# Patient Record
Sex: MALE | Race: WHITE | NOT HISPANIC OR LATINO | Employment: OTHER | URBAN - NONMETROPOLITAN AREA
[De-identification: names, ages, dates, MRNs, and addresses within clinical notes are randomized per-mention and may not be internally consistent; named-entity substitution may affect disease eponyms.]

---

## 2017-01-28 ENCOUNTER — HOSPITAL ENCOUNTER (EMERGENCY)
Facility: HOSPITAL | Age: 72
Discharge: HOME | End: 2017-01-28
Attending: EMERGENCY MEDICINE | Admitting: EMERGENCY MEDICINE
Payer: MEDICARE

## 2017-01-28 VITALS
OXYGEN SATURATION: 94 % | WEIGHT: 177 LBS | HEART RATE: 82 BPM | TEMPERATURE: 97.5 F | HEIGHT: 70 IN | RESPIRATION RATE: 18 BRPM | BODY MASS INDEX: 25.34 KG/M2 | DIASTOLIC BLOOD PRESSURE: 97 MMHG | SYSTOLIC BLOOD PRESSURE: 153 MMHG

## 2017-01-28 DIAGNOSIS — G44.209 ACUTE NON INTRACTABLE TENSION-TYPE HEADACHE: ICD-10-CM

## 2017-01-28 DIAGNOSIS — I10 ESSENTIAL HYPERTENSION: ICD-10-CM

## 2017-01-28 LAB
ANION GAP SERPL CALCULATED.3IONS-SCNC: 10 MMOL/L (ref 3–14)
BASOPHILS # BLD AUTO: 0 10E9/L (ref 0–0.2)
BASOPHILS NFR BLD AUTO: 0.4 %
BUN SERPL-MCNC: 19 MG/DL (ref 7–30)
CALCIUM SERPL-MCNC: 8.3 MG/DL (ref 8.5–10.1)
CHLORIDE SERPL-SCNC: 108 MMOL/L (ref 94–109)
CO2 SERPL-SCNC: 23 MMOL/L (ref 20–32)
CREAT SERPL-MCNC: 0.99 MG/DL (ref 0.66–1.25)
DIFFERENTIAL METHOD BLD: NORMAL
EOSINOPHIL # BLD AUTO: 0.3 10E9/L (ref 0–0.7)
EOSINOPHIL NFR BLD AUTO: 3 %
ERYTHROCYTE [DISTWIDTH] IN BLOOD BY AUTOMATED COUNT: 12.9 % (ref 10–15)
GFR SERPL CREATININE-BSD FRML MDRD: 74 ML/MIN/1.7M2
GLUCOSE SERPL-MCNC: 125 MG/DL (ref 70–99)
HCT VFR BLD AUTO: 47.4 % (ref 40–53)
HGB BLD-MCNC: 16.2 G/DL (ref 13.3–17.7)
IMM GRANULOCYTES # BLD: 0.1 10E9/L (ref 0–0.4)
IMM GRANULOCYTES NFR BLD: 0.5 %
LYMPHOCYTES # BLD AUTO: 1.7 10E9/L (ref 0.8–5.3)
LYMPHOCYTES NFR BLD AUTO: 17.6 %
MCH RBC QN AUTO: 29.1 PG (ref 26.5–33)
MCHC RBC AUTO-ENTMCNC: 34.2 G/DL (ref 31.5–36.5)
MCV RBC AUTO: 85 FL (ref 78–100)
MONOCYTES # BLD AUTO: 0.7 10E9/L (ref 0–1.3)
MONOCYTES NFR BLD AUTO: 7.7 %
NEUTROPHILS # BLD AUTO: 6.8 10E9/L (ref 1.6–8.3)
NEUTROPHILS NFR BLD AUTO: 70.8 %
NRBC # BLD AUTO: 0 10*3/UL
NRBC BLD AUTO-RTO: 0 /100
PLATELET # BLD AUTO: 275 10E9/L (ref 150–450)
POTASSIUM SERPL-SCNC: 4.1 MMOL/L (ref 3.4–5.3)
RBC # BLD AUTO: 5.56 10E12/L (ref 4.4–5.9)
SODIUM SERPL-SCNC: 141 MMOL/L (ref 133–144)
TROPONIN I SERPL-MCNC: NORMAL UG/L (ref 0–0.04)
WBC # BLD AUTO: 9.5 10E9/L (ref 4–11)

## 2017-01-28 PROCEDURE — 36415 COLL VENOUS BLD VENIPUNCTURE: CPT | Performed by: EMERGENCY MEDICINE

## 2017-01-28 PROCEDURE — 84484 ASSAY OF TROPONIN QUANT: CPT | Performed by: EMERGENCY MEDICINE

## 2017-01-28 PROCEDURE — 70450 CT HEAD/BRAIN W/O DYE: CPT | Mod: TC

## 2017-01-28 PROCEDURE — 85025 COMPLETE CBC W/AUTO DIFF WBC: CPT | Performed by: EMERGENCY MEDICINE

## 2017-01-28 PROCEDURE — 80048 BASIC METABOLIC PNL TOTAL CA: CPT | Performed by: EMERGENCY MEDICINE

## 2017-01-28 PROCEDURE — 99284 EMERGENCY DEPT VISIT MOD MDM: CPT | Performed by: EMERGENCY MEDICINE

## 2017-01-28 PROCEDURE — 99284 EMERGENCY DEPT VISIT MOD MDM: CPT | Mod: 25

## 2017-01-28 ASSESSMENT — ENCOUNTER SYMPTOMS
EYES NEGATIVE: 1
ABDOMINAL PAIN: 0
HEMATOLOGIC/LYMPHATIC NEGATIVE: 1
CARDIOVASCULAR NEGATIVE: 1
MUSCULOSKELETAL NEGATIVE: 1
PSYCHIATRIC NEGATIVE: 1
GASTROINTESTINAL NEGATIVE: 1
CONSTITUTIONAL NEGATIVE: 1
HEADACHES: 1
RESPIRATORY NEGATIVE: 1

## 2017-01-28 NOTE — ED AVS SNAPSHOT
[facility]    [address] [address]    Phone:  [phone]                                       Peter Zhao   MRN: [id number]    Department:  [facility]   Date of Visit:  1/28/2017           After Visit Summary Signature Page     I have received my discharge instructions, and my questions have been answered. I have discussed any challenges I see with this plan with the nurse or doctor.    ..........................................................................................................................................  Patient/Patient Representative Signature      ..........................................................................................................................................  Patient Representative Print Name and Relationship to Patient    ..................................................               ................................................  Date                                            Time    ..........................................................................................................................................  Reviewed by Signature/Title    ...................................................              ..............................................  Date                                                            Time

## 2017-01-28 NOTE — ED AVS SNAPSHOT
[facility]    [address] [address]    Phone:  [phone]                                       Peter Zhao   MRN: [id number]    Department:  [facility]   Date of Visit:  1/28/2017           Patient Information     Date Of Birth          1945        Your diagnoses for this visit were:     Essential hypertension     Acute non intractable tension-type headache        You were seen by Viky Sterling MD.      Follow-up Information     Follow up with Brock Vega MD In 1 day.    Specialty:  Family Practice    Contact information:    [facility]  [address] [address]  [phone]        Discharge References/Attachments     HEADACHE, UNSPECIFIED (ENGLISH)    HYPERTENSION, ESTABLISHED (ENGLISH)         Review of your medicines      Our records show that you are taking the medicines listed below. If these are incorrect, please call your family doctor or clinic.        Dose / Directions Last dose taken    AMOXICILLIN PO        Take 4 capsules prior to dental procedures d/t cardiac histroy   Refills:  0        ASPIRIN PO   Dose:  325 mg        Take 325 mg by mouth daily   Refills:  0        atorvastatin 40 MG tablet   Commonly known as:  LIPITOR   Dose:  40 mg        Take 40 mg by mouth daily   Refills:  0        blood glucose lancing device   Dose:  1 Units   Quantity:  1 kit        1 Units daily   Refills:  2        * blood glucose monitoring lancets   Dose:  1 Units   Quantity:  100 each        1 Units daily Use fresh lancet each day to check blood sugars   Refills:  4        * blood glucose monitoring lancets   Quantity:  100 Box        Use to test blood sugar 2 times weekly or as directed.   Refills:  6        * blood glucose monitoring test strip   Commonly known as:  ACCU-CHEK SMARTVIEW   Quantity:  1 Box        Test blood sugar before breakfast one day, before and after supper the next and not at all the third day   Refills:  12         * blood glucose monitoring test strip   Commonly known as:  MARIAN CONTOUR NEXT   Quantity:  100 strip        Use to test blood sugar 2 times weekly or as directed.   Refills:  6        FINASTERIDE PO   Dose:  5 mg        Take 5 mg by mouth daily   Refills:  0        LISINOPRIL PO   Dose:  40 mg        Take 40 mg by mouth daily   Refills:  0        METOPROLOL TARTRATE PO   Dose:  100 mg        Take 100 mg by mouth daily   Refills:  0        MULTIVITAMINS PO   Dose:  1 capsule        Take 1 capsule by mouth daily   Refills:  0        NITROSTAT 0.4 MG sublingual tablet   Dose:  1 tablet   Generic drug:  nitroglycerin        Place 1 tablet under the tongue every 5 minutes as needed   Refills:  0        NONFORMULARY        OTC eye drops as directed   Refills:  0        polyethylene glycol 0.4%- propylene glycol 0.3% 0.4-0.3 % Soln ophthalmic solution   Commonly known as:  SYSTANE ULTRA   Dose:  1 drop        Place 1 drop into both eyes every hour as needed for dry eyes   Refills:  0        RANITIDINE HCL PO   Dose:  50 mg        Take 50 mg by mouth 2 times daily   Refills:  0        VITAMIN D3 PO        Take  by mouth daily.   Refills:  0        * Notice:  This list has 4 medication(s) that are the same as other medications prescribed for you. Read the directions carefully, and ask your doctor or other care provider to review them with you.            Procedures and tests performed during your visit     Basic metabolic panel    CBC with platelets differential    Troponin I      Orders Needing Specimen Collection     None      Pending Results     No orders found from 1/27/2017 to 1/29/2017.            Pending Culture Results     No orders found from 1/27/2017 to 1/29/2017.            Thank you for choosing [facility]       Thank you for choosing [facility] for your care. Our goal is always to provide you with excellent care. Hearing back from our patients is one way we can continue to improve our services. Please take  "a few minutes to complete the written survey that you may receive in the mail after you visit with us. Thank you!        "[employer] Information     [employer] lets you send messages to your doctor, view your test results, renew your prescriptions, schedule appointments and more. To sign up, go to [email or web address] . Click on \"Log in\" on the left side of the screen, which will take you to the Welcome page. Then click on \"Sign up Now\" on the right side of the page.     You will be asked to enter the access code listed below, as well as some personal information. Please follow the directions to create your username and password.     Your access code is: [email or web address]  Expires: 2017  7:08 AM     Your access code will  in 90 days. If you need help or a new code, please call your [facility] clinic or [phone].        Care EveryWhere ID     This is your Care EveryWhere ID. This could be used by other organizations to access your [facility] medical records  [id number]        After Visit Summary       This is your record. Keep this with you and show to your community pharmacist(s) and doctor(s) at your next visit.                  "

## 2017-01-28 NOTE — ED PROVIDER NOTES
"  History     Chief Complaint   Patient presents with     Headache     \"headache since 2300 last night\"      Hypertension     \" 160/111 at home\"      HPI  Peter Zhao is a 71 year old male who presents today with a headache and concerns over his blood pressure. Patient states he was recently diagnosed with hypertension and has been placed on medications but has been unable to bring blood pressure back to normal. Patient states blood pressure was elevated today and so he took an extra dose of medications without relief. While trying to treat his blood pressure, patient noticed he had developed a headache prompting visit to the ER. Headache is dull and mainly in frontal part of his head. Headache was gradual and not worst headache of his life. Headache has now resolved. No visual complaints. No chest pain or shortness of breath at any time. No additional complaints.     I have reviewed the Medications, Allergies, Past Medical and Surgical History, and Social History in the Epic system.    Review of Systems   Constitutional: Negative.    Eyes: Negative.    Respiratory: Negative.    Cardiovascular: Negative.  Negative for chest pain.   Gastrointestinal: Negative.  Negative for abdominal pain.   Genitourinary: Negative.    Musculoskeletal: Negative.    Skin: Negative.    Neurological: Positive for headaches.   Hematological: Negative.    Psychiatric/Behavioral: Negative.        Physical Exam   BP: 138/99 mmHg  Pulse: 82  Heart Rate: 73  Temp: 98.7  F (37.1  C)  Resp: 18  Height: 177.8 cm (5' 10\")  Weight: 80.287 kg (177 lb)  SpO2: 97 %  Physical Exam   Constitutional: He is oriented to person, place, and time. He appears well-developed and well-nourished.   HENT:   Mouth/Throat: Oropharynx is clear and moist.   Eyes: Conjunctivae and EOM are normal. Pupils are equal, round, and reactive to light.   Neck: Normal range of motion. Neck supple.   Cardiovascular: Normal rate and regular rhythm.    Pulmonary/Chest: Effort " normal.   Abdominal: Soft.   Musculoskeletal: Normal range of motion.   Neurological: He is alert and oriented to person, place, and time.   Skin: Skin is warm and dry.   Psychiatric: He has a normal mood and affect. His behavior is normal. Judgment and thought content normal.       ED Course   Procedures        Head CT: No acute abnormality. Chronic microvascular ischemic patches noted.         Labs Ordered and Resulted from Time of ED Arrival Up to the Time of Departure from the ED   BASIC METABOLIC PANEL - Abnormal; Notable for the following:     Glucose 125 (*)     Calcium 8.3 (*)     All other components within normal limits   CBC WITH PLATELETS DIFFERENTIAL   TROPONIN I       Assessments & Plan (with Medical Decision Making)     I have reviewed the nursing notes.    I have reviewed the findings, diagnosis, plan and need for follow up with the patient.    New Prescriptions    No medications on file       Final diagnoses:   Essential hypertension   Acute non intractable tension-type headache       1/28/2017   [facility]  7.02am: No headache or visual symptoms. CT shows no acute findings. Plan: d/c home and patient to follow up with pcp for proper control of blood pressure. Patient to return for any chest pain, shortness of breath or return on headache.     Viky Sterling MD  01/28/17 2007

## 2017-02-15 ENCOUNTER — HOSPITAL ENCOUNTER (OUTPATIENT)
Facility: HOSPITAL | Age: 72
End: 2017-02-15
Attending: SURGERY | Admitting: SURGERY
Payer: MEDICARE

## 2017-02-15 ENCOUNTER — TRANSFERRED RECORDS (OUTPATIENT)
Dept: HEALTH INFORMATION MANAGEMENT | Facility: HOSPITAL | Age: 72
End: 2017-02-15

## 2017-02-15 ENCOUNTER — HOSPITAL ENCOUNTER (OUTPATIENT)
Dept: CT IMAGING | Facility: HOSPITAL | Age: 72
Discharge: HOME | DRG: 329 | End: 2017-02-15
Attending: FAMILY MEDICINE | Admitting: SURGERY
Payer: MEDICARE

## 2017-02-15 ENCOUNTER — OFFICE VISIT (OUTPATIENT)
Dept: SURGERY | Facility: OTHER | Age: 72
DRG: 329 | End: 2017-02-15
Attending: SURGERY
Payer: MEDICARE

## 2017-02-15 VITALS
DIASTOLIC BLOOD PRESSURE: 68 MMHG | TEMPERATURE: 97.5 F | BODY MASS INDEX: 25.34 KG/M2 | OXYGEN SATURATION: 95 % | HEART RATE: 91 BPM | HEIGHT: 70 IN | WEIGHT: 177 LBS | SYSTOLIC BLOOD PRESSURE: 128 MMHG

## 2017-02-15 DIAGNOSIS — R10.84 ABDOMINAL PAIN, GENERALIZED: Primary | ICD-10-CM

## 2017-02-15 DIAGNOSIS — K56.699 STRICTURE OF SIGMOID COLON (H): ICD-10-CM

## 2017-02-15 DIAGNOSIS — R10.32 LLQ ABDOMINAL PAIN: Primary | ICD-10-CM

## 2017-02-15 PROBLEM — R97.20 ELEVATED PROSTATE SPECIFIC ANTIGEN (PSA): Status: ACTIVE | Noted: 2017-02-15

## 2017-02-15 PROBLEM — H26.9 CATARACT: Status: ACTIVE | Noted: 2017-02-15

## 2017-02-15 PROBLEM — H53.9 VISUAL AURA: Status: ACTIVE | Noted: 2017-02-15

## 2017-02-15 PROBLEM — Z86.69 H/O MIGRAINE: Status: ACTIVE | Noted: 2017-02-15

## 2017-02-15 PROBLEM — E78.5 HYPERLIPIDEMIA LDL GOAL <100: Status: ACTIVE | Noted: 2017-02-15

## 2017-02-15 PROBLEM — I10 HTN (HYPERTENSION): Status: ACTIVE | Noted: 2017-02-15

## 2017-02-15 PROBLEM — Z95.810 PRESENCE OF COMBINATION INTERNAL CARDIAC DEFIBRILLATOR (ICD) AND PACEMAKER: Status: ACTIVE | Noted: 2017-02-15

## 2017-02-15 PROCEDURE — 2894A VOIDCORRECT: CPT | Performed by: SURGERY

## 2017-02-15 PROCEDURE — 99214 OFFICE O/P EST MOD 30 MIN: CPT | Performed by: SURGERY

## 2017-02-15 RX ORDER — LIDOCAINE 40 MG/G
CREAM TOPICAL
Status: CANCELLED | OUTPATIENT
Start: 2017-02-15

## 2017-02-15 RX ORDER — SODIUM, POTASSIUM,MAG SULFATES 17.5-3.13G
2 SOLUTION, RECONSTITUTED, ORAL ORAL SEE ADMIN INSTRUCTIONS
Qty: 2 BOTTLE | Refills: 0 | Status: CANCELLED | OUTPATIENT
Start: 2017-02-15

## 2017-02-15 RX ORDER — SODIUM CHLORIDE, SODIUM LACTATE, POTASSIUM CHLORIDE, CALCIUM CHLORIDE 600; 310; 30; 20 MG/100ML; MG/100ML; MG/100ML; MG/100ML
INJECTION, SOLUTION INTRAVENOUS CONTINUOUS
Status: CANCELLED | OUTPATIENT
Start: 2017-02-15

## 2017-02-15 RX ORDER — IOPAMIDOL 612 MG/ML
100 INJECTION, SOLUTION INTRAVASCULAR ONCE
Status: COMPLETED | OUTPATIENT
Start: 2017-02-15 | End: 2017-02-15

## 2017-02-15 RX ADMIN — IOPAMIDOL 100 ML: 612 INJECTION, SOLUTION INTRAVASCULAR at 11:49

## 2017-02-15 ASSESSMENT — PAIN SCALES - GENERAL: PAINLEVEL: SEVERE PAIN (6)

## 2017-02-15 NOTE — NURSING NOTE
"Chief Complaint   Patient presents with     Consult     Consult for sigmoid bowel obstruction-CT done here-Dr Vega is referring       Initial /68  Pulse 91  Temp 97.5  F (36.4  C) (Tympanic)  Ht 5' 10\" (1.778 m)  Wt 177 lb (80.3 kg)  SpO2 95%  BMI 25.4 kg/m2 Estimated body mass index is 25.4 kg/(m^2) as calculated from the following:    Height as of this encounter: 5' 10\" (1.778 m).    Weight as of this encounter: 177 lb (80.3 kg).  Medication Reconciliation: complete   ELIAS MULLER      "

## 2017-02-15 NOTE — MR AVS SNAPSHOT
After Visit Summary   2/15/2017    Peter Zhao    MRN: [id number]           Patient Information     Date Of Birth          1945        Visit Information        Provider Department      2/15/2017 1:30 PM Tima Moreland MD [facility]        Today's Diagnoses     Abdominal pain, generalized    -  1    Stricture of sigmoid colon (H)          Care Instructions    Thank you for allowing Dr Moreland and our surgical team to participate in your care.  If you have a scheduling or an appointment question please contact Darcie, our Health Unit Coordinator, at her direct line [phone].   ALL nursing questions or concerns can be directed to your surgical nurse at: [phone]-Barbara    You are scheduled for a: Colonoscopy with possible biopsy   Your procedure date is: Friday March 17, 2017    You have been ordered Suprep as your mechanical bowel prep. Please pick this up from your preferred pharmacy.       Bowel Prep    Clear liquid diet only on Thursday, February 16th,  the day before the examination. (See the instruction sheet)    Ruiz prep - one bottle at 6pm Thursday, February 16th, and follow with Two 16 ounce glasses water.    Ruiz prep - one bottle at 0400 on Friday, February 17th, the day of the exam.  Follow with Two 16 ounce glasses of water.    Nothing by mouth after finishing the second 16 ounce glass of water the morning of the procedure.     HOW TO PREPARE-        You need a friend or family member available to drive you home AND stay with you for 4 hours after you leave the hospital. You will not be allowed to drive yourself. IF you need to take a taxi or the bus you MUST have a responsible person to ride with you. YOUR PROCEDURE WILL BE CANCELLED IF YOU DO NOT HAVE A RESPONSIBLE ADULT TO DRIVE YOU HOME.       You need to call our Surgery Education Nurses 1-2 weeks prior to your surgery date at [phone] or toll free [phone]. Please have you medication and allergy  "lists ready.       Stop your aspirin or other NSAIDs(Ibuprofen, Motrin, Aleve, Celebrex, Naproxen, etc...) 7 days before your surgery.      Hospital admitting will call you the day before your surgery with your arrival time. If you are scheduled on a Monday admitting will call you the Friday before.      Please call your primary care physician if you should become ill within 24 hours of scheduled surgery. (ex.vomiting, diarrhea, fever)          Follow-ups after your visit        Your next 10 appointments already scheduled     Feb 17, 2017   Procedure with Tima Moreland MD   [facility] ([facility] )    [address] [place] [address] [phone]              Who to contact     If you have questions or need follow up information about today's clinic visit or your schedule please contact [facility] directly at [phone].  Normal or non-critical lab and imaging results will be communicated to you by MyChart, letter or phone within 4 business days after the clinic has received the results. If you do not hear from us within 7 days, please contact the clinic through [employer] or phone. If you have a critical or abnormal lab result, we will notify you by phone as soon as possible.  Submit refill requests through [facility] [employer] [facility] or call your pharmacy and they will forward the refill request to us. Please allow 3 business days for your refill to be completed.          Additional Information About Your Visit        [employer] Information     [employer] [facility] lets you send messages to your doctor, view your test results, renew your prescriptions, schedule appointments and more. To sign up, go to [email or web address] . Click on \"Log in\" on the left side of the screen, which will take you to the Welcome page. Then click on \"Sign up Now\" on the right side of the page.     You will be asked to enter the access code listed below, as well as some personal information. Please follow the directions " "to create your username and password.     Your access code is: [email or web address]  Expires: 2017  7:08 AM     Your access code will  in 90 days. If you need help or a new code, please call your [facility] clinic or [phone].        Care EveryWhere ID     This is your Care EveryWhere ID. This could be used by other organizations to access your [facility] medical records  [id number]        Your Vitals Were     Pulse Temperature Height Pulse Oximetry BMI (Body Mass Index)       91 97.5  F (36.4  C) (Tympanic) 5' 10\" (1.778 m) 95% 25.4 kg/m2        Blood Pressure from Last 3 Encounters:   02/15/17 128/68   17 153/97   10/04/16 112/68    Weight from Last 3 Encounters:   02/15/17 177 lb (80.3 kg)   17 177 lb (80.3 kg)   10/04/16 186 lb 3.2 oz (84.5 kg)              We Performed the Following     SURGERY ORDER        Primary Care Provider Office Phone # Fax #    Brock Vega [phone]       [facility] [address] [address]        Thank you!     Thank you for choosing [facility]  for your care. Our goal is always to provide you with excellent care. Hearing back from our patients is one way we can continue to improve our services. Please take a few minutes to complete the written survey that you may receive in the mail after your visit with us. Thank you!             Your Updated Medication List - Protect others around you: Learn how to safely use, store and throw away your medicines at www.disposemymeds.org.          This list is accurate as of: 2/15/17  2:58 PM.  Always use your most recent med list.                   Brand Name Dispense Instructions for use    AMOXICILLIN PO      Take 4 capsules prior to dental procedures d/t cardiac histroy       ASPIRIN PO      Take 325 mg by mouth daily       atorvastatin 40 MG tablet    LIPITOR     Take 40 mg by mouth daily       blood glucose lancing device     1 kit    1 Units daily       * blood " glucose monitoring lancets     100 each    1 Units daily Use fresh lancet each day to check blood sugars       * blood glucose monitoring lancets     100 Box    Use to test blood sugar 2 times weekly or as directed.       * blood glucose monitoring test strip    ACCU-CHEK SMARTVIEW    1 Box    Test blood sugar before breakfast one day, before and after supper the next and not at all the third day       * blood glucose monitoring test strip    MARIAN CONTOUR NEXT    100 strip    Use to test blood sugar 2 times weekly or as directed.       FINASTERIDE PO      Take 5 mg by mouth daily       LISINOPRIL PO      Take 40 mg by mouth daily       METOPROLOL TARTRATE PO      Take 100 mg by mouth daily       MULTIVITAMINS PO      Take 1 capsule by mouth daily       NITROSTAT 0.4 MG sublingual tablet   Generic drug:  nitroglycerin      Place 1 tablet under the tongue every 5 minutes as needed       NONFORMULARY      OTC eye drops as directed       polyethylene glycol 0.4%- propylene glycol 0.3% 0.4-0.3 % Soln ophthalmic solution    SYSTANE ULTRA     Place 1 drop into both eyes every hour as needed for dry eyes       RANITIDINE HCL PO      Take 50 mg by mouth 2 times daily       VITAMIN D3 PO      Take  by mouth daily.       * Notice:  This list has 4 medication(s) that are the same as other medications prescribed for you. Read the directions carefully, and ask your doctor or other care provider to review them with you.

## 2017-02-15 NOTE — PATIENT INSTRUCTIONS
Thank you for allowing Dr Moreland and our surgical team to participate in your care.  If you have a scheduling or an appointment question please contact Darcie, our Health Unit Coordinator, at her direct line [phone].   ALL nursing questions or concerns can be directed to your surgical nurse at: [phone]    You are scheduled for a: Colonoscopy with possible biopsy   Your procedure date is: Friday March 17, 2017    You have been ordered Suprep as your mechanical bowel prep. Please pick this up from your preferred pharmacy.       Bowel Prep    Clear liquid diet only on Thursday, February 16th,  the day before the examination. (See the instruction sheet)    Ruiz prep - one bottle at 6pm Thursday, February 16th, and follow with Two 16 ounce glasses water.    Ruiz prep - one bottle at 0400 on Friday, February 17th, the day of the exam.  Follow with Two 16 ounce glasses of water.    Nothing by mouth after finishing the second 16 ounce glass of water the morning of the procedure.     HOW TO PREPARE-        You need a friend or family member available to drive you home AND stay with you for 4 hours after you leave the hospital. You will not be allowed to drive yourself. IF you need to take a taxi or the bus you MUST have a responsible person to ride with you. YOUR PROCEDURE WILL BE CANCELLED IF YOU DO NOT HAVE A RESPONSIBLE ADULT TO DRIVE YOU HOME.       You need to call our Surgery Education Nurses 1-2 weeks prior to your surgery date at [phone] or toll free [phone]. Please have you medication and allergy lists ready.       Stop your aspirin or other NSAIDs(Ibuprofen, Motrin, Aleve, Celebrex, Naproxen, etc...) 7 days before your surgery.      Hospital admitting will call you the day before your surgery with your arrival time. If you are scheduled on a Monday admitting will call you the Friday before.      Please call your primary care physician if you should become ill within 24 hours of scheduled  surgery. (ex.vomiting, diarrhea, fever)

## 2017-02-15 NOTE — CONSULTS
Dr. Vega asked me to see Mr. Peter Zhao, who is a pleasant 71-year-old male regarding an obstructing lesion in the patient's lower sigmoid colon.  This man states that he was well until Thursday or Friday.  He does state that prior to that time his bowels have been working normally.  He tends to have 2 bowel movements a day.  He had not noticed any change in the caliber of the stool.  He started having some lower abdominal pain.  This was a crampy type of discomfort.  He was hearing a fair amount of noise coming from the abdomen.  Would develop cramps.  Would then pass a large amount of gas and feel better.  He has been passing quite a bit of gas over the weekend and earlier this week.  He has not passed as much yesterday and today.  His bowels have been functioning.  He had quite a good bowel movement yesterday.  He passed a small amount of stool today.  He denies any fever or chills.  He has not had any blood with his bowel movements.  There is no family history of colon cancer or colonic polyps.      He has been eating normally.  There has been no nausea or vomiting.      Of note, he did have a bout of diverticulitis 3-4 years ago.  He was treated with antibiotics at that time.  He was not admitted into the hospital.  He has not had any recent episodes of gastroenteritis or abdominal pain.  He did have trouble with an upper respiratory tract infection on 2 occasions earlier this year.      The patient has never had a colonoscopy.      The patient does have a history of coronary artery disease.  He has had previous aortic bypass surgery plus repair of a valve.  He also has a pacemaker and defibrillator in place.  This was placed approximately 5 years ago.  The surgery was around the same time.  He states that his heart has been good since then.  He denies chest pain.  Denies any shortness of breath.  He has not smoked for approximately 28 years.  He is not asthmatic.  He is not diabetic.  There is no history of  any chronic renal disease.  There is no history of stroke or threatened stroke.      CURRENT MEDICATIONS:  Lipitor, Nitrostat, aspirin, lisinopril, multivitamins, vitamin D, Avodart, ranitidine, and metoprolol.      ALLERGIES:  He does have allergies to acetaminophen and ciprofloxacin.      PAST SURGICAL HISTORY:  He has had previous tonsillectomy, biopsy of his prostate, bilateral inguinal hernia repairs, insertion of the pacemaker, repair of a retinal tear as well as cataract repair.      REVIEW OF SYSTEMS:  He does state that he thinks he has lost approximately 6-7 pounds of weight over the last week or so.  His abdomen does feel a bit distended.      PHYSICAL EXAMINATION:   GENERAL:  Today he is a healthy-appearing 71-year-old male who is in no distress.  He is awake and alert.   HEAD, NECK:  Unremarkable.  I could not feel any masses.  There is no thyroid enlargement.  There is no scleral icterus.   CHEST:  He has scars from the previous surgeries.  Pacemaker is present in the left upper chest.  He does have good air entry bilaterally.  There are no wheezes or creps.   HEART:  Sounds were normal.  He does have a bit of a click.  He is in sinus rhythm.  No murmurs were heard.   ABDOMEN:  He is a bit on the obese side.  He has scars from his previous hernia repairs.  One was done open on the left side; the right one was done by way of the laparoscope.  The abdomen was soft.  He had very minimal tenderness on deep palpation in the left lower quadrant.  I could not feel any masses.  There is no hepatosplenomegaly.  I could not demonstrate any costovertebral angle tenderness.  He does not have groin hernias.  His femoral pulses are strong bilaterally.      LABORATORY WORK:  He did have blood work done by Dr. Vega, which reveals that his hemoglobin is normal at 17.3 with a white count of 9.8.  His urine is clear.      He has also had a CT scan of the abdomen and pelvis.  I did review this personally.  This does  suggest a focal narrowing within the lower part of the sigmoid colon.  Thickening appears to be a confined to the bowel wall.  There are no extraneous masses that could be seen.  Proximal left colon is dilated primarily with gas and a little stool.  There is no evidence of any metastatic disease to the liver.  The radiologist felt that this was either an area of scarring perhaps secondary to diverticulitis or a neoplasm.      I explained to Mr. Zhao that he does have an area of narrowing in the sigmoid colon.  This is no doubt causing his symptoms.  I did explain that this could either be diverticular disease or it could be a malignancy.  I explained that to try and sort it out, we should do a colonoscopy.  Hopefully, we will be able to get up into the stricture and hopefully will be able to get past it and examine the whole colon.  I did explain that if there are polyps present we would remove them.  Otherwise, we will try to do biopsies.  I did go over the procedure with him including the risks of perforation and bleeding.  We also discussed the risks of the sedation.  I suspect we will be able to get him cleaned out as he is passing gas and having bowel movements still.      We also discussed that he likely is going to need to have surgery done on the area.  It will depend a little bit on the findings.  We talked briefly about the surgery.  I did explain that it is a big operation.  He likely would be in the hospital for a week or so.  We will need to discuss it further and decide on timing after the colonoscopy is done.  I did explain to him that I do also work here just on a part-time basis.      Arrangements are being made for the colonoscopy to be carried out on Friday.      Thank you for asking me to see Mr. Zhao.         RUDY RAYO MD             D: 02/15/2017 14:18   T: 02/15/2017 15:13   MT: MERCED      Name:     ROGER ZHAO   MRN:      [id number]        Account:       [id number]   :       1945           Consult Date:  02/15/2017      Document: [id number]       cc: Brokc Vega MD

## 2017-02-16 ENCOUNTER — TELEPHONE (OUTPATIENT)
Dept: EDUCATION SERVICES | Facility: HOSPITAL | Age: 72
End: 2017-02-16

## 2017-02-16 DIAGNOSIS — E11.9 TYPE 2 DIABETES MELLITUS WITHOUT COMPLICATION, WITHOUT LONG-TERM CURRENT USE OF INSULIN (H): Primary | ICD-10-CM

## 2017-02-16 NOTE — H&P
Dr. Vega asked me to see Mr. Peter Zhao, who is a pleasant 71-year-old male regarding an obstructing lesion in the patient's lower sigmoid colon.  This man states that he was well until Thursday or Friday.  He does state that prior to that time his bowels have been working normally.  He tends to have 2 bowel movements a day.  He had not noticed any change in the caliber of the stool.  He started having some lower abdominal pain.  This was a crampy type of discomfort.  He was hearing a fair amount of noise coming from the abdomen.  Would develop cramps.  Would then pass a large amount of gas and feel better.  He has been passing quite a bit of gas over the weekend and earlier this week.  He has not passed as much yesterday and today.  His bowels have been functioning.  He had quite a good bowel movement yesterday.  He passed a small amount of stool today.  He denies any fever or chills.  He has not had any blood with his bowel movements.  There is no family history of colon cancer or colonic polyps.      He has been eating normally.  There has been no nausea or vomiting.      Of note, he did have a bout of diverticulitis 3-4 years ago.  He was treated with antibiotics at that time.  He was not admitted into the hospital.  He has not had any recent episodes of gastroenteritis or abdominal pain.  He did have trouble with an upper respiratory tract infection on 2 occasions earlier this year.      The patient has never had a colonoscopy.      The patient does have a history of coronary artery disease.  He has had previous aortic bypass surgery plus repair of a valve.  He also has a pacemaker and defibrillator in place.  This was placed approximately 5 years ago.  The surgery was around the same time.  He states that his heart has been good since then.  He denies chest pain.  Denies any shortness of breath.  He has not smoked for approximately 28 years.  He is not asthmatic.  He is not diabetic.  There is no history of  any chronic renal disease.  There is no history of stroke or threatened stroke.      CURRENT MEDICATIONS:  Lipitor, Nitrostat, aspirin, lisinopril, multivitamins, vitamin D, Avodart, ranitidine, and metoprolol.      ALLERGIES:  He does have allergies to acetaminophen and ciprofloxacin.      PAST SURGICAL HISTORY:  He has had previous tonsillectomy, biopsy of his prostate, bilateral inguinal hernia repairs, insertion of the pacemaker, repair of a retinal tear as well as cataract repair.      REVIEW OF SYSTEMS:  He does state that he thinks he has lost approximately 6-7 pounds of weight over the last week or so.  His abdomen does feel a bit distended.      PHYSICAL EXAMINATION:   GENERAL:  Today he is a healthy-appearing 71-year-old male who is in no distress.  He is awake and alert.   HEAD, NECK:  Unremarkable.  I could not feel any masses.  There is no thyroid enlargement.  There is no scleral icterus.   CHEST:  He has scars from the previous surgeries.  Pacemaker is present in the left upper chest.  He does have good air entry bilaterally.  There are no wheezes or creps.   HEART:  Sounds were normal.  He does have a bit of a click.  He is in sinus rhythm.  No murmurs were heard.   ABDOMEN:  He is a bit on the obese side.  He has scars from his previous hernia repairs.  One was done open on the left side; the right one was done by way of the laparoscope.  The abdomen was soft.  He had very minimal tenderness on deep palpation in the left lower quadrant.  I could not feel any masses.  There is no hepatosplenomegaly.  I could not demonstrate any costovertebral angle tenderness.  He does not have groin hernias.  His femoral pulses are strong bilaterally.      LABORATORY WORK:  He did have blood work done by Dr. Vega, which reveals that his hemoglobin is normal at 17.3 with a white count of 9.8.  His urine is clear.      He has also had a CT scan of the abdomen and pelvis.  I did review this personally.  This does  suggest a focal narrowing within the lower part of the sigmoid colon.  Thickening appears to be a confined to the bowel wall.  There are no extraneous masses that could be seen.  Proximal left colon is dilated primarily with gas and a little stool.  There is no evidence of any metastatic disease to the liver.  The radiologist felt that this was either an area of scarring perhaps secondary to diverticulitis or a neoplasm.      I explained to Mr. Zhao that he does have an area of narrowing in the sigmoid colon.  This is no doubt causing his symptoms.  I did explain that this could either be diverticular disease or it could be a malignancy.  I explained that to try and sort it out, we should do a colonoscopy.  Hopefully, we will be able to get up into the stricture and hopefully will be able to get past it and examine the whole colon.  I did explain that if there are polyps present we would remove them.  Otherwise, we will try to do biopsies.  I did go over the procedure with him including the risks of perforation and bleeding.  We also discussed the risks of the sedation.  I suspect we will be able to get him cleaned out as he is passing gas and having bowel movements still.      We also discussed that he likely is going to need to have surgery done on the area.  It will depend a little bit on the findings.  We talked briefly about the surgery.  I did explain that it is a big operation.  He likely would be in the hospital for a week or so.  We will need to discuss it further and decide on timing after the colonoscopy is done.  I did explain to him that I do also work here just on a part-time basis.      Arrangements are being made for the colonoscopy to be carried out on Friday.      Thank you for asking me to see Mr. Zhao.         RUDY RAYO MD             D: 02/15/2017 14:18   T: 02/15/2017 15:13   MT: MERCED      Name:     ROGER ZHAO   MRN:      [id number]        Account:       [id number]   :       1945           Consult Date:  02/15/2017      Document: [id number]       cc: Brock Vega MD

## 2017-02-17 ENCOUNTER — ANESTHESIA (OUTPATIENT)
Dept: SURGERY | Facility: HOSPITAL | Age: 72
DRG: 329 | End: 2017-02-17
Payer: MEDICARE

## 2017-02-17 ENCOUNTER — APPOINTMENT (OUTPATIENT)
Dept: GENERAL RADIOLOGY | Facility: HOSPITAL | Age: 72
DRG: 329 | End: 2017-02-17
Payer: MEDICARE

## 2017-02-17 ENCOUNTER — ANESTHESIA EVENT (OUTPATIENT)
Dept: SURGERY | Facility: HOSPITAL | Age: 72
DRG: 329 | End: 2017-02-17
Payer: MEDICARE

## 2017-02-17 ENCOUNTER — TRANSFERRED RECORDS (OUTPATIENT)
Dept: SURGERY | Facility: HOSPITAL | Age: 72
End: 2017-02-17

## 2017-02-17 ENCOUNTER — HOSPITAL ENCOUNTER (INPATIENT)
Facility: HOSPITAL | Age: 72
LOS: 6 days | Discharge: HOME HEALTH | DRG: 329 | End: 2017-02-23
Attending: EMERGENCY MEDICINE | Admitting: SURGERY
Payer: MEDICARE

## 2017-02-17 ENCOUNTER — TRANSFERRED RECORDS (OUTPATIENT)
Dept: HEALTH INFORMATION MANAGEMENT | Facility: HOSPITAL | Age: 72
End: 2017-02-17

## 2017-02-17 DIAGNOSIS — K56.60 INTESTINAL OBSTRUCTION, UNSPECIFIED TYPE: Primary | ICD-10-CM

## 2017-02-17 DIAGNOSIS — Z43.3 COLOSTOMY CARE (H): ICD-10-CM

## 2017-02-17 DIAGNOSIS — R10.84 ABDOMINAL PAIN, GENERALIZED: ICD-10-CM

## 2017-02-17 PROBLEM — K56.609 LARGE BOWEL OBSTRUCTION (H): Status: ACTIVE | Noted: 2017-02-17

## 2017-02-17 PROBLEM — K56.699 STRICTURE OF SIGMOID COLON (H): Status: ACTIVE | Noted: 2017-02-17

## 2017-02-17 LAB
ALBUMIN SERPL-MCNC: 3.9 G/DL (ref 3.4–5)
ALP SERPL-CCNC: 98 U/L (ref 40–150)
ALT SERPL W P-5'-P-CCNC: 32 U/L (ref 0–70)
AMYLASE SERPL-CCNC: 51 U/L (ref 30–110)
ANION GAP SERPL CALCULATED.3IONS-SCNC: 13 MMOL/L (ref 3–14)
AST SERPL W P-5'-P-CCNC: 34 U/L (ref 0–45)
BASOPHILS # BLD AUTO: 0.1 10E9/L (ref 0–0.2)
BASOPHILS NFR BLD AUTO: 0.4 %
BILIRUB SERPL-MCNC: 1.6 MG/DL (ref 0.2–1.3)
BUN SERPL-MCNC: 29 MG/DL (ref 7–30)
CALCIUM SERPL-MCNC: 8.7 MG/DL (ref 8.5–10.1)
CHLORIDE SERPL-SCNC: 104 MMOL/L (ref 94–109)
CO2 SERPL-SCNC: 24 MMOL/L (ref 20–32)
CREAT SERPL-MCNC: 1.32 MG/DL (ref 0.66–1.25)
DIFFERENTIAL METHOD BLD: ABNORMAL
EOSINOPHIL # BLD AUTO: 0.1 10E9/L (ref 0–0.7)
EOSINOPHIL NFR BLD AUTO: 0.3 %
ERYTHROCYTE [DISTWIDTH] IN BLOOD BY AUTOMATED COUNT: 13.7 % (ref 10–15)
FLEXIBLE SIGMOIDOSCOPY: NORMAL
GFR SERPL CREATININE-BSD FRML MDRD: 53 ML/MIN/1.7M2
GLUCOSE SERPL-MCNC: 153 MG/DL (ref 70–99)
HCT VFR BLD AUTO: 50.7 % (ref 40–53)
HGB BLD-MCNC: 17.3 G/DL (ref 13.3–17.7)
IMM GRANULOCYTES # BLD: 0.1 10E9/L (ref 0–0.4)
IMM GRANULOCYTES NFR BLD: 0.4 %
LACTATE SERPL-SCNC: 1.7 MMOL/L (ref 0.4–2)
LIPASE SERPL-CCNC: 131 U/L (ref 73–393)
LYMPHOCYTES # BLD AUTO: 1.5 10E9/L (ref 0.8–5.3)
LYMPHOCYTES NFR BLD AUTO: 9.5 %
MCH RBC QN AUTO: 29.3 PG (ref 26.5–33)
MCHC RBC AUTO-ENTMCNC: 34.1 G/DL (ref 31.5–36.5)
MCV RBC AUTO: 86 FL (ref 78–100)
MONOCYTES # BLD AUTO: 0.9 10E9/L (ref 0–1.3)
MONOCYTES NFR BLD AUTO: 5.7 %
NEUTROPHILS # BLD AUTO: 13.1 10E9/L (ref 1.6–8.3)
NEUTROPHILS NFR BLD AUTO: 83.7 %
NRBC # BLD AUTO: 0 10*3/UL
NRBC BLD AUTO-RTO: 0 /100
PLATELET # BLD AUTO: 248 10E9/L (ref 150–450)
POTASSIUM SERPL-SCNC: 4 MMOL/L (ref 3.4–5.3)
PROT SERPL-MCNC: 7.9 G/DL (ref 6.8–8.8)
RBC # BLD AUTO: 5.9 10E12/L (ref 4.4–5.9)
SODIUM SERPL-SCNC: 141 MMOL/L (ref 133–144)
TROPONIN I SERPL-MCNC: NORMAL UG/L (ref 0–0.04)
WBC # BLD AUTO: 15.7 10E9/L (ref 4–11)

## 2017-02-17 PROCEDURE — 40000306 ZZH STATISTIC PRE PROC ASSESS II: Performed by: SURGERY

## 2017-02-17 PROCEDURE — 99285 EMERGENCY DEPT VISIT HI MDM: CPT | Mod: 25

## 2017-02-17 PROCEDURE — 71000027 ZZH RECOVERY PHASE 2 EACH 15 MINS: Performed by: SURGERY

## 2017-02-17 PROCEDURE — 96374 THER/PROPH/DIAG INJ IV PUSH: CPT

## 2017-02-17 PROCEDURE — 25000128 H RX IP 250 OP 636: Performed by: EMERGENCY MEDICINE

## 2017-02-17 PROCEDURE — 71000017 ZZH RECOVERY PHASE 1 LEVEL 3 EA ADDTL HR: Performed by: SURGERY

## 2017-02-17 PROCEDURE — 88307 TISSUE EXAM BY PATHOLOGIST: CPT | Mod: TC | Performed by: SURGERY

## 2017-02-17 PROCEDURE — 83605 ASSAY OF LACTIC ACID: CPT | Performed by: EMERGENCY MEDICINE

## 2017-02-17 PROCEDURE — 85025 COMPLETE CBC W/AUTO DIFF WBC: CPT | Performed by: EMERGENCY MEDICINE

## 2017-02-17 PROCEDURE — 25000125 ZZHC RX 250: Performed by: NURSE ANESTHETIST, CERTIFIED REGISTERED

## 2017-02-17 PROCEDURE — 25000566 ZZH SEVOFLURANE, EA 15 MIN: Performed by: ANESTHESIOLOGY

## 2017-02-17 PROCEDURE — 25000128 H RX IP 250 OP 636: Performed by: NURSE ANESTHETIST, CERTIFIED REGISTERED

## 2017-02-17 PROCEDURE — 45330 DIAGNOSTIC SIGMOIDOSCOPY: CPT | Performed by: ANESTHESIOLOGY

## 2017-02-17 PROCEDURE — 0DBN0ZZ EXCISION OF SIGMOID COLON, OPEN APPROACH: ICD-10-PCS | Performed by: SURGERY

## 2017-02-17 PROCEDURE — 44141 PARTIAL REMOVAL OF COLON: CPT | Performed by: SURGERY

## 2017-02-17 PROCEDURE — 83690 ASSAY OF LIPASE: CPT | Performed by: EMERGENCY MEDICINE

## 2017-02-17 PROCEDURE — S0074 INJECTION, CEFOTETAN DISODIU: HCPCS | Performed by: SURGERY

## 2017-02-17 PROCEDURE — 99100 ANES PT EXTEME AGE<1 YR&>70: CPT | Performed by: NURSE ANESTHETIST, CERTIFIED REGISTERED

## 2017-02-17 PROCEDURE — 37000009 ZZH ANESTHESIA TECHNICAL FEE, EACH ADDTL 15 MIN: Performed by: SURGERY

## 2017-02-17 PROCEDURE — 71010 ZZHC CHEST ONE VIEW: CPT | Mod: TC

## 2017-02-17 PROCEDURE — 40000275 ZZH STATISTIC RCP TIME EA 10 MIN

## 2017-02-17 PROCEDURE — 93005 ELECTROCARDIOGRAM TRACING: CPT

## 2017-02-17 PROCEDURE — 25000125 ZZHC RX 250: Performed by: EMERGENCY MEDICINE

## 2017-02-17 PROCEDURE — 99222 1ST HOSP IP/OBS MODERATE 55: CPT | Mod: AI | Performed by: SURGERY

## 2017-02-17 PROCEDURE — 37000008 ZZH ANESTHESIA TECHNICAL FEE, 1ST 30 MIN: Performed by: SURGERY

## 2017-02-17 PROCEDURE — 25000132 ZZH RX MED GY IP 250 OP 250 PS 637: Mod: GY | Performed by: SURGERY

## 2017-02-17 PROCEDURE — 62326 NJX INTERLAMINAR LMBR/SAC: CPT | Mod: 59 | Performed by: ANESTHESIOLOGY

## 2017-02-17 PROCEDURE — 44140 PARTIAL REMOVAL OF COLON: CPT | Performed by: NURSE ANESTHETIST, CERTIFIED REGISTERED

## 2017-02-17 PROCEDURE — 20000003 ZZH R&B ICU

## 2017-02-17 PROCEDURE — 36000056 ZZH SURGERY LEVEL 3 1ST 30 MIN: Performed by: SURGERY

## 2017-02-17 PROCEDURE — 25000125 ZZHC RX 250: Performed by: SURGERY

## 2017-02-17 PROCEDURE — 25800025 ZZH RX 258: Performed by: NURSE ANESTHETIST, CERTIFIED REGISTERED

## 2017-02-17 PROCEDURE — 36000058 ZZH SURGERY LEVEL 3 EA 15 ADDTL MIN: Performed by: SURGERY

## 2017-02-17 PROCEDURE — 80053 COMPREHEN METABOLIC PANEL: CPT | Performed by: EMERGENCY MEDICINE

## 2017-02-17 PROCEDURE — 36000050 ZZH SURGERY LEVEL 2 1ST 30 MIN: Performed by: SURGERY

## 2017-02-17 PROCEDURE — 25800025 ZZH RX 258: Performed by: ANESTHESIOLOGY

## 2017-02-17 PROCEDURE — 25000128 H RX IP 250 OP 636: Performed by: SURGERY

## 2017-02-17 PROCEDURE — 0D1N0Z4 BYPASS SIGMOID COLON TO CUTANEOUS, OPEN APPROACH: ICD-10-PCS | Performed by: SURGERY

## 2017-02-17 PROCEDURE — 96375 TX/PRO/DX INJ NEW DRUG ADDON: CPT

## 2017-02-17 PROCEDURE — 0DJD8ZZ INSPECTION OF LOWER INTESTINAL TRACT, VIA NATURAL OR ARTIFICIAL OPENING ENDOSCOPIC: ICD-10-PCS | Performed by: SURGERY

## 2017-02-17 PROCEDURE — 96361 HYDRATE IV INFUSION ADD-ON: CPT

## 2017-02-17 PROCEDURE — 36556 INSERT NON-TUNNEL CV CATH: CPT | Mod: 59 | Performed by: ANESTHESIOLOGY

## 2017-02-17 PROCEDURE — 44140 PARTIAL REMOVAL OF COLON: CPT | Performed by: ANESTHESIOLOGY

## 2017-02-17 PROCEDURE — 36415 COLL VENOUS BLD VENIPUNCTURE: CPT | Performed by: SURGERY

## 2017-02-17 PROCEDURE — 40000786 ZZHCL STATISTIC ACTIVE MRSA SURVEILLANCE CULTURE: Performed by: SURGERY

## 2017-02-17 PROCEDURE — 45330 DIAGNOSTIC SIGMOIDOSCOPY: CPT | Performed by: NURSE ANESTHETIST, CERTIFIED REGISTERED

## 2017-02-17 PROCEDURE — 27110028 ZZH OR GENERAL SUPPLY NON-STERILE: Performed by: SURGERY

## 2017-02-17 PROCEDURE — 84484 ASSAY OF TROPONIN QUANT: CPT | Performed by: EMERGENCY MEDICINE

## 2017-02-17 PROCEDURE — 82150 ASSAY OF AMYLASE: CPT | Performed by: EMERGENCY MEDICINE

## 2017-02-17 PROCEDURE — 99285 EMERGENCY DEPT VISIT HI MDM: CPT | Performed by: EMERGENCY MEDICINE

## 2017-02-17 PROCEDURE — 71000016 ZZH RECOVERY PHASE 1 LEVEL 3 FIRST HR: Performed by: SURGERY

## 2017-02-17 PROCEDURE — 99140 ANES COMP EMERGENCY COND: CPT | Performed by: NURSE ANESTHETIST, CERTIFIED REGISTERED

## 2017-02-17 PROCEDURE — 93010 ELECTROCARDIOGRAM REPORT: CPT | Performed by: INTERNAL MEDICINE

## 2017-02-17 PROCEDURE — 27210794 ZZH OR GENERAL SUPPLY STERILE: Performed by: SURGERY

## 2017-02-17 PROCEDURE — 74177 CT ABD & PELVIS W/CONTRAST: CPT | Mod: TC

## 2017-02-17 RX ORDER — FENTANYL CITRATE 50 UG/ML
25-50 INJECTION, SOLUTION INTRAMUSCULAR; INTRAVENOUS
Status: DISCONTINUED | OUTPATIENT
Start: 2017-02-17 | End: 2017-02-17 | Stop reason: HOSPADM

## 2017-02-17 RX ORDER — LORAZEPAM 2 MG/ML
.5-1 INJECTION INTRAMUSCULAR EVERY 4 HOURS PRN
Status: DISCONTINUED | OUTPATIENT
Start: 2017-02-17 | End: 2017-02-23 | Stop reason: HOSPADM

## 2017-02-17 RX ORDER — HYDRALAZINE HYDROCHLORIDE 20 MG/ML
10 INJECTION INTRAMUSCULAR; INTRAVENOUS EVERY 4 HOURS PRN
Status: DISCONTINUED | OUTPATIENT
Start: 2017-02-17 | End: 2017-02-22

## 2017-02-17 RX ORDER — ALBUTEROL SULFATE 0.83 MG/ML
2.5 SOLUTION RESPIRATORY (INHALATION) EVERY 4 HOURS PRN
Status: DISCONTINUED | OUTPATIENT
Start: 2017-02-17 | End: 2017-02-17 | Stop reason: HOSPADM

## 2017-02-17 RX ORDER — LIDOCAINE HYDROCHLORIDE AND EPINEPHRINE 15; 5 MG/ML; UG/ML
INJECTION, SOLUTION EPIDURAL PRN
Status: DISCONTINUED | OUTPATIENT
Start: 2017-02-17 | End: 2017-02-17

## 2017-02-17 RX ORDER — LIDOCAINE 40 MG/G
CREAM TOPICAL
Status: DISCONTINUED | OUTPATIENT
Start: 2017-02-17 | End: 2017-02-23 | Stop reason: HOSPADM

## 2017-02-17 RX ORDER — METOPROLOL TARTRATE 100 MG
100 TABLET ORAL DAILY
Status: DISCONTINUED | OUTPATIENT
Start: 2017-02-17 | End: 2017-02-17

## 2017-02-17 RX ORDER — LABETALOL HYDROCHLORIDE 5 MG/ML
10 INJECTION, SOLUTION INTRAVENOUS
Status: DISCONTINUED | OUTPATIENT
Start: 2017-02-17 | End: 2017-02-17 | Stop reason: HOSPADM

## 2017-02-17 RX ORDER — LIDOCAINE HYDROCHLORIDE 20 MG/ML
INJECTION, SOLUTION INFILTRATION; PERINEURAL PRN
Status: DISCONTINUED | OUTPATIENT
Start: 2017-02-17 | End: 2017-02-17

## 2017-02-17 RX ORDER — NALOXONE HYDROCHLORIDE 0.4 MG/ML
.1-.4 INJECTION, SOLUTION INTRAMUSCULAR; INTRAVENOUS; SUBCUTANEOUS
Status: DISCONTINUED | OUTPATIENT
Start: 2017-02-17 | End: 2017-02-17 | Stop reason: HOSPADM

## 2017-02-17 RX ORDER — LIDOCAINE 40 MG/G
CREAM TOPICAL
Status: DISCONTINUED | OUTPATIENT
Start: 2017-02-17 | End: 2017-02-17 | Stop reason: HOSPADM

## 2017-02-17 RX ORDER — PROPOFOL 10 MG/ML
INJECTION, EMULSION INTRAVENOUS PRN
Status: DISCONTINUED | OUTPATIENT
Start: 2017-02-17 | End: 2017-02-17

## 2017-02-17 RX ORDER — SODIUM CHLORIDE 9 MG/ML
INJECTION, SOLUTION INTRAVENOUS CONTINUOUS
Status: DISCONTINUED | OUTPATIENT
Start: 2017-02-17 | End: 2017-02-17

## 2017-02-17 RX ORDER — GLYCOPYRROLATE 0.2 MG/ML
INJECTION, SOLUTION INTRAMUSCULAR; INTRAVENOUS PRN
Status: DISCONTINUED | OUTPATIENT
Start: 2017-02-17 | End: 2017-02-17

## 2017-02-17 RX ORDER — FENTANYL CITRATE 50 UG/ML
INJECTION, SOLUTION INTRAMUSCULAR; INTRAVENOUS PRN
Status: DISCONTINUED | OUTPATIENT
Start: 2017-02-17 | End: 2017-02-17

## 2017-02-17 RX ORDER — IOPAMIDOL 612 MG/ML
100 INJECTION, SOLUTION INTRAVASCULAR ONCE
Status: COMPLETED | OUTPATIENT
Start: 2017-02-17 | End: 2017-02-17

## 2017-02-17 RX ORDER — MEPERIDINE HYDROCHLORIDE 25 MG/ML
12.5 INJECTION INTRAMUSCULAR; INTRAVENOUS; SUBCUTANEOUS
Status: DISCONTINUED | OUTPATIENT
Start: 2017-02-17 | End: 2017-02-17 | Stop reason: HOSPADM

## 2017-02-17 RX ORDER — ONDANSETRON 4 MG/1
4 TABLET, ORALLY DISINTEGRATING ORAL EVERY 30 MIN PRN
Status: DISCONTINUED | OUTPATIENT
Start: 2017-02-17 | End: 2017-02-17 | Stop reason: HOSPADM

## 2017-02-17 RX ORDER — EPHEDRINE SULFATE 50 MG/ML
INJECTION, SOLUTION INTRAVENOUS PRN
Status: DISCONTINUED | OUTPATIENT
Start: 2017-02-17 | End: 2017-02-17

## 2017-02-17 RX ORDER — NALOXONE HYDROCHLORIDE 0.4 MG/ML
.1-.4 INJECTION, SOLUTION INTRAMUSCULAR; INTRAVENOUS; SUBCUTANEOUS
Status: DISCONTINUED | OUTPATIENT
Start: 2017-02-17 | End: 2017-02-18

## 2017-02-17 RX ORDER — NALOXONE HYDROCHLORIDE 0.4 MG/ML
.1-.4 INJECTION, SOLUTION INTRAMUSCULAR; INTRAVENOUS; SUBCUTANEOUS
Status: DISCONTINUED | OUTPATIENT
Start: 2017-02-17 | End: 2017-02-17

## 2017-02-17 RX ORDER — NITROGLYCERIN 0.4 MG/1
0.4 TABLET SUBLINGUAL EVERY 5 MIN PRN
Status: DISCONTINUED | OUTPATIENT
Start: 2017-02-17 | End: 2017-02-23 | Stop reason: HOSPADM

## 2017-02-17 RX ORDER — ONDANSETRON 2 MG/ML
4 INJECTION INTRAMUSCULAR; INTRAVENOUS EVERY 30 MIN PRN
Status: DISCONTINUED | OUTPATIENT
Start: 2017-02-17 | End: 2017-02-17 | Stop reason: HOSPADM

## 2017-02-17 RX ORDER — SODIUM CHLORIDE, SODIUM LACTATE, POTASSIUM CHLORIDE, CALCIUM CHLORIDE 600; 310; 30; 20 MG/100ML; MG/100ML; MG/100ML; MG/100ML
INJECTION, SOLUTION INTRAVENOUS CONTINUOUS
Status: DISCONTINUED | OUTPATIENT
Start: 2017-02-17 | End: 2017-02-17 | Stop reason: HOSPADM

## 2017-02-17 RX ORDER — SODIUM CHLORIDE 9 MG/ML
1000 INJECTION, SOLUTION INTRAVENOUS CONTINUOUS
Status: DISCONTINUED | OUTPATIENT
Start: 2017-02-17 | End: 2017-02-17

## 2017-02-17 RX ORDER — ONDANSETRON 2 MG/ML
4 INJECTION INTRAMUSCULAR; INTRAVENOUS EVERY 6 HOURS PRN
Status: DISCONTINUED | OUTPATIENT
Start: 2017-02-17 | End: 2017-02-23 | Stop reason: HOSPADM

## 2017-02-17 RX ORDER — PROMETHAZINE HYDROCHLORIDE 25 MG/ML
12.5 INJECTION, SOLUTION INTRAMUSCULAR; INTRAVENOUS
Status: DISCONTINUED | OUTPATIENT
Start: 2017-02-17 | End: 2017-02-17 | Stop reason: HOSPADM

## 2017-02-17 RX ORDER — HETASTARCH 6 G/100ML
INJECTION, SOLUTION INTRAVENOUS CONTINUOUS PRN
Status: DISCONTINUED | OUTPATIENT
Start: 2017-02-17 | End: 2017-02-17

## 2017-02-17 RX ORDER — SODIUM CHLORIDE, SODIUM LACTATE, POTASSIUM CHLORIDE, CALCIUM CHLORIDE 600; 310; 30; 20 MG/100ML; MG/100ML; MG/100ML; MG/100ML
INJECTION, SOLUTION INTRAVENOUS CONTINUOUS PRN
Status: DISCONTINUED | OUTPATIENT
Start: 2017-02-17 | End: 2017-02-17

## 2017-02-17 RX ORDER — DEXAMETHASONE SODIUM PHOSPHATE 10 MG/ML
INJECTION, SOLUTION INTRAMUSCULAR; INTRAVENOUS PRN
Status: DISCONTINUED | OUTPATIENT
Start: 2017-02-17 | End: 2017-02-17

## 2017-02-17 RX ORDER — DEXAMETHASONE SODIUM PHOSPHATE 4 MG/ML
4 INJECTION, SOLUTION INTRA-ARTICULAR; INTRALESIONAL; INTRAMUSCULAR; INTRAVENOUS; SOFT TISSUE EVERY 10 MIN PRN
Status: DISCONTINUED | OUTPATIENT
Start: 2017-02-17 | End: 2017-02-17 | Stop reason: HOSPADM

## 2017-02-17 RX ORDER — SODIUM CHLORIDE 9 MG/ML
INJECTION, SOLUTION INTRAVENOUS CONTINUOUS
Status: DISCONTINUED | OUTPATIENT
Start: 2017-02-17 | End: 2017-02-18

## 2017-02-17 RX ORDER — ONDANSETRON 2 MG/ML
4 INJECTION INTRAMUSCULAR; INTRAVENOUS EVERY 6 HOURS PRN
Status: DISCONTINUED | OUTPATIENT
Start: 2017-02-17 | End: 2017-02-17

## 2017-02-17 RX ORDER — LISINOPRIL 40 MG/1
40 TABLET ORAL DAILY
Status: DISCONTINUED | OUTPATIENT
Start: 2017-02-17 | End: 2017-02-23 | Stop reason: HOSPADM

## 2017-02-17 RX ORDER — ALBUTEROL SULFATE 0.83 MG/ML
2.5 SOLUTION RESPIRATORY (INHALATION) EVERY 6 HOURS PRN
Status: DISCONTINUED | OUTPATIENT
Start: 2017-02-17 | End: 2017-02-23 | Stop reason: HOSPADM

## 2017-02-17 RX ORDER — DIPHENHYDRAMINE HYDROCHLORIDE 50 MG/ML
12.5 INJECTION INTRAMUSCULAR; INTRAVENOUS EVERY 6 HOURS PRN
Status: DISCONTINUED | OUTPATIENT
Start: 2017-02-17 | End: 2017-02-23 | Stop reason: HOSPADM

## 2017-02-17 RX ORDER — NEOSTIGMINE METHYLSULFATE 1 MG/ML
VIAL (ML) INJECTION PRN
Status: DISCONTINUED | OUTPATIENT
Start: 2017-02-17 | End: 2017-02-17

## 2017-02-17 RX ORDER — FLUMAZENIL 0.1 MG/ML
INJECTION, SOLUTION INTRAVENOUS PRN
Status: DISCONTINUED | OUTPATIENT
Start: 2017-02-17 | End: 2017-02-17

## 2017-02-17 RX ORDER — HYDROMORPHONE HYDROCHLORIDE 1 MG/ML
.3-.5 INJECTION, SOLUTION INTRAMUSCULAR; INTRAVENOUS; SUBCUTANEOUS
Status: DISCONTINUED | OUTPATIENT
Start: 2017-02-17 | End: 2017-02-17

## 2017-02-17 RX ORDER — PHENYLEPHRINE HCL IN 0.9% NACL 1 MG/10 ML
SYRINGE (ML) INTRAVENOUS PRN
Status: DISCONTINUED | OUTPATIENT
Start: 2017-02-17 | End: 2017-02-17

## 2017-02-17 RX ORDER — HYDRALAZINE HYDROCHLORIDE 20 MG/ML
2.5-5 INJECTION INTRAMUSCULAR; INTRAVENOUS EVERY 10 MIN PRN
Status: DISCONTINUED | OUTPATIENT
Start: 2017-02-17 | End: 2017-02-17 | Stop reason: HOSPADM

## 2017-02-17 RX ORDER — HYDROMORPHONE HYDROCHLORIDE 1 MG/ML
0.5 INJECTION, SOLUTION INTRAMUSCULAR; INTRAVENOUS; SUBCUTANEOUS
Status: COMPLETED | OUTPATIENT
Start: 2017-02-17 | End: 2017-02-17

## 2017-02-17 RX ORDER — ONDANSETRON 4 MG/1
4 TABLET, ORALLY DISINTEGRATING ORAL EVERY 6 HOURS PRN
Status: DISCONTINUED | OUTPATIENT
Start: 2017-02-17 | End: 2017-02-23 | Stop reason: HOSPADM

## 2017-02-17 RX ORDER — ONDANSETRON 2 MG/ML
INJECTION INTRAMUSCULAR; INTRAVENOUS PRN
Status: DISCONTINUED | OUTPATIENT
Start: 2017-02-17 | End: 2017-02-17

## 2017-02-17 RX ORDER — ONDANSETRON 2 MG/ML
4 INJECTION INTRAMUSCULAR; INTRAVENOUS EVERY 30 MIN PRN
Status: DISCONTINUED | OUTPATIENT
Start: 2017-02-17 | End: 2017-02-17

## 2017-02-17 RX ORDER — FINASTERIDE 5 MG/1
5 TABLET, FILM COATED ORAL DAILY
Status: DISCONTINUED | OUTPATIENT
Start: 2017-02-17 | End: 2017-02-17

## 2017-02-17 RX ORDER — FENTANYL CITRATE 50 UG/ML
25-50 INJECTION, SOLUTION INTRAMUSCULAR; INTRAVENOUS
Status: ACTIVE | OUTPATIENT
Start: 2017-02-17 | End: 2017-02-18

## 2017-02-17 RX ADMIN — Medication 100 MCG: at 13:10

## 2017-02-17 RX ADMIN — Medication 100 MCG: at 13:56

## 2017-02-17 RX ADMIN — FENTANYL CITRATE 50 MCG: 50 INJECTION, SOLUTION INTRAMUSCULAR; INTRAVENOUS at 08:33

## 2017-02-17 RX ADMIN — Medication 8 ML/HR: at 12:03

## 2017-02-17 RX ADMIN — PHENYLEPHRINE HYDROCHLORIDE 0.5 MCG/KG/MIN: 10 INJECTION INTRAVENOUS at 14:43

## 2017-02-17 RX ADMIN — PROPOFOL 20 MG: 10 INJECTION, EMULSION INTRAVENOUS at 08:33

## 2017-02-17 RX ADMIN — Medication 100 MCG: at 14:09

## 2017-02-17 RX ADMIN — ROCURONIUM BROMIDE 10 MG: 10 INJECTION INTRAVENOUS at 12:50

## 2017-02-17 RX ADMIN — HYDROMORPHONE HYDROCHLORIDE 0.5 MG: 1 INJECTION, SOLUTION INTRAMUSCULAR; INTRAVENOUS; SUBCUTANEOUS at 01:37

## 2017-02-17 RX ADMIN — MIDAZOLAM HYDROCHLORIDE 1 MG: 1 INJECTION, SOLUTION INTRAMUSCULAR; INTRAVENOUS at 08:33

## 2017-02-17 RX ADMIN — SUCCINYLCHOLINE CHLORIDE 100 MG: 20 INJECTION, SOLUTION INTRAMUSCULAR; INTRAVENOUS at 11:27

## 2017-02-17 RX ADMIN — Medication 100 MCG: at 13:45

## 2017-02-17 RX ADMIN — HETASTARCH IN SODIUM CHLORIDE: 6; .9 INJECTION, SOLUTION INTRAVENOUS at 11:48

## 2017-02-17 RX ADMIN — PROPOFOL 10 MG: 10 INJECTION, EMULSION INTRAVENOUS at 08:41

## 2017-02-17 RX ADMIN — PROPOFOL 10 MG: 10 INJECTION, EMULSION INTRAVENOUS at 08:43

## 2017-02-17 RX ADMIN — MIDAZOLAM HYDROCHLORIDE 1 MG: 1 INJECTION, SOLUTION INTRAMUSCULAR; INTRAVENOUS at 13:27

## 2017-02-17 RX ADMIN — SODIUM CHLORIDE 1000 ML: 9 INJECTION, SOLUTION INTRAVENOUS at 01:37

## 2017-02-17 RX ADMIN — Medication 100 MCG: at 13:28

## 2017-02-17 RX ADMIN — NEOSTIGMINE METHYLSULFATE 4 MG: 1 INJECTION INTRAMUSCULAR; INTRAVENOUS; SUBCUTANEOUS at 14:51

## 2017-02-17 RX ADMIN — Medication 100 MCG: at 11:30

## 2017-02-17 RX ADMIN — Medication 5 ML: at 12:03

## 2017-02-17 RX ADMIN — FLUMAZENIL 0.2 MG: 0.1 INJECTION, SOLUTION INTRAVENOUS at 15:00

## 2017-02-17 RX ADMIN — PROPOFOL 10 MG: 10 INJECTION, EMULSION INTRAVENOUS at 08:48

## 2017-02-17 RX ADMIN — PROPOFOL 10 MG: 10 INJECTION, EMULSION INTRAVENOUS at 08:37

## 2017-02-17 RX ADMIN — EPHEDRINE SULFATE 10 MG: 50 INJECTION, SOLUTION INTRAMUSCULAR; INTRAVENOUS; SUBCUTANEOUS at 11:39

## 2017-02-17 RX ADMIN — EPHEDRINE SULFATE 10 MG: 50 INJECTION, SOLUTION INTRAMUSCULAR; INTRAVENOUS; SUBCUTANEOUS at 11:45

## 2017-02-17 RX ADMIN — ONDANSETRON 4 MG: 2 INJECTION INTRAMUSCULAR; INTRAVENOUS at 01:37

## 2017-02-17 RX ADMIN — DEXAMETHASONE SODIUM PHOSPHATE 10 MG: 10 INJECTION, SOLUTION INTRAMUSCULAR; INTRAVENOUS at 11:49

## 2017-02-17 RX ADMIN — EPHEDRINE SULFATE 5 MG: 50 INJECTION, SOLUTION INTRAMUSCULAR; INTRAVENOUS; SUBCUTANEOUS at 13:10

## 2017-02-17 RX ADMIN — SODIUM PHOSPHATE, DIBASIC AND SODIUM PHOSPHATE, MONOBASIC 1 ENEMA: 7; 19 ENEMA RECTAL at 05:29

## 2017-02-17 RX ADMIN — Medication 100 MCG: at 11:39

## 2017-02-17 RX ADMIN — ONDANSETRON 4 MG: 2 INJECTION INTRAMUSCULAR; INTRAVENOUS at 11:49

## 2017-02-17 RX ADMIN — SODIUM CHLORIDE, POTASSIUM CHLORIDE, SODIUM LACTATE AND CALCIUM CHLORIDE: 600; 310; 30; 20 INJECTION, SOLUTION INTRAVENOUS at 08:15

## 2017-02-17 RX ADMIN — SODIUM CHLORIDE, POTASSIUM CHLORIDE, SODIUM LACTATE AND CALCIUM CHLORIDE: 600; 310; 30; 20 INJECTION, SOLUTION INTRAVENOUS at 11:02

## 2017-02-17 RX ADMIN — MIDAZOLAM HYDROCHLORIDE 1 MG: 1 INJECTION, SOLUTION INTRAMUSCULAR; INTRAVENOUS at 08:28

## 2017-02-17 RX ADMIN — SODIUM CHLORIDE, POTASSIUM CHLORIDE, SODIUM LACTATE AND CALCIUM CHLORIDE: 600; 310; 30; 20 INJECTION, SOLUTION INTRAVENOUS at 11:04

## 2017-02-17 RX ADMIN — SODIUM CHLORIDE, PRESERVATIVE FREE: 5 INJECTION INTRAVENOUS at 04:35

## 2017-02-17 RX ADMIN — PROPOFOL 20 MG: 10 INJECTION, EMULSION INTRAVENOUS at 08:32

## 2017-02-17 RX ADMIN — GLYCOPYRROLATE 0.7 MG: 0.2 INJECTION, SOLUTION INTRAMUSCULAR; INTRAVENOUS at 14:51

## 2017-02-17 RX ADMIN — Medication 100 MCG: at 11:49

## 2017-02-17 RX ADMIN — SODIUM CHLORIDE, POTASSIUM CHLORIDE, SODIUM LACTATE AND CALCIUM CHLORIDE: 600; 310; 30; 20 INJECTION, SOLUTION INTRAVENOUS at 14:38

## 2017-02-17 RX ADMIN — FAMOTIDINE 20 MG: 20 INJECTION, SOLUTION INTRAVENOUS at 06:59

## 2017-02-17 RX ADMIN — LIDOCAINE HYDROCHLORIDE 40 MG: 20 INJECTION, SOLUTION INFILTRATION; PERINEURAL at 11:25

## 2017-02-17 RX ADMIN — Medication 100 MCG: at 14:18

## 2017-02-17 RX ADMIN — PROPOFOL 10 MG: 10 INJECTION, EMULSION INTRAVENOUS at 08:39

## 2017-02-17 RX ADMIN — ROCURONIUM BROMIDE 30 MG: 10 INJECTION INTRAVENOUS at 11:44

## 2017-02-17 RX ADMIN — PROPOFOL 200 MG: 10 INJECTION, EMULSION INTRAVENOUS at 11:25

## 2017-02-17 RX ADMIN — Medication 100 MCG: at 14:30

## 2017-02-17 RX ADMIN — Medication 8 ML/HR: at 15:40

## 2017-02-17 RX ADMIN — SODIUM CHLORIDE 1000 ML: 9 INJECTION, SOLUTION INTRAVENOUS at 03:25

## 2017-02-17 RX ADMIN — ROCURONIUM BROMIDE 15 MG: 10 INJECTION INTRAVENOUS at 13:22

## 2017-02-17 RX ADMIN — FENTANYL CITRATE 50 MCG: 50 INJECTION, SOLUTION INTRAMUSCULAR; INTRAVENOUS at 12:00

## 2017-02-17 RX ADMIN — PANTOPRAZOLE SODIUM 40 MG: 40 INJECTION, POWDER, FOR SOLUTION INTRAVENOUS at 01:37

## 2017-02-17 RX ADMIN — Medication 100 MCG: at 13:39

## 2017-02-17 RX ADMIN — FENTANYL CITRATE 50 MCG: 50 INJECTION, SOLUTION INTRAMUSCULAR; INTRAVENOUS at 08:28

## 2017-02-17 RX ADMIN — HYDROMORPHONE HYDROCHLORIDE 0.5 MG: 1 INJECTION, SOLUTION INTRAMUSCULAR; INTRAVENOUS; SUBCUTANEOUS at 05:24

## 2017-02-17 RX ADMIN — MIDAZOLAM HYDROCHLORIDE 1 MG: 1 INJECTION, SOLUTION INTRAMUSCULAR; INTRAVENOUS at 11:19

## 2017-02-17 RX ADMIN — CEFOTETAN AND DEXTROSE 2 G: 2 INJECTION, SOLUTION INTRAVENOUS at 19:56

## 2017-02-17 RX ADMIN — LIDOCAINE HYDROCHLORIDE AND EPINEPHRINE 5 ML: 15; 5 INJECTION, SOLUTION EPIDURAL; INFILTRATION; INTRACAUDAL; PERINEURAL at 10:21

## 2017-02-17 RX ADMIN — SODIUM CHLORIDE 1000 ML: 9 INJECTION, SOLUTION INTRAVENOUS at 22:57

## 2017-02-17 RX ADMIN — PROPOFOL 10 MG: 10 INJECTION, EMULSION INTRAVENOUS at 08:35

## 2017-02-17 RX ADMIN — SODIUM CHLORIDE: 9 INJECTION, SOLUTION INTRAVENOUS at 18:03

## 2017-02-17 RX ADMIN — SODIUM CHLORIDE, POTASSIUM CHLORIDE, SODIUM LACTATE AND CALCIUM CHLORIDE: 600; 310; 30; 20 INJECTION, SOLUTION INTRAVENOUS at 14:30

## 2017-02-17 RX ADMIN — IOPAMIDOL 100 ML: 612 INJECTION, SOLUTION INTRAVASCULAR at 02:07

## 2017-02-17 RX ADMIN — FENTANYL CITRATE 50 MCG: 50 INJECTION, SOLUTION INTRAMUSCULAR; INTRAVENOUS at 11:19

## 2017-02-17 RX ADMIN — Medication 100 MCG: at 11:35

## 2017-02-17 RX ADMIN — PROPOFOL 10 MG: 10 INJECTION, EMULSION INTRAVENOUS at 08:45

## 2017-02-17 ASSESSMENT — ENCOUNTER SYMPTOMS
SHORTNESS OF BREATH: 0
CHILLS: 0
VOMITING: 1
ABDOMINAL PAIN: 1
FLATUS: 0
HEMATURIA: 0
HEMATOCHEZIA: 0
CONSTIPATION: 1
DYSRHYTHMIAS: 1
DYSURIA: 0
FEVER: 0
COUGH: 0
DIARRHEA: 0
SORE THROAT: 0
NAUSEA: 1
FATIGUE: 0
HEMATEMESIS: 0

## 2017-02-17 ASSESSMENT — PAIN DESCRIPTION - DESCRIPTORS: DESCRIPTORS: CRAMPING;SHARP

## 2017-02-17 ASSESSMENT — LIFESTYLE VARIABLES
TOBACCO_USE: 1
TOBACCO_USE: 1

## 2017-02-17 NOTE — BRIEF OP NOTE
[facility] Brief Operative Note    Pre-operative diagnosis: Obstructing lesion sigmoid colon   Post-operative diagnosis Stricture lower sigmoid colon   Procedure: Procedure(s):  Flexible Sigmoidoscopy - Wound Class: II-Clean Contaminated   Surgeon: Tima Moreland MD   Assistants(s):    Estimated blood loss: None    Specimens: None   Findings: Tight stricture lower sigmoid colon, no malignancy seen.

## 2017-02-17 NOTE — ANESTHESIA PREPROCEDURE EVALUATION
Anesthesia Evaluation     . Pt has had prior anesthetic.       ROS/MED HX    ENT/Pulmonary:     (+)allergic rhinitis, tobacco use, Past use Quit 2012 packs/day  , . .    Neurologic:       Cardiovascular:     (+) Dyslipidemia, hypertension-range: on beta blocker (metoprolol), -CAD, -CABG-date: 2012, . : . . . pacemaker Reason placed: 2012:ICD . dysrhythmias valvular problems/murmurs s/p AO Valve Replacement 2012:. Previous cardiac testing date:results:date: results:ECG reviewed date:2/17/2017 results:Electronic Pacemaker at 83 date: results:          METS/Exercise Tolerance:     Hematologic:     (+) History of Transfusion no previous transfusion reaction -      Musculoskeletal:   (+) arthritis, , , -       GI/Hepatic:     (+) GERD Other GI/Hepatic STRICTURE SIGMOID COLON, Diverticular Disease        Renal/Genitourinary:     (+) chronic renal disease (Stage 3 (moderate)), type: CRI and ARF, BPH,       Endo:     (+) type II DM Last HgA1c: 6.2 date: 10/2016 .      Psychiatric:         Infectious Disease:         Malignancy:         Other:               Physical Exam      Airway   Mallampati: III  TM distance: <3 FB  Neck ROM: full    Dental   (+) lower dentures, upper dentures and missing    Cardiovascular   Rhythm and rate: regular and normal  (+) murmur     PE comment: 3/6 Murmur    Pulmonary    breath sounds clear to auscultation(+) decreased breath sounds                       Anesthesia Plan      History & Physical Review  History and physical reviewed and following examination; no interval change.    ASA Status:  4 emergent.    NPO Status:  > 8 hours    Plan for General, ETT, RSI and Epidural with Intravenous and Propofol induction. Maintenance will be Balanced.    PONV prophylaxis:  Ondansetron (or other 5HT-3) and Dexamethasone or Solumedrol  Additional equipment: CVP and Central Line      Postoperative Care  Postoperative pain management:  IV analgesics and Neuraxial analgesia.      Consents  Anesthetic plan,  risks, benefits and alternatives discussed with:  Patient and Spouse.  Use of blood products discussed: Yes.   Use of blood products discussed with Patient and Spouse.  Consented to blood products.  .                          .

## 2017-02-17 NOTE — ANESTHESIA POSTPROCEDURE EVALUATION
Patient: Peter Zhao    Procedure(s):  Flexible Sigmoidoscopy - Wound Class: II-Clean Contaminated    Diagnosis:Obstructing lesion sigmoid colon  Diagnosis Additional Information: No value filed.    Anesthesia Type:  MAC    Note:  Anesthesia Post Evaluation    Patient location during evaluation: Phase 2 and Bedside  Patient participation: Able to fully participate in evaluation  Level of consciousness: awake and alert  Pain management: adequate  Airway patency: patent  Cardiovascular status: acceptable  Respiratory status: acceptable  Hydration status: stable  PONV: none     Anesthetic complications: None          Last vitals:  Vitals:    02/17/17 1100 02/17/17 1105 02/17/17 1110   BP: 118/87 122/82 125/83   Resp:      Temp:      SpO2: 93% 94% 93%         Electronically Signed By: Davi Perkins MD  February 17, 2017  3:22 PM

## 2017-02-17 NOTE — ED NOTES
Patients SpO2 decreased to 88% while on RA - placed on 2L via NC at this time - SpO2 increased to 95%.

## 2017-02-17 NOTE — ANESTHESIA PREPROCEDURE EVALUATION
Anesthesia Evaluation     . Pt has had prior anesthetic.     No history of anesthetic complications     ROS/MED HX    ENT/Pulmonary:     (+)allergic rhinitis, tobacco use, Past use Quit 2013 packs/day  , . .    Neurologic:     (+)migraines,     Cardiovascular:     (+) Dyslipidemia, hypertension-range: on beta blocker (metoprolol) , -CAD, -CABG-date: 2012, . : . . . pacemaker :ICD . valvular problems/murmurs s/p AO Valve Replacement 2012:. Previous cardiac testing date:results:date: results:ECG reviewed date:2/17/2017 results:Electronic Pacemaker at 83 date: results:         (-) taking anticoagulants/antiplatelets   METS/Exercise Tolerance:     Hematologic:     (+) History of Transfusion no previous transfusion reaction -      Musculoskeletal:   (+) arthritis, , , -       GI/Hepatic:     (+) GERD Other GI/Hepatic OBSTRUCTING LESION SIGMOID COLON, Diverticular Disease       Renal/Genitourinary:     (+) chronic renal disease (Stage 3 (moderate)), type: ARF and CRI, BPH,       Endo:     (+) type II DM Last HgA1c: 6.2 date: 10/2016 .      Psychiatric:  - neg psychiatric ROS       Infectious Disease:  - neg infectious disease ROS       Malignancy:      - no malignancy   Other:    - neg other ROS           Physical Exam      Airway   Mallampati: III  TM distance: >3 FB  Neck ROM: full    Dental   (+) missing, upper dentures, lower dentures and partials    Cardiovascular   Rhythm and rate: regular and normal  (+) murmur     PE comment: 3/6 Murmur    Pulmonary    breath sounds clear to auscultation(+) decreased breath sounds                       Anesthesia Plan      History & Physical Review  History and physical reviewed and following examination; no interval change.    ASA Status:  3 .    NPO Status:  > 8 hours    Plan for MAC with Intravenous and Propofol induction. Maintenance will be TIVA.  Reason for MAC:  Chronic cardiopulmonary disease (G9) and Other - see comments    Surgeon requests deep sedation. Patient is an  ASA 3 and has advanced age >70. Will provide MAC.      Postoperative Care  Postoperative pain management:  Multi-modal analgesia.      Consents  Anesthetic plan, risks, benefits and alternatives discussed with:  Patient..                          .

## 2017-02-17 NOTE — DISCHARGE INSTRUCTIONS
You have a follow up appointment with Dr. Vega on Tuesday, February 28th at 3:15PM. Please call [phone] if this does not work to reschedule.      Ostomy Care Instructions    Empty your pouch when it is 1/3 - 1/2 full or if it has gas in it  Change your pouch if you notice any leaking or burning by your stoma  Change your pouch at least every 3 days at the start - this time can be extended to up to a week in the future if you don't have any issues with leaking.    Pouch change:    Gather supplies - template or measuring device,pouch; barrier, scissors, barrier ring, washcloth, gauze (gauze is nice to use to clean in case you produce stool during your pouch change)    Gently remove pouch - use your finger to support skin under barrier to prevent injury    Using water and washcloth, clean the area under the barrier - do not use any soaps or lotions    Cut your barrier so that it has an opening for your stoma that is approx 1/8 inch larger.  You can use your template, but if your stoma is smaller you may need to cut a smaller opening.      Use your barrier ring around the stoma.  Remove it from its package and stretch it so it fits around the stoma.    Remove backing from barrier and apply it so the opening is around the stoma.  Use your index finger to go around the stoma to help it to adhere    Put your pouch onto the barrier - it works like a tupperwear seal.  Lightly pull on it to be sure it is on tight    Close the bottom of the pouch.  It should roll up toward you 3 times and then pull down the velcro strip to secure.    Place your hand over the barrier for about 5 minutes to warm the barrier to help it adhere.      What to expect when you have contrast    During your exam, we will inject  contrast  into your vein or artery. (Contrast is a clear liquid with iodine in it. It shows up on X-rays.)    You may feel warm or hot. You may have a metal taste in your mouth and a slight upset stomach. You may also  feel pressure near the kidneys and bladder. These effects will last about 1 to 3 minutes.    Please tell us if you have:    Sneezing     Itching    Hives     Swelling in the face    A hoarse voice    Breathing problems    Other new symptoms    Serious problems are rare.  They may include:    Irregular heartbeat     Seizures    Kidney failure              Tissue damage    Shock      Death    If you have any problems during the exam, we  will treat them right away.    When you get home    Call your hospital if you have any new symptoms in the next 2 days, like hives or swelling. (Phone numbers are at the bottom of this page.) Or call your family doctor.     If you have wheezing or trouble breathing, call 911.    Self-care  -Drink at least 4 extra glasses of water today.   This reduces the stress on your kidneys.  -Keep taking your regular medicines.    The contrast will pass out of your body in your  Urine(pee). This will happen in the next 24 hours. You  will not feel this. Your urine will not  change color.    If you have kidney problems or take metformin    Drink 4 to 8 large glasses of water for the next  2 days, if you are not on a fluid restriction.    ?If you take metformin (Glucophage or Glucovance) for diabetes, keep taking it.      ?Your kidney function tests are abnormal.  If you take Metformin, do not take it for 48 hours. Please go to your clinic for a blood test within 3 days after your exam before the restarting this medicine.     (Note to provider:please give patient prescription for lab tests.)    ?Special instructions:     I have read and understand the above information.    Patient Sign Here:______________________________________Date:________Time:______    Staff Sign Here:________________________________________Date:_______Time:______      Radiology Departments:     ?[facility]: [phone] ?[facility]: [phone]     ?[place]: [phone] ?[facility]:[phone]      ?[place]:  [phone]  ?[place]: [phone]  ?[place]:[phone]    ?[facility] [place]:[phone]  ?[facility]:[phone]

## 2017-02-17 NOTE — OR NURSING
in and spoke with pt.and wife.Istructed re:cautied,and catheter associated bloodstream infections and surgical site infections.

## 2017-02-17 NOTE — ED NOTES
Awaiting call back from Med/Surg nurse for patient report. Dr. Moreland in with patient at this time.

## 2017-02-17 NOTE — PROGRESS NOTES
[facility]    Family Practice Progress Note    Date of Service (when I saw the patient): 02/17/2017     Assessment & Plan   Peter Zhao is a 71 year old male who was admitted on 2/17/2017.     Principal Problem:    Large bowel obstruction (H)    Assessment: increased pain with prep    Plan: colonoscopy this morning  Active Problems:    CAD (coronary artery disease)    Assessment: chronic stable    Plan: acceptable risk for procedure    HTN (hypertension)    Assessment: controlled    Plan: monitor      DVT Prophylaxis: Low Risk/Ambulatory with no VTE prophylaxis indicated  Code Status: Full Code    Brokc STREETER Vega    Interval History   Stable. Doing well. Pain is reasonably controlled. No fevers. Mildly anxious.    Review Of Systems  CONSTITUTIONAL:  negative for  fevers  HEENT:  negative  RESPIRATORY:  negative for cough or shortness of breath  CARDIOVASCULAR:  negative for  chest pain, palpitations  GASTROINTESTINAL:  negative for nausea, vomiting, diarrhea, constipation and abdominal pain  GENITOURINARY:  negative for frequency and dysuria  INTEGUMENT/BREAST:  negative for rash and skin lesion(s)  MUSCULOSKELETAL:  negative for  decreased range of motion and muscle weakness  BEHAVIOR/PSYCH:  positive for anxiety      Physical Exam   Temp: 97.4  F (36.3  C) Temp src: Oral BP: 139/90   Heart Rate: 88 Resp: 16 SpO2: 94 % O2 Device: Nasal cannula Oxygen Delivery: 2 LPM  Vitals:    02/17/17 0433   Weight: 84.2 kg (185 lb 10 oz)     Vital Signs with Ranges  Temp:  [97.4  F (36.3  C)-97.9  F (36.6  C)] 97.4  F (36.3  C)  Heart Rate:  [85-91] 88  Resp:  [16-18] 16  BP: (126-155)/() 139/90  SpO2:  [88 %-98 %] 94 %       Peripheral IV 02/17/17 Left (Active)   Site Assessment WDL 2/17/2017  4:29 AM   Line Status Infusing 2/17/2017  4:29 AM   Phlebitis Scale 0-->no symptoms 2/17/2017  4:29 AM   Infiltration Scale 0 2/17/2017  4:29 AM   Extravasation? No 2/17/2017  3:00 AM   Number of days:0     Line/device  assessment(s) completed for medical necessity    Constitutional: awake, alert, cooperative, no apparent distress, appears stated age and normal weight  ENT: normocepalic, without obvious abnormality  Respiratory: no increased work of breathing, good air exchange and clear to auscultation  Cardiovascular: regular rate and rhythm and no murmur noted  GI: hypoactive bowel sounds, soft, non-distended, non-tender, involuntary guarding absent and no masses palpated, no hepatosplenomegally  Skin: no bruising or bleeding and normal skin color, texture, turgor  Musculoskeletal: no lower extremity pitting edema present  there is no redness, warmth, or swelling of the joints  Neuropsychiatric: General: calm and normal eye contact  Level of consciousness: alert / normal  Affect: anxious  Orientation: oriented to self, place, time and situation  Memory and insight: normal, memory for past and recent events intact and thought process normal    Medications     NaCl 125 mL/hr at 02/17/17 0435     lactated ringers         finasteride (PROSCAR) tablet 5 mg  5 mg Oral Daily     lisinopril (PRINIVIL/ZESTRIL) tablet 40 mg  40 mg Oral Daily     metoprolol (LOPRESSOR) tablet 100 mg  100 mg Oral Daily     sodium chloride (PF)  3 mL Intracatheter Q8H     sodium chloride (PF)  3 mL Intracatheter Q8H     sodium chloride 0.9%  250 mL Intravenous Once     famotidine  20 mg Intravenous Q8H       Data   Results for orders placed or performed during the hospital encounter of 02/17/17 (from the past 24 hour(s))   CBC with platelets differential   Result Value Ref Range    WBC 15.7 (H) 4.0 - 11.0 10e9/L    RBC Count 5.90 4.4 - 5.9 10e12/L    Hemoglobin 17.3 13.3 - 17.7 g/dL    Hematocrit 50.7 40.0 - 53.0 %    MCV 86 78 - 100 fl    MCH 29.3 26.5 - 33.0 pg    MCHC 34.1 31.5 - 36.5 g/dL    RDW 13.7 10.0 - 15.0 %    Platelet Count 248 150 - 450 10e9/L    Diff Method Automated Method     % Neutrophils 83.7 %    % Lymphocytes 9.5 %    % Monocytes 5.7 %     % Eosinophils 0.3 %    % Basophils 0.4 %    % Immature Granulocytes 0.4 %    Nucleated RBCs 0 0 /100    Absolute Neutrophil 13.1 (H) 1.6 - 8.3 10e9/L    Absolute Lymphocytes 1.5 0.8 - 5.3 10e9/L    Absolute Monocytes 0.9 0.0 - 1.3 10e9/L    Absolute Eosinophils 0.1 0.0 - 0.7 10e9/L    Absolute Basophils 0.1 0.0 - 0.2 10e9/L    Abs Immature Granulocytes 0.1 0 - 0.4 10e9/L    Absolute Nucleated RBC 0.0    Comprehensive metabolic panel   Result Value Ref Range    Sodium 141 133 - 144 mmol/L    Potassium 4.0 3.4 - 5.3 mmol/L    Chloride 104 94 - 109 mmol/L    Carbon Dioxide 24 20 - 32 mmol/L    Anion Gap 13 3 - 14 mmol/L    Glucose 153 (H) 70 - 99 mg/dL    Urea Nitrogen 29 7 - 30 mg/dL    Creatinine 1.32 (H) 0.66 - 1.25 mg/dL    GFR Estimate 53 (L) >60 mL/min/1.7m2    GFR Estimate If Black 64 >60 mL/min/1.7m2    Calcium 8.7 8.5 - 10.1 mg/dL    Bilirubin Total 1.6 (H) 0.2 - 1.3 mg/dL    Albumin 3.9 3.4 - 5.0 g/dL    Protein Total 7.9 6.8 - 8.8 g/dL    Alkaline Phosphatase 98 40 - 150 U/L    ALT 32 0 - 70 U/L    AST 34 0 - 45 U/L   Lactic acid   Result Value Ref Range    Lactic Acid 1.7 0.4 - 2.0 mmol/L   Lipase   Result Value Ref Range    Lipase 131 73 - 393 U/L   Amylase   Result Value Ref Range    Amylase 51 30 - 110 U/L   Troponin I   Result Value Ref Range    Troponin I ES  0.000 - 0.045 ug/L     <0.015  The 99th percentile for upper reference range is 0.045 ug/L.  Troponin values in   the range of 0.045 - 0.120 ug/L may be associated with risks of adverse   clinical events.

## 2017-02-17 NOTE — BRIEF OP NOTE
[facility] Brief Operative Note    Pre-operative diagnosis: STRICTURE SIGMOID COLON   Post-operative diagnosis Sigmoid colon stricture secondary to Diverticular Disease   Procedure: Procedure(s):  SIGMOID COLECTOMY with Antrostomy - Wound Class: II-Clean Contaminated   Surgeon: Tima Moreland MD   Assistants(s):    Estimated blood loss: Less than 100 ml    Specimens: Sigmoid colon   Findings: Stricture lower sigmoid colon. Bowel grossly distended and thin walled. [place] Sigmoid colostomy

## 2017-02-17 NOTE — ED NOTES
"72 y/o male presents to ED with wife with c/o abdominal pain. Patient had several calls to ER prior to arrival regarding colonoscopy prep and given the instruction to present to ER if he feels like his symptoms are an emergency. Patient states he was seen by PCP Dr Vega on Tuesday regarding abdominal pain - had a CT which showed \"diverticulitis and a small blockage.\" Patient started colonoscopy prep \"sure prep\" at 5:30pm tonight. States he had finished 3/4s of prep with no results. Also states he became nauseated and vomited 3x. Has not passed gas in 24 hours. Rates pain 5/10 all throughout abdomen. States he is \"very bloated.\"  "

## 2017-02-17 NOTE — H&P
CONSULTATION AND ADMISSION HISTORY AND PHYSICAL       DATE OF CONSULTATION AND ADMISSION:  02/17/2017.      HISTORY OF PRESENT ILLNESS:  Mr. Peter Zhao is seen in the emergency room early this morning.  This man originally was seen by myself on Wednesday of this week in the clinic.  He had presented to Dr. Vega on Wednesday complaining of lower abdominal pain.  Pain had started approximately last Friday.  He was having intermittent crampy abdominal pain, was continuing to have bowel movements.  Dr. Vega has ordered a CT scan of the abdomen and pelvis which had shown a stricture in the lower sigmoid.  This is possibly either a diverticular stricture or a malignancy.  The patient was scheduled to have colonoscopy done later this morning.  He does state that, Thursday morning, he did have a rather, large formed bowel movement.  He was having a bit of discomfort throughout the day but did carry on normally.  He started taking his prep around 5:00 last evening.  He did become nauseated, started vomiting.  Also, developed some lower abdominal crampy pain.  Did not end up passing any gas or stool.  As he was having fairly intense cramps and the multiple episodes of vomiting, he presented to the emergency room.  He did have a repeat CT scan with contrast which does again show dilated colon and the stricture in the lower sigmoid colon.  Contrast did get through into the lower sigmoid and rectum but there is stool that is present.  Currently, he is more comfortable, is having very minimal pain, has not vomited for the last 3 hours or so.      The patient did have a bout of diverticulitis 3 or 4 years ago.  He has not had previous colonoscopies.  There is no family history of colon cancer or colonic polyps.      He does have a history of coronary artery disease.  He has not had a previous myocardial infarction but has had coronary artery bypass surgery as well as repair of the valve.  This was done approximately 5 years  ago.  He also has a pacemaker and defibrillator in place.  The defibrillator has never fired.  This was placed approximately 5 years ago.  He has not had any heart troubles since the bypass surgery and the pacemaker.  He is not a cigarette smoker.  Denies shortness of breath.  He is not diabetic.  Denies any chronic renal disease.  Does have difficulties with his prostate.      CURRENT MEDICATIONS:  Include amoxicillin, aspirin, Lipitor, vitamin D3, finasteride, lisinopril, metoprolol, multivitamins, Nitrostat and ranitidine.      ALLERGIES:  He does have allergies to acetaminophen and ciprofloxacin.        PAST SURGICAL HISTORY:     1.  He has had previous tonsillectomy.   2.  Biopsy of his prostate.   3.  Bilateral inguinal hernia repairs.   4.  Coronary artery bypass surgery and insertion of a pacemaker as well as eye surgery for a retinal tear and cataract removal.      PHYSICAL EXAMINATION:   GENERAL:  Tonight in the emergency room, he is afebrile.   VITAL SIGNS:  Have been stable.  He is perhaps slightly hypertensive.  Heart rate is 89 and regular.  Sats are normal on room air.  He was lying quite comfortably in bed.   HEAD AND NECK:  Unremarkable.  I could not feel any masses.  There is no thyroid enlargement.  There is no scleral icterus.   CHEST:  He does have good air entry bilaterally.  There are no wheezes or creps.  He does have the pacemaker in the left upper chest.   HEART:  Sounds are normal.  This morning I could hear what sounds to be a grade 2/6 systolic ejection murmur.  He is in sinus rhythm.   ABDOMEN:  He is a bit distended.  The abdomen was soft.  He had some tenderness down the left lower quadrant.  There was no guarding.  The rest of the abdomen was soft and nontender with no palpable masses.  There was no hepatosplenomegaly.  No costovertebral angle tenderness.  I could not feel any groin hernias.  He has strong femoral pulses bilaterally.   EXTREMITIES:  He is moving all limbs well.   There is no peripheral edema.      LABORATORY DATA:  His blood work this morning reveals a hemoglobin of 17.3 with a white count of 15.7.  Electrolytes are normal.  A creatinine is 1.32 with a BUN of 29.  Liver function tests are normal except for a slightly high bilirubin of 1.6.      He did have another CT scan of the abdomen done which again shows the stricture in the sigmoid colon.  A bit of contrast did get by this into the lower sigmoid and rectum, which does have some stool present.  Proximal colon is distended and there is a small amount of small bowel distention.  No free air.      Unfortunately, Mr. Zhao did not tolerate his prep secondary to the colonic stricture and when I saw him in the clinic, and once I felt that this might be a problem but with him having bowel movements, I thought we would at least be able to prep him somewhat.  This does still leave us with the problem of the obstructing lesion.  At the moment, the plan will be bring him into the hospital.  We will give him a couple of fleet enemas and proceed to the colonoscopy later this morning to try and determine whether or not the lesion is malignant or a benign stricture.  It will also give us an idea as the height of the lesion from the anus.  Will then need to decide the approach to the lesion.  Unfortunately, with him being obstructed, an operation might end up with him requiring a colostomy at the time of the surgery.  It would be nice to avoid this.      I did explain this to Mr. Zhao and his wife.  They are agreeable to the admission and proceeding to do the scope later this morning.         RUDY RAYO MD             D: 2017 04:07   T: 2017 04:35   MT: ARMANI      Name:     ROGER ZHAO   MRN:      [id number]        Account:      [id number]   :      1945           Admitted:     [id number]      Document: [id number]       cc: Brock Vega MD

## 2017-02-17 NOTE — ANESTHESIA PROCEDURE NOTES
Peripheral nerve/Neuraxial procedure note : epidural catheter  Pre-Procedure  Performed by   Referred by DR HEREDIA  Location: pre-op    Procedure Times:2/17/2017 10:11 AM and 2/17/2017 10:21 AM  Pre-Anesthestic Checklist: patient identified, IV checked, site marked, risks and benefits discussed, informed consent, monitors and equipment checked, pre-op evaluation, at physician/surgeon's request and post-op pain management    Timeout  Correct Patient: Yes   Correct Procedure: Yes   Correct Site: Yes   Correct Laterality: N/A   Correct Position: Yes   Site Marked: Yes   .   Procedure Documentation  ASA 3  Diagnosis:sigmoid colectomy.    Procedure:    Epidural catheter.  Insertion Site:L2-3  (midline approach) Injection technique: LORT air   Local skin infiltrated with 3 mL of 1% lidocaine.  JUDY at 7 cm     Patient Prep;mask, sterile gloves, chlorhexidine gluconate and isopropyl alcohol, patient draped.  .  Needle: Touhy needle (18 G. 3.5 in). # of attempts: 2.  # of redirects: 2.  Spinal Needle: . . . Catheter: 20 G .  .  .  Catheter threaded easily, 13 at the skin and 5 cm in the epidural space.     Assessment/Narrative  Paresthesias: Resolved.  .  .  Aspiration negative for heme or CSF  . Test dose of 5 mL lidocaine 1.5% w/ 1:200,000 epinephrine at 10:21.  Test dose negative for signs of intravascular, subdural or intrathecal injection. Comments:  No complications noted

## 2017-02-17 NOTE — IP AVS SNAPSHOT
MRN:[id number]                      After Visit Summary   2/17/2017    Peter Zhao    MRN: [id number]           Thank you!     Thank you for choosing [facility] for your care. Our goal is always to provide you with excellent care. Hearing back from our patients is one way we can continue to improve our services. Please take a few minutes to complete the written survey that you may receive in the mail after you visit with us. Thank you!        Patient Information     Date Of Birth          1945        About your hospital stay     You were admitted on:  February 17, 2017 You last received care in the:  [facility]    You were discharged on:  February 23, 2017        Reason for your hospital stay       Patient is a 71 year old male who was admitted during his prep for colonoscopy secondary to large bowel obstruction. 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 Postoperatively he developed some confusion which cleared, his pain was well controlled and he tolerated advancement of his diet and activity. He learned to care for his ostomy and was discharged to home.                  Who to Call     For medical emergencies, please call 911.  For non-urgent questions about your medical care, please call your primary care provider or clinic, [phone]  For questions related to your surgery, please call your surgery clinic        Attending Provider     Provider Specialty    Fareed Morales MD Emergency Medicine    [facility]       Primary Care Provider Office Phone # Fax #    Brock Vega [phone]       [facility] [address] [address]        After Care Instructions     Activity       Your activity upon discharge: no lifting, driving, or strenuous exercise for 4 weeks            Diet       Follow this diet upon discharge:     Regular Diet Adult             Supplies       List the supplies the pt needs to go home - ostomy supplies                  Follow-up Appointments     Follow-up and recommended labs and tests        Follow up with primary care provider, Brock Vega, within 7 days for hospital follow- up.  The following labs/tests are recommended: Chem 8, CBC.                  Your next 10 appointments already scheduled     Mar 01, 2017 10:00 AM CST   New Visit with [facility]   [facility] ([facility] )    [address] [place] [id number] [phone]              Additional Services     WOUND CARE REFERRAL ([place])       WOUND/OSTOMY CENTER ([facility]) TO EVALUATE, INITIATE TREATMENT BASED ON WOUND OSTOMY PROTOCOL, AND RECOMMEND AND REVISE AS NEEDED AN INDIVIDUALIZED TREATMENT CARE PLAN:    Conservative Sharp Debridement of non-viable and loose necrotic tissue  Topical 5% Lidocaine ointment   for pain associated with wound care/debridement; apply thin layer   Dakin s Solution (Sodium Hypochlorite Topical 0.125%) to cleanse wounds with odor & signs of infection  Antifungal Powder and Cream   applied topically to areas of fungal dermatitis  Cadexomer Iodine (Iodosorb) 0.9% Topical Gel apply topically to wound beds with slough/soft necrotic tissue with suspected bioburden  Collagenase Enzymatic Debrider (Santyl ointment)   250units/gram apply topically to wound beds with necrotic debris/slough;   Acetic Acid 0.25% to cleanse wounds with odor and signs of infection  Lidocaine Hydrochloride  topical solution 4% to assist with Wound Vac dressing remove  Silver Sulfadiazine topical cream; apply topically to burns/wound beds  Hydrocortisone 1% cream; apply topically to areas with dermatitis  Silver nitrate topical stick to control minor bleeding, and applied to epibole and hypergranular tissue  Negative Pressure Wound Therapy (additional physician order needed)  Triple Antibiotic ointment for wounds with signs of infection or at risk for  infection  Topical agents, dressings, or  products per Wound Ostomy Protocol Clinical Guidelines  Compression therapy as indicated for lower extremity ulcers caused by venous insufficiency or complicated by edema.  ARIEL above 0.8   medium compression; ARIEL  0.6   0.8   light compression  Therapeutic support surfaces for bed and/or chair and off-loading devices to minimize pressure    DIAGNOSTICS THAT MAY BE ORDERED BY CWON OR APRN; ORDERS TO BE PLACED UNDER THE REFERRING PROVIDER OR APRN FOR REVIEW AND ANY NEEDED ACTION:    Wound culture when signs of infection or colonization are present  ARIEL in patients with Lower Extremity ulcerations; TBI for diabetics as indicated  Hgb A1c for diabetics or those suspected of undiagnosed diabetes  Albumin or Prealbumin if nutritional concerns   UA/UC if UTI suspected in Urostomy patient  Cdiff if suspected in Fecal Ostomy patient  ANCILLARY CONSULTS AS NEEDED:    Pedorithist/Prosthetist for specialty shoes/orthotic  Diabetes Education for improved blood glucose during wound healing  Medical Nutrition Therapy for diabetes management and wound and/or ostomy healing nutrition   PT/OT   assess tissue loads & need for positioning devices, mobility safety/ ADL s, lymphedema treatment    OSTOMY ONLY:    Preoperative stoma site marking if requested by surgeon  Equipment selection/Ostomy supplies based on assessment  Treatment of stomal and peristomal complications  Irrigation/Lavage    NOTE EXCLUSIONS FROM ABOVE ITEMS HERE: none      No name on file.  [facility]                  Further instructions from your care team       You have a follow up appointment with Dr. Vega on Tuesday, February 28th at 3:15PM. Please call [phone] if this does not work to reschedule.      Ostomy Care Instructions    Empty your pouch when it is 1/3 - 1/2 full or if it has gas in it  Change your pouch if you notice any leaking or burning by your stoma  Change your pouch at least every 3 days at  the start - this time can be extended to up to a week in the future if you don't have any issues with leaking.    Pouch change:    Gather supplies - template or measuring device,pouch; barrier, scissors, barrier ring, washcloth, gauze (gauze is nice to use to clean in case you produce stool during your pouch change)    Gently remove pouch - use your finger to support skin under barrier to prevent injury    Using water and washcloth, clean the area under the barrier - do not use any soaps or lotions    Cut your barrier so that it has an opening for your stoma that is approx 1/8 inch larger.  You can use your template, but if your stoma is smaller you may need to cut a smaller opening.      Use your barrier ring around the stoma.  Remove it from its package and stretch it so it fits around the stoma.    Remove backing from barrier and apply it so the opening is around the stoma.  Use your index finger to go around the stoma to help it to adhere    Put your pouch onto the barrier - it works like a tupperwear seal.  Lightly pull on it to be sure it is on tight    Close the bottom of the pouch.  It should roll up toward you 3 times and then pull down the velcro strip to secure.    Place your hand over the barrier for about 5 minutes to warm the barrier to help it adhere.      What to expect when you have contrast    During your exam, we will inject  contrast  into your vein or artery. (Contrast is a clear liquid with iodine in it. It shows up on X-rays.)    You may feel warm or hot. You may have a metal taste in your mouth and a slight upset stomach. You may also feel pressure near the kidneys and bladder. These effects will last about 1 to 3 minutes.    Please tell us if you have:    Sneezing     Itching    Hives     Swelling in the face    A hoarse voice    Breathing problems    Other new symptoms    Serious problems are rare.  They may include:    Irregular heartbeat     Seizures    Kidney failure              Tissue  damage    Shock      Death    If you have any problems during the exam, we  will treat them right away.    When you get home    Call your hospital if you have any new symptoms in the next 2 days, like hives or swelling. (Phone numbers are at the bottom of this page.) Or call your family doctor.     If you have wheezing or trouble breathing, call 911.    Self-care  -Drink at least 4 extra glasses of water today.   This reduces the stress on your kidneys.  -Keep taking your regular medicines.    The contrast will pass out of your body in your  Urine(pee). This will happen in the next 24 hours. You  will not feel this. Your urine will not  change color.    If you have kidney problems or take metformin    Drink 4 to 8 large glasses of water for the next  2 days, if you are not on a fluid restriction.    ?If you take metformin (Glucophage or Glucovance) for diabetes, keep taking it.      ?Your kidney function tests are abnormal.  If you take Metformin, do not take it for 48 hours. Please go to your clinic for a blood test within 3 days after your exam before the restarting this medicine.     (Note to provider:please give patient prescription for lab tests.)    ?Special instructions:     I have read and understand the above information.    Patient Sign Here:______________________________________Date:________Time:______    Staff Sign Here:________________________________________Date:_______Time:______      Radiology Departments:     ?[facility]: [phone] ?[facility]: [phone]     ?[facility]: [phone] ?[facility]:[phone]      ?[facility]: [phone]  ?[facility]: [phone]  ?[facility]:[phone]    ?[facility]:[phone]  ?[facility]:[phone]    Pending Results     No orders found from 2/15/2017 to 2/18/2017.            Statement of Approval     Ordered          02/23/17 0740  I have reviewed and agree with all the recommendations and orders detailed in this document.  EFFECTIVE NOW     Approved  "and electronically signed by:  Brock Vega MD             Admission Information     Date & Time Provider Department Dept. Phone    2017 Tima Moreland MD [facility] [phone]      Your Vitals Were     Blood Pressure Pulse Temperature Respirations Height Weight    136/84 (BP Location: Right arm) 84 97  F (36.1  C) (Tympanic) 16 1.778 m (5' 10\") 79 kg (174 lb 2.6 oz)    Pulse Oximetry BMI (Body Mass Index)                94% 24.99 kg/m2          [employer] Information     [employer] lets you send messages to your doctor, view your test results, renew your prescriptions, schedule appointments and more. To sign up, go to [email or web address] . Click on \"Log in\" on the left side of the screen, which will take you to the Welcome page. Then click on \"Sign up Now\" on the right side of the page.     You will be asked to enter the access code listed below, as well as some personal information. Please follow the directions to create your username and password.     Your access code is: [email or web address]  Expires: 2017  7:08 AM     Your access code will  in 90 days. If you need help or a new code, please call your [place] clinic or [phone].        Care EveryWhere ID     This is your Care EveryWhere ID. This could be used by other organizations to access your [place] medical records  [id number]           Review of your medicines      CONTINUE these medicines which have NOT CHANGED        Dose / Directions    ASPIRIN PO        Dose:  325 mg   Take 325 mg by mouth daily   Refills:  0       atorvastatin 40 MG tablet   Commonly known as:  LIPITOR        Dose:  40 mg   Take 40 mg by mouth daily   Refills:  0       blood glucose lancing device   Used for:  T2DM (type 2 diabetes mellitus) (H)        Dose:  1 Units   1 Units daily   Quantity:  1 kit   Refills:  2       * blood glucose monitoring lancets   Used for:  T2DM (type 2 diabetes mellitus) (H)        Dose:  1 Units   1 Units daily Use fresh " lancet each day to check blood sugars   Quantity:  100 each   Refills:  4       * blood glucose monitoring lancets   Used for:  Type 2 diabetes mellitus without complication, without long-term current use of insulin (H)        Use to test blood sugar 2 times weekly   Quantity:  100 Box   Refills:  6       * blood glucose monitoring test strip   Commonly known as:  ACCU-CHEK SMARTVIEW   Used for:  T2DM (type 2 diabetes mellitus) (H)        Test blood sugar before breakfast one day, before and after supper the next and not at all the third day   Quantity:  1 Box   Refills:  12       * blood glucose monitoring test strip   Commonly known as:  MARIAN CONTOUR NEXT   Used for:  Diabetes mellitus, type 2 (H)        Use to test blood sugar 2 times weekly or as directed.   Quantity:  100 strip   Refills:  6       FINASTERIDE PO        Dose:  5 mg   Take 5 mg by mouth daily   Refills:  0       LISINOPRIL PO        Dose:  40 mg   Take 40 mg by mouth daily   Refills:  0       METOPROLOL TARTRATE PO        Dose:  100 mg   Take 100 mg by mouth daily   Refills:  0       MULTIVITAMINS PO        Dose:  1 capsule   Take 1 capsule by mouth daily   Refills:  0       NITROSTAT 0.4 MG sublingual tablet   Generic drug:  nitroglycerin        Dose:  1 tablet   Place 1 tablet under the tongue every 5 minutes as needed   Refills:  0       NONFORMULARY        OTC eye drops as directed   Refills:  0       polyethylene glycol 0.4%- propylene glycol 0.3% 0.4-0.3 % Soln ophthalmic solution   Commonly known as:  SYSTANE ULTRA        Dose:  1 drop   Place 1 drop into both eyes every hour as needed for dry eyes   Refills:  0       RANITIDINE HCL PO        Dose:  50 mg   Take 50 mg by mouth 2 times daily   Refills:  0       VITAMIN D3 PO        Take  by mouth daily.   Refills:  0       * Notice:  This list has 4 medication(s) that are the same as other medications prescribed for you. Read the directions carefully, and ask your doctor or other care  provider to review them with you.             Protect others around you: Learn how to safely use, store and throw away your medicines at www.disposemymeds.org.             Medication List: This is a list of all your medications and when to take them. Check marks below indicate your daily home schedule. Keep this list as a reference.      Medications           Morning Afternoon Evening Bedtime As Needed    ASPIRIN PO   Take 325 mg by mouth daily                                atorvastatin 40 MG tablet   Commonly known as:  LIPITOR   Take 40 mg by mouth daily                                blood glucose lancing device   1 Units daily                                * blood glucose monitoring lancets   1 Units daily Use fresh lancet each day to check blood sugars                                * blood glucose monitoring lancets   Use to test blood sugar 2 times weekly                                * blood glucose monitoring test strip   Commonly known as:  ACCU-CHEK SMARTVIEW   Test blood sugar before breakfast one day, before and after supper the next and not at all the third day                                * blood glucose monitoring test strip   Commonly known as:  Second & Fourth CONTOUR NEXT   Use to test blood sugar 2 times weekly or as directed.                                FINASTERIDE PO   Take 5 mg by mouth daily                                LISINOPRIL PO   Take 40 mg by mouth daily   Last time this was given:  40 mg on 2/23/2017  9:01 AM                                METOPROLOL TARTRATE PO   Take 100 mg by mouth daily                                MULTIVITAMINS PO   Take 1 capsule by mouth daily                                NITROSTAT 0.4 MG sublingual tablet   Place 1 tablet under the tongue every 5 minutes as needed   Generic drug:  nitroglycerin                                NONFORMULARY   OTC eye drops as directed                                polyethylene glycol 0.4%- propylene glycol 0.3% 0.4-0.3 %  Soln ophthalmic solution   Commonly known as:  SYSTANE ULTRA   Place 1 drop into both eyes every hour as needed for dry eyes                                RANITIDINE HCL PO   Take 50 mg by mouth 2 times daily                                VITAMIN D3 PO   Take  by mouth daily.                                * Notice:  This list has 4 medication(s) that are the same as other medications prescribed for you. Read the directions carefully, and ask your doctor or other care provider to review them with you.              More Information        Discharge Instructions for Abdominal Surgery  Abdominal surgery is done through an incision in your abdomen. It may take a few weeks or longer to heal from the surgery. This sheet gives instructions on how to care for yourself once you re home.     Take all medicines on schedule and as directed by your doctor.   Medicines    You may be prescribed pain medication. Do not wait until your pain becomes severe before taking the medication. It may not work as well if you wait too long to take it between doses.    You may be prescribed antibiotics to help treat or prevent infection. Be sure to take all of the antibiotics even if you start to feel better. Most surgeons prescribe stool softeners along with opioid prescriptions.   Diet    Follow any diet instructions given by your doctor. You may need to start with liquids and then slowly add solid foods back into your diet.     If you have constipation, your doctor may tell you to add more fiber to your diet. You may also be told to use a laxative or stool softener. These can often be bought over the counter.    Drink plenty of fluids.  Activity    Rest as often as needed.    Ask your doctor when you can shower or bathe. Have someone nearby in case you need help.    Ask your family and friends to help with chores and errands.    Don t mow the lawn, vacuum, or do any strenuous activities for 4 to 6 weeks.    Avoid lifting anything over 10  pounds for 4 to 6 weeks.    Avoid driving until your doctor says it s OK.    Walk as often as you feel able.    Do the coughing and breathing exercises you were taught in the hospital. If you were given an incentive spirometer to help with breathing, use it as directed.    Ask your doctor when you can return to work.  Incision and drain care    Keep your incision clean and dry. It s OK to wash the skin around your incision with mild soap and water.    If you have a dressing over your incision, change it as you were told. Replace the dressing if it becomes wet or dirty. In most cases, the dressing can be removed after 48 hours.    If you have a drain, record the amount of drainage daily. You may also need to empty the drain and clean the attached tubing daily. Check with your doctor if you can get your drain wet or if it needs to stay dry at all times.    Don t sit in a bathtub, pool, or hot tub until your incision is closed and any drains are removed.    When coughing or sneezing, hold a pillow firmly against your incision with both hands. This is called  splinting.  Doing this helps protect your incision and decreases abdominal discomfort.    Avoid picking, scratching, or pulling at your incision.    Don t use oils, or creams on your incision. Ask your doctor before using lotions on your incision.  Follow-up  You will have 1 or more follow-up visits with your doctor. These are needed to check how well you re healing. Your drain, stitches, or staples may also be removed during these visits.  When to call the doctor  Call your doctor right away if you have any of the following:    Fever of 100.4 F (38 C) or higher    Chest pain or trouble breathing    Pain or tenderness in the leg    Increased pain, redness, swelling, bleeding, or foul-smelling drainage at the incision site; incision separates or comes apart    Problems with the drain if you have one    Pain or hardness in your abdomen that gets worse or isn t  relieved by pain medication    Nausea and vomiting that won t go away    Diarrhea that lasts more than 3 days    Constipation or inability to pass gas for more than 3 days    Dark-colored or bloody urine    Bright red or dark black stools    Itchy, swollen skin; skin rash     9483-0762 [employer]. [address] [place] [address]. All rights reserved. This information is not intended as a substitute for professional medical care. Always follow your healthcare professional's instructions.                Enoxaparin Sodium Solution for injection  What is this medicine?  ENOXAPARIN (ee nox a PA rin) is used after knee, hip, or abdominal surgeries to prevent blood clotting. It is also used to treat existing blood clots in the lungs or in the veins.  This medicine may be used for other purposes; ask your health care provider or pharmacist if you have questions.  What should I tell my health care provider before I take this medicine?  They need to know if you have any of these conditions:    bleeding disorders, hemorrhage, or hemophilia    infection of the heart or heart valves    kidney or liver disease    previous stroke    prosthetic heart valve    recent surgery or delivery of a baby    ulcer in the stomach or intestine, diverticulitis, or other bowel disease    an unusual or allergic reaction to enoxaparin, heparin, pork or pork products, other medicines, foods, dyes, or preservatives    pregnant or trying to get pregnant    breast-feeding  How should I use this medicine?  This medicine is for injection under the skin. It is usually given by a health-care professional. You or a family member may be trained on how to give the injections. If you are to give yourself injections, make sure you understand how to use the syringe, measure the dose if necessary, and give the injection. To avoid bruising, do not rub the site where this medicine has been injected. Do not take your medicine more often  than directed. Do not stop taking except on the advice of your doctor or health care professional.  Make sure you receive a puncture-resistant container to dispose of the needles and syringes once you have finished with them. Do not reuse these items. Return the container to your doctor or health care professional for proper disposal.  Talk to your pediatrician regarding the use of this medicine in children. Special care may be needed.  Overdosage: If you think you have taken too much of this medicine contact a poison control center or emergency room at once.  NOTE: This medicine is only for you. Do not share this medicine with others.  What if I miss a dose?  If you miss a dose, take it as soon as you can. If it is almost time for your next dose, take only that dose. Do not take double or extra doses.  What may interact with this medicine?    aspirin and aspirin-like medicines    certain medicines that treat or prevent blood clots    dipyridamole    NSAIDs, medicines for pain and inflammation, like ibuprofen or naproxen  This list may not describe all possible interactions. Give your health care provider a list of all the medicines, herbs, non-prescription drugs, or dietary supplements you use. Also tell them if you smoke, drink alcohol, or use illegal drugs. Some items may interact with your medicine.  What should I watch for while using this medicine?  Visit your doctor or health care professional for regular checks on your progress. Your condition will be monitored carefully while you are receiving this medicine.  Notify your doctor or health care professional and seek emergency treatment if you develop breathing problems; changes in vision; chest pain; severe, sudden headache; pain, swelling, warmth in the leg; trouble speaking; sudden numbness or weakness of the face, arm, or leg. These can be signs that your condition has gotten worse.  If you are going to have surgery, tell your doctor or health care  professional that you are taking this medicine.  Do not stop taking this medicine without first talking to your doctor. Be sure to refill your prescription before you run out of medicine.  Avoid sports and activities that might cause injury while you are using this medicine. Severe falls or injuries can cause unseen bleeding. Be careful when using sharp tools or knives. Consider using an electric razor. Take special care brushing or flossing your teeth. Report any injuries, bruising, or red spots on the skin to your doctor or health care professional.  What side effects may I notice from receiving this medicine?  Side effects that you should report to your doctor or health care professional as soon as possible:    allergic reactions like skin rash, itching or hives, swelling of the face, lips, or tongue    feeling faint or lightheaded, falls    signs and symptoms of bleeding such as bloody or black, tarry stools; red or dark-brown urine; spitting up blood or brown material that looks like coffee grounds; red spots on the skin; unusual bruising or bleeding from the eye, gums, or nose  Side effects that usually do not require medical attention (report to your doctor or health care professional if they continue or are bothersome):    pain, redness, or irritation at site where injected  This list may not describe all possible side effects. Call your doctor for medical advice about side effects. You may report side effects to FDA at 5-573-FDA-4378.  Where should I keep my medicine?  Keep out of the reach of children.  Store at room temperature between 15 and 30 degrees C (59 and 86 degrees F). Do not freeze. If your injections have been specially prepared, you may need to store them in the refrigerator. Ask your pharmacist. Throw away any unused medicine after the expiration date.  NOTE:This sheet is a summary. It may not cover all possible information. If you have questions about this medicine, talk to your doctor,  pharmacist, or health care provider. Copyright  2016 Gold Standard                Patient Education    Famotidine Chewable tablet    Famotidine Oral suspension    Famotidine Oral tablet    Famotidine Solution for injection  Famotidine Oral tablet  What is this medicine?  FAMOTIDINE (jennifer thorpe tangela) is a type of antihistamine that blocks the release of stomach acid. It is used to treat stomach or intestinal ulcers. It can also relieve heartburn from acid reflux.  This medicine may be used for other purposes; ask your health care provider or pharmacist if you have questions.  What should I tell my health care provider before I take this medicine?  They need to know if you have any of these conditions:    kidney or liver disease    trouble swallowing    an unusual or allergic reaction to famotidine, other medicines, foods, dyes, or preservatives    pregnant or trying to get pregnant    breast-feeding  How should I use this medicine?  Take this medicine by mouth with a glass of water. Follow the directions on the prescription label. If you only take this medicine once a day, take it at bedtime. Take your doses at regular intervals. Do not take your medicine more often than directed.  Talk to your pediatrician regarding the use of this medicine in children. Special care may be needed.  Overdosage: If you think you have taken too much of this medicine contact a poison control center or emergency room at once.  NOTE: This medicine is only for you. Do not share this medicine with others.  What if I miss a dose?  If you miss a dose, take it as soon as you can. If it is almost time for your next dose, take only that dose. Do not take double or extra doses.  What may interact with this medicine?    delavirdine    itraconazole    ketoconazole  This list may not describe all possible interactions. Give your health care provider a list of all the medicines, herbs, non-prescription drugs, or dietary supplements you use. Also tell  them if you smoke, drink alcohol, or use illegal drugs. Some items may interact with your medicine.  What should I watch for while using this medicine?  Tell your doctor or health care professional if your condition does not start to get better or if it gets worse. Finish the full course of tablets prescribed, even if you feel better.  Do not take with aspirin, ibuprofen or other antiinflammatory medicines. These can make your condition worse.  Do not smoke cigarettes or drink alcohol. These cause irritation in your stomach and can increase the time it will take for ulcers to heal.  If you get black, tarry stools or vomit up what looks like coffee grounds, call your doctor or health care professional at once. You may have a bleeding ulcer.  What side effects may I notice from receiving this medicine?  Side effects that you should report to your doctor or health care professional as soon as possible:    agitation, nervousness    confusion    hallucinations    skin rash, itching  Side effects that usually do not require medical attention (report to your doctor or health care professional if they continue or are bothersome):    constipation    diarrhea    dizziness    headache  This list may not describe all possible side effects. Call your doctor for medical advice about side effects. You may report side effects to FDA at 2-792-FDA-7591.  Where should I keep my medicine?  Keep out of the reach of children.  Store at room temperature between 15 and 30 degrees C (59 and 86 degrees F). Do not freeze. Throw away any unused medicine after the expiration date.  NOTE:This sheet is a summary. It may not cover all possible information. If you have questions about this medicine, talk to your doctor, pharmacist, or health care provider. Copyright  2016 Gold Standard                Discharge Instructions for Colostomy  You just underwent a procedure that required a colostomy. This procedure involves removing or disconnecting part  of your colon (large intestine). If your large intestine was diseased, your doctor may have removed it. If it was injured, your doctor may have disconnected it for a short time so that it can heal. After it heals, your doctor may reconnect it. During a colostomy formation, your doctor reroutes your colon through your abdominal wall. Stool and mucus can then pass out of your body through this opening, called a stoma. The following are general guidelines for home care following a colostomy. Your doctor and nurse will go over any information that is specific to your condition.  Home Care    Take care of your stoma as directed. Your doctor and ostomy nurse discussed how to do this with you before you left the hospital.    Ask your doctor or ostomy nurse for a patient education sheet about colostomy care before you leave the hospital. This will help remind you how to care for yourself.    Don t lift anything heavier than 5 pounds until your doctor says it is OK.    Don t drive until after your first doctor s appointment after your surgery.    If you ride in a car for more than short trips, stop frequently to stretch your legs.    Ask your doctor when you can expect to return to work. Most patients are able to return to work within 4 to 6 weeks after surgery.    Increase your activity gradually. Take short walks on a level surface.    Wash your incision site with soap and water and pat it dry.    Check your incision every day for redness, drainage, swelling, or separation of the skin.    Take your medications exactly as directed. Don t skip doses.    Don t take any over-the-counter medication unless your doctor tells you to do so.  Call Your Doctor  Call your doctor immediately if you have any of the following:    Excessive bleeding from your stoma    Blood in your stool    Stool that is very hard    No gas or stool    Change in the color of your stoma    Bulging skin around your stoma    A stoma that looks like it s  getting longer    Fever of 100.4 F (38 C) or higher, or chills    Redness, swelling, bleeding, or drainage from your incision    Constipation    Diarrhea    Nausea or vomiting    Increased pain in the belly or around the stoma     1180-7608 [employer]. [address] [place] [address]. All rights reserved. This information is not intended as a substitute for professional medical care. Always follow your healthcare professional's instructions.                Colostomy: Adjusting to Your Body  Getting used to a colostomy may take time. Learning to care for it and the changes in your body can be tough. Keep in mind that you are still the same person you were before the colostomy. And you can still do many of the activities that you love. This sheet offers tips to help you adjust to having a colostomy.    Accepting Yourself  It s normal to feel anxious about how your body has changed. But a health care provider will show you how to care for the colostomy and yourself. This may be a wound, ostomy, and continence (WOC) nurse. A WOC nurse is specially trained to care for ostomy patients. Soon, caring for your colostomy will become part of your daily routine -- like bathing or brushing your teeth.  Telling Others  It s your choice whether you tell others about your colostomy. No one can tell by looking at you or being near you that you have a colostomy. Your pouch won t bulge or smell if it s put on right. You may worry about how to tell potential partners that you have a colostomy. It s best to wait until you feel at ease with the person. But talk about it before you decide to have sex.  Sex and Intimacy  You may have some concerns about how a colostomy will affect your sexuality. This is normal. Talk with your WOC nurse about fears you may have. He or she can offer help and advice. Also, share your feelings with your partner. People with colostomies still have sex. They also date, , and have  children. Below are some tips:    Give yourself time. Wait until you feel well and relaxed. Until you are ready, you can express love in other ways, such as hugging, kissing, and caressing.    Empty your pouch before you have sex. You may want to wear a pouch cover or a shirt over the pouch. Or you might tuck the pouch under a soft belt or inside underwear with an open crotch.    Do not put anything in the stoma during sex.  Get Support  Talking with another person who has had a colostomy can help, too. Members of the [employer] are glad to answer questions and talk with you about any concerns you have.    [employer] [place]  [phone]  For Loved Ones  The person you love has not changed because he or she has a colostomy. But it may take time for your loved one to adjust. He or she may be depressed or withdrawn at first. Do your best to support your loved one as he or she gets used to having a colostomy. Your loved one may also want help caring for the colostomy at first. A [facility] nurse or other health care provider can show you what to do. If you have questions or concerns, you can talk with someone from the [employer] [place] ([employer]). The [employer] is a support group for people with ostomies.     7951-3157 [employer]. [address] [place] [address]. All rights reserved. This information is not intended as a substitute for professional medical care. Always follow your healthcare professional's instructions.                Colostomy: Answers to Common Questions  You have been told you need a colostomy. Or you have recently been given one. Below are answers to some questions you are likely to have. Learning as much as you can about your colostomy can help you adjust.    Can I take my regular medications when I have colostomy?  A colostomy could affect the way they act in the body. Talk to your health care provider about any medications you take.  Where do I buy  pouches and skin care products?  Supplies can be bought through medical supply companies, some drugstores, and special catalogs. Be sure you know the maker and product number of the supplies you use. And order new supplies well before you run out.  How can I know whether a product will irritate my skin?  If you have had a skin reaction before, you may want to  patch test  a product. Apply a small amount (or a small piece of product) on your belly, away from the stoma. Remove it after 48 hours. If the skin isn t red or sore, the product is okay to use. Know that you can develop an allergy to a product over time. If you start having a reaction to a product, stop using it. Then, call your wound, ostomy, and continence (WOC) nurse for advice.  Why do I still sometimes feel as if I m going to have a bowel movement through the rectum?  This is called  phantom rectum.  The feeling is common. It may occur because nerves that were cut during surgery still send messages to the brain. The feeling may go away when you ve healed from the surgery.  Where can I get more information?  Your  WOC nurse is there to answer your questions. So are your surgeon and other health care providers. Contacting the sources listed below is a good way to learn more:    [employer] [place]  [phone]   www.uoaa.org    American Cancer Society  [phone]   www.cancer.org    Wound, Ostomy, and Continence Nurses Society  www.wocn.org/Patients    8598-7218 [employer]. [address] [place] [address], [place] [address]. All rights reserved. This information is not intended as a substitute for professional medical care. Always follow your healthcare professional's instructions.                Colostomy: Living an Active Life  You have been told you need a colostomy. Or, you have recently been given one. Once you heal from surgery, you can still live an active life. In fact, if you had a chronic disease such as Crohn s  disease, your quality of life may be better now than before surgery. In most cases, it s your choice how much having a colostomy limits your life.     A colostomy won t keep you from working, being active, and living your life.   Work  You can return to work as soon as your surgeon says it s OK. Keep in mind that having a colostomy is not a handicap. People with colostomies do all kinds of work. This includes jobs that are outdoors and physical, as well as jobs that require a lot of standing or sitting. In fact, some athletes and movie stars have colostomies:    If your work involves heavy labor, such as lifting or digging, talk with your health care provider. You may need to wear a special support to prevent a hernia. A hernia is a weakness or defect in the wall of the abdomen that allows the contents of the abdomen to push outward.    If you move a lot in your work, you may want to wear an ostomy belt over the pouch to hold it in place.  Activity  You can most likely get back to your normal routine soon after surgery. This includes doing the sports and hobbies you may love, such as playing golf, doing aerobics, skiing, dancing, or taking walks. Being active is a good way to relieve stress and stay healthy. It can help you feel better about yourself, too. Until you have your strength back, ease back into being active. If you do a contact sport, such as football or karate, or lift weights, you may need to wear a special support or cover to protect your stoma. Talk to your wound, ostomy, and continence (WOC) nurse. A WOC nurse is specially trained to care for ostomy patients. You'll need to limit your activity for 6 weeks after surgery to prevent a hernia from forming.   Bathing and Swimming  Water will not hurt your stoma. You can take showers and baths, with or without the pouch. You can also go swimming. Pouches don t show under most swimwear. Some tips:    Women often prefer to wear 1-piece swimsuits with  patterns or skirts.    Men often prefer boxer-type trunks.    If you d like, use a rubber belt to hold the pouch in place. Elastic belts may change size when wet.  Clothing  Today s pouches lie flat against the body. That means they don t show, even under tight clothing. You can wear knits, belts, stretch pants -- anything you like. Women can wear pantyhose, tights, and panty girdles. Just make sure that belts and waistbands don t rub against your stoma.  Travel   With a colostomy, you can most likely still travel where you d like. But you ll need to take all your supplies with you.    If you fly, pack your supplies in your carry-on luggage.    If you drive, don t put supplies in the trunk or glove compartment. They can get hot and melt.    Fasten your seatbelt above or below your stoma to avoid rubbing.    In other countries, watch what you eat and drink. Avoid ice, tap water, and unpeeled fruits and vegetables. Drink only bottled water. Or, boil tap water and let it cool. These dietary changes may affect how your bowel functions.     7240-6768 [employer]. [address] [place] [address], [place] [address]. All rights reserved. This information is not intended as a substitute for professional medical care. Always follow your healthcare professional's instructions.

## 2017-02-17 NOTE — OP NOTE
DATE OF PROCEDURE:  02/17/2017       POSTOPERATIVE DIAGNOSIS:  Large bowel obstruction secondary to stricture of lower sigmoid colon.      POSTOPERATIVE DIAGNOSIS:  Large bowel obstruction secondary to stricture of lower sigmoid colon.      PROCEDURE:  Flexible sigmoidoscopy.      SURGEON:  Tima Moreland MD      ANESTHETIC:  MAC.      SPECIMENS:  Nil.      COMPLICATIONS:  Nil.      INDICATIONS:  Mr. Peter Zhao is a 71-year-old male who was seen in the clinic on Wednesday because of a short history of left-sided abdominal pain and distention.  CT scan at that time had revealed what appeared to be a distal sigmoid colon stricture.  The original plan was for him to have a colonoscopy today.  Unfortunately he did not tolerate his prep and did start having vomiting and abdominal pain overnight.  He was admitted to the hospital.  It was felt we would go ahead and do a colonoscopy this morning.  The patient was prepped with Fleet enemas after admission.      DESCRIPTION OF PROCEDURE:  The patient was prepped and brought to the operating room.  Under MAC anesthesia he was placed in the left lateral position.  An appropriate timeout was carried out.  A digital examination of the rectum was done.  There were no external hemorrhoids.  Prostate feels moderately enlarged.  There were no masses palpable.  The flexible end viewing colonoscope was introduced through the anus into the rectum.  He did have some liquid stool in the rectum.  The rectum appeared normal.  We were able to work into the sigmoid colon.  Bowel is a bit tortuous but we did work up to the area of the stricture.  This appears to be approximately 30 cm from the anal verge.  There was some kinking of the scope which made measurements a bit difficult, but definitely this is in the lower sigmoid and not the upper rectum.  We were able to get to an area that was fairly tight.  Mucosa appeared to be normal.  I did not see anything to suggest a malignancy.  We did  exchange the scope for a pediatric scope and worked it back up to the area but could not manipulate through the area of stricture.  Again I did not see anything that suggested a malignancy and the CT scan does support a short stricture secondary to diverticular disease.  The scope was then slowly withdrawn.  No lesions were identified though with liquid stool, small polyps could have been missed.  No biopsies were taken.      He tolerated the procedure well.  He was returned to the recovery room in good condition.  At the moment I will discuss with him and his wife whether we should just go ahead and do his procedure as he is almost completely obstructed.  Arrangements are being made for it to be carried out later today.         RUDY RAYO MD             D: 2017 09:15   T: 2017 09:32   MT: dayday      Name:     ROGER VINSON   MRN:      [id number]        Account:        [id number]   :      1945           Procedure Date: 2017      Document: [id number]

## 2017-02-17 NOTE — ANESTHESIA CARE TRANSFER NOTE
Patient: Peter NARANJO Pavel    Procedure(s):  SIGMOID COLECTOMY with Antrostomy - Wound Class: II-Clean Contaminated    Diagnosis: STRICTURE SIGMOID COLON  Diagnosis Additional Information: No value filed.    Anesthesia Type:   General, ETT, RSI, Epidural     Note:  Airway :Face Mask  Patient transferred to:PACU        Vitals: (Last set prior to Anesthesia Care Transfer)    CRNA VITALS  2/17/2017 1439 - 2/17/2017 1537      2/17/2017             Pulse: 92    SpO2: 90 %    Resp Rate (set): 8                Electronically Signed By: ALBARO Lord CRNA  February 17, 2017  3:37 PM

## 2017-02-17 NOTE — IP AVS SNAPSHOT
[facility]    [address] [address]    Phone:  [phone]    Fax:  [phone]                                       After Visit Summary   2/17/2017    Peter Zhao    MRN: [id number]           After Visit Summary Signature Page     I have received my discharge instructions, and my questions have been answered. I have discussed any challenges I see with this plan with the nurse or doctor.    ..........................................................................................................................................  Patient/Patient Representative Signature      ..........................................................................................................................................  Patient Representative Print Name and Relationship to Patient    ..................................................               ................................................  Date                                            Time    ..........................................................................................................................................  Reviewed by Signature/Title    ...................................................              ..............................................  Date                                                            Time

## 2017-02-17 NOTE — OR NURSING
Pt came to PACU confused and trying to pull out lines.  X-ray done   Pt sleeping now.  Denied any pain when asked

## 2017-02-17 NOTE — IP AVS SNAPSHOT
` `     [facility]: [phone]            Medication Administration Report for Peter Zhao as of 02/22/17 1905   Legend:    Given Hold Not Given Due Canceled Entry Other Actions    Time Time (Time) Time  Time-Action       Inactive    Active    Linked        Medications 02/16/17 02/17/17 02/18/17 02/19/17 02/20/17 02/21/17 02/22/17    albuterol neb solution 2.5 mg  Dose: 2.5 mg Freq: EVERY 6 HOURS PRN Route: NEBULIZATION  PRN Reasons: wheezing,shortness of breath / dyspnea  Start: 02/17/17 1710     (1807)-Not Given        (0747)-Not Given       (1143)-Not Given        0340 (2.5 mg)-Given       (0750)-Not Given        (0713)-Not Given       (1301)-Not Given        (1129)-Not Given       (1514)-Not Given        (0136)-Not Given       (0908)-Not Given           benzocaine-menthol (CEPACOL) 15-3.6 MG lozenge 1-2 lozenge  Dose: 1-2 lozenge Freq: EVERY 1 HOUR PRN Route: BU  PRN Reason: sore throat  PRN Comment: without fever  Start: 02/17/17 1710         1653 (1 lozenge)-Given            diphenhydrAMINE (BENADRYL) injection 12.5 mg  Dose: 12.5 mg Freq: EVERY 6 HOURS PRN Route: IV  PRN Reason: itching  Start: 02/17/17 1710   Admin Instructions: Caution to be used when administering multiple CNS depressing meds within a short time frame.         1657 (12.5 mg)-Given        0005 (12.5 mg)-Given            enoxaparin (LOVENOX) injection 40 mg  Dose: 40 mg Freq: EVERY 24 HOURS Route: SC  Start: 02/19/17 2130       (2151)-Not Given [C]         (0018)-Not Given       (2247)-Not Given        [ ] 2130           famotidine (PEPCID) tablet 20 mg  Dose: 20 mg Freq: 2 TIMES DAILY Route: PO  Start: 02/21/17 0900         0949 (20 mg)-Given       2245 (20 mg)-Given        0753 (20 mg)-Given              [ ] 2100           haloperidol lactate (HALDOL) injection 2 mg  Dose: 2 mg Freq: EVERY 6 HOURS PRN Route: IV  PRN Reason: agitation  Start: 02/20/17 1557        1647 (2 mg)-Given        0005 (2 mg)-Given       1756 (2  "mg)-Given            HYDROmorphone (PF) (DILAUDID) injection 0.2 mg  Dose: 0.2 mg Freq: EVERY 1 HOUR PRN Route: IV  PRN Reason: moderate to severe pain  Start: 02/21/17 0734              HYDROmorphone (PF) (DILAUDID) injection 0.2 mg  Dose: 0.2 mg Freq: EVERY 1 HOUR PRN Route: IV  PRN Reason: moderate to severe pain  PRN Comment: for breakthrough pain  Start: 02/18/17 2009         0400 (0.2 mg)-Given            hypromellose-dextran (ARTIFICAL TEARS) ophthalmic solution 1 drop  Dose: 1 drop Freq: EVERY 1 HOUR PRN Route: Both Eyes  PRN Reason: dry eyes  Start: 02/21/17 1602   Admin Instructions: Subs for systane ultra          1653 (1 drop)-Given            lactated ringers BOLUS 500 mL  Dose: 500 mL Freq: EVERY 4 HOURS PRN Route: IV  PRN Reason: other  PRN Comment: Give for urine output less than 80 mL/4 hours.  Start: 02/17/17 1710              lidocaine (LMX4) kit  Freq: EVERY 1 HOUR PRN Route: Top  PRN Reason: pain  PRN Comment: with VAD insertion or accessing implanted port.  Start: 02/17/17 1710   Admin Instructions: Do NOT give if patient has a history of allergy to any local anesthetic or any \"temitope\" product.   Apply 30 minutes prior to VAD insertion or port access.  MAX Dose:  2.5 g (  of 5 g tube)               lidocaine 1 % 1 mL  Dose: 1 mL Freq: EVERY 1 HOUR PRN Route: OTHER  PRN Comment: mild pain with VAD insertion or accessing implanted port  Start: 02/17/17 1710   Admin Instructions: Do NOT give if patient has a history of allergy to any local anesthetic or any \"temitope\" product. MAX dose 1 mL subcutaneous OR intradermal in divided doses.               lisinopril (PRINIVIL/ZESTRIL) tablet 40 mg  Dose: 40 mg Freq: DAILY Route: PO  Start: 02/17/17 0900     0741-Auto Hold       0900-Automatically Held       1649-Unhold        (1014)-Not Given        1013 (40 mg)-Given        0841 (40 mg)-Given        0954 (40 mg)-Given        0919 (40 mg)-Given           LORazepam (ATIVAN) injection 0.5-1 mg  Dose: 0.5-1 mg " Freq: EVERY 4 HOURS PRN Route: IV  PRN Reason: anxiety  Start: 02/17/17 1710   Admin Instructions: For IV PUSH: Dilute with equal volume of NS.               metoprolol (TOPROL-XL) 24 hr tablet 100 mg  Dose: 100 mg Freq: DAILY Route: PO  Start: 02/21/17 0900   Admin Instructions: DO NOT CRUSH. Tablet may be split in half along score line.          0948 (100 mg)-Given        0919 (100 mg)-Given           naloxone (NARCAN) injection 0.1-0.4 mg  Dose: 0.1-0.4 mg Freq: EVERY 2 MIN PRN Route: IV  PRN Reason: opioid reversal  Start: 02/18/17 2008   Admin Instructions: For respiratory rate LESS than or EQUAL to 8.  Partial reversal dose:  0.1 mg titrated q 2 minutes for Analgesia Side Effects Monitoring Sedation Level of 3 (frequently drowsy, arousable, drifts to sleep during conversation).Full reversal dose:  0.4 mg bolus for Analgesia Side Effects Monitoring Sedation Level of 4 (somnolent, minimal or no response to stimulation).               nitroglycerin (NITROSTAT) sublingual tablet 0.4 mg  Dose: 0.4 mg Freq: EVERY 5 MIN PRN Route: SL  PRN Reason: chest pain  Start: 02/17/17 0424   Admin Instructions: Max of 3 tabs per episode, contact MD if no relief after 3rd dose      0741-Auto Hold       1649-Unhold                ondansetron (ZOFRAN) injection 4 mg  Dose: 4 mg Freq: EVERY 6 HOURS PRN Route: IV  PRN Reasons: nausea,vomiting  Start: 02/17/17 1710   Admin Instructions: Step 1 of nausea and vomiting management. If nausea not resolved in 15 minutes, go to Step 2 prochlorperazine (COMPAZINE).  Irritant.       1213 (4 mg)-Given               ondansetron (ZOFRAN-ODT) ODT tab 4 mg  Dose: 4 mg Freq: EVERY 6 HOURS PRN Route: PO  PRN Reason: nausea  Start: 02/17/17 0424   Admin Instructions: This is Step 1 of nausea and vomiting management.  If nausea not resolved in 15 minutes, go to Step 2 prochlorperazine (COMPAZINE). Do not push through foil backing. Peel back foil and gently remove. Place on tongue immediately.  Administration with liquid unnecessary      0741-Auto Hold       1649-Unhold                oxyCODONE (ROXICODONE) IR tablet 5 mg  Dose: 5 mg Freq: EVERY 4 HOURS PRN Route: PO  PRN Reason: moderate to severe pain  Start: 02/21/17 0733              potassium chloride SA (K-DUR/KLOR-CON M) CR tablet 20 mEq  Dose: 20 mEq Freq: 2 TIMES DAILY Route: PO  Start: 02/22/17 0900   Admin Instructions: DO NOT CRUSH           1046 (20 mEq)-Given       [ ] 2100           prochlorperazine (COMPAZINE) injection 5 mg  Dose: 5 mg Freq: EVERY 6 HOURS PRN Route: IV  PRN Reasons: nausea,vomiting  Start: 02/17/17 1710   Admin Instructions: This is Step 2 of nausea and vomiting management. If nausea not resolved in 15 minutes, notify provider.               sodium chloride (PF) 0.9% PF flush 3 mL  Dose: 3 mL Freq: EVERY 1 HOUR PRN Route: IK  PRN Reason: line flush  PRN Comment: for peripheral IV flush post IV meds  Start: 02/17/17 1710             Discontinued Medications  Medications 02/16/17 02/17/17 02/18/17 02/19/17 02/20/17 02/21/17 02/22/17         Rate: 50 mL/hr Freq: CONTINUOUS Route: IV  Start: 02/18/17 0930   End: 02/22/17 0806      1014 ( )-New Bag       1935 ( )-New Bag        0510 ( )-New Bag       1014 ( )-Rate/Dose Change        0513 ( )-New Bag        1242 ( )-Rate/Dose Change        0806-Med Discontinued         Dose: 20 mg Freq: 2 TIMES DAILY Route: IV  Last Dose: 20 mg (02/21/17 0639)  Start: 02/19/17 1800   End: 02/21/17 0737   Admin Instructions: This is replacing zantac due to availability        1818 (20 mg)-New Bag        0604 (20 mg)-New Bag       (1816)-Not Given        0639 (20 mg)-New Bag       0737-Med Discontinued          Dose: 10 mg Freq: EVERY 4 HOURS PRN Route: IV  PRN Reason: high blood pressure  PRN Comment: Systolic Blood Pressure greater than 140 mmHg or Diastolic Blood Pressure greater than 90 mmHg.  Start: 02/17/17 1710   End: 02/22/17 0806      2259 (10 mg)-Given         0305 (10 mg)-Given          0806-Med Discontinued         Freq: PCA Route: IV  Last Dose: Stopped (02/20/17 1408)  Start: 02/18/17 2015   End: 02/21/17 0737   Admin Instructions: Do NOT give additional opioids orders unless requested by provider.       2040 ( )-New Syringe/Cartridge        0627 ( )-New Syringe/Cartridge       1445 ( )-Shift Total       2207 ( )-New Syringe/Cartridge        0558 ( )-New Syringe/Cartridge       1408-Stopped               0008-Hold [C]              0737-Med Discontinued          Dose: 5 mg Freq: EVERY 6 HOURS Route: IV  Start: 02/18/17 0800   End: 02/21/17 0737   Admin Instructions: HOLD IF Heart Rate less than 65 beats per minute or Systolic Blood Pressure less than 110 mmHg, bronchospasm, on inotropic continuous infusions or being paced .  Start POD 1.    ADMINISTRATION: By slow IV push over at least 2 minutes or give by piggyback over 15-20 minutes if patient not appropriate for IV push administration. ASSESSMENT: Blood pressure and heart rate BEFORE and 10 minutes AFTER each dose x first 2 doses.       (0740)-Not Given [C]       1515 (5 mg)-Given       2134 (5 mg)-Given        0229 (5 mg)-Given       0856 (5 mg)-Given       1440 (5 mg)-Given [C]       2028 (5 mg)-Given        0158 (5 mg)-Given       0841 (5 mg)-Given       1404 (5 mg)-Given       2031 (5 mg)-Given        (0408)-Not Given       0737-Med Discontinued          Dose: 1 drop Freq: EVERY 1 HOUR PRN Route: Both Eyes  PRN Reason: dry eyes  Start: 02/21/17 1556   End: 02/21/17 1601   Admin Instructions: For dry eyes          1601-Med Discontinued          Dose: 20-40 mEq Freq: EVERY 2 HOURS PRN Route: ORAL OR FEED  PRN Reason: potassium supplementation  Start: 02/20/17 0946   End: 02/22/17 0806   Admin Instructions: Use if unable to tolerate tablets.   If Serum K+ 3.0-3.3, dose = 60 mEq po total dose (40 mEq x1 followed in 2 hours by 20 mEq x1). Recheck K+ level 4 hours after dose and the next AM.  If Serum K+ 2.5-2.9, dose = 80 mEq po total dose (40  mEq Q2H x2). Recheck K+ level 4 hours after dose and the next AM.  If Serum K+ less than 2.5, See IV order.  Dissolve packet contents in 4-8 ounces of cold water or juice.           0806-Med Discontinued         Dose: 10 mEq Freq: EVERY 1 HOUR PRN Route: IV  PRN Reason: potassium supplementation  Start: 02/20/17 0946   End: 02/22/17 0806   Admin Instructions: Infuse via PERIPHERAL LINE or CENTRAL LINE.  If Serum K+ 3.0-3.3, dose = 10 mEq/hr x 4 doses (40 mEq IV total dose). Recheck K+ level 2 hours after dose and the next AM.  If Serum K+ less than 3.0, dose = 10 mEq/hr x 6 doses (60 mEq IV total dose). Recheck K+ level 2 hours after dose and the next AM.           0806-Med Discontinued         Dose: 10 mEq Freq: EVERY 1 HOUR PRN Route: IV  PRN Reason: potassium supplementation  Last Dose: 10 mEq (02/21/17 1130)  Start: 02/20/17 0946   End: 02/22/17 0806   Admin Instructions: Infuse via PERIPHERAL LINE. Use potassium with lidocaine for pain with peripheral administration.  If Serum K+ 3.0-3.3, dose = 10 mEq/hr x 4 doses (40 mEq IV total dose). Recheck K+ level 2 hours after dose and the next AM.  If Serum K+ less than 3.0, dose = 10 mEq/hr x 6 doses (60 mEq IV total dose). Recheck K+ level 2 hours after dose and the next AM.         1036 (10 mEq)-New Bag       1145 (10 mEq)-New Bag       1406 (10 mEq)-New Bag        0802 (10 mEq)-New Bag       0920 (10 mEq)-New Bag       1023 (10 mEq)-New Bag       1130 (10 mEq)-New Bag        0806-Med Discontinued         Dose: 20-40 mEq Freq: EVERY 2 HOURS PRN Route: PO  PRN Reason: potassium supplementation  Start: 02/20/17 0946   End: 02/22/17 0806   Admin Instructions: Use if able to take PO.   If Serum K+ 3.0-3.3, dose = 60 mEq po total dose (40 mEq x1 followed in 2 hours by 20 mEq x1). Recheck K+ level 4 hours after dose and the next AM.  If Serum K+ 2.5-2.9, dose = 80 mEq po total dose (40 mEq Q2H x2). Recheck K+ level 4 hours after dose and the next AM.  If Serum K+ less  than 2.5, See IV order.  DO NOT CRUSH           0806-Med Discontinued         Dose: 3 mL Freq: EVERY 8 HOURS Route: IK  Start: 02/17/17 1715   End: 02/21/17 0409   Admin Instructions: And Q1H PRN, to lock peripheral IV dormant line.      1803 (3 mL)-Given        (0035)-Not Given       0815 (3 mL)-Given       1702 (3 mL)-Given        (0120)-Not Given       1000 (3 mL)-Given       1816 (3 mL)-Given        (0054)-Not Given       (0857)-Not Given       (1657)-Not Given        0409-Med Discontinued  (0410)-Not Given         Medications 02/16/17 02/17/17 02/18/17 02/19/17 02/20/17 02/21/17 02/22/17

## 2017-02-17 NOTE — PLAN OF CARE
[facility] Inpatient Admission Note:    Patient admitted to 3226/3226-1 at approximately 0400 via bed accompanied by transport tech from emergency room . Report received from BRENNEN Graves in SBAR format at 0334 via telephone. Patient transferred to bed via self.. Patient is alert and oriented X 3, reports pain; rates at 4 on 0-10 scale.  Patient oriented to room, unit, hourly rounding, and plan of care. Explained admission packet and patient handbook with patient bill of rights brochure. Will continue to monitor and document as needed.     Inpatient Nursing criteria listed below was met:      Health care directives status obtained and documented: Yes      Care Everywhere authorization obtained No      MRSA swab completed for patient 65 years and older: Yes      Patient identifies a surrogate decision maker: Yes If yes, who:spouse Floridalma Contact Information: see facesheet      Core Measure diagnosis present:No. If yes, state diagnosis: n/a       If initial lactic acid >2.0, repeat lactic acid drawn within one hour of arrival to unit: NA. If no, state reason: n/a      Vaccination assessment and education completed: Yes   Vaccinations received prior to admission: Pneumovax yes  Influenza(seasonal)  YES   Vaccination(s) ordered: n/a      Clergy visit ordered if patient requests: N/A      Skin issues/needs documented: N/A      Isolation Patient: no Education given, correct sign in place and documentation row added to PCS:  No      Fall Prevention Yes: Care plan updated, education given and documented, sticker and magnet in place: Yes      Care Plan initiated: Yes      Education Documented (including assessment): Yes      Patient has discharge needs : No If yes, please explain:

## 2017-02-17 NOTE — PLAN OF CARE
Problem: Goal Outcome Summary  Goal: Goal Outcome Summary  Outcome: No Change  Pt to floor from ED this shift. Vitals as charted. Afebrile. Up with SBA. Enemas administered as ordered, stools becoming more clear. Scheduled for colonoscopy this AM. Bowel sounds hypoactive in RLQ. PRN Dilaudid for c/o abdominal pain. Denies any nausea. Alert and oriented.      Face to face report given with opportunity to observe patient.     Report given to BRENNEN Davis   2/17/2017  7:04 AM

## 2017-02-17 NOTE — ED PROVIDER NOTES
History     Chief Complaint   Patient presents with     Abdominal Pain     Nausea & Vomiting     Patient is a 71 year old male presenting with abdominal pain. The history is provided by the patient. No  was used.   Abdominal Pain   Pain location:  Generalized  Pain quality: aching    Pain radiates to:  Does not radiate  Pain severity:  Moderate  Onset quality:  Gradual  Duration:  8 hours  Timing:  Intermittent  Progression:  Worsening  Chronicity:  New  Context comment:  He was taking laxative for bowel preparation for colonoscopy  Relieved by:  Nothing  Worsened by:  Nothing  Ineffective treatments:  None tried  Associated symptoms: constipation, nausea and vomiting    Associated symptoms: no chest pain, no chills, no cough, no diarrhea, no dysuria, no fatigue, no fever, no flatus, no hematemesis, no hematochezia, no hematuria, no melena, no shortness of breath and no sore throat    Risk factors: being elderly      Peter Zhao is a 71 year old male who presents to the ED with Complaints of abdominal pain and distension.  There is associated nausea and vomiting.  Multiple episodes in the last 8 hours.  He has been taking a laxative in preparation for colonoscopy tomorrow.  So far he has not had any bowel movement. Denies any fever, chest pain, palpitations.    I have reviewed the Medications, Allergies, Past Medical and Surgical History, and Social History in the Epic system.    Review of Systems   Constitutional: Negative for chills, fatigue and fever.   HENT: Negative for sore throat.    Respiratory: Negative for cough and shortness of breath.    Cardiovascular: Negative for chest pain.   Gastrointestinal: Positive for abdominal pain, constipation, nausea and vomiting. Negative for diarrhea, flatus, hematemesis, hematochezia and melena.   Genitourinary: Negative for dysuria and hematuria.   All other systems reviewed and are negative.      Physical Exam   BP: (!) 152/114  Heart Rate:  89  Temp: 97.6  F (36.4  C)  Resp: 18  SpO2: 96 %  Physical Exam   Constitutional: No distress.   HENT:   Head: Atraumatic.   Mouth/Throat: Oropharynx is clear and moist. No oropharyngeal exudate.   Eyes: Pupils are equal, round, and reactive to light. No scleral icterus.   Cardiovascular: Normal heart sounds and intact distal pulses.    Pulmonary/Chest: Breath sounds normal. No respiratory distress.   Abdominal: Soft. Bowel sounds are normal. He exhibits distension. There is no tenderness.   Musculoskeletal: He exhibits no edema or tenderness.   Skin: Skin is warm. No rash noted. He is not diaphoretic.       ED Course   Started on IV fluids,   Dilaudid and iv Zofran. The patient's pain was relieved after thsese medications.  Mild leukocytosis is noted. Normal CMP, Amylase, lipase, troponin.   CT scan shows bowel obstruction with obstructing lesion in the sigmoid colon.    ED Course     Procedures       Labs Ordered and Resulted from Time of ED Arrival Up to the Time of Departure from the ED   CBC WITH PLATELETS DIFFERENTIAL - Abnormal; Notable for the following:        Result Value    WBC 15.7 (*)     Absolute Neutrophil 13.1 (*)     All other components within normal limits   COMPREHENSIVE METABOLIC PANEL - Abnormal; Notable for the following:     Glucose 153 (*)     Creatinine 1.32 (*)     GFR Estimate 53 (*)     Bilirubin Total 1.6 (*)     All other components within normal limits   LACTIC ACID   LIPASE   AMYLASE   TROPONIN I       Assessments & Plan (with Medical Decision Making)     I have reviewed the nursing notes.    I have reviewed the findings, diagnosis, plan and need for follow up with the patient.    New Prescriptions    No medications on file       Final diagnoses:   Abdominal pain, generalized   Intestinal obstruction, unspecified type (H)   Admitted to the hospital.    2/17/2017   [facility]     Fareed Morales MD  02/17/17 0312

## 2017-02-17 NOTE — ANESTHESIA CARE TRANSFER NOTE
Patient: Peter Zhao    Procedure(s):  Flexible Sigmoidoscopy - Wound Class: II-Clean Contaminated    Diagnosis: Obstructing lesion sigmoid colon  Diagnosis Additional Information: No value filed.    Anesthesia Type:   MAC     Note:  Airway :Nasal Cannula  Patient transferred to:Phase II        Vitals: (Last set prior to Anesthesia Care Transfer)    CRNA VITALS  2/17/2017 0826 - 2/17/2017 0901      2/17/2017             Resp Rate (observed): (!)  2    Resp Rate (set): 8                Electronically Signed By: ALBARO [facility]  February 17, 2017  9:01 AM

## 2017-02-18 PROBLEM — Z90.49 S/P COLON RESECTION: Status: ACTIVE | Noted: 2017-02-18

## 2017-02-18 LAB
ALBUMIN SERPL-MCNC: 2 G/DL (ref 3.4–5)
ALP SERPL-CCNC: 48 U/L (ref 40–150)
ALT SERPL W P-5'-P-CCNC: 20 U/L (ref 0–70)
ANION GAP SERPL CALCULATED.3IONS-SCNC: 9 MMOL/L (ref 3–14)
AST SERPL W P-5'-P-CCNC: 36 U/L (ref 0–45)
BACTERIA SPEC CULT: NORMAL
BILIRUB SERPL-MCNC: 0.9 MG/DL (ref 0.2–1.3)
BUN SERPL-MCNC: 23 MG/DL (ref 7–30)
CALCIUM SERPL-MCNC: 7.2 MG/DL (ref 8.5–10.1)
CHLORIDE SERPL-SCNC: 114 MMOL/L (ref 94–109)
CO2 SERPL-SCNC: 24 MMOL/L (ref 20–32)
CREAT SERPL-MCNC: 1.35 MG/DL (ref 0.66–1.25)
ERYTHROCYTE [DISTWIDTH] IN BLOOD BY AUTOMATED COUNT: 13.9 % (ref 10–15)
GFR SERPL CREATININE-BSD FRML MDRD: 52 ML/MIN/1.7M2
GLUCOSE SERPL-MCNC: 130 MG/DL (ref 70–99)
HCT VFR BLD AUTO: 37.4 % (ref 40–53)
HGB BLD-MCNC: 12.4 G/DL (ref 13.3–17.7)
LACTATE SERPL-SCNC: 1 MMOL/L (ref 0.4–2)
MCH RBC QN AUTO: 29.5 PG (ref 26.5–33)
MCHC RBC AUTO-ENTMCNC: 33.2 G/DL (ref 31.5–36.5)
MCV RBC AUTO: 89 FL (ref 78–100)
MICRO REPORT STATUS: NORMAL
PLATELET # BLD AUTO: 168 10E9/L (ref 150–450)
POTASSIUM SERPL-SCNC: 3.4 MMOL/L (ref 3.4–5.3)
PROT SERPL-MCNC: 4.4 G/DL (ref 6.8–8.8)
RBC # BLD AUTO: 4.21 10E12/L (ref 4.4–5.9)
SODIUM SERPL-SCNC: 147 MMOL/L (ref 133–144)
SPECIMEN SOURCE: NORMAL
WBC # BLD AUTO: 14.4 10E9/L (ref 4–11)

## 2017-02-18 PROCEDURE — 85027 COMPLETE CBC AUTOMATED: CPT | Performed by: SURGERY

## 2017-02-18 PROCEDURE — 25000125 ZZHC RX 250: Performed by: NURSE ANESTHETIST, CERTIFIED REGISTERED

## 2017-02-18 PROCEDURE — 36415 COLL VENOUS BLD VENIPUNCTURE: CPT | Performed by: SURGERY

## 2017-02-18 PROCEDURE — 20000003 ZZH R&B ICU

## 2017-02-18 PROCEDURE — S0074 INJECTION, CEFOTETAN DISODIU: HCPCS | Performed by: SURGERY

## 2017-02-18 PROCEDURE — 25000128 H RX IP 250 OP 636: Performed by: SURGERY

## 2017-02-18 PROCEDURE — 80053 COMPREHEN METABOLIC PANEL: CPT | Performed by: SURGERY

## 2017-02-18 PROCEDURE — 25000125 ZZHC RX 250: Performed by: SURGERY

## 2017-02-18 PROCEDURE — 37000011 ZZH ANESTHESIA WARD SERVICE: Performed by: NURSE ANESTHETIST, CERTIFIED REGISTERED

## 2017-02-18 PROCEDURE — 83605 ASSAY OF LACTIC ACID: CPT | Performed by: SURGERY

## 2017-02-18 PROCEDURE — 40000275 ZZH STATISTIC RCP TIME EA 10 MIN

## 2017-02-18 PROCEDURE — 25800025 ZZH RX 258: Performed by: SURGERY

## 2017-02-18 RX ORDER — FENTANYL CITRATE 50 UG/ML
50 INJECTION, SOLUTION INTRAMUSCULAR; INTRAVENOUS
Status: DISCONTINUED | OUTPATIENT
Start: 2017-02-18 | End: 2017-02-18

## 2017-02-18 RX ORDER — DEXTROSE MONOHYDRATE, SODIUM CHLORIDE, AND POTASSIUM CHLORIDE 50; 1.49; 4.5 G/1000ML; G/1000ML; G/1000ML
INJECTION, SOLUTION INTRAVENOUS CONTINUOUS
Status: DISCONTINUED | OUTPATIENT
Start: 2017-02-18 | End: 2017-02-22

## 2017-02-18 RX ORDER — NALOXONE HYDROCHLORIDE 0.4 MG/ML
.1-.4 INJECTION, SOLUTION INTRAMUSCULAR; INTRAVENOUS; SUBCUTANEOUS
Status: DISCONTINUED | OUTPATIENT
Start: 2017-02-18 | End: 2017-02-23 | Stop reason: HOSPADM

## 2017-02-18 RX ORDER — HYDROMORPHONE HYDROCHLORIDE 1 MG/ML
0.2 INJECTION, SOLUTION INTRAMUSCULAR; INTRAVENOUS; SUBCUTANEOUS
Status: DISCONTINUED | OUTPATIENT
Start: 2017-02-18 | End: 2017-02-23 | Stop reason: HOSPADM

## 2017-02-18 RX ORDER — FENTANYL CITRATE 50 UG/ML
INJECTION, SOLUTION INTRAMUSCULAR; INTRAVENOUS
Status: DISCONTINUED
Start: 2017-02-18 | End: 2017-02-19 | Stop reason: HOSPADM

## 2017-02-18 RX ADMIN — SODIUM CHLORIDE: 9 INJECTION, SOLUTION INTRAVENOUS at 06:14

## 2017-02-18 RX ADMIN — METOPROLOL TARTRATE 5 MG: 5 INJECTION INTRAVENOUS at 15:15

## 2017-02-18 RX ADMIN — FAMOTIDINE 20 MG: 20 INJECTION, SOLUTION INTRAVENOUS at 00:05

## 2017-02-18 RX ADMIN — FAMOTIDINE 20 MG: 20 INJECTION, SOLUTION INTRAVENOUS at 06:44

## 2017-02-18 RX ADMIN — SODIUM CHLORIDE: 9 INJECTION, SOLUTION INTRAVENOUS at 08:14

## 2017-02-18 RX ADMIN — FENTANYL CITRATE 50 MCG: 50 INJECTION, SOLUTION INTRAMUSCULAR; INTRAVENOUS at 20:11

## 2017-02-18 RX ADMIN — HYDROMORPHONE HYDROCHLORIDE: 10 INJECTION, SOLUTION INTRAMUSCULAR; INTRAVENOUS; SUBCUTANEOUS at 20:40

## 2017-02-18 RX ADMIN — POTASSIUM CHLORIDE, DEXTROSE MONOHYDRATE AND SODIUM CHLORIDE: 150; 5; 450 INJECTION, SOLUTION INTRAVENOUS at 10:14

## 2017-02-18 RX ADMIN — Medication 5 ML: at 13:01

## 2017-02-18 RX ADMIN — ONDANSETRON 4 MG: 2 INJECTION, SOLUTION INTRAMUSCULAR; INTRAVENOUS at 12:13

## 2017-02-18 RX ADMIN — Medication 3 ML: at 19:58

## 2017-02-18 RX ADMIN — Medication 10 ML/HR: at 13:01

## 2017-02-18 RX ADMIN — METOPROLOL TARTRATE 5 MG: 5 INJECTION INTRAVENOUS at 21:34

## 2017-02-18 RX ADMIN — HYDRALAZINE HYDROCHLORIDE 10 MG: 20 INJECTION INTRAMUSCULAR; INTRAVENOUS at 22:59

## 2017-02-18 RX ADMIN — POTASSIUM CHLORIDE, DEXTROSE MONOHYDRATE AND SODIUM CHLORIDE: 150; 5; 450 INJECTION, SOLUTION INTRAVENOUS at 19:35

## 2017-02-18 RX ADMIN — FAMOTIDINE 20 MG: 20 INJECTION, SOLUTION INTRAVENOUS at 14:05

## 2017-02-18 RX ADMIN — Medication 5 ML: at 19:55

## 2017-02-18 RX ADMIN — Medication 10 ML/HR: at 17:31

## 2017-02-18 RX ADMIN — FAMOTIDINE 20 MG: 20 INJECTION, SOLUTION INTRAVENOUS at 23:28

## 2017-02-18 RX ADMIN — CEFOTETAN AND DEXTROSE 2 G: 2 INJECTION, SOLUTION INTRAVENOUS at 06:00

## 2017-02-18 ASSESSMENT — PAIN DESCRIPTION - DESCRIPTORS
DESCRIPTORS: SHARP;STABBING
DESCRIPTORS: ACHING

## 2017-02-18 NOTE — PLAN OF CARE
Problem: Patient Goal: Rt Focus  Goal: 1. Patient Goal: RT Focus  [place] - Respiratory Clinical Assessment     Current Patient History:    Respiratory History: post op        Smoking History: quit smoking 35 years ago    Oxygen dependency: No,    Oxygen prescribed: none         2/17/2017 6:08 PM Patient Initial Assessment:     Level of Consciousness: alert , cooperative    Skin color: pink    Lung sounds:clear and diminished        Respirations:  Normal with easy respirations and no use of accessory muscles to breathe    Respiratory symptoms: no other unusual symptoms    Cough/Sputum:  nonproductive    Current oxygenation status: 95% 2L

## 2017-02-18 NOTE — PLAN OF CARE
Problem: Patient Goal: Social Work Focus  Goal: 1. Patient Goal: Social Work Focus  Met with Peter and his wife, Raven for CM/SS assessment.  Medicare letter also completed.  See flow sheet for completed assessment.       Peter lives independently at home with his wife, Raven.  They share household chores and yard work.  Peter is independent with walking, driving, ADL's, finances, and medications.  He identifies as [employer] and this is of importance to him.  He has not completed a health care directive and at this time is no interested in information.  He is not connected with services or a .  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  He enjoys [employer] and [employer].     Peter sees Dr. Vega for primary care and was last seen on Wednesday.  He sees Dr. Pleitez for dental care and was last seen in July.  He has an appointment to see Dr. Gutierrez for eye care coming up.  He is not normally on oxygen.  Peter is discharging home in several days with a colostomy.  He is interested in home care to help manage this.  Will look into availability.  Raven will provide transportation upon discharge.

## 2017-02-18 NOTE — OP NOTE
DATE OF PROCEDURE:  02/17/2017.        PREOPERATIVE DIAGNOSIS:  Stricture lower sigmoid colon.      POSTOPERATIVE DIAGNOSIS:  Stricture lower sigmoid colon secondary to diverticular disease with gross distention of large bowel.      PROCEDURE:  Resection of sigmoid colon with end sigmoid colostomy.      SURGEON:  Tima Moreland MD      ANESTHETIC:  General.      BLOOD LOSS:  Less than 100 mL.      SPECIMEN:  Sigmoid colon.      COMPLICATIONS:  Nil.      INDICATIONS FOR PROCEDURE:  Mr. Peter Zhao is a 71-year-old male who was originally seen on Wednesday with a short history of left-sided abdominal pain and difficulties with his bowels.  A CT scan had been carried out at that time which did suggest a stricture in his lower sigmoid colon with evidence of a partial large bowel obstruction.  The decision was made to try and do a colonoscopy.  He did start his prep last evening did not tolerate it, was admitted into the hospital because of vomiting.  On examination, though he had minimal tenderness though was distended.  A flexible sigmoidoscopy was carried out this morning, which did reveal the area of the narrowing which appeared to be benign, but malignancy could not be ruled out.  Because of the bowel obstruction, it was felt that we should go ahead and deal with the lesion.  The risks and benefits were discussed with the patient and his wife.      DESCRIPTION OF PROCEDURE:  The patient was prepped and brought to the operating room.  An epidural and a central line had been previously placed by Anesthesia.  Under general anesthetic with endotracheal tube in place, a Temple catheter was passed.  The patient was placed in stirrups, taking care to protect the bony prominences.  The abdomen was prepped with ChloraPrep and the perineum was prepped with povidine and draped in the usual fashion.  An appropriate timeout was carried out.  A generous midline incision was then made around the left side of the umbilicus and down  to the symphysis.  It was carried down through subcutaneous tissue with hemostasis being achieved with cautery.  The fascia was opened in the midline.  The large bowel was grossly distended.  There was a small amount of free serosanguineous fluid, particularly in the pelvis which was removed.  There was no fecal contamination.  It did appear that he had an obstructing lesion situated in the lower sigmoid behind the bladder.  This was quite a short segment, felt somewhat rubbery but I could not rule out a malignancy.  The rest of the examination was normal.  Colon was diffusely distended, particularly the cecum.  There was a small split in the lateral side of the cecum in the serosa.  The liver felt normal.  The upper abdomen felt normal.      I did go on and mobilize the left colon from the left pericolic gutter.  This was done with cautery along the white line of Toldt.  This was extended up to the level of the splenic flexure.  I did decide to do a cancer-type resection in case this did turn out to be an early carcinoma.  The sigmoid mesentery was thus mobilized from the left side.  The left ureter was identified and preserved.  We did swing the mesentery over to the level of the left common iliac dissected up to the level of Gerota's fascia.  Hemostasis was achieved with cautery.  We then extended the peritoneal dissection down onto the rectum into the pelvis.  The lesion did appear to be in the lower sigmoid just above the junction with the rectum.  A suitable area was identified in the upper area in the mid to upper third of the sigmoid colon.  This was then divided with a SHAINA 80 stapler.  The mesentery to the sigmoid colon was then divided and tied with 0-Vicryl ties.  This was taken back to the level of the left colic.  This was doubly ligated with 2-0 chromic.  The mesentery was then divided and tied with 2-0 Vicryl ties down to the level of the rectum.  The mesorectum was then divided and clipped until we  did have a suitable area on the upper rectum for division.  This area was then clamped with a TA 55 stapler using the blue cartridge.  A bowel clamp was placed distally, and the bowel was divided with a knife.  The specimen was then delivered.  This was sent fresh to the lab.  Dr. Nicolas had a look at it and did feel that there was no malignancy and this was strictly a diverticular stricture.  A search for bleeding was made and the area was dry.      We then looked at the bowel.  The sigmoid colon did appear quite thin.  It was also slightly dusky, I think, from the distention.  There is also a considerable size discrepancy between the sigmoid and the rectum.  Did consider doing a primary anastomosis but really felt that with the condition of the sigmoid that the chances of this healing without a leak was really quite small.  I thus felt that the safest thing to do was to do a sigmoid colostomy.      I did end up placing two 4-0 Prolene sutures at each end of the staple line in the rectum for identification at future surgery.  The staple line did appear intact and the bowel did look quite good.      I then created an area for the colostomy in the left iliac fossa.  A Holy Cross of skin was excised as well as the subcutaneous tissue.  A cruciate incision was made in the anterior rectus sheath.  The rectus muscle was then split in the direction of its fibers and the peritoneum opened.  We were then able to deliver the sigmoid colon through the area without difficulty and without tension.      A search for sponges and instruments was carried out.  A search for bleeding was performed.  The abdomen was then irrigated with warm normal saline.      We then switched to the closing instruments.  The peritoneum in the lower portion of the incision was closed with running 2-0 Vicryl.  The fascia was closed with running 0-PDS.  This was done in a small bite type closure, did come together well without tension.  The wound was  irrigated with saline.  The skin was then closed with interrupted 3-0 Prolene.  A gauze and Tegaderm dressing was applied.      The colostomy was then matured.  The seromuscular layer was anchored in 4 quadrants to the skin with interrupted 3-0 chromic.  The staple line in the bowel was then removed.  The mucosa was then tacked circumferentially to the skin with interrupted 3-0 chromic.  The bowel did end up pinking up nicely after it was decompressed.  A sucker was easily passed up into the descending colon through the colostomy.  We did suck out a fair amount of liquid stool and air.  A thin appliance was applied.      He tolerated procedure well.  He was returned to the intensive care unit room in good condition.  At the end of procedure, sponge, instrument and needle count was correct as reported by the nurses.         RUDY RAYO MD             D: 2017 15:33   T: 2017 15:35   MT: TD      Name:     ROGER VINSON   MRN:      [id number]        Account:        [id number]   :      1945           Procedure Date: 2017      Document: [id number]       cc: Brock Vega MD

## 2017-02-18 NOTE — PROGRESS NOTES
"[facility] Practice Progress Note    Date of Service (when I saw the patient): 02/18/2017     Assessment & Plan      Peter Zhao is a 71 year old male who was admitted on 2/17/2017 for  Lower bowel obstruction/constriction.  A colon resection was completed yesterday after a coloscopy was done.  He reports doing much better than yesterday.    Principal Problem:    Large bowel obstruction (H)  Active Problems:    CAD (coronary artery disease)    HTN (hypertension)    Stricture of sigmoid colon (H)    S/P colon resection    DVT Prophylaxis: Pneumatic Compression Devices  Code Status: Full Code    Tima Prado    Interval History      Doing well.  Continues to improve.  Pain is well-controlled.  No fevers.      Physical Exam   Temp: 98  F (36.7  C) Temp src: Tympanic BP: 110/62   Heart Rate: 92 Resp: 12 SpO2: 95 % O2 Device: Nasal cannula Oxygen Delivery: 4 LPM  Vitals:    02/17/17 0433   Weight: 84.2 kg (185 lb 10 oz)     Vital Signs with Ranges  Temp:  [95.8  F (35.4  C)-100.2  F (37.9  C)] 98  F (36.7  C)  Heart Rate:  [] 92  Resp:  [6-24] 12  BP: ()/(47-98) 110/62  FiO2 (%):  [70 %-100 %] 70 %  SpO2:  [86 %-98 %] 95 %  Blood pressure 110/62, temperature 98  F (36.7  C), temperature source Tympanic, resp. rate 12, height 1.778 m (5' 10\"), weight 84.2 kg (185 lb 10 oz), SpO2 95 %.      I/O last 3 completed shifts:  In: 7234 [I.V.:7234]  Out: 2775 [Urine:1325; Emesis/NG output:400; Stool:950; Blood:100]    Peripheral IV 02/17/17 Left (Active)   Site Assessment WDL 2/18/2017  7:38 AM   Line Status Saline locked 2/18/2017  7:38 AM   Phlebitis Scale 0-->no symptoms 2/18/2017  7:38 AM   Infiltration Scale 0 2/18/2017  7:38 AM   Extravasation? No 2/17/2017  4:11 PM   Number of days:1       CVC Triple Lumen 02/17/17 Internal jugular (Active)   Site Assessment WDL 2/18/2017  7:38 AM   Dressing Intervention Chlorhexidine sponge;Transparent 2/18/2017  7:38 AM   CVC Comment zeroed " 2/18/2017  7:15 AM   CVC Lumen Assessment Brown 2/18/2017  7:38 AM   Number of days:1       Intrathecal/Epidural Catheter 02/17/17 (Active)   Site Assessment Other (Comment) 2/18/2017  7:30 AM   Line Status Infusing 2/18/2017  7:30 AM   Dressing Type Gauze;Transparent 2/18/2017  7:30 AM   Dressing Status Drainage on dressing 2/18/2017  7:30 AM   Number of days:1       NG/OG Tube Nasogastric 18 fr Left nostril (Active)   Site Description WDL 2/18/2017  7:59 AM   Status Suction-low intermittent 2/18/2017  7:59 AM   Drainage Appearance Brown;Thin 2/18/2017  7:59 AM   Placement Check appearance of fluid consistent with GI contents 2/18/2017  7:59 AM   Distal Tube Length (cm marking) 59 cm 2/17/2017  7:49 PM   Output (ml) 150 ml 2/18/2017  4:11 AM   Number of days:1       Urethral Catheter Double-lumen 16 fr (Active)   Catheter Care Done 2/18/2017  7:59 AM   Collection Container Standard 2/18/2017  7:59 AM   Securement Method Securing device (Describe) 2/18/2017  7:59 AM   Rationale for Continued Use Strict 1-2 Hour I&O 2/18/2017  7:59 AM   Urine Output 105 mL 2/18/2017 10:00 AM   Number of days:1       Incision/Surgical Site 02/17/17 Abdomen (Active)   Incision Assessment UTV 2/18/2017  7:30 AM   Charisma-Incision Assessment UTV 2/18/2017  7:30 AM   Closure Sutures 2/18/2017  7:30 AM   Dressing Intervention Dressing marked - No change 2/18/2017  7:30 AM   Number of days:1       Incision/Surgical Site 02/17/17 Left Abdomen (Active)   Incision Assessment UTV 2/18/2017  7:30 AM   Charisma-Incision Assessment UTV 2/18/2017  7:30 AM   Incision Drainage Amount UTV 2/18/2017  7:30 AM   Drainage Description UTV 2/18/2017  7:30 AM   Number of days:1     Line/device assessment(s) completed for medical necessity    Constitutional: easily awakened, alert, cooperative, no apparent distress, and appears stated age  Respiratory: No increased work of breathing on 4 L/min, good air exchange, clear to auscultation bilaterally, no crackles or  wheezing  Cardiovascular: Normal apical impulse, regular rate and rhythm, normal S1 and S2, systolic murmur noted  GI: No scars, normal bowel sounds, soft, non-distended, colostomy noted.  Skin: no bruising or bleeding, normal skin color, texture, turgor and no redness, warmth, or swelling  Musculoskeletal: There is no redness, warmth, or swelling of the joints.  Full range of motion noted.  Tone is normal.    Medications     dextrose 5% and 0.45% NaCl + KCl 20 mEq/L 100 mL/hr at 02/18/17 1014     fentaNYL-ropivacaine 8 mL/hr (02/18/17 0804)       lisinopril (PRINIVIL/ZESTRIL) tablet 40 mg  40 mg Oral Daily     famotidine  20 mg Intravenous Q8H     sodium chloride (PF)  3 mL Intracatheter Q8H     metoprolol  5 mg Intravenous Q6H       Data      Results for orders placed or performed during the hospital encounter of 02/17/17 (from the past 24 hour(s))   XR Chest Port 1 View    Narrative    CHEST SINGLE VIEW    REPORT:  There is a central catheter with its tip at the junction of  the superior vena cava and right atrium.  There is atelectatic changes  seen at the lung bases.  Postoperative changes are seen in the  mediastinum. There is a transvenous pacemaker in place.    IMPRESSION:  BIBASILAR ATELECTASIS.    CENTRAL CATHETER WITH ITS TIP IN THE JUNCTION OF THE SUPERIOR VENA  CAVA AND RIGHT ATRIUM.  Exam Date: Feb 17, 2017 03:34:00 PM  Author: ANA HERNADEZ  This report is preliminary and transcribed     CBC with platelets   Result Value Ref Range    WBC 14.4 (H) 4.0 - 11.0 10e9/L    RBC Count 4.21 (L) 4.4 - 5.9 10e12/L    Hemoglobin 12.4 (L) 13.3 - 17.7 g/dL    Hematocrit 37.4 (L) 40.0 - 53.0 %    MCV 89 78 - 100 fl    MCH 29.5 26.5 - 33.0 pg    MCHC 33.2 31.5 - 36.5 g/dL    RDW 13.9 10.0 - 15.0 %    Platelet Count 168 150 - 450 10e9/L   Comprehensive metabolic panel   Result Value Ref Range    Sodium 147 (H) 133 - 144 mmol/L    Potassium 3.4 3.4 - 5.3 mmol/L    Chloride 114 (H) 94 - 109 mmol/L    Carbon Dioxide 24  20 - 32 mmol/L    Anion Gap 9 3 - 14 mmol/L    Glucose 130 (H) 70 - 99 mg/dL    Urea Nitrogen 23 7 - 30 mg/dL    Creatinine 1.35 (H) 0.66 - 1.25 mg/dL    GFR Estimate 52 (L) >60 mL/min/1.7m2    GFR Estimate If Black 63 >60 mL/min/1.7m2    Calcium 7.2 (L) 8.5 - 10.1 mg/dL    Bilirubin Total 0.9 0.2 - 1.3 mg/dL    Albumin 2.0 (L) 3.4 - 5.0 g/dL    Protein Total 4.4 (L) 6.8 - 8.8 g/dL    Alkaline Phosphatase 48 40 - 150 U/L    ALT 20 0 - 70 U/L    AST 36 0 - 45 U/L

## 2017-02-18 NOTE — PLAN OF CARE
Face to face report given with opportunity to observe patient.  Report given to Sabina NIEVES RN.    Felipe Palafox  2/18/2017, 7:12 AM

## 2017-02-18 NOTE — PLAN OF CARE
Problem: Patient Goal: Rt Focus  Goal: 1. Patient Goal: RT Focus  Outcome: No Change  Pt maintaining oxygen saturation >92% on 2L n/c.  No neb indicated this shift.  Encouraged IS use.

## 2017-02-18 NOTE — PLAN OF CARE
Problem: Bowel Obstruction (Adult)  Goal: Signs and Symptoms of Listed Potential Problems Will be Absent or Manageable (Bowel Obstruction)  Signs and symptoms of listed potential problems will be absent or manageable by discharge/transition of care (reference Bowel Obstruction (Adult) CPG).   Outcome: Improving  Patient is POD #1, pain managed with continuous epidural, rate increased from 8 to 10 mL per [place] from anesthesia. Patient reports pain 0.5/10 mid upper abdomen and declines additional interventions. Dermatomes completed as charted. Pt has midline incision, drainage does not extend past previously traced boarders. Pt has LLQ ostomy appliance in place with prodruding and slightly edematous red/pink stoma. Two piece appliance has put out moderate amount of brown liquid stool. Pt has NGT to LIS in place, no output noted t/o shift- Dr. Duran aware, 30 mL flush requested, flushed as ordered. Pt has oxygen on at 2 LPM with saturations mid 90's. He has been v-paced t/o shift. BP's slightly hypertensive, treated with scheduled metoprolol. Pt has productive/splinted cough with thick rust tinged sputum with dark red blood streaks- MD aware. Pt was able to dangle at side of bed for approximately 40 minutes, tolerated well. Pt was intermittently itching t/o the day, lotion applied with reported relief of itching. Temple catheter in place with good UOP.

## 2017-02-18 NOTE — PROGRESS NOTES
POD # 1  Stable overnight, HR has come down with fluid, good U/O, BP stable  Denies abdominal pain, NG in place, liquid stool in colostomy bag  Awake and alert, perhaps mild confusion  Abdomen - soft, non-tender, colostomy OK, a bit edematous, good color  Lab - Hgb 12.4, WBC 14.4, lytes OK  A - stable post-op, epidural working well  P - switch IV       Await bowel function

## 2017-02-18 NOTE — PLAN OF CARE
Pt presented to the floor with VSS, denying pain. Epidural in place and applying adequate pain control, dermatomes as charted. CVP zeroed and reading 9-10 mmHg consistently. Pt maintaining sats of 94-96% on 2L NC. Midline incision has small amount of shadowing on dressing. Colostomy putting out moderate amount of watery, brown stool. Lungs clear, IS encouraged as well as cough/deep breathing. Temple catheter in place, urine is pink/bloody and has not changed since admission to the unit. NG in place at low intermittent suction putting out clear, red fluid. CVC infusing two ports with CVP on third. Pt is alert/oriented. Has been very willing to learn about colostomy.     Temp: (!) 95.8  F (35.4  C) (bear hugger applied) Temp src: Tympanic BP: 136/85   Heart Rate: 100 Resp: 17 SpO2: 98 % O2 Device: Nasal cannula Oxygen Delivery: 2 LPM     Face to face report given with opportunity to observe patient.    Report given to BRENNEN Wang.     Brittny Mckeon   2/17/2017  7:14 PM

## 2017-02-19 LAB
ANION GAP SERPL CALCULATED.3IONS-SCNC: 7 MMOL/L (ref 3–14)
BUN SERPL-MCNC: 15 MG/DL (ref 7–30)
CALCIUM SERPL-MCNC: 7.3 MG/DL (ref 8.5–10.1)
CHLORIDE SERPL-SCNC: 114 MMOL/L (ref 94–109)
CO2 SERPL-SCNC: 22 MMOL/L (ref 20–32)
CREAT SERPL-MCNC: 1.07 MG/DL (ref 0.66–1.25)
ERYTHROCYTE [DISTWIDTH] IN BLOOD BY AUTOMATED COUNT: 14 % (ref 10–15)
GFR SERPL CREATININE-BSD FRML MDRD: 68 ML/MIN/1.7M2
GLUCOSE SERPL-MCNC: 155 MG/DL (ref 70–99)
HCT VFR BLD AUTO: 38.2 % (ref 40–53)
HGB BLD-MCNC: 12.9 G/DL (ref 13.3–17.7)
MCH RBC QN AUTO: 29.6 PG (ref 26.5–33)
MCHC RBC AUTO-ENTMCNC: 33.8 G/DL (ref 31.5–36.5)
MCV RBC AUTO: 88 FL (ref 78–100)
PLATELET # BLD AUTO: 166 10E9/L (ref 150–450)
POTASSIUM SERPL-SCNC: 3.5 MMOL/L (ref 3.4–5.3)
RBC # BLD AUTO: 4.36 10E12/L (ref 4.4–5.9)
SODIUM SERPL-SCNC: 143 MMOL/L (ref 133–144)
WBC # BLD AUTO: 14 10E9/L (ref 4–11)

## 2017-02-19 PROCEDURE — 40000275 ZZH STATISTIC RCP TIME EA 10 MIN

## 2017-02-19 PROCEDURE — 25000125 ZZHC RX 250: Performed by: SURGERY

## 2017-02-19 PROCEDURE — 80048 BASIC METABOLIC PNL TOTAL CA: CPT | Performed by: SURGERY

## 2017-02-19 PROCEDURE — 25000308 HC RX OP HPI UCR WEL MED 250 IP 250: Performed by: SURGERY

## 2017-02-19 PROCEDURE — 85027 COMPLETE CBC AUTOMATED: CPT | Performed by: SURGERY

## 2017-02-19 PROCEDURE — 36415 COLL VENOUS BLD VENIPUNCTURE: CPT | Performed by: SURGERY

## 2017-02-19 PROCEDURE — 12000000 ZZH R&B MED SURG/OB

## 2017-02-19 PROCEDURE — A9270 NON-COVERED ITEM OR SERVICE: HCPCS | Mod: GY | Performed by: SURGERY

## 2017-02-19 PROCEDURE — 25800025 ZZH RX 258: Performed by: SURGERY

## 2017-02-19 PROCEDURE — 94640 AIRWAY INHALATION TREATMENT: CPT

## 2017-02-19 PROCEDURE — 25000132 ZZH RX MED GY IP 250 OP 250 PS 637: Mod: GY | Performed by: SURGERY

## 2017-02-19 RX ADMIN — METOPROLOL TARTRATE 5 MG: 5 INJECTION INTRAVENOUS at 14:40

## 2017-02-19 RX ADMIN — METOPROLOL TARTRATE 5 MG: 5 INJECTION INTRAVENOUS at 02:29

## 2017-02-19 RX ADMIN — POTASSIUM CHLORIDE, DEXTROSE MONOHYDRATE AND SODIUM CHLORIDE: 150; 5; 450 INJECTION, SOLUTION INTRAVENOUS at 05:10

## 2017-02-19 RX ADMIN — METOPROLOL TARTRATE 5 MG: 5 INJECTION INTRAVENOUS at 08:56

## 2017-02-19 RX ADMIN — LISINOPRIL 40 MG: 40 TABLET ORAL at 10:13

## 2017-02-19 RX ADMIN — HYDROMORPHONE HYDROCHLORIDE: 10 INJECTION, SOLUTION INTRAMUSCULAR; INTRAVENOUS; SUBCUTANEOUS at 06:27

## 2017-02-19 RX ADMIN — FAMOTIDINE 20 MG: 20 INJECTION, SOLUTION INTRAVENOUS at 18:18

## 2017-02-19 RX ADMIN — ALBUTEROL SULFATE 2.5 MG: 2.5 SOLUTION RESPIRATORY (INHALATION) at 03:40

## 2017-02-19 RX ADMIN — METOPROLOL TARTRATE 5 MG: 5 INJECTION INTRAVENOUS at 20:28

## 2017-02-19 RX ADMIN — FAMOTIDINE 20 MG: 20 INJECTION, SOLUTION INTRAVENOUS at 06:13

## 2017-02-19 RX ADMIN — HYDROMORPHONE HYDROCHLORIDE: 10 INJECTION, SOLUTION INTRAMUSCULAR; INTRAVENOUS; SUBCUTANEOUS at 22:07

## 2017-02-19 NOTE — PROGRESS NOTES
"[facility]    Family Practice Progress Note    Date of Service (when I saw the patient): 02/19/2017     Assessment & Plan      Peter Zhao is a 71 year old male who was admitted on 2/17/2017.   Temple out.  Removed his own NG tube at night.  Voided once this morning without difficulty.  Some confusion which was confirmed by his wife.  Otherwise, doing well.    Principal Problem:    Large bowel obstruction (H)  Active Problems:    CAD (coronary artery disease)    HTN (hypertension)    Stricture of sigmoid colon (H)    S/P colon resection    DVT Prophylaxis: Pneumatic Compression Devices  Code Status: Full Code    Tima LEOStefan Prado    Interval History      Doing well.  Continues to improve.  Pain is well-controlled.  No fevers.      Physical Exam   Temp: 98.6  F (37  C) Temp src: Tympanic BP: 141/92   Heart Rate: 87 Resp: 20 SpO2: 92 % O2 Device: Nasal cannula with humidification Oxygen Delivery: 5 LPM  Vitals:    02/17/17 0433 02/19/17 0648   Weight: 84.2 kg (185 lb 10 oz) 87 kg (191 lb 12.8 oz)     Vital Signs with Ranges  Temp:  [98.6  F (37  C)-99.7  F (37.6  C)] 98.6  F (37  C)  Heart Rate:  [] 87  Resp:  [7-21] 20  BP: (118-159)/() 141/92  SpO2:  [89 %-97 %] 92 %  Blood pressure 141/92, temperature 98.6  F (37  C), temperature source Tympanic, resp. rate 20, height 1.778 m (5' 10\"), weight 87 kg (191 lb 12.8 oz), SpO2 92 %.      I/O last 3 completed shifts:  In: 1905 [I.V.:1875; NG/GT:30]  Out: 3290 [Urine:2240; Stool:1050]    Peripheral IV 02/17/17 Left (Active)   Site Assessment WDL 2/19/2017  6:38 AM   Line Status Saline locked;Checked every 1-2 hour 2/19/2017  6:38 AM   Phlebitis Scale 0-->no symptoms 2/19/2017  6:38 AM   Infiltration Scale 0 2/19/2017  6:38 AM   Extravasation? No 2/18/2017  4:00 PM   Number of days:2       CVC Triple Lumen 02/17/17 Internal jugular (Active)   Site Assessment WDL 2/19/2017  6:38 AM   Dressing Intervention Chlorhexidine sponge;Transparent 2/18/2017  " 4:00 PM   CVC Comment zeroed 2/18/2017  9:54 PM   CVC Lumen Assessment Brown 2/18/2017  4:00 PM   Number of days:2       Colostomy (Active)   Stomal Appliance 2 piece 2/19/2017  4:19 AM   Stoma Assessment Protruding;Red;Edema 2/18/2017  4:12 PM   Peristomal Assessment UTV 2/18/2017  4:12 PM   Stool Amount Small 2/19/2017  5:10 AM   Number of days:2       Urethral Catheter Double-lumen 16 fr (Active)   Tube Description Positional 2/18/2017  4:12 PM   Catheter Care Done 2/19/2017  4:19 AM   Collection Container Standard 2/19/2017  4:19 AM   Securement Method Securing device (Describe) 2/19/2017  4:19 AM   Rationale for Continued Use Strict 1-2 Hour I&O 2/19/2017  4:19 AM   Urine Output 200 mL 2/19/2017  6:00 AM   Number of days:2       Incision/Surgical Site 02/17/17 Abdomen (Active)   Incision Assessment UT 2/19/2017  4:19 AM   Charisma-Incision Assessment UT 2/18/2017  4:12 PM   Closure Sutures 2/18/2017  4:12 PM   Dressing Intervention Dressing marked 2/19/2017  4:19 AM   Number of days:2       Incision/Surgical Site 02/17/17 Left Abdomen (Active)   Incision Assessment UT 2/19/2017  4:19 AM   Charisma-Incision Assessment [facility] 2/18/2017  4:12 PM   Incision Drainage Amount None 2/18/2017  4:12 PM   Drainage Description UTV 2/19/2017  4:19 AM   Number of days:2     Line/device assessment(s) completed for medical necessity    Constitutional: easily awakened, alert, cooperative, no apparent distress, and appears stated age  Respiratory: No increased work of breathing on 4 L/min, good air exchange, clear to auscultation bilaterally, no crackles or wheezing  Cardiovascular: Normal apical impulse, regular rate and rhythm, normal S1 and S2, systolic murmur noted  GI: No scars, normal bowel sounds, soft, non-distended, colostomy noted.  Skin: no bruising or bleeding, normal skin color, texture, turgor and no redness, warmth, or swelling  Musculoskeletal: There is no redness, warmth, or swelling of the joints.  Full range of motion  noted.  Motor strength is 5 out of 5 all extremities bilaterally.  Tone is normal.    Medications     dextrose 5% and 0.45% NaCl + KCl 20 mEq/L 100 mL/hr at 02/19/17 0510     fentaNYL-ropivacaine Stopped (02/18/17 2005)       famotidine  20 mg Intravenous BID     HYDROmorphone   Intravenous PCA     lisinopril (PRINIVIL/ZESTRIL) tablet 40 mg  40 mg Oral Daily     sodium chloride (PF)  3 mL Intracatheter Q8H     metoprolol  5 mg Intravenous Q6H       Data      Results for orders placed or performed during the hospital encounter of 02/17/17 (from the past 24 hour(s))   Lactic acid level STAT   Result Value Ref Range    Lactic Acid 1.0 0.4 - 2.0 mmol/L   CBC with platelets   Result Value Ref Range    WBC 14.0 (H) 4.0 - 11.0 10e9/L    RBC Count 4.36 (L) 4.4 - 5.9 10e12/L    Hemoglobin 12.9 (L) 13.3 - 17.7 g/dL    Hematocrit 38.2 (L) 40.0 - 53.0 %    MCV 88 78 - 100 fl    MCH 29.6 26.5 - 33.0 pg    MCHC 33.8 31.5 - 36.5 g/dL    RDW 14.0 10.0 - 15.0 %    Platelet Count 166 150 - 450 10e9/L   Basic metabolic panel   Result Value Ref Range    Sodium 143 133 - 144 mmol/L    Potassium 3.5 3.4 - 5.3 mmol/L    Chloride 114 (H) 94 - 109 mmol/L    Carbon Dioxide 22 20 - 32 mmol/L    Anion Gap 7 3 - 14 mmol/L    Glucose 155 (H) 70 - 99 mg/dL    Urea Nitrogen 15 7 - 30 mg/dL    Creatinine 1.07 0.66 - 1.25 mg/dL    GFR Estimate 68 >60 mL/min/1.7m2    GFR Estimate If Black 82 >60 mL/min/1.7m2    Calcium 7.3 (L) 8.5 - 10.1 mg/dL

## 2017-02-19 NOTE — PLAN OF CARE
"Patient flexing arm when BP cuff going off, refusing BP's at this time. Patient refusing to CDB, and IS, stating \"I don't want too.\" Unable to redirect at this time.   "

## 2017-02-19 NOTE — PLAN OF CARE
"Patient removed o2 sensor stated, \"I'am taking a break from all that shit, don't have anyone else try to put it back on including you\"  "

## 2017-02-19 NOTE — PLAN OF CARE
Face to face report given with opportunity to observe patient.  Report given to Harika FONSECA RN.    Felipe Palafox  2/19/2017, 7:14 AM

## 2017-02-19 NOTE — PLAN OF CARE
Face to face report given with opportunity to observe patient.    Report given to BRENNEN Wang   2/18/2017  7:37 PM

## 2017-02-19 NOTE — PHARMACY
Epidural discontinued. Remainder documented as waste in pharmacy 2/19/17 February 19, 2017  Mady Lr

## 2017-02-19 NOTE — PLAN OF CARE
Problem: Patient Goal: Rt Focus  Goal: 1. Patient Goal: RT Focus  Outcome: Declining  Pt requiring 4L oxygen to maintain saturation >90%  Lungs remain clear and diminished.  No neb indicated.  Encouraged splinted cough and deep breathing.  Pt slightly restless, has had nasal cannula off.

## 2017-02-19 NOTE — PLAN OF CARE
Problem: Patient Goal: Rt Focus  Goal: 1. Patient Goal: RT Focus  Pt remained on 2lpm nc sats 93%.  BS clear, no treatments given.  Able to achieve 1000 with IS.

## 2017-02-19 NOTE — PROGRESS NOTES
Pod # 2  Pulled out NG tube last night, denies nausea this morning, feels hungry, little from colostomy  Vitals stable, good U/O, Temp 37.0, Temp max 37.9 last evening  Minimal confusion, good pain control  Abdomen - soft, minimally tender, colostomy looks OK, serous fluid in bag  Lab - Hgb 12.9, WBC 14.0, lytes OK  A - doing well  P - move out of ICU today       D/C Temple       Keep NPO for now, will have to consider TPN over the next day or two if not eating       Watch fluid balance

## 2017-02-19 NOTE — ANESTHESIA POSTPROCEDURE EVALUATION
Patient: Peter Zhao    * No procedures listed *    Diagnosis:* No pre-op diagnosis entered *  Diagnosis Additional Information: No value filed.    Anesthesia Type:  No value filed.    Note:  Anesthesia Post Evaluation    Patient location during evaluation: Bedside  Patient participation: Able to fully participate in evaluation  Level of consciousness: confused and awake  Pain management: adequate  Airway patency: patent  Cardiovascular status: acceptable  Respiratory status: acceptable  Hydration status: acceptable  PONV: none     Anesthetic complications: None          Last vitals:  Vitals:    02/18/17 1530 02/18/17 1930 02/18/17 1935   BP: 118/79 133/81    Resp: 12  13   Temp:      SpO2: 97%           Electronically Signed By: ALBARO Hernandez CRNA  February 18, 2017  8:09 PM

## 2017-02-19 NOTE — PLAN OF CARE
Problem: Patient Goal: Rt Focus  Goal: 1. Patient Goal: RT Focus  Pt weaned to 3lpm nc sats 92-95%.  BS clear to diminished throughout.  Able to achieve 1000 with IS.  No respiratory distress, no nebs given.

## 2017-02-20 ENCOUNTER — RESULTS ONLY (OUTPATIENT)
Dept: SURGERY | Facility: OTHER | Age: 72
End: 2017-02-20

## 2017-02-20 PROBLEM — E87.6 HYPOKALEMIA: Status: ACTIVE | Noted: 2017-02-20

## 2017-02-20 LAB
ALBUMIN SERPL-MCNC: 2.5 G/DL (ref 3.4–5)
ALP SERPL-CCNC: 86 U/L (ref 40–150)
ALT SERPL W P-5'-P-CCNC: 34 U/L (ref 0–70)
ANION GAP SERPL CALCULATED.3IONS-SCNC: 10 MMOL/L (ref 3–14)
AST SERPL W P-5'-P-CCNC: 45 U/L (ref 0–45)
BASOPHILS # BLD AUTO: 0 10E9/L (ref 0–0.2)
BASOPHILS NFR BLD AUTO: 0.3 %
BILIRUB SERPL-MCNC: 1.3 MG/DL (ref 0.2–1.3)
BUN SERPL-MCNC: 9 MG/DL (ref 7–30)
CALCIUM SERPL-MCNC: 7.8 MG/DL (ref 8.5–10.1)
CHLORIDE SERPL-SCNC: 110 MMOL/L (ref 94–109)
CO2 SERPL-SCNC: 22 MMOL/L (ref 20–32)
CREAT SERPL-MCNC: 0.93 MG/DL (ref 0.66–1.25)
DIFFERENTIAL METHOD BLD: ABNORMAL
EOSINOPHIL # BLD AUTO: 0.2 10E9/L (ref 0–0.7)
EOSINOPHIL NFR BLD AUTO: 2 %
ERYTHROCYTE [DISTWIDTH] IN BLOOD BY AUTOMATED COUNT: 13.6 % (ref 10–15)
GFR SERPL CREATININE-BSD FRML MDRD: 80 ML/MIN/1.7M2
GLUCOSE SERPL-MCNC: 133 MG/DL (ref 70–99)
HCT VFR BLD AUTO: 39.5 % (ref 40–53)
HGB BLD-MCNC: 13.3 G/DL (ref 13.3–17.7)
IMM GRANULOCYTES # BLD: 0.1 10E9/L (ref 0–0.4)
IMM GRANULOCYTES NFR BLD: 0.6 %
LYMPHOCYTES # BLD AUTO: 0.9 10E9/L (ref 0.8–5.3)
LYMPHOCYTES NFR BLD AUTO: 7.4 %
MCH RBC QN AUTO: 28.9 PG (ref 26.5–33)
MCHC RBC AUTO-ENTMCNC: 33.7 G/DL (ref 31.5–36.5)
MCV RBC AUTO: 86 FL (ref 78–100)
MONOCYTES # BLD AUTO: 0.9 10E9/L (ref 0–1.3)
MONOCYTES NFR BLD AUTO: 8.1 %
NEUTROPHILS # BLD AUTO: 9.4 10E9/L (ref 1.6–8.3)
NEUTROPHILS NFR BLD AUTO: 81.6 %
NRBC # BLD AUTO: 0 10*3/UL
NRBC BLD AUTO-RTO: 0 /100
PLATELET # BLD AUTO: 183 10E9/L (ref 150–450)
POTASSIUM SERPL-SCNC: 3.3 MMOL/L (ref 3.4–5.3)
PROT SERPL-MCNC: 5.8 G/DL (ref 6.8–8.8)
RBC # BLD AUTO: 4.6 10E12/L (ref 4.4–5.9)
SODIUM SERPL-SCNC: 142 MMOL/L (ref 133–144)
WBC # BLD AUTO: 11.6 10E9/L (ref 4–11)

## 2017-02-20 PROCEDURE — 25000128 H RX IP 250 OP 636

## 2017-02-20 PROCEDURE — 25800025 ZZH RX 258: Performed by: SURGERY

## 2017-02-20 PROCEDURE — 40000275 ZZH STATISTIC RCP TIME EA 10 MIN

## 2017-02-20 PROCEDURE — 25000125 ZZHC RX 250: Performed by: SURGERY

## 2017-02-20 PROCEDURE — 80053 COMPREHEN METABOLIC PANEL: CPT | Performed by: SURGERY

## 2017-02-20 PROCEDURE — 12000000 ZZH R&B MED SURG/OB

## 2017-02-20 PROCEDURE — 25000132 ZZH RX MED GY IP 250 OP 250 PS 637: Mod: GY | Performed by: SURGERY

## 2017-02-20 PROCEDURE — 85025 COMPLETE CBC W/AUTO DIFF WBC: CPT | Performed by: SURGERY

## 2017-02-20 PROCEDURE — 36415 COLL VENOUS BLD VENIPUNCTURE: CPT | Performed by: SURGERY

## 2017-02-20 PROCEDURE — A9270 NON-COVERED ITEM OR SERVICE: HCPCS | Mod: GY | Performed by: SURGERY

## 2017-02-20 PROCEDURE — 25000128 H RX IP 250 OP 636: Performed by: SURGERY

## 2017-02-20 PROCEDURE — 40000832 H STATISTIC POST OPERATIVE IMAGING: Mod: TC | Performed by: RADIOLOGY

## 2017-02-20 RX ORDER — POTASSIUM CHLORIDE 7.45 MG/ML
10 INJECTION INTRAVENOUS
Status: DISCONTINUED | OUTPATIENT
Start: 2017-02-20 | End: 2017-02-22

## 2017-02-20 RX ORDER — POTASSIUM CHLORIDE 1500 MG/1
20-40 TABLET, EXTENDED RELEASE ORAL
Status: DISCONTINUED | OUTPATIENT
Start: 2017-02-20 | End: 2017-02-22

## 2017-02-20 RX ORDER — HALOPERIDOL 5 MG/ML
2 INJECTION INTRAMUSCULAR EVERY 6 HOURS PRN
Status: DISCONTINUED | OUTPATIENT
Start: 2017-02-20 | End: 2017-02-23 | Stop reason: HOSPADM

## 2017-02-20 RX ORDER — POTASSIUM CHLORIDE 1.5 G/1.58G
20-40 POWDER, FOR SOLUTION ORAL
Status: DISCONTINUED | OUTPATIENT
Start: 2017-02-20 | End: 2017-02-22

## 2017-02-20 RX ORDER — HALOPERIDOL 5 MG/ML
INJECTION INTRAMUSCULAR
Status: COMPLETED
Start: 2017-02-20 | End: 2017-02-20

## 2017-02-20 RX ADMIN — HYDRALAZINE HYDROCHLORIDE 10 MG: 20 INJECTION INTRAMUSCULAR; INTRAVENOUS at 03:05

## 2017-02-20 RX ADMIN — HALOPERIDOL LACTATE 2 MG: 5 INJECTION, SOLUTION INTRAMUSCULAR at 16:47

## 2017-02-20 RX ADMIN — METOPROLOL TARTRATE 5 MG: 5 INJECTION INTRAVENOUS at 14:04

## 2017-02-20 RX ADMIN — POTASSIUM CHLORIDE 10 MEQ: 14.9 INJECTION, SOLUTION, CONCENTRATE PARENTERAL at 14:06

## 2017-02-20 RX ADMIN — DIPHENHYDRAMINE HYDROCHLORIDE 12.5 MG: 50 INJECTION, SOLUTION INTRAMUSCULAR; INTRAVENOUS at 16:57

## 2017-02-20 RX ADMIN — POTASSIUM CHLORIDE 10 MEQ: 14.9 INJECTION, SOLUTION, CONCENTRATE PARENTERAL at 11:45

## 2017-02-20 RX ADMIN — METOPROLOL TARTRATE 5 MG: 5 INJECTION INTRAVENOUS at 01:58

## 2017-02-20 RX ADMIN — METOPROLOL TARTRATE 5 MG: 5 INJECTION INTRAVENOUS at 20:31

## 2017-02-20 RX ADMIN — LISINOPRIL 40 MG: 40 TABLET ORAL at 08:41

## 2017-02-20 RX ADMIN — METOPROLOL TARTRATE 5 MG: 5 INJECTION INTRAVENOUS at 08:41

## 2017-02-20 RX ADMIN — HYDROMORPHONE HYDROCHLORIDE: 10 INJECTION, SOLUTION INTRAMUSCULAR; INTRAVENOUS; SUBCUTANEOUS at 05:58

## 2017-02-20 RX ADMIN — FAMOTIDINE 20 MG: 20 INJECTION, SOLUTION INTRAVENOUS at 06:04

## 2017-02-20 RX ADMIN — POTASSIUM CHLORIDE 10 MEQ: 14.9 INJECTION, SOLUTION, CONCENTRATE PARENTERAL at 10:36

## 2017-02-20 RX ADMIN — POTASSIUM CHLORIDE, DEXTROSE MONOHYDRATE AND SODIUM CHLORIDE: 150; 5; 450 INJECTION, SOLUTION INTRAVENOUS at 05:13

## 2017-02-20 ASSESSMENT — PAIN DESCRIPTION - DESCRIPTORS: DESCRIPTORS: ACHING

## 2017-02-20 NOTE — PLAN OF CARE
Problem: Patient Goal: Social Work Focus  Goal: 1. Patient Goal: Social Work Focus  Spoke with Miguel and his wife, Floridalma they are still interested in home care, with no preference in vendor.  Per discharge planning, the choice of vendor list has been provided to the patient/family for homecare.     Spoke with [facility] at [facility], they are able to meet Miguel's needs upon discharge.  Miguel and Floridalma aware.

## 2017-02-20 NOTE — PROGRESS NOTES
[facility]    Family Practice Progress Note    Date of Service (when I saw the patient): 02/20/2017     Assessment & Plan   Peter Zhao is a 71 year old male who was admitted on 2/17/2017.     Principal Problem:    Large bowel obstruction (H)    Assessment: stable S/P surgical resection    Plan: continue postoperative recovery  Active Problems:    Diabetes mellitus, type 2 (H)    Assessment: stable    Plan: monitor    CAD (coronary artery disease)    Assessment: chronic, stable    Plan: monitor    HTN (hypertension)    Assessment: controlled    Plan: monitor    Hypokalemia    Assessment: new, mild    Plan: add supplemental K+      DVT Prophylaxis: Enoxaprain (Lovenox) SQ  Code Status: Full Code    Brock Vega    Interval History   Doing well. Continues to improve. Pain is well-controlled. No fevers. Confused. Potassium 3.3 today. Other labs improving.    Review Of Systems  CONSTITUTIONAL:  negative for  fevers  HEENT:  negative  RESPIRATORY:  negative for cough or shortness of breath  CARDIOVASCULAR:  negative for  chest pain, palpitations  GASTROINTESTINAL:  negative for abdominal pain  GENITOURINARY:  negative for frequency and dysuria  INTEGUMENT/BREAST:  negative for rash and skin lesion(s)  MUSCULOSKELETAL:  negative for  muscle weakness  BEHAVIOR/PSYCH:  positive for anxiety      Physical Exam   Temp: 98.7  F (37.1  C) Temp src: Tympanic BP: 143/71   Heart Rate: 65 Resp: 16 SpO2: 94 % O2 Device: None (Room air) Oxygen Delivery: 3 LPM  Vitals:    02/17/17 0433 02/19/17 0648 02/20/17 0520   Weight: 84.2 kg (185 lb 10 oz) 87 kg (191 lb 12.8 oz) 84 kg (185 lb 3 oz)     Vital Signs with Ranges  Temp:  [98.7  F (37.1  C)-100  F (37.8  C)] 98.7  F (37.1  C)  Heart Rate:  [65-90] 65  Resp:  [11-25] 16  BP: (140-167)/(71-96) 143/71  SpO2:  [92 %-96 %] 94 %  I/O last 3 completed shifts:  In: 1537 [I.V.:1138; Other:399]  Out: 3950 [Urine:3900; Stool:50]    CVC Triple Lumen 02/17/17 Internal jugular  (Active)   Site Assessment WDL 2/20/2017  3:39 AM   Dressing Intervention Chlorhexidine sponge;Transparent 2/18/2017  4:00 PM   CVC Comment zeroed 2/18/2017  9:54 PM   CVC Lumen Assessment Brown 2/18/2017  4:00 PM   Number of days:3       Colostomy (Active)   Stomal Appliance 2 piece 2/20/2017  1:55 AM   Stoma Assessment Protruding;Red;Pink 2/19/2017  3:00 PM   Peristomal Assessment UTV 2/20/2017  1:55 AM   Stool Amount Small 2/19/2017  3:00 PM   Number of days:3       Incision/Surgical Site 02/17/17 Abdomen (Active)   Incision Assessment WDL 2/20/2017  1:55 AM   Charisma-Incision Assessment [facility] 2/19/2017  3:00 PM   Closure Sutures 2/19/2017  6:30 PM   Incision Drainage Amount Scant 2/19/2017  6:30 PM   Drainage Description Serosanguinous 2/19/2017  6:30 PM   Dressing Intervention Clean, dry, intact 2/20/2017  1:55 AM   Number of days:3       Incision/Surgical Site 02/17/17 Left Abdomen (Active)   Incision Assessment [facility] 2/20/2017  1:55 AM   Charisma-Incision Assessment [facility] 2/18/2017  4:12 PM   Incision Drainage Amount None 2/18/2017  4:12 PM   Drainage Description [facility] 2/19/2017  4:19 AM   Number of days:3     Line/device assessment(s) completed for medical necessity    Constitutional: awake, alert, cooperative, no apparent distress, appears stated age and normal weight  ENT: normocepalic, without obvious abnormality  Respiratory: no increased work of breathing, good air exchange and clear to auscultation  Cardiovascular: regular rate and rhythm and no murmur noted  GI: normal bowel sounds, soft and non-tender  Skin: normal skin color, texture, turgor and no redness, warmth, or swelling  Musculoskeletal: no lower extremity pitting edema present  there is no redness, warmth, or swelling of the joints  Neuropsychiatric: General: restless and normal eye contact  Level of consciousness: alert / normal  Affect: anxious  Orientation: oriented to self and place  Memory and insight: impaired    Medications     dextrose 5% and 0.45%  NaCl + KCl 20 mEq/L 75 mL/hr at 02/20/17 0513       famotidine  20 mg Intravenous BID     enoxaparin  40 mg Subcutaneous Q24H     HYDROmorphone   Intravenous PCA     lisinopril (PRINIVIL/ZESTRIL) tablet 40 mg  40 mg Oral Daily     sodium chloride (PF)  3 mL Intracatheter Q8H     metoprolol  5 mg Intravenous Q6H       Data     Recent Labs  Lab 02/20/17  0545 02/19/17  0455 02/18/17  0455 02/17/17  0130   WBC 11.6* 14.0* 14.4* 15.7*   HGB 13.3 12.9* 12.4* 17.3   MCV 86 88 89 86    166 168 248    143 147* 141   POTASSIUM 3.3* 3.5 3.4 4.0   CHLORIDE 110* 114* 114* 104   CO2 22 22 24 24   BUN 9 15 23 29   CR 0.93 1.07 1.35* 1.32*   ANIONGAP 10 7 9 13   WALI 7.8* 7.3* 7.2* 8.7   * 155* 130* 153*   ALBUMIN 2.5*  --  2.0* 3.9   PROTTOTAL 5.8*  --  4.4* 7.9   BILITOTAL 1.3  --  0.9 1.6*   ALKPHOS 86  --  48 98   ALT 34  --  20 32   AST 45  --  36 34   TROPI  --   --   --  <0.015The 99th percentile for upper reference range is 0.045 ug/L.  Troponin values in the range of 0.045 - 0.120 ug/L may be associated with risks of adverse clinical events.

## 2017-02-20 NOTE — PLAN OF CARE
Problem: Goal Outcome Summary  Goal: Goal Outcome Summary  Outcome: No Change  Patient pleasant, forgetfull at times. Having bruising on L flank and bilat knees, MD aware. Up SBA, ambulating halls X1. Using pain pump with adequate relief of pain control Bowel hypoactive in upper guadrants. Denies nausea/vomiting. Voiding in urinal. o2 sats mid 90's on 3 LPM nasal cannula. Refusing SCD's at times.    Problem: Bowel Obstruction (Adult)  Goal: Signs and Symptoms of Listed Potential Problems Will be Absent or Manageable (Bowel Obstruction)  Signs and symptoms of listed potential problems will be absent or manageable by discharge/transition of care (reference Bowel Obstruction (Adult) CPG).   Outcome: No Change    02/20/17 0515   Bowel Obstruction   Problems Assessed (Bowel Obstruction) pain;fluid volume deficit/hypovolemia;situational response

## 2017-02-20 NOTE — PLAN OF CARE
Problem: Goal Outcome Summary  Goal: Goal Outcome Summary  Outcome: Improving  Patient up to chair and walked 75 ft from 3124 to 3216.  Alert, oriented to self throughout the day, with very frequent reorientation.  Complained of minimal pain during the day, using PCA as documented.  Bowel sounds hypoactive in upper quadrants, progressing some from initial assessment. 40 ml out of brown/red liquid produced during the day with minimal gas.  Dressing removed and replaced.  Sutures in place and intact.  Minimal serosanguinous drainage present.  Redressed.  Patient tolerated well.  Vital signs as in graphics.  Wife present with him during the day.

## 2017-02-20 NOTE — PLAN OF CARE
Face to face report given with opportunity to observe patient.  Report given to BRENNEN Almeida.    Felipe Palafox  2/20/2017, 6:53 AM

## 2017-02-20 NOTE — PLAN OF CARE
Problem: Patient Goal: Rt Focus  Goal: 1. Patient Goal: RT Focus  Outcome: Improving  Pt maintaining oxygen saturation >92% on 3L n/c  No neb indicated this shift.  Encouraged IS use. And splinted coughing.

## 2017-02-20 NOTE — PROGRESS NOTES
POD # 3  Sitting up in chair, definitely confused this morning, did not recognize me.  Vitals stable, Afebrile, denies pain  Nothing from colostomy  Abdomen - soft, non-tender, colostomy OK, wound shows some bruising at lower end, minimal redness  Lab - Hgb 13.3, WBC 11.6, K 3.3  A - doing OK, post-op confusion        Awaiting bowel function  P - replace K       Try sips of clear fluids        Ambulate

## 2017-02-20 NOTE — PLAN OF CARE
Problem: Patient Goal: Rt Focus  Goal: 1. Patient Goal: RT Focus  SPO2 94% on Room air- no nebs given this shift- IS used-achieved 1750 ls-splinted for cough

## 2017-02-21 PROBLEM — K56.699 STRICTURE OF SIGMOID COLON (H): Status: RESOLVED | Noted: 2017-02-17 | Resolved: 2017-02-21

## 2017-02-21 PROBLEM — K56.609 LARGE BOWEL OBSTRUCTION (H): Status: RESOLVED | Noted: 2017-02-17 | Resolved: 2017-02-21

## 2017-02-21 LAB
ALBUMIN SERPL-MCNC: 2.7 G/DL (ref 3.4–5)
ALP SERPL-CCNC: 91 U/L (ref 40–150)
ALT SERPL W P-5'-P-CCNC: 45 U/L (ref 0–70)
ANION GAP SERPL CALCULATED.3IONS-SCNC: 14 MMOL/L (ref 3–14)
AST SERPL W P-5'-P-CCNC: 48 U/L (ref 0–45)
BILIRUB SERPL-MCNC: 1.6 MG/DL (ref 0.2–1.3)
BUN SERPL-MCNC: 12 MG/DL (ref 7–30)
CALCIUM SERPL-MCNC: 8.2 MG/DL (ref 8.5–10.1)
CHLORIDE SERPL-SCNC: 110 MMOL/L (ref 94–109)
CO2 SERPL-SCNC: 21 MMOL/L (ref 20–32)
COPATH REPORT: NORMAL
CREAT SERPL-MCNC: 1.04 MG/DL (ref 0.66–1.25)
ERYTHROCYTE [DISTWIDTH] IN BLOOD BY AUTOMATED COUNT: 13.7 % (ref 10–15)
GFR SERPL CREATININE-BSD FRML MDRD: 70 ML/MIN/1.7M2
GLUCOSE SERPL-MCNC: 133 MG/DL (ref 70–99)
HCT VFR BLD AUTO: 41.8 % (ref 40–53)
HGB BLD-MCNC: 14.6 G/DL (ref 13.3–17.7)
MCH RBC QN AUTO: 29.4 PG (ref 26.5–33)
MCHC RBC AUTO-ENTMCNC: 34.9 G/DL (ref 31.5–36.5)
MCV RBC AUTO: 84 FL (ref 78–100)
PLATELET # BLD AUTO: 238 10E9/L (ref 150–450)
POTASSIUM SERPL-SCNC: 3.3 MMOL/L (ref 3.4–5.3)
POTASSIUM SERPL-SCNC: 3.6 MMOL/L (ref 3.4–5.3)
PROT SERPL-MCNC: 6.2 G/DL (ref 6.8–8.8)
RBC # BLD AUTO: 4.96 10E12/L (ref 4.4–5.9)
SODIUM SERPL-SCNC: 145 MMOL/L (ref 133–144)
WBC # BLD AUTO: 11.6 10E9/L (ref 4–11)

## 2017-02-21 PROCEDURE — 36415 COLL VENOUS BLD VENIPUNCTURE: CPT | Performed by: SURGERY

## 2017-02-21 PROCEDURE — 12000000 ZZH R&B MED SURG/OB

## 2017-02-21 PROCEDURE — 85027 COMPLETE CBC AUTOMATED: CPT | Performed by: FAMILY MEDICINE

## 2017-02-21 PROCEDURE — 84132 ASSAY OF SERUM POTASSIUM: CPT | Performed by: SURGERY

## 2017-02-21 PROCEDURE — 80053 COMPREHEN METABOLIC PANEL: CPT | Performed by: FAMILY MEDICINE

## 2017-02-21 PROCEDURE — 36415 COLL VENOUS BLD VENIPUNCTURE: CPT | Performed by: FAMILY MEDICINE

## 2017-02-21 PROCEDURE — 40000275 ZZH STATISTIC RCP TIME EA 10 MIN

## 2017-02-21 PROCEDURE — 25000125 ZZHC RX 250: Performed by: SURGERY

## 2017-02-21 PROCEDURE — A9270 NON-COVERED ITEM OR SERVICE: HCPCS | Mod: GY | Performed by: SURGERY

## 2017-02-21 PROCEDURE — 25000128 H RX IP 250 OP 636: Performed by: SURGERY

## 2017-02-21 PROCEDURE — 25000132 ZZH RX MED GY IP 250 OP 250 PS 637: Mod: GY | Performed by: SURGERY

## 2017-02-21 RX ORDER — FAMOTIDINE 20 MG/1
20 TABLET, FILM COATED ORAL 2 TIMES DAILY
Status: DISCONTINUED | OUTPATIENT
Start: 2017-02-21 | End: 2017-02-23 | Stop reason: HOSPADM

## 2017-02-21 RX ORDER — METOPROLOL SUCCINATE 100 MG/1
100 TABLET, EXTENDED RELEASE ORAL DAILY
Status: DISCONTINUED | OUTPATIENT
Start: 2017-02-21 | End: 2017-02-23 | Stop reason: HOSPADM

## 2017-02-21 RX ORDER — OXYCODONE HYDROCHLORIDE 5 MG/1
5 TABLET ORAL EVERY 4 HOURS PRN
Status: DISCONTINUED | OUTPATIENT
Start: 2017-02-21 | End: 2017-02-23 | Stop reason: HOSPADM

## 2017-02-21 RX ORDER — HYDROMORPHONE HYDROCHLORIDE 1 MG/ML
0.2 INJECTION, SOLUTION INTRAMUSCULAR; INTRAVENOUS; SUBCUTANEOUS
Status: DISCONTINUED | OUTPATIENT
Start: 2017-02-21 | End: 2017-02-23 | Stop reason: HOSPADM

## 2017-02-21 RX ADMIN — HALOPERIDOL LACTATE 2 MG: 5 INJECTION, SOLUTION INTRAMUSCULAR at 17:56

## 2017-02-21 RX ADMIN — LISINOPRIL 40 MG: 40 TABLET ORAL at 09:54

## 2017-02-21 RX ADMIN — HYDROMORPHONE HYDROCHLORIDE 0.2 MG: 1 INJECTION, SOLUTION INTRAMUSCULAR; INTRAVENOUS; SUBCUTANEOUS at 04:00

## 2017-02-21 RX ADMIN — HALOPERIDOL LACTATE 2 MG: 5 INJECTION, SOLUTION INTRAMUSCULAR at 00:05

## 2017-02-21 RX ADMIN — POTASSIUM CHLORIDE 10 MEQ: 14.9 INJECTION, SOLUTION, CONCENTRATE PARENTERAL at 11:30

## 2017-02-21 RX ADMIN — FAMOTIDINE 20 MG: 20 INJECTION, SOLUTION INTRAVENOUS at 06:39

## 2017-02-21 RX ADMIN — POTASSIUM CHLORIDE 10 MEQ: 14.9 INJECTION, SOLUTION, CONCENTRATE PARENTERAL at 09:20

## 2017-02-21 RX ADMIN — FAMOTIDINE 20 MG: 20 TABLET ORAL at 09:49

## 2017-02-21 RX ADMIN — FAMOTIDINE 20 MG: 20 TABLET ORAL at 22:45

## 2017-02-21 RX ADMIN — POTASSIUM CHLORIDE 10 MEQ: 14.9 INJECTION, SOLUTION, CONCENTRATE PARENTERAL at 08:02

## 2017-02-21 RX ADMIN — BENZOCAINE AND MENTHOL 1 LOZENGE: 15; 3.6 LOZENGE ORAL at 16:53

## 2017-02-21 RX ADMIN — DEXTRAN 70, AND HYPROMELLOSE 2910 1 DROP: 1; 3 SOLUTION/ DROPS OPHTHALMIC at 16:53

## 2017-02-21 RX ADMIN — POTASSIUM CHLORIDE 10 MEQ: 14.9 INJECTION, SOLUTION, CONCENTRATE PARENTERAL at 10:23

## 2017-02-21 RX ADMIN — METOPROLOL SUCCINATE 100 MG: 100 TABLET, EXTENDED RELEASE ORAL at 09:48

## 2017-02-21 RX ADMIN — DIPHENHYDRAMINE HYDROCHLORIDE 12.5 MG: 50 INJECTION, SOLUTION INTRAMUSCULAR; INTRAVENOUS at 00:05

## 2017-02-21 ASSESSMENT — PAIN DESCRIPTION - DESCRIPTORS: DESCRIPTORS: SORE

## 2017-02-21 NOTE — PLAN OF CARE
Face to face report given with opportunity to observe patient.    Report given to Barbara HUTTON and ROSIE Mistry   2/21/2017  7:26 AM

## 2017-02-21 NOTE — PLAN OF CARE
"Increasing amount of agitation, difficult to redirect. Wanting to \"cut a new gasket\" for his ostomy.  Requesting a knife or a razor blade to cut it.  Previously, resident retrieved his pocket knife from his pant pocket in order to cut off his ID band.  Knife confiscated from him once he left the room for a walk in the hallway and is at nursing desk with name on it.  Medicated with 2mg haldol as ordered and 12.5mg Benadryl.  IV fluids are not infusing at this time.  Patient much too restless to trust him not to pull at central line.  Noted previously to have occlusion to the white port when flushing attempted.  Refused and did not want to frustrate further regarding need for lovenox injection.    "

## 2017-02-21 NOTE — PROGRESS NOTES
[facility]    Family Practice Progress Note    Date of Service (when I saw the patient): 02/21/2017     Assessment & Plan   Peter Zhao is a 71 year old male who was admitted on 2/17/2017.     Principal Problem:    S/P colon resection    Assessment: colostomy now functioning    Plan: advance diet  Active Problems:    Diabetes mellitus, type 2 (H)    Assessment: controlled, sugars running ~130    Plan: monitor as diet advanced    CAD (coronary artery disease)    Assessment: chronic stable    Plan: monitor    HTN (hypertension)    Assessment: controlled    Plan: monitor    Hypokalemia    Assessment: mild    Plan: monitor as diet is advanced      DVT Prophylaxis: Enoxaprain (Lovenox) SQ  Code Status: Full Code    Brock Vega    Interval History   Doing well. Continues to improve. Pain is well-controlled. Low grade fevers. Colostomy now functioning. Potassium mildly decreased. Sugars controlled. Less confused. Tolerating clear liquids.    Review Of Systems  CONSTITUTIONAL:  positive for low grade fevers  HEENT:  negative  RESPIRATORY:  negative for cough or shortness of breath  CARDIOVASCULAR:  negative for  chest pain, palpitations  GASTROINTESTINAL:  negative for nausea, vomiting, diarrhea, constipation and abdominal pain  GENITOURINARY:  negative for frequency and dysuria  INTEGUMENT/BREAST:  negative for rash and skin lesion(s)  MUSCULOSKELETAL:  negative for  decreased range of motion and muscle weakness  BEHAVIOR/PSYCH:  positive for anxiety and confusion      Physical Exam   Temp: 99.5  F (37.5  C) Temp src: Tympanic BP: (!) 156/101   Heart Rate: 118 Resp: 18 SpO2: 95 % O2 Device: None (Room air)    Vitals:    02/17/17 0433 02/19/17 0648 02/20/17 0520   Weight: 84.2 kg (185 lb 10 oz) 87 kg (191 lb 12.8 oz) 84 kg (185 lb 3 oz)     Vital Signs with Ranges  Temp:  [99  F (37.2  C)-99.5  F (37.5  C)] 99.5  F (37.5  C)  Heart Rate:  [] 118  Resp:  [18-20] 18  BP: (156-163)/() 156/101  SpO2:   [93 %-95 %] 95 %  I/O last 3 completed shifts:  In: -   Out: 1050 [Urine:950; Stool:100]    CVC Triple Lumen 02/17/17 Internal jugular (Active)   Site Assessment WDL 2/20/2017  8:30 PM   Dressing Intervention Chlorhexidine sponge;Transparent 2/18/2017  4:00 PM   CVC Comment zeroed 2/18/2017  9:54 PM   CVC Lumen Assessment Brown 2/18/2017  4:00 PM   Number of days:4       Colostomy (Active)   Stomal Appliance 2 piece 2/21/2017  6:00 AM   Stoma Assessment Red;Protruding 2/21/2017  6:00 AM   Peristomal Assessment UTV 2/21/2017  6:00 AM   Stool Amount Small 2/21/2017  6:00 AM   Output (ml) 100 ml 2/21/2017  6:00 AM   Number of days:4       Incision/Surgical Site 02/17/17 Abdomen (Active)   Incision Assessment UTV 2/21/2017  4:00 AM   Charisma-Incision Assessment UTV 2/21/2017  4:00 AM   Closure MECCA 2/21/2017  4:00 AM   Incision Drainage Amount UTV 2/21/2017  4:00 AM   Drainage Description Serosanguinous 2/20/2017  2:00 PM   Dressing Intervention Clean, dry, intact 2/20/2017  2:00 PM   Number of days:4       Incision/Surgical Site 02/17/17 Left Abdomen (Active)   Incision Assessment UTV 2/20/2017  2:00 PM   Charisma-Incision Assessment UTV 2/20/2017  2:00 PM   Incision Drainage Amount None 2/20/2017  2:00 PM   Drainage Description UT 2/20/2017  2:00 PM   Number of days:4     Line/device assessment(s) completed for medical necessity    Constitutional: awake, alert, cooperative, no apparent distress, appears stated age and normal weight  ENT: normocepalic, without obvious abnormality  Respiratory: no increased work of breathing, good air exchange and clear to auscultation  Cardiovascular: regular rate and rhythm and no murmur noted  GI: normal bowel sounds, soft and non-tender  Skin: normal skin color, texture, turgor and no redness, warmth, or swelling  Musculoskeletal: no lower extremity pitting edema present  there is no redness, warmth, or swelling of the joints  Neuropsychiatric: General: normal, calm and normal eye  contact  Level of consciousness: alert / normal  Affect: normal and pleasant  Orientation: oriented to self and place  Memory and insight: impaired    Medications     dextrose 5% and 0.45% NaCl + KCl 20 mEq/L 75 mL/hr at 02/20/17 0513       metoprolol  100 mg Oral Daily     famotidine  20 mg Oral BID     enoxaparin  40 mg Subcutaneous Q24H     lisinopril (PRINIVIL/ZESTRIL) tablet 40 mg  40 mg Oral Daily       Data     Recent Labs  Lab 02/21/17  0629 02/20/17  0545 02/19/17  0455  02/17/17  0130   WBC 11.6* 11.6* 14.0*  < > 15.7*   HGB 14.6 13.3 12.9*  < > 17.3   MCV 84 86 88  < > 86    183 166  < > 248   * 142 143  < > 141   POTASSIUM 3.3* 3.3* 3.5  < > 4.0   CHLORIDE 110* 110* 114*  < > 104   CO2 21 22 22  < > 24   BUN 12 9 15  < > 29   CR 1.04 0.93 1.07  < > 1.32*   ANIONGAP 14 10 7  < > 13   WALI 8.2* 7.8* 7.3*  < > 8.7   * 133* 155*  < > 153*   ALBUMIN 2.7* 2.5*  --   < > 3.9   PROTTOTAL 6.2* 5.8*  --   < > 7.9   BILITOTAL 1.6* 1.3  --   < > 1.6*   ALKPHOS 91 86  --   < > 98   ALT 45 34  --   < > 32   AST 48* 45  --   < > 34   TROPI  --   --   --   --  <0.015The 99th percentile for upper reference range is 0.045 ug/L.  Troponin values in the range of 0.045 - 0.120 ug/L may be associated with risks of adverse clinical events.   < > = values in this interval not displayed.

## 2017-02-21 NOTE — PLAN OF CARE
Patient up a number of times between this note and the previous note.  Redirected back to bed. Up at present time.  Less argumentative and suspicious.  C/O pain.  Medicated with 0.2mg dilaudid IV.  Pushed in wheelchair around unit, back to bed.

## 2017-02-21 NOTE — PLAN OF CARE
Problem: Goal Outcome Summary  Goal: Goal Outcome Summary  Outcome: No Change  A&O with forgetfulness. VSS, low temp 99.3. Denies pain. BS active. Colostomy intact, stoma red and protruding. BM small, soft to loose, output of 125mL. Abdominal incision wnl, no drainage noted with dressing partially opened, so removed and left open to air per care plan. LS clear. Potassium 3.3 this AM, replaced by IV, recheck 3.6. IVF infusing to picc line in the right neck area. White lumen occluded, blue lumen infusing, and other saline locked. Diet advance from full liquid to reg. Tolerated full liquid adequate, educated to start with soft easy to digest foods on reg diet. Ambulated in the hallway with stand by assist, very steady on feet.        Face to face report given with opportunity to observe patient.     Report given to BRENNEN Marin   2/21/2017  3:58 PM

## 2017-02-21 NOTE — PROGRESS NOTES
POD # 4  Confusion seems better this morning. He was fairly restless during the night.  Colostomy has started to work. Stool and gas. Feels hungry.  Temp 37.5, HR 95 this AM, BP OK  Abdomen - less distended, non-tender, stool in bag, wound OK - some edema but no redness  Lab - Hgb 14.6, WBC 11.6, K 3.3, Albumin 2.7  A - doing better, bowels have started to work  P - full fluids by mouth with slow increase in diet        Replace K        Decrease IV        Home in 2 to 3 days        Dr Acharya to follow from surgery pont of view    March 15, 2017 - 0841 Hrs  Patient did develop post-op confusion, acute encephalopathy secondary to narcotics and likely anesthesia. Should improve with time.

## 2017-02-21 NOTE — PLAN OF CARE
Patient laying with eyes closed.  Respirations even and unlabored.  Fall precautions in place.  Able to position self independently.

## 2017-02-21 NOTE — PLAN OF CARE
Problem: Patient Goal: Rt Focus  Goal: 1. Patient Goal: RT Focus  Pt remained on room air sats 93%.  BS clear, no resp distress, no treatments needed.  Encouraged use of IS, able to achieve 1750.

## 2017-02-21 NOTE — PLAN OF CARE
"Problem: Goal Outcome Summary  Goal: Goal Outcome Summary  Outcome: Improving  Pt advanced to clear liquid, small sips at a time.  Pt tolerating well with no increased pain or nausea.  Pt was cooperative and pleasant with mild confusion most of day.  Pt did get somewhat combative and paranoid with staff at approximately 1700 stating \"you are going to kill me\", \"I am leaving and going home\".  Took staff approximately 1 hour to calm patient.  Pt was given haldol 2 mg ivp and benadryl 12.5 mg ivp.  Pt did calm after administration and 1:1 staffing for part of the shift.  Patient was able to call wife but also confused and upset with her.  Pt colostomy pouch changed with wound care today with wife at bedside for teaching.  Wound care to return on Wednesday for additional teaching and to change.  Central line dressing changed.  All three lines flush with ease.  Dress ing to abdomin CDI.  No pain, no nausea.  Dr Vega in to see today.  Dr baron in to see pt multiple times to day as well.  Pt ambulated in stahl multiple times today, no difficulty, steady on feet.      Face to face report given with opportunity to observe patient.    Report given to Harika Loaiza   2/20/2017  7:35 PM      "

## 2017-02-21 NOTE — PLAN OF CARE
Face to face report given with opportunity to observe patient.    Report given to Barbara Harvey   2/21/2017  11:37 AM

## 2017-02-21 NOTE — PLAN OF CARE
Problem: Goal Outcome Summary  Goal: Goal Outcome Summary  Outcome: Improving  Patient is physically improving with developing bowel sounds early this shift as well as moderate stool production and passing gas as well.  VS as charted.  Would not allow them at times, but in general VSS.  Disoriented for most of the night, particularly to place, time and situation.  Did not always reorient easily often times becoming more resistant and verbalizing frustration with staff.  Given Haldol and Benadryl x1 with rest to follow.  Also received dilaudid IV x1.  IV fluids not infusing for most of the night due to risk of pulling at central line.  Patient drank about 360ml of apple juice and lemon lime soda.  Tolerated well, no complaint of nausea or vomiting noted. Voiding without difficulty into the toilet directly.  Central line remains in place.  White port found not to be flushing at the beginning of the shift.  Blue and brown flush without incident.  Fall precautions in place the entire night. Does not call for help, just gets up resulting in bed alarm sounding.  Incision intact with no drainage to dressing noted.

## 2017-02-21 NOTE — PLAN OF CARE
"Up in room.  Bed alarm sounding, unsteady on feet.  \"What the hell is going on here, who took my pills.  Who the hell took my pills?\"  Redirected to sit in wheelchair.  Wheeled around unit by this staff person.  Muttering, \"Cant believe you were going to kill me by not giving my pills, If I die, it's going to be on your hands.\"  Not engaging in redirection.  Eventually, patient back to room and into bed.  Fall precautions in place.  IV fluids are continued to be held due to risk of patient pulling at/pulling out central line.   "

## 2017-02-22 ENCOUNTER — APPOINTMENT (OUTPATIENT)
Dept: WOUND CARE | Facility: HOSPITAL | Age: 72
DRG: 329 | End: 2017-02-22
Payer: MEDICARE

## 2017-02-22 LAB
ANION GAP SERPL CALCULATED.3IONS-SCNC: 13 MMOL/L (ref 3–14)
BUN SERPL-MCNC: 14 MG/DL (ref 7–30)
CALCIUM SERPL-MCNC: 7.9 MG/DL (ref 8.5–10.1)
CHLORIDE SERPL-SCNC: 109 MMOL/L (ref 94–109)
CO2 SERPL-SCNC: 20 MMOL/L (ref 20–32)
CREAT SERPL-MCNC: 1.09 MG/DL (ref 0.66–1.25)
CRP SERPL-MCNC: 45.3 MG/L (ref 0–8)
ERYTHROCYTE [DISTWIDTH] IN BLOOD BY AUTOMATED COUNT: 13.6 % (ref 10–15)
GFR SERPL CREATININE-BSD FRML MDRD: 66 ML/MIN/1.7M2
GLUCOSE SERPL-MCNC: 125 MG/DL (ref 70–99)
HCT VFR BLD AUTO: 39.2 % (ref 40–53)
HGB BLD-MCNC: 13.6 G/DL (ref 13.3–17.7)
MCH RBC QN AUTO: 29.2 PG (ref 26.5–33)
MCHC RBC AUTO-ENTMCNC: 34.7 G/DL (ref 31.5–36.5)
MCV RBC AUTO: 84 FL (ref 78–100)
PLATELET # BLD AUTO: 237 10E9/L (ref 150–450)
POTASSIUM SERPL-SCNC: 3.3 MMOL/L (ref 3.4–5.3)
RBC # BLD AUTO: 4.66 10E12/L (ref 4.4–5.9)
SODIUM SERPL-SCNC: 142 MMOL/L (ref 133–144)
WBC # BLD AUTO: 9.4 10E9/L (ref 4–11)

## 2017-02-22 PROCEDURE — A9270 NON-COVERED ITEM OR SERVICE: HCPCS | Mod: GY | Performed by: FAMILY MEDICINE

## 2017-02-22 PROCEDURE — 25000132 ZZH RX MED GY IP 250 OP 250 PS 637: Mod: GY | Performed by: FAMILY MEDICINE

## 2017-02-22 PROCEDURE — 40000275 ZZH STATISTIC RCP TIME EA 10 MIN

## 2017-02-22 PROCEDURE — 36415 COLL VENOUS BLD VENIPUNCTURE: CPT | Performed by: FAMILY MEDICINE

## 2017-02-22 PROCEDURE — 80048 BASIC METABOLIC PNL TOTAL CA: CPT | Performed by: FAMILY MEDICINE

## 2017-02-22 PROCEDURE — 25000132 ZZH RX MED GY IP 250 OP 250 PS 637: Mod: GY | Performed by: SURGERY

## 2017-02-22 PROCEDURE — 85027 COMPLETE CBC AUTOMATED: CPT | Performed by: FAMILY MEDICINE

## 2017-02-22 PROCEDURE — 12000000 ZZH R&B MED SURG/OB

## 2017-02-22 PROCEDURE — 86140 C-REACTIVE PROTEIN: CPT | Performed by: FAMILY MEDICINE

## 2017-02-22 PROCEDURE — A9270 NON-COVERED ITEM OR SERVICE: HCPCS | Mod: GY | Performed by: SURGERY

## 2017-02-22 RX ORDER — POTASSIUM CHLORIDE 1500 MG/1
20 TABLET, EXTENDED RELEASE ORAL 2 TIMES DAILY
Status: DISCONTINUED | OUTPATIENT
Start: 2017-02-22 | End: 2017-02-23 | Stop reason: HOSPADM

## 2017-02-22 RX ADMIN — LISINOPRIL 40 MG: 40 TABLET ORAL at 09:19

## 2017-02-22 RX ADMIN — FAMOTIDINE 20 MG: 20 TABLET ORAL at 22:10

## 2017-02-22 RX ADMIN — POTASSIUM CHLORIDE 20 MEQ: 20 TABLET, EXTENDED RELEASE ORAL at 22:10

## 2017-02-22 RX ADMIN — METOPROLOL SUCCINATE 100 MG: 100 TABLET, EXTENDED RELEASE ORAL at 09:19

## 2017-02-22 RX ADMIN — POTASSIUM CHLORIDE 20 MEQ: 20 TABLET, EXTENDED RELEASE ORAL at 10:46

## 2017-02-22 RX ADMIN — FAMOTIDINE 20 MG: 20 TABLET ORAL at 07:53

## 2017-02-22 ASSESSMENT — ENCOUNTER SYMPTOMS
NAUSEA: 0
SUBJECTIVE PATIENT PAIN CONTROL: WELL CONTROLLED
VOMITING: 0
ACTIVITY IMPAIRMENT: RETURNING TO NORMAL
DIETARY ISSUES: ADEQUATE INTAKE
SUBJECTIVE PAIN PROGRESSION: IMPROVING
FEVER: 0

## 2017-02-22 NOTE — PLAN OF CARE
Problem: Goal Outcome Summary  Goal: Goal Outcome Summary  Outcome: Improving  Patient has denied pain throughout the entire night.  Has denied nausea as well.  Oriented throughout most of the night; clearing as the night has progressed to being completely alert and oriented at present.  Cranky at times, but doesn't maintain crankiness.  Up to use the bathroom to void throughout the night.  Ostomy emptied of 125ml and 250mls soft medium brown stools with flecks of formed stools.  Bowel sounds active.  When emptying ostomy, resident takes interest in process, not afraid of the process or concerned of the look of ostomy.   Eating regular diet and tolerating well without any nausea.  Central line remains in place, all ports are locked at this time.  Up ambulating multiple times.  A bit unsteady on feet.  Remainder of assessment as charted    Problem: Bowel Obstruction (Adult)  Goal: Signs and Symptoms of Listed Potential Problems Will be Absent or Manageable (Bowel Obstruction)  Signs and symptoms of listed potential problems will be absent or manageable by discharge/transition of care (reference Bowel Obstruction (Adult) CPG).   Outcome: Improving    02/22/17 0529   Bowel Obstruction   Problems Assessed (Bowel Obstruction) all;pain;nausea and vomiting   Problems Present (Bowel Obstruction) none

## 2017-02-22 NOTE — PROGRESS NOTES
[facility]    Family Practice Progress Note    Date of Service (when I saw the patient): 02/22/2017     Assessment & Plan   Peter Zhao is a 71 year old male who was admitted on 2/17/2017.     Principal Problem:    S/P colon resection    Assessment: doing well, colostomy functioning, tolerating regular diet    Plan: continue current cares  Active Problems:    Diabetes mellitus, type 2 (H)    Assessment: controlled    Plan: monitor    CAD (coronary artery disease)    Assessment: chronic, stable    Plan: monitor    HTN (hypertension)    Assessment: elevated some    Plan: observe, adjust medications if persistent    Hypokalemia    Assessment: mild    Plan: oral supplements      DVT Prophylaxis: Low Risk/Ambulatory with no VTE prophylaxis indicated  Code Status: Full Code    Brock Vega    Interval History   Doing well. Continues to improve. Pain is well-controlled. No fevers. Potassium still mildly decreased. Ambulating well. Colostomy functioning well. Blood pressures up some. Cognitively improving.    Review Of Systems  CONSTITUTIONAL:  negative for  fevers and chills  HEENT:  negative  RESPIRATORY:  negative for cough or shortness of breath  CARDIOVASCULAR:  negative for  chest pain, palpitations  GASTROINTESTINAL:  negative for nausea, vomiting, diarrhea, constipation and abdominal pain  GENITOURINARY:  negative for frequency and dysuria  INTEGUMENT/BREAST:  negative for rash and skin lesion(s)  MUSCULOSKELETAL:  negative for  myalgias, arthralgias and muscle weakness  BEHAVIOR/PSYCH:  negative for poor concentration and depressed mood      Physical Exam   Temp: 97.8  F (36.6  C) Temp src: Tympanic BP: 143/93 Pulse: 98 Heart Rate: 96 Resp: 20 SpO2: 96 % O2 Device: None (Room air)    Vitals:    02/19/17 0648 02/20/17 0520 02/22/17 0458   Weight: 87 kg (191 lb 12.8 oz) 84 kg (185 lb 3 oz) 79 kg (174 lb 2.6 oz)     Vital Signs with Ranges  Temp:  [97.1  F (36.2  C)-99.3  F (37.4  C)] 97.8  F (36.6   C)  Pulse:  [93-98] 98  Heart Rate:  [78-98] 96  Resp:  [18-22] 20  BP: (137-143)/(80-95) 143/93  SpO2:  [94 %-96 %] 96 %  I/O last 3 completed shifts:  In: 1290 [P.O.:1290]  Out: 1450 [Urine:675; Stool:775]    CVC Triple Lumen 02/17/17 Internal jugular (Active)   Site Assessment WDL 2/22/2017  5:00 AM   Dressing Intervention Chlorhexidine sponge;Transparent 2/22/2017  5:00 AM   CVC Comment zeroed 2/18/2017  9:54 PM   CVC Lumen Assessment Brown 2/18/2017  4:00 PM   Number of days:5       Colostomy (Active)   Stomal Appliance 2 piece 2/22/2017  7:41 AM   Stoma Assessment Red 2/22/2017  7:41 AM   Peristomal Assessment UTV 2/22/2017  5:00 AM   Stool Amount Small 2/21/2017  5:00 PM   Output (ml) 250 ml 2/22/2017  5:00 AM   Number of days:5       Incision/Surgical Site 02/17/17 Abdomen (Active)   Incision Assessment WDL 2/22/2017  7:41 AM   Charisma-Incision Assessment Warm 2/21/2017  7:35 PM   Closure Sutures;Approximated 2/22/2017  7:41 AM   Incision Drainage Amount None 2/22/2017  7:41 AM   Drainage Description Serosanguinous 2/20/2017  2:00 PM   Dressing Intervention Open to air / No Dressing 2/22/2017  7:41 AM   Number of days:5       Incision/Surgical Site 02/17/17 Left Abdomen (Active)   Incision Assessment WDL 2/22/2017  7:41 AM   Charisma-Incision Assessment UTV 2/21/2017  9:59 AM   Incision Drainage Amount None 2/22/2017  7:41 AM   Drainage Description UTV 2/21/2017  9:59 AM   Dressing Intervention Open to air / No Dressing 2/22/2017  7:41 AM   Number of days:5     Line/device assessment(s) completed for medical necessity    Constitutional: awake, alert, cooperative, no apparent distress, appears stated age and normal weight  ENT: normocepalic, without obvious abnormality  Respiratory: no increased work of breathing, good air exchange and clear to auscultation  Cardiovascular: regular rate and rhythm and no murmur noted  GI: normal bowel sounds, soft and non-tender  Skin: normal skin color, texture, turgor and no  redness, warmth, or swelling  Musculoskeletal: no lower extremity pitting edema present  there is no redness, warmth, or swelling of the joints  Neuropsychiatric: General: normal, calm and normal eye contact  Level of consciousness: alert / normal  Affect: normal  Orientation: oriented to self, place, time and situation  Memory and insight: impaired    Medications     dextrose 5% and 0.45% NaCl + KCl 20 mEq/L 50 mL/hr at 02/21/17 1242       metoprolol  100 mg Oral Daily     famotidine  20 mg Oral BID     enoxaparin  40 mg Subcutaneous Q24H     lisinopril (PRINIVIL/ZESTRIL) tablet 40 mg  40 mg Oral Daily       Data     Recent Labs  Lab 02/22/17  0523 02/21/17  1340 02/21/17  0629 02/20/17  0545  02/17/17  0130   WBC 9.4  --  11.6* 11.6*  < > 15.7*   HGB 13.6  --  14.6 13.3  < > 17.3   MCV 84  --  84 86  < > 86     --  238 183  < > 248     --  145* 142  < > 141   POTASSIUM 3.3* 3.6 3.3* 3.3*  < > 4.0   CHLORIDE 109  --  110* 110*  < > 104   CO2 20  --  21 22  < > 24   BUN 14  --  12 9  < > 29   CR 1.09  --  1.04 0.93  < > 1.32*   ANIONGAP 13  --  14 10  < > 13   WALI 7.9*  --  8.2* 7.8*  < > 8.7   *  --  133* 133*  < > 153*   ALBUMIN  --   --  2.7* 2.5*  < > 3.9   PROTTOTAL  --   --  6.2* 5.8*  < > 7.9   BILITOTAL  --   --  1.6* 1.3  < > 1.6*   ALKPHOS  --   --  91 86  < > 98   ALT  --   --  45 34  < > 32   AST  --   --  48* 45  < > 34   TROPI  --   --   --   --   --  <0.015The 99th percentile for upper reference range is 0.045 ug/L.  Troponin values in the range of 0.045 - 0.120 ug/L may be associated with risks of adverse clinical events.   < > = values in this interval not displayed.

## 2017-02-22 NOTE — PLAN OF CARE
Face to face report given with opportunity to observe patient.    Report given to BRENNEN Shabazz   2/21/2017  8:14 PM

## 2017-02-22 NOTE — PLAN OF CARE
Problem: Goal Outcome Summary  Goal: Goal Outcome Summary  Outcome: Improving  Pt has been A&O , pleasant and cooperative all day. Visitors and wife in most of the day. Pt has denied pain, nausea N/T or dizziness . Abdomen rounded, ostomy pouch intact with small amount of light brown liquid stool. Pt educated on steps to empty pouch- watched nurse perform procedure. Pt states understanding and states he will be able to empty per self. Nohemy in  made aware of need for follow up visit upon discharge per wound care nurse request. Appetite good- consumes 100% of meals. TLS has been flushed with 10cc NS syringes per Dr Ruby order. One port is unflushable. Pt is not getting anything IV anymore. VSS. Afebrile. No complaints.

## 2017-02-22 NOTE — PLAN OF CARE
Problem: Patient Goal: Rt Focus  Goal: 1. Patient Goal: RT Focus  Encouraged use of IS, able to achieve 2500.  BS clear on room air sats 96%.  No treatments given.

## 2017-02-22 NOTE — PLAN OF CARE
Face to face report given with opportunity to observe patient.  Report given to Elizabeth HUTTON.    Eliz Tapia  2/22/2017, 4:17 PM

## 2017-02-22 NOTE — PLAN OF CARE
Problem: Patient Goal: Social Work Focus  Goal: 1. Patient Goal: Social Work Focus  Left message for Anna Marie at [facility], anticipated discharge on Thursday.

## 2017-02-22 NOTE — PROGRESS NOTES
Pt seen along with wife Floridalma for pouch change and education.  He is much less confused today and interested in learning.  He has not had the opportunity to empty his pouch yet, but is willing to do so.  Right now his biggest concern is how he will see to do his pouch change.  Discussed options including sitting on toilet or standing up and using a mirror.      Ostomy pouch change done:  Stoma Type: end sigmoid colostomy  Location: EUSBEIO   Mucosa: red moist except brown area that is sloughing from 5:00 - 7:00 and near 9:00  Stoma Size:2 1/2 x 2 - edematous  Height: approx 1/2 in  Mucocutaneous Juncture: intact - sutures visible  Peristomal Skin: pink along eges  Output: semi-liquid brown - emptied approx 40 cc  Deviations from normal: Area that is sloughing on distal edge  - however, this is improving    Pouching system upon arrival: 2 piece cut to fit 4 inch barrier (36229) and pouch [id number]); barrier ring     Pouching system applied: 2 piece cut to fit 4 inch barrier (36569) and pouch [id number]); barrier ring stocked in [place] supply room.    Education provided including removal of pouch, cleansing, use of barrier ring, application of barrier and pouch, different types of pouching system, frequency of pouch changes, emptying pouch, lock and roll.  Pt was able to apply barrier and tape border and use lock and roll to close pouch with assistance.  It is difficult for him to see to do the entire pouch change as he needs to be flat due to the size of the barrier he currently needs.    Plan - pt thinks he will be going home tomorrow.  He will need a home care visit within a day of discharge to do a pouch change with him at home and reinforce teaching.  3 pouches and barriers and barrier rings provided for them to use at home until we can get them samples - hopefully his stoma will decrease in size so we can get him into a smaller system.  Discharge instruction written.  Referral for follow up in wound/ostomy center from   [place].  We will plan to see him next Wednesday.  They have our contact information if they have any questions.      Report given to Cely ALEMAN on above.  She will contact Case Management regarding home care scheduling.

## 2017-02-22 NOTE — PLAN OF CARE
Face to face report given with opportunity to observe patient.    Report given to Eliz Harvey   2/22/2017  11:16 AM

## 2017-02-22 NOTE — PLAN OF CARE
Face to face report given with opportunity to observe patient.    Report given to BRENNEN Olea   2/22/2017  7:10 AM

## 2017-02-22 NOTE — PROGRESS NOTES
"Peter Zhao is a 71 year old male patient.  1. Intestinal obstruction, unspecified type (H)    2. Abdominal pain, generalized      Past Medical History   Diagnosis Date     Allergic state      CAD (coronary artery disease)      CABG 2013, stent x 1 in 2014     Cataract      Cataracts, bilateral      Elevated PSA      Gastro-oesophageal reflux disease      Heart murmur      Hyperlipidemia      Hypertension      Pacemaker      Stented coronary artery      Visual aura      No current outpatient prescriptions on file.     Allergies   Allergen Reactions     Acetaminophen      Ciprofloxacin Hives     No Clinical Screening - See Comments      Antibiotic allergy, pt not sure which     Principal Problem:    S/P colon resection  Active Problems:    Diabetes mellitus, type 2 (H)    HTN (hypertension)    Hypokalemia    CAD (coronary artery disease)    Blood pressure 131/84, pulse 98, temperature 97.8  F (36.6  C), temperature source Tympanic, resp. rate 20, height 1.778 m (5' 10\"), weight 79 kg (174 lb 2.6 oz), SpO2 95 %.    Subjective:  Symptoms:  Improved.    Diet:  Adequate intake.  No nausea or vomiting.    Activity level: Returning to normal.    Pain:  He reports pain is improving.  Pain is well controlled.      Objective:  General Appearance:  Comfortable and well-appearing.    Vital signs: (most recent): Blood pressure 131/84, pulse 98, temperature 97.8  F (36.6  C), temperature source Tympanic, resp. rate 20, height 1.778 m (5' 10\"), weight 79 kg (174 lb 2.6 oz), SpO2 95 %.  No fever.    Output: Producing stool.    HEENT: Normal HEENT exam.    Lungs:  Normal effort.  He is not in respiratory distress.    Heart: Normal rate.    Skin:  Warm and dry.    Abdomen: (Soft, non-distended, incision C/D/I, ostomy viable and functioning, appropriately tender)    Assessment:    Condition: In stable condition.  Improving.       Plan:   Transfer Plan: Plan for discharge home tomorrow.  Encourage ambulation.  Start/continue incentive " spirometry.  Regular diet.  Administer medications as ordered.   (Colostomy teaching).       Charlene Loyd  2/22/2017

## 2017-02-22 NOTE — PLAN OF CARE
Problem: Patient Goal: Social Work Focus  Goal: 1. Patient Goal: Social Work Focus  Met with Miguel to discuss Medicare letter and discharge plan.  Plan is for tomorrow. [facility] able to pick Miguel up for care on Friday.

## 2017-02-23 VITALS
OXYGEN SATURATION: 94 % | HEIGHT: 70 IN | HEART RATE: 84 BPM | WEIGHT: 174.16 LBS | BODY MASS INDEX: 24.93 KG/M2 | TEMPERATURE: 97 F | SYSTOLIC BLOOD PRESSURE: 136 MMHG | RESPIRATION RATE: 16 BRPM | DIASTOLIC BLOOD PRESSURE: 84 MMHG

## 2017-02-23 LAB
ANION GAP SERPL CALCULATED.3IONS-SCNC: 8 MMOL/L (ref 3–14)
BUN SERPL-MCNC: 18 MG/DL (ref 7–30)
CALCIUM SERPL-MCNC: 7.9 MG/DL (ref 8.5–10.1)
CHLORIDE SERPL-SCNC: 112 MMOL/L (ref 94–109)
CO2 SERPL-SCNC: 24 MMOL/L (ref 20–32)
CREAT SERPL-MCNC: 1.16 MG/DL (ref 0.66–1.25)
CRP SERPL-MCNC: 25.7 MG/L (ref 0–8)
ERYTHROCYTE [DISTWIDTH] IN BLOOD BY AUTOMATED COUNT: 13.7 % (ref 10–15)
GFR SERPL CREATININE-BSD FRML MDRD: 62 ML/MIN/1.7M2
GLUCOSE SERPL-MCNC: 124 MG/DL (ref 70–99)
HCT VFR BLD AUTO: 38.9 % (ref 40–53)
HGB BLD-MCNC: 13.2 G/DL (ref 13.3–17.7)
MCH RBC QN AUTO: 29 PG (ref 26.5–33)
MCHC RBC AUTO-ENTMCNC: 33.9 G/DL (ref 31.5–36.5)
MCV RBC AUTO: 86 FL (ref 78–100)
PLATELET # BLD AUTO: 252 10E9/L (ref 150–450)
POTASSIUM SERPL-SCNC: 3.5 MMOL/L (ref 3.4–5.3)
RBC # BLD AUTO: 4.55 10E12/L (ref 4.4–5.9)
SODIUM SERPL-SCNC: 144 MMOL/L (ref 133–144)
WBC # BLD AUTO: 9 10E9/L (ref 4–11)

## 2017-02-23 PROCEDURE — 25000132 ZZH RX MED GY IP 250 OP 250 PS 637: Mod: GY | Performed by: FAMILY MEDICINE

## 2017-02-23 PROCEDURE — 36415 COLL VENOUS BLD VENIPUNCTURE: CPT | Performed by: FAMILY MEDICINE

## 2017-02-23 PROCEDURE — 40000275 ZZH STATISTIC RCP TIME EA 10 MIN

## 2017-02-23 PROCEDURE — 86140 C-REACTIVE PROTEIN: CPT | Performed by: FAMILY MEDICINE

## 2017-02-23 PROCEDURE — A9270 NON-COVERED ITEM OR SERVICE: HCPCS | Mod: GY | Performed by: SURGERY

## 2017-02-23 PROCEDURE — 80048 BASIC METABOLIC PNL TOTAL CA: CPT | Performed by: FAMILY MEDICINE

## 2017-02-23 PROCEDURE — 25000132 ZZH RX MED GY IP 250 OP 250 PS 637: Mod: GY | Performed by: SURGERY

## 2017-02-23 PROCEDURE — A9270 NON-COVERED ITEM OR SERVICE: HCPCS | Mod: GY | Performed by: FAMILY MEDICINE

## 2017-02-23 PROCEDURE — 85027 COMPLETE CBC AUTOMATED: CPT | Performed by: FAMILY MEDICINE

## 2017-02-23 RX ADMIN — METOPROLOL SUCCINATE 100 MG: 100 TABLET, EXTENDED RELEASE ORAL at 09:01

## 2017-02-23 RX ADMIN — POTASSIUM CHLORIDE 20 MEQ: 20 TABLET, EXTENDED RELEASE ORAL at 09:01

## 2017-02-23 RX ADMIN — FAMOTIDINE 20 MG: 20 TABLET ORAL at 09:01

## 2017-02-23 RX ADMIN — LISINOPRIL 40 MG: 40 TABLET ORAL at 09:01

## 2017-02-23 ASSESSMENT — ENCOUNTER SYMPTOMS
DIETARY ISSUES: ADEQUATE INTAKE
NAUSEA: 0
SUBJECTIVE PAIN PROGRESSION: IMPROVING
ACTIVITY IMPAIRMENT: RETURNING TO NORMAL
VOMITING: 0
FEVER: 0
SUBJECTIVE PATIENT PAIN CONTROL: WELL CONTROLLED

## 2017-02-23 NOTE — PLAN OF CARE
Under the instruction of Harika FONSECA ICU RN, CVC was removed, pt placed in trendelenberg, mask put over mouth and nose, and he turned his head and took a deep breath during removal. He tolerated the process well. At this time he is up in his chair resting comfortably with pressure dressing applied.

## 2017-02-23 NOTE — DISCHARGE SUMMARY
[facility]    Discharge Summary  [facility]    Date of Admission:  2/17/2017  Date of Discharge:  2/23/2017  Discharging Provider: Brock Vega  Date of Service (when I saw the patient): 02/23/17    Discharge Diagnoses   Principal Problem:    S/P colon resection  Active Problems:    Diabetes mellitus, type 2 (H)    CAD (coronary artery disease)    HTN (hypertension)    Hypokalemia      History of Present Illness   Peter Zhao is an 71 year old male who presented with large bowel obstruction    Hospital Course   Peter Zhao was admitted on 2/17/2017.  The following problems were addressed during his hospitalization:    Principal Problem:    S/P colon resection    Assessment: stable with functioning diverting colostomy    Plan: discharge  Active Problems:    Diabetes mellitus, type 2 (H)    Assessment: controlled    Plan: discharge    CAD (coronary artery disease)    Assessment: chronic, stable    Plan: discharge    HTN (hypertension)    Assessment: controlled    Plan: discharge    Hypokalemia    Assessment: resolved     Plan: discharge      Brock Vega    Significant Results and Procedures   Hgb 13.2  A1C 6.2    Pending Results   These results will be followed up by Brock Vega MD    Unresulted Labs Ordered in the Past 30 Days of this Admission     No orders found from 12/19/2016 to 2/18/2017.          Code Status   Full Code    Primary Care Physician   Brock Vega    Physical Exam   Temp: 98.6  F (37  C) Temp src: Tympanic BP: 132/89 Pulse: 95 Heart Rate: 102 Resp: 16 SpO2: 95 % O2 Device: None (Room air)    Vitals:    02/19/17 0648 02/20/17 0520 02/22/17 0458   Weight: 87 kg (191 lb 12.8 oz) 84 kg (185 lb 3 oz) 79 kg (174 lb 2.6 oz)     Vital Signs with Ranges  Temp:  [97.8  F (36.6  C)-99.2  F (37.3  C)] 98.6  F (37  C)  Pulse:  [95] 95  Heart Rate:  [] 102  Resp:  [14-20] 16  BP: (131-168)/() 132/89  SpO2:  [94 %-96 %] 95 %  I/O last 3 completed shifts:  In: 960  [P.O.:960]  Out: -     Constitutional: awake, alert, cooperative, no apparent distress, appears stated age and normal weight  ENT: normocepalic, without obvious abnormality  Respiratory: no increased work of breathing, good air exchange and clear to auscultation  Cardiovascular: regular rate and rhythm and no murmur noted  GI: normal bowel sounds, soft, non-distended, non-tender, involuntary guarding absent and no masses palpated, no hepatosplenomegally, colostomy functioning well  Skin: no bruising or bleeding and normal skin color, texture, turgor  Musculoskeletal: no lower extremity pitting edema present  there is no redness, warmth, or swelling of the joints  Neuropsychiatric: General: normal, calm and normal eye contact  Level of consciousness: alert / normal  Affect: normal  Orientation: oriented to self, place, time and situation  Memory and insight: normal, memory for past and recent events intact and thought process normal    Time Spent on this Encounter   IBrock, personally saw the patient today and spent less than or equal to 30 minutes discharging this patient.    Discharge Disposition   Discharged to home  Condition at discharge: Stable    Consultations This Hospital Stay   WOUND OSTOMY CONTINENCE NURSE  IP CONSULT    Discharge Orders     WOUND CARE REFERRAL ([place])     Reason for your hospital stay   Patient is a 71 year old male who was admitted during his prep for colonoscopy secondary to large bowel obstruction. 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 Postoperatively he developed some confusion which cleared, his pain was well controlled and he tolerated advancement of his diet and activity. He learned to care for his ostomy and was discharged to home.     Follow-up and recommended labs and tests    Follow up with primary care provider, Brock Vega, within 7 days for hospital follow- up.  The following labs/tests are  recommended: Chem 8, CBC.     Activity   Your activity upon discharge: no lifting, driving, or strenuous exercise for 4 weeks     Supplies   List the supplies the pt needs to go home - ostomy supplies     Full Code     Diet   Follow this diet upon discharge:    Regular Diet Adult       Discharge Medications   Current Discharge Medication List      CONTINUE these medications which have NOT CHANGED    Details   !! blood glucose monitoring (LUIS MICROLET) lancets Use to test blood sugar 2 times weekly  Qty: 100 Box, Refills: 6    Associated Diagnoses: Type 2 diabetes mellitus without complication, without long-term current use of insulin (H)      atorvastatin (LIPITOR) 40 MG tablet Take 40 mg by mouth daily      NONFORMULARY OTC eye drops as directed      polyethylene glycol 0.4%- propylene glycol 0.3% (SYSTANE ULTRA) 0.4-0.3 % SOLN ophthalmic solution Place 1 drop into both eyes every hour as needed for dry eyes      LISINOPRIL PO Take 40 mg by mouth daily       FINASTERIDE PO Take 5 mg by mouth daily      RANITIDINE HCL PO Take 50 mg by mouth 2 times daily       !! blood glucose (LUIS CONTOUR NEXT) test strip Use to test blood sugar 2 times weekly or as directed.  Qty: 100 strip, Refills: 6    Comments: Please dispense Luis Contour EZ test strips  Associated Diagnoses: Diabetes mellitus, type 2 (H)      Multiple Vitamin (MULTIVITAMINS PO) Take 1 capsule by mouth daily      !! glucose blood VI test strips (ACCU-CHEK SMARTVIEW) strip Test blood sugar before breakfast one day, before and after supper the next and not at all the third day  Qty: 1 Box, Refills: 12    Associated Diagnoses: T2DM (type 2 diabetes mellitus) (H)      !! SOFTCLIX LANCETS MISC 1 Units daily Use fresh lancet each day to check blood sugars  Qty: 100 each, Refills: 4    Associated Diagnoses: T2DM (type 2 diabetes mellitus) (H)      Lancets Misc. (ACCU-CHEK SOFTCLIX LANCET DEV) KIT 1 Units daily  Qty: 1 kit, Refills: 2    Associated Diagnoses:  T2DM (type 2 diabetes mellitus) (H)      METOPROLOL TARTRATE PO Take 100 mg by mouth daily       ASPIRIN PO Take 325 mg by mouth daily       Cholecalciferol (VITAMIN D3 PO) Take  by mouth daily.      nitroglycerin (NITROSTAT) 0.4 MG SL tablet Place 1 tablet under the tongue every 5 minutes as needed       !! - Potential duplicate medications found. Please discuss with provider.        Allergies   Allergies   Allergen Reactions     Acetaminophen      Ciprofloxacin Hives     No Clinical Screening - See Comments      Antibiotic allergy, pt not sure which     Data   Most Recent 3 CBC's:  Recent Labs   Lab Test  02/23/17   0538  02/22/17   0523  02/21/17   0629   WBC  9.0  9.4  11.6*   HGB  13.2*  13.6  14.6   MCV  86  84  84   PLT  252  237  238      Most Recent 3 BMP's:  Recent Labs   Lab Test  02/23/17   0538  02/22/17   0523  02/21/17   1340  02/21/17   0629   NA  144  142   --   145*   POTASSIUM  3.5  3.3*  3.6  3.3*   CHLORIDE  112*  109   --   110*   CO2  24  20   --   21   BUN  18  14   --   12   CR  1.16  1.09   --   1.04   ANIONGAP  8  13   --   14   WALI  7.9*  7.9*   --   8.2*   GLC  124*  125*   --   133*     Most Recent 2 LFT's:  Recent Labs   Lab Test  02/21/17   0629  02/20/17   0545   AST  48*  45   ALT  45  34   ALKPHOS  91  86   BILITOTAL  1.6*  1.3     Most Recent INR's and Anticoagulation Dosing History:  Anticoagulation Dose History     Recent Dosing and Labs Latest Ref Rng & Units 3/27/2013    INR 0.86 - 1.14 1.08        Most Recent 3 Troponin's:  Recent Labs   Lab Test  02/17/17   0130  01/28/17   0615  08/05/16   1105   TROPI  <0.015  The 99th percentile for upper reference range is 0.045 ug/L.  Troponin values in   the range of 0.045 - 0.120 ug/L may be associated with risks of adverse   clinical events.    <0.015  The 99th percentile for upper reference range is 0.045 ug/L.  Troponin values in   the range of 0.045 - 0.120 ug/L may be associated with risks of adverse   clinical events.     <0.015  The 99th percentile for upper reference range is 0.045 ug/L.  Troponin values in   the range of 0.045 - 0.120 ug/L may be associated with risks of adverse   clinical events.       Most Recent Cholesterol Panel:No lab results found.  Most Recent 6 Bacteria Isolates From Any Culture (See EPIC Reports for Culture Details):  Recent Labs   Lab Test  02/17/17   0430   CULT  No MRSA isolated     Most Recent TSH, T4 and A1c Labs:  Recent Labs   Lab Test 10/04/16   A1C  6.2     Results for orders placed or performed during the hospital encounter of 02/17/17   CT Abdomen Pelvis w Contrast    Narrative    CT SCAN OF ABDOMEN AND PELVIS WITH IV CONTRAST    REPORT:  There is some increased density of both lung bases.  This may  represent atelectasis.  Early pneumonia cannot be excluded.  The liver  is free of masses or biliary ductal enlargement.  No calcified  gallstones are seen.  The spleen appears normal.  The pancreas is  normal.  There are no adrenal masses.  The right and left kidneys show  no evidence of hydronephrosis.  There are some small benign cysts seen  in the right kidney.  The periaortic lymph nodes are normal in  caliber.  There is mild aneurysmal dilatation of the distal abdominal  aorta, with maximal transverse diameter of 3.7 cm.  Intraperitoneally,  there is dilated colonic loops down to the sigmoid.  There is some  focal thickening in the sigmoid colon and the possibility of  malignancy exists.  The pelvic lymph nodes are normal in caliber.  The  bladder and rectum appear normal.  Prostatic calculi are seen.  No  destructive lesions are seen in the regional skeleton.    IMPRESSION:  COLONIC OBSTRUCTION AT THE SIGMOID LEVEL.  THE  POSSIBILITY OF A SIGMOID MALIGNANCY EXISTS.  Exam Date: Feb 17, 2017 02:21:56 AM  Author: ANA HERNADEZ  This report is final and signed     XR Chest Port 1 View    Narrative    CHEST SINGLE VIEW    REPORT:  There is a central catheter with its tip at the junction of  the  superior vena cava and right atrium.  There is atelectatic changes  seen at the lung bases.  Postoperative changes are seen in the  mediastinum. There is a transvenous pacemaker in place.    IMPRESSION:  BIBASILAR ATELECTASIS.    CENTRAL CATHETER WITH ITS TIP IN THE JUNCTION OF THE SUPERIOR VENA  CAVA AND RIGHT ATRIUM.  Exam Date: Feb 17, 2017 03:34:00 PM  Author: ANA HERNADEZ  This report is final and signed       CC: Dr. Brock Vega MD  CC: [facility]

## 2017-02-23 NOTE — DISCHARGE SUMMARY
[facility]    Discharge Summary  [facility]    Date of Admission:  2/17/2017  Date of Discharge:  2/23/2017  Discharging Provider: Brock Vega  Date of Service (when I saw the patient): 02/23/17    Discharge Diagnoses   Principal Problem:    S/P colon resection  Active Problems:    Diabetes mellitus, type 2 (H)    CAD (coronary artery disease)    HTN (hypertension)    Hypokalemia      History of Present Illness   Peter Zhao is an 71 year old male who presented with a bowel obstruction    Hospital Course   Peter Zhao was admitted on 2/17/2017.  The following problems were addressed during his hospitalization:    Principal Problem:    S/P colon resection    Assessment: stable with functioning colostomy    Plan: discharge  Active Problems:    Diabetes mellitus, type 2 (H)    Assessment: controlled    Plan: discharge    CAD (coronary artery disease)    Assessment: chronic, stable    Plan: discharge    HTN (hypertension)    Assessment: controlled    Plan: discharge    Hypokalemia    Assessment: resolved    Plan: discharge      Brock Vega    Significant Results and Procedures   Hgb 13.2  A1C 6.2    Pending Results   These results will be followed up by Brock Vega MD    Unresulted Labs Ordered in the Past 30 Days of this Admission     No orders found from 12/19/2016 to 2/18/2017.          Code Status   Full Code    Primary Care Physician   Brock Vega    Physical Exam   Temp: 98.6  F (37  C) Temp src: Tympanic BP: 132/89 Pulse: 95 Heart Rate: 102 Resp: 16 SpO2: 95 % O2 Device: None (Room air)    Vitals:    02/19/17 0648 02/20/17 0520 02/22/17 0458   Weight: 87 kg (191 lb 12.8 oz) 84 kg (185 lb 3 oz) 79 kg (174 lb 2.6 oz)     Vital Signs with Ranges  Temp:  [97.8  F (36.6  C)-99.2  F (37.3  C)] 98.6  F (37  C)  Pulse:  [95] 95  Heart Rate:  [] 102  Resp:  [14-20] 16  BP: (131-168)/() 132/89  SpO2:  [94 %-96 %] 95 %  I/O last 3 completed shifts:  In: 960 [P.O.:960]  Out: -      Constitutional: awake, alert, cooperative, no apparent distress, appears stated age and normal weight  ENT: normocepalic, without obvious abnormality  Respiratory: no increased work of breathing, good air exchange and clear to auscultation  Cardiovascular: regular rate and rhythm and no murmur noted  GI: normal bowel sounds, soft, non-distended, non-tender, involuntary guarding absent and no masses palpated, no hepatosplenomegally, colostomy functioning   Skin: no bruising or bleeding and normal skin color, texture, turgor  Musculoskeletal: no lower extremity pitting edema present  there is no redness, warmth, or swelling of the joints  Neuropsychiatric: General: normal, calm and normal eye contact  Level of consciousness: alert / normal    Time Spent on this Encounter   I, Brock Vega, personally saw the patient today and spent less than or equal to 30 minutes discharging this patient.    Discharge Disposition   Discharged to home  Condition at discharge: Stable    Consultations This Hospital Stay   WOUND OSTOMY CONTINENCE NURSE  IP CONSULT    Discharge Orders     WOUND CARE REFERRAL ([place])     Reason for your hospital stay   Patient is a 71 year old male who was admitted during his prep for colonoscopy secondary to large bowel obstruction. 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 Postoperatively he developed some confusion which cleared, his pain was well controlled and he tolerated advancement of his diet and activity. He learned to care for his ostomy and was discharged to home.     Follow-up and recommended labs and tests    Follow up with primary care provider, Brock Vega, within 7 days for hospital follow- up.  The following labs/tests are recommended: Chem 8, CBC.     Activity   Your activity upon discharge: no lifting, driving, or strenuous exercise for 4 weeks     Supplies   List the supplies the pt needs to go home - ostomy supplies      Full Code     Diet   Follow this diet upon discharge:    Regular Diet Adult       Discharge Medications   Current Discharge Medication List      CONTINUE these medications which have NOT CHANGED    Details   !! blood glucose monitoring (LUIS MICROLET) lancets Use to test blood sugar 2 times weekly  Qty: 100 Box, Refills: 6    Associated Diagnoses: Type 2 diabetes mellitus without complication, without long-term current use of insulin (H)      atorvastatin (LIPITOR) 40 MG tablet Take 40 mg by mouth daily      NONFORMULARY OTC eye drops as directed      polyethylene glycol 0.4%- propylene glycol 0.3% (SYSTANE ULTRA) 0.4-0.3 % SOLN ophthalmic solution Place 1 drop into both eyes every hour as needed for dry eyes      LISINOPRIL PO Take 40 mg by mouth daily       FINASTERIDE PO Take 5 mg by mouth daily      RANITIDINE HCL PO Take 50 mg by mouth 2 times daily       !! blood glucose (LUIS CONTOUR NEXT) test strip Use to test blood sugar 2 times weekly or as directed.  Qty: 100 strip, Refills: 6    Comments: Please dispense Luis Contour EZ test strips  Associated Diagnoses: Diabetes mellitus, type 2 (H)      Multiple Vitamin (MULTIVITAMINS PO) Take 1 capsule by mouth daily      !! glucose blood VI test strips (ACCU-CHEK SMARTVIEW) strip Test blood sugar before breakfast one day, before and after supper the next and not at all the third day  Qty: 1 Box, Refills: 12    Associated Diagnoses: T2DM (type 2 diabetes mellitus) (H)      !! SOFTCLIX LANCETS MISC 1 Units daily Use fresh lancet each day to check blood sugars  Qty: 100 each, Refills: 4    Associated Diagnoses: T2DM (type 2 diabetes mellitus) (H)      Lancets Misc. (ACCU-CHEK SOFTCLIX LANCET DEV) KIT 1 Units daily  Qty: 1 kit, Refills: 2    Associated Diagnoses: T2DM (type 2 diabetes mellitus) (H)      METOPROLOL TARTRATE PO Take 100 mg by mouth daily       ASPIRIN PO Take 325 mg by mouth daily       Cholecalciferol (VITAMIN D3 PO) Take  by mouth daily.       nitroglycerin (NITROSTAT) 0.4 MG SL tablet Place 1 tablet under the tongue every 5 minutes as needed       !! - Potential duplicate medications found. Please discuss with provider.        Allergies   Allergies   Allergen Reactions     Acetaminophen      Ciprofloxacin Hives     No Clinical Screening - See Comments      Antibiotic allergy, pt not sure which     Data   Results for orders placed or performed during the hospital encounter of 02/17/17   CT Abdomen Pelvis w Contrast    Narrative    CT SCAN OF ABDOMEN AND PELVIS WITH IV CONTRAST    REPORT:  There is some increased density of both lung bases.  This may  represent atelectasis.  Early pneumonia cannot be excluded.  The liver  is free of masses or biliary ductal enlargement.  No calcified  gallstones are seen.  The spleen appears normal.  The pancreas is  normal.  There are no adrenal masses.  The right and left kidneys show  no evidence of hydronephrosis.  There are some small benign cysts seen  in the right kidney.  The periaortic lymph nodes are normal in  caliber.  There is mild aneurysmal dilatation of the distal abdominal  aorta, with maximal transverse diameter of 3.7 cm.  Intraperitoneally,  there is dilated colonic loops down to the sigmoid.  There is some  focal thickening in the sigmoid colon and the possibility of  malignancy exists.  The pelvic lymph nodes are normal in caliber.  The  bladder and rectum appear normal.  Prostatic calculi are seen.  No  destructive lesions are seen in the regional skeleton.    IMPRESSION:  COLONIC OBSTRUCTION AT THE SIGMOID LEVEL.  THE  POSSIBILITY OF A SIGMOID MALIGNANCY EXISTS.  Exam Date: Feb 17, 2017 02:21:56 AM  Author: ANA HERNADEZ  This report is final and signed     XR Chest Port 1 View    Narrative    CHEST SINGLE VIEW    REPORT:  There is a central catheter with its tip at the junction of  the superior vena cava and right atrium.  There is atelectatic changes  seen at the lung bases.  Postoperative  changes are seen in the  mediastinum. There is a transvenous pacemaker in place.    IMPRESSION:  BIBASILAR ATELECTASIS.    CENTRAL CATHETER WITH ITS TIP IN THE JUNCTION OF THE SUPERIOR VENA  CAVA AND RIGHT ATRIUM.  Exam Date: Feb 17, 2017 03:34:00 PM  Author: ANA HERNADEZ  This report is final and signed       CC: Dr. Brock Vega MD  CC: [facility]

## 2017-02-23 NOTE — PLAN OF CARE
Problem: Goal Outcome Summary  Goal: Goal Outcome Summary  Outcome: Improving  Pt has been alert and oriented all shift. He ambulated the unit multiple times during shift and ambulated around his room independently without issue. He has active BS and is passing flatus into his colostomy bag. He did empty it of air during shift and a small amount of brown liquid stool. He had his CVC removed during shift and has not had any complications noted pertaining to that. He continues on RA. LS were clear and equal bilaterally. He had adequate oral intake during shift with no c/o n&v. He has had no c/o pain during shift. Refused to take his lovenox but received all other medications per MAR. Incision on abdm is open to air, there is erythema noted around the sutures but there are no s&s of infection noted. He has been afebrile. He has remained free from all falls and/or injuries.      Face to face report given with opportunity to observe patient.     Report given to Felipe Santos RN  2/22/2017  11:59 PM

## 2017-02-23 NOTE — PLAN OF CARE
Problem: Goal Outcome Summary  Goal: Goal Outcome Summary  Outcome: No Change  Patient alert/oriented denies pain up IND, Tolerating regular diet. Bowel sounds active. Patient colostomy having air and small output, patient emptying bag himself. Incision ERICKA, WDL.     Problem: Bowel Obstruction (Adult)  Goal: Signs and Symptoms of Listed Potential Problems Will be Absent or Manageable (Bowel Obstruction)  Signs and symptoms of listed potential problems will be absent or manageable by discharge/transition of care (reference Bowel Obstruction (Adult) CPG).   Outcome: No Change    02/23/17 0456   Bowel Obstruction   Problems Assessed (Bowel Obstruction) situational response

## 2017-02-23 NOTE — PROGRESS NOTES
"Peter Zhao is a 71 year old male patient.  1. Intestinal obstruction, unspecified type (H)    2. Abdominal pain, generalized    3. Colostomy care (H)      Past Medical History   Diagnosis Date     Allergic state      CAD (coronary artery disease)      CABG 2013, stent x 1 in 2014     Cataract      Cataracts, bilateral      Elevated PSA      Gastro-oesophageal reflux disease      Heart murmur      Hyperlipidemia      Hypertension      Pacemaker      Stented coronary artery      Visual aura      No current outpatient prescriptions on file.     Allergies   Allergen Reactions     Acetaminophen      Ciprofloxacin Hives     No Clinical Screening - See Comments      Antibiotic allergy, pt not sure which     Principal Problem:    S/P colon resection  Active Problems:    Diabetes mellitus, type 2 (H)    HTN (hypertension)    Hypokalemia    CAD (coronary artery disease)    Blood pressure 136/84, pulse 84, temperature 97  F (36.1  C), temperature source Tympanic, resp. rate 16, height 1.778 m (5' 10\"), weight 79 kg (174 lb 2.6 oz), SpO2 94 %.    Subjective:  Symptoms:  Improved.    Diet:  Adequate intake.  No nausea or vomiting.    Activity level: Returning to normal.    Pain:  He reports pain is improving.  Pain is well controlled.      Objective:  General Appearance:  Comfortable and well-appearing.    Vital signs: (most recent): Blood pressure 136/84, pulse 84, temperature 97  F (36.1  C), temperature source Tympanic, resp. rate 16, height 1.778 m (5' 10\"), weight 79 kg (174 lb 2.6 oz), SpO2 94 %.  No fever.    Output: Producing stool.    HEENT: Normal HEENT exam.    Lungs:  Normal effort.  He is not in respiratory distress.    Heart: Normal rate.    Skin:  Warm and dry.    Abdomen: (Soft, non-distended, incision C/D/I, ostomy viable and functioning, appropriately tender)    Assessment:    Condition: In stable condition.  Improving.       Plan:   Discharge home.  (Colostomy teaching).       Charlene Loyd  2/22/2017    "

## 2017-02-23 NOTE — PLAN OF CARE
Face to face report given with opportunity to observe patient. Elevated blood pressure notified to the RN    Report given to MINNA Cruz   2/22/2017  8:11 PM

## 2017-02-23 NOTE — PLAN OF CARE
Problem: Discharge Planning  Goal: Discharge Planning (Adult, OB, Behavioral, Peds)  Outcome: Adequate for Discharge Date Met:  02/23/17 02/23/17 1033   Discharge Planning   Patient/family verbalizes understanding of discharge plan recommendations? Yes   Medical Team notified of plan? yes   Current Health   Anticipated Changes Related to Illness none   Functional Level Prior   Transportation Available car;family or friend will provide   Reviewed discharge instructions with Peter. Reviewed in detail medications, follow up appointments, and when to seek additional medical attention. Pt expresses understanding and has no additional questions at this time.

## 2017-02-23 NOTE — PLAN OF CARE
Face to face report given with opportunity to observe patient.  Report given to Brittny BELTRAN RN.    Felipe Palafox  2/23/2017, 6:59 AM

## 2017-02-24 ENCOUNTER — TELEPHONE (OUTPATIENT)
Dept: CASE MANAGEMENT | Facility: HOSPITAL | Age: 72
End: 2017-02-24

## 2017-02-24 NOTE — TELEPHONE ENCOUNTER
Pt answered phone but was unable to talk. Stated  wound/ostomy care was with him . Stated I  would call  back later this afternoon or next week.

## 2017-02-28 LAB
CREAT SERPL-MCNC: 1.35 MG/DL (ref 0.8–1.5)
GLUCOSE SERPL-MCNC: 104 MG/DL (ref 60–99)
POTASSIUM SERPL-SCNC: 4.5 MEQ/L (ref 3.5–5.1)

## 2017-03-01 ENCOUNTER — HOSPITAL ENCOUNTER (OUTPATIENT)
Dept: WOUND CARE | Facility: HOSPITAL | Age: 72
Discharge: HOME | End: 2017-03-01
Attending: FAMILY MEDICINE | Admitting: FAMILY MEDICINE
Payer: MEDICARE

## 2017-03-01 VITALS
SYSTOLIC BLOOD PRESSURE: 111 MMHG | DIASTOLIC BLOOD PRESSURE: 77 MMHG | HEART RATE: 72 BPM | HEIGHT: 70 IN | TEMPERATURE: 97.2 F | WEIGHT: 175.5 LBS | BODY MASS INDEX: 25.13 KG/M2

## 2017-03-01 PROCEDURE — 97597 DBRDMT OPN WND 1ST 20 CM/<: CPT

## 2017-03-01 NOTE — PROGRESS NOTES
"Peter Zhao, 1945  Chief Complaint   Patient presents with     Ostomy     Colostomy care and education       Patient Active Problem List   Diagnosis     Diabetes mellitus, type 2 (H)     CAD (coronary artery disease)     ACP (advance care planning)     Hyperlipidemia LDL goal <100     Elevated prostate specific antigen (PSA)     HTN (hypertension)     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     H/O migraine     Visual aura     Cataract     S/P colon resection     Hypokalemia       Initial Visit:  Pt seen for ostomy education and care. Pt has end sigmoid colostomy done for a stricture of his lower colon secondary to diverticular disease. Relevant PMH includes Diverticulosis, CAD and a prior CABG. He is a non-smoker and is not diabetic.  He experienced some confusion in the immediate post op period that is now better.     He is accompanied by his wife Floridalma who is willing to be standby assist for his pouch changes.  He is currently getting Home [facility] for education.  Pt is somewhat anxious about having to do the pouch changes by himself and stated \"[facility] will do this for one month and then I am on my own\".  Reminded him that Home Care is there to teach him how to do his own pouch changes - not to just do them for him.  Roddy is easily distractible and has difficulty staying on task.  His biggest concern is that \"Shit gets all over everything when I empty my pouch - I can't deal with this!!\"  Presented some options for pouch changes including a pouch swap out method and closed end pouches.  He prefers the closed end pouch option.  Roddy also thought his colostomy should be producing something all the time - explained that his bowel will go back to his previous bowel function which is 2 BM's per day.      Objective:   Stoma Type: End Colostomy  Location: LUQ  Mucosa: Pink/red moist with necrotic area from 5:00 - 7:00  Stoma Size: 1 5/8 X 1 3/8 in  Height: < 1/2 " "inch  Mucocutaneous Juncture: separation on 9:00 side with some fibrinous loose material  Peristomal Skin: Intact - 2 small pinpoint denuded areas near 6:00  Output: semi-solid brown stool with normal fecal odor  Deviations from normal: Mucocutaneous Juncture separation    Pouching system upon arrival:  2 piece cut to fit [place] flat in 4 inch size - the opening for the stoma was considerably larger than the stoma itself.    Pouching system applied:2 piece cut to fit S[employer] barrier 35783 with pouch 27577.  Hydrofiber over separation; crusted stoma powder with skin barrier prep wipe over denuded skin     Procedures:    Informed consent done for serial conservative sharp debridement - gave explanation of risks (slight bleeding, pain, infection, scarring) and benefits (enhanced healing) and answered all questions.  Form signed and sent to medical records.  Verified name, , and site prior to procedure as part of time out.  Used iris scissors to remove loose necrotic tissue from distal end of stoma and fibrinous loose tissue in mucocutaneous juncture < 20 sq cm).  no bleeding;  No pain    Assessments  Small amount of necrotic tissue from 5:00 - 7:00 with maroon soft gelatinous tissue under the necrotic tissue  Mucocutaneous Juncture separation on 9:00 side of stoma  Pt is fearful of having to be independent with pouch changes and emptying pouch    Plan of care:   Request Home Care visit on Friday instead of tomorrow  Continue with 2 piece pouching system as noted above - get samples for patient from both [employer] and [place] - he prefers as closed end system - Ordered samples   Return on Monday  Make appt for post op visit with Dr Moreland  Encouraged patient to try swap out method for pouches (remove pouch and put on clean pouch) empty used pouch and cleanse inside - hang to dry to \"swap with the 2nd pouch\"  Aiming for total system change every 3 days - will extend by one day until he is able to go " one week.    Supplies provided:  Sample 27796 and 31211 barrier/pouch; sample hydrofiber to place over mucocutaneous separation  Template provided    Education:  Ostomy education provided including types of barriers and pouches - closed end vs drainable , crusting technique, pouch change techniques (support skin, clear water only, etc);  pouch change frequency, etc.  Demonstrated pouch change.  Patient did the lock and roll. Answered all questions.  It is difficult for patient to focus on education and stay on track.  He is anxious.  His wife is present.  They will need continued repetitive instructions until he can independently do a pouch change    Nurse face to face time: 70 min    CC: Brock Vega/ Report called to Cheri at [facility]

## 2017-03-06 ENCOUNTER — OFFICE VISIT (OUTPATIENT)
Dept: WOUND CARE | Facility: OTHER | Age: 72
End: 2017-03-06
Attending: FAMILY MEDICINE
Payer: MEDICARE

## 2017-03-06 VITALS — SYSTOLIC BLOOD PRESSURE: 114 MMHG | HEART RATE: 76 BPM | DIASTOLIC BLOOD PRESSURE: 71 MMHG | TEMPERATURE: 97.1 F

## 2017-03-06 DIAGNOSIS — K94.00 COLOSTOMY COMPLICATION (H): ICD-10-CM

## 2017-03-06 DIAGNOSIS — Z90.49 S/P COLON RESECTION: Primary | ICD-10-CM

## 2017-03-06 PROCEDURE — 99213 OFFICE O/P EST LOW 20 MIN: CPT | Performed by: NURSE PRACTITIONER

## 2017-03-06 PROCEDURE — 99212 OFFICE O/P EST SF 10 MIN: CPT

## 2017-03-06 NOTE — PROGRESS NOTES
SUBJECTIVE:  Peter Zhao, 71 year old, male presents with the following Chief Complaint(s) with HPI to follow:   Chief Complaint   Patient presents with     Ostomy      HPI:  Peter is here today for follow up ostomy care.  He was seen for the first time here on 31/17 and has since been seen by home care.  His home care nurse Sarahi called this morning.  On Friday night Peter called, stating that he was having ostomy issues that he could not deal with.  By the time she arrived he and his wife had put on another pouch.  He called again in the middle of the night last night, and they tell me that she instructed them to leave the barrier on and come to his appt today.    He is accompanied by his wife today.      Patient Active Problem List   Diagnosis     Diabetes mellitus, type 2 (H)     CAD (coronary artery disease)     ACP (advance care planning)     Hyperlipidemia LDL goal <100     Elevated prostate specific antigen (PSA)     HTN (hypertension)     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     H/O migraine     Visual aura     Cataract     S/P colon resection     Hypokalemia       Past Medical History   Diagnosis Date     Allergic state      CAD (coronary artery disease)      CABG 2013, stent x 1 in 2014     Cataract      Cataracts, bilateral      Elevated PSA      Gastro-oesophageal reflux disease      Heart murmur      Hyperlipidemia      Hypertension      Pacemaker      Stented coronary artery      Visual aura        Past Surgical History   Procedure Laterality Date     Flex sig (medicare pt.)       Hernia repair       Cardiac surgery       Ent surgery       Laparoscopic herniorrhaphy inguinal Right 9/18/2015     Procedure: LAPAROSCOPIC HERNIORRHAPHY INGUINAL;  Surgeon: Solitraio Nettles, DO;  Location: [facility]     Tonsillectomy       Prostate biopsy, needle, saturation sampling  2009     Retinopexy-pvd with retinal tear Right 2008     Colectomy low anterior N/A 2/17/2017      Procedure: COLECTOMY LOW ANTERIOR;  Surgeon: Tima Moreland MD;  Location: [facility] OR     Colonoscopy N/A 2/17/2017     Procedure: COLONOSCOPY;  Surgeon: Tima Moreland MD;  Location: [facility]       Family History   Problem Relation Age of Onset     DIABETES Mother      C.A.D. Mother      CANCER Maternal Grandmother        Social History   Substance Use Topics     Smoking status: Former Smoker     Smokeless tobacco: Former User     Types: Chew     Quit date: 10/19/2013      Comment: quit in 2000     Alcohol use No       Current Outpatient Prescriptions   Medication Sig Dispense Refill     blood glucose monitoring (MARIAN MICROLET) lancets Use to test blood sugar 2 times weekly 100 Box 6     atorvastatin (LIPITOR) 40 MG tablet Take 40 mg by mouth daily       NONFORMULARY OTC eye drops as directed       polyethylene glycol 0.4%- propylene glycol 0.3% (SYSTANE ULTRA) 0.4-0.3 % SOLN ophthalmic solution Place 1 drop into both eyes every hour as needed for dry eyes       LISINOPRIL PO Take 40 mg by mouth daily        FINASTERIDE PO Take 5 mg by mouth daily       RANITIDINE HCL PO Take 50 mg by mouth 2 times daily        blood glucose (MARIAN CONTOUR NEXT) test strip Use to test blood sugar 2 times weekly or as directed. 100 strip 6     nitroglycerin (NITROSTAT) 0.4 MG SL tablet Place 1 tablet under the tongue every 5 minutes as needed       Multiple Vitamin (MULTIVITAMINS PO) Take 1 capsule by mouth daily       glucose blood VI test strips (ACCU-CHEK SMARTVIEW) strip Test blood sugar before breakfast one day, before and after supper the next and not at all the third day 1 Box 12     SOFTCLIX LANCETS MISC 1 Units daily Use fresh lancet each day to check blood sugars 100 each 4     Lancets Misc. (ACCU-CHEK SOFTCLIX LANCET DEV) KIT 1 Units daily 1 kit 2     METOPROLOL TARTRATE PO Take 100 mg by mouth daily        ASPIRIN PO Take 325 mg by mouth daily        Cholecalciferol (VITAMIN D3 PO) Take  by mouth daily.         Allergies  "  Allergen Reactions     Acetaminophen      Ciprofloxacin Hives     No Clinical Screening - See Comments      Antibiotic allergy, pt not sure which       REVIEW OF SYSTEMS  Skin: area near stoma is open  Eyes: cataracts, glasses  Ears/Nose/Throat: negative  Respiratory: No shortness of breath, dyspnea on exertion, cough, or hemoptysis  Cardiovascular: pacemaker and defibrillator  Gastrointestinal: colostomy, diverticulosis  Genitourinary: elevated prostate specific antigen level  Musculoskeletal: arthritis and joint pain  Neurologic: migraine headaches  Psychiatric: anxiety  Hematologic/Lymphatic/Immunologic: negative  Endocrine: diabetes    OBJECTIVE:    B/P: 114/71, T: 97.1, P: 76, R: Data Unavailable, W: 0 lbs 0 oz, BMI: There is no height or weight on file to calculate BMI.  Constitutional: healthy, alert and no distress  Head: Normocephalic. No masses, lesions, tenderness or abnormalities  Cardiovascular: RRR. No murmurs, clicks gallops or rub  Respiratory:  Good diaphragmatic excursion. Lungs clear  Gastrointestinal: Abdomen soft, non-tender. BS normal. No masses, organomegaly  : Deferred  Musculoskeletal: extremities normal- no gross deformities noted, gait normal and normal muscle tone  Skin: Ostomy documentation:  Type of ostomy- colostomy  Location- LLQ abd  Drainage- soft brown  Stoma presentation- oval, close to skin level at 9:00, raised approx 1/4-1/2\" at 3:00  Stoma size (measurements for pouching)- not measured, new template made  Peristomal skin condition- Mucocutaneous junction separation noted at 6:00.  Previous necrotic tissue appears to have resolved.  At 9:00 the are is moist with pink granulation buds noted, appears to be healing.  Pouching system on arrival- Sondra two piece  Discharge pouching system- [employer] Sensura Dawit wafer #[id number] (barely fits), and closed end pouches #[id number]  Misc products used- none (may need a ring)    Neurologic: Gait normal.  Sensation grossly " "WNL.  Psychiatric: mentation appears normal and affect normal/bright      ASSESSMENT / PLAN:  We will try the closed end pouches and the pouch/barrier system that sticks on with adhesive, vs the type that \"clicks\" on.  We feel he may do better with this.  He should have samples arriving soon, and I sent him home with a few pouches and one more wafer.  He was attentive during the visit and used a mirror to closely watch where I placed his wafer.  He will stop and purchase a curved scissors today.   They had a difficult time cutting out the template on Friday night.  I gave them each a piece of paper and an oval tracing and asked them to cut it out.  They both felt they did better with the curved scissors.    He and his wife verbalize understanding of the system and were attentive to teaching.  I have called and left a report for his home care RN Sarahi.  I would like Home care to visit on Wednesday.  I have also made a new template that pouches around the stoma a bit more, to encompass the moist areas.  His wafer was leaking underneath and definitely needed changing.    He may need a barrier ring in the future.  He will follow up in a week, or if he is not doing well this week, he will call and we will try to work him in sometime on Friday.     Jackie Horne CNP, CWOCN  Urology and Wound Care  March 6, 2017          "

## 2017-03-06 NOTE — MR AVS SNAPSHOT
After Visit Summary   3/6/2017    Peter Zhao    MRN: [id number]           Patient Information     Date Of Birth          1945        Visit Information        Provider Department      3/6/2017 10:00 AM Jackie Horne NP [facility]        Care Instructions    We are trying a new pouch and wafer for you.  We sent you with some closed end pouches.  When you have a stool, wait until it is done and then gently peel off the pouch and throw it away.    Please change the wafer again on Wednesday.  I sent a new pattern for you, I cut it a little bigger to go around the healing area.  If you are having troubles this week, let us know so we can fit you in on Friday if necessary.  Buy a new pair of scissors at [facility].        Follow-ups after your visit        Your next 10 appointments already scheduled     Mar 14, 2017  1:00 PM CDT   (Arrive by 12:45 PM)   Return Visit with Tima Moreland MD   [facility] ([facility])    [address] [place] [address] [facility] [address]   [phone]              Who to contact     If you have questions or need follow up information about today's clinic visit or your schedule please contact [facility] directly at [phone].  Normal or non-critical lab and imaging results will be communicated to you by MyChart, letter or phone within 4 business days after the clinic has received the results. If you do not hear from us within 7 days, please contact the clinic through MyChart or phone. If you have a critical or abnormal lab result, we will notify you by phone as soon as possible.  Submit refill requests through [employer] or call your pharmacy and they will forward the refill request to us. Please allow 3 business days for your refill to be completed.          Additional Information About Your Visit        [employer] Information     [employer] lets you send messages to your doctor, view your test results, renew your prescriptions, schedule  "appointments and more. To sign up, go to [email or web address] . Click on \"Log in\" on the left side of the screen, which will take you to the Welcome page. Then click on \"Sign up Now\" on the right side of the page.     You will be asked to enter the access code listed below, as well as some personal information. Please follow the directions to create your username and password.     Your access code is: [email or web address]  Expires: 2017  7:08 AM     Your access code will  in 90 days. If you need help or a new code, please call your [facility] clinic or [phone].        Care EveryWhere ID     This is your Care EveryWhere ID. This could be used by other organizations to access your [facility] medical records  [id number]        Your Vitals Were     Pulse Temperature                76 97.1  F (36.2  C)           Blood Pressure from Last 3 Encounters:   17 114/71   17 111/77   17 136/84    Weight from Last 3 Encounters:   17 175 lb 8 oz (79.6 kg)   17 174 lb 2.6 oz (79 kg)   02/15/17 177 lb (80.3 kg)              Today, you had the following     No orders found for display       Primary Care Provider Office Phone # Fax #    Brock Vega [phone]       [facility] [address] [address]        Thank you!     Thank you for choosing [facility]  for your care. Our goal is always to provide you with excellent care. Hearing back from our patients is one way we can continue to improve our services. Please take a few minutes to complete the written survey that you may receive in the mail after your visit with us. Thank you!             Your Updated Medication List - Protect others around you: Learn how to safely use, store and throw away your medicines at www.disposemymeds.org.          This list is accurate as of: 3/6/17 11:00 AM.  Always use your most recent med list.                   Brand Name Dispense Instructions for use    " ASPIRIN PO      Take 325 mg by mouth daily       atorvastatin 40 MG tablet    LIPITOR     Take 40 mg by mouth daily       blood glucose lancing device     1 kit    1 Units daily       * blood glucose monitoring lancets     100 each    1 Units daily Use fresh lancet each day to check blood sugars       * blood glucose monitoring lancets     100 Box    Use to test blood sugar 2 times weekly       * blood glucose monitoring test strip    ACCU-CHEK SMARTVIEW    1 Box    Test blood sugar before breakfast one day, before and after supper the next and not at all the third day       * blood glucose monitoring test strip    MARIAN CONTOUR NEXT    100 strip    Use to test blood sugar 2 times weekly or as directed.       FINASTERIDE PO      Take 5 mg by mouth daily       LISINOPRIL PO      Take 40 mg by mouth daily       METOPROLOL TARTRATE PO      Take 100 mg by mouth daily       MULTIVITAMINS PO      Take 1 capsule by mouth daily       NITROSTAT 0.4 MG sublingual tablet   Generic drug:  nitroglycerin      Place 1 tablet under the tongue every 5 minutes as needed       NONFORMULARY      OTC eye drops as directed       polyethylene glycol 0.4%- propylene glycol 0.3% 0.4-0.3 % Soln ophthalmic solution    SYSTANE ULTRA     Place 1 drop into both eyes every hour as needed for dry eyes       RANITIDINE HCL PO      Take 50 mg by mouth 2 times daily       VITAMIN D3 PO      Take  by mouth daily.       * Notice:  This list has 4 medication(s) that are the same as other medications prescribed for you. Read the directions carefully, and ask your doctor or other care provider to review them with you.

## 2017-03-06 NOTE — PATIENT INSTRUCTIONS
We are trying a new pouch and wafer for you.  We sent you with some closed end pouches.  When you have a stool, wait until it is done and then gently peel off the pouch and throw it away.    Please change the wafer again on Wednesday.  I sent a new pattern for you, I cut it a little bigger to go around the healing area.  If you are having troubles this week, let us know so we can fit you in on Friday if necessary.  Buy a new pair of scissors at [facility].

## 2017-03-07 ENCOUNTER — HOSPITAL ENCOUNTER (OUTPATIENT)
Dept: WOUND CARE | Facility: HOSPITAL | Age: 72
Discharge: HOME | End: 2017-03-07
Attending: NURSE PRACTITIONER | Admitting: FAMILY MEDICINE
Payer: MEDICARE

## 2017-03-07 VITALS — HEART RATE: 76 BPM | DIASTOLIC BLOOD PRESSURE: 76 MMHG | TEMPERATURE: 97 F | SYSTOLIC BLOOD PRESSURE: 115 MMHG

## 2017-03-07 DIAGNOSIS — Z43.3 COLOSTOMY CARE (H): Primary | ICD-10-CM

## 2017-03-07 PROCEDURE — 99215 OFFICE O/P EST HI 40 MIN: CPT

## 2017-03-07 NOTE — PROGRESS NOTES
"Peter Zhao, 1945  Chief Complaint   Patient presents with     Ostomy   Ostomy wear time issues    Patient Active Problem List   Diagnosis     Diabetes mellitus, type 2 (H)     CAD (coronary artery disease)     ACP (advance care planning)     Hyperlipidemia LDL goal <100     Elevated prostate specific antigen (PSA)     HTN (hypertension)     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     H/O migraine     Visual aura     Cataract     S/P colon resection     Hypokalemia       Concerns/Comments since last visits: Has continued to have wear time issues. Stool is collecting around the stoma and leaking under the barrier.  He first notice smell and slight burning approx 5 hours after his visit here with HSAUN Horne yesterday.  It became worse and at approx 8 pm he called the wound care nurse and she came to assist with a full pouch change.  He was afraid to do it himself because he has limited supplies while we are trying to figure out what pouching system works best.  The samples from [employer] and Sondra have not arrived.  He is quite anxious that he will start having skin issues.  Roddy's wife was present for approx 1/2 the appointment as she had a conflicting appt. He is trying to be independent with pouch changes with her standby assist.  Today he wanted to do most of the procedure by himself.    He eventually would like to consider a closed end pouch for easy disposal but for now would like the 2 piece switch out system.  He was using a toothbrush to clean out the pouch causing it to \"pucker\".    Objective:   Soft brown stool in closed end pouch - most of it in top 1/3 of pouch.  The stool had migrated under the barrier from 3:00 - 9:00.  Pt correctly removed the pouch from the barrier and the barrier from his skin.  His stoma is red and moist; the necrotic area near 6:00 is nearly resolved.  The stoma measures approx 1 1/2 x 1 3/8 in - it becoming closer to round in shape.  On " the 9:00 side there is a moist shallow separation that has several hypergranular buds.  There are also hypergranular buds at 2:00 and 5:00.  His periwound skin is intact - there is some pinkness to the skin where the adhesive strip is located on the barrier.      When patient is standing, he has some firmness around the top and lateral edge of the stoma.  There is a lower area around 8:00.  The stomal opening is pointing downward at 7;00.  Procedures:   Pouch change done after cleansing his skin with clear water.  See plan of care.  Hypergranular buds cauterized with silver nitrate.I cut the barrier for the patient so he would have a good template.  He placed the pouch with some standby assistance to get the opening centralized over the stoma.  He used his finger to help secure the barrier and applied the pouch himself.      Assessments  Continued wear time issues with hypergranular bud formation.  May need barrier ring.  Due to firmness around the top area around the stoma and softness with a slight depression below and almost flush stoma pt may benefit from convexity in the future  His ability to do a pouch change is slowly improving.    Plan of care:   Today we used a [place] flat cut to fit [employer] barrier 72734 with pouch 26078. - template provided to patient.  It was cut to fit over the separation.  Barrier ring (Adapt 7805) to barrier  Hydrofiber to separation at 9:00  Secure appropriately by using index finger around the stoma for one minute and warming with his hand for 5 min  Trial of lubrication deodarant to make emptying and cleaning easier  Use belt - instructions for use discussed.  Recommended trying vinegar for cleaning - do not use hot water or toothbrush  HomeCare to do pouch change Thursday  Return on Friday to allow for problem solving prior to weekend.  Appt with Dr Moreland next Tuesday.    Supplies provided:  Rx to [employer] per pt choice of supplier for barriers, pouchs,  "barrier ring and belt    Education:  Ostomy care instructions/education provided.    Use clear water only for cleansing    Lift abd fold prior to placing barrier to smooth surface    Use of hydrofiber    How to secure barrier    Ideas for cleaning pouch for \"swap out system\"  . Patient verbalized understanding of instruction/education however he does have trouble staying focused.  Today he was much better at staying on task.  Is capable of doing most of the pouch change with standby assist.  [facility]    Nurse face to face time: 90 min    CC: Brock Vega                  "

## 2017-03-07 NOTE — DISCHARGE INSTRUCTIONS
Pouch/Barrier change instructions      Assemble supplies - scissors; gauze or toilet paper, washcloth, pouch, barrier, barrier ring, belt  Cut barrier using template from 3/7/17    Remove backing from the barrier and put barrier ring onto barrier stretching it to fit the size of the hole you cut for the stoma    Gently remove pouch, remove any stool from stoma with gauze or toilet paper, and cleanse with clear water - try not to use any other products on your skin    Put a piece of the white felt-like material on the area near the stoma that you were shown.    Center the barrier over your stoma and apply; remove tape from edges    Using your finger; apply pressure in a circular motion around the stoma to help it adhere for about 1 minute    Attach pouch to barrier and close    Put your hand over your barrier to allow it to warm up to help it adhere better.     Put your belt on     supplies that were ordered from [employer]

## 2017-03-07 NOTE — IP AVS SNAPSHOT
MRN:[id number]                      After Visit Summary   3/7/2017    Peter Zhao    MRN: [id number]           Visit Information        Provider Department      3/7/2017 10:30 AM [facility]           Review of your medicines      UNREVIEWED medicines. Ask your doctor about these medicines        Dose / Directions    ASPIRIN PO        Dose:  325 mg   Take 325 mg by mouth daily   Refills:  0       atorvastatin 40 MG tablet   Commonly known as:  LIPITOR        Dose:  40 mg   Take 40 mg by mouth daily   Refills:  0       FINASTERIDE PO        Dose:  5 mg   Take 5 mg by mouth daily   Refills:  0       LISINOPRIL PO        Dose:  40 mg   Take 40 mg by mouth daily   Refills:  0       METOPROLOL TARTRATE PO        Dose:  100 mg   Take 100 mg by mouth daily   Refills:  0       MULTIVITAMINS PO        Dose:  1 capsule   Take 1 capsule by mouth daily   Refills:  0       NITROSTAT 0.4 MG sublingual tablet   Generic drug:  nitroglycerin        Dose:  1 tablet   Place 1 tablet under the tongue every 5 minutes as needed   Refills:  0       NONFORMULARY        OTC eye drops as directed   Refills:  0       polyethylene glycol 0.4%- propylene glycol 0.3% 0.4-0.3 % Soln ophthalmic solution   Commonly known as:  SYSTANE ULTRA        Dose:  1 drop   Place 1 drop into both eyes every hour as needed for dry eyes   Refills:  0       RANITIDINE HCL PO        Dose:  50 mg   Take 50 mg by mouth 2 times daily   Refills:  0       VITAMIN D3 PO        Take  by mouth daily.   Refills:  0         START taking        Dose / Directions    order for DME   Used for:  Colostomy care (H)        Equipment being ordered: Ostomy supplies - Barrier - currently using [address] [place] flat cut to fit; Pouch 38014 clear; Adapt barrier rings 6455; [place] ostomy belt.  Fill per insurance guidelines   Quantity:  1 Package   Refills:  11         CONTINUE these medicines which have NOT CHANGED        Dose / Directions     blood glucose lancing device   Used for:  T2DM (type 2 diabetes mellitus) (H)        Dose:  1 Units   1 Units daily   Quantity:  1 kit   Refills:  2       * blood glucose monitoring lancets   Used for:  T2DM (type 2 diabetes mellitus) (H)        Dose:  1 Units   1 Units daily Use fresh lancet each day to check blood sugars   Quantity:  100 each   Refills:  4       * blood glucose monitoring lancets   Used for:  Type 2 diabetes mellitus without complication, without long-term current use of insulin (H)        Use to test blood sugar 2 times weekly   Quantity:  100 Box   Refills:  6       * blood glucose monitoring test strip   Commonly known as:  ACCU-CHEK SMARTVIEW   Used for:  T2DM (type 2 diabetes mellitus) (H)        Test blood sugar before breakfast one day, before and after supper the next and not at all the third day   Quantity:  1 Box   Refills:  12       * blood glucose monitoring test strip   Commonly known as:  MARIAN CONTOUR NEXT   Used for:  Diabetes mellitus, type 2 (H)        Use to test blood sugar 2 times weekly or as directed.   Quantity:  100 strip   Refills:  6       * Notice:  This list has 4 medication(s) that are the same as other medications prescribed for you. Read the directions carefully, and ask your doctor or other care provider to review them with you.         Where to get your medicines      These medications were sent to [facility]  [address] [place] [address], [address], [facility] [address]     Phone:  [phone]     order for DME               Prescriptions were sent or printed at these locations (1 Prescription)                   [facility]   [address], [address], [place] [address]    Telephone:  [phone]   Fax:  [phone]   Hours:                  Faxed (1 of 1)         order for DME                 Protect others around you: Learn how to safely use, store and throw away your medicines at www.disposemymeds.org.          "Follow-ups after your visit        Your next 10 appointments already scheduled     Mar 10, 2017 11:00 AM CST   Return Visit with [facility]   [facility] Wound Ostomy ([facility] )    [address]  [facility] [id number] [phone]            Mar 14, 2017  1:00 PM CDT   (Arrive by 12:45 PM)   Return Visit with Tmia Moreland MD   [facility] ([facility])    [address]  [facility] [address]   [phone]               Care Instructions        Further instructions from your care team       Pouch/Barrier change instructions      Assemble supplies - scissors; gauze or toilet paper, washcloth, pouch, barrier, barrier ring, belt  Cut barrier using template from 3/7/17    Remove backing from the barrier and put barrier ring onto barrier stretching it to fit the size of the hole you cut for the stoma    Gently remove pouch, remove any stool from stoma with gauze or toilet paper, and cleanse with clear water - try not to use any other products on your skin    Put a piece of the white felt-like material on the area near the stoma that you were shown.    Center the barrier over your stoma and apply; remove tape from edges    Using your finger; apply pressure in a circular motion around the stoma to help it adhere for about 1 minute    Attach pouch to barrier and close    Put your hand over your barrier to allow it to warm up to help it adhere better.     Put your belt on     supplies that were ordered from [facility] [email or web address]       Additional Information About Your Visit        [employer] Information     [employer] lets you send messages to your doctor, view your test results, renew your prescriptions, schedule appointments and more. To sign up, go to [email or web address] . Click on \"Log in\" on the left side of the screen, which will take you to the Welcome page. Then click on \"Sign up Now\" on the right side of the page.     You will be asked to enter the access code listed " below, as well as some personal information. Please follow the directions to create your username and password.     Your access code is: [email or web address]  Expires: 2017  7:08 AM     Your access code will  in 90 days. If you need help or a new code, please call your [facility] clinic or [phone].        Care EveryWhere ID     This is your Care EveryWhere ID. This could be used by other organizations to access your [facility] medical records  [id number]        Your Vitals Were     Blood Pressure Pulse Temperature             115/76 (BP Location: Left arm) 76 97  F (36.1  C) (Tympanic)          Primary Care Provider Office Phone # Fax #    Brock Vega [phone]      Thank you!     Thank you for choosing [facility] for your care. Our goal is always to provide you with excellent care. Hearing back from our patients is one way we can continue to improve our services. Please take a few minutes to complete the written survey that you may receive in the mail after you visit with us. Thank you!             Medication List: This is a list of all your medications and when to take them. Check marks below indicate your daily home schedule. Keep this list as a reference.      Medications           Morning Afternoon Evening Bedtime As Needed    ASPIRIN PO   Take 325 mg by mouth daily                                atorvastatin 40 MG tablet   Commonly known as:  LIPITOR   Take 40 mg by mouth daily                                blood glucose lancing device   1 Units daily                                * blood glucose monitoring lancets   1 Units daily Use fresh lancet each day to check blood sugars                                * blood glucose monitoring lancets   Use to test blood sugar 2 times weekly                                * blood glucose monitoring test strip   Commonly known as:  ACCU-CHEK SMARTVIEW   Test blood sugar before breakfast one day, before and after supper the next and not at  all the third day                                * blood glucose monitoring test strip   Commonly known as:  Jdguanjia CONTOUR NEXT   Use to test blood sugar 2 times weekly or as directed.                                FINASTERIDE PO   Take 5 mg by mouth daily                                LISINOPRIL PO   Take 40 mg by mouth daily                                METOPROLOL TARTRATE PO   Take 100 mg by mouth daily                                MULTIVITAMINS PO   Take 1 capsule by mouth daily                                NITROSTAT 0.4 MG sublingual tablet   Place 1 tablet under the tongue every 5 minutes as needed   Generic drug:  nitroglycerin                                NONFORMULARY   OTC eye drops as directed                                order for DME   Equipment being ordered: Ostomy supplies - Barrier - currently using [address] [place] flat cut to fit; Pouch 32269 clear; Adapt barrier rings 7805; [place] ostomy belt.  Fill per insurance guidelines                                polyethylene glycol 0.4%- propylene glycol 0.3% 0.4-0.3 % Soln ophthalmic solution   Commonly known as:  SYSTANE ULTRA   Place 1 drop into both eyes every hour as needed for dry eyes                                RANITIDINE HCL PO   Take 50 mg by mouth 2 times daily                                VITAMIN D3 PO   Take  by mouth daily.                                * Notice:  This list has 4 medication(s) that are the same as other medications prescribed for you. Read the directions carefully, and ask your doctor or other care provider to review them with you.

## 2017-03-08 ENCOUNTER — TELEPHONE (OUTPATIENT)
Dept: WOUND CARE | Facility: HOSPITAL | Age: 72
End: 2017-03-08

## 2017-03-08 NOTE — TELEPHONE ENCOUNTER
Pt called to report the pouching system applied yesterday is still intact but he has some questions.  He has had to empty his pouch 5 times in the past 24 hours.  Discussed that the frequency of stools may continue to decrease.  If that doesn't happen, we could possibly try Metamucil to see if more bulk in his stool will decrease frequency.  It sounds like the tape barrier has loosen in 2 places.  He used regular tape to secure the edges.  He thinks the barrier is still securely in place although it sounds like the hydrofiber came out.  He doesn't have any burning at this time except near the bottom tape edge.  There is stool on his barrier that happen when he exchanged pouches - recommended he use a damp washcloth to gently remove the stool.  Stool is continuing to accumulate near the top of the pouch near his stoma which makes exchanging the pouches more difficult for him.  He did not think the lubricating deodorant helped at all.  Pt does not want to empty pouch at bottom at this time so I encouraged him to just clean the stool around his stoma with gauze or toilet paper when he exchanges pouches.  HomeCare will see him tomorrow, and he will see us on Friday for problem solving.

## 2017-03-10 ENCOUNTER — HOSPITAL ENCOUNTER (OUTPATIENT)
Dept: WOUND CARE | Facility: HOSPITAL | Age: 72
Discharge: HOME | End: 2017-03-10
Attending: FAMILY MEDICINE | Admitting: FAMILY MEDICINE
Payer: MEDICARE

## 2017-03-10 VITALS — TEMPERATURE: 97.7 F | DIASTOLIC BLOOD PRESSURE: 74 MMHG | SYSTOLIC BLOOD PRESSURE: 112 MMHG | HEART RATE: 73 BPM

## 2017-03-10 PROCEDURE — 99215 OFFICE O/P EST HI 40 MIN: CPT

## 2017-03-10 NOTE — PROGRESS NOTES
Peter Zhao, 1945  Chief Complaint   Patient presents with     Ostomy       Patient Active Problem List   Diagnosis     Diabetes mellitus, type 2 (H)     CAD (coronary artery disease)     ACP (advance care planning)     Hyperlipidemia LDL goal <100     Elevated prostate specific antigen (PSA)     HTN (hypertension)     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     H/O migraine     Visual aura     Cataract     S/P colon resection     Hypokalemia       Concerns/Comments since last visits: Here for follow up and teaching on ostomy care since patient has been experiencing wear time issues.  The pouch/barrier that was placed on Tuesday was still intact yesterday when it was changed by home care.  Pt said it hadn't leaked and he likes this pouching system the best so far.  Has been doing the swap out system with his pouches and it sounds like he has several rinsed out ready to go.  Reports having difficulty centering pouch over stoma while standing looking in mirror, but his wife is helping with this    Objective:   Small amount of soft brown stool in  pouch. No leakage noted although the 8:00 side is slightly damp and that is where patient has a crease. His stoma is red and moist; the necrotic area near 6:00 has fully resolved. The stoma measures approx 1 3/8 x slightly larger than 1/1/4 in.  On the 9:00 side there is a moist shallow separation that has several hypergranular buds. There are also hypergranular buds at 2:00 and 5:00. His periwound skin is intact.    Procedures:   Silver nitrate to hypergranular buds  Pouch change per plan of care    Assessments  Wear time is improving  Pt is more confident about his ability to do full pouch and barrier change however, he is a bit obsessed about cleaning his pouches and exchanging them    Plan of care:   Use the same products - Heath flat cut to fit Ceraplus barrier 02600 with pouch 89208. -new  template provided to patient. It was  cut to fit over the separation.  Barrier ring (Adapt 1989) to barrier  Discontinue hydrofiber  Use small piece of stoma paste strip at 8:00 to help fill crease - it is difficult for patient to see the crease so I told him approx where to place it.  Secure appropriately by using index finger around the stoma for one minute and warming with his hand for 5 min  Use belt - instructions for use discussed.  Recommended trying vinegar for cleaning   May try Sondra closed end pouches over the weekend to see how he likes them  HomeCare to do pouch change Monday  Return on 3/16/17 to allow for problem solving prior to weekend.  Appt with Dr Moreland next Tuesday    Supplies provided:  N/a - has supplies at home + samples from [employer] and Sondra    Education:Ostomy care instructions/education provided. Needs some reminders on securing barrier by using his index finger around the stoma and warming the barrier with his hand.  He might try a hair dryer. Patient verbalized understanding of instruction/education.    Nurse face to face time: 65 min    CC: Brock Vega

## 2017-03-14 ENCOUNTER — OFFICE VISIT (OUTPATIENT)
Dept: SURGERY | Facility: OTHER | Age: 72
End: 2017-03-14
Attending: SURGERY
Payer: MEDICARE

## 2017-03-14 VITALS
OXYGEN SATURATION: 95 % | HEART RATE: 72 BPM | WEIGHT: 173 LBS | BODY MASS INDEX: 24.82 KG/M2 | TEMPERATURE: 96.6 F | DIASTOLIC BLOOD PRESSURE: 70 MMHG | SYSTOLIC BLOOD PRESSURE: 128 MMHG

## 2017-03-14 DIAGNOSIS — K56.699 STRICTURE OF SIGMOID COLON (H): Primary | ICD-10-CM

## 2017-03-14 PROCEDURE — 99024 POSTOP FOLLOW-UP VISIT: CPT | Performed by: SURGERY

## 2017-03-14 PROCEDURE — 99213 OFFICE O/P EST LOW 20 MIN: CPT

## 2017-03-14 ASSESSMENT — PAIN SCALES - GENERAL: PAINLEVEL: NO PAIN (0)

## 2017-03-14 NOTE — NURSING NOTE
"Chief Complaint   Patient presents with     Wound Check     Ostomy       Initial /70 (BP Location: Right arm, Cuff Size: Adult Large)  Pulse 72  Temp 96.6  F (35.9  C) (Tympanic)  Wt 173 lb (78.5 kg)  SpO2 95%  BMI 24.82 kg/m2 Estimated body mass index is 24.82 kg/(m^2) as calculated from the following:    Height as of 3/1/17: 5' 10\" (1.778 m).    Weight as of this encounter: 173 lb (78.5 kg).  Medication Reconciliation: complete   Isabel Cardona LPN    "

## 2017-03-14 NOTE — PROGRESS NOTES
CLINIC PROGRESS NOTE       DATE OF VISIT:  03/14/2017      Mr. 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  He had presented approximately a month ago with a large bowel obstruction secondary to the stricture.  He ended up undergoing a laparotomy.  The sigmoid colon was resected.  He did end up with an end colostomy because of the degree of distention of the colon.  Post-procedure he did develop some rather significant confusion but this did clear prior to discharge.  Since being home, he states he has been doing well.  He denies any abdominal pain.  He has been eating basically normally.  Colostomy has been working well.  He does look after the colostomy by himself.  His wife really does not have anything to do with it.  He states he still feels a bit fatigued.  This is not surprising at this time.      On examination today, he looks well.  He is well oriented.  His abdomen is soft.  The incision has healed nicely.  There is no evidence of a fascial defect.  There are no masses.  The colostomy appears quite healthy and pink.  No evidence of a stomal hernia that I can identify.      He is doing quite well.  I have asked him to avoid lifting or straining for another 3-4 weeks.  He can then return to normal activities.  I also stated he can eat whatever he feels like though is aware that this may result in some changes in the consistency of his stool.  I would suspect it will take him another 2-3 months to fully recover.      We did discuss closing the colostomy.  I would suggest waiting to at least the 6-month alfred.  This would allow him to recover from the surgery and also for the adhesions to become stabilized.      He seems quite happy with things at this time.  I am going to see him again on my next visit to [place].  He will then continue to follow with Dr. Vega.         RUDY RAYO MD             D: 03/14/2017 14:39   T: 03/14/2017 15:13   MT: ANURAG       Name:     ROGER VINSON   MRN:      [id number]        Account:      [id number]   :      1945           Visit Date:   2017      Document: [id number]

## 2017-03-14 NOTE — MR AVS SNAPSHOT
"              After Visit Summary   3/14/2017    Peter Zhao    MRN: [id number]           Patient Information     Date Of Birth          1945        Visit Information        Provider Department      3/14/2017 1:00 PM Tima Moreland MD [facility]        Today's Diagnoses     Stricture of sigmoid colon (H)    -  1       Follow-ups after your visit        Your next 10 appointments already scheduled     Mar 16, 2017 11:00 AM CDT   Return Visit with HI WOUND CARE   HI Wound Ostomy ([facility] )    [address] [id number] [phone]            Apr 18, 2017  1:00 PM CDT   (Arrive by 12:45 PM)   Return Visit with Tima Moreland MD   [facility] ([facility])    [address] [place] [address] [facility] [address]   [phone]              Who to contact     If you have questions or need follow up information about today's clinic visit or your schedule please contact [facility] directly at [phone].  Normal or non-critical lab and imaging results will be communicated to you by [employer], letter or phone within 4 business days after the clinic has received the results. If you do not hear from us within 7 days, please contact the clinic through [employer] or phone. If you have a critical or abnormal lab result, we will notify you by phone as soon as possible.  Submit refill requests through [employer] or call your pharmacy and they will forward the refill request to us. Please allow 3 business days for your refill to be completed.          Additional Information About Your Visit        [employer] Information     [employer] lets you send messages to your doctor, view your test results, renew your prescriptions, schedule appointments and more. To sign up, go to [email or web address] . Click on \"Log in\" on the left side of the screen, which will take you to the Welcome page. Then click on \"Sign up Now\" on the right side of the page.     You will be asked to " enter the access code listed below, as well as some personal information. Please follow the directions to create your username and password.     Your access code is: [email or web address]  Expires: 2017  8:08 AM     Your access code will  in 90 days. If you need help or a new code, please call your [place] clinic or [phone].        Care EveryWhere ID     This is your Care EveryWhere ID. This could be used by other organizations to access your [place] medical records  [id number]        Your Vitals Were     Pulse Temperature Pulse Oximetry BMI (Body Mass Index)          72 96.6  F (35.9  C) (Tympanic) 95% 24.82 kg/m2         Blood Pressure from Last 3 Encounters:   17 128/70   03/10/17 112/74   17 115/76    Weight from Last 3 Encounters:   17 78.5 kg (173 lb)   17 79.6 kg (175 lb 8 oz)   17 79 kg (174 lb 2.6 oz)              Today, you had the following     No orders found for display       Primary Care Provider Office Phone # Fax #    Brock Vega [phone]       [facility] [address] [address]        Thank you!     Thank you for choosing [facility]  for your care. Our goal is always to provide you with excellent care. Hearing back from our patients is one way we can continue to improve our services. Please take a few minutes to complete the written survey that you may receive in the mail after your visit with us. Thank you!             Your Updated Medication List - Protect others around you: Learn how to safely use, store and throw away your medicines at www.disposemymeds.org.          This list is accurate as of: 3/14/17  2:40 PM.  Always use your most recent med list.                   Brand Name Dispense Instructions for use    ASPIRIN PO      Take 325 mg by mouth daily       atorvastatin 40 MG tablet    LIPITOR     Take 40 mg by mouth daily       blood glucose lancing device     1 kit    1 Units daily       *  blood glucose monitoring lancets     100 each    1 Units daily Use fresh lancet each day to check blood sugars       * blood glucose monitoring lancets     100 Box    Use to test blood sugar 2 times weekly       * blood glucose monitoring test strip    ACCU-CHEK SMARTVIEW    1 Box    Test blood sugar before breakfast one day, before and after supper the next and not at all the third day       * blood glucose monitoring test strip    MARIAN CONTOUR NEXT    100 strip    Use to test blood sugar 2 times weekly or as directed.       FINASTERIDE PO      Take 5 mg by mouth daily       LISINOPRIL PO      Take 40 mg by mouth daily       METOPROLOL TARTRATE PO      Take 100 mg by mouth daily       MULTIVITAMINS PO      Take 1 capsule by mouth daily       NITROSTAT 0.4 MG sublingual tablet   Generic drug:  nitroglycerin      Place 1 tablet under the tongue every 5 minutes as needed       NONFORMULARY      OTC eye drops as directed       order for DME     1 Package    Equipment being ordered: Ostomy supplies - Barrier - currently using [address] Sondra flat cut to fit; Pouch 30680 clear; Adapt barrier rings 7805; [email or web address] ostomy belt.  Fill per insurance guidelines       polyethylene glycol 0.4%- propylene glycol 0.3% 0.4-0.3 % Soln ophthalmic solution    SYSTANE ULTRA     Place 1 drop into both eyes every hour as needed for dry eyes       RANITIDINE HCL PO      Take 50 mg by mouth 2 times daily       VITAMIN D3 PO      Take  by mouth daily.       * Notice:  This list has 4 medication(s) that are the same as other medications prescribed for you. Read the directions carefully, and ask your doctor or other care provider to review them with you.

## 2017-03-16 ENCOUNTER — HOSPITAL ENCOUNTER (OUTPATIENT)
Dept: WOUND CARE | Facility: HOSPITAL | Age: 72
Discharge: HOME | End: 2017-03-16
Attending: SURGERY | Admitting: FAMILY MEDICINE
Payer: MEDICARE

## 2017-03-16 VITALS — TEMPERATURE: 97.6 F | SYSTOLIC BLOOD PRESSURE: 121 MMHG | HEART RATE: 74 BPM | DIASTOLIC BLOOD PRESSURE: 75 MMHG

## 2017-03-16 PROCEDURE — 99215 OFFICE O/P EST HI 40 MIN: CPT

## 2017-03-16 NOTE — PROGRESS NOTES
Peter Zhao, 1945  Chief Complaint   Patient presents with     Ostomy       Patient Active Problem List   Diagnosis     Diabetes mellitus, type 2 (H)     CAD (coronary artery disease)     ACP (advance care planning)     Hyperlipidemia LDL goal <100     Elevated prostate specific antigen (PSA)     HTN (hypertension)     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     H/O migraine     Visual aura     Cataract     S/P colon resection     Hypokalemia       Concerns/Comments since last visits: brought samples from both [employer] and Sondra with him.  He really wants to try the closed end pouch.  Explained that insurance only pays so much for ostomy supplies so he may have to pay out of pocket if he wants to use 2 closed end pouches per day + a drainable at night.  He is willing to do this.  Got pricing for him from [employer]:    Barrier 49891 - box of 10 is $40.30  Pouch 06400 (drainable) - box of 10 $46.30  Pouch 18193 (closed end) - box of 30 - $58.00    Current barrier/pouch was placed on Monday.  He thought he had a leak on Tuesday but didn't feel any burning so he left the pouching system on.  Is feeling more comfortable with pouch change.  Has some itching where the tape border is but it goes away.  He does not want to try a barrier without tape at this time.      Objective:   Stoma now measures approx 1 3/8 x 1 1/4 with hypergranular buds at 3:00, 1:00 and 4:00.  Stoma is red and moist and approx 1/4 - 1/2 inch high - it is lower at 3:00.  There was some slight leakage from 5:00 - 10:00.  Skin is intact but pink.      Procedures:   Silver nitrate to hypergranular buds to cauterize  Pouch change with barrier Sondra 80024 and closed end pouch 91890.  Cleansed peristomal skin with clear water.  Crusted stoma powder over pink area near stoma. Barrier ring around stoma.      Assessments  Getting longer wear times, but continues to have some leakage as well as  hypergranular buds.  More comfortable with doing pouch changes but still has some anxiety and needs reassurance    Plan of care:   Pt will try the closed end pouchs during the day and the drainable pouch at night.  He will contact me tomorrow so we can decided what needs to be ordered.  He currently has 5 Tyler 51898 barriers; 7 - 85280 closed end pouchs; and 6 08727 drainable pouches.      Turned barrier so tape goes vertically instead of horizontally to see if leakage improves  Placed Adapt barrier ring on skin instead of barrier  Will do crusting if experiences any skin irritation  Change pouch on Sunday  Return in 2 weeks unless he has any problems    Supplies provided:  n/a    Education:  Ostomy care instructions/education provided. Patient placed barrier ring and barrier - needed some help guiding the pouch to center over the stoma.  Pt also attached pouch to barrier.  Demonstrated crusting technique. Patient verbalized understanding of instruction/education.    Nurse face to face time: 70 min    CC: Brock Vega

## 2017-03-17 ENCOUNTER — TELEPHONE (OUTPATIENT)
Dept: WOUND CARE | Facility: HOSPITAL | Age: 72
End: 2017-03-17

## 2017-03-17 NOTE — TELEPHONE ENCOUNTER
Pt called to say he tried the closed end pouch once but had difficulty removing it because there was stool collected around the ring closure.  He does not want to order any at this time but does want to make sure that medical supply has enough barriers and drainable pouches if he runs out next week.  He currently has 7 of the [place] #60220 drainable pouches and 6 of the 60792 Sondra [employer] barriers 12652.  Contacted [facility] - they do not have them in stock but will order one box of each for back up for Roddy.

## 2017-03-21 ENCOUNTER — TELEPHONE (OUTPATIENT)
Dept: WOUND CARE | Facility: HOSPITAL | Age: 72
End: 2017-03-21

## 2017-03-21 NOTE — TELEPHONE ENCOUNTER
Pt called to discuss different pouches  - he does not like the closed end pouches as the stool collects around his stoma making it difficult to change.  [facility] came on Sunday to assist with his pouch change and do education.  He said his skin was in good condition and there wasn't any leakage.  He is getting more comfortable doing the pouch changes independently.  Will need to get supplies this week - he will  5 of the barriers and 10 of the 61057 pouch.  He asked about pouch 86811 because it has a filter but I explained that the filter doesn't work once it gets wet and since his is swapping out his pouches that is not a good product for him.  He agreed.  Home Care will be seeing him again this week.

## 2017-03-23 ENCOUNTER — TELEPHONE (OUTPATIENT)
Dept: WOUND CARE | Facility: HOSPITAL | Age: 72
End: 2017-03-23

## 2017-03-23 NOTE — TELEPHONE ENCOUNTER
Pt called - he is going to be discharged from home care tomorrow and is getting quite anxious.  I spoke with home care earlier about his ostomy supplies as they should be covering him while he is on their services.  Explained this to Roddy.  He is worried about getting an infection in his stoma - explained that would be rare.  He also has had some abd pain on the opposite side from his stoma away from his incision.  Recommended he call his PCP on that issue.  Discussed that we will follow him as long as we are assessing for complications and doing education.  Once he is stable, we cannot see him for routine care.  He sounds quite anxious about this but I did reassure him that if he is having issues we can see him again.

## 2017-03-26 ENCOUNTER — HOSPITAL ENCOUNTER (EMERGENCY)
Facility: HOSPITAL | Age: 72
Discharge: HOME | End: 2017-03-26
Attending: PHYSICIAN ASSISTANT | Admitting: PHYSICIAN ASSISTANT
Payer: MEDICARE

## 2017-03-26 VITALS
DIASTOLIC BLOOD PRESSURE: 97 MMHG | SYSTOLIC BLOOD PRESSURE: 171 MMHG | OXYGEN SATURATION: 97 % | RESPIRATION RATE: 18 BRPM | TEMPERATURE: 96.6 F

## 2017-03-26 DIAGNOSIS — I10 ESSENTIAL HYPERTENSION: ICD-10-CM

## 2017-03-26 PROCEDURE — 99212 OFFICE O/P EST SF 10 MIN: CPT

## 2017-03-26 PROCEDURE — 99212 OFFICE O/P EST SF 10 MIN: CPT | Performed by: PHYSICIAN ASSISTANT

## 2017-03-26 ASSESSMENT — ENCOUNTER SYMPTOMS
LIGHT-HEADEDNESS: 0
DIZZINESS: 0
HEADACHES: 0
AGITATION: 0
CONFUSION: 0
FEVER: 0
SHORTNESS OF BREATH: 0
FATIGUE: 0
NAUSEA: 0

## 2017-03-26 NOTE — ED AVS SNAPSHOT
[facility]    [address] [address]    Phone:  [phone]                                       Peter Zhao   MRN: [id number]    Department:  [facility]   Date of Visit:  3/26/2017           After Visit Summary Signature Page     I have received my discharge instructions, and my questions have been answered. I have discussed any challenges I see with this plan with the nurse or doctor.    ..........................................................................................................................................  Patient/Patient Representative Signature      ..........................................................................................................................................  Patient Representative Print Name and Relationship to Patient    ..................................................               ................................................  Date                                            Time    ..........................................................................................................................................  Reviewed by Signature/Title    ...................................................              ..............................................  Date                                                            Time

## 2017-03-26 NOTE — ED AVS SNAPSHOT
[facility]    [address] [address]    Phone:  [phone]                                       Peter Zhao   MRN: [id number]    Department:  [facility]   Date of Visit:  3/26/2017           Patient Information     Date Of Birth          1945        Your diagnoses for this visit were:     Essential hypertension        You were seen by Naima Cooney PA.      Follow-up Information     Follow up with Brock Vega MD.    Specialty:  Family Practice    Why:  If symptoms worsen    Contact information:    [facility]  [address] [address] [phone]          Follow up with [facility].    Specialty:  EMERGENCY MEDICINE    Why:  If further concerns develop    Contact information:    [address] [address] [phone]    Additional information:    From [facility]: Take [address]. Turn left at [address]. Turn left at the first stoplight on [address]. At the first stop sign, take a right onto [address]. Take a left into the parking lot and continue through until you reach the North enterance of the building.       From [place]: Take [address]. Take the [address] ramp towards [place]. Turn left onto [address]. Take a slight right onto [address]. Turn left at the first stoplight on [address]. At the first stop sign, take a right onto [address]. Take a left into the parking lot and continue through until you reach the North enterance of the building.       From [place]: Take [address]. Take a right at [address]. At the first stop sign, take a right onto [address]. Take a left into the parking lot and continue through until you reach the North enterance of the building.       Discharge References/Attachments     HIGH BLOOD PRESSURE, CONTROLLING (ENGLISH)      Future Appointments        Provider  Department Dept Phone Center    3/30/2017 11:00 AM Vaishali Harvey NP [facility] [place] [phone] [place]    4/18/2017 1:00 PM Tima Moreland MD [facility] [phone] [facility]         Review of your medicines      Our records show that you are taking the medicines listed below. If these are incorrect, please call your family doctor or clinic.        Dose / Directions Last dose taken    ASPIRIN PO   Dose:  325 mg        Take 325 mg by mouth daily   Refills:  0        atorvastatin 40 MG tablet   Commonly known as:  LIPITOR   Dose:  40 mg        Take 40 mg by mouth daily   Refills:  0        blood glucose lancing device   Dose:  1 Units   Quantity:  1 kit        1 Units daily   Refills:  2        * blood glucose monitoring lancets   Dose:  1 Units   Quantity:  100 each        1 Units daily Use fresh lancet each day to check blood sugars   Refills:  4        * blood glucose monitoring lancets   Quantity:  100 Box        Use to test blood sugar 2 times weekly   Refills:  6        * blood glucose monitoring test strip   Commonly known as:  ACCU-CHEK SMARTVIEW   Quantity:  1 Box        Test blood sugar before breakfast one day, before and after supper the next and not at all the third day   Refills:  12        * blood glucose monitoring test strip   Commonly known as:  MARIAN CONTOUR NEXT   Quantity:  100 strip        Use to test blood sugar 2 times weekly or as directed.   Refills:  6        FINASTERIDE PO   Dose:  5 mg        Take 5 mg by mouth daily   Refills:  0        LISINOPRIL PO   Dose:  40 mg        Take 40 mg by mouth daily   Refills:  0        METOPROLOL TARTRATE PO   Dose:  100 mg        Take 100 mg by mouth daily   Refills:  0        MULTIVITAMINS PO   Dose:  1 capsule        Take 1 capsule by mouth daily   Refills:  0        NITROSTAT 0.4 MG sublingual tablet   Dose:  1 tablet   Generic drug:  nitroglycerin        Place 1 tablet under the tongue every 5 minutes as needed  "  Refills:  0        NONFORMULARY        OTC eye drops as directed   Refills:  0        order for DME   Quantity:  1 Package        Equipment being ordered: Ostomy supplies - Barrier - currently using 69826 Sondra flat cut to fit; Pouch 07271 clear; Adapt barrier rings 7805; Sondra ostomy belt.  Fill per insurance guidelines   Refills:  11        polyethylene glycol 0.4%- propylene glycol 0.3% 0.4-0.3 % Soln ophthalmic solution   Commonly known as:  SYSTANE ULTRA   Dose:  1 drop        Place 1 drop into both eyes every hour as needed for dry eyes   Refills:  0        RANITIDINE HCL PO   Dose:  50 mg        Take 50 mg by mouth 2 times daily   Refills:  0        VITAMIN D3 PO        Take  by mouth daily.   Refills:  0        * Notice:  This list has 4 medication(s) that are the same as other medications prescribed for you. Read the directions carefully, and ask your doctor or other care provider to review them with you.            Orders Needing Specimen Collection     None      Pending Results     No orders found from 3/24/2017 to 3/27/2017.            Pending Culture Results     No orders found from 3/24/2017 to 3/27/2017.            Thank you for choosing [facility]       Thank you for choosing [facility] for your care. Our goal is always to provide you with excellent care. Hearing back from our patients is one way we can continue to improve our services. Please take a few minutes to complete the written survey that you may receive in the mail after you visit with us. Thank you!        [facility] Information     [employer] lets you send messages to your doctor, view your test results, renew your prescriptions, schedule appointments and more. To sign up, go to [email or web address] . Click on \"Log in\" on the left side of the screen, which will take you to the Welcome page. Then click on \"Sign up Now\" on the right side of the page.     You will be asked to enter the access code listed below, as well as some personal " information. Please follow the directions to create your username and password.     Your access code is: [email or web address]  Expires: 2017  8:08 AM     Your access code will  in 90 days. If you need help or a new code, please call your [facility] clinic or [phone].        Care EveryWhere ID     This is your Care EveryWhere ID. This could be used by other organizations to access your [facility] medical records  [id number]        After Visit Summary       This is your record. Keep this with you and show to your community pharmacist(s) and doctor(s) at your next visit.

## 2017-03-27 NOTE — ED NOTES
Patient came with elevated blood pressure. Stated his machine read 200/150. Patient seemed very concern over bp reading.

## 2017-03-27 NOTE — ED PROVIDER NOTES
History     Chief Complaint   Patient presents with     Hypertension     c/o monitored bp at home and notes up on his machine. bp 171/97 in triage     The history is provided by the patient. No  was used.     Peter Zhao is a 71 year old male who took his blood pressure earlier and it was approx 200/150. The pt was worried it was too high so he came in. Pt denies any headache, no new vision changes, no dizziness.  States he feels fine. No n/v. No fever    Allergies as of 03/26/2017 - Brock as Reviewed 03/26/2017   Allergen Reaction Noted     Acetaminophen  03/27/2013     Ciprofloxacin Hives 11/26/2013     No clinical screening - see comments  05/20/2013     Discharge Medication List as of 3/26/2017  9:00 PM      CONTINUE these medications which have NOT CHANGED    Details   order for DME Equipment being ordered: Ostomy supplies - Barrier - currently using [address]3764 [place] flat cut to fit; Pouch 84151 clear; Adapt barrier rings 7805; [email or web address] ostomy belt.  Fill per insurance guidelinesDisp-1 Package, R-11, Fax      atorvastatin (LIPITOR) 40 MG tablet Take 40 mg by mouth daily, Historical      FINASTERIDE PO Take 5 mg by mouth daily, Historical      RANITIDINE HCL PO Take 50 mg by mouth 2 times daily , Historical      Multiple Vitamin (MULTIVITAMINS PO) Take 1 capsule by mouth daily, Historical      Lancets Misc. (ACCU-CHEK SOFTCLIX LANCET DEV) KIT 1 Units daily, Disp-1 kit, R-2, Fax      METOPROLOL TARTRATE PO Take 100 mg by mouth daily , Historical      ASPIRIN PO Take 325 mg by mouth daily , Historical      Cholecalciferol (VITAMIN D3 PO) Take  by mouth daily., Historical      !! blood glucose monitoring (MARIAN MICROLET) lancets Use to test blood sugar 2 times weekly, Disp-100 Box, R-6, Fax      NONFORMULARY OTC eye drops as directed, Historical      polyethylene glycol 0.4%- propylene glycol 0.3% (SYSTANE ULTRA) 0.4-0.3 % SOLN ophthalmic solution Place 1 drop into both eyes every hour as  needed for dry eyes, Historical      LISINOPRIL PO Take 40 mg by mouth daily , Historical      !! blood glucose (LUIS CONTOUR NEXT) test strip Use to test blood sugar 2 times weekly or as directed., Disp-100 strip, R-6, E-PrescribePlease dispense Luis Contour EZ test strips      nitroglycerin (NITROSTAT) 0.4 MG SL tablet Place 1 tablet under the tongue every 5 minutes as needed, Historical      !! glucose blood VI test strips (ACCU-CHEK SMARTVIEW) strip Test blood sugar before breakfast one day, before and after supper the next and not at all the third day, Disp-1 Box, R-12, Fax      !! SOFTCLIX LANCETS MISC 1 Units daily Use fresh lancet each day to check blood sugars, Disp-100 each, R-4, Fax       !! - Potential duplicate medications found. Please discuss with provider.        Past Medical History:   Diagnosis Date     Allergic state      CAD (coronary artery disease)     CABG 2013, stent x 1 in 2014     Cataract      Cataracts, bilateral      Elevated PSA      Gastro-oesophageal reflux disease      Heart murmur      Hyperlipidemia      Hypertension      Pacemaker      Stented coronary artery      Visual aura      Past Surgical History:   Procedure Laterality Date     CARDIAC SURGERY       COLECTOMY LOW ANTERIOR N/A 2/17/2017    Procedure: COLECTOMY LOW ANTERIOR;  Surgeon: Tima Moreland MD;  Location: [facility]     COLONOSCOPY N/A 2/17/2017    Procedure: COLONOSCOPY;  Surgeon: Tima Moreland MD;  Location: [facility]     ENT SURGERY       FLEX SIG (MEDICARE PT.)       HERNIA REPAIR       LAPAROSCOPIC HERNIORRHAPHY INGUINAL Right 9/18/2015    Procedure: LAPAROSCOPIC HERNIORRHAPHY INGUINAL;  Surgeon: Solitario Nettles, DO;  Location: [facility]     PROSTATE BIOPSY, NEEDLE, SATURATION SAMPLING  2009     retinopexy-pvd with retinal tear Right 2008     TONSILLECTOMY       Family History   Problem Relation Age of Onset     DIABETES Mother      C.A.D. Mother      CANCER Maternal Grandmother      Social History     Social  "History     Marital status:      Spouse name: N/A     Number of children: N/A     Years of education: N/A     Social History Main Topics     Smoking status: Former Smoker     Smokeless tobacco: Former User     Types: Chew     Quit date: 10/19/2013      Comment: quit in 2000     Alcohol use No     Drug use: No     Sexual activity: Not Asked     Other Topics Concern     None     Social History Narrative    2/15:  \"Miguel\" lives with his wife and he has one child.  He is retired from [employer].           I have reviewed the Medications, Allergies, Past Medical and Surgical History, and Social History in the Epic system.    Review of Systems   Constitutional: Negative for fatigue and fever.   HENT: Negative.    Respiratory: Negative for shortness of breath.    Gastrointestinal: Negative for nausea.   Neurological: Negative for dizziness, light-headedness and headaches.   Psychiatric/Behavioral: Negative for agitation, behavioral problems and confusion.       Physical Exam   BP: 171/97  Heart Rate: 59  Temp: 96.6  F (35.9  C)  Resp: 18  SpO2: 97 %  Physical Exam   Constitutional: He is oriented to person, place, and time. He appears well-developed and well-nourished. No distress.   Cardiovascular: Normal rate, regular rhythm and normal heart sounds.    Pulmonary/Chest: Effort normal and breath sounds normal. No respiratory distress.   Neurological: He is alert and oriented to person, place, and time.   Skin: He is not diaphoretic.   Psychiatric: He has a normal mood and affect. His behavior is normal.   Nursing note and vitals reviewed.      ED Course     ED Course     Procedures          Assessments & Plan (with Medical Decision Making)     I have reviewed the nursing notes.    I have reviewed the findings, diagnosis, plan and need for follow up with the patient.    Final diagnoses:   Essential hypertension         I reassured the pt and his wife.  Take Morning BPs for your diary and only later in the day " if you have headaches/vision changes or dizziness, out of the normal for you.  If you have concerns f/u with your PCP or ED/UC. Bring BP diary.  Patient verbally educated and given appropriate education sheets for the diagnoses and has no questions.  Take current medications as directed.   Follow up with your Primary Care provider as already directed. Bring BP diary.    Naima Cooney Certified  Physician Assistant  3/26/2017  9:31 PM  URGENT CARE CLINIC      3/26/2017   [facility]     Niama Cooney PA  03/26/17 [id number]

## 2017-03-29 ENCOUNTER — TELEPHONE (OUTPATIENT)
Dept: WOUND CARE | Facility: HOSPITAL | Age: 72
End: 2017-03-29

## 2017-03-29 NOTE — TELEPHONE ENCOUNTER
"Pt called to say he thinks he has a \"little crater\" near his stoma and is wondering if he should be worried about it.  He has an appt tomorrow in the wound center for ostomy evaluation and education with YOVANY Nieves.  HomeCare is not longer involved.  He is also concerned about \"black spots\" that he thinks may be dried blood.  Stated - What if I get an infection; What if I need to change this in the middle of the night - do I come into the ED?  Also reported that last night he woke up with burning and itching under his barrier.  He did not change his pouch and it resolved on its own.  Pt continues to want to be independent with his ostomy cares but is extremely anxious.  Reinforced that he is able to do his own pouch changes and he does not need to come into the ED.  It appears that his lack of confidence is creating his anxiety.    Recommended that he change his system today if he has any burning.  Offered him an appointment this afternoon but he has decided to wait until his appt tomorrow.  "

## 2017-03-30 ENCOUNTER — OFFICE VISIT (OUTPATIENT)
Dept: WOUND CARE | Facility: OTHER | Age: 72
End: 2017-03-30
Attending: NURSE PRACTITIONER
Payer: MEDICARE

## 2017-03-30 VITALS
TEMPERATURE: 97.5 F | HEART RATE: 56 BPM | BODY MASS INDEX: 25.71 KG/M2 | DIASTOLIC BLOOD PRESSURE: 82 MMHG | OXYGEN SATURATION: 98 % | SYSTOLIC BLOOD PRESSURE: 146 MMHG | HEIGHT: 70 IN | WEIGHT: 179.6 LBS

## 2017-03-30 DIAGNOSIS — Z93.3 COLOSTOMY IN PLACE (H): Primary | ICD-10-CM

## 2017-03-30 PROCEDURE — 99212 OFFICE O/P EST SF 10 MIN: CPT

## 2017-03-30 PROCEDURE — 99213 OFFICE O/P EST LOW 20 MIN: CPT | Performed by: NURSE PRACTITIONER

## 2017-03-30 NOTE — MR AVS SNAPSHOT
After Visit Summary   3/30/2017    Peter Zhao    MRN: [id number]           Patient Information     Date Of Birth          1945        Visit Information        Provider Department      3/30/2017 11:00 AM Vaishali Harvey NP [facility]        Today's Diagnoses     Colostomy in place (H)    -  1      Care Instructions    Continue with the same plan of care.        Call with concerns.          Follow-ups after your visit        Follow-up notes from your care team     Return in about 1 week (around 4/6/2017), or if symptoms worsen or fail to improve.      Your next 10 appointments already scheduled     Apr 05, 2017  3:00 PM CDT   Return Visit with [facility]   [facility] ([facility] )    [address] [address] [phone]            Apr 18, 2017  1:00 PM CDT   (Arrive by 12:45 PM)   Return Visit with Tima Moreland MD   [facility] ([facility])    [address] [facility] [id number] [phone]            Apr 20, 2017   Procedure with Darin Gutierrez MD   [facility] Periop [facility] ([facility] )    [address] [id number] [phone]              Who to contact     If you have questions or need follow up information about today's clinic visit or your schedule please contact [facility] directly at [phone].  Normal or non-critical lab and imaging results will be communicated to you by MyChart, letter or phone within 4 business days after the clinic has received the results. If you do not hear from us within 7 days, please contact the clinic through MyChart or phone. If you have a critical or abnormal lab result, we will notify you by phone as soon as possible.  Submit refill requests through [employer] or call your pharmacy and they will forward the refill request to us. Please allow 3 business days for your refill to be completed.          Additional Information About Your  "Visit        HSTYLE Information     HSTYLE lets you send messages to your doctor, view your test results, renew your prescriptions, schedule appointments and more. To sign up, go to [email or web address] . Click on \"Log in\" on the left side of the screen, which will take you to the Welcome page. Then click on \"Sign up Now\" on the right side of the page.     You will be asked to enter the access code listed below, as well as some personal information. Please follow the directions to create your username and password.     Your access code is: [email or web address]  Expires: 2017  8:08 AM     Your access code will  in 90 days. If you need help or a new code, please call your [facility] clinic or [phone].        Care EveryWhere ID     This is your Care EveryWhere ID. This could be used by other organizations to access your [facility] medical records  [id number]        Your Vitals Were     Pulse Temperature Height Pulse Oximetry BMI (Body Mass Index)       56 97.5  F (36.4  C) 5' 10\" (1.778 m) 98% 25.77 kg/m2        Blood Pressure from Last 3 Encounters:   17 146/82   17 171/97   17 121/75    Weight from Last 3 Encounters:   17 179 lb 9.6 oz (81.5 kg)   17 173 lb (78.5 kg)   17 175 lb 8 oz (79.6 kg)              Today, you had the following     No orders found for display         Today's Medication Changes          These changes are accurate as of: 3/30/17 11:59 PM.  If you have any questions, ask your nurse or doctor.               Start taking these medicines.        Dose/Directions    Ostomy Supplies Misc   Used for:  Colostomy in place (H)        [place] m9 odor eliminator drops 7717 or 7715 - patient preference   Quantity:  1 each   Refills:  11            Where to get your medicines      Some of these will need a paper prescription and others can be bought over the counter.  Ask your nurse if you have questions.     Bring a paper prescription for each of these " medications     Ostomy Supplies Misc                Primary Care Provider Office Phone # Fax #    Brock Vega [phone]       [facility] [address] [address]        Thank you!     Thank you for choosing [facility]  for your care. Our goal is always to provide you with excellent care. Hearing back from our patients is one way we can continue to improve our services. Please take a few minutes to complete the written survey that you may receive in the mail after your visit with us. Thank you!             Your Updated Medication List - Protect others around you: Learn how to safely use, store and throw away your medicines at www.disposemymeds.org.          This list is accurate as of: 3/30/17 11:59 PM.  Always use your most recent med list.                   Brand Name Dispense Instructions for use    ASPIRIN PO      Take 325 mg by mouth daily       atorvastatin 40 MG tablet    LIPITOR     Take 40 mg by mouth daily       blood glucose lancing device     1 kit    1 Units daily       * blood glucose monitoring lancets     100 each    1 Units daily Use fresh lancet each day to check blood sugars       * blood glucose monitoring lancets     100 Box    Use to test blood sugar 2 times weekly       * blood glucose monitoring test strip    ACCU-CHEK SMARTVIEW    1 Box    Test blood sugar before breakfast one day, before and after supper the next and not at all the third day       * blood glucose monitoring test strip    MARIAN CONTOUR NEXT    100 strip    Use to test blood sugar 2 times weekly or as directed.       FINASTERIDE PO      Take 5 mg by mouth daily       LISINOPRIL PO      Take 40 mg by mouth daily       METOPROLOL TARTRATE PO      Take 100 mg by mouth daily       MULTIVITAMINS PO      Take 1 capsule by mouth daily       NITROSTAT 0.4 MG sublingual tablet   Generic drug:  nitroglycerin      Place 1 tablet under the tongue every 5 minutes as needed        NONFORMULARY      OTC eye drops as directed       order for DME     1 Package    Equipment being ordered: Ostomy supplies - Barrier - currently using [address] [place] flat cut to fit; Pouch 47217 clear; [employer] barrier rings 7805; Sondra ostomy belt.  Fill per insurance guidelines       Ostomy Supplies Misc     1 each    Sondra m9 odor eliminator drops 7717 or 7715 - patient preference       polyethylene glycol 0.4%- propylene glycol 0.3% 0.4-0.3 % Soln ophthalmic solution    SYSTANE ULTRA     Place 1 drop into both eyes every hour as needed for dry eyes       RANITIDINE HCL PO      Take 50 mg by mouth 2 times daily       VITAMIN D3 PO      Take  by mouth daily.       * Notice:  This list has 4 medication(s) that are the same as other medications prescribed for you. Read the directions carefully, and ask your doctor or other care provider to review them with you.

## 2017-03-30 NOTE — PROGRESS NOTES
"SUBJECTIVE:  Peter Zhao, 71 year old, male presents with the following Chief Complaint(s) with HPI to follow:  Chief Complaint   Patient presents with     Ostomy        Ostomy care    HPI:  Peter is here today for the reassessment and treatment of ostomy concerns.     Back story:   Peter had surgery 6 weeks ago by Dr. Moreland for a \"blockage/obstruction\".    He's suppose to have a takedown in 6 months.    Peter was first seen at the Wound/Ostomy clinic on 3/1/17 by Purvi BROWNE RN CWON for ostomy education and care.   He's been seen several times here for ostomy cares.  He's also has called several time with ostomy concerns.    Peter's here today as he has had some issues with leaking and has a hypergranular bud on his stoma.    He would like to transition into a closed end pouching system.    Denies any fevers or chills.    Denies any pain.   He is accompanied by his wife today.    Patient Active Problem List   Diagnosis     Diabetes mellitus, type 2 (H)     CAD (coronary artery disease)     ACP (advance care planning)     Hyperlipidemia LDL goal <100     Elevated prostate specific antigen (PSA)     HTN (hypertension)     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     H/O migraine     Visual aura     Cataract     S/P colon resection     Hypokalemia       Past Medical History:   Diagnosis Date     Allergic state      CAD (coronary artery disease)     CABG 2013, stent x 1 in 2014     Cataract      Cataracts, bilateral      Elevated PSA      Gastro-oesophageal reflux disease      Heart murmur      Hyperlipidemia      Hypertension      Pacemaker      Stented coronary artery      Visual aura        Past Surgical History:   Procedure Laterality Date     CARDIAC SURGERY       COLECTOMY LOW ANTERIOR N/A 2/17/2017    Procedure: COLECTOMY LOW ANTERIOR;  Surgeon: Tima Moreland MD;  Location: [facility]     COLONOSCOPY N/A 2/17/2017    Procedure: COLONOSCOPY;  Surgeon: Tima Moreland MD;  " Location: [facility] OR     ENT SURGERY       FLEX SIG (MEDICARE PT.)       HERNIA REPAIR       LAPAROSCOPIC HERNIORRHAPHY INGUINAL Right 9/18/2015    Procedure: LAPAROSCOPIC HERNIORRHAPHY INGUINAL;  Surgeon: Solitario Nettles DO;  Location: [facility] OR     PROSTATE BIOPSY, NEEDLE, SATURATION SAMPLING  2009     retinopexy-pvd with retinal tear Right 2008     TONSILLECTOMY         Family History   Problem Relation Age of Onset     DIABETES Mother      C.A.D. Mother      CANCER Maternal Grandmother        Social History   Substance Use Topics     Smoking status: Former Smoker     Smokeless tobacco: Former User     Types: Chew     Quit date: 10/19/2013      Comment: quit in 2000     Alcohol use No       Current Outpatient Prescriptions   Medication Sig Dispense Refill     Ostomy Supplies MISC Sondra m9 odor eliminator drops 7717 or 7715 - patient preference 1 each 11     order for DME Equipment being ordered: Ostomy supplies - Barrier - currently using [address] [place] flat cut to fit; Pouch 58804 clear; [employer] barrier rings 7805; Sondra ostomy belt.  Fill per insurance guidelines 1 Package 11     blood glucose monitoring (MARIAN MICROLET) lancets Use to test blood sugar 2 times weekly 100 Box 6     atorvastatin (LIPITOR) 40 MG tablet Take 40 mg by mouth daily       NONFORMULARY OTC eye drops as directed       polyethylene glycol 0.4%- propylene glycol 0.3% (SYSTANE ULTRA) 0.4-0.3 % SOLN ophthalmic solution Place 1 drop into both eyes every hour as needed for dry eyes       LISINOPRIL PO Take 40 mg by mouth daily        FINASTERIDE PO Take 5 mg by mouth daily       RANITIDINE HCL PO Take 50 mg by mouth 2 times daily        blood glucose (MARIAN CONTOUR NEXT) test strip Use to test blood sugar 2 times weekly or as directed. 100 strip 6     nitroglycerin (NITROSTAT) 0.4 MG SL tablet Place 1 tablet under the tongue every 5 minutes as needed       Multiple Vitamin (MULTIVITAMINS PO) Take 1 capsule by mouth daily       glucose blood  "VI test strips (ACCU-CHEK SMARTVIEW) strip Test blood sugar before breakfast one day, before and after supper the next and not at all the third day 1 Box 12     SOFTCLIX LANCETS MISC 1 Units daily Use fresh lancet each day to check blood sugars 100 each 4     Lancets Misc. (ACCU-CHEK SOFTCLIX LANCET DEV) KIT 1 Units daily 1 kit 2     METOPROLOL TARTRATE PO Take 100 mg by mouth daily        ASPIRIN PO Take 325 mg by mouth daily        Cholecalciferol (VITAMIN D3 PO) Take  by mouth daily.         Allergies   Allergen Reactions     Acetaminophen      Ciprofloxacin Hives     No Clinical Screening - See Comments      Antibiotic allergy, pt not sure which       REVIEW OF SYSTEMS  Skin: occasionally burning of peristomal skin  Eyes: history of cataracts, wears glasses  Ears/Nose/Throat: history of allergies; positive for Craig (left ear)  Respiratory: No shortness of breath, dyspnea on exertion, cough, or hemoptysis  Cardiovascular: history of pacemaker/defibrillator, bypass, and valve repair  Gastrointestinal: as mentioned above; history of diverticulosis  Genitourinary: negative  Musculoskeletal: history of arthritis and joint pain  Neurologic: negative  Psychiatric: positive for stress--\"tired of this sh*t\", no SI  Hematologic/Lymphatic/Immunologic: negative  Endocrine: history of diabetes    OBJECTIVE:  /82  Pulse 56  Temp 97.5  F (36.4  C)  Ht 5' 10\" (1.778 m)  Wt 179 lb 9.6 oz (81.5 kg)  SpO2 98%  BMI 25.77 kg/m2  Constitutional: healthy, alert and no distress  Cardiovascular: RRR. No murmurs, clicks gallops or rub  Respiratory: Good diaphragmatic excursion. Lungs clear  Gastrointestinal: Abdomen soft, non-tender. BS normal.   Skin: Ostomy documentation:  Stoma Type: colostomy  Location: LLQ (abd)  Mucosa: red, moist  Stoma Size: 1 3/8 in x 1 1/4 inch   Height: 1/2 inch  Mucocutaneous Juncture: appears intact  Peristomal Skin: intact skin  Output: soft brown  Deviations from normal: hypergranulated buds at " 9 o'clock     Pouching system upon arrival: Phoebe, 2 piece, flat with slim kwesi's    Pouching system applied: [place], 2 piece, flat with slime kwesi's    Psychiatric: mentation appears normal and affect normal/bright    Washed peristomal skin with warm water.    Noted skin's intact.    Silver nitrate to hypergranulated buds.    Made new template    LABS  Results for orders placed or performed during the hospital encounter of 02/17/17   CT Abdomen Pelvis w Contrast    Narrative    CT SCAN OF ABDOMEN AND PELVIS WITH IV CONTRAST    REPORT:  There is some increased density of both lung bases.  This may  represent atelectasis.  Early pneumonia cannot be excluded.  The liver  is free of masses or biliary ductal enlargement.  No calcified  gallstones are seen.  The spleen appears normal.  The pancreas is  normal.  There are no adrenal masses.  The right and left kidneys show  no evidence of hydronephrosis.  There are some small benign cysts seen  in the right kidney.  The periaortic lymph nodes are normal in  caliber.  There is mild aneurysmal dilatation of the distal abdominal  aorta, with maximal transverse diameter of 3.7 cm.  Intraperitoneally,  there is dilated colonic loops down to the sigmoid.  There is some  focal thickening in the sigmoid colon and the possibility of  malignancy exists.  The pelvic lymph nodes are normal in caliber.  The  bladder and rectum appear normal.  Prostatic calculi are seen.  No  destructive lesions are seen in the regional skeleton.    IMPRESSION:  COLONIC OBSTRUCTION AT THE SIGMOID LEVEL.  THE  POSSIBILITY OF A SIGMOID MALIGNANCY EXISTS.  Exam Date: Feb 17, 2017 02:21:56 AM  Author: ANA HERNADEZ  This report is final and signed     XR Chest Port 1 View    Narrative    CHEST SINGLE VIEW    REPORT:  There is a central catheter with its tip at the junction of  the superior vena cava and right atrium.  There is atelectatic changes  seen at the lung bases.  Postoperative changes are  seen in the  mediastinum. There is a transvenous pacemaker in place.    IMPRESSION:  BIBASILAR ATELECTASIS.    CENTRAL CATHETER WITH ITS TIP IN THE JUNCTION OF THE SUPERIOR VENA  CAVA AND RIGHT ATRIUM.  Exam Date: Feb 17, 2017 03:34:00 PM  Author: ANA HERNADEZ  This report is final and signed     CBC with platelets differential   Result Value Ref Range    WBC 15.7 (H) 4.0 - 11.0 10e9/L    RBC Count 5.90 4.4 - 5.9 10e12/L    Hemoglobin 17.3 13.3 - 17.7 g/dL    Hematocrit 50.7 40.0 - 53.0 %    MCV 86 78 - 100 fl    MCH 29.3 26.5 - 33.0 pg    MCHC 34.1 31.5 - 36.5 g/dL    RDW 13.7 10.0 - 15.0 %    Platelet Count 248 150 - 450 10e9/L    Diff Method Automated Method     % Neutrophils 83.7 %    % Lymphocytes 9.5 %    % Monocytes 5.7 %    % Eosinophils 0.3 %    % Basophils 0.4 %    % Immature Granulocytes 0.4 %    Nucleated RBCs 0 0 /100    Absolute Neutrophil 13.1 (H) 1.6 - 8.3 10e9/L    Absolute Lymphocytes 1.5 0.8 - 5.3 10e9/L    Absolute Monocytes 0.9 0.0 - 1.3 10e9/L    Absolute Eosinophils 0.1 0.0 - 0.7 10e9/L    Absolute Basophils 0.1 0.0 - 0.2 10e9/L    Abs Immature Granulocytes 0.1 0 - 0.4 10e9/L    Absolute Nucleated RBC 0.0    Comprehensive metabolic panel   Result Value Ref Range    Sodium 141 133 - 144 mmol/L    Potassium 4.0 3.4 - 5.3 mmol/L    Chloride 104 94 - 109 mmol/L    Carbon Dioxide 24 20 - 32 mmol/L    Anion Gap 13 3 - 14 mmol/L    Glucose 153 (H) 70 - 99 mg/dL    Urea Nitrogen 29 7 - 30 mg/dL    Creatinine 1.32 (H) 0.66 - 1.25 mg/dL    GFR Estimate 53 (L) >60 mL/min/1.7m2    GFR Estimate If Black 64 >60 mL/min/1.7m2    Calcium 8.7 8.5 - 10.1 mg/dL    Bilirubin Total 1.6 (H) 0.2 - 1.3 mg/dL    Albumin 3.9 3.4 - 5.0 g/dL    Protein Total 7.9 6.8 - 8.8 g/dL    Alkaline Phosphatase 98 40 - 150 U/L    ALT 32 0 - 70 U/L    AST 34 0 - 45 U/L   Lactic acid   Result Value Ref Range    Lactic Acid 1.7 0.4 - 2.0 mmol/L   Lipase   Result Value Ref Range    Lipase 131 73 - 393 U/L   Amylase   Result Value Ref  Range    Amylase 51 30 - 110 U/L   Troponin I   Result Value Ref Range    Troponin I ES  0.000 - 0.045 ug/L     <0.015  The 99th percentile for upper reference range is 0.045 ug/L.  Troponin values in   the range of 0.045 - 0.120 ug/L may be associated with risks of adverse   clinical events.     Surgical pathology exam   Result Value Ref Range    Copath Report       Patient Name: ROGER VINSON  MR#: [id number]  Specimen #:   Collected: 2/17/2017  Received: 2/20/2017  Reported: 2/21/2017 14:38  Ordering Phy(s): RUDY RAYO  Additional Phy(s): REGI COWAN    For improved result formatting, select 'View Enhanced Report Format'  under Linked Documents section.    SPECIMEN(S):  Colon, sigmoid    FINAL DIAGNOSIS:  Sigmoid colon, segmental resection  - Chronic diverticulitis  - Findings consistent with obstruction or partial obstruction    Electronically signed out by:    Bertin Nicolas M.D.    CLINICAL HISTORY:  Stricture sigmoid colon; sigmoid colectomy.    GROSS:  The specimen is a piece of bowel and mesentery.  One end of the bowel is  stapled.  The bowel is approximately 15 x 7 cm and with the attached  mesentery, is up to 8 cm.  There is a firm thicker area palpable in the  midportion of the bowel.  One end of the bowel, the stapled end, is  dilated compared to the open end.  Opening the bowel, the mucosa is  grossly unremarkable.  Karina roximately 6 cm has a firm thickened wall.  Longitudinally sectioning the bowel, there are numerous diverticula  which do not appear to be acutely inflamed.  Sectioning the mesentery,  there no focal lesions.  Summary of sections  Cassettes 1-2: Thickened area of bowel with diverticula  Cassette 3: Margins  Cassette 4: Mesentery  (4 in 4 blocks). (Dictated by: Bertin Nicolas MD 2/20/2017 05:21 PM)    INTRAOPERATIVE CONSULTATION:  Gross diagnosis-diverticulitis    MICROSCOPIC:  Microscopic sections show colon with numerous diverticula.  Some of them  have mild chronic  inflammation.  There is some adjacent fat necrosis and  fibrosis.  There are hemosiderin laden macrophages in these areas.  One  of the margins is unremarkable.  The other has some mucosal flattening  and is mildly inflamed.  Sections through the mesentery show a mildly  reactive lymph node.  It is otherwise unremarkable.    CPT Codes  A: 84302-PC7    TESTING LAB LOCATION:  [address] [place] [address]  [phone]    COLLECTION SITE:  Client: [facility]  Location: [facility] (B)     CBC with platelets   Result Value Ref Range    WBC 14.4 (H) 4.0 - 11.0 10e9/L    RBC Count 4.21 (L) 4.4 - 5.9 10e12/L    Hemoglobin 12.4 (L) 13.3 - 17.7 g/dL    Hematocrit 37.4 (L) 40.0 - 53.0 %    MCV 89 78 - 100 fl    MCH 29.5 26.5 - 33.0 pg    MCHC 33.2 31.5 - 36.5 g/dL    RDW 13.9 10.0 - 15.0 %    Platelet Count 168 150 - 450 10e9/L   Comprehensive metabolic panel   Result Value Ref Range    Sodium 147 (H) 133 - 144 mmol/L    Potassium 3.4 3.4 - 5.3 mmol/L    Chloride 114 (H) 94 - 109 mmol/L    Carbon Dioxide 24 20 - 32 mmol/L    Anion Gap 9 3 - 14 mmol/L    Glucose 130 (H) 70 - 99 mg/dL    Urea Nitrogen 23 7 - 30 mg/dL    Creatinine 1.35 (H) 0.66 - 1.25 mg/dL    GFR Estimate 52 (L) >60 mL/min/1.7m2    GFR Estimate If Black 63 >60 mL/min/1.7m2    Calcium 7.2 (L) 8.5 - 10.1 mg/dL    Bilirubin Total 0.9 0.2 - 1.3 mg/dL    Albumin 2.0 (L) 3.4 - 5.0 g/dL    Protein Total 4.4 (L) 6.8 - 8.8 g/dL    Alkaline Phosphatase 48 40 - 150 U/L    ALT 20 0 - 70 U/L    AST 36 0 - 45 U/L   Lactic acid level STAT   Result Value Ref Range    Lactic Acid 1.0 0.4 - 2.0 mmol/L   CBC with platelets   Result Value Ref Range    WBC 14.0 (H) 4.0 - 11.0 10e9/L    RBC Count 4.36 (L) 4.4 - 5.9 10e12/L    Hemoglobin 12.9 (L) 13.3 - 17.7 g/dL    Hematocrit 38.2 (L) 40.0 - 53.0 %    MCV 88 78 - 100 fl    MCH 29.6 26.5 - 33.0 pg    MCHC 33.8 31.5 - 36.5 g/dL    RDW 14.0 10.0 - 15.0 %    Platelet Count 166 150  - 450 10e9/L   Basic metabolic panel   Result Value Ref Range    Sodium 143 133 - 144 mmol/L    Potassium 3.5 3.4 - 5.3 mmol/L    Chloride 114 (H) 94 - 109 mmol/L    Carbon Dioxide 22 20 - 32 mmol/L    Anion Gap 7 3 - 14 mmol/L    Glucose 155 (H) 70 - 99 mg/dL    Urea Nitrogen 15 7 - 30 mg/dL    Creatinine 1.07 0.66 - 1.25 mg/dL    GFR Estimate 68 >60 mL/min/1.7m2    GFR Estimate If Black 82 >60 mL/min/1.7m2    Calcium 7.3 (L) 8.5 - 10.1 mg/dL   CBC with platelets differential   Result Value Ref Range    WBC 11.6 (H) 4.0 - 11.0 10e9/L    RBC Count 4.60 4.4 - 5.9 10e12/L    Hemoglobin 13.3 13.3 - 17.7 g/dL    Hematocrit 39.5 (L) 40.0 - 53.0 %    MCV 86 78 - 100 fl    MCH 28.9 26.5 - 33.0 pg    MCHC 33.7 31.5 - 36.5 g/dL    RDW 13.6 10.0 - 15.0 %    Platelet Count 183 150 - 450 10e9/L    Diff Method Automated Method     % Neutrophils 81.6 %    % Lymphocytes 7.4 %    % Monocytes 8.1 %    % Eosinophils 2.0 %    % Basophils 0.3 %    % Immature Granulocytes 0.6 %    Nucleated RBCs 0 0 /100    Absolute Neutrophil 9.4 (H) 1.6 - 8.3 10e9/L    Absolute Lymphocytes 0.9 0.8 - 5.3 10e9/L    Absolute Monocytes 0.9 0.0 - 1.3 10e9/L    Absolute Eosinophils 0.2 0.0 - 0.7 10e9/L    Absolute Basophils 0.0 0.0 - 0.2 10e9/L    Abs Immature Granulocytes 0.1 0 - 0.4 10e9/L    Absolute Nucleated RBC 0.0    Comprehensive metabolic panel   Result Value Ref Range    Sodium 142 133 - 144 mmol/L    Potassium 3.3 (L) 3.4 - 5.3 mmol/L    Chloride 110 (H) 94 - 109 mmol/L    Carbon Dioxide 22 20 - 32 mmol/L    Anion Gap 10 3 - 14 mmol/L    Glucose 133 (H) 70 - 99 mg/dL    Urea Nitrogen 9 7 - 30 mg/dL    Creatinine 0.93 0.66 - 1.25 mg/dL    GFR Estimate 80 >60 mL/min/1.7m2    GFR Estimate If Black >90   GFR Calc   >60 mL/min/1.7m2    Calcium 7.8 (L) 8.5 - 10.1 mg/dL    Bilirubin Total 1.3 0.2 - 1.3 mg/dL    Albumin 2.5 (L) 3.4 - 5.0 g/dL    Protein Total 5.8 (L) 6.8 - 8.8 g/dL    Alkaline Phosphatase 86 40 - 150 U/L    ALT 34 0 - 70  U/L    AST 45 0 - 45 U/L   CBC with platelets   Result Value Ref Range    WBC 11.6 (H) 4.0 - 11.0 10e9/L    RBC Count 4.96 4.4 - 5.9 10e12/L    Hemoglobin 14.6 13.3 - 17.7 g/dL    Hematocrit 41.8 40.0 - 53.0 %    MCV 84 78 - 100 fl    MCH 29.4 26.5 - 33.0 pg    MCHC 34.9 31.5 - 36.5 g/dL    RDW 13.7 10.0 - 15.0 %    Platelet Count 238 150 - 450 10e9/L   Comprehensive metabolic panel   Result Value Ref Range    Sodium 145 (H) 133 - 144 mmol/L    Potassium 3.3 (L) 3.4 - 5.3 mmol/L    Chloride 110 (H) 94 - 109 mmol/L    Carbon Dioxide 21 20 - 32 mmol/L    Anion Gap 14 3 - 14 mmol/L    Glucose 133 (H) 70 - 99 mg/dL    Urea Nitrogen 12 7 - 30 mg/dL    Creatinine 1.04 0.66 - 1.25 mg/dL    GFR Estimate 70 >60 mL/min/1.7m2    GFR Estimate If Black 85 >60 mL/min/1.7m2    Calcium 8.2 (L) 8.5 - 10.1 mg/dL    Bilirubin Total 1.6 (H) 0.2 - 1.3 mg/dL    Albumin 2.7 (L) 3.4 - 5.0 g/dL    Protein Total 6.2 (L) 6.8 - 8.8 g/dL    Alkaline Phosphatase 91 40 - 150 U/L    ALT 45 0 - 70 U/L    AST 48 (H) 0 - 45 U/L     *Note: Due to a large number of results and/or encounters for the requested time period, some results have not been displayed. A complete set of results can be found in Results Review.       ASSESSMENT / PLAN:  1. Colostomy in place (H)  Silver nitrate to buds.    Follow up as needed.        - Ostomy Supplies MISC; [place] m9 odor eliminator drops 7717 or 7715 - patient preference  Dispense: 1 each; Refill: 11      Patient Instructions   Continue with the same plan of care.        Call with concerns.          Time: 45 minutes  Barrier: none  Willingness to learn: accepting    Vaishali IRVIN [facility]  Disease Management    Cc: Dr. Vega

## 2017-03-31 ENCOUNTER — TELEPHONE (OUTPATIENT)
Dept: WOUND CARE | Facility: HOSPITAL | Age: 72
End: 2017-03-31

## 2017-03-31 DIAGNOSIS — Z93.3 COLOSTOMY IN PLACE (H): Primary | ICD-10-CM

## 2017-03-31 NOTE — TELEPHONE ENCOUNTER
Peter came in to  RX.Wanted to change pouches. Purvi put new order in and was fax to [facility] for Peter to .

## 2017-03-31 NOTE — TELEPHONE ENCOUNTER
I printed him one off that he can  here.  It doesn't include the closed end pouch.  I can put one in for him if he would like.  He can  the printed Rx next time he is in town and we are open.

## 2017-04-04 ENCOUNTER — TRANSFERRED RECORDS (OUTPATIENT)
Dept: HEALTH INFORMATION MANAGEMENT | Facility: HOSPITAL | Age: 72
End: 2017-04-04

## 2017-04-05 ENCOUNTER — HOSPITAL ENCOUNTER (OUTPATIENT)
Dept: WOUND CARE | Facility: HOSPITAL | Age: 72
Discharge: HOME | End: 2017-04-05
Attending: FAMILY MEDICINE | Admitting: FAMILY MEDICINE
Payer: MEDICARE

## 2017-04-05 VITALS — SYSTOLIC BLOOD PRESSURE: 150 MMHG | DIASTOLIC BLOOD PRESSURE: 78 MMHG | TEMPERATURE: 98.3 F

## 2017-04-05 PROCEDURE — 99214 OFFICE O/P EST MOD 30 MIN: CPT

## 2017-04-05 NOTE — PROGRESS NOTES
Peter Zhao, 1945  Chief Complaint   Patient presents with     Ostomy       Patient Active Problem List   Diagnosis     Diabetes mellitus, type 2 (H)     CAD (coronary artery disease)     ACP (advance care planning)     Hyperlipidemia LDL goal <100     Elevated prostate specific antigen (PSA)     HTN (hypertension)     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     H/O migraine     Visual aura     Cataract     S/P colon resection     Hypokalemia       Concerns/Comments since last visits: Has been changing pouch every 2 - 3 days when he feels some burning.  Has occasional pain near his stoma but this appears to come and go and is not related to his pouching.  Home Care has discharged patient and he is doing his own pouch changes    Arrived today with higher than normal BP which worries him because he has a headache.  Would like to see Dr Vega today.  We contacted [facility] and there aren't any openings with providers.  Dr Garcia recommends taking an extra Metoprolol.  Roddy does not want to melgoza this because his pulse gets low.  Recommended urgent care but he doesn't think they do anything there.  Repeated BP later in visit which improved.  See Vital Sign flowchart.    Objective:   Removed pouch - very slight wash out at 9:00.  2 hypergranular buds in this area.  The rest of his skin is intact with exception of small tape burn where top edge of tape from barrier lies.  Stoma is pink/red and approx 1 1/2 x 1 inc and approx 1/2 inch high.      Procedures:   Silver nitrate to 2 hypergranular buds  Pouch change with barrier 11657 Sondra [employer] flat cut to fit and 98731 pouch.  Wedge of [employer] barrier ring at 0900 with full barrier ring around stoma.    Assessments  Skin in excellent condition but continues to have hypergranular buds at 9:00 and some slight leakage there.  Pt independent with pouch changes with stand by assist from wife, but continues  to be anxious  Initial /92 with c/o headache; repeat 150/78    Plan of care:   Follow up with Dr Vega for high BP - has appt tomorrow  Continue changing pouch every 3 - 4 days or if any burning - add wedge of barrier ring to 9:00  No additional visits needed at this time unless the hypergranular buds reappear    Supplies provided:  Will be picking up supplies tomorrow - orders are in Epic    Education:  Ostomy care instructions/education provided on plan of care. Patient verbalized understanding of instruction/education.    Nurse face to face time: 50 min    CC: Brock Vega

## 2017-04-06 ENCOUNTER — TRANSFERRED RECORDS (OUTPATIENT)
Dept: HEALTH INFORMATION MANAGEMENT | Facility: HOSPITAL | Age: 72
End: 2017-04-06

## 2017-04-06 LAB
CREAT SERPL-MCNC: 1.24 MG/DL (ref 0.8–1.5)
GLUCOSE SERPL-MCNC: 95 MG/DL (ref 60–99)
INR PPP: 1.1 (ref 0.9–1.2)
POTASSIUM SERPL-SCNC: 4.2 MEQ/L (ref 3.5–5.1)

## 2017-04-11 ENCOUNTER — HOSPITAL ENCOUNTER (OUTPATIENT)
Dept: WOUND CARE | Facility: HOSPITAL | Age: 72
Discharge: HOME | End: 2017-04-11
Attending: FAMILY MEDICINE | Admitting: FAMILY MEDICINE
Payer: MEDICARE

## 2017-04-11 VITALS — HEART RATE: 58 BPM | SYSTOLIC BLOOD PRESSURE: 171 MMHG | DIASTOLIC BLOOD PRESSURE: 95 MMHG | TEMPERATURE: 97.7 F

## 2017-04-11 PROCEDURE — 99212 OFFICE O/P EST SF 10 MIN: CPT

## 2017-04-11 NOTE — PROGRESS NOTES
"Peter Zhao, 1945  Chief Complaint   Patient presents with     Ostomy     colostomy education       Patient Active Problem List   Diagnosis     Diabetes mellitus, type 2 (H)     CAD (coronary artery disease)     ACP (advance care planning)     Hyperlipidemia LDL goal <100     Elevated prostate specific antigen (PSA)     HTN (hypertension)     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     H/O migraine     Visual aura     Cataract     S/P colon resection     Hypokalemia       Concerns/Comments since last visits: is concerned about some bleeding around his stoma over the weekend and he thinks the hypergranular buds are larger.  Last pouch change was on Sunday.  He is using the closed end pouches during the day and likes that option.  Admits that he is \"a worrier\".  Wife Floridalma is present.    Objective:   Stoma pink, moist and healthy appearing.  There are 2 hypergranular buds at 9:00 that are smaller than last visit.  No leakage.  Skin is intact and in excellent condition.  Some pinkness near tape edge that pt says itches.  Small amount of soft brown stool in pouch with strong fecal odor.  Some abd bloating today    Procedures:   Pouch change and education  Hypergranular buds cauterized with silver nitrate.    Assessments  Skin is in excellent condition.  Hypergranular buds smaller but still present.    Plan of care:   Continue with same Sondra 2 piece flat cut to fit system, with [employer] barrier ring and a small pie shaped piece at 9:00 (patient did not use this on his last pouch change)  No need to return at this time unless he has skin issues or the hypergranular buds do not resolve  He is following up with a physician in [place] for his HTN (Dr Espinoza?)    Supplies provided:  New template to cut barrier    Education:  Ostomy care instructions/education provided. Patient verbalized understanding of instruction/education. Reinforced that patient is doing an excellent job and that " he doesn't need to worry so much.      Nurse face to face time: 25 min    CC: Brock Vega

## 2017-04-18 ENCOUNTER — OFFICE VISIT (OUTPATIENT)
Dept: SURGERY | Facility: OTHER | Age: 72
End: 2017-04-18
Attending: SURGERY
Payer: MEDICARE

## 2017-04-18 VITALS
HEART RATE: 64 BPM | HEIGHT: 70 IN | WEIGHT: 165 LBS | BODY MASS INDEX: 23.62 KG/M2 | TEMPERATURE: 96.1 F | DIASTOLIC BLOOD PRESSURE: 76 MMHG | SYSTOLIC BLOOD PRESSURE: 134 MMHG

## 2017-04-18 DIAGNOSIS — K56.699 STRICTURE OF SIGMOID COLON (H): Primary | ICD-10-CM

## 2017-04-18 PROCEDURE — 99024 POSTOP FOLLOW-UP VISIT: CPT | Performed by: SURGERY

## 2017-04-18 PROCEDURE — 99212 OFFICE O/P EST SF 10 MIN: CPT | Performed by: SURGERY

## 2017-04-18 ASSESSMENT — PAIN SCALES - GENERAL: PAINLEVEL: NO PAIN (0)

## 2017-04-18 NOTE — NURSING NOTE
"Chief Complaint   Patient presents with     Wound Check     ostomy       Initial /76  Pulse 64  Temp 96.1  F (35.6  C)  Ht 5' 10\" (1.778 m)  Wt 165 lb (74.8 kg)  BMI 23.68 kg/m2 Estimated body mass index is 23.68 kg/(m^2) as calculated from the following:    Height as of this encounter: 5' 10\" (1.778 m).    Weight as of this encounter: 165 lb (74.8 kg).  Medication Reconciliation: complete   Charlene Peters    "

## 2017-04-18 NOTE — MR AVS SNAPSHOT
"              After Visit Summary   4/18/2017    Peter Zhao    MRN: [id number]           Patient Information     Date Of Birth          1945        Visit Information        Provider Department      4/18/2017 1:00 PM Tima Moreland MD [facility]        Today's Diagnoses     Stricture of sigmoid colon (H)    -  1       Follow-ups after your visit        Your next 10 appointments already scheduled     Apr 20, 2017   Procedure with Darin Gutierrez MD   [facility] ([facility] )    [address] [place] [id number] [phone]            Jul 18, 2017  1:30 PM CDT   (Arrive by 1:15 PM)   Return Visit with Tima Moreland MD   [facility] ([facility])    [address]  [facility] [address]   [phone]              Who to contact     If you have questions or need follow up information about today's clinic visit or your schedule please contact [facility] directly at [phone].  Normal or non-critical lab and imaging results will be communicated to you by [email or web address], letter or phone within 4 business days after the clinic has received the results. If you do not hear from us within 7 days, please contact the clinic through [email or web address] or phone. If you have a critical or abnormal lab result, we will notify you by phone as soon as possible.  Submit refill requests through [employer] or call your pharmacy and they will forward the refill request to us. Please allow 3 business days for your refill to be completed.          Additional Information About Your Visit        [email or web address] Information     [employer] lets you send messages to your doctor, view your test results, renew your prescriptions, schedule appointments and more. To sign up, go to [email or web address] . Click on \"Log in\" on the left side of the screen, which will take you to the Welcome page. Then click on \"Sign up Now\" on the right side of the page.     You will be asked to enter " "the access code listed below, as well as some personal information. Please follow the directions to create your username and password.     Your access code is: [email or web address]  Expires: 2017  8:08 AM     Your access code will  in 90 days. If you need help or a new code, please call your [facility] clinic or [phone].        Care EveryWhere ID     This is your Care EveryWhere ID. This could be used by other organizations to access your [facility] medical records  [id number]        Your Vitals Were     Pulse Temperature Height BMI (Body Mass Index)          64 96.1  F (35.6  C) 5' 10\" (1.778 m) 23.68 kg/m2         Blood Pressure from Last 3 Encounters:   17 134/76   17 171/95   17 150/78    Weight from Last 3 Encounters:   17 165 lb (74.8 kg)   17 179 lb 9.6 oz (81.5 kg)   17 173 lb (78.5 kg)              Today, you had the following     No orders found for display       Primary Care Provider Office Phone # Fax #    Brock Vega [phone]       [facility] [address] [address]        Thank you!     Thank you for choosing [facility]  for your care. Our goal is always to provide you with excellent care. Hearing back from our patients is one way we can continue to improve our services. Please take a few minutes to complete the written survey that you may receive in the mail after your visit with us. Thank you!             Your Updated Medication List - Protect others around you: Learn how to safely use, store and throw away your medicines at www.disposemymeds.org.          This list is accurate as of: 17  6:13 PM.  Always use your most recent med list.                   Brand Name Dispense Instructions for use    ASPIRIN PO      Take 325 mg by mouth daily       atorvastatin 40 MG tablet    LIPITOR     Take 40 mg by mouth daily       blood glucose lancing device     1 kit    1 Units daily       * blood glucose " monitoring lancets     100 each    1 Units daily Use fresh lancet each day to check blood sugars       * blood glucose monitoring lancets     100 Box    Use to test blood sugar 2 times weekly       * blood glucose monitoring test strip    ACCU-CHEK SMARTVIEW    1 Box    Test blood sugar before breakfast one day, before and after supper the next and not at all the third day       * blood glucose monitoring test strip    MARIAN CONTOUR NEXT    100 strip    Use to test blood sugar 2 times weekly or as directed.       FINASTERIDE PO      Take 5 mg by mouth daily       LISINOPRIL PO      Take 40 mg by mouth daily       METOPROLOL TARTRATE PO      Take 100 mg by mouth daily       MULTIVITAMINS PO      Take 1 capsule by mouth daily       NITROSTAT 0.4 MG sublingual tablet   Generic drug:  nitroglycerin      Place 1 tablet under the tongue every 5 minutes as needed       NONFORMULARY      OTC eye drops as directed       order for DME     1 Package    Equipment being ordered: Ostomy supplies - Barrier - currently using [address]6484 [place] flat cut to fit; Pouch 06768 clear; [employer] barrier rings 7805; [place] ostomy belt.  Fill per insurance guidelines       * Ostomy Supplies Misc     1 each    [place] m9 odor eliminator drops 7717 or 7715 - patient preference       * Ostomy Supplies POUCH Misc     2 each    Sondra 10671 closed end pouches - per Medicare patient is allowed 60 per month.  Dispense 2 boxes or patient choice       polyethylene glycol 0.4%- propylene glycol 0.3% 0.4-0.3 % Soln ophthalmic solution    SYSTANE ULTRA     Place 1 drop into both eyes every hour as needed for dry eyes       RANITIDINE HCL PO      Take 50 mg by mouth 2 times daily       VITAMIN D3 PO      Take  by mouth daily.       * Notice:  This list has 6 medication(s) that are the same as other medications prescribed for you. Read the directions carefully, and ask your doctor or other care provider to review them with you.

## 2017-04-19 NOTE — PROGRESS NOTES
DATE OF SERVICE:  2017      Mr. Peter Zhao was seen today for followup of his sigmoid colectomy with end sigmoid colostomy carried out approximately 7 weeks ago.  This was done because of an obstructing sigmoid stricture which did turn out to be a benign stricture secondary to diverticular disease.  The patient was seen approximately a month ago.  He comes in for followup.  He continues to do quite well.  He really denies any abdominal pain.  He is eating normally.  He is not having any difficulties with the colostomy.  He is doing the bag changes.  He notes a bit of bleeding from the stoma when he wipes it, which I reassured him was quite normal.  He is starting to regain some of his energy.  Weight has stabilized.      PHYSICAL EXAMINATION:  On examination today, he looks quite well.  He is moving around well.     ABDOMEN:  His incision has healed nicely.  The abdomen was soft.  It was nontender.  There were no palpable masses.  There is no evidence of a hernia.  I did not take off his colostomy bag to view the stoma today.      He is doing quite well.  He has gotten his blood pressure under control on new medications.  Apparently he did have his metoprolol and lisinopril increased recently.  Blood pressure was good today.      We again talked briefly about colostomy closure.  I would like to wait until the 6-month alfred, which will take us to August.  I will see him in the clinic in July and will organize for the colostomy closure at that time and will go over the procedure.  He is aware that it is a big procedure.  In the meantime he has been instructed to return to normal activities.  I will see him again in the meantime if there are difficulties.         RUDY RAYO MD             D: 2017 18:11   T: 2017 04:53   MT: dayday      Name:     PETER ZHAO   MRN:      [id number]        Account:      [id number]   :      1945           Visit Date:   2017      Document: [id number]

## 2017-04-20 ENCOUNTER — ANESTHESIA (OUTPATIENT)
Dept: SURGERY | Facility: HOSPITAL | Age: 72
End: 2017-04-20
Payer: MEDICARE

## 2017-04-20 ENCOUNTER — HOSPITAL ENCOUNTER (OUTPATIENT)
Facility: HOSPITAL | Age: 72
Discharge: HOME | End: 2017-04-20
Attending: OPHTHALMOLOGY | Admitting: OPHTHALMOLOGY
Payer: MEDICARE

## 2017-04-20 ENCOUNTER — TRANSFERRED RECORDS (OUTPATIENT)
Dept: HEALTH INFORMATION MANAGEMENT | Facility: HOSPITAL | Age: 72
End: 2017-04-20

## 2017-04-20 ENCOUNTER — ANESTHESIA EVENT (OUTPATIENT)
Dept: SURGERY | Facility: HOSPITAL | Age: 72
End: 2017-04-20
Payer: MEDICARE

## 2017-04-20 VITALS
SYSTOLIC BLOOD PRESSURE: 147 MMHG | OXYGEN SATURATION: 96 % | BODY MASS INDEX: 24.62 KG/M2 | HEIGHT: 70 IN | WEIGHT: 172 LBS | RESPIRATION RATE: 16 BRPM | DIASTOLIC BLOOD PRESSURE: 82 MMHG | TEMPERATURE: 97.9 F

## 2017-04-20 PROCEDURE — 27210794 ZZH OR GENERAL SUPPLY STERILE: Performed by: OPHTHALMOLOGY

## 2017-04-20 PROCEDURE — 37000009 ZZH ANESTHESIA TECHNICAL FEE, EACH ADDTL 15 MIN: Performed by: OPHTHALMOLOGY

## 2017-04-20 PROCEDURE — 25000128 H RX IP 250 OP 636: Performed by: OPHTHALMOLOGY

## 2017-04-20 PROCEDURE — 25000132 ZZH RX MED GY IP 250 OP 250 PS 637: Performed by: OPHTHALMOLOGY

## 2017-04-20 PROCEDURE — V2632 POST CHMBR INTRAOCULAR LENS: HCPCS | Performed by: OPHTHALMOLOGY

## 2017-04-20 PROCEDURE — 25000125 ZZHC RX 250: Performed by: OPHTHALMOLOGY

## 2017-04-20 PROCEDURE — 36000056 ZZH SURGERY LEVEL 3 1ST 30 MIN: Performed by: OPHTHALMOLOGY

## 2017-04-20 PROCEDURE — 71000027 ZZH RECOVERY PHASE 2 EACH 15 MINS: Performed by: OPHTHALMOLOGY

## 2017-04-20 PROCEDURE — 40000305 ZZH STATISTIC PRE PROC ASSESS I: Performed by: OPHTHALMOLOGY

## 2017-04-20 PROCEDURE — C9447 INJ, PHENYLEPHRINE KETOROLAC: HCPCS | Performed by: OPHTHALMOLOGY

## 2017-04-20 PROCEDURE — 25000128 H RX IP 250 OP 636: Performed by: NURSE ANESTHETIST, CERTIFIED REGISTERED

## 2017-04-20 PROCEDURE — 66984 XCAPSL CTRC RMVL W/O ECP: CPT | Performed by: ANESTHESIOLOGY

## 2017-04-20 PROCEDURE — 66984 XCAPSL CTRC RMVL W/O ECP: CPT | Performed by: NURSE ANESTHETIST, CERTIFIED REGISTERED

## 2017-04-20 PROCEDURE — 25000132 ZZH RX MED GY IP 250 OP 250 PS 637: Mod: GY

## 2017-04-20 PROCEDURE — 36000058 ZZH SURGERY LEVEL 3 EA 15 ADDTL MIN: Performed by: OPHTHALMOLOGY

## 2017-04-20 PROCEDURE — 99100 ANES PT EXTEME AGE<1 YR&>70: CPT | Performed by: NURSE ANESTHETIST, CERTIFIED REGISTERED

## 2017-04-20 PROCEDURE — 37000008 ZZH ANESTHESIA TECHNICAL FEE, 1ST 30 MIN: Performed by: OPHTHALMOLOGY

## 2017-04-20 DEVICE — LENS-TECNIS PCB00 20.0 PRELOADED: Type: IMPLANTABLE DEVICE | Site: EYE | Status: FUNCTIONAL

## 2017-04-20 RX ORDER — TOBRAMYCIN 3 MG/ML
1 SOLUTION/ DROPS OPHTHALMIC
Status: DISCONTINUED | OUTPATIENT
Start: 2017-04-20 | End: 2017-04-20

## 2017-04-20 RX ORDER — LABETALOL HYDROCHLORIDE 5 MG/ML
10 INJECTION, SOLUTION INTRAVENOUS
Status: DISCONTINUED | OUTPATIENT
Start: 2017-04-20 | End: 2017-04-20 | Stop reason: HOSPADM

## 2017-04-20 RX ORDER — PROPARACAINE HYDROCHLORIDE 5 MG/ML
1 SOLUTION/ DROPS OPHTHALMIC ONCE
Status: COMPLETED | OUTPATIENT
Start: 2017-04-20 | End: 2017-04-20

## 2017-04-20 RX ORDER — PHENYLEPHRINE HYDROCHLORIDE 100 MG/ML
SOLUTION/ DROPS OPHTHALMIC
Status: COMPLETED
Start: 2017-04-20 | End: 2017-04-20

## 2017-04-20 RX ORDER — PHENYLEPHRINE HYDROCHLORIDE 100 MG/ML
1 SOLUTION/ DROPS OPHTHALMIC ONCE
Status: COMPLETED | OUTPATIENT
Start: 2017-04-20 | End: 2017-04-20

## 2017-04-20 RX ORDER — PHENYLEPHRINE HYDROCHLORIDE 25 MG/ML
1 SOLUTION/ DROPS OPHTHALMIC
Status: DISCONTINUED | OUTPATIENT
Start: 2017-04-20 | End: 2017-04-20

## 2017-04-20 RX ORDER — NALOXONE HYDROCHLORIDE 0.4 MG/ML
.1-.4 INJECTION, SOLUTION INTRAMUSCULAR; INTRAVENOUS; SUBCUTANEOUS
Status: DISCONTINUED | OUTPATIENT
Start: 2017-04-20 | End: 2017-04-20 | Stop reason: HOSPADM

## 2017-04-20 RX ORDER — TOBRAMYCIN 3 MG/ML
1 SOLUTION/ DROPS OPHTHALMIC ONCE
Status: COMPLETED | OUTPATIENT
Start: 2017-04-20 | End: 2017-04-20

## 2017-04-20 RX ORDER — FENTANYL CITRATE 50 UG/ML
25-50 INJECTION, SOLUTION INTRAMUSCULAR; INTRAVENOUS
Status: DISCONTINUED | OUTPATIENT
Start: 2017-04-20 | End: 2017-04-20 | Stop reason: HOSPADM

## 2017-04-20 RX ORDER — PREDNISOLONE ACETATE 10 MG/ML
SUSPENSION/ DROPS OPHTHALMIC PRN
Status: DISCONTINUED | OUTPATIENT
Start: 2017-04-20 | End: 2017-04-20 | Stop reason: HOSPADM

## 2017-04-20 RX ORDER — PHENYLEPHRINE HYDROCHLORIDE 25 MG/ML
1 SOLUTION/ DROPS OPHTHALMIC 2 TIMES DAILY
Status: DISCONTINUED | OUTPATIENT
Start: 2017-04-20 | End: 2017-04-20 | Stop reason: HOSPADM

## 2017-04-20 RX ORDER — PROPARACAINE HYDROCHLORIDE 5 MG/ML
1 SOLUTION/ DROPS OPHTHALMIC
Status: DISCONTINUED | OUTPATIENT
Start: 2017-04-20 | End: 2017-04-20

## 2017-04-20 RX ORDER — ONDANSETRON 2 MG/ML
4 INJECTION INTRAMUSCULAR; INTRAVENOUS EVERY 30 MIN PRN
Status: DISCONTINUED | OUTPATIENT
Start: 2017-04-20 | End: 2017-04-20 | Stop reason: HOSPADM

## 2017-04-20 RX ORDER — MEPERIDINE HYDROCHLORIDE 25 MG/ML
12.5 INJECTION INTRAMUSCULAR; INTRAVENOUS; SUBCUTANEOUS
Status: DISCONTINUED | OUTPATIENT
Start: 2017-04-20 | End: 2017-04-20 | Stop reason: HOSPADM

## 2017-04-20 RX ORDER — TOBRAMYCIN AND DEXAMETHASONE 3; 1 MG/ML; MG/ML
1 SUSPENSION/ DROPS OPHTHALMIC ONCE
Status: DISCONTINUED | OUTPATIENT
Start: 2017-04-20 | End: 2017-04-20 | Stop reason: CLARIF

## 2017-04-20 RX ORDER — CYCLOPENTOLATE HYDROCHLORIDE 10 MG/ML
1 SOLUTION/ DROPS OPHTHALMIC
Status: COMPLETED | OUTPATIENT
Start: 2017-04-20 | End: 2017-04-20

## 2017-04-20 RX ORDER — ALBUTEROL SULFATE 0.83 MG/ML
2.5 SOLUTION RESPIRATORY (INHALATION) EVERY 4 HOURS PRN
Status: DISCONTINUED | OUTPATIENT
Start: 2017-04-20 | End: 2017-04-20 | Stop reason: HOSPADM

## 2017-04-20 RX ORDER — HYDRALAZINE HYDROCHLORIDE 20 MG/ML
2.5-5 INJECTION INTRAMUSCULAR; INTRAVENOUS EVERY 10 MIN PRN
Status: DISCONTINUED | OUTPATIENT
Start: 2017-04-20 | End: 2017-04-20 | Stop reason: HOSPADM

## 2017-04-20 RX ORDER — CYCLOPENTOLATE HYDROCHLORIDE 10 MG/ML
1 SOLUTION/ DROPS OPHTHALMIC 2 TIMES DAILY
Status: DISCONTINUED | OUTPATIENT
Start: 2017-04-20 | End: 2017-04-20

## 2017-04-20 RX ORDER — DEXAMETHASONE SODIUM PHOSPHATE 4 MG/ML
4 INJECTION, SOLUTION INTRA-ARTICULAR; INTRALESIONAL; INTRAMUSCULAR; INTRAVENOUS; SOFT TISSUE EVERY 10 MIN PRN
Status: DISCONTINUED | OUTPATIENT
Start: 2017-04-20 | End: 2017-04-20 | Stop reason: HOSPADM

## 2017-04-20 RX ORDER — TOBRAMYCIN 3 MG/ML
SOLUTION/ DROPS OPHTHALMIC PRN
Status: DISCONTINUED | OUTPATIENT
Start: 2017-04-20 | End: 2017-04-20 | Stop reason: HOSPADM

## 2017-04-20 RX ORDER — ONDANSETRON 4 MG/1
4 TABLET, ORALLY DISINTEGRATING ORAL EVERY 30 MIN PRN
Status: DISCONTINUED | OUTPATIENT
Start: 2017-04-20 | End: 2017-04-20 | Stop reason: HOSPADM

## 2017-04-20 RX ORDER — PROMETHAZINE HYDROCHLORIDE 25 MG/ML
12.5 INJECTION, SOLUTION INTRAMUSCULAR; INTRAVENOUS
Status: DISCONTINUED | OUTPATIENT
Start: 2017-04-20 | End: 2017-04-20 | Stop reason: HOSPADM

## 2017-04-20 RX ORDER — TETRACAINE HYDROCHLORIDE 5 MG/ML
SOLUTION OPHTHALMIC PRN
Status: DISCONTINUED | OUTPATIENT
Start: 2017-04-20 | End: 2017-04-20 | Stop reason: HOSPADM

## 2017-04-20 RX ADMIN — PROPARACAINE HYDROCHLORIDE 1 DROP: 5 SOLUTION/ DROPS OPHTHALMIC at 09:12

## 2017-04-20 RX ADMIN — PHENYLEPHRINE HYDROCHLORIDE 1 DROP: 25 SOLUTION/ DROPS OPHTHALMIC at 09:13

## 2017-04-20 RX ADMIN — CYCLOPENTOLATE HYDROCHLORIDE 1 DROP: 10 SOLUTION/ DROPS OPHTHALMIC at 09:26

## 2017-04-20 RX ADMIN — LIDOCAINE HYDROCHLORIDE 0.5 ML: 20 JELLY TOPICAL at 09:54

## 2017-04-20 RX ADMIN — PHENYLEPHRINE HYDROCHLORIDE 1 DROP: 100 SOLUTION/ DROPS OPHTHALMIC at 09:44

## 2017-04-20 RX ADMIN — TOBRAMYCIN 1 DROP: 3 SOLUTION OPHTHALMIC at 09:23

## 2017-04-20 RX ADMIN — PHENYLEPHRINE HYDROCHLORIDE 1 DROP: 25 SOLUTION/ DROPS OPHTHALMIC at 09:25

## 2017-04-20 RX ADMIN — CYCLOPENTOLATE HYDROCHLORIDE 1 DROP: 10 SOLUTION/ DROPS OPHTHALMIC at 09:13

## 2017-04-20 RX ADMIN — MIDAZOLAM HYDROCHLORIDE 1 MG: 1 INJECTION, SOLUTION INTRAMUSCULAR; INTRAVENOUS at 10:24

## 2017-04-20 ASSESSMENT — LIFESTYLE VARIABLES: TOBACCO_USE: 1

## 2017-04-20 ASSESSMENT — ENCOUNTER SYMPTOMS: DYSRHYTHMIAS: 1

## 2017-04-20 NOTE — OR NURSING
Discharge instructions discussed with Pt and wife. Instructed Pt to take three sets of eye drops today, per Dr. Gutierrez. Pt did first round of drops in SDS. Eye patch in place when Pt escorted to [facility] exit.

## 2017-04-20 NOTE — IP AVS SNAPSHOT
[facility]    [address] [address]    Phone:  [phone]                                       After Visit Summary   4/20/2017    Peter Zhao    MRN: [id number]           After Visit Summary Signature Page     I have received my discharge instructions, and my questions have been answered. I have discussed any challenges I see with this plan with the nurse or doctor.    ..........................................................................................................................................  Patient/Patient Representative Signature      ..........................................................................................................................................  Patient Representative Print Name and Relationship to Patient    ..................................................               ................................................  Date                                            Time    ..........................................................................................................................................  Reviewed by Signature/Title    ...................................................              ..............................................  Date                                                            Time

## 2017-04-20 NOTE — BRIEF OP NOTE
[facility] Brief Operative Note    Pre-operative diagnosis: CATARACT RIGHT EYE   Post-operative diagnosis same   Procedure: Procedure(s):  CATARACT EXTRACTION RIGHT EYE WITH IMPLANT - Wound Class: I-Clean   Surgeon: Darin Gutierrez MD   Assistants(s): none   Estimated blood loss: None    Specimens: None   Findings: none

## 2017-04-20 NOTE — ANESTHESIA CARE TRANSFER NOTE
Patient: Peter NARANJO Pavel    Procedure(s):  CATARACT EXTRACTION RIGHT EYE WITH IMPLANT - Wound Class: I-Clean    Diagnosis: CATARACT RIGHT EYE  Diagnosis Additional Information: No value filed.    Anesthesia Type:   MAC     Note:  Airway :Room Air  Patient transferred to:Phase II        Vitals: (Last set prior to Anesthesia Care Transfer)    CRNA VITALS  4/20/2017 1022 - 4/20/2017 1057      4/20/2017             Resp Rate (set): 8                Electronically Signed By: ALBARO Johns CRNA  April 20, 2017  10:57 AM

## 2017-04-20 NOTE — IP AVS SNAPSHOT
MRN:[id number]                      After Visit Summary   4/20/2017    Peter Zhao    MRN: [id number]           Thank you!     Thank you for choosing [facility] for your care. Our goal is always to provide you with excellent care. Hearing back from our patients is one way we can continue to improve our services. Please take a few minutes to complete the written survey that you may receive in the mail after you visit with us. Thank you!        Patient Information     Date Of Birth          1945        About your hospital stay     You were admitted on:  April 20, 2017 You last received care in the:  [facility] Preop/Phase II    You were discharged on:  April 20, 2017       Who to Call     For medical emergencies, please call 911.  For non-urgent questions about your medical care, please call your primary care provider or clinic, [phone]  For questions related to your surgery, please call your surgery clinic        Attending Provider     Provider Darin Melton MD Ophthalmology       Primary Care Provider Office Phone # Fax #    Brock Vega [phone]       [facility] [address] [address]        Your next 10 appointments already scheduled     Jul 18, 2017  1:30 PM CDT   (Arrive by 1:15 PM)   Return Visit with Tima Moreland MD   [facility] ([facility])    [address] [place] [id number] [phone]              Further instructions from your care team           INSTRUCTIONS FOR IMMEDIATELY AFTER YOUR CATARACT SURGERY    POST OPERATIVE ANESTHESIA INSTRUCTIONS  YOU HAVE HAD GENERAL, LOCAL ANESTHESIA WITH SEDATION or SEDATION FOR A PROCEDURE.    For your safety please follow these directions:    1. Do not drink alcoholic beverages for 24 hours.  2. Do not drive or operate any power tools or appliances or attempt stairs for 24 hours.  3. Do not stay alone for 24 hours.  4. Rest at home.  It is not necessary  to go to bed; however, it is important to rest after anesthesia.  5. Observe the area surrounding your incision and IV site for:  Redness  Swelling  Drainage  Increased pain.  These are symptoms of infection and would usually not become apparent for 36 to 48 hours.  A fever over 100 F and/or chills are symptoms of infection also.  Please call your surgeon if any problems arise.    ? There is little to no post-operative pain following the procedure.  Some initial soreness may occur, however this is usually gone by the following morning.  Irritation, tearing, redness and increased sensitivity to light may be expected.  ? Wear dark glasses with ultraviolet protection when outdoors.  ? Go home and rest, keeping your operated eye closed as much as possible.  ? If you are uncomfortable, take 1 to 2 Tylenol, which can be repeated every 4 hours.  ? Do not bend, lift, wash hair or strain at bowel movement (use mild laxative as needed).  ? Avoid hard coughing, nose blowing & sneezing.  ? You MAY sleep on either side, read & watch television.  ? Following your procedure you will be sent home with sunglasses or an eye shield to be worn until removed by your doctor.  It is recommended that you wear the shield whenever sleeping during the day and for the next 4 nights following surgery as well.  ? Do not get anything in your eye(s) such as eye makeup, soap, or any particulate matter.  Avoid swimming pools and hot tubs for three (3) weeks.  ? Do not rub your eye or allow injury.            IN CASE OF EMERGENCY, REPORT TO THE EMERGENCY ROOM    Post-Anesthesia Patient Instructions    IMMEDIATELY FOLLOWING SURGERY:  Do not drive or operate machinery for the first twenty four hours after surgery.  Do not make any important decisions for twenty four hours after surgery or while taking narcotic pain medications or sedatives.  If you develop intractable nausea and vomiting or a severe headache please notify your doctor  "immediately.    FOLLOW-UP:  Please make an appointment with your surgeon as instructed. You do not need to follow up with anesthesia unless specifically instructed to do so.    WOUND CARE INSTRUCTIONS (if applicable):  Keep a dry clean dressing on the anesthesia/puncture wound site if there is drainage.  Once the wound has quit draining you may leave it open to air.  Generally you should leave the bandage intact for twenty four hours unless there is drainage.  If the epidural site drains for more than 36-48 hours please call the anesthesia department.    QUESTIONS?:  Please feel free to call your physician or the hospital  if you have any questions, and they will be happy to assist you.       Pending Results     No orders found from 2017 to 2017.            Admission Information     Date & Time Provider Department Dept. Phone    2017 Darin Gutierrez MD HI Preop/Phase [phone]      Your Vitals Were     Blood Pressure Temperature Respirations Height Weight Pulse Oximetry    151/98 97.8  F (36.6  C) (Oral) 16 1.778 m (5' 10\") 78 kg (172 lb) 98%    BMI (Body Mass Index)                   24.68 kg/m2           MyChart Information     [employer] lets you send messages to your doctor, view your test results, renew your prescriptions, schedule appointments and more. To sign up, go to [email or web address] . Click on \"Log in\" on the left side of the screen, which will take you to the Welcome page. Then click on \"Sign up Now\" on the right side of the page.     You will be asked to enter the access code listed below, as well as some personal information. Please follow the directions to create your username and password.     Your access code is: [email or web address]  Expires: 2017  8:08 AM     Your access code will  in 90 days. If you need help or a new code, please call your [facility] clinic or [phone].        Care EveryWhere ID     This is your Care EveryWhere ID. This could be used by " other organizations to access your [facility] medical records  [id number]           Review of your medicines      CONTINUE these medicines which have NOT CHANGED        Dose / Directions    ASPIRIN PO        Dose:  325 mg   Take 325 mg by mouth daily   Refills:  0       atorvastatin 40 MG tablet   Commonly known as:  LIPITOR        Dose:  40 mg   Take 40 mg by mouth daily   Refills:  0       blood glucose lancing device   Used for:  T2DM (type 2 diabetes mellitus) (H)        Dose:  1 Units   1 Units daily   Quantity:  1 kit   Refills:  2       * blood glucose monitoring lancets   Used for:  T2DM (type 2 diabetes mellitus) (H)        Dose:  1 Units   1 Units daily Use fresh lancet each day to check blood sugars   Quantity:  100 each   Refills:  4       * blood glucose monitoring lancets   Used for:  Type 2 diabetes mellitus without complication, without long-term current use of insulin (H)        Use to test blood sugar 2 times weekly   Quantity:  100 Box   Refills:  6       * blood glucose monitoring test strip   Commonly known as:  ACCU-CHEK SMARTVIEW   Used for:  T2DM (type 2 diabetes mellitus) (H)        Test blood sugar before breakfast one day, before and after supper the next and not at all the third day   Quantity:  1 Box   Refills:  12       * blood glucose monitoring test strip   Commonly known as:  MARIAN CONTOUR NEXT   Used for:  Diabetes mellitus, type 2 (H)        Use to test blood sugar 2 times weekly or as directed.   Quantity:  100 strip   Refills:  6       FINASTERIDE PO        Dose:  5 mg   Take 5 mg by mouth daily   Refills:  0       LISINOPRIL PO        Dose:  40 mg   Take 40 mg by mouth daily   Refills:  0       METOPROLOL TARTRATE PO        Dose:  100 mg   Take 100 mg by mouth daily   Refills:  0       MULTIVITAMINS PO        Dose:  1 capsule   Take 1 capsule by mouth daily   Refills:  0       NITROSTAT 0.4 MG sublingual tablet   Generic drug:  nitroglycerin        Dose:  1 tablet   Place 1  tablet under the tongue every 5 minutes as needed   Refills:  0       NONFORMULARY        OTC eye drops as directed   Refills:  0       order for DME   Used for:  Colostomy care (H)        Equipment being ordered: Ostomy supplies - Barrier - currently using [address]107 Sondra flat cut to fit; Pouch 30408 clear; Adapt barrier rings 7805; [place] ostomy belt.  Fill per insurance guidelines   Quantity:  1 Package   Refills:  11       * Ostomy Supplies Misc   Used for:  Colostomy in place (H)        [place] m9 odor eliminator drops 7717 or 7715 - patient preference   Quantity:  1 each   Refills:  11       * Ostomy Supplies POUCH Misc   Used for:  Colostomy in place (H)        [place] [address] closed end pouches - per Medicare patient is allowed 60 per month.  Dispense 2 boxes or patient choice   Quantity:  2 each   Refills:  11       polyethylene glycol 0.4%- propylene glycol 0.3% 0.4-0.3 % Soln ophthalmic solution   Commonly known as:  SYSTANE ULTRA        Dose:  1 drop   Place 1 drop into both eyes every hour as needed for dry eyes   Refills:  0       RANITIDINE HCL PO        Dose:  50 mg   Take 50 mg by mouth 2 times daily   Refills:  0       VITAMIN D3 PO        Take  by mouth daily.   Refills:  0       * Notice:  This list has 6 medication(s) that are the same as other medications prescribed for you. Read the directions carefully, and ask your doctor or other care provider to review them with you.             Protect others around you: Learn how to safely use, store and throw away your medicines at www.disposemymeds.org.             Medication List: This is a list of all your medications and when to take them. Check marks below indicate your daily home schedule. Keep this list as a reference.      Medications           Morning Afternoon Evening Bedtime As Needed    ASPIRIN PO   Take 325 mg by mouth daily                                atorvastatin 40 MG tablet   Commonly known as:  LIPITOR   Take 40 mg by mouth  daily                                blood glucose lancing device   1 Units daily                                * blood glucose monitoring lancets   1 Units daily Use fresh lancet each day to check blood sugars                                * blood glucose monitoring lancets   Use to test blood sugar 2 times weekly                                * blood glucose monitoring test strip   Commonly known as:  ACCU-CHEK SMARTVIEW   Test blood sugar before breakfast one day, before and after supper the next and not at all the third day                                * blood glucose monitoring test strip   Commonly known as:  MARIAN CONTOUR NEXT   Use to test blood sugar 2 times weekly or as directed.                                FINASTERIDE PO   Take 5 mg by mouth daily                                LISINOPRIL PO   Take 40 mg by mouth daily                                METOPROLOL TARTRATE PO   Take 100 mg by mouth daily                                MULTIVITAMINS PO   Take 1 capsule by mouth daily                                NITROSTAT 0.4 MG sublingual tablet   Place 1 tablet under the tongue every 5 minutes as needed   Generic drug:  nitroglycerin                                NONFORMULARY   OTC eye drops as directed                                order for DME   Equipment being ordered: Ostomy supplies - Barrier - currently using 20781 Sondra flat cut to fit; Pouch 99849 clear; Adapt barrier rings 7805; [place] ostomy belt.  Fill per insurance guidelines                                * Ostomy Supplies Misc   [place] m9 odor eliminator drops 7717 or 7715 - patient preference                                * Ostomy Supplies POUCH Movetisister 67671 closed end pouches - per Medicare patient is allowed 60 per month.  Dispense 2 boxes or patient choice                                polyethylene glycol 0.4%- propylene glycol 0.3% 0.4-0.3 % Soln ophthalmic solution   Commonly known as:  SYSTANE ULTRA    Place 1 drop into both eyes every hour as needed for dry eyes                                RANITIDINE HCL PO   Take 50 mg by mouth 2 times daily                                VITAMIN D3 PO   Take  by mouth daily.                                * Notice:  This list has 6 medication(s) that are the same as other medications prescribed for you. Read the directions carefully, and ask your doctor or other care provider to review them with you.

## 2017-04-20 NOTE — DISCHARGE INSTRUCTIONS
INSTRUCTIONS FOR IMMEDIATELY AFTER YOUR CATARACT SURGERY    POST OPERATIVE ANESTHESIA INSTRUCTIONS  YOU HAVE HAD GENERAL, LOCAL ANESTHESIA WITH SEDATION or SEDATION FOR A PROCEDURE.    For your safety please follow these directions:    1. Do not drink alcoholic beverages for 24 hours.  2. Do not drive or operate any power tools or appliances or attempt stairs for 24 hours.  3. Do not stay alone for 24 hours.  4. Rest at home.  It is not necessary to go to bed; however, it is important to rest after anesthesia.  5. Observe the area surrounding your incision and IV site for:  Redness  Swelling  Drainage  Increased pain.  These are symptoms of infection and would usually not become apparent for 36 to 48 hours.  A fever over 100 F and/or chills are symptoms of infection also.  Please call your surgeon if any problems arise.    ? There is little to no post-operative pain following the procedure.  Some initial soreness may occur, however this is usually gone by the following morning.  Irritation, tearing, redness and increased sensitivity to light may be expected.  ? Wear dark glasses with ultraviolet protection when outdoors.  ? Go home and rest, keeping your operated eye closed as much as possible.  ? If you are uncomfortable, take 1 to 2 Tylenol, which can be repeated every 4 hours.  ? Do not bend, lift, wash hair or strain at bowel movement (use mild laxative as needed).  ? Avoid hard coughing, nose blowing & sneezing.  ? You MAY sleep on either side, read & watch television.  ? Following your procedure you will be sent home with sunglasses or an eye shield to be worn until removed by your doctor.  It is recommended that you wear the shield whenever sleeping during the day and for the next 4 nights following surgery as well.  ? Do not get anything in your eye(s) such as eye makeup, soap, or any particulate matter.  Avoid swimming pools and hot tubs for three (3) weeks.  ? Do not rub your eye or allow  injury.            IN CASE OF EMERGENCY, REPORT TO THE EMERGENCY ROOM    Post-Anesthesia Patient Instructions    IMMEDIATELY FOLLOWING SURGERY:  Do not drive or operate machinery for the first twenty four hours after surgery.  Do not make any important decisions for twenty four hours after surgery or while taking narcotic pain medications or sedatives.  If you develop intractable nausea and vomiting or a severe headache please notify your doctor immediately.    FOLLOW-UP:  Please make an appointment with your surgeon as instructed. You do not need to follow up with anesthesia unless specifically instructed to do so.    WOUND CARE INSTRUCTIONS (if applicable):  Keep a dry clean dressing on the anesthesia/puncture wound site if there is drainage.  Once the wound has quit draining you may leave it open to air.  Generally you should leave the bandage intact for twenty four hours unless there is drainage.  If the epidural site drains for more than 36-48 hours please call the anesthesia department.    QUESTIONS?:  Please feel free to call your physician or the hospital  if you have any questions, and they will be happy to assist you.

## 2017-04-20 NOTE — OP NOTE
DATE OF SERVICE:  04/20/2017.      PREOPERATIVE DIAGNOSIS:     Cataract, right eye.       POSTOPERATIVE DIAGNOSIS:  Cataract, right eye.       PROCEDURE:  Phacoemulsification with intraocular lens implant, Model PCB00, 20.0 diopter power.       ANESTHESIA:  Topical with IV sedation.         DESCRIPTION OF PROCEDURE:   Operative risks, benefits and alternatives to the procedure were discussed at length with Peter Pavel including loss of vision, loss of the eye.  Patient voiced understanding and informed consent was signed.  The patient was taken to the operating suite, where an appropriate level of anesthesia was given.  Attention was placed to the right eye.  The patient was then prepped and draped in the usual sterile ophthalmic manner.  A lid speculum was placed in the eye to provide exposure and MVR blade was used to make a paracentesis.  Once this was performed, Shugarcaine was then placed intracamerally.  This was then followed by intracameral Viscoat.  A 2.75 mm blade was then used to make a biplanar temporal clear corneal incision.  A cystotome and uttrata were used to make a continuous curvilinear capsulorrhexis.  BSS on Rivera cannula was then used to hydrodissect the lens.  Phacoemulsification was then performed in a divide-and-conquer technique to remove all pieces of the nucleus.  Irrigation aspiration was then used to remove all remaining cortical material and debris.  Amvisc was then used to fill the capsular bag.  A PCB00 20.0 diopter lens was then injected into the capsular bag using the injector.  Once this was performed, a Goldberg secondary instrument was used to rotate the lens into proper position.  Irrigation aspiration was then used to remove all remaining viscoelastic material and center the lens appropriately.  BSS on 30 gauge cannula was then used to hydrate both wounds.  A Weck-Terri was used to assess intraocular IOP.  Once this was stable and the lens was found to be in good position, the  patient was undraped.  The patient was brought to the recovery area in stable condition.  Family was made aware of the patient's condition and the patient will follow up with us later this afternoon.      COMPLICATIONS:  None.         MELIDA SANDOVAL MD             D: 2017 10:53   T: 2017 11:57   MT: GILLIAN      Name:     ROGER VINSON   MRN:      [id number]        Account:        [id number]   :      1945           Procedure Date: 2017      Document: [id number]

## 2017-04-20 NOTE — ANESTHESIA PREPROCEDURE EVALUATION
Anesthesia Evaluation     . Pt has had prior anesthetic.            ROS/MED HX    ENT/Pulmonary:     (+)allergic rhinitis, tobacco use, Past use Quit 2012 packs/day  , . .    Neurologic:     (+)migraines,     Cardiovascular:     (+) Dyslipidemia, hypertension-range: on beta blocker (metoprolol), -CAD, -CABG-date: 2012, stent,2014  1 . : . . . pacemaker :ICD . dysrhythmias Irregular Heartbeat/Palpitations, valvular problems/murmurs s/p AO Valve Replacement 2012:.      (-) taking anticoagulants/antiplatelets   METS/Exercise Tolerance:     Hematologic:     (+) Anemia, History of Transfusion no previous transfusion reaction -      Musculoskeletal:   (+) arthritis, , , -       GI/Hepatic:     (+) GERD Other GI/Hepatic STRICTURE SIGMOID COLON, Diverticular Disease s/p Sigmoid Colectomy  2/2017      Renal/Genitourinary:     (+) chronic renal disease (Stage 3 (moderate)), type: CRI, BPH,       Endo:     (+) type II DM .      Psychiatric:  - neg psychiatric ROS       Infectious Disease:  - neg infectious disease ROS       Malignancy:      - no malignancy   Other:    - neg other ROS                 Physical Exam      Airway   Mallampati: III  TM distance: >3 FB  Neck ROM: full    Dental   (+) missing    Cardiovascular   Rhythm and rate: regular and normal  (+) murmur     PE comment: 4/6 Blowing Murmur at LSB      Pulmonary    breath sounds clear to auscultation(+) decreased breath sounds                       Anesthesia Plan      History & Physical Review  History and physical reviewed and following examination; no interval change.    ASA Status:  3 .    NPO Status:  > 8 hours    Plan for MAC with Other induction. Maintenance will be Other.  Reason for MAC:  Chronic cardiopulmonary disease (G9), Extreme anxiety (QS), Difficulty with conscious sedation (QS), Procedure to face, neck, head or breast and Deep or markedly invasive procedure (G8)  PONV prophylaxis:  Ondansetron (or other 5HT-3)       Postoperative  Care  Postoperative pain management:  Multi-modal analgesia.      Consents  Anesthetic plan, risks, benefits and alternatives discussed with:  Patient..                          .

## 2017-04-21 NOTE — ANESTHESIA POSTPROCEDURE EVALUATION
Patient: Peter NARANJO Pavel    Procedure(s):  CATARACT EXTRACTION RIGHT EYE WITH IMPLANT - Wound Class: I-Clean    Diagnosis:CATARACT RIGHT EYE  Diagnosis Additional Information: No value filed.    Anesthesia Type:  MAC    Note:  Anesthesia Post Evaluation    Patient location during evaluation: Phase 2 and Bedside  Patient participation: Able to fully participate in evaluation  Level of consciousness: awake and alert  Pain management: adequate  Airway patency: patent  Cardiovascular status: acceptable  Respiratory status: acceptable  Hydration status: stable  PONV: none     Anesthetic complications: None          Last vitals:  Vitals:    04/20/17 1115 04/20/17 1120 04/20/17 1125   BP: 151/98 166/80 147/82   Resp: 16  16   Temp:   97.9  F (36.6  C)   SpO2: 98% 97% 96%         Electronically Signed By: Davi Perkins MD  April 21, 2017  6:50 AM

## 2017-04-28 ENCOUNTER — HOSPITAL ENCOUNTER (OUTPATIENT)
Facility: HOSPITAL | Age: 72
Setting detail: OBSERVATION
Discharge: ANOTHER HOSPITAL | End: 2017-04-30
Admitting: FAMILY MEDICINE
Payer: MEDICARE

## 2017-04-28 DIAGNOSIS — G45.9 TIA (TRANSIENT ISCHEMIC ATTACK): ICD-10-CM

## 2017-04-28 LAB
ALBUMIN SERPL-MCNC: 3.6 G/DL (ref 3.4–5)
ALP SERPL-CCNC: 107 U/L (ref 40–150)
ALT SERPL W P-5'-P-CCNC: 22 U/L (ref 0–70)
ANION GAP SERPL CALCULATED.3IONS-SCNC: 9 MMOL/L (ref 3–14)
AST SERPL W P-5'-P-CCNC: 19 U/L (ref 0–45)
BASOPHILS # BLD AUTO: 0.1 10E9/L (ref 0–0.2)
BASOPHILS NFR BLD AUTO: 0.6 %
BILIRUB SERPL-MCNC: 0.6 MG/DL (ref 0.2–1.3)
BUN SERPL-MCNC: 22 MG/DL (ref 7–30)
CALCIUM SERPL-MCNC: 8.9 MG/DL (ref 8.5–10.1)
CHLORIDE SERPL-SCNC: 107 MMOL/L (ref 94–109)
CO2 SERPL-SCNC: 28 MMOL/L (ref 20–32)
CREAT SERPL-MCNC: 1.16 MG/DL (ref 0.66–1.25)
CRP SERPL-MCNC: <2.9 MG/L (ref 0–8)
DIFFERENTIAL METHOD BLD: NORMAL
EOSINOPHIL # BLD AUTO: 0.3 10E9/L (ref 0–0.7)
EOSINOPHIL NFR BLD AUTO: 3.6 %
ERYTHROCYTE [DISTWIDTH] IN BLOOD BY AUTOMATED COUNT: 13.4 % (ref 10–15)
GFR SERPL CREATININE-BSD FRML MDRD: 62 ML/MIN/1.7M2
GLUCOSE SERPL-MCNC: 110 MG/DL (ref 70–99)
HCT VFR BLD AUTO: 43.7 % (ref 40–53)
HGB BLD-MCNC: 15 G/DL (ref 13.3–17.7)
IMM GRANULOCYTES # BLD: 0 10E9/L (ref 0–0.4)
IMM GRANULOCYTES NFR BLD: 0.5 %
LYMPHOCYTES # BLD AUTO: 1.8 10E9/L (ref 0.8–5.3)
LYMPHOCYTES NFR BLD AUTO: 21.5 %
MCH RBC QN AUTO: 29.2 PG (ref 26.5–33)
MCHC RBC AUTO-ENTMCNC: 34.3 G/DL (ref 31.5–36.5)
MCV RBC AUTO: 85 FL (ref 78–100)
MONOCYTES # BLD AUTO: 0.9 10E9/L (ref 0–1.3)
MONOCYTES NFR BLD AUTO: 11 %
NEUTROPHILS # BLD AUTO: 5.2 10E9/L (ref 1.6–8.3)
NEUTROPHILS NFR BLD AUTO: 62.8 %
NRBC # BLD AUTO: 0 10*3/UL
NRBC BLD AUTO-RTO: 0 /100
PLATELET # BLD AUTO: 223 10E9/L (ref 150–450)
POTASSIUM SERPL-SCNC: 3.7 MMOL/L (ref 3.4–5.3)
PROT SERPL-MCNC: 7 G/DL (ref 6.8–8.8)
RBC # BLD AUTO: 5.14 10E12/L (ref 4.4–5.9)
SODIUM SERPL-SCNC: 144 MMOL/L (ref 133–144)
TROPONIN I SERPL-MCNC: NORMAL UG/L (ref 0–0.04)
TSH SERPL DL<=0.05 MIU/L-ACNC: 0.8 MU/L (ref 0.4–4)
WBC # BLD AUTO: 8.3 10E9/L (ref 4–11)

## 2017-04-28 PROCEDURE — 99284 EMERGENCY DEPT VISIT MOD MDM: CPT | Mod: 25

## 2017-04-28 PROCEDURE — A9270 NON-COVERED ITEM OR SERVICE: HCPCS | Mod: GY

## 2017-04-28 PROCEDURE — 25000132 ZZH RX MED GY IP 250 OP 250 PS 637: Mod: GY

## 2017-04-28 PROCEDURE — 80053 COMPREHEN METABOLIC PANEL: CPT

## 2017-04-28 PROCEDURE — 86140 C-REACTIVE PROTEIN: CPT

## 2017-04-28 PROCEDURE — 93005 ELECTROCARDIOGRAM TRACING: CPT

## 2017-04-28 PROCEDURE — G0378 HOSPITAL OBSERVATION PER HR: HCPCS

## 2017-04-28 PROCEDURE — 93880 EXTRACRANIAL BILAT STUDY: CPT | Mod: TC

## 2017-04-28 PROCEDURE — 84484 ASSAY OF TROPONIN QUANT: CPT

## 2017-04-28 PROCEDURE — 84443 ASSAY THYROID STIM HORMONE: CPT

## 2017-04-28 PROCEDURE — 70450 CT HEAD/BRAIN W/O DYE: CPT | Mod: TC

## 2017-04-28 PROCEDURE — 85025 COMPLETE CBC W/AUTO DIFF WBC: CPT

## 2017-04-28 PROCEDURE — 93010 ELECTROCARDIOGRAM REPORT: CPT | Performed by: INTERNAL MEDICINE

## 2017-04-28 RX ORDER — CLOPIDOGREL BISULFATE 75 MG/1
75 TABLET ORAL ONCE
Status: COMPLETED | OUTPATIENT
Start: 2017-04-28 | End: 2017-04-28

## 2017-04-28 RX ADMIN — CLOPIDOGREL 75 MG: 75 TABLET, FILM COATED ORAL at 22:45

## 2017-04-28 ASSESSMENT — ENCOUNTER SYMPTOMS
NECK STIFFNESS: 0
HEADACHES: 1
ABDOMINAL PAIN: 0
EYE REDNESS: 0
DIFFICULTY URINATING: 0
ARTHRALGIAS: 0
COLOR CHANGE: 0
WEAKNESS: 1
FACIAL ASYMMETRY: 1
CONFUSION: 0
NUMBNESS: 1
FEVER: 0
SHORTNESS OF BREATH: 0

## 2017-04-28 NOTE — IP AVS SNAPSHOT
` ` Patient Information     Patient Name Sex     Peter Zhao ([id number]) Male 1945       Room Bed    3116  3116-1      Patient Demographics     Address Phone    [address] [facility] [address] [phone] (Home)  none (Mobile)      Patient Ethnicity & Race     Ethnic Group Patient Race    American White      Emergency Contact(s)     Name Relation Home Work Mobile    Raven Zhao Spouse [phone]      Regan Zhao [phone]      Documents on File        Status Date Received Description       Documents for the Patient    Consent for Services - Hospital/Clinic-ESign Received () 14     [facility] Privacy Notice-ESign       Insurance Card Received 13 BX    Patient ID Received () 13     Consent for EHR Access-Received-ESign       Consent for EHR Access-Decline-ESign       Privacy Notice - [facility] Received 13     Consent for Services - Hospital/Clinic Received () 13     Consent for Services - Hospital/Clinic Received () 13     Consent for Services - Hospital/Clinic Received () 13     Consent for EHR Access Received 13     Insurance Card Received 13 MEDICARE    Consent for Services - Hospital/Clinic-ESign Received () 07/07/15     Consent for Services/Privacy Notice - Hospital/Clinic-Esign Received () 16     HIM TESFAYE Authorization  10/20/16 Patient    Consent for Services/Privacy Notice - Hospital/Clinic Received 17     Patient ID Received 17 [place]    Business/Insurance/Care Coordination/Health Form - Patient   CLAIM INFORMATION REQUESTED - CMS - 17    Consent for EHR Access  (Deleted)         Documents for the Encounter    Discharge Attachment   TIA, WHAT IS (ENGLISH)    CMS GOMES for Patient Signature Received 17 sign by wife linda due to pts mental status      Admission Information     Attending Provider Admitting Provider Admission Type  Admission Date/Time    Brock Vega MD Hilde-Philips, Amy K, MD Emergency 04/28/17 1948    Discharge Date Hospital Service Auth/Cert Status Service Area     General Medicine Incomplete [facility] SERVICES    Unit Room/Bed Admission Status       [facility] MEDICAL SURGICAL 3116 /3116-1 Admission (Confirmed)       Admission     Complaint    TIA (transient ischemic attack)      Hospital Account     Name Acct ID Class Status Primary Coverage    PavelPeter MARCELLA [id number] Observation Open MEDICARE - MEDICARE            Guarantor Account (for Hospital Account #[id number])     Name Relation to Pt Service Area Active? Acct Type    Peter Zhao Self RANGE Yes Personal/Family    Address Phone          [address] [place], [place] [address] [phone](H)              Coverage Information (for Hospital Account #[id number])     1. MEDICARE/MEDICARE     F/O Payor/Plan Precert #    MEDICARE/MEDICARE     Subscriber Subscriber #    Peter Zhao [id number]    Address Phone    ATTN CLAIMS  [address]  [place] [address] [phone]          2. BCBS/BCBS OUT OF STATE     F/O Payor/Plan Precert #    BCBS/BCBS OUT OF STATE     Subscriber Subscriber #    Peter Zhao [id number]    Address Phone    [address] [address]  [place], [place] [id number] [phone]

## 2017-04-28 NOTE — IP AVS SNAPSHOT
Peter Zhao #[id number] (CSN: [id number])  (71 year old M)  (Adm: 17)     HIMED-3116 -3116-1               [facility]: [phone]            Patient Demographics     Patient Name Sex          Age SSN Address Phone    Peter Zhao Male 1945 (71 year old) [id number] [address] [facility] [address] [phone] (Home)  none (Mobile)      Emergency Contact(s)     Name Relation Home Work Mobile    Raven Zhao Spouse [phone]      Regan Zhao Son [phone]      Admission Information     Attending Provider Admitting Provider Admission Type Admission Date/Time    Brock Vega MD HildeGretchen, Nancy HAN MD Emergency 17    Discharge Date Hospital Service Auth/Cert Status Service Area     General Medicine Incomplete [facility]    Unit Room/Bed Admission Status       [facility] 3116 /3116-1 Admission (Confirmed)       Admission     Complaint    TIA (transient ischemic attack)      Hospital Account     Name Acct ID Class Status Primary Coverage    Peter Zhao [id number] Observation Open MEDICARE - MEDICARE            Guarantor Account (for Hospital Account #[id number])     Name Relation to Pt Service Area Active? Acct Type    Peter Zhao Self [facility] Yes Personal/Family    Address Phone          [address] [place] [address] [phone](H)              Coverage Information (for Hospital Account #[id number])     1. MEDICARE/MEDICARE     F/O Payor/Plan Precert #    MEDICARE/MEDICARE     Subscriber Subscriber #    Peter Zhao [id number]    Address Phone    [employer] CLAIMS  [address]  [place] [address] [phone]          2. BCBS/BCBS OUT OF STATE     F/O Payor/Plan Precert #    BCBS/BCBS OUT OF STATE     Subscriber Subscriber #    Peter Zhao [id number]    Address Phone    [address] [address]  [place] [address] [phone]                                                      INTERAGENCY  "TRANSFER FORM - PHYSICIAN ORDERS   4/28/2017                       [facility]: [phone]            Attending Provider: Brock Vega MD     Allergies:  Acetaminophen, Ciprofloxacin, No Clinical Screening - See Comments    Infection:  None   Service:  GENERAL MEDI    Ht:  1.778 m (5' 10\")   Wt:  78 kg (172 lb)   Admission Wt:  --    BMI:  24.68 kg/m 2   BSA:  1.96 m 2            ED Clinical Impression     Diagnosis Description Comment Added By Time Added    TIA (transient ischemic attack) [G45.9] TIA (transient ischemic attack) [G45.9]  Monica Guzman APRN [facility]NA[facility] 4/28/2017 10:13 PM      Hospital Problems as of 4/30/2017              Priority Class Noted POA    CAD (coronary artery disease)   Unknown Yes    HTN (hypertension) Medium  2/15/2017 Yes    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 Medium  2/15/2017 Yes    * (Principal)TIA (transient ischemic attack) Medium  4/29/2017 Yes      Non-Hospital Problems as of 4/30/2017              Priority Class Noted    Diabetes mellitus, type 2 (H) Medium  10/31/2013    ACP (advance care planning)   10/4/2016    Hyperlipidemia LDL goal <100 Medium  2/15/2017    Elevated prostate specific antigen (PSA) Medium  2/15/2017    H/O migraine Medium  2/15/2017    Visual aura Medium  2/15/2017    Cataract Medium  2/15/2017    S/P colon resection Medium  2/18/2017    Hypokalemia Medium  2/20/2017      Code Status History     Date Active Date Inactive Code Status Order ID Comments User Context    2/23/2017  7:39 AM 4/29/2017  1:07 AM Full Code [id number]  Brock Vega MD Outpatient    2/17/2017  5:10 PM 2/23/2017  7:39 AM Full Code [id number]  Tima Moreland MD Inpatient    2/17/2017  4:24 AM 2/17/2017  5:10 PM Full Code [id number]  Tima Moreland MD Inpatient      Current Code Status     Date Active Code Status Order ID Comments User Context       4/29/2017  1:07 AM Full Code [id number]  Nancy Garcia MD Inpatient        "   Medication Review      CONTINUE these medications which have NOT CHANGED        Dose / Directions Comments    ASPIRIN PO        Dose:  325 mg   Take 325 mg by mouth daily   Refills:  0        atorvastatin 40 MG tablet   Commonly known as:  LIPITOR        Dose:  40 mg   Take 40 mg by mouth daily   Refills:  0        blood glucose lancing device   Used for:  T2DM (type 2 diabetes mellitus) (H)        Dose:  1 Units   1 Units daily   Quantity:  1 kit   Refills:  2        * blood glucose monitoring lancets   Used for:  T2DM (type 2 diabetes mellitus) (H)        Dose:  1 Units   1 Units daily Use fresh lancet each day to check blood sugars   Quantity:  100 each   Refills:  4        * blood glucose monitoring lancets   Used for:  Type 2 diabetes mellitus without complication, without long-term current use of insulin (H)        Use to test blood sugar 2 times weekly   Quantity:  100 Box   Refills:  6        * blood glucose monitoring test strip   Commonly known as:  ACCU-CHEK SMARTVIEW   Used for:  T2DM (type 2 diabetes mellitus) (H)        Test blood sugar before breakfast one day, before and after supper the next and not at all the third day   Quantity:  1 Box   Refills:  12        * blood glucose monitoring test strip   Commonly known as:  LUIS CONTOUR NEXT   Used for:  Diabetes mellitus, type 2 (H)        Use to test blood sugar 2 times weekly or as directed.   Quantity:  100 strip   Refills:  6    Please dispense Luis Contour EZ test strips       FINASTERIDE PO        Dose:  5 mg   Take 5 mg by mouth daily   Refills:  0        HYDROCHLOROTHIAZIDE PO        Dose:  25 mg   Take 25 mg by mouth   Refills:  0        LISINOPRIL PO        Dose:  40 mg   Take 40 mg by mouth daily   Refills:  0        MULTIVITAMINS PO        Dose:  1 capsule   Take 1 capsule by mouth daily   Refills:  0        NITROSTAT 0.4 MG sublingual tablet   Generic drug:  nitroglycerin        Dose:  1 tablet   Place 1 tablet under the tongue every 5  minutes as needed   Refills:  0        NONFORMULARY        OTC eye drops as directed   Refills:  0        order for DME   Used for:  Colostomy care (H)        Equipment being ordered: Ostomy supplies - Barrier - currently using 68178 Clayton flat cut to fit; Pouch 17285 clear; [employer] barrier rings 7805; Clayton ostomy belt.  Fill per insurance guidelines   Quantity:  1 Package   Refills:  11        * Ostomy Supplies Misc   Used for:  Colostomy in place (H)        Sondra m9 odor eliminator drops 7717 or 7715 - patient preference   Quantity:  1 each   Refills:  11        * Ostomy Supplies POUCH Misc   Used for:  Colostomy in place (H)        Sondra 68240 closed end pouches - per Medicare patient is allowed 60 per month.  Dispense 2 boxes or patient choice   Quantity:  2 each   Refills:  11        polyethylene glycol 0.4%- propylene glycol 0.3% 0.4-0.3 % Soln ophthalmic solution   Commonly known as:  SYSTANE ULTRA        Dose:  1 drop   Place 1 drop into both eyes every hour as needed for dry eyes   Refills:  0        RANITIDINE HCL PO        Dose:  50 mg   Take 50 mg by mouth 2 times daily   Refills:  0        VITAMIN D3 PO        Take  by mouth daily.   Refills:  0        * Notice:  This list has 6 medication(s) that are the same as other medications prescribed for you. Read the directions carefully, and ask your doctor or other care provider to review them with you.      STOP taking     METOPROLOL TARTRATE PO                     Further instructions from your care team       What to expect when you have contrast    During your exam, we will inject  contrast  into your vein or artery. (Contrast is a clear liquid with iodine in it. It shows up on X-rays.)    You may feel warm or hot. You may have a metal taste in your mouth and a slight upset stomach. You may also feel pressure near the kidneys and bladder. These effects will last about 1 to 3 minutes.    Please tell us if you have:    Sneezing     Itching     Hives     Swelling in the face    A hoarse voice    Breathing problems    Other new symptoms    Serious problems are rare.  They may include:    Irregular heartbeat     Seizures    Kidney failure              Tissue damage    Shock      Death    If you have any problems during the exam, we  will treat them right away.    When you get home    Call your hospital if you have any new symptoms in the next 2 days, like hives or swelling. (Phone numbers are at the bottom of this page.) Or call your family doctor.     If you have wheezing or trouble breathing, call 911.    Self-care  -Drink at least 4 extra glasses of water today.   This reduces the stress on your kidneys.  -Keep taking your regular medicines.    The contrast will pass out of your body in your  Urine(pee). This will happen in the next 24 hours. You  will not feel this. Your urine will not  change color.    If you have kidney problems or take metformin    Drink 4 to 8 large glasses of water for the next  2 days, if you are not on a fluid restriction.    ?If you take metformin (Glucophage or Glucovance) for diabetes, keep taking it.      ?Your kidney function tests are abnormal.  If you take Metformin, do not take it for 48 hours. Please go to your clinic for a blood test within 3 days after your exam before the restarting this medicine.     (Note to provider:please give patient prescription for lab tests.)    ?Special instructions: Drink an extra 4 glasses of water to flush out the contrast.     I have read and understand the above information.    Patient Sign Here:______________________________________Date:________Time:______    Staff Sign Here:________________________________________Date:_______Time:______      Radiology Departments:     ?[facility]: [phone] ?[facility]: [phone]     ?[place]: [phone] ?[facility]:[phone]      ?[facility]: [phone]  ?[facility]: [phone]  ?[facility]:[phone]    ?[facility]:[phone]  ?[facility]  "[facility]:[phone]    Your next 10 appointments already scheduled     Jul 18, 2017  1:30 PM CDT   (Arrive by 1:15 PM)   Return Visit with Tima Moreland MD   [facility] ([facility])    [place] [place] [address]   [phone]              Statement of Approval     Ordered          04/30/17 0810  I have reviewed and agree with all the recommendations and orders detailed in this document.  EFFECTIVE NOW     Comments:  Transfer to [facility]-dr. jolly accepting   Approved and electronically signed by:  Nancy Garcia MD                                                 INTERAGENCY TRANSFER FORM - NURSING   4/28/2017                       [facility]: [phone]            Attending Provider: Brock Vega MD     Allergies:  Acetaminophen, Ciprofloxacin, No Clinical Screening - See Comments    Infection:  None   Service:  GENERAL MEDI    Ht:  1.778 m (5' 10\")   Wt:  78 kg (172 lb)   Admission Wt:  --    BMI:  24.68 kg/m 2   BSA:  1.96 m 2            Advance Directives        Does patient have a scanned Advance Directive/ACP document in EPIC?           No        Immunizations     Name Date      TDAP Vaccine (Adacel) 07/04/14       ASSESSMENT     Discharge Profile Flowsheet     EXPECTED DISCHARGE     Prior to admission patient needed assistance with:  -- (independent) 04/29/17 1534    Expected Discharge Date  05/02/17 04/29/17 1534   GASTROINTESTINAL (ADULT,PEDIATRIC,OB)      DISCHARGE NEEDS ASSESSMENT     GI WDL  WDL 04/30/17 0121    Concerns To Be Addressed  -- (spouses ability to care for Miguel) 04/29/17 1534   Last Bowel Movement  04/29/17 04/29/17 0800    Patient/family verbalizes understanding of discharge plan recommendations?  Yes 04/29/17 1534   Passing flatus  yes 04/30/17 0121    Medical Team notified of plan?  no 04/29/17 1534   COMMUNICATION ASSESSMENT      Readmission Within The Last 30 Days  no previous admission in last 30 days 04/29/17 1534   Patient's " "communication style  spoken language (English or Bilingual) 04/28/17 1937    Anticipated Changes Related to Illness  inability to care for self 04/29/17 1534   SKIN      Equipment Currently Used at Home  none 02/18/17 1328   Inspection  Partial (identify areas NOT inspected) 04/30/17 0121    Transportation Available  family or friend will provide 04/29/17 1534   Skin WDL  WDL 04/30/17 0121    Current Discharge Risk  lack of support system/caregiver 04/29/17 1534   Skin areas NOT inspected  Hip, left;Hip, right;Buttock, left;Buttock, right;Sacrum;Coccyx 04/30/17 0121    Key Recommendations  F/U with PCP 04/29/17 1534   SAFETY      Does Patient Need a Referral for Clinic CC  No 02/18/17 1328   Safety WDL  WDL 04/30/17 0121    ASSESSMENT OF FUNCTIONAL STATUS                        Assessment WDL (Within Defined Limits) Definitions           Safety WDL     Effective: 09/28/15    Row Information: <b>WDL Definition:</b> Bed in low position, wheels locked; call light in reach; upper side rails up x 2; ID band on<br> <font color=\"gray\"><i>Item=AS safety wdl>>List=AS safety wdl>>Version=F14</i></font>      Skin WDL     Effective: 09/28/15    Row Information: <b>WDL Definition:</b> Warm; dry; intact; elastic; without discoloration; pressure points without redness<br> <font color=\"gray\"><i>Item=AS skin wdl>>List=AS skin wdl>>Version=F14</i></font>      Vitals     Vital Signs Flowsheet     VITAL SIGNS     Body Position  (P)  independently positioning 04/30/17 0829    Temp  96.2  F (35.7  C) 04/30/17 0825   Chair  (P)  Upright in chair 04/30/17 0829    Temp src  Tympanic 04/30/17 0825   DAILY CARE      Resp  18 04/30/17 0825   Activity Type  (P)  activity adjusted per tolerance 04/30/17 0829    Pulse  61 04/28/17 1941   Activity Level of Assistance  (P)  independent 04/30/17 0829    Heart Rate  98 04/30/17 0825   ECG      Pulse/Heart Rate Source  Monitor 04/30/17 0825   ECG Rhythm  Sinus rhythm;Other (Comment) (dual V-paced " 70's) 04/29/17 0954    BP  132/89 04/30/17 0825   EKG MONITORING      BP Location  Right arm 04/30/17 0825   Cardiac Regularity  (P)  Other (Comment) 04/30/17 0829    OXYGEN THERAPY     Cardiac Rhythm  (P)  -- (pacemaker) 04/30/17 0829    SpO2  98 % 04/30/17 0825   KANU COMA SCALE      O2 Device  None (Room air) 04/30/17 0825   Best Eye Response  4-->(E4) spontaneous 04/30/17 0606    PAIN/COMFORT     Best Motor Response  6-->(M6) obeys commands 04/30/17 0606    Patient Currently in Pain  (P)  denies 04/30/17 0829   Best Verbal Response  4-->(V4) confused 04/30/17 0606    Preferred Pain Scale  (P)  word (verbal rating pain scale) 04/30/17 0829   Kanu Coma Scale Score  14 04/30/17 0606    POSITIONING                   Patient Lines/Drains/Airways Status    Active LINES/DRAINS/AIRWAYS     Name: Placement date: Placement time: Site: Days: Last dressing change:    Colostomy 02/17/17   1415   LLQ   71     Peripheral IV 04/28/17 Left Upper forearm 04/28/17 2010   Upper forearm   1     Peripheral IV 04/30/17 Right Upper arm 04/30/17 0449   Upper arm   less than 1     Incision/Surgical Site 04/20/17 Right Eye 04/20/17   0940    9             Patient Lines/Drains/Airways Status    Active PICC/CVC     None            Intake/Output Detail Report     Date Intake     Output Net    Shift P.O. I.V. IV Piggyback Total Urine Total       Noc 04/28/17 2300 - 04/29/17 0659 240 -- -- 240 -- -- 240    Day 04/29/17 0700 - 04/29/17 1459 727 -- -- 727 -- -- 727    Landy 04/29/17 1500 - 04/29/17 2259 240 -- -- 240 -- -- 240    Noc 04/29/17 2300 - 04/30/17 0659 -- 100 -- 100 350 350 -250    Day 04/30/17 0700 - 04/30/17 1459 -- -- -- -- -- -- 0      Case Management/Discharge Planning     Case Management/Discharge Planning Flowsheet     REFERRAL INFORMATION     COPING/STRESS CAREGIVER      Admission Type  inpatient 04/29/17 1534   Major Change/Loss/Stressor  -- (spouses medical condition) 04/29/17 1534    Arrived From  home or self-care  04/29/17 1534   EXPECTED DISCHARGE      Referral Source  admission list 04/29/17 1534   Expected Discharge Date  05/02/17 04/29/17 1534    Reason For Consult  discharge planning 04/29/17 1534   ASSESSMENT/CONCERNS TO BE ADDRESSED      Record Reviewed  history and physical;plan of care 04/29/17 1534   Concerns To Be Addressed  -- (spouses ability to care for Miguel) 04/29/17 1534     Assigned to Mustapha Slater RN 04/29/17 1534   DISCHARGE PLANNING      Primary Care Clinic Name  [facility] 04/29/17 1534   Patient/family verbalizes understanding of discharge plan recommendations?  Yes 04/29/17 1534    Primary Care MD Name  Dr Vega 04/29/17 1534   Medical Team notified of plan?  no 04/29/17 1534    LIVING ENVIRONMENT     Readmission Within The Last 30 Days  no previous admission in last 30 days 04/29/17 1534    Lives With  spouse 04/29/17 1534   Anticipated Changes Related to Illness  inability to care for self 04/29/17 1534    Living Arrangements  house 04/29/17 1534   Transportation Available  family or friend will provide 04/29/17 1534    Provides Primary Care For  no one 04/29/17 1534   Current Discharge Risk  lack of support system/caregiver 04/29/17 1534    Caregiving Concerns  -- (spouse may not be able to care for him) 04/29/17 1534   Key Recommendations  F/U with PCP 04/29/17 1534    Quality Of Family Relationships  supportive;helpful;involved 04/29/17 1534   Does Patient Need a Referral for Clinic CC  No 02/18/17 1328    Able to Return to Prior Living Arrangements  -- (unknown at this time) 04/29/17 1534   FINAL NOTE      HOME SAFETY     Final Note  SEe Care PLan 04/29/17 1534    Patient Feels Safe Living in Home?  yes 04/29/17 1534   FINAL RESOURCES      ASSESSMENT OF FAMILY/SOCIAL SUPPORT     Equipment Currently Used at Home  none 02/18/17 1328    Marital Status   04/29/17 1534   ABUSE RISK SCREEN      Description of Support System  Supportive;Involved 04/29/17 1534   QUESTION TO PATIENT:  Has  a member of your family or a partner(now or in the past) intimidated, hurt, manipulated, or controlled you in any way?  no 04/28/17 1950    Quality of Family Relationships  supportive;involved 04/29/17 1534   QUESTION TO PATIENT: Do you feel safe going back to the place where you are living?  yes 04/28/17 1950    ASSESSMENT OF FUNCTIONAL STATUS     OBSERVATION: Is there reason to believe there has been maltreatment of a vulnerable adult (ie. Physical/Sexual/Emotional abuse, self neglect, lack of adequate food, shelter, medical care, or financial exploitation)?  no 04/28/17 1950    Prior to admission patient needed assistance with:  -- (independent) 04/29/17 1534   (R) MENTAL HEALTH SUICIDE RISK      EMPLOYMENT     Are you depressed or being treated for depression?  No 04/29/17 0012    Do you work full or part-time?  no 04/29/17 1534   HOMICIDE RISK      COPING/STRESS     Homicidal Ideation  no 04/28/17 1950    Major Change/Loss/Stressor  denies 04/29/17 1534                       [facility]: [phone]            Medication Administration Report for Peter Zhao MARCELLA as of 04/30/17 0830   Legend:    Given Hold Not Given Due Canceled Entry Other Actions    Time Time (Time) Time  Time-Action       Inactive    Active    Linked        Medications 04/24/17 04/25/17 04/26/17 04/27/17 04/28/17 04/29/17 04/30/17    aspirin tablet 325 mg  Dose: 325 mg Freq: DAILY Route: PO  Start: 04/29/17 0900         0803 (325 mg)-Given        0826 (325 mg)-Given           atorvastatin (LIPITOR) tablet 40 mg  Dose: 40 mg Freq: AT BEDTIME Route: PO  Start: 04/29/17 0130   Admin Instructions: Patient may be charged $5 per dose while on observation status, discuss with patient before giving this non-essential medication.          (6592)-Not Given [C]       2146 (40 mg)-Given        [ ] 2200           finasteride (PROSCAR) tablet 5 mg  Dose: 5 mg Freq: DAILY Route: PO  Start: 04/29/17 0900   Admin Instructions: *Do not handle tablets if  you are pregnant*          0803 (5 mg)-Given        0826 (5 mg)-Given           hydrochlorothiazide (HYDRODIURIL) tablet 25 mg  Dose: 25 mg Freq: DAILY Route: PO  Start: 04/29/17 1615         1617 (25 mg)-Given        0826 (25 mg)-Given           ketorolac (ACULAR) 0.5 % ophthalmic solution 1 drop  Dose: 1 drop Freq: 2 TIMES DAILY Route: RIGHT EYE  Start: 04/29/17 2100         2150 (1 drop)-Given        (0827)-Not Given [C]       [ ] 2100           lisinopril (PRINIVIL/ZESTRIL) tablet 40 mg  Dose: 40 mg Freq: DAILY Route: PO  Start: 04/29/17 1615 1617 (40 mg)-Given        0826 (40 mg)-Given           LORazepam (ATIVAN) injection 0.5-1 mg  Dose: 0.5-1 mg Freq: EVERY 6 HOURS PRN Route: IV  PRN Reasons: anxiety,agitation  Start: 04/29/17 1937   Admin Instructions: For IV PUSH: Dilute with equal volume of NS.          1954 (0.5 mg)-Given            naloxone (NARCAN) injection 0.1-0.4 mg  Dose: 0.1-0.4 mg Freq: EVERY 2 MIN PRN Route: IV  PRN Reason: opioid reversal  Start: 04/29/17 0107   Admin Instructions: For respiratory rate LESS than or EQUAL to 8.  Partial reversal dose:  0.1 mg titrated q 2 minutes for Analgesia Side Effects Monitoring Sedation Level of 3 (frequently drowsy, arousable, drifts to sleep during conversation).Full reversal dose:  0.4 mg bolus for Analgesia Side Effects Monitoring Sedation Level of 4 (somnolent, minimal or no response to stimulation).               nitroglycerin (NITROSTAT) sublingual tablet 0.4 mg  Dose: 0.4 mg Freq: EVERY 5 MIN PRN Route: SL  PRN Reason: chest pain  Start: 04/29/17 0107   Admin Instructions: Max of 3 tabs per episode, contact MD if no relief after 3rd dose               ondansetron (ZOFRAN-ODT) ODT tab 4 mg  Dose: 4 mg Freq: EVERY 6 HOURS PRN Route: PO  PRN Reason: nausea  Start: 04/29/17 0107   Admin Instructions: This is Step 1 of nausea and vomiting management.  If nausea not resolved in 15 minutes, go to Step 2 prochlorperazine (COMPAZINE). Do not push  through foil backing. Peel back foil and gently remove. Place on tongue immediately. Administration with liquid unnecessary              Or  ondansetron (ZOFRAN) injection 4 mg  Dose: 4 mg Freq: EVERY 6 HOURS PRN Route: IV  PRN Reasons: nausea,vomiting  Start: 04/29/17 0107   Admin Instructions: This is Step 1 of nausea and vomiting management.  If nausea not resolved in 15 minutes, go to Step 2 prochlorperazine (COMPAZINE).  Irritant.               prednisoLONE acetate (PRED FORTE) 1 % ophthalmic susp 1 drop  Dose: 1 drop Freq: 2 TIMES DAILY Route: RIGHT EYE  Start: 04/29/17 2100         2149 (1 drop)-Given        (0828)-Not Given [C]       [ ] 2100           ranitidine (ZANTAC) tablet 150 mg  Dose: 150 mg Freq: 2 TIMES DAILY Route: PO  Start: 04/29/17 0107   Admin Instructions: Patient may be charged $5 per dose while on observation status, discuss with patient before giving this non-essential medication.          0803 (150 mg)-Given       2146 (150 mg)-Given        0826 (150 mg)-Given       [ ] 2100           risperiDONE (risperDAL) tablet 0.25 mg  Dose: 0.25 mg Freq: 2 TIMES DAILY Route: PO  Start: 04/29/17 1515         1515 (0.25 mg)-Given       2146 (0.25 mg)-Given        0826 (0.25 mg)-Given       [ ] 2100           senna-docusate (SENOKOT-S;PERICOLACE) 8.6-50 MG per tablet 1-2 tablet  Dose: 1-2 tablet Freq: 2 TIMES DAILY PRN Route: PO  PRN Comment: constipation   Start: 04/29/17 0107   Admin Instructions: If no bowel movement in 24 hours, increase to 2 tablets PO BID.  Hold for loose stools.   This is the first step of a three step constipation treatment protocol.              Completed Medications  Medications 04/24/17 04/25/17 04/26/17 04/27/17 04/28/17 04/29/17 04/30/17         Dose: 75 mg Freq: ONCE Route: PO  Start: 04/28/17 2214   End: 04/28/17 2245        2245 (75 mg)-Given               Dose: 75 mL Freq: ONCE Route: IV  Start: 04/30/17 0445   End: 04/30/17 0504          0504 (75 mL)-Given              Dose: 100 mL Freq: ONCE Route: IV  Start: 04/30/17 0445   End: 04/30/17 0504          0504 (100 mL)-Given          Discontinued Medications  Medications 04/24/17 04/25/17 04/26/17 04/27/17 04/28/17 04/29/17 04/30/17         Dose: 650 mg Freq: EVERY 4 HOURS PRN Route: PO  PRN Reason: mild pain  Start: 04/29/17 0107   End: 04/29/17 0151   Admin Instructions: Alternate ibuprofen (if ordered) with acetaminophen.  Maximum acetaminophen dose from all sources = 75 mg/kg/day not to exceed 4 grams/day.          0151-Med Discontinued          Dose: 1 drop Freq: 2 TIMES DAILY Route: RIGHT EYE  Start: 04/29/17 2100   End: 04/29/17 1532   Admin Instructions: Formulary alternate for Acular          1532-Med Discontinued          Dose: 0.25 mg Freq: 2 TIMES DAILY Route: PO  Start: 04/29/17 2100   End: 04/29/17 1506         1506-Med Discontinued                 INTERAGENCY TRANSFER FORM - NOTES (H&P, Discharge Summary, Consults, Procedures, Therapies)   4/28/2017                       [facility]: [phone]               History & Physicals      H&P signed by Nancy Garcia MD at 4/29/2017  8:40 PM      Author:  Nancy Garcia MD Service:  Family Medicine Author Type:  Physician    Filed:  4/29/2017  8:40 PM Date of Service:  4/29/2017  3:58 PM Note Created:  4/29/2017  6:27 PM    Status:  Signed :  Nancy Garcia MD (Physician)         CHIEF COMPLAINT:  Left arm heaviness.      SUBJECTIVE:  Peter Zhao is a 71-year-old male who was brought to the emergency room by his wife for feelings of heaviness in his arm.  It also encompassed his left side of his face, his neck and jaw.  He had a previous episode 1 week ago but it did not last as long as today's episode which prompted his wife to bring him to the emergency room.  By the time he was admitted to the floor, he had resolution of symptoms.  He has also had some increasing confusion since 04/11, but he has also had significant medical issues  going on recently.  He had a cataract extraction on the right.  He has a recent history of bowel obstruction, status post colostomy and he is scheduled to have a takedown in the near future, and his wife also comments that his sister abruptly .  She does not know if all the stress has caused him to become increasingly confused, but she cannot think of anything else that may have precipitated this.      PAST MEDICAL HISTORY:   1.  Elevated LDL.   2.  Hypertension.   3.  Coronary artery disease, status post CABG.   4.  Pacemaker and defibrillator.   5.  History of migraine headaches.   6.  History of GERD.   7.  BPH.      CURRENT MEDICATIONS:   1.  P.o. nitroglycerin.   2.  Aspirin 325 mg a day.   3.  Lisinopril 40 mg a day.   4.  Multivitamin and vitamin D.   5.  Avodart 0.5 mg daily.   6.  Ranitidine 150 mg b.i.d.   7.  Metoprolol succinate 100 mg 1 tablet daily.   8.  Lipitor 40 mg at night.     9.  Tobramycin eyedrops.   10.  Ketorolac eyedrops.   11.  Prednisolone eyedrops.      PAST SURGICAL HISTORY:   1.  Tonsillectomy.   2.  Prostate biopsy .   3.  Hernia repair .   4.  Biventricular ICD placement by Dr. Nunez.   5.  Retinopexy, PVD with retinal tear.   6.  Cataract extraction in the left eye in .   7.  Resection of the sigmoid colon with end sigmoid colostomy in 2017.      FAMILY HISTORY:  Father  at age 81, had hypertension.  Mother  at 75, had hypertension, hyperlipidemia, coronary artery disease and diabetes.      SOCIAL HISTORY:  He is .  Quit tobacco in .  No alcohol.  Previous salesman and .      ALLERGIES:  Tylenol and Cipro.      REVIEW OF SYSTEMS:  Secondary to patient's confused state unreliable.      PHYSICAL EXAMINATION:   VITAL SIGNS:  Blood pressure is 162/86, heart rate is 71, temperature is 97.6, respiratory rate is 18, O2 sat on room air 97%.   GENERAL:  This is a pleasant male, oriented only to person but not place or time.   HEENT.   Eyes without injection.  The TMs are clear.  The canals are patent.  Oral mucosa within normal limits.   NECK:  No adenopathy or thyroid abnormalities.  No carotid bruits.   CARDIOVASCULAR:  Regular rate and rhythm with a grade 2/6 to 3/6 systolic ejection murmur.   According to the wife, has a history of.   ABDOMEN:  Nondistended.   EXTREMITIES:  Without edema.   NEUROLOGIC:  Alert and oriented to person only.  Cranial nerves 2-12 are intact.  Face is symmetric.  Tongue protrudes in the midline.  Gait is a normal-based gait with normal arm swing.  Strength is 5/5 in the upper extremities for deltoid, bicep, tricep and 5/5 in the lower extremities for quadricep, hamstring, dorsi and plantar flexion.  No ataxia noted.   SKIN:  Clear and dry.      LABORATORY DATA:  Sodium 144, potassium 3.7, BUN is 22, creatinine is 1.1.  Troponin less than 0.015.  Glucose of 110.  White count 8.3, hemoglobin of 14.0.        ASSESSMENT AND PLAN:     1.  This is a 71-year-old male that presented with classic transient ischemic attack symptoms.  He will be admitted OPO status for neurologic checks.  We will put him on telemetry.  Unfortunately, the patient cannot have an MRI of the head because of his pacemaker.  We will continue aspirin and add Plavix.  He did have carotid ultrasound done in the emergency room which showed no significant stenosis.  With regard to his acute confusion, more than likely this is related to a stress reaction; however, I did discuss with the patient and his wife that it may be of benefit for him to see Neurology in the near future.   2.  Hypertension.  We will hold on his medications for now, but as time progresses we will reintroduce those.  According to the patient's wife, since he has had all the stressful events, his blood pressure has been very difficult to control.   3.  Hyperlipidemia.  Continue Lipitor.   4.  Recent cataract extraction.  Continue home eyedrops.   5.  Gastroesophageal reflux disease.   Continue H2 therapy.   6.  Code status:  Full code.   7.  Expected length of hospitalization less than or equal to 2 days.   8.  Deep venous thrombosis prophylaxis:  Mechanical prophylaxis has been ordered.         NANCY GARCIA MD             D: 2017 15:58   T: 2017 18:26   MT: ANURAG      Name:     ROGER VINSON   MRN:      [id number]        Account:      [id number]   :      1945           Admitted:     [id number]      Document: [id number][AH1.1]         Revision History        User Key Date/Time User Provider Type Action    > AH1.1 2017  8:40 PM Nancy Garcia MD Physician Sign     [N/A] 2017  6:27 PM Nancy Garcia MD Physician Edit                  Discharge Summaries     No notes of this type exist for this encounter.      Consult Notes     No notes of this type exist for this encounter.         Progress Notes - Physician (Notes for yesterday and today)      Progress Notes by Nancy Garcia MD at 2017  8:06 AM     Author:  Nancy Garcia MD Service:  Family Medicine Author Type:  Physician    Filed:  2017  8:09 AM Date of Service:  2017  8:06 AM Note Created:  2017  8:06 AM    Status:  Signed :  Nancy Garcia MD (Physician)         [facility]  Progress Note            Subjective:   Pt increasing confusion last pm shift. Then early this am, slurred speech, right sided weakness. No memory of event. Very confused this am but not agitated/anxious                 Medications:[AH1.1]     Current Facility-Administered Medications Ordered in Epic   Medication Dose Route Frequency Last Rate Last Dose     aspirin tablet 325 mg  325 mg Oral Daily   325 mg at 17 0803     atorvastatin (LIPITOR) tablet 40 mg  40 mg Oral At Bedtime   40 mg at 17     finasteride (PROSCAR) tablet 5 mg  5 mg Oral Daily   5 mg at 17 0803     nitroglycerin (NITROSTAT) sublingual tablet 0.4 mg  0.4 mg Sublingual Q5  Min PRN         ranitidine (ZANTAC) tablet 150 mg  150 mg Oral BID   150 mg at 04/29/17 2146     naloxone (NARCAN) injection 0.1-0.4 mg  0.1-0.4 mg Intravenous Q2 Min PRN         senna-docusate (SENOKOT-S;PERICOLACE) 8.6-50 MG per tablet 1-2 tablet  1-2 tablet Oral BID PRN         ondansetron (ZOFRAN-ODT) ODT tab 4 mg  4 mg Oral Q6H PRN        Or     ondansetron (ZOFRAN) injection 4 mg  4 mg Intravenous Q6H PRN         prednisoLONE acetate (PRED FORTE) 1 % ophthalmic susp 1 drop  1 drop Right Eye BID   1 drop at 04/29/17 2149     risperiDONE (risperDAL) tablet 0.25 mg  0.25 mg Oral BID   0.25 mg at 04/29/17 2146     ketorolac (ACULAR) 0.5 % ophthalmic solution 1 drop  1 drop Right Eye BID   1 drop at 04/29/17 2150     hydrochlorothiazide (HYDRODIURIL) tablet 25 mg  25 mg Oral Daily   25 mg at 04/29/17 1617     lisinopril (PRINIVIL/ZESTRIL) tablet 40 mg  40 mg Oral Daily   40 mg at 04/29/17 1617     LORazepam (ATIVAN) injection 0.5-1 mg  0.5-1 mg Intravenous Q6H PRN   0.5 mg at 04/29/17 1954     No current Epic-ordered outpatient prescriptions on file.[AH1.2]       Review of Systems:   C: NEGATIVE for fever, chills, change in weight  E/M: NEGATIVE for ear, mouth and throat problems  R: NEGATIVE for significant cough or SOB  CV: NEGATIVE for chest pain, palpitations or peripheral edema               Physical Exam:   Vitals were reviewed[AH1.1]  Patient Vitals for the past 24 hrs:   BP Temp Temp src Heart Rate Resp SpO2   04/30/17 0624 146/98 96.8  F (36  C) Tympanic 80 18 96 %   04/30/17 0600 141/82 - - 84 18 -   04/30/17 0551 134/82 - - 82 18 -   04/30/17 0536 131/86 - - 81 18 -   04/30/17 0521 137/80 - - 79 16 -   04/30/17 0506 143/98 - - 86 18 -   04/30/17 0447 143/97 98.2  F (36.8  C) Tympanic 82 18 -   04/30/17 0417 161/97 98  F (36.7  C) Tympanic 82 18 97 %   04/30/17 0005 127/89 98  F (36.7  C) Tympanic 99 16 92 %   04/29/17 1540 162/86 97.6  F (36.4  C) Tympanic 71 18 97 %   04/29/17 1338 132/89 - - 79 18 97 %    04/29/17 1221 - 98  F (36.7  C) Tympanic - 18 -[AH1.2]     Constitutional: Awake, alert, cooperative.  Eyes:  sclera clear  ENT: no oral lesions  Neck: Supple,  Hematologic / Lymphatic: No cervical lymphadenopathy and no supraclavicular lymphadenopathy.  Lungs: No increased work of breathing, good air exchange, clear to auscultation bilaterally, no crackles or wheezing.  Cardiovascular: Regular rate and rhythm, normal S1 and S2, no S3 or S4, and no murmur noted.  Abdomen: normal bowel sounds, soft, non-distended, non-tender, no masses palpated,   Neurologic: Awake, alert, oriented to name, place and time.    Neuropsychiatric: Normal affect, mood, orientation, memory and insight.  Skin: No rashes[AH1.1]      Peripheral IV 04/28/17 Left Upper forearm (Active)   Site Assessment [facility] 4/30/2017 12:05 AM   Line Status Saline locked 4/30/2017 12:05 AM   Phlebitis Scale 0-->no symptoms 4/30/2017 12:05 AM   Infiltration Scale 0 4/30/2017 12:05 AM   Number of days:2       Peripheral IV 04/30/17 Right Upper arm (Active)   Number of days:0       Colostomy (Active)   Number of days:72       Incision/Surgical Site 04/20/17 Right Eye (Active)   Incision Assessment [facility] 4/20/2017 11:25 AM   Incision Drainage Amount None 4/20/2017 11:25 AM   Dressing Intervention Open to air / No Dressing 4/20/2017 11:25 AM   Number of days:10[AH1.2]     Line/device assessment(s) completed for medical necessity         Data:[AH1.1]     Results for orders placed or performed during the hospital encounter of 04/28/17 (from the past 24 hour(s))   Glucose by meter   Result Value Ref Range    Glucose 115 (H) 70 - 99 mg/dL   CBC differential   Result Value Ref Range    WBC 8.8 4.0 - 11.0 10e9/L    RBC Count 5.59 4.4 - 5.9 10e12/L    Hemoglobin 16.1 13.3 - 17.7 g/dL    Hematocrit 47.9 40.0 - 53.0 %    MCV 86 78 - 100 fl    MCH 28.8 26.5 - 33.0 pg    MCHC 33.6 31.5 - 36.5 g/dL    RDW 13.4 10.0 - 15.0 %    Platelet Count 226 150 - 450 10e9/L    Diff Method  Automated Method     % Neutrophils 68.6 %    % Lymphocytes 19.5 %    % Monocytes 8.6 %    % Eosinophils 2.1 %    % Basophils 0.6 %    % Immature Granulocytes 0.6 %    Nucleated RBCs 0 0 /100    Absolute Neutrophil 6.0 1.6 - 8.3 10e9/L    Absolute Lymphocytes 1.7 0.8 - 5.3 10e9/L    Absolute Monocytes 0.8 0.0 - 1.3 10e9/L    Absolute Eosinophils 0.2 0.0 - 0.7 10e9/L    Absolute Basophils 0.1 0.0 - 0.2 10e9/L    Abs Immature Granulocytes 0.1 0 - 0.4 10e9/L    Absolute Nucleated RBC 0.0    Comprehensive metabolic panel   Result Value Ref Range    Sodium 143 133 - 144 mmol/L    Potassium 4.0 3.4 - 5.3 mmol/L    Chloride 104 94 - 109 mmol/L    Carbon Dioxide 28 20 - 32 mmol/L    Anion Gap 11 3 - 14 mmol/L    Glucose 127 (H) 70 - 99 mg/dL    Urea Nitrogen 25 7 - 30 mg/dL    Creatinine 1.24 0.66 - 1.25 mg/dL    GFR Estimate 57 (L) >60 mL/min/1.7m2    GFR Estimate If Black 69 >60 mL/min/1.7m2    Calcium 9.0 8.5 - 10.1 mg/dL    Bilirubin Total 0.7 0.2 - 1.3 mg/dL    Albumin 3.7 3.4 - 5.0 g/dL    Protein Total 7.1 6.8 - 8.8 g/dL    Alkaline Phosphatase 106 40 - 150 U/L    ALT 21 0 - 70 U/L    AST 15 0 - 45 U/L[AH1.2]     All cardiac studies reviewed by me.  All imaging studies reviewed by me.         Assessment and Plan:   Principal Problem:    TIA (transient ischemic attack)   on statin/plavix. Still had event. Ct angio no concerning lesion. Also last night not reported to me was a transient episode of complete heart block-this was several hours before the neurol event.  Pt needs neuro eval so will transfer [facility]  Active Problems:    CAD (coronary artery disease)   asymptom    HTN (hypertension)    Med restarted yesterday    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  Will need pacemaker interrogation[AH1.1]              Revision History        User Key Date/Time User Provider Type Action    > AH1.2 4/30/2017  8:09 AM Nancy Garcia MD Physician Sign     AH1.1 4/30/2017  8:06 AM  Nancy Garcia MD Physician             Progress Notes by Yariel Sandoval MD at 4/30/2017  4:52 AM     Author:  Yariel Sandoval MD Service:  Hospitalist Author Type:  Physician    Filed:  4/30/2017  6:01 AM Date of Service:  4/30/2017  4:52 AM Note Created:  4/30/2017  4:52 AM    Status:  Addendum :  Yariel Sandoval MD (Physician)         [facility]    Hospitalist Progress Note  Called to see the patient for increasing confusion and sudden onset of slurring of speech. The patient has been confused overnight. The patient was seen and examined immediately .[AP1.1]    Assessment & Plan[AP1.2]   Peter Zhao is a 71 year old male who was admitted on 4/28/2017 with confusion heaviness on the left arm and face with a diagnosis of TIA[AP1.1] was found to have sudden onset of slurring of speech this morning.     Slurring of speech: The patient received ativan and risperidone last night. Acute onset of aphasia was also concerning for an acute stroke but the symptoms improved and as his symptoms have been intermittently present for the last 2 days, administration of TPA is not recommended. He is on ASA, statins etc. I have moved the patient to the ICU for better monitoring. The wife has been updated about the plan. I also updated the Primary Physician Dr. Montelongo. Continue with neurochecks [id number]. The risk of giving TPA outweigh the benefits at the moment. Will continue to monitor closely.  Awaiting CTA report.[AP1.3]     Yariel Sandoval    Interval History[AP1.2]   The patient was admitted with confusion and and weakness diagnosed with TIA. Reportedly the weakness resolved but intermittent confusion persisted. This morning around 4:20 am he had sudden onset of slurring of speech and gait issues while using the bathroom. He was seen and examined by myself. His symptoms were concerning for a CVA vs TIA. CTA brain was performed as he cannot get MRI due to his defibrillator. He came back from the CT scan  around 5 am. He was seen and examined again. His speech was back to baseline and weakness had resolved. The patient did receive a dose of risperidone and ativan last night for confusion.[AP1.3]      -Data reviewed today: I reviewed all new labs and imaging results over the last 24 hours.[AP1.1]    Physical Exam   Temp: 98  F (36.7  C) Temp src: Tympanic BP: 127/89   Heart Rate: 99 Resp: 16 SpO2: 92 % O2 Device: None (Room air)    There were no vitals filed for this visit.[AP1.2]  Vital Signs with Ranges[AP1.1]  Temp:  [97.3  F (36.3  C)-98  F (36.7  C)] 98  F (36.7  C)  Heart Rate:  [70-99] 99  Resp:  [16-18] 16  BP: (127-162)/(86-90) 127/89  SpO2:  [92 %-100 %] 92 %  I/O last 3 completed shifts:  In: 1207 [P.O.:1207]  Out: -     Peripheral IV 04/28/17 Left Upper forearm (Active)   Site Assessment [facility] 4/30/2017 12:05 AM   Line Status Saline locked 4/30/2017 12:05 AM   Phlebitis Scale 0-->no symptoms 4/30/2017 12:05 AM   Infiltration Scale 0 4/30/2017 12:05 AM   Number of days:2       Colostomy (Active)   Number of days:72       Incision/Surgical Site 04/20/17 Right Eye (Active)   Incision Assessment [facility] 4/20/2017 11:25 AM   Incision Drainage Amount None 4/20/2017 11:25 AM   Dressing Intervention Open to air / No Dressing 4/20/2017 11:25 AM   Number of days:10[AP1.2]     No line/device    Constitutional:   Confused, responding to some commands, has incomprehensible speech.    Eyes:   Lids and lashes normal, pupils equal, round and reactive to light, extra ocular muscles intact, sclera clear, conjunctiva normal   ENT:   Normocephalic, without obvious abnormality, atraumatic, sinuses nontender on palpation,    Neck:   supple, symmetrical, trachea midline     Lungs:   No increased work of breathing, good air exchange, clear to auscultation bilaterally, no crackles or wheezing   Cardiovascular:   normal S1 and S2   Abdomen:   No scars, normal bowel sounds, soft, non-distended, non-tender, no masses palpated, no  hepatosplenomegally     Neurologic:   Alert, not oriented, incomprehensible speech. Following some commands. No obvious facial droop, moving eye balls in all directions, PERRLA. Tongue movement is symmetric. He is moving all extremities, strength was symmetrically 5/5 in upper extremities and lower extremities. No obvious sensory loss. He was able to stand up and walk but with slight heaviness on the right side. No obvious ataxia.    Neuropsychiatric:   Level of consciousness: alert /confused   Skin:   no bruising or bleeding[AP1.1]        Medications        iopamidol  75 mL Intravenous Once     sodium chloride (PF)  100 mL Intravenous Once     aspirin tablet 325 mg  325 mg Oral Daily     atorvastatin  40 mg Oral At Bedtime     finasteride (PROSCAR) tablet 5 mg  5 mg Oral Daily     ranitidine  150 mg Oral BID     prednisoLONE acetate  1 drop Right Eye BID     risperiDONE  0.25 mg Oral BID     ketorolac  1 drop Right Eye BID     hydrochlorothiazide (HYDRODIURIL) tablet 25 mg  25 mg Oral Daily     lisinopril (PRINIVIL/ZESTRIL) tablet 40 mg  40 mg Oral Daily       Data     Recent Labs  Lab 04/28/17 2010   WBC 8.3   HGB 15.0   MCV 85         POTASSIUM 3.7   CHLORIDE 107   CO2 28   BUN 22   CR 1.16   ANIONGAP 9   WALI 8.9   *   ALBUMIN 3.6   PROTTOTAL 7.0   BILITOTAL 0.6   ALKPHOS 107   ALT 22   AST 19   TROPI <0.015The 99th percentile for upper reference range is 0.045 ug/L.  Troponin values in the range of 0.045 - 0.120 ug/L may be associated with risks of adverse clinical events.       No results found for this or any previous visit (from the past 24 hour(s)).[AP1.2]     CTA brain/neck report came back. Report reviewed. No acute occlusion or hemorrhage. Also discussed with the radiologist.[AP1.4]      Revision History        User Key Date/Time User Provider Type Action    > AP1.4 4/30/2017  6:01 AM Yariel Sandoval MD Physician Addend     AP1.3 4/30/2017  5:57 AM Yariel Sandoval MD Physician Sign      AP1.2 4/30/2017  4:53 AM Yariel Sandoval MD Physician      AP1.1 4/30/2017  4:52 AM Yariel Sandoval MD Physician                   Procedure Notes     No notes of this type exist for this encounter.      Progress Notes - Therapies (Notes from 04/27/17 through 04/30/17)     No notes of this type exist for this encounter.                                          INTERAGENCY TRANSFER FORM - LAB / IMAGING / EKG / EMG RESULTS   4/28/2017                       [facility]: [phone]            Unresulted Labs (24h ago through future)    Start       Ordered    05/01/17 0600  CBC with platelets differential  AM DRAW,   Routine     Comments:  Last Lab Result: Hemoglobin (g/dL)       Date                     Value                 04/28/2017               15.0             ----------    04/30/17 0543         Lab Results - 3 Days      Comprehensive metabolic panel [[id number]] (Abnormal)  Resulted: 04/30/17 0716, Result status: Final result    Ordering provider: Brock Vega MD  04/30/17 0614 Resulting lab: [facility]    Specimen Information    Type Source Collected On   Blood  04/30/17 0645          Components       Value Reference Range Flag Lab   Sodium 143 133 - 144 mmol/L  HI   Potassium 4.0 3.4 - 5.3 mmol/L  HI   Chloride 104 94 - 109 mmol/L  HI   Carbon Dioxide 28 20 - 32 mmol/L  HI   Anion Gap 11 3 - 14 mmol/L  HI   Glucose 127 70 - 99 mg/dL H HI   Urea Nitrogen 25 7 - 30 mg/dL  HI   Creatinine 1.24 0.66 - 1.25 mg/dL  HI   GFR Estimate 57 >60 mL/min/1.7m2 L HI   Comment:  Non  GFR Calc   GFR Estimate If Black 69 >60 mL/min/1.7m2  HI   Comment:  African American GFR Calc   Calcium 9.0 8.5 - 10.1 mg/dL  HI   Bilirubin Total 0.7 0.2 - 1.3 mg/dL  HI   Albumin 3.7 3.4 - 5.0 g/dL  HI   Protein Total 7.1 6.8 - 8.8 g/dL  HI   Alkaline Phosphatase 106 40 - 150 U/L  HI   ALT 21 0 - 70 U/L  HI   AST 15 0 - 45 U/L  HI            CBC differential [[id number]]  Resulted: 04/30/17 0656, Result  status: Final result    Ordering provider: Brock Vega MD  04/30/17 0614 Resulting lab: [facility]    Specimen Information    Type Source Collected On   Blood  04/30/17 0645          Components       Value Reference Range Flag Lab   WBC 8.8 4.0 - 11.0 10e9/L  HI   RBC Count 5.59 4.4 - 5.9 10e12/L  HI   Hemoglobin 16.1 13.3 - 17.7 g/dL  HI   Hematocrit 47.9 40.0 - 53.0 %  HI   MCV 86 78 - 100 fl  HI   MCH 28.8 26.5 - 33.0 pg  HI   MCHC 33.6 31.5 - 36.5 g/dL  HI   RDW 13.4 10.0 - 15.0 %  HI   Platelet Count 226 150 - 450 10e9/L  HI   Diff Method Automated Method   HI   % Neutrophils 68.6 %  HI   % Lymphocytes 19.5 %  HI   % Monocytes 8.6 %  HI   % Eosinophils 2.1 %  HI   % Basophils 0.6 %  HI   % Immature Granulocytes 0.6 %  HI   Nucleated RBCs 0 0 /100  HI   Absolute Neutrophil 6.0 1.6 - 8.3 10e9/L  HI   Absolute Lymphocytes 1.7 0.8 - 5.3 10e9/L  HI   Absolute Monocytes 0.8 0.0 - 1.3 10e9/L  HI   Absolute Eosinophils 0.2 0.0 - 0.7 10e9/L  HI   Absolute Basophils 0.1 0.0 - 0.2 10e9/L  HI   Abs Immature Granulocytes 0.1 0 - 0.4 10e9/L  HI   Absolute Nucleated RBC 0.0   HI            Glucose by meter [[id number]] (Abnormal)  Resulted: 04/30/17 0550, Result status: Final result    Ordering provider: Nancy Garcia MD  04/30/17 0413 Resulting lab: POINT OF CARE TEST, GLUCOSE    Specimen Information    Type Source Collected On     04/30/17 0413          Components       Value Reference Range Flag Lab   Glucose 115 70 - 99 mg/dL H 170            Comprehensive metabolic panel [[id number]] (Abnormal)  Resulted: 04/28/17 2054, Result status: Final result    Ordering provider: Monica Guzman APRN FNP  04/28/17 2019 Resulting lab: [facility]    Specimen Information    Type Source Collected On   Blood  04/28/17 2010          Components       Value Reference Range Flag Lab   Sodium 144 133 - 144 mmol/L  HI   Potassium 3.7 3.4 - 5.3 mmol/L  HI   Chloride 107 94 - 109 mmol/L  HI    Carbon Dioxide 28 20 - 32 mmol/L  HI   Anion Gap 9 3 - 14 mmol/L  HI   Glucose 110 70 - 99 mg/dL H HI   Urea Nitrogen 22 7 - 30 mg/dL  HI   Creatinine 1.16 0.66 - 1.25 mg/dL  HI   GFR Estimate 62 >60 mL/min/1.7m2  HI   Comment:  Non  GFR Calc   GFR Estimate If Black 75 >60 mL/min/1.7m2  HI   Comment:  African American GFR Calc   Calcium 8.9 8.5 - 10.1 mg/dL  HI   Bilirubin Total 0.6 0.2 - 1.3 mg/dL  HI   Albumin 3.6 3.4 - 5.0 g/dL  HI   Protein Total 7.0 6.8 - 8.8 g/dL  HI   Alkaline Phosphatase 107 40 - 150 U/L  HI   ALT 22 0 - 70 U/L  HI   AST 19 0 - 45 U/L  HI            Troponin I [[id number]]  Resulted: 04/28/17 2054, Result status: Final result    Ordering provider: Monica Guzman APRN FNP  04/28/17 2021 Resulting lab: [facility]    Specimen Information    Type Source Collected On   Blood  04/28/17 2010          Components       Value Reference Range Flag Lab   Troponin I ES -- 0.000 - 0.045 ug/L  HI   Result:         <0.015  The 99th percentile for upper reference range is 0.045 ug/L.  Troponin values in   the range of 0.045 - 0.120 ug/L may be associated with risks of adverse   clinical events.              TSH [[id number]]  Resulted: 04/28/17 2054, Result status: Final result    Ordering provider: Monica Guzman APRN FNP  04/28/17 2021 Resulting lab: [facility]    Specimen Information    Type Source Collected On   Blood  04/28/17 2010          Components       Value Reference Range Flag Lab   TSH 0.80 0.40 - 4.00 mU/L  [facility]            CRP inflammation [[id number]]  Resulted: 04/28/17 2054, Result status: Final result    Ordering provider: Monica Guzman APRN FNP  04/28/17 2021 Resulting lab: [facility]    Specimen Information    Type Source Collected On   Blood  04/28/17 2010          Components       Value Reference Range Flag Lab   CRP Inflammation <2.9 0.0 - 8.0 mg/L  HI            CBC with platelets differential [[id number]]   Resulted: 04/28/17 2027, Result status: Final result    Ordering provider: Monica Guzman APRN FNP  04/28/17 2019 Resulting lab: [facility]    Specimen Information    Type Source Collected On   Blood  04/28/17 2010          Components       Value Reference Range Flag Lab   WBC 8.3 4.0 - 11.0 10e9/L  HI   RBC Count 5.14 4.4 - 5.9 10e12/L  HI   Hemoglobin 15.0 13.3 - 17.7 g/dL  HI   Hematocrit 43.7 40.0 - 53.0 %  HI   MCV 85 78 - 100 fl  HI   MCH 29.2 26.5 - 33.0 pg  HI   MCHC 34.3 31.5 - 36.5 g/dL  HI   RDW 13.4 10.0 - 15.0 %  HI   Platelet Count 223 150 - 450 10e9/L  HI   Diff Method Automated Method   HI   % Neutrophils 62.8 %  HI   % Lymphocytes 21.5 %  HI   % Monocytes 11.0 %  HI   % Eosinophils 3.6 %  HI   % Basophils 0.6 %  HI   % Immature Granulocytes 0.5 %  HI   Nucleated RBCs 0 0 /100  HI   Absolute Neutrophil 5.2 1.6 - 8.3 10e9/L  HI   Absolute Lymphocytes 1.8 0.8 - 5.3 10e9/L  HI   Absolute Monocytes 0.9 0.0 - 1.3 10e9/L  HI   Absolute Eosinophils 0.3 0.0 - 0.7 10e9/L  HI   Absolute Basophils 0.1 0.0 - 0.2 10e9/L  HI   Abs Immature Granulocytes 0.0 0 - 0.4 10e9/L  HI   Absolute Nucleated RBC 0.0   HI            Testing Performed By     Lab - Abbreviation Name Director Address Valid Date Range    170 - Unknown POINT OF CARE TEST, GLUCOSE Unknown Unknown 10/31/11 1114 - Present    210 - HI [facility] Unknown [address] [address] 05/08/15 1057 - Present               Imaging Results - 3 Days      CT Head Angio w/o & w Contrast [[id number]]  Resulted: 04/30/17 0820, Result status: In process    Ordering provider: Yariel Sandoval MD  04/30/17 2696 Performed: 04/30/17 0445 - 04/30/17 0507    Resulting lab: PAT[facility]           Specimen Information    Type Source Collected On     04/30/17 0506            CT Neck Angio w/o & w Contrast [[id number]]  Resulted: 04/30/17 0820, Result status: In process    Ordering provider: Yariel Sandoval MD  04/30/17 0443 Performed:  04/30/17 0445 - 04/30/17 0507    Resulting lab: PAT[facility]           Specimen Information    Type Source Collected On     04/30/17 0507            US Carotid Bilateral [[id number]]  Resulted: 04/29/17 1152, Result status: Preliminary result    Ordering provider: Monica Guzman APRN FNP  04/28/17 2023 Resulted by: Warren Jay MD    Performed: 04/28/17 2024 - 04/28/17 2150 Resulting lab: PATI    Narrative:           CAROTID ULTRASOUND    INDICATION:  Syncope.    COMPARISON:  None.    FINDINGS: The caliber and flow within the common carotid arteries is  normal, with peak systolic velocity of 41 cm per second in the right  common carotid and 40 cm per second in the left common carotid.  There  is mild atherosclerotic plaque at the carotid bifurcations.  No  systolic acceleration is seen in either cervical internal carotid  artery, with a peak systolic velocity of 65 cm per second on the right  and 43 cm per second on the left.  The external carotid arteries are  patent.  The vertebral arteries are antegrade.    IMPRESSION:  NO EVIDENCE OF FLOW-LIMITING ATHEROSCLEROTIC DISEASE OF  THE MAJOR CERVICAL ARTERIES.  Exam Date: Apr 28, 2017 09:50:00 PM  Author: WARREN JAY  This report is preliminary and transcribed      Specimen Information    Type Source Collected On     04/28/17 2101            CT Head w/o Contrast [[id number]]  Resulted: 04/28/17 2334, Result status: Final result    Ordering provider: Monica Guzman APRN FNP  04/28/17 2019 Resulted by: Casey Delgadillo MD    Performed: 04/28/17 2019 - 04/28/17 2045 Resulting lab: PAT[facility]    Narrative:           CT SCAN OF THE BRAIN WITHOUT CONTRAST    REPORT:  The ventricular system is normal in size.  There are no  masses, ventricular shifts, or extracerebral collections.  Brainstem  and cerebellum appear normal.  The cranial vault is intact.  Paranasal  sinuses are clear.    IMPRESSION:  NEGATIVE CT SCAN OF THE BRAIN.  Exam Date: Apr 28, 2017  08:45:06 PM  Author: ANA HERNADEZ  This report is final and signed      Specimen Information    Type Source Collected On     04/28/17 2036            Testing Performed By     Lab - Abbreviation Name Director Address Valid Date Range    183 - [facility] Unknown Unknown 11/29/12 1227 - Present            Encounter-Level Documents:     There are no encounter-level documents.      Order-Level Documents:     There are no order-level documents.

## 2017-04-29 PROBLEM — G45.9 TIA (TRANSIENT ISCHEMIC ATTACK): Status: ACTIVE | Noted: 2017-04-29

## 2017-04-29 PROCEDURE — A9270 NON-COVERED ITEM OR SERVICE: HCPCS | Mod: GY | Performed by: FAMILY MEDICINE

## 2017-04-29 PROCEDURE — 25000132 ZZH RX MED GY IP 250 OP 250 PS 637: Mod: GY | Performed by: FAMILY MEDICINE

## 2017-04-29 PROCEDURE — 25000128 H RX IP 250 OP 636: Performed by: FAMILY MEDICINE

## 2017-04-29 PROCEDURE — G0378 HOSPITAL OBSERVATION PER HR: HCPCS

## 2017-04-29 PROCEDURE — 96374 THER/PROPH/DIAG INJ IV PUSH: CPT

## 2017-04-29 RX ORDER — ASPIRIN 325 MG
325 TABLET ORAL DAILY
Status: DISCONTINUED | OUTPATIENT
Start: 2017-04-29 | End: 2017-04-30 | Stop reason: HOSPADM

## 2017-04-29 RX ORDER — FINASTERIDE 5 MG/1
5 TABLET, FILM COATED ORAL DAILY
Status: DISCONTINUED | OUTPATIENT
Start: 2017-04-29 | End: 2017-04-30 | Stop reason: HOSPADM

## 2017-04-29 RX ORDER — ACETAMINOPHEN 325 MG/1
650 TABLET ORAL EVERY 4 HOURS PRN
Status: DISCONTINUED | OUTPATIENT
Start: 2017-04-29 | End: 2017-04-29

## 2017-04-29 RX ORDER — HYDROCHLOROTHIAZIDE 25 MG/1
25 TABLET ORAL DAILY
Status: DISCONTINUED | OUTPATIENT
Start: 2017-04-29 | End: 2017-04-30 | Stop reason: HOSPADM

## 2017-04-29 RX ORDER — PREDNISOLONE ACETATE 10 MG/ML
1 SUSPENSION/ DROPS OPHTHALMIC 2 TIMES DAILY
Status: DISCONTINUED | OUTPATIENT
Start: 2017-04-29 | End: 2017-04-30 | Stop reason: HOSPADM

## 2017-04-29 RX ORDER — ONDANSETRON 2 MG/ML
4 INJECTION INTRAMUSCULAR; INTRAVENOUS EVERY 6 HOURS PRN
Status: DISCONTINUED | OUTPATIENT
Start: 2017-04-29 | End: 2017-04-30 | Stop reason: HOSPADM

## 2017-04-29 RX ORDER — RISPERIDONE 0.25 MG/1
0.25 TABLET ORAL 2 TIMES DAILY
Status: DISCONTINUED | OUTPATIENT
Start: 2017-04-29 | End: 2017-04-29

## 2017-04-29 RX ORDER — RISPERIDONE 0.25 MG/1
0.25 TABLET ORAL 2 TIMES DAILY
Status: DISCONTINUED | OUTPATIENT
Start: 2017-04-29 | End: 2017-04-30 | Stop reason: HOSPADM

## 2017-04-29 RX ORDER — ATORVASTATIN CALCIUM 40 MG/1
40 TABLET, FILM COATED ORAL AT BEDTIME
Status: DISCONTINUED | OUTPATIENT
Start: 2017-04-29 | End: 2017-04-30 | Stop reason: HOSPADM

## 2017-04-29 RX ORDER — ONDANSETRON 4 MG/1
4 TABLET, ORALLY DISINTEGRATING ORAL EVERY 6 HOURS PRN
Status: DISCONTINUED | OUTPATIENT
Start: 2017-04-29 | End: 2017-04-30 | Stop reason: HOSPADM

## 2017-04-29 RX ORDER — DICLOFENAC SODIUM 1 MG/ML
1 SOLUTION/ DROPS OPHTHALMIC 2 TIMES DAILY
Status: DISCONTINUED | OUTPATIENT
Start: 2017-04-29 | End: 2017-04-29 | Stop reason: CLARIF

## 2017-04-29 RX ORDER — AMOXICILLIN 250 MG
1-2 CAPSULE ORAL 2 TIMES DAILY PRN
Status: DISCONTINUED | OUTPATIENT
Start: 2017-04-29 | End: 2017-04-30 | Stop reason: HOSPADM

## 2017-04-29 RX ORDER — LORAZEPAM 2 MG/ML
.5-1 INJECTION INTRAMUSCULAR EVERY 6 HOURS PRN
Status: DISCONTINUED | OUTPATIENT
Start: 2017-04-29 | End: 2017-04-30 | Stop reason: HOSPADM

## 2017-04-29 RX ORDER — LISINOPRIL 20 MG/1
40 TABLET ORAL DAILY
Status: DISCONTINUED | OUTPATIENT
Start: 2017-04-29 | End: 2017-04-30 | Stop reason: HOSPADM

## 2017-04-29 RX ORDER — KETOROLAC TROMETHAMINE 5 MG/ML
1 SOLUTION OPHTHALMIC 2 TIMES DAILY
Status: DISCONTINUED | OUTPATIENT
Start: 2017-04-29 | End: 2017-04-30 | Stop reason: HOSPADM

## 2017-04-29 RX ORDER — NALOXONE HYDROCHLORIDE 0.4 MG/ML
.1-.4 INJECTION, SOLUTION INTRAMUSCULAR; INTRAVENOUS; SUBCUTANEOUS
Status: DISCONTINUED | OUTPATIENT
Start: 2017-04-29 | End: 2017-04-30 | Stop reason: HOSPADM

## 2017-04-29 RX ORDER — NITROGLYCERIN 0.4 MG/1
0.4 TABLET SUBLINGUAL EVERY 5 MIN PRN
Status: DISCONTINUED | OUTPATIENT
Start: 2017-04-29 | End: 2017-04-30 | Stop reason: HOSPADM

## 2017-04-29 RX ADMIN — RANITIDINE HYDROCHLORIDE 150 MG: 150 TABLET, FILM COATED ORAL at 21:46

## 2017-04-29 RX ADMIN — KETOROLAC TROMETHAMINE 1 DROP: 5 SOLUTION OPHTHALMIC at 21:50

## 2017-04-29 RX ADMIN — ATORVASTATIN CALCIUM 40 MG: 40 TABLET, FILM COATED ORAL at 21:46

## 2017-04-29 RX ADMIN — PREDNISOLONE ACETATE 1 DROP: 10 SUSPENSION/ DROPS OPHTHALMIC at 21:49

## 2017-04-29 RX ADMIN — RISPERIDONE 0.25 MG: 0.25 TABLET ORAL at 21:46

## 2017-04-29 RX ADMIN — RISPERIDONE 0.25 MG: 0.25 TABLET ORAL at 15:15

## 2017-04-29 RX ADMIN — RANITIDINE HYDROCHLORIDE 150 MG: 150 TABLET, FILM COATED ORAL at 08:03

## 2017-04-29 RX ADMIN — LORAZEPAM 0.5 MG: 2 INJECTION, SOLUTION INTRAMUSCULAR; INTRAVENOUS at 19:54

## 2017-04-29 RX ADMIN — ASPIRIN 325 MG ORAL TABLET 325 MG: 325 PILL ORAL at 08:03

## 2017-04-29 RX ADMIN — LISINOPRIL 40 MG: 20 TABLET ORAL at 16:17

## 2017-04-29 RX ADMIN — HYDROCHLOROTHIAZIDE 25 MG: 25 TABLET ORAL at 16:17

## 2017-04-29 RX ADMIN — FINASTERIDE 5 MG: 5 TABLET, FILM COATED ORAL at 08:03

## 2017-04-29 ASSESSMENT — ACTIVITIES OF DAILY LIVING (ADL)
BATHING: 0-->INDEPENDENT
DRESS: 0-->INDEPENDENT
FALL_HISTORY_WITHIN_LAST_SIX_MONTHS: NO
RETIRED_COMMUNICATION: 0-->UNDERSTANDS/COMMUNICATES WITHOUT DIFFICULTY
SWALLOWING: 0-->SWALLOWS FOODS/LIQUIDS WITHOUT DIFFICULTY
COGNITION: 0 - NO COGNITION ISSUES REPORTED
TOILETING: 0-->INDEPENDENT
RETIRED_EATING: 0-->INDEPENDENT
AMBULATION: 0-->INDEPENDENT
TRANSFERRING: 0-->INDEPENDENT

## 2017-04-29 ASSESSMENT — VISUAL ACUITY
OU: NORMAL ACUITY
OU: NORMAL ACUITY

## 2017-04-29 NOTE — ED NOTES
"Patient to ED with reports he was thinking he was having a stroke at about 1830 this evening reports he was laying on the couch. Her reported he \"felt a hardness in his head\" \"numbness left side of the face and mouth\" \"numbness left hand, like it went dead\" This episode lasted until about 30 minutes. Patient reports he also had a similar episode last week, reported it last about 10-15 minutes. Reports some short term memory loss  Since April 11- has not followed up with PCP on this issue. Reports more frequent headaches recently. Reports headache, but currently denies headache.  Patient also had cataract surgery last Thursday and per patient report he saw a flash of light last week after the surgery and occasionally some double vision that doesn't last very long per patient report he also reported that it feel like its happening less often.  Patient also recently had a colostomy placed on April 17 2017, denies any problems with the colostomy  "

## 2017-04-29 NOTE — PLAN OF CARE
Face to face report given with opportunity to observe patient.    Report given to BRENNEN Mart   4/29/2017  3:32 PM

## 2017-04-29 NOTE — PLAN OF CARE
Pt continues to be confused to place and situation. Pt also is having a hard time forming coherent sentences. Needs prompts to remind him of where he is and what is going on.

## 2017-04-29 NOTE — PLAN OF CARE
Dr. Garcia notified that pt continues to be confused and is ambulating in the hallways. Pt is not combative. Wife is requesting to have pt medicated with something for sleep because she reports that has not been sleeping. MD ordered risperdal requesting that one be given now and one later this evening.     MD also made aware that pt's wife brought in eye gtts and that he is to take s/p his cataract surgery. MD requests that the eye gtts be entered as ordered.        1445 PM  04/29/2017  Joselyn Seth

## 2017-04-29 NOTE — ED PROVIDER NOTES
"  History     Chief Complaint   Patient presents with     Numbness     laying in couch about 1830 and numbness went in my left side of my head and neck, then it went away, then a bit later my left hand went numb. also happened last week. \"fighting high bp since january\"     Altered Mental Status     wife reports some confusion since april 11 but makes no note of anything specific happening. oriented to name and situation only     HPI  Peter Zhao is a 71 year old male, hx of CABG, pacemaker, AICD, ostomy s/p obstruction 2/17, HTN, started on HCTZ yesterday, right cataract extraction last week presents to ED via private car with wife for evaluation of episode of left sided weakness. Onset of sx about 2 hours PTA, pt states his left hand went numb, then left side of face felt numb. States sx lasted about 1/2 hour then resolved. He then became concerned about stroke so chose to come to ED for work up. Presently states he is sx free, denies cp, no sob, no n/v/d. He was feeling well prior to episode. Pt reports hx of migraine h/a's, wife reports increasing confusion over the past few weeks.     I have reviewed the Medications, Allergies, Past Medical and Surgical History, and Social History in the Epic system.    Review of Systems   Constitutional: Negative for fever.   HENT: Negative for congestion.    Eyes: Negative for redness.   Respiratory: Negative for shortness of breath.    Cardiovascular: Negative for chest pain.   Gastrointestinal: Negative for abdominal pain.   Genitourinary: Negative for difficulty urinating.   Musculoskeletal: Negative for arthralgias and neck stiffness.   Skin: Negative for color change.   Neurological: Positive for facial asymmetry, weakness, numbness and headaches.   Psychiatric/Behavioral: Negative for confusion.       Physical Exam   BP: (!) 159/105  Pulse: 61  Temp: 97  F (36.1  C)  Resp: 20  SpO2: 96 %  Physical Exam   Constitutional: He is oriented to person, place, and time. No " distress.   HENT:   Head: Normocephalic and atraumatic.   Eyes: Conjunctivae are normal. Pupils are equal, round, and reactive to light.   Neck: No thyromegaly present.   Cardiovascular: Normal rate and regular rhythm.    Pulmonary/Chest: Effort normal and breath sounds normal. No respiratory distress.   Abdominal: Soft. He exhibits no distension.   Neurological: He is alert and oriented to person, place, and time. No cranial nerve deficit.   Skin: Skin is warm and dry. He is not diaphoretic. No erythema.   Nursing note and vitals reviewed.      ED Course     Procedures         Labs Ordered and Resulted from Time of ED Arrival Up to the Time of Departure from the ED   COMPREHENSIVE METABOLIC PANEL - Abnormal; Notable for the following:        Result Value    Glucose 110 (*)     All other components within normal limits   CBC WITH PLATELETS DIFFERENTIAL   TROPONIN I   TSH   CRP INFLAMMATION          EKG Interpretation:      Interpreted by Monica Guzman  Time reviewed: 2040  Symptoms at time of EKG: tia like sx   Rhythm: paced  Rate: 57  Axis: Left Axis Deviation  Ectopy: none  Conduction: nonspecific interventricular conduction block  ST Segments/ T Waves: No ST-T wave changes  Q Waves: lateral leads  Comparison to prior: Unchanged    Clinical Impression: no acute changes    Assessments & Plan (with Medical Decision Making)   Pt presents with episode of left sided paresthesia, weakness, facial droop. Upon arrival, sx completely resolved. VSS, NAD, exam as above. ECG no changes, labs unremarkable, CT head normal, carotid us normal.   Findings consistent with TIA. ABCD2 score for TIA: 5 points - mod risk, 4.1% 2 day cva.   Consulted with Dr. Sue Godinez who recommended observation. Pt verbalizes understanding and agrees with plan. PO plavix 75 mg given.   Pt transferred to floor in stable condition.   I have reviewed the nursing notes.    I have reviewed the findings, diagnosis, plan and need for follow up with the  patient.    Current Discharge Medication List        Final diagnoses:   TIA (transient ischemic attack)       4/28/2017   [facility]     Monica Guzman APRN FNP  04/28/17 4468

## 2017-04-29 NOTE — PLAN OF CARE
Problem: Goal Outcome Summary  Goal: Goal Outcome Summary  Outcome: No Change  Patient was admitted on shift following a TIA. Upon assessment patient showed marked confusion resembling sun downers. He was unable to state why he came to the hospital Oriented to self only. Patient refused the use of SCD's and was very irritable with his tele bx. Patient stated that his box was making him not breath well. Patient was awake most of the night hollaring at his wife and yellign profanities. Took patient for a walk around the unit and was able to deescalate and calm him. Patiens lungs are clear. Gait is steady. Neuros remain in tact. Patient shows no signs of deficits. Strength remains equal through all extremities. No facial droop noted. Speech appropriate. Steady gait. Patient remains a stand by assist. Patients colostomy bag was replaced on shift. Patient states the entire appliance (2 piece) is scheduled to be changed on 4-29. Patient refused to have entire appliance changed here due to wanting to use his own supplies when he discharges in the am. No skin issues noted. Patient remains hypertensive per his baseline. Patient started on new blood pressure medication on 4/27. /91  Pulse 61  Temp 97.4  F (36.3  C) (Tympanic)  Resp 16  SpO2 99% Declines experiencing any pain or discomfort on shift. Tele reading per ICU nurse Sinus with dual chamber ventricular paced in the 60's. IV remains saline locked. Patient remains free of falls and any injury on shift.         Face to face report given with opportunity to observe patient.    Report given to BRENNEN Alves   4/29/2017  7:49 AM

## 2017-04-29 NOTE — PLAN OF CARE
Problem: Individualization  Goal: Patient Preferences  Outcome: Improving  Q 4 hour neuro checks performed as ordered. Pt is intermittently oriented/disoriented. Sometimes he converses appropriately and sometimes not. He is pacing the hallways, ambulating independently, tolerating activity well. His wife has been at bedside with him t/o shift providing supportive care. He has noted HTN, however is decreased from previous BP's. He denies pain. 2 piece colostomy in place, appliance burped with + flatus x2 this shift. Pt was to have appliance changed, however wife forgot to bring his supplies from home and reports that she will return home and bring everything back this evening.      Of note he recently had cataract surgery. Eye Gtts in pt's drawer. Continue to monitor neurological status. Pt lives at home with wife, currently she states that she would like to take him home. However given his persistent confusion, it may be worth continuing the conversation of possible placement or available resources in the community for help.

## 2017-04-29 NOTE — PLAN OF CARE
Problem: Patient Goal: Social Work Focus  Goal: 1. Patient Goal: Social Work Focus  Assessment completed via flow sheet.     DENAE score reviewed, level: Average     Miguel is sitting in a chair during assessment. He is very confused. His PCP is Dr Vega, dentist is Dr Cee and he sees Dr Gutierrez for his eye care. He also sees cardiology at [facility]. It is unknown if he has a POA, he has information for a healthcare directive at home. He uses [facility]. He served in the [employer].     He lives at home with his wife at [facility], states he feels safe and that his home is well maintained. He normally performs his own self cares, manages his own medications and appointment schedules. Around April 11 2017 the confusion started. He is not easily redirected. Floridalma states his confusion is worse today than it was yesterday. Both Miguel and Floridalma do the shopping and food prep. He drives and has reliable transportation. He is retired from [employer].     He does not have any sleep or mood concerns. He quit smoking 20+ years and quit drinking 30+ years ago. He states he is looking into his aysha. His support system is his wife Floridalma. He denies stressors. He enjoys fishing, going to the [employer], home maintenance and just doing nothing.     His plan is to go home, Floridalma will provide transportation depending on if the confusion clears. Will remain available for discharge planning.

## 2017-04-29 NOTE — ED NOTES
Supervisor contacted for dinner box of food for patient.   informed patients wife on admit with the delay  Patient is showing signs of confusion at this time, continues to ask questions about why he is here and is starting to become more irritable.  Patients wife reported she has been noticing that during the night time his confusion increase and day time he is ok.  Patient wife reports the confusion starts between [id number].

## 2017-04-29 NOTE — H&P
CHIEF COMPLAINT:  Left arm heaviness.      SUBJECTIVE:  Peter Zhao is a 71-year-old male who was brought to the emergency room by his wife for feelings of heaviness in his arm.  It also encompassed his left side of his face, his neck and jaw.  He had a previous episode 1 week ago but it did not last as long as today's episode which prompted his wife to bring him to the emergency room.  By the time he was admitted to the floor, he had resolution of symptoms.  He has also had some increasing confusion since , but he has also had significant medical issues going on recently.  He had a cataract extraction on the right.  He has a recent history of bowel obstruction, status post colostomy and he is scheduled to have a takedown in the near future, and his wife also comments that his sister abruptly .  She does not know if all the stress has caused him to become increasingly confused, but she cannot think of anything else that may have precipitated this.      PAST MEDICAL HISTORY:   1.  Elevated LDL.   2.  Hypertension.   3.  Coronary artery disease, status post CABG.   4.  Pacemaker and defibrillator.   5.  History of migraine headaches.   6.  History of GERD.   7.  BPH.      CURRENT MEDICATIONS:   1.  P.o. nitroglycerin.   2.  Aspirin 325 mg a day.   3.  Lisinopril 40 mg a day.   4.  Multivitamin and vitamin D.   5.  Avodart 0.5 mg daily.   6.  Ranitidine 150 mg b.i.d.   7.  Metoprolol succinate 100 mg 1 tablet daily.   8.  Lipitor 40 mg at night.     9.  Tobramycin eyedrops.   10.  Ketorolac eyedrops.   11.  Prednisolone eyedrops.      PAST SURGICAL HISTORY:   1.  Tonsillectomy.   2.  Prostate biopsy .   3.  Hernia repair .   4.  Biventricular ICD placement by Dr. Nunez.   5.  Retinopexy, PVD with retinal tear.   6.  Cataract extraction in the left eye in .   7.  Resection of the sigmoid colon with end sigmoid colostomy in 2017.      FAMILY HISTORY:  Father  at age 81, had hypertension.  Mother   at 75, had hypertension, hyperlipidemia, coronary artery disease and diabetes.      SOCIAL HISTORY:  He is .  Quit tobacco in .  No alcohol.  Previous salesman and .      ALLERGIES:  Tylenol and Cipro.      REVIEW OF SYSTEMS:  Secondary to patient's confused state unreliable.      PHYSICAL EXAMINATION:   VITAL SIGNS:  Blood pressure is 162/86, heart rate is 71, temperature is 97.6, respiratory rate is 18, O2 sat on room air 97%.   GENERAL:  This is a pleasant male, oriented only to person but not place or time.   HEENT.  Eyes without injection.  The TMs are clear.  The canals are patent.  Oral mucosa within normal limits.   NECK:  No adenopathy or thyroid abnormalities.  No carotid bruits.   CARDIOVASCULAR:  Regular rate and rhythm with a grade 2/6 to 3/6 systolic ejection murmur.   According to the wife, has a history of.   LUNGS: Clear bilaterally  ABDOMEN:  Nondistended.   EXTREMITIES:  Without edema.   NEUROLOGIC:  Alert and oriented to person only.  Cranial nerves 2-12 are intact.  Face is symmetric.  Tongue protrudes in the midline.  Gait is a normal-based gait with normal arm swing.  Strength is 5/5 in the upper extremities for deltoid, bicep, tricep and 5/5 in the lower extremities for quadricep, hamstring, dorsi and plantar flexion.  No ataxia noted.   SKIN:  Clear and dry.      LABORATORY DATA:  Sodium 144, potassium 3.7, BUN is 22, creatinine is 1.1.  Troponin less than 0.015.  Glucose of 110.  White count 8.3, hemoglobin of 14.0.        ASSESSMENT AND PLAN:     1.  This is a 71-year-old male that presented with classic transient ischemic attack symptoms.  He will be admitted OPO status for neurologic checks.  We will put him on telemetry.  Unfortunately, the patient cannot have an MRI of the head because of his pacemaker.  We will continue aspirin and add Plavix.  He did have carotid ultrasound done in the emergency room which showed no significant stenosis.  With  regard to his acute confusion, more than likely this is related to a stress reaction; however, I did discuss with the patient and his wife that it may be of benefit for him to see Neurology in the near future.   2.  Hypertension.  We will hold on his medications for now, but as time progresses we will reintroduce those.  According to the patient's wife, since he has had all the stressful events, his blood pressure has been very difficult to control.   3.  Hyperlipidemia.  Continue Lipitor.   4.  Recent cataract extraction.  Continue home eyedrops.   5.  Gastroesophageal reflux disease.  Continue H2 therapy.   6.  Code status:  Full code.   7.  Expected length of hospitalization less than or equal to 2 days.   8.  Deep venous thrombosis prophylaxis:  Mechanical prophylaxis has been ordered.         PAUL BOSE MD             D: 2017 15:58   T: 2017 18:26   MT: ANURAG      Name:     ROGER VINSON   MRN:      [id number]        Account:      [id number]   :      1945           Admitted:     [id number]      Document: [id number]

## 2017-04-29 NOTE — PLAN OF CARE
[facility] Inpatient Admission Note:    Patient admitted to 3116 /3116-1 at approximately 2345 via wheel chair accompanied by spouse from emergency room . Report received from [place] in SBAR format at 2334 via telephone. Patient ambulated to bed via self.. Patient is alert and oriented X 2, denies pain; rates at 0 on 0-10 scale.  Patient oriented to room, unit, hourly rounding, and plan of care. Explained admission packet and patient handbook with patient bill of rights brochure. Will continue to monitor and document as needed.     Inpatient Nursing criteria listed below was met:      Health care directives status obtained and documented: No      Care Everywhere authorization obtained No        Patient identifies a surrogate decision maker: Yes If yes, who: Wife Contact Information:in room      Core Measure diagnosis present:No. If yes, state diagnosis: none       If initial lactic acid >2.0, repeat lactic acid drawn within one hour of arrival to unit: NA. If no, state reason: na      Vaccination assessment and education completed: Yes   Vaccinations received prior to admission: Pneumovax yes  Influenza(seasonal)  YES   Vaccination(s) ordered: already received      Clergy visit ordered if patient requests: N/A      Skin issues/needs documented: N/A      Isolation Patient: no Education given, correct sign in place and documentation row added to PCS:  No      Fall Prevention Yes: Care plan updated, education given and documented, sticker and magnet in place: Yes      Care Plan initiated: Yes      Education Documented (including assessment): Yes      Patient has discharge needs : No If yes, please explain:na

## 2017-04-30 ENCOUNTER — TRANSFERRED RECORDS (OUTPATIENT)
Dept: HEALTH INFORMATION MANAGEMENT | Facility: HOSPITAL | Age: 72
End: 2017-04-30

## 2017-04-30 VITALS
RESPIRATION RATE: 18 BRPM | HEART RATE: 61 BPM | OXYGEN SATURATION: 98 % | DIASTOLIC BLOOD PRESSURE: 89 MMHG | SYSTOLIC BLOOD PRESSURE: 132 MMHG | TEMPERATURE: 96.2 F

## 2017-04-30 LAB
ALBUMIN SERPL-MCNC: 3.7 G/DL (ref 3.4–5)
ALP SERPL-CCNC: 106 U/L (ref 40–150)
ALT SERPL W P-5'-P-CCNC: 21 U/L (ref 0–70)
ANION GAP SERPL CALCULATED.3IONS-SCNC: 11 MMOL/L (ref 3–14)
AST SERPL W P-5'-P-CCNC: 15 U/L (ref 0–45)
BASOPHILS # BLD AUTO: 0.1 10E9/L (ref 0–0.2)
BASOPHILS NFR BLD AUTO: 0.6 %
BILIRUB SERPL-MCNC: 0.7 MG/DL (ref 0.2–1.3)
BUN SERPL-MCNC: 25 MG/DL (ref 7–30)
CALCIUM SERPL-MCNC: 9 MG/DL (ref 8.5–10.1)
CHLORIDE SERPL-SCNC: 104 MMOL/L (ref 94–109)
CHOLEST SERPL-MCNC: 129 MG/DL
CO2 SERPL-SCNC: 28 MMOL/L (ref 20–32)
CREAT SERPL-MCNC: 1.24 MG/DL (ref 0.66–1.25)
DIFFERENTIAL METHOD BLD: NORMAL
EOSINOPHIL # BLD AUTO: 0.2 10E9/L (ref 0–0.7)
EOSINOPHIL NFR BLD AUTO: 2.1 %
ERYTHROCYTE [DISTWIDTH] IN BLOOD BY AUTOMATED COUNT: 13.4 % (ref 10–15)
GFR SERPL CREATININE-BSD FRML MDRD: 57 ML/MIN/1.7M2
GLUCOSE BLDC GLUCOMTR-MCNC: 115 MG/DL (ref 70–99)
GLUCOSE SERPL-MCNC: 127 MG/DL (ref 70–99)
HCT VFR BLD AUTO: 47.9 % (ref 40–53)
HDLC SERPL-MCNC: 33 MG/DL
HGB BLD-MCNC: 16.1 G/DL (ref 13.3–17.7)
IMM GRANULOCYTES # BLD: 0.1 10E9/L (ref 0–0.4)
IMM GRANULOCYTES NFR BLD: 0.6 %
LDLC SERPL CALC-MCNC: 35 MG/DL
LYMPHOCYTES # BLD AUTO: 1.7 10E9/L (ref 0.8–5.3)
LYMPHOCYTES NFR BLD AUTO: 19.5 %
MCH RBC QN AUTO: 28.8 PG (ref 26.5–33)
MCHC RBC AUTO-ENTMCNC: 33.6 G/DL (ref 31.5–36.5)
MCV RBC AUTO: 86 FL (ref 78–100)
MONOCYTES # BLD AUTO: 0.8 10E9/L (ref 0–1.3)
MONOCYTES NFR BLD AUTO: 8.6 %
NEUTROPHILS # BLD AUTO: 6 10E9/L (ref 1.6–8.3)
NEUTROPHILS NFR BLD AUTO: 68.6 %
NRBC # BLD AUTO: 0 10*3/UL
NRBC BLD AUTO-RTO: 0 /100
PLATELET # BLD AUTO: 226 10E9/L (ref 150–450)
POTASSIUM SERPL-SCNC: 4 MMOL/L (ref 3.4–5.3)
PROT SERPL-MCNC: 7.1 G/DL (ref 6.8–8.8)
RBC # BLD AUTO: 5.59 10E12/L (ref 4.4–5.9)
SODIUM SERPL-SCNC: 143 MMOL/L (ref 133–144)
TRIGL SERPL-MCNC: 305 MG/DL
WBC # BLD AUTO: 8.8 10E9/L (ref 4–11)

## 2017-04-30 PROCEDURE — 80053 COMPREHEN METABOLIC PANEL: CPT | Performed by: FAMILY MEDICINE

## 2017-04-30 PROCEDURE — 70498 CT ANGIOGRAPHY NECK: CPT | Mod: TC

## 2017-04-30 PROCEDURE — 85025 COMPLETE CBC W/AUTO DIFF WBC: CPT | Performed by: FAMILY MEDICINE

## 2017-04-30 PROCEDURE — 99225 ZZC SUBSEQUENT OBSERVATION CARE,LEVEL II: CPT | Performed by: HOSPITALIST

## 2017-04-30 PROCEDURE — G0378 HOSPITAL OBSERVATION PER HR: HCPCS

## 2017-04-30 PROCEDURE — A9270 NON-COVERED ITEM OR SERVICE: HCPCS | Mod: GY | Performed by: FAMILY MEDICINE

## 2017-04-30 PROCEDURE — 25000132 ZZH RX MED GY IP 250 OP 250 PS 637: Mod: GY | Performed by: FAMILY MEDICINE

## 2017-04-30 PROCEDURE — 00000146 ZZHCL STATISTIC GLUCOSE BY METER IP

## 2017-04-30 PROCEDURE — 70496 CT ANGIOGRAPHY HEAD: CPT | Mod: TC

## 2017-04-30 PROCEDURE — 36415 COLL VENOUS BLD VENIPUNCTURE: CPT | Performed by: FAMILY MEDICINE

## 2017-04-30 RX ORDER — IOPAMIDOL 755 MG/ML
75 INJECTION, SOLUTION INTRAVASCULAR ONCE
Status: COMPLETED | OUTPATIENT
Start: 2017-04-30 | End: 2017-04-30

## 2017-04-30 RX ADMIN — IOPAMIDOL 75 ML: 755 INJECTION, SOLUTION INTRAVASCULAR at 05:04

## 2017-04-30 RX ADMIN — RISPERIDONE 0.25 MG: 0.25 TABLET ORAL at 08:26

## 2017-04-30 RX ADMIN — HYDROCHLOROTHIAZIDE 25 MG: 25 TABLET ORAL at 08:26

## 2017-04-30 RX ADMIN — LISINOPRIL 40 MG: 20 TABLET ORAL at 08:26

## 2017-04-30 RX ADMIN — ASPIRIN 325 MG ORAL TABLET 325 MG: 325 PILL ORAL at 08:26

## 2017-04-30 RX ADMIN — RANITIDINE HYDROCHLORIDE 150 MG: 150 TABLET, FILM COATED ORAL at 08:26

## 2017-04-30 RX ADMIN — FINASTERIDE 5 MG: 5 TABLET, FILM COATED ORAL at 08:26

## 2017-04-30 NOTE — PLAN OF CARE
Patient discharged at 10:00 AM via cart accompanied by staff. Prescriptions - None ordered for discharge. All belongings sent with patient.     Discharge instructions reviewed with Dank HUTTON with [facility] in [place]. Listed belongings gathered and returned to patient. yes    Patient discharged to [facility] in [place].   Report called to [facility] in [place] spoke with Dank HUTTON    Core Measures and Vaccines  Core Measures applicable during stay: N/A If yes, state diagnosis: N/A  Pneumonia and Influenza given prior to discharge, if indicated: N/A    Surgical Patient   Surgical Procedures during stay: N/A  Did patient receive discharge instruction on wound care and recognition of infection symptoms? N/A    MISC  Follow up appointment made:  Per [facility] to arrange on discharge.   Home and hospital aquired medications returned to patient: N/A  Patient reports pain was well managed at discharge: Yes

## 2017-04-30 NOTE — PLAN OF CARE
Problem: Goal Outcome Summary  Goal: Goal Outcome Summary  Outcome: No Change  Vitals: VSS. /98 (BP Location: Right arm)  Pulse 61  Temp 96.8  F (36  C) (Tympanic)  Resp 18  SpO2 96%  Pain: Denies.  Orientation: Oriented to self only. Refused Neuro checks. Agitated and angry about any cares being done. Beginning of shift speech was clear later became gargled within minutes. Code stroke called Read prior note.   Cardiac: Irregular. Tele monitor read 100% dual paced in the 80's.   Lungs: Pt refused to take deep breaths.  Bowels: Active  Urinating: Adequate.  Skin: WDL  IV fluids: SL  Antibiotics: None  Mobility: Assist of one with gait belt. Impulsive and does not use call light.   Safety: Call light in reach. Bed alarm on. Wife in room  Comment: Code stroke was called- CT angio negative. Unable to do MRI due to pacemaker. Slept on and off throughout shift.         Face to face report given with opportunity to observe patient.    Report given to Julia Jhaveri   4/30/2017  7:03 AM

## 2017-04-30 NOTE — PROVIDER NOTIFICATION
Dr. Garcia notified of pt's increased agitation and continued confusion, telephone with read back orders received for IV Ativan 0.5-1mg Q6H PRN.

## 2017-04-30 NOTE — PLAN OF CARE
Code stroke called.  Assisted patient into bed via 3 staff.  Applied cardiac monitor.  Obtained VS.  Assisted patient to CT via bed with staff of 3.  Inserted 18G IV Right AC while in CT. Patient tolerated well.  Patient returned to floor via bed and staff assist of 3 @ 0503.  Awaiting test results.  Will continue to observe.

## 2017-04-30 NOTE — PLAN OF CARE
Problem: Goal Outcome Summary  Goal: Goal Outcome Summary  Outcome: No Change  Pt presented with VSS, denying pain throughout the shift. Pt's neuro checks intact except for increased confusion and agitation as the night progressed, see prior notes. Pt received 0.5mg IV ativan - pt slept soundly after receiving dose. Pt up walking around in the halls when awake, is very confused and can be difficult to redirect. Pt's wife present, attentive to pt.      Temp: 97.6  F (36.4  C) Temp src: Tympanic BP: 162/86   Heart Rate: 71 Resp: 18 SpO2: 97 % O2 Device: None (Room air)       Face to face report given with opportunity to observe patient.     Report given to BRENNEN Chang.      Brittny Mckeon   4/29/2017  11:10 PM

## 2017-04-30 NOTE — PROGRESS NOTES
Name: Peter Zhao    MRN#: [id number]    Reason for Hospitalization: TIA (transient ischemic attack) [G45.9]    Discharge Date: 4/30/2017    Patient / Family response to discharge plan: in agreement    Follow-Up Appt: Future Appointments  Date Time Provider Department Center   7/18/2017 1:30 PM Tima Moreland MD [facility] [place]       Other Providers (Care Coordinator, County Services, PCA services etc): No    Discharge Disposition: [facility] Transfer Ennice \[facility] [place] [facility]\""    [facility]

## 2017-04-30 NOTE — PROGRESS NOTES
[facility]  Progress Note            Subjective:   Pt increasing confusion last pm shift. Then early this am, slurred speech, right sided weakness. No memory of event. Very confused this am but not agitated/anxious                 Medications:     Current Facility-Administered Medications Ordered in Epic   Medication Dose Route Frequency Last Rate Last Dose     aspirin tablet 325 mg  325 mg Oral Daily   325 mg at 04/29/17 0803     atorvastatin (LIPITOR) tablet 40 mg  40 mg Oral At Bedtime   40 mg at 04/29/17 2146     finasteride (PROSCAR) tablet 5 mg  5 mg Oral Daily   5 mg at 04/29/17 0803     nitroglycerin (NITROSTAT) sublingual tablet 0.4 mg  0.4 mg Sublingual Q5 Min PRN         ranitidine (ZANTAC) tablet 150 mg  150 mg Oral BID   150 mg at 04/29/17 2146     naloxone (NARCAN) injection 0.1-0.4 mg  0.1-0.4 mg Intravenous Q2 Min PRN         senna-docusate (SENOKOT-S;PERICOLACE) 8.6-50 MG per tablet 1-2 tablet  1-2 tablet Oral BID PRN         ondansetron (ZOFRAN-ODT) ODT tab 4 mg  4 mg Oral Q6H PRN        Or     ondansetron (ZOFRAN) injection 4 mg  4 mg Intravenous Q6H PRN         prednisoLONE acetate (PRED FORTE) 1 % ophthalmic susp 1 drop  1 drop Right Eye BID   1 drop at 04/29/17 2149     risperiDONE (risperDAL) tablet 0.25 mg  0.25 mg Oral BID   0.25 mg at 04/29/17 2146     ketorolac (ACULAR) 0.5 % ophthalmic solution 1 drop  1 drop Right Eye BID   1 drop at 04/29/17 2150     hydrochlorothiazide (HYDRODIURIL) tablet 25 mg  25 mg Oral Daily   25 mg at 04/29/17 1617     lisinopril (PRINIVIL/ZESTRIL) tablet 40 mg  40 mg Oral Daily   40 mg at 04/29/17 1617     LORazepam (ATIVAN) injection 0.5-1 mg  0.5-1 mg Intravenous Q6H PRN   0.5 mg at 04/29/17 1954     No current Epic-ordered outpatient prescriptions on file.       Review of Systems:   C: NEGATIVE for fever, chills, change in weight  E/M: NEGATIVE for ear, mouth and throat problems  R: NEGATIVE for significant cough or SOB  CV: NEGATIVE for chest  pain, palpitations or peripheral edema               Physical Exam:   Vitals were reviewed  Patient Vitals for the past 24 hrs:   BP Temp Temp src Heart Rate Resp SpO2   04/30/17 0624 146/98 96.8  F (36  C) Tympanic 80 18 96 %   04/30/17 0600 141/82 - - 84 18 -   04/30/17 0551 134/82 - - 82 18 -   04/30/17 0536 131/86 - - 81 18 -   04/30/17 0521 137/80 - - 79 16 -   04/30/17 0506 143/98 - - 86 18 -   04/30/17 0447 143/97 98.2  F (36.8  C) Tympanic 82 18 -   04/30/17 0417 161/97 98  F (36.7  C) Tympanic 82 18 97 %   04/30/17 0005 127/89 98  F (36.7  C) Tympanic 99 16 92 %   04/29/17 1540 162/86 97.6  F (36.4  C) Tympanic 71 18 97 %   04/29/17 1338 132/89 - - 79 18 97 %   04/29/17 1221 - 98  F (36.7  C) Tympanic - 18 -     Constitutional: Awake, alert, cooperative.  Eyes:  sclera clear  ENT: no oral lesions  Neck: Supple,  Hematologic / Lymphatic: No cervical lymphadenopathy and no supraclavicular lymphadenopathy.  Lungs: No increased work of breathing, good air exchange, clear to auscultation bilaterally, no crackles or wheezing.  Cardiovascular: Regular rate and rhythm, normal S1 and S2, no S3 or S4, and no murmur noted.  Abdomen: normal bowel sounds, soft, non-distended, non-tender, no masses palpated,   Neurologic: Awake, alert, oriented to name, place and time.    Neuropsychiatric: Normal affect, mood, orientation, memory and insight.  Skin: No rashes      Peripheral IV 04/28/17 Left Upper forearm (Active)   Site Assessment WDL 4/30/2017 12:05 AM   Line Status Saline locked 4/30/2017 12:05 AM   Phlebitis Scale 0-->no symptoms 4/30/2017 12:05 AM   Infiltration Scale 0 4/30/2017 12:05 AM   Number of days:2       Peripheral IV 04/30/17 Right Upper arm (Active)   Number of days:0       Colostomy (Active)   Number of days:72       Incision/Surgical Site 04/20/17 Right Eye (Active)   Incision Assessment WDL 4/20/2017 11:25 AM   Incision Drainage Amount None 4/20/2017 11:25 AM   Dressing Intervention Open to air / No  Dressing 4/20/2017 11:25 AM   Number of days:10     Line/device assessment(s) completed for medical necessity         Data:     Results for orders placed or performed during the hospital encounter of 04/28/17 (from the past 24 hour(s))   Glucose by meter   Result Value Ref [facility]    Glucose 115 (H) 70 - 99 mg/dL   CBC differential   Result Value Ref [facility]    WBC 8.8 4.0 - 11.0 10e9/L    RBC Count 5.59 4.4 - 5.9 10e12/L    Hemoglobin 16.1 13.3 - 17.7 g/dL    Hematocrit 47.9 40.0 - 53.0 %    MCV 86 78 - 100 fl    MCH 28.8 26.5 - 33.0 pg    MCHC 33.6 31.5 - 36.5 g/dL    RDW 13.4 10.0 - 15.0 %    Platelet Count 226 150 - 450 10e9/L    Diff Method Automated Method     % Neutrophils 68.6 %    % Lymphocytes 19.5 %    % Monocytes 8.6 %    % Eosinophils 2.1 %    % Basophils 0.6 %    % Immature Granulocytes 0.6 %    Nucleated RBCs 0 0 /100    Absolute Neutrophil 6.0 1.6 - 8.3 10e9/L    Absolute Lymphocytes 1.7 0.8 - 5.3 10e9/L    Absolute Monocytes 0.8 0.0 - 1.3 10e9/L    Absolute Eosinophils 0.2 0.0 - 0.7 10e9/L    Absolute Basophils 0.1 0.0 - 0.2 10e9/L    Abs Immature Granulocytes 0.1 0 - 0.4 10e9/L    Absolute Nucleated RBC 0.0    Comprehensive metabolic panel   Result Value Ref [facility]    Sodium 143 133 - 144 mmol/L    Potassium 4.0 3.4 - 5.3 mmol/L    Chloride 104 94 - 109 mmol/L    Carbon Dioxide 28 20 - 32 mmol/L    Anion Gap 11 3 - 14 mmol/L    Glucose 127 (H) 70 - 99 mg/dL    Urea Nitrogen 25 7 - 30 mg/dL    Creatinine 1.24 0.66 - 1.25 mg/dL    GFR Estimate 57 (L) >60 mL/min/1.7m2    GFR Estimate If Black 69 >60 mL/min/1.7m2    Calcium 9.0 8.5 - 10.1 mg/dL    Bilirubin Total 0.7 0.2 - 1.3 mg/dL    Albumin 3.7 3.4 - 5.0 g/dL    Protein Total 7.1 6.8 - 8.8 g/dL    Alkaline Phosphatase 106 40 - 150 U/L    ALT 21 0 - 70 U/L    AST 15 0 - 45 U/L     All cardiac studies reviewed by me.  All imaging studies reviewed by me.         Assessment and Plan:   Principal Problem:    TIA (transient ischemic attack)   on statin/plavix.  Still had event. Ct angio no concerning lesion. Also last night not reported to me was a transient episode of complete heart block-this was several hours before the neurol event.  Pt needs neuro eval so will transfer to [facility]  Active Problems:    CAD (coronary artery disease)   asymptom    HTN (hypertension)    Med restarted yesterday    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  Will need pacemaker interrogation

## 2017-04-30 NOTE — PLAN OF CARE
Attempted neuro exam during Code Stroke, but pt refused pupil check and did not participate in FAST exam secondary to lack of comprehension. Pt followed few commands. CT angio results read by Dr. Sandoval, Pt ok to return to med/surg. 0615: pt returned to room 3116, Charlene MOJICA RN updated on condition and will resume care of pt.

## 2017-04-30 NOTE — DISCHARGE INSTRUCTIONS
What to expect when you have contrast    During your exam, we will inject  contrast  into your vein or artery. (Contrast is a clear liquid with iodine in it. It shows up on X-rays.)    You may feel warm or hot. You may have a metal taste in your mouth and a slight upset stomach. You may also feel pressure near the kidneys and bladder. These effects will last about 1 to 3 minutes.    Please tell us if you have:    Sneezing     Itching    Hives     Swelling in the face    A hoarse voice    Breathing problems    Other new symptoms    Serious problems are rare.  They may include:    Irregular heartbeat     Seizures    Kidney failure              Tissue damage    Shock      Death    If you have any problems during the exam, we  will treat them right away.    When you get home    Call your hospital if you have any new symptoms in the next 2 days, like hives or swelling. (Phone numbers are at the bottom of this page.) Or call your family doctor.     If you have wheezing or trouble breathing, call 911.    Self-care  -Drink at least 4 extra glasses of water today.   This reduces the stress on your kidneys.  -Keep taking your regular medicines.    The contrast will pass out of your body in your  Urine(pee). This will happen in the next 24 hours. You  will not feel this. Your urine will not  change color.    If you have kidney problems or take metformin    Drink 4 to 8 large glasses of water for the next  2 days, if you are not on a fluid restriction.    ?If you take metformin (Glucophage or Glucovance) for diabetes, keep taking it.      ?Your kidney function tests are abnormal.  If you take Metformin, do not take it for 48 hours. Please go to your clinic for a blood test within 3 days after your exam before the restarting this medicine.     (Note to provider:please give patient prescription for lab tests.)    ?Special instructions: Drink an extra 4 glasses of water to flush out the contrast.     I have read and understand the  above information.    Patient Sign Here:______________________________________Date:________Time:______    Staff Sign Here:________________________________________Date:_______Time:______      Radiology Departments:     ?[facility]: [phone] ?[place]: [phone]     ?[place]: [phone] ?[facility]:[phone]      ?[facility]: [phone]  ?[place]: [phone]  ?[place]:[phone]    ?[facility]:[phone]  ?[facility]:[phone]

## 2017-04-30 NOTE — PLAN OF CARE
Patient is to be transferred to [facility] in [place], wife aware, wife did take patients eye drops home therefore they were not administered, transfer pending.

## 2017-04-30 NOTE — PROGRESS NOTES
[facility]    Hospitalist Progress Note  Called to see the patient for increasing confusion and sudden onset of slurring of speech. The patient has been confused overnight. The patient was seen and examined immediately .    Assessment & Plan   Peter Zhao is a 71 year old male who was admitted on 4/28/2017 with confusion heaviness on the left arm and face with a diagnosis of TIA was found to have sudden onset of slurring of speech this morning.     Slurring of speech: The patient received ativan and risperidone last night. Acute onset of aphasia was also concerning for an acute stroke but the symptoms improved and as his symptoms have been intermittently present for the last 2 days, administration of TPA is not recommended. He is on ASA, statins etc. I have moved the patient to the ICU for better monitoring. The wife has been updated about the plan. I also updated the Primary Physician Dr. Montelongo. Continue with neurochecks [id number]. The risk of giving TPA outweigh the benefits at the moment. Will continue to monitor closely.  Awaiting CTA report.     Yariel Sandoval    Interval History   The patient was admitted with confusion and and weakness diagnosed with TIA. Reportedly the weakness resolved but intermittent confusion persisted. This morning around 4:20 am he had sudden onset of slurring of speech and gait issues while using the bathroom. He was seen and examined by myself. His symptoms were concerning for a CVA vs TIA. CTA brain was performed as he cannot get MRI due to his defibrillator. He came back from the CT scan around 5 am. He was seen and examined again. His speech was back to baseline and weakness had resolved. The patient did receive a dose of risperidone and ativan last night for confusion.      -Data reviewed today: I reviewed all new labs and imaging results over the last 24 hours.    Physical Exam   Temp: 98  F (36.7  C) Temp src: Tympanic BP: 127/89   Heart Rate: 99 Resp: 16 SpO2:  92 % O2 Device: None (Room air)    There were no vitals filed for this visit.  Vital Signs with Ranges  Temp:  [97.3  F (36.3  C)-98  F (36.7  C)] 98  F (36.7  C)  Heart Rate:  [70-99] 99  Resp:  [16-18] 16  BP: (127-162)/(86-90) 127/89  SpO2:  [92 %-100 %] 92 %  I/O last 3 completed shifts:  In: 1207 [P.O.:1207]  Out: -     Peripheral IV 04/28/17 Left Upper forearm (Active)   Site Assessment [facility] 4/30/2017 12:05 AM   Line Status Saline locked 4/30/2017 12:05 AM   Phlebitis Scale 0-->no symptoms 4/30/2017 12:05 AM   Infiltration Scale 0 4/30/2017 12:05 AM   Number of days:2       Colostomy (Active)   Number of days:72       Incision/Surgical Site 04/20/17 Right Eye (Active)   Incision Assessment [facility] 4/20/2017 11:25 AM   Incision Drainage Amount None 4/20/2017 11:25 AM   Dressing Intervention Open to air / No Dressing 4/20/2017 11:25 AM   Number of days:10     No line/device    Constitutional:   Confused, responding to some commands, has incomprehensible speech.    Eyes:   Lids and lashes normal, pupils equal, round and reactive to light, extra ocular muscles intact, sclera clear, conjunctiva normal   ENT:   Normocephalic, without obvious abnormality, atraumatic, sinuses nontender on palpation,    Neck:   supple, symmetrical, trachea midline     Lungs:   No increased work of breathing, good air exchange, clear to auscultation bilaterally, no crackles or wheezing   Cardiovascular:   normal S1 and S2   Abdomen:   No scars, normal bowel sounds, soft, non-distended, non-tender, no masses palpated, no hepatosplenomegally     Neurologic:   Alert, not oriented, incomprehensible speech. Following some commands. No obvious facial droop, moving eye balls in all directions, PERRLA. Tongue movement is symmetric. He is moving all extremities, strength was symmetrically 5/5 in upper extremities and lower extremities. No obvious sensory loss. He was able to stand up and walk but with slight heaviness on the right side. No obvious  ataxia.    Neuropsychiatric:   Level of consciousness: alert /confused   Skin:   no bruising or bleeding        Medications        iopamidol  75 mL Intravenous Once     sodium chloride (PF)  100 mL Intravenous Once     aspirin tablet 325 mg  325 mg Oral Daily     atorvastatin  40 mg Oral At Bedtime     finasteride (PROSCAR) tablet 5 mg  5 mg Oral Daily     ranitidine  150 mg Oral BID     prednisoLONE acetate  1 drop Right Eye BID     risperiDONE  0.25 mg Oral BID     ketorolac  1 drop Right Eye BID     hydrochlorothiazide (HYDRODIURIL) tablet 25 mg  25 mg Oral Daily     lisinopril (PRINIVIL/ZESTRIL) tablet 40 mg  40 mg Oral Daily       Data     Recent Labs  Lab 04/28/17 2010   WBC 8.3   HGB 15.0   MCV 85         POTASSIUM 3.7   CHLORIDE 107   CO2 28   BUN 22   CR 1.16   ANIONGAP 9   WALI 8.9   *   ALBUMIN 3.6   PROTTOTAL 7.0   BILITOTAL 0.6   ALKPHOS 107   ALT 22   AST 19   TROPI <0.015The 99th percentile for upper reference range is 0.045 ug/L.  Troponin values in the range of 0.045 - 0.120 ug/L may be associated with risks of adverse clinical events.       No results found for this or any previous visit (from the past 24 hour(s)).     CTA brain/neck report came back. Report reviewed. No acute occlusion or hemorrhage. Also discussed with the radiologist.

## 2017-04-30 NOTE — PROVIDER NOTIFICATION
At 0414 pt was up urinating with UA and speech quickly changed by 0417 to gargled. Did a BG check that was 115. Took vital signs. Attempted to do FAST test but was unable to assess due to pt not able to follow directions. Unsure if it was from early stage dementia or signs and symptoms of stroke. MD was in room assessing at 0421 and code stroke was initiated at 0429. Pt went to get CT-angio stroke protocol at 0431.

## 2017-05-01 ENCOUNTER — TRANSFERRED RECORDS (OUTPATIENT)
Dept: HEALTH INFORMATION MANAGEMENT | Facility: HOSPITAL | Age: 72
End: 2017-05-01

## 2017-05-01 NOTE — DISCHARGE SUMMARY
HOSPITAL COURSE:  Roger Zhao is a 71-year-old male who came in for a possible TIA.  He was having left upper extremity weakness.  By the time he go to the emergency room, his symptoms were resolved.  He was admitted.  Carotid ultrasound in the ER showed no stenotic lesions.  He on the day of transfer developed slurred speech, expressive aphasia and right upper and lower extremity weakness.  He had no recollection of the event.  A code stroke was called.  He was sent for a CT angiogram that again showed no concerning lesion in any of the cerebral arteries.  However, the patient has had this acute confusion that is unexplained.  He also had an episode where he had complete heart block that was notified to me at 8:30 p.m. on , although this was several from the event that resulted in the code stroke.  This was related to the transfer team at [facility].      The total time spent in this transfer exceeded 30 minutes.         PAUL BOSE MD             D: 2017 19:20   T: 2017 20:19   MT: GILLIAN      Name:     ROGER ZHAO   MRN:      [id number]        Account:        [id number]   :      1945           Admit Date:                                       Discharge Date: 2017      Document: [id number]       cc: [facility]

## 2017-05-02 ENCOUNTER — TRANSFERRED RECORDS (OUTPATIENT)
Dept: HEALTH INFORMATION MANAGEMENT | Facility: HOSPITAL | Age: 72
End: 2017-05-02

## 2017-05-03 ENCOUNTER — TRANSFERRED RECORDS (OUTPATIENT)
Dept: HEALTH INFORMATION MANAGEMENT | Facility: HOSPITAL | Age: 72
End: 2017-05-03

## 2017-05-03 LAB — TSH SERPL-ACNC: 1.04 MIU/ML (ref 0.35–4.8)

## 2017-05-06 LAB
ALT SERPL-CCNC: 23 U/L (ref 18–65)
AST SERPL-CCNC: 20 U/L (ref 10–40)

## 2017-05-07 ENCOUNTER — TRANSFERRED RECORDS (OUTPATIENT)
Dept: HEALTH INFORMATION MANAGEMENT | Facility: HOSPITAL | Age: 72
End: 2017-05-07

## 2017-05-09 LAB
CREAT SERPL-MCNC: 1.4 MG/DL (ref 0.8–1.5)
INR PPP: 1 (ref 0.9–1.2)
POTASSIUM SERPL-SCNC: 4.2 MEQ/L (ref 3.5–5.1)

## 2017-05-12 ENCOUNTER — TRANSFERRED RECORDS (OUTPATIENT)
Dept: HEALTH INFORMATION MANAGEMENT | Facility: HOSPITAL | Age: 72
End: 2017-05-12

## 2017-05-12 LAB — GLUCOSE SERPL-MCNC: 103 MG/DL (ref 60–99)

## 2017-05-19 ENCOUNTER — OFFICE VISIT (OUTPATIENT)
Dept: FAMILY MEDICINE | Facility: OTHER | Age: 72
End: 2017-05-19
Attending: FAMILY MEDICINE
Payer: MEDICARE

## 2017-05-19 ENCOUNTER — HOSPITAL ENCOUNTER (OUTPATIENT)
Dept: WOUND CARE | Facility: HOSPITAL | Age: 72
Discharge: HOME | End: 2017-05-19
Admitting: FAMILY MEDICINE
Payer: MEDICARE

## 2017-05-19 VITALS
OXYGEN SATURATION: 96 % | HEIGHT: 70 IN | TEMPERATURE: 96.2 F | DIASTOLIC BLOOD PRESSURE: 72 MMHG | HEART RATE: 62 BPM | WEIGHT: 175 LBS | SYSTOLIC BLOOD PRESSURE: 120 MMHG | BODY MASS INDEX: 25.05 KG/M2

## 2017-05-19 VITALS — SYSTOLIC BLOOD PRESSURE: 149 MMHG | HEART RATE: 62 BPM | DIASTOLIC BLOOD PRESSURE: 97 MMHG | TEMPERATURE: 96.8 F

## 2017-05-19 DIAGNOSIS — R45.1 AGITATION: ICD-10-CM

## 2017-05-19 DIAGNOSIS — I10 BENIGN ESSENTIAL HYPERTENSION: ICD-10-CM

## 2017-05-19 DIAGNOSIS — Z86.73 HISTORY OF TIA (TRANSIENT ISCHEMIC ATTACK) AND STROKE: Primary | ICD-10-CM

## 2017-05-19 PROCEDURE — 99214 OFFICE O/P EST MOD 30 MIN: CPT

## 2017-05-19 PROCEDURE — 99214 OFFICE O/P EST MOD 30 MIN: CPT | Performed by: FAMILY MEDICINE

## 2017-05-19 PROCEDURE — 99213 OFFICE O/P EST LOW 20 MIN: CPT

## 2017-05-19 ASSESSMENT — PAIN SCALES - GENERAL: PAINLEVEL: NO PAIN (0)

## 2017-05-19 NOTE — PROGRESS NOTES
"Peter Zhao, 1945  Chief Complaint   Patient presents with     Ostomy       Patient Active Problem List   Diagnosis     Diabetes mellitus, type 2 (H)     CAD (coronary artery disease)     ACP (advance care planning)     Hyperlipidemia LDL goal <100     Elevated prostate specific antigen (PSA)     HTN (hypertension)     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     H/O migraine     Visual aura     Cataract     S/P colon resection     Hypokalemia     TIA (transient ischemic attack)     Agitation       Concerns/Comments:   Peter Zhao called this morning reporting a stomal bulge that has gotten worse the past few days and wanted to be seen today if possible. Wife Floridalma present. Has history end sigmoid colostomy that was placed on 2/17/2017 due to stricture of lower sigmoid secondary to diverticular disease. He continues to ask questions that I know have been addressed in the past and is quite anxious about the peristomal bulge. He and his wife report that he has had a small peristomal hernia bulge since the stoma was placed but feels it has gotten worse and his wife agrees; I was unable to find documentation of any mild peristomal hernia. Also c/o constipation \"not shitting\" for the past 2 days; his wife relays that the bulging was more pronounced even before that time.    At the beginning of the visit he denied any issues with the pouch, stoma, or wear time issues; at almost the end of the visit he relayed that his stoma has been bleeding quite frequently.    Objective:   Does appear to have mild reducible peristomal hernia. Explained that the bulging may be more pronounced recently due to constipation; Floridalma believes it was more pronounced even before the constipation.    Superficial stomal ulceration due to pouch rubbing on distal edge; pouch was cut too small.    Procedures:   Pt evaluated standing and sitting for peristomal bulge.  Measured for hernia belt.    Pouch " "removed; there was a small amount of soft stool present in pouch.  Peristomal skin cleansed and evaluated.  Cut barrier to fit just slightly larger than stoma with approx 1/16\" of skin showing and showed pt with mirror.  Placed cut to fit 2 piece closed end flat pouching system with use of Salvatore ring.    Assessment/Response to tx:  Mild reducible peristomal hernia.  Superficial stomal ulceration.  Peter is happy his stoma is producing again.    Plan of care:   Pt wants to wait to order hernia belt to see what Dr. Moreland says about a ostomy revision this summer; has an appt in July.  Will cut barrier larger to accommodate stoma.  Return if necessary.  Monitor BM's, if any cramping, nausea, or vomiting or continued constipation seek medical attention.    Treatment Goal:  Resolution of stomal ulceration.  No increase in size of possible hernia.    Supplies provided:  NA    Education:  Wound care instructions/education provided. Patient verbalized understanding of instruction/education.    Nurse face to face time: 60min    CC: Brock Vega                  "

## 2017-05-19 NOTE — MR AVS SNAPSHOT
After Visit Summary   5/19/2017    Peter Zhao    MRN: [id number]           Patient Information     Date Of Birth          1945        Visit Information        Provider Department      5/19/2017 9:45 AM RAYMUNDO Gallagher MD [facility]        Today's Diagnoses     History of TIA (transient ischemic attack) and stroke    -  1    Benign essential hypertension        H/O colostomy          Care Instructions    Follow up with Dr. Petersen. Try a dose reduction every 5-7 days.        Follow-ups after your visit        Additional Services     INTERNAL MEDICINE REFERRAL       Your provider has referred you to: [facility] [phone]   [email or web address]    Please be aware that coverage of these services is subject to the terms and limitations of your health insurance plan.  Call member services at your health plan with any benefit or coverage questions.      Please bring the following to your appointment:  >>   Any x-rays, CTs or MRIs which have been performed.  Contact the facility where they were done to arrange for  prior to your scheduled appointment.   >>   List of current medications   >>   This referral request   >>   Any documents/labs given to you for this referral                  Your next 10 appointments already scheduled     May 19, 2017 11:00 AM CDT   Return Visit with [facility]   [facility] ([facility] )    [address] [id number] [phone]            Jul 18, 2017  1:30 PM CDT   (Arrive by 1:15 PM)   Return Visit with Tima Moreland MD   [facility] ([facility])    [address] [place] [address] [id number] [phone]              Who to contact     If you have questions or need follow up information about today's clinic visit or your schedule please contact [facility] directly at [phone].  Normal or  "non-critical lab and imaging results will be communicated to you by MyChart, letter or phone within 4 business days after the clinic has received the results. If you do not hear from us within 7 days, please contact the clinic through [employer] or phone. If you have a critical or abnormal lab result, we will notify you by phone as soon as possible.  Submit refill requests through SmartCare system or call your pharmacy and they will forward the refill request to us. Please allow 3 business days for your refill to be completed.          Additional Information About Your Visit        SmartCare system Information     SmartCare system lets you send messages to your doctor, view your test results, renew your prescriptions, schedule appointments and more. To sign up, go to [email or web address] . Click on \"Log in\" on the left side of the screen, which will take you to the Welcome page. Then click on \"Sign up Now\" on the right side of the page.     You will be asked to enter the access code listed below, as well as some personal information. Please follow the directions to create your username and password.     Your access code is: [email or web address]  Expires: 2017  8:29 AM     Your access code will  in 90 days. If you need help or a new code, please call your [place] clinic or [phone].        Care EveryWhere ID     This is your Care EveryWhere ID. This could be used by other organizations to access your [place] medical records  [id number]        Your Vitals Were     Pulse Temperature Height Pulse Oximetry BMI (Body Mass Index)       62 96.2  F (35.7  C) (Tympanic) 5' 10\" (1.778 m) 96% 25.11 kg/m2        Blood Pressure from Last 3 Encounters:   17 120/72   17 132/89   17 147/82    Weight from Last 3 Encounters:   17 175 lb (79.4 kg)   17 172 lb (78 kg)   17 165 lb (74.8 kg)              We Performed the Following     INTERNAL MEDICINE REFERRAL        Primary Care Provider Office Phone # Fax #    Brock " MCKENNA Vega [phone]       [facility] [address] [address]        Thank you!     Thank you for choosing [facility]  for your care. Our goal is always to provide you with excellent care. Hearing back from our patients is one way we can continue to improve our services. Please take a few minutes to complete the written survey that you may receive in the mail after your visit with us. Thank you!             Your Updated Medication List - Protect others around you: Learn how to safely use, store and throw away your medicines at www.disposemymeds.org.          This list is accurate as of: 5/19/17 10:08 AM.  Always use your most recent med list.                   Brand Name Dispense Instructions for use    ASPIRIN PO      Take 325 mg by mouth daily       atorvastatin 40 MG tablet    LIPITOR     Take 40 mg by mouth daily       blood glucose lancing device     1 kit    1 Units daily       * blood glucose monitoring lancets     100 each    1 Units daily Use fresh lancet each day to check blood sugars       * blood glucose monitoring lancets     100 Box    Use to test blood sugar 2 times weekly       * blood glucose monitoring test strip    ACCU-CHEK SMARTVIEW    1 Box    Test blood sugar before breakfast one day, before and after supper the next and not at all the third day       * blood glucose monitoring test strip    MARIAN CONTOUR NEXT    100 strip    Use to test blood sugar 2 times weekly or as directed.       FINASTERIDE PO      Take 5 mg by mouth daily       HYDROCHLOROTHIAZIDE PO      Take 25 mg by mouth       LISINOPRIL PO      Take 40 mg by mouth daily       METOPROLOL TARTRATE PO      Take 100 mg by mouth 2 times daily       MULTIVITAMINS PO      Take 1 capsule by mouth daily       NITROSTAT 0.4 MG sublingual tablet   Generic drug:  nitroglycerin      Place 1 tablet under the tongue every 5 minutes as needed       NONFORMULARY      OTC eye drops as  directed       order for DME     1 Package    Equipment being ordered: Ostomy supplies - Barrier - currently using 84979 Sondra flat cut to fit; Pouch 87360 clear; [employer] barrier rings 7805; Gray ostomy belt.  Fill per insurance guidelines       * Ostomy Supplies Misc     1 each    Sondra m9 odor eliminator drops 7717 or 7715 - patient preference       * Ostomy Supplies POUCH Misc     2 each    Gray 36984 closed end pouches - per Medicare patient is allowed 60 per month.  Dispense 2 boxes or patient choice       PLAVIX PO      Take 75 mg by mouth daily       polyethylene glycol 0.4%- propylene glycol 0.3% 0.4-0.3 % Soln ophthalmic solution    SYSTANE ULTRA     Place 1 drop into both eyes every hour as needed for dry eyes       PROSCAR PO      Take 5 mg by mouth daily       RANITIDINE HCL PO      Take 50 mg by mouth 2 times daily       SEROQUEL PO      Take 50 mg by mouth 2 times daily take 25 mg tablet in the am and 50 mg tablet in the afternoon and pm       VITAMIN D3 PO      Take  by mouth daily.       * Notice:  This list has 6 medication(s) that are the same as other medications prescribed for you. Read the directions carefully, and ask your doctor or other care provider to review them with you.

## 2017-05-19 NOTE — PROGRESS NOTES
[facility]    Peter Zhao, 71 year old, male presents with   Chief Complaint   Patient presents with     Establish Care     Perviously seeing Dr. Vega  At [facility].  Family is requesting to switch to our clinic.  Earlier had difficulties getting his hypertension controlled.  In April developed a TIA.  He does have known heart valve and history of heart pacemaker.  He had a colostomy for what ended up being  a stricture of his colon from diverticulosis.  He will need subsequent colon surgery.  Is on Plavix and recently saw the neurologist.wife is requesting that he could try to get off the Seroquel.  He was not on that prior to his hospitalization.       PAST MEDICAL HISTORY:  Past Medical History:   Diagnosis Date     Allergic state      CAD (coronary artery disease)     CABG 2013, stent x 1 in 2014     Cataract      Cataracts, bilateral      Elevated PSA      Gastro-oesophageal reflux disease      Heart murmur      Hyperlipidemia      Hypertension      Pacemaker      Stented coronary artery      Visual aura        PAST SURGICAL HISTORY:  Past Surgical History:   Procedure Laterality Date     CARDIAC SURGERY       COLECTOMY LOW ANTERIOR N/A 2/17/2017    Procedure: COLECTOMY LOW ANTERIOR;  Surgeon: Tima Moreland MD;  Location: [facility] OR     COLONOSCOPY N/A 2/17/2017    Procedure: COLONOSCOPY;  Surgeon: Tima Moreland MD;  Location: [facility] OR     ENT SURGERY       FLEX SIG (MEDICARE PT.)       HERNIA REPAIR       LAPAROSCOPIC HERNIORRHAPHY INGUINAL Right 9/18/2015    Procedure: LAPAROSCOPIC HERNIORRHAPHY INGUINAL;  Surgeon: Solitario Nettles DO;  Location: [facility] OR     PHACOEMULSIFICATION WITH STANDARD INTRAOCULAR LENS IMPLANT Right 4/20/2017    Procedure: PHACOEMULSIFICATION WITH STANDARD INTRAOCULAR LENS IMPLANT;  CATARACT EXTRACTION RIGHT EYE WITH IMPLANT;  Surgeon: Darin Gutierrez MD;  Location: [facility] OR     PROSTATE BIOPSY, NEEDLE, SATURATION SAMPLING  2009     retinopexy-pvd with retinal  tear Right 2008     TONSILLECTOMY         MEDICATIONS:  Prior to Admission medications    Medication Sig Start Date End Date Taking? Authorizing Provider   Clopidogrel Bisulfate (PLAVIX PO) Take 75 mg by mouth daily   Yes Reported, Patient   Finasteride (PROSCAR PO) Take 5 mg by mouth daily   Yes Reported, Patient   METOPROLOL TARTRATE PO Take 100 mg by mouth 2 times daily   Yes Reported, Patient   QUEtiapine Fumarate (SEROQUEL PO) Take 50 mg by mouth 2 times daily take 25 mg tablet in the am and 50 mg tablet in the afternoon and pm   Yes Reported, Patient   HYDROCHLOROTHIAZIDE PO Take 25 mg by mouth   Yes Reported, Patient   Ostomy Supplies POUCH [employer] Sondra 49332 closed end pouches - per Medicare patient is allowed 60 per month.  Dispense 2 boxes or patient choice 3/31/17  Yes Jackie Horne NP   Ostomy Supplies MISC Sondra m9 odor eliminator drops 7717 or 7715 - patient preference 3/30/17  Yes Vaishali Harvey NP   order for DME Equipment being ordered: Ostomy supplies - Barrier - currently using [address] [place] flat cut to fit; Pouch 35980 clear; [employer] barrier rings 7805; Sondra ostomy belt.  Fill per insurance guidelines 3/7/17  Yes Jackie Horne NP   blood glucose monitoring (MARIAN MICROLET) lancets Use to test blood sugar 2 times weekly 2/16/17  Yes Vaishali Harvey NP   atorvastatin (LIPITOR) 40 MG tablet Take 40 mg by mouth daily   Yes Reported, Patient   NONFORMULARY OTC eye drops as directed   Yes Reported, Patient   polyethylene glycol 0.4%- propylene glycol 0.3% (SYSTANE ULTRA) 0.4-0.3 % SOLN ophthalmic solution Place 1 drop into both eyes every hour as needed for dry eyes   Yes Reported, Patient   LISINOPRIL PO Take 40 mg by mouth daily    Yes Reported, Patient   FINASTERIDE PO Take 5 mg by mouth daily   Yes Reported, Patient   RANITIDINE HCL PO Take 50 mg by mouth 2 times daily    Yes Reported, Patient   blood glucose (MARIAN CONTOUR NEXT) test strip Use to test blood sugar 2 times weekly  "or as directed. 1/13/15  Yes Brock Vega MD   nitroglycerin (NITROSTAT) 0.4 MG SL tablet Place 1 tablet under the tongue every 5 minutes as needed   Yes Reported, Patient   Multiple Vitamin (MULTIVITAMINS PO) Take 1 capsule by mouth daily   Yes Reported, Patient   glucose blood VI test strips (ACCU-CHEK SMARTVIEW) strip Test blood sugar before breakfast one day, before and after supper the next and not at all the third day 10/31/13  Yes Steven Reed, DO   SOFTCLIX LANCETS MISC 1 Units daily Use fresh lancet each day to check blood sugars 10/31/13  Yes Steven Reed,    Lancets Misc. (ACCU-CHEK SOFTCLIX LANCET DEV) KIT 1 Units daily 10/31/13  Yes Steven Reed,    ASPIRIN PO Take 325 mg by mouth daily    Yes Reported, Patient   Cholecalciferol (VITAMIN D3 PO) Take  by mouth daily.   Yes Reported, Patient       ALLERGIES:     Allergies   Allergen Reactions     Acetaminophen      Ciprofloxacin Hives     No Clinical Screening - See Comments      Antibiotic allergy, pt not sure which       ROS:  Constitutional, HEENT, cardiovascular, pulmonary, gi and gu systems are negative, except as otherwise noted.      EXAM:  /72 (BP Location: Left arm, Patient Position: Chair, Cuff Size: Adult Large)  Pulse 62  Temp 96.2  F (35.7  C) (Tympanic)  Ht 5' 10\" (1.778 m)  Wt 175 lb (79.4 kg)  SpO2 96%  BMI 25.11 kg/m2 Body mass index is 25.11 kg/(m^2).   GENERAL APPEARANCE: healthy, alert and no distress  EYES: Eyes grossly normal to inspection, PERRL and conjunctivae and sclerae normal  RESP: lungs clear to auscultation - no rales, rhonchi or wheezes  CV: regular rates and rhythm, normal S1 S2, no S3 or S4 and Has a systolic murmur  Lab/ X-ray  Results for orders placed or performed during the hospital encounter of 04/28/17   CT Head w/o Contrast    Narrative    CT SCAN OF THE BRAIN WITHOUT CONTRAST    REPORT:  The ventricular system is normal in size.  There are no  masses, ventricular shifts, or " extracerebral collections.  Brainstem  and cerebellum appear normal.  The cranial vault is intact.  Paranasal  sinuses are clear.    IMPRESSION:  NEGATIVE CT SCAN OF THE BRAIN.  Exam Date: Apr 28, 2017 08:45:06 PM  Author: ANA HERNADEZ  This report is final and signed     US Carotid Bilateral    Narrative    CAROTID ULTRASOUND    INDICATION:  Syncope.    COMPARISON:  None.    FINDINGS: The caliber and flow within the common carotid arteries is  normal, with peak systolic velocity of 41 cm per second in the right  common carotid and 40 cm per second in the left common carotid.  There  is mild atherosclerotic plaque at the carotid bifurcations.  No  systolic acceleration is seen in either cervical internal carotid  artery, with a peak systolic velocity of 65 cm per second on the right  and 43 cm per second on the left.  The external carotid arteries are  patent.  The vertebral arteries are antegrade.    IMPRESSION:  NO EVIDENCE OF FLOW-LIMITING ATHEROSCLEROTIC DISEASE OF  THE MAJOR CERVICAL ARTERIES.  Exam Date: Apr 28, 2017 09:50:00 PM  Author: ERIN JAY  This report is final and signed     CT Head Angio w/o & w Contrast    Narrative    CT ANGIOGRAM OF THE HEAD AND NECK    HISTORY:  Slurred speech, rule out stroke.    COMPARISON:  Today's study is compared to a prior head CT which is  dated April 28, 2017.    FINDINGS:  Head; the ventricles and sulci are prominent, reflecting  mild generalized volume loss.  No acute intracranial hemorrhage, mass  effect, midline shift, hydrocephalus or basilar cistern effacement is  seen.  The gray/white distinction is grossly preserved.  The calvarium  is intact.  5-minute delayed imaging demonstrates no abnormal  intracranial enhancement.    Neck; there is a 4-vessel arch with the left vertebral artery arising  from the arch, proximal left subclavian origin.  The origins of the  major arch vessels are patent.  The common carotid arteries are normal  in caliber.  The  carotid bifurcations demonstrate mild left and  minimal right mixed atherosclerotic plaque.  No measurable ICA  stenosis is present by NASCET criteria.  The distal cervical ICA's are  widely patent.    The right vertebral artery arises from the left subclavian artery.  Both cervical vertebral arteries are symmetric in size without  evidence of flow-limiting stenosis or occlusion.    CT angiogram of the head; the petrous internal carotid arteries are  unremarkable.  There is mild-to-moderate narrowing of the cavernous  internal carotid arteries due to atherosclerotic plaque.  There is  minimal caliber change in the supraclinoid arteries, also likely  reflecting atherosclerotic disease.  The A:1 segments are symmetric.  An anterior communicating artery is present.  The distal ARELI vessels  are unremarkable.  The MCA candelabra's are symmetric.  Small right  and moderate left posterior communicating arteries are present.    Continued on page 2...        The four segments are symmetric.  The left PICA origin is clearly  seen.  The right PICA origin appears to be right at the dural  penetration and giving rise to a small right PICA.  The basilar artery  is slightly small, reflecting small left more so than right P:1  segments due to anterior predominant flow on the left and mixed flow  to the right PCA arteries.  The SCA's are present and symmetric.    IMPRESSION:  1.  NO ACUTE INTRACRANIAL HEMORRHAGE.  NO ABNORMAL BRAIN ENHANCEMENT.    2.  FOUR-VESSEL AORTIC ARCH WITH DIRECT ORIGIN OF THE LEFT VERTEBRAL  ARTERY FROM THE ARCH.  MILD ATHEROSCLEROTIC DISEASE OF THE LEFT MORE  SO THAN RIGHT CAROTID BIFURCATIONS WITHOUT MEASURABLE STENOSIS BY  NASCET CRITERIA.  NO EVIDENCE OF FLOW-LIMITING STENOSIS OF THE MAJOR  ARTERIES OF THE HEAD AND NECK.    This is in agreement with the preliminary report provided by [facility].  Exam Date: Apr 30, 2017 05:07:27 AM  Author: ERIN JAY  This report is final and signed      CT Neck Angio w/o & w Contrast    Narrative    CT ANGIOGRAM OF THE HEAD AND NECK    HISTORY:  Slurred speech, rule out stroke.    COMPARISON:  Today's study is compared to a prior head CT which is  dated April 28, 2017.    FINDINGS:  Head; the ventricles and sulci are prominent, reflecting  mild generalized volume loss.  No acute intracranial hemorrhage, mass  effect, midline shift, hydrocephalus or basilar cistern effacement is  seen.  The gray/white distinction is grossly preserved.  The calvarium  is intact.  5-minute delayed imaging demonstrates no abnormal  intracranial enhancement.    Neck; there is a 4-vessel arch with the left vertebral artery arising  from the arch, proximal left subclavian origin.  The origins of the  major arch vessels are patent.  The common carotid arteries are normal  in caliber.  The carotid bifurcations demonstrate mild left and  minimal right mixed atherosclerotic plaque.  No measurable ICA  stenosis is present by NASCET criteria.  The distal cervical ICA's are  widely patent.    The right vertebral artery arises from the left subclavian artery.  Both cervical vertebral arteries are symmetric in size without  evidence of flow-limiting stenosis or occlusion.    CT angiogram of the head; the petrous internal carotid arteries are  unremarkable.  There is mild-to-moderate narrowing of the cavernous  internal carotid arteries due to atherosclerotic plaque.  There is  minimal caliber change in the supraclinoid arteries, also likely  reflecting atherosclerotic disease.  The A:1 segments are symmetric.  An anterior communicating artery is present.  The distal ARELI vessels  are unremarkable.  The MCA candelabra's are symmetric.  Small right  and moderate left posterior communicating arteries are present.    Continued on page 2...        The four segments are symmetric.  The left PICA origin is clearly  seen.  The right PICA origin appears to be right at the dural  penetration and giving  rise to a small right PICA.  The basilar artery  is slightly small, reflecting small left more so than right P:1  segments due to anterior predominant flow on the left and mixed flow  to the right PCA arteries.  The SCA's are present and symmetric.    IMPRESSION:  1.  NO ACUTE INTRACRANIAL HEMORRHAGE.  NO ABNORMAL BRAIN ENHANCEMENT.    2.  FOUR-VESSEL AORTIC ARCH WITH DIRECT ORIGIN OF THE LEFT VERTEBRAL  ARTERY FROM THE ARCH.  MILD ATHEROSCLEROTIC DISEASE OF THE LEFT MORE  SO THAN RIGHT CAROTID BIFURCATIONS WITHOUT MEASURABLE STENOSIS BY  NASCET CRITERIA.  NO EVIDENCE OF FLOW-LIMITING STENOSIS OF THE MAJOR  ARTERIES OF THE HEAD AND NECK.    This is in agreement with the preliminary report provided by [facility].  Exam Date: Apr 30, 2017 05:07:37 AM  Author: ERIN JAY  This report is final and signed     CBC with platelets differential   Result Value Ref Range    WBC 8.3 4.0 - 11.0 10e9/L    RBC Count 5.14 4.4 - 5.9 10e12/L    Hemoglobin 15.0 13.3 - 17.7 g/dL    Hematocrit 43.7 40.0 - 53.0 %    MCV 85 78 - 100 fl    MCH 29.2 26.5 - 33.0 pg    MCHC 34.3 31.5 - 36.5 g/dL    RDW 13.4 10.0 - 15.0 %    Platelet Count 223 150 - 450 10e9/L    Diff Method Automated Method     % Neutrophils 62.8 %    % Lymphocytes 21.5 %    % Monocytes 11.0 %    % Eosinophils 3.6 %    % Basophils 0.6 %    % Immature Granulocytes 0.5 %    Nucleated RBCs 0 0 /100    Absolute Neutrophil 5.2 1.6 - 8.3 10e9/L    Absolute Lymphocytes 1.8 0.8 - 5.3 10e9/L    Absolute Monocytes 0.9 0.0 - 1.3 10e9/L    Absolute Eosinophils 0.3 0.0 - 0.7 10e9/L    Absolute Basophils 0.1 0.0 - 0.2 10e9/L    Abs Immature Granulocytes 0.0 0 - 0.4 10e9/L    Absolute Nucleated RBC 0.0    Comprehensive metabolic panel   Result Value Ref Range    Sodium 144 133 - 144 mmol/L    Potassium 3.7 3.4 - 5.3 mmol/L    Chloride 107 94 - 109 mmol/L    Carbon Dioxide 28 20 - 32 mmol/L    Anion Gap 9 3 - 14 mmol/L    Glucose 110 (H) 70 - 99 mg/dL    Urea Nitrogen 22 7 -  30 mg/dL    Creatinine 1.16 0.66 - 1.25 mg/dL    GFR Estimate 62 >60 mL/min/1.7m2    GFR Estimate If Black 75 >60 mL/min/1.7m2    Calcium 8.9 8.5 - 10.1 mg/dL    Bilirubin Total 0.6 0.2 - 1.3 mg/dL    Albumin 3.6 3.4 - 5.0 g/dL    Protein Total 7.0 6.8 - 8.8 g/dL    Alkaline Phosphatase 107 40 - 150 U/L    ALT 22 0 - 70 U/L    AST 19 0 - 45 U/L   Troponin I   Result Value Ref Range    Troponin I ES  0.000 - 0.045 ug/L     <0.015  The 99th percentile for upper reference range is 0.045 ug/L.  Troponin values in   the range of 0.045 - 0.120 ug/L may be associated with risks of adverse   clinical events.     TSH   Result Value Ref Range    TSH 0.80 0.40 - 4.00 mU/L   CRP inflammation   Result Value Ref Range    CRP Inflammation <2.9 0.0 - 8.0 mg/L   Glucose by meter   Result Value Ref Range    Glucose 115 (H) 70 - 99 mg/dL   CBC differential   Result Value Ref Range    WBC 8.8 4.0 - 11.0 10e9/L    RBC Count 5.59 4.4 - 5.9 10e12/L    Hemoglobin 16.1 13.3 - 17.7 g/dL    Hematocrit 47.9 40.0 - 53.0 %    MCV 86 78 - 100 fl    MCH 28.8 26.5 - 33.0 pg    MCHC 33.6 31.5 - 36.5 g/dL    RDW 13.4 10.0 - 15.0 %    Platelet Count 226 150 - 450 10e9/L    Diff Method Automated Method     % Neutrophils 68.6 %    % Lymphocytes 19.5 %    % Monocytes 8.6 %    % Eosinophils 2.1 %    % Basophils 0.6 %    % Immature Granulocytes 0.6 %    Nucleated RBCs 0 0 /100    Absolute Neutrophil 6.0 1.6 - 8.3 10e9/L    Absolute Lymphocytes 1.7 0.8 - 5.3 10e9/L    Absolute Monocytes 0.8 0.0 - 1.3 10e9/L    Absolute Eosinophils 0.2 0.0 - 0.7 10e9/L    Absolute Basophils 0.1 0.0 - 0.2 10e9/L    Abs Immature Granulocytes 0.1 0 - 0.4 10e9/L    Absolute Nucleated RBC 0.0    Comprehensive metabolic panel   Result Value Ref Range    Sodium 143 133 - 144 mmol/L    Potassium 4.0 3.4 - 5.3 mmol/L    Chloride 104 94 - 109 mmol/L    Carbon Dioxide 28 20 - 32 mmol/L    Anion Gap 11 3 - 14 mmol/L    Glucose 127 (H) 70 - 99 mg/dL    Urea Nitrogen 25 7 - 30 mg/dL     Creatinine 1.24 0.66 - 1.25 mg/dL    GFR Estimate 57 (L) >60 mL/min/1.7m2    GFR Estimate If Black 69 >60 mL/min/1.7m2    Calcium 9.0 8.5 - 10.1 mg/dL    Bilirubin Total 0.7 0.2 - 1.3 mg/dL    Albumin 3.7 3.4 - 5.0 g/dL    Protein Total 7.1 6.8 - 8.8 g/dL    Alkaline Phosphatase 106 40 - 150 U/L    ALT 21 0 - 70 U/L    AST 15 0 - 45 U/L         ASSESSMENT/PLAN:    ICD-10-CM    1. History of TIA (transient ischemic attack) and stroke Z86.73 INTERNAL MEDICINE REFERRAL. Patient was at the [facility] and requests to switch here.  Because of his complexity will set him up to see internal medicine.  He's had previous difficulties with  uncontrolled hypertension.  Did develop a TIA in April.  Has a known heart valve and pacemaker.  Had GI surgery and will need to have surgery this summer on his colostomy and is on Plavix.  Recently saw the neurologist for follow up of his TIA   2. Benign essential hypertension I10 INTERNAL MEDICINE REFERRAL   3. H/O colostomy Z98.890 INTERNAL MEDICINE REFERRAL   4. Agitation R45.1 Try dose reduction  with the Seroquel from 25 mg in the morning and then 50  2 times later in the day to take and it  25 in the morning and 25 midday and 50 at night for a week and then go to 25   3 times a day for a week and keep reducing it that way.         LUIS Gallagher MD  May 19, 2017

## 2017-05-19 NOTE — NURSING NOTE
"Chief Complaint   Patient presents with     Establish Care     Perviously seeing Dr. Vega        Initial /72 (BP Location: Left arm, Patient Position: Chair, Cuff Size: Adult Large)  Pulse 62  Temp 96.2  F (35.7  C) (Tympanic)  Ht 5' 10\" (1.778 m)  Wt 175 lb (79.4 kg)  SpO2 96%  BMI 25.11 kg/m2 Estimated body mass index is 25.11 kg/(m^2) as calculated from the following:    Height as of this encounter: 5' 10\" (1.778 m).    Weight as of this encounter: 175 lb (79.4 kg).  Medication Reconciliation: complete   Maryjane Madera CMA(AAMA)   "

## 2017-05-23 ENCOUNTER — OFFICE VISIT (OUTPATIENT)
Dept: INTERNAL MEDICINE | Facility: OTHER | Age: 72
End: 2017-05-23
Attending: FAMILY MEDICINE
Payer: MEDICARE

## 2017-05-23 VITALS
DIASTOLIC BLOOD PRESSURE: 70 MMHG | HEART RATE: 68 BPM | SYSTOLIC BLOOD PRESSURE: 130 MMHG | HEIGHT: 70 IN | WEIGHT: 175 LBS | BODY MASS INDEX: 25.05 KG/M2 | TEMPERATURE: 97 F | OXYGEN SATURATION: 96 %

## 2017-05-23 DIAGNOSIS — Z95.0 CARDIAC PACEMAKER IN SITU: ICD-10-CM

## 2017-05-23 DIAGNOSIS — Z86.73 HISTORY OF TIA (TRANSIENT ISCHEMIC ATTACK) AND STROKE: ICD-10-CM

## 2017-05-23 DIAGNOSIS — R97.20 ELEVATED PROSTATE SPECIFIC ANTIGEN (PSA): Primary | ICD-10-CM

## 2017-05-23 DIAGNOSIS — I25.10 CORONARY ARTERY DISEASE INVOLVING NATIVE HEART WITHOUT ANGINA PECTORIS, UNSPECIFIED VESSEL OR LESION TYPE: ICD-10-CM

## 2017-05-23 DIAGNOSIS — I10 BENIGN ESSENTIAL HYPERTENSION: ICD-10-CM

## 2017-05-23 PROCEDURE — 99204 OFFICE O/P NEW MOD 45 MIN: CPT | Performed by: INTERNAL MEDICINE

## 2017-05-23 PROCEDURE — 99213 OFFICE O/P EST LOW 20 MIN: CPT

## 2017-05-23 ASSESSMENT — PAIN SCALES - GENERAL: PAINLEVEL: NO PAIN (0)

## 2017-05-23 NOTE — MR AVS SNAPSHOT
After Visit Summary   5/23/2017    Peter Zhao    MRN: [id number]           Patient Information     Date Of Birth          1945        Visit Information        Provider Department      5/23/2017 9:15 AM Alexander Vazquez, DO [facility]        Today's Diagnoses     Elevated prostate specific antigen (PSA)    -  1    History of TIA (transient ischemic attack) and stroke        Benign essential hypertension        H/O colostomy        Coronary artery disease involving native heart without angina pectoris, unspecified vessel or lesion type        Cardiac pacemaker in situ          Care Instructions    Taper off Seroquel: 25 mg PO twice daily for 5-7 days then stop completely   Follow up with me in 1 month- LONG   Release from [facility]'s -records       Clinic and Lab Hours:    I have office hours on Monday's and Wednesday's at the [facility].  I have office hours Tuesdays and Fridays at the [facility] in [facility] site.    OfficeHours:  8:00am- 4:00 pm    Following your visit, when your labs and diagnostic testing have returned and I have reviewed them, you will be contacted by my nurse.  If you are on My Chart on Epic, you can also view results there, and call or message me with questions and or concerns.    For refills, notify your pharmacy regarding what you need and the pharmacy will generate a refill request. Please plan ahead and allow 3-5 days for refill requests.    You will generally receive a reminder call the day prior to your appointment.  If you cannot attend your appointment, please cancel within days prior.  If there is a pattern of failure to present for your appointments, I cannot provide consistent, meaningful, ongoing care for you. It is very important to me that you come in for your care, so we can best assist you with your health care needs.    IMPORTANT:  Please note that it is my standard of practice to NOT participate in  prescribing ongoing requested Narcotic Analgesic therapy, and/or participate in the prescribing of other controlled substances.  My nurse and I am happy to assist you with the process of referral for alternative pain management as needed, and other treatment modalities including but not limited to:  Physical Therapy, Physical Medicine and Rehab, Counseling, Chiropractic Care, Orthopedic Care, and non-narcotic medication management.     I am out of the office on Thursday's. If you have labs that return while I am out,   our office will contact you when I return on my next scheduled clinic day.  If there is a concerning lab, you will be contacted.  Medication refills will generally be addressed upon my return the next day.     In the event that you need to be seen for emergent concerns and I am out of office,  please see one of my colleagues for acute concerns.  You may also present to  or ER.    Thank you for allowing me to participate in your care.  I look forward to addressing your  Internal Medicine Level health care needs, and assisting you to achieve optimal health.     Your note and results will be copied to your referral source, and any other specialists involved in your care, as well as any providers you are referred to for additional care.        Sincerely,  Dr. Alexander Vazquez              Follow-ups after your visit        Your next 10 appointments already scheduled     Jun 20, 2017  9:15 AM CDT   (Arrive by 9:00 AM)   Office Visit with Alexander Vazquez DO   [facility] [place] ([facility] [place] [facility])    [address]  [place] [address]   [phone]           Bring a current list of meds and any records pertaining to this visit.  For Physicals, please bring immunization records and any forms needing to be filled out.  Please arrive 10 minutes early to complete paperwork.            Jul 18, 2017  1:30 PM CDT   (Arrive by 1:15 PM)   Return Visit with Tima Moreland MD   [facility]  "[place] [facility])    [place] [address]   [phone]              Who to contact     If you have questions or need follow up information about today's clinic visit or your schedule please contact [facility] directly at [phone].  Normal or non-critical lab and imaging results will be communicated to you by MyChart, letter or phone within 4 business days after the clinic has received the results. If you do not hear from us within 7 days, please contact the clinic through MyChart or phone. If you have a critical or abnormal lab result, we will notify you by phone as soon as possible.  Submit refill requests through tsumobi or call your pharmacy and they will forward the refill request to us. Please allow 3 business days for your refill to be completed.          Additional Information About Your Visit        MyChart Information     tsumobi lets you send messages to your doctor, view your test results, renew your prescriptions, schedule appointments and more. To sign up, go to [email or web address] . Click on \"Log in\" on the left side of the screen, which will take you to the Welcome page. Then click on \"Sign up Now\" on the right side of the page.     You will be asked to enter the access code listed below, as well as some personal information. Please follow the directions to create your username and password.     Your access code is: [email or web address]  Expires: 2017  8:29 AM     Your access code will  in 90 days. If you need help or a new code, please call your [facility] clinic or [phone].        Care EveryWhere ID     This is your Care EveryWhere ID. This could be used by other organizations to access your [facility] medical records  [id number]        Your Vitals Were     Pulse Temperature Height Pulse Oximetry BMI (Body Mass Index)       68 97  F (36.1  C) (Tympanic) 5' 10\" (1.778 m) 96% 25.11 kg/m2        Blood Pressure from Last 3 Encounters:   17 " 130/70   05/19/17 149/97   05/19/17 120/72    Weight from Last 3 Encounters:   05/23/17 175 lb (79.4 kg)   05/19/17 175 lb (79.4 kg)   04/20/17 172 lb (78 kg)              Today, you had the following     No orders found for display       Primary Care Provider Office Phone # Fax #    Alexander Vazquez [phone]       [facility] [address] [place] [address]        Thank you!     Thank you for choosing [facility]  for your care. Our goal is always to provide you with excellent care. Hearing back from our patients is one way we can continue to improve our services. Please take a few minutes to complete the written survey that you may receive in the mail after your visit with us. Thank you!             Your Updated Medication List - Protect others around you: Learn how to safely use, store and throw away your medicines at www.disposemymeds.org.          This list is accurate as of: 5/23/17 11:59 PM.  Always use your most recent med list.                   Brand Name Dispense Instructions for use    ASPIRIN PO      Take 325 mg by mouth daily       atorvastatin 40 MG tablet    LIPITOR     Take 40 mg by mouth daily       blood glucose lancing device     1 kit    1 Units daily       * blood glucose monitoring lancets     100 each    1 Units daily Use fresh lancet each day to check blood sugars       * blood glucose monitoring lancets     100 Box    Use to test blood sugar 2 times weekly       * blood glucose monitoring test strip    ACCU-CHEK SMARTVIEW    1 Box    Test blood sugar before breakfast one day, before and after supper the next and not at all the third day       * blood glucose monitoring test strip    MARIAN CONTOUR NEXT    100 strip    Use to test blood sugar 2 times weekly or as directed.       LISINOPRIL PO      Take 5 mg by mouth       METOPROLOL TARTRATE PO      Take 100 mg by mouth 2 times daily       MULTIVITAMINS PO      Take 1 capsule by mouth  daily       NITROSTAT 0.4 MG sublingual tablet   Generic drug:  nitroglycerin      Place 1 tablet under the tongue every 5 minutes as needed       NONFORMULARY      OTC eye drops as directed       order for DME     1 Package    Equipment being ordered: Ostomy supplies - Barrier - currently using [address] [place] flat cut to fit; Pouch 45288 clear; [employer] barrier rings 7805; [place] ostomy belt.  Fill per insurance guidelines       * Ostomy Supplies Misc     1 each    [place] m9 odor eliminator drops 7717 or 7715 - patient preference       * Ostomy Supplies POUCH Misc     2 each    [place] [address] closed end pouches - per Medicare patient is allowed 60 per month.  Dispense 2 boxes or patient choice       PLAVIX PO      Take 75 mg by mouth daily       polyethylene glycol 0.4%- propylene glycol 0.3% 0.4-0.3 % Soln ophthalmic solution    SYSTANE ULTRA     Place 1 drop into both eyes every hour as needed for dry eyes       PROSCAR PO      Take 5 mg by mouth daily       RANITIDINE HCL PO      Take 50 mg by mouth 2 times daily       SENNOSIDES PO      Take 8.6 mg by mouth 2 times daily       SEROQUEL PO      Take 50 mg by mouth 2 times daily take 25 mg tablet in the am and 25 mg tablet in the afternoon and pm       VITAMIN D3 PO      Take  by mouth daily.       * Notice:  This list has 6 medication(s) that are the same as other medications prescribed for you. Read the directions carefully, and ask your doctor or other care provider to review them with you.

## 2017-05-23 NOTE — PATIENT INSTRUCTIONS
Taper off Seroquel: 25 mg PO twice daily for 5-7 days then stop completely   Follow up with me in 1 month- LONG   Release from [facility]       Clinic and Lab Hours:    I have office hours on Monday's and Wednesday's at the [facility].  I have office hours Tuesdays and Fridays at the [facility] in [facility] site.    OfficeHours:  8:00am- 4:00 pm    Following your visit, when your labs and diagnostic testing have returned and I have reviewed them, you will be contacted by my nurse.  If you are on My Chart on Epic, you can also view results there, and call or message me with questions and or concerns.    For refills, notify your pharmacy regarding what you need and the pharmacy will generate a refill request. Please plan ahead and allow 3-5 days for refill requests.    You will generally receive a reminder call the day prior to your appointment.  If you cannot attend your appointment, please cancel within days prior.  If there is a pattern of failure to present for your appointments, I cannot provide consistent, meaningful, ongoing care for you. It is very important to me that you come in for your care, so we can best assist you with your health care needs.    IMPORTANT:  Please note that it is my standard of practice to NOT participate in prescribing ongoing requested Narcotic Analgesic therapy, and/or participate in the prescribing of other controlled substances.  My nurse and I am happy to assist you with the process of referral for alternative pain management as needed, and other treatment modalities including but not limited to:  Physical Therapy, Physical Medicine and Rehab, Counseling, Chiropractic Care, Orthopedic Care, and non-narcotic medication management.     I am out of the office on Thursday's. If you have labs that return while I am out,   our office will contact you when I return on my next scheduled clinic day.  If there is a concerning lab, you will be contacted.   Medication refills will generally be addressed upon my return the next day.     In the event that you need to be seen for emergent concerns and I am out of office,  please see one of my colleagues for acute concerns.  You may also present to UC or ER.    Thank you for allowing me to participate in your care.  I look forward to addressing your  Internal Medicine Level health care needs, and assisting you to achieve optimal health.     Your note and results will be copied to your referral source, and any other specialists involved in your care, as well as any providers you are referred to for additional care.        Sincerely,  Dr. Alexander Vazquez

## 2017-05-23 NOTE — PROGRESS NOTES
"SUBJECTIVE:    Chief Complaint   Patient presents with     Establish Care     refered by Dr. Marcos Gallagher, pt is not fasting today     Hospital F/U     pt was in [facility] last week with TIA, pt states was just put on seroqual in [place] and pt states he feels \"cloudy\" on it. - pt states slight headache, no chest pain or SOB- spouse notices no changes       Peter Zhao is a 71 year old male with pmh of recent TIA(symptoms have resolved), CAD s/p CABG in 2013 and stenting 2014, hx of Pacemaker placement, HTN, Hyperlipidemia. Diverticular disease s/p sigmoid resection and ostomy and elevated PSA presents to clinic today for establishment of care.  Peter was hospitalized recently here at [facility] and subsequently transferred to [facility] in [place] due to recurrent TIA symptoms.  Numerous tests were also completed at [facility] without indication of stroke or arterial ischemia based upon CTA.  He was discharged home on Seroquel and Plavix and states he has been doing well since.  He denies any recent chest pain or SOB or weakness.  He does report extreme fatigue and lethargy associated with the Seroquel use.  He asks whether this can be discontinued.  It appears that it was started due to sundowning at the hospital.  He otherwise would like to transition his care to [facility] at this time.  Prior to the appointment some of the records were available and reviewed but not all records were available.          Past Medical History:   Diagnosis Date     Allergic state      CAD (coronary artery disease)     CABG 2013, stent x 1 in 2014     Cataract      Cataracts, bilateral      Elevated PSA      Gastro-oesophageal reflux disease      Heart murmur      Hyperlipidemia      Hypertension      Pacemaker      Stented coronary artery      Visual aura          Past Surgical History:   Procedure Laterality Date     CARDIAC SURGERY       CATARACT IOL, RT/LT Right 04/2017     COLECTOMY LOW ANTERIOR N/A 2/17/2017    Procedure: COLECTOMY " LOW ANTERIOR;  Surgeon: Tima Moreland MD;  Location: [facility] OR     COLONOSCOPY N/A 2/17/2017    Procedure: COLONOSCOPY;  Surgeon: Tima Moreland MD;  Location: [facility] OR     ENT SURGERY       FLEX SIG (MEDICARE PT.)       GI SURGERY  02/2017    ostemomy     HERNIA REPAIR       LAPAROSCOPIC HERNIORRHAPHY INGUINAL Right 9/18/2015    Procedure: LAPAROSCOPIC HERNIORRHAPHY INGUINAL;  Surgeon: Solitario Nettles, DO;  Location: [facility] OR     PHACOEMULSIFICATION WITH STANDARD INTRAOCULAR LENS IMPLANT Right 4/20/2017    Procedure: PHACOEMULSIFICATION WITH STANDARD INTRAOCULAR LENS IMPLANT;  CATARACT EXTRACTION RIGHT EYE WITH IMPLANT;  Surgeon: Darin Gutierrez MD;  Location: [facility] OR     PROSTATE BIOPSY, NEEDLE, SATURATION SAMPLING  2009     retinopexy-pvd with retinal tear Right 2008     TONSILLECTOMY          Allergies   Allergen Reactions     Acetaminophen      Ciprofloxacin Hives     No Clinical Screening - See Comments      Antibiotic allergy, pt not sure which       Medications:  Current Outpatient Prescriptions   Medication Sig Dispense Refill     SENNOSIDES PO Take 8.6 mg by mouth 2 times daily       Clopidogrel Bisulfate (PLAVIX PO) Take 75 mg by mouth daily       Finasteride (PROSCAR PO) Take 5 mg by mouth daily       METOPROLOL TARTRATE PO Take 100 mg by mouth 2 times daily       QUEtiapine Fumarate (SEROQUEL PO) Take 50 mg by mouth 2 times daily take 25 mg tablet in the am and 25 mg tablet in the afternoon and pm       Ostomy Supplies POUCH MISC [place] [address] closed end pouches - per Medicare patient is allowed 60 per month.  Dispense 2 boxes or patient choice 2 each 11     Ostomy Supplies MISC [place] m9 odor eliminator drops 7717 or 7715 - patient preference 1 each 11     order for DME Equipment being ordered: Ostomy supplies - Barrier - currently using [address] [place] flat cut to fit; Pouch 00531 clear; [employer] barrier rings 7805; [place] ostomy belt.  Fill per insurance guidelines 1 Package 11     blood  "glucose monitoring (MARIAN MICROLET) lancets Use to test blood sugar 2 times weekly 100 Box 6     atorvastatin (LIPITOR) 40 MG tablet Take 40 mg by mouth daily       NONFORMULARY OTC eye drops as directed       polyethylene glycol 0.4%- propylene glycol 0.3% (SYSTANE ULTRA) 0.4-0.3 % SOLN ophthalmic solution Place 1 drop into both eyes every hour as needed for dry eyes       LISINOPRIL PO Take 5 mg by mouth        RANITIDINE HCL PO Take 50 mg by mouth 2 times daily        blood glucose (MARIAN CONTOUR NEXT) test strip Use to test blood sugar 2 times weekly or as directed. 100 strip 6     nitroglycerin (NITROSTAT) 0.4 MG SL tablet Place 1 tablet under the tongue every 5 minutes as needed       Multiple Vitamin (MULTIVITAMINS PO) Take 1 capsule by mouth daily       glucose blood VI test strips (ACCU-CHEK SMARTVIEW) strip Test blood sugar before breakfast one day, before and after supper the next and not at all the third day 1 Box 12     SOFTCLIX LANCETS MISC 1 Units daily Use fresh lancet each day to check blood sugars 100 each 4     Lancets Misc. (ACCU-CHEK SOFTCLIX LANCET DEV) KIT 1 Units daily 1 kit 2     ASPIRIN PO Take 325 mg by mouth daily        Cholecalciferol (VITAMIN D3 PO) Take  by mouth daily.         Family History   Problem Relation Age of Onset     DIABETES Mother      C.A.D. Mother      CANCER Maternal Grandmother        Social History   Substance Use Topics     Smoking status: Former Smoker     Smokeless tobacco: Former User     Types: Chew     Quit date: 10/19/2013      Comment: quit in 2000     Alcohol use No     Social History     Social History Narrative    2/15:  \"Miguel\" lives with his wife and he has one child.  He is retired from [employer].          Review Of Systems  Constitutional: Fatigue-from Seroquel    Eyes: negative  Ears/Nose/Throat: negative  Cardiovascular: negative  Respiratory: No shortness of breath, dyspnea on exertion, cough, or hemoptysis  Gastrointestinal: Ostomy-otherwise " "stable    Genitourinary: negative-hx of BPH    Musculoskeletal: negative  Skin: Bruising   Neurologic: Since TIA-negative   Endocrine: DM  Hematologic/Lymphatic/Immunologic: Bruising   Psychiatric: negative    OBJECTIVE:  /70 (BP Location: Right arm, Patient Position: Chair, Cuff Size: Adult Large)  Pulse 68  Temp 97  F (36.1  C) (Tympanic)  Ht 5' 10\" (1.778 m)  Wt 175 lb (79.4 kg)  SpO2 96%  BMI 25.11 kg/m2  Body mass index is 25.11 kg/(m^2).   Gen: Well-developed, well-nourished and in no apparent distress  HEENT:  Normocephalic, atraumatic, PERRL, no scleral icterus, TMs visualized bilaterally without effusion/erythema, external auditory canals clear.  Cranial nerves grossly intact.  Neck: supple, no LAD, no thyromegaly  CV: Subcutaneous pacer in place. Regular rate and rhythm, normal S1 S2, no S3 or S4 and no murmur, click, or rub  Resp: Clear to ausculation bilaterally, normal respiratory effort  Abd: Bowel sounds present, soft NT/ND,  no masses or hepatosplenomegaly.  Ostomy in LLQ intact without erythema.    Ext: No LE edema.  5/5 strength  Neuro:  No focal deficits noted.  No evidence of slurred speech or unilater weakness today.    Skin: warm and dry  Psych: normal mood/affect, appropriately oriented    ASSESSMENT/PLAN:  Pt is a 71 year old male here to [facility]    Peter was seen today for [facility] and hospital f/u.    Diagnoses and all orders for this visit:    Elevated prostate specific antigen (PSA): Elevated PSa per hx.  Will obtain Urology records and follow up annual PSAs moving forward.      History of TIA (transient ischemic attack) and stroke:  Recent hospitalization here at [place] with subsequent transfer to [facility].  Extensive testing completed but on scant records availble.  No ischemic source identified and symptoms have completely resolved.  Patient remains on Plavix and can resume previous activities but is aware of increased bleeding risk.      Benign " essential hypertension:  BP is fine today.  Appears his Bp has been variable in the past.  Will continue to monitor.  Urged to check BPs at different times daily at home versus always checking in the morning.  No changes to BP medications today.      H/O colostomy:  S/p Colostomy due to sigmoid colectomy due to diverticular disease.  Anticipate take down in August 2017.  This likely will be fine if remains stable on Plavix for the next couple months.      Coronary artery disease involving native heart without angina pectoris, unspecified vessel or lesion type:  Stable per report-he will continue to follow with Cardiology at [facility].      Cardiac pacemaker in situ:  S/p Pacer.    Other orders:  Hx inclusive of what appears to be diet control DM.  He states his last A1C was less than 7, but we will obtain records to check on this.    -     Cancel: HEMOGLOBIN A1C      Patient instructions:   Taper off Seroquel: 25 mg PO twice daily for 5-7 days then stop completely   Follow up with me in 1 month- LONG   Release from [facility] -records    Return to clinic in 1 month.  Due to his complexity we will ask to see him back in 1 month at which time we will determine what labs and additional tests may be required.  We did also discuss Seroquel taper as listed above today.         Alexander Vazquez D.O.

## 2017-05-23 NOTE — NURSING NOTE
"Chief Complaint   Patient presents with     Establish Care     refered by Dr. Marcos Gallagher, pt is not fasting today     Hospital F/U     pt was in [facility] last week with TIA, pt states was just put on seroqual in [place] and pt states he feels \"cloudy\" on it. - pt states slight headache, no chest pain or SOB- spouse notices no changes       Initial /70 (BP Location: Right arm, Patient Position: Chair, Cuff Size: Adult Large)  Pulse 68  Temp 97  F (36.1  C) (Tympanic)  Ht 5' 10\" (1.778 m)  Wt 175 lb (79.4 kg)  SpO2 96%  BMI 25.11 kg/m2 Estimated body mass index is 25.11 kg/(m^2) as calculated from the following:    Height as of this encounter: 5' 10\" (1.778 m).    Weight as of this encounter: 175 lb (79.4 kg).  Medication Reconciliation: complete   Sabina Olivo LPN      "

## 2017-05-24 ENCOUNTER — TELEPHONE (OUTPATIENT)
Dept: SURGERY | Facility: OTHER | Age: 72
End: 2017-05-24

## 2017-05-24 NOTE — TELEPHONE ENCOUNTER
Pt wife is calling cause pt saw Dr. Moreland and has another appt scheduled in July. Pt has want he thinks is a hernia developing in the wound area and would like to get into [facility] or another surgeon sooner then July. Message sent to Surgery nurse pool. Wife notified someone should call her back with in 48 hours, that there are no nurses or providers in today.

## 2017-05-25 ENCOUNTER — TELEPHONE (OUTPATIENT)
Dept: INTERNAL MEDICINE | Facility: OTHER | Age: 72
End: 2017-05-25

## 2017-05-25 NOTE — TELEPHONE ENCOUNTER
Calling about his blood pressure started last night was the lowest 166/95 5/24/17, this morning 5/25/17 139/93 and a few minutes ago 156/94 he took his 100 metoprolol last night and the BP never went down. He takes 100mg metoprolol 5mg lisinipril.

## 2017-05-25 NOTE — TELEPHONE ENCOUNTER
Pt notified to bring in note book with readings when he comes in- was advised to monitor salt intake and exercise daily. Sabina Olivo LPN

## 2017-05-26 ENCOUNTER — HOSPITAL ENCOUNTER (EMERGENCY)
Facility: HOSPITAL | Age: 72
Discharge: HOME | End: 2017-05-26
Payer: MEDICARE

## 2017-05-26 VITALS
OXYGEN SATURATION: 96 % | HEART RATE: 95 BPM | RESPIRATION RATE: 16 BRPM | TEMPERATURE: 97.6 F | SYSTOLIC BLOOD PRESSURE: 105 MMHG | DIASTOLIC BLOOD PRESSURE: 102 MMHG

## 2017-05-26 DIAGNOSIS — E11.8 TYPE 2 DIABETES MELLITUS WITH COMPLICATION, UNSPECIFIED LONG TERM INSULIN USE STATUS: ICD-10-CM

## 2017-05-26 DIAGNOSIS — S66.911A OVERUSE SYNDROME OF RIGHT HAND, INITIAL ENCOUNTER: ICD-10-CM

## 2017-05-26 LAB
ALBUMIN SERPL-MCNC: 3.5 G/DL (ref 3.4–5)
ALP SERPL-CCNC: 116 U/L (ref 40–150)
ALT SERPL W P-5'-P-CCNC: 21 U/L (ref 0–70)
ANION GAP SERPL CALCULATED.3IONS-SCNC: 10 MMOL/L (ref 3–14)
AST SERPL W P-5'-P-CCNC: 17 U/L (ref 0–45)
BASOPHILS # BLD AUTO: 0 10E9/L (ref 0–0.2)
BASOPHILS NFR BLD AUTO: 0.5 %
BILIRUB SERPL-MCNC: 0.7 MG/DL (ref 0.2–1.3)
BUN SERPL-MCNC: 23 MG/DL (ref 7–30)
CALCIUM SERPL-MCNC: 8.3 MG/DL (ref 8.5–10.1)
CHLORIDE SERPL-SCNC: 111 MMOL/L (ref 94–109)
CO2 SERPL-SCNC: 24 MMOL/L (ref 20–32)
CREAT SERPL-MCNC: 1.23 MG/DL (ref 0.66–1.25)
DIFFERENTIAL METHOD BLD: ABNORMAL
EOSINOPHIL # BLD AUTO: 0.2 10E9/L (ref 0–0.7)
EOSINOPHIL NFR BLD AUTO: 3.1 %
ERYTHROCYTE [DISTWIDTH] IN BLOOD BY AUTOMATED COUNT: 13.7 % (ref 10–15)
GFR SERPL CREATININE-BSD FRML MDRD: 58 ML/MIN/1.7M2
GLUCOSE SERPL-MCNC: 99 MG/DL (ref 70–99)
HCT VFR BLD AUTO: 39.4 % (ref 40–53)
HGB BLD-MCNC: 13.4 G/DL (ref 13.3–17.7)
IMM GRANULOCYTES # BLD: 0 10E9/L (ref 0–0.4)
IMM GRANULOCYTES NFR BLD: 0.5 %
LYMPHOCYTES # BLD AUTO: 1.6 10E9/L (ref 0.8–5.3)
LYMPHOCYTES NFR BLD AUTO: 21 %
MCH RBC QN AUTO: 29.3 PG (ref 26.5–33)
MCHC RBC AUTO-ENTMCNC: 34 G/DL (ref 31.5–36.5)
MCV RBC AUTO: 86 FL (ref 78–100)
MONOCYTES # BLD AUTO: 0.6 10E9/L (ref 0–1.3)
MONOCYTES NFR BLD AUTO: 8.4 %
NEUTROPHILS # BLD AUTO: 5.1 10E9/L (ref 1.6–8.3)
NEUTROPHILS NFR BLD AUTO: 66.5 %
NRBC # BLD AUTO: 0 10*3/UL
NRBC BLD AUTO-RTO: 0 /100
PLATELET # BLD AUTO: 248 10E9/L (ref 150–450)
POTASSIUM SERPL-SCNC: 3.5 MMOL/L (ref 3.4–5.3)
PROT SERPL-MCNC: 6.7 G/DL (ref 6.8–8.8)
RBC # BLD AUTO: 4.58 10E12/L (ref 4.4–5.9)
SODIUM SERPL-SCNC: 145 MMOL/L (ref 133–144)
TSH SERPL DL<=0.05 MIU/L-ACNC: 0.91 MU/L (ref 0.4–4)
WBC # BLD AUTO: 7.6 10E9/L (ref 4–11)

## 2017-05-26 PROCEDURE — 99283 EMERGENCY DEPT VISIT LOW MDM: CPT

## 2017-05-26 PROCEDURE — 84443 ASSAY THYROID STIM HORMONE: CPT

## 2017-05-26 PROCEDURE — 99284 EMERGENCY DEPT VISIT MOD MDM: CPT

## 2017-05-26 PROCEDURE — 85025 COMPLETE CBC W/AUTO DIFF WBC: CPT

## 2017-05-26 PROCEDURE — 36415 COLL VENOUS BLD VENIPUNCTURE: CPT

## 2017-05-26 PROCEDURE — 80053 COMPREHEN METABOLIC PANEL: CPT

## 2017-05-26 NOTE — ED PROVIDER NOTES
History     Chief Complaint   Patient presents with     Hand Pain     Hx TIA, discharged from [facility] 2 wks ago. Noted finger pain at base of left index, middle, ring finger individually. Finger straigtend and was unable to flex at first.  Now resolved.     The history is provided by the patient. No  was used.     Peter Zhao is a 71 year old male with hx CAD, pacemaker, AICD, TIA, recent 2 week hospitalization, presents to ED via private car with wife for evaluation of stiffness, pain right hand. States he was out weed whipping and working in the yard today. When done, middle finger extended, he was unable to flex for a couple minutes. Then same thing happened to ring finger. States he feels fine, no weakness or paresthesia, sx have resolved but due to recent TIA felt he should have a check up. Denies any other sx at this time.     I have reviewed the Medications, Allergies, Past Medical and Surgical History, and Social History in the Epic system.    Review of Systems   Constitutional: Negative for fever.   HENT: Negative for congestion.    Eyes: Negative for redness.   Respiratory: Negative for shortness of breath.    Cardiovascular: Negative for chest pain.   Gastrointestinal: Negative for abdominal pain.   Genitourinary: Negative for difficulty urinating.   Musculoskeletal: Positive for arthralgias. Negative for neck stiffness.        Stiffness, pain right hand, fingers.    Skin: Negative for color change.   Neurological: Negative for headaches.   Psychiatric/Behavioral: Negative for confusion.       Physical Exam   BP: 168/96  Pulse: 64  Temp: 96.2  F (35.7  C)  Resp: 14  SpO2: 96 %  Physical Exam   Constitutional: He is oriented to person, place, and time. No distress.   HENT:   Head: Normocephalic and atraumatic.   Eyes: Conjunctivae and EOM are normal. Pupils are equal, round, and reactive to light.   Neck: Normal range of motion.   Cardiovascular: Normal rate, regular rhythm and  intact distal pulses.    Pulmonary/Chest: Effort normal. No respiratory distress.   Abdominal: Soft. He exhibits no distension.   Musculoskeletal: Normal range of motion.        Right hand: He exhibits tenderness.   Full ROM, n/v status intact, PTP with flexion and extension of fingers, no locking, no soft tissue swelling.    Neurological: He is alert and oriented to person, place, and time. No cranial nerve deficit.   Skin: Skin is warm and dry. He is not diaphoretic.   Nursing note and vitals reviewed.      ED Course     Procedures        Labs Ordered and Resulted from Time of ED Arrival Up to the Time of Departure from the ED   CBC WITH PLATELETS DIFFERENTIAL - Abnormal; Notable for the following:        Result Value    Hematocrit 39.4 (*)     All other components within normal limits   COMPREHENSIVE METABOLIC PANEL - Abnormal; Notable for the following:     Sodium 145 (*)     Chloride 111 (*)     GFR Estimate 58 (*)     Calcium 8.3 (*)     Protein Total 6.7 (*)     All other components within normal limits   TSH       Assessments & Plan (with Medical Decision Making)   Pt presents with flexion locking episode right fingers after extensive use today. Resolved upon arrival. Pt was concerned about stroke given his recent hospitalization. VSS, NAD, Neuro exam wnl. Labs noted, slightly low calcium which may be contributing to sx. Pt reassured. Recommend Ca++ to diet. Pt verbalizes understanding and agrees with plan.  Epic discharge instructions given. Pt discharged in stable condition.     I have reviewed the nursing notes.    I have reviewed the findings, diagnosis, plan and need for follow up with the patient.    Discharge Medication List as of 5/26/2017  7:59 PM      START taking these medications    Details   calcium carbonate (OS-WALI 600 MG [place]. CA) 1500 (600 CA) MG tablet Take 1 tablet (1,500 mg) by mouth daily, Disp-30 tablet, R-3, E-Prescribe             Final diagnoses:   Overuse syndrome of right hand,  initial encounter     See attached for home care   Rest, ice  Tylenol for pain  Take extra calcium for the next week  F/U with PCP if not improving or back to base line in 1 week  Return to  with worsening symptoms.     5/26/2017   [facility]     Monica Guzman, ALBARO FNP  05/30/17 1117

## 2017-05-26 NOTE — TELEPHONE ENCOUNTER
Contour test strips          Last Written Prescription Date: 2/18/2016  Last Fill Quantity: 100, # refills: 6  Last Office Visit with [facility], P or  Health prescribing provider:  5/23/2017   Next 5 appointments (look out 90 days)     Jun 20, 2017  9:15 AM CDT   (Arrive by 9:00 AM)   Office Visit with Alexander Vazquez DO   [facility] [place] ([facility] [place] [facility])    [address]  [place] [address]   [phone]            Jul 18, 2017  1:30 PM CDT   (Arrive by 1:15 PM)   Return Visit with Tima Moerland MD   [facility] [place] ([facility])    [address]  [place] [address]   [phone]                   BP Readings from Last 3 Encounters:   05/23/17 130/70   05/19/17 149/97   05/19/17 120/72     No results found for: MICROL  No results found for: UMALCR  Creatinine   Date Value Ref Range Status   04/30/2017 1.24 0.66 - 1.25 mg/dL Final   ]  GFR Estimate   Date Value Ref Range Status   04/30/2017 57 (L) >60 mL/min/1.7m2 Final     Comment:     Non  GFR Calc   04/28/2017 62 >60 mL/min/1.7m2 Final     Comment:     Non  GFR Calc   02/23/2017 62 >60 mL/min/1.7m2 Final     Comment:     Non  GFR Calc     GFR Estimate If Black   Date Value Ref Range Status   04/30/2017 69 >60 mL/min/1.7m2 Final     Comment:      GFR Calc   04/28/2017 75 >60 mL/min/1.7m2 Final     Comment:      GFR Calc   02/23/2017 75 >60 mL/min/1.7m2 Final     Comment:      GFR Calc     No results found for: CHOL  No results found for: HDL  No results found for: LDL  No results found for: TRIG  No results found for: CHOLHDLRATIO  Lab Results   Component Value Date    AST 15 04/30/2017     Lab Results   Component Value Date    ALT 21 04/30/2017     Lab Results   Component Value Date    A1C 6.2 10/04/2016    A1C 6.3 01/13/2015    A1C 6.1 01/23/2014     Potassium   Date Value Ref Range Status   04/30/2017 4.0 3.4 - 5.3 mmol/L  Final

## 2017-05-26 NOTE — ED NOTES
Pt presents with hx of weed whipping earlier today, pounding and noted his left middle finger spasmed straight, painful at base joint, then the ring finger and middle finger did it after this resolved. Pt was discharged from [facility] For a TIA 2 wks ago. Denies pain, tingling numbness at this time. States checked his bp a couple of times after this happened. 149/88 and 160/90's. Negative FAST exam.

## 2017-05-26 NOTE — ED AVS SNAPSHOT
[facility]    [address] [address]    Phone:  [phone]                                       Peter Zhao   MRN: [id number]    Department:  [facility]   Date of Visit:  5/26/2017           After Visit Summary Signature Page     I have received my discharge instructions, and my questions have been answered. I have discussed any challenges I see with this plan with the nurse or doctor.    ..........................................................................................................................................  Patient/Patient Representative Signature      ..........................................................................................................................................  Patient Representative Print Name and Relationship to Patient    ..................................................               ................................................  Date                                            Time    ..........................................................................................................................................  Reviewed by Signature/Title    ...................................................              ..............................................  Date                                                            Time

## 2017-05-26 NOTE — TELEPHONE ENCOUNTER
Spoke with  Pavel about when Dr. Moreland comes back and needing preapproval for his clinic day. Patient was ok with this plan. Writer left patient info in Dr. Aggarwal folder with surgery huc to ask Dr. Moreland when he returns on June 20th.    Milagros Justice

## 2017-05-26 NOTE — ED NOTES
Report to Dr. Goncalves. Consulted Dr. Goncalves regarding a Neuro eval. No neuro eval to be done.

## 2017-05-26 NOTE — ED AVS SNAPSHOT
[facility]    [address] [address]    Phone:  [phone]                                       Peter Zhao   MRN: [id number]    Department:  [facility]   Date of Visit:  5/26/2017           Patient Information     Date Of Birth          1945        Your diagnoses for this visit were:     Overuse syndrome of right hand, initial encounter        You were seen by Monica Guzman APRN FNP.      Follow-up Information     Follow up with Alexander Vazquez DO.    Specialty:  Internal Medicine    Why:  if not improving or back to baseline    Contact information:    [facility]  [address]  [place] [address] [phone]          Follow up with [facility].    Specialty:  EMERGENCY MEDICINE    Why:  As needed, If symptoms worsen    Contact information:    [address] [place] [address] [phone]    Additional information:    From [facility]: Take [address]. Turn left at [address]. Turn left at the first stoplight on [address]. At the first stop sign, take a right onto [address]. Take a left into the parking lot and continue through until you reach the North enterance of the building.       From [place]: Take [address]. Take the [address] ramp towards [place]. Turn left onto [address]. Take a slight right onto [address]. Turn left at the first stoplight on [address]. At the first stop sign, take a right onto [address]. Take a left into the parking lot and continue through until you reach the North enterance of the building.       From [place]: Take [address]. Take a right at [address]. At the first stop sign, take a right onto [address]. Take a left into the parking lot and continue through until you reach the North enterance of the building.         Discharge Instructions       See attached for home care    Rest, ice  Tylenol for pain  Take extra calcium for the next week  F/U with PCP if not improving or back to base line in 1 week  Return to  with worsening symptoms.           Treating Strains and Sprains  Strains and sprains happen when muscles or other soft tissues near your bones stretch or tear. These injuries can cause bruising, swelling, and pain. To ease your discomfort and speed the healing of your strain or sprain, follow the tips below. Remember, a strain or sprain can take 6 to 8 weeks to heal.     Important Note: Do not give aspirin to children or teens without discussing it with your healthcare provider first.        Ice first, heat later    Use ice for the first 24 to 48 hours after injury. Ice helps prevent swelling and reduce pain. Ice the injury for no more than 20 minutes at a time and allow at least  20 minutes between icing sessions.    Apply heat after the first 72 hours, once the swelling has gone down. Heat relaxes muscles and increases blood flow. Soak the injured area in warm water or use a heating pad set on low for no more than 15 minutes at a time.  Wrap and elevate    Wrap an injured limb firmly with an elastic bandage. This provides support and helps prevent swelling. Don t wear an elastic bandage overnight. Watch for tingling, numbness, or increased pain, and remove the bandage immediately if any of these occurs.    Elevate the injured area to help reduce swelling and throbbing. It s best to raise an injured limb above the level of your heart.     Medicines    Over-the-counter medicines such as acetaminophen or ibuprofen can help reduce pain. Some also help reduce swelling.    Take medicine only as directed.    Rest the area even if medicines are controlling the pain.  Rest    Rest the injured area by not using it for 24 hours.    When you re ready, return slowly to your normal activities. Rest the injured area often.    Don t use or walk on an injured limb if it hurts.    0831-1374  [employer]. [address] [place] [address]. All rights reserved. This information is not intended as a substitute for professional medical care. Always follow your healthcare professional's instructions.          Future Appointments        Provider Department Dept Phone Center    6/20/2017 9:15 AM [facility] DO [facility] [phone] [facility]    7/18/2017 1:30 PM Tima Moreland MD [facility] [phone] [facility]         Review of your medicines      START taking        Dose / Directions Last dose taken    calcium carbonate 1500 (600 CA) MG tablet   Commonly known as:  OS-WALI 600 mg [place]. Ca   Dose:  1500 mg   Quantity:  30 tablet        Take 1 tablet (1,500 mg) by mouth daily   Refills:  3          Our records show that you are taking the medicines listed below. If these are incorrect, please call your family doctor or clinic.        Dose / Directions Last dose taken    ASPIRIN PO   Dose:  325 mg        Take 325 mg by mouth daily   Refills:  0        atorvastatin 40 MG tablet   Commonly known as:  LIPITOR   Dose:  40 mg        Take 40 mg by mouth daily   Refills:  0        blood glucose lancing device   Dose:  1 Units   Quantity:  1 kit        1 Units daily   Refills:  2        * blood glucose monitoring lancets   Dose:  1 Units   Quantity:  100 each        1 Units daily Use fresh lancet each day to check blood sugars   Refills:  4        * blood glucose monitoring lancets   Quantity:  100 Box        Use to test blood sugar 2 times weekly   Refills:  6        * blood glucose monitoring test strip   Commonly known as:  ACCU-CHEK SMARTVIEW   Quantity:  1 Box        Test blood sugar before breakfast one day, before and after supper the next and not at all the third day   Refills:  12        * blood glucose monitoring test strip   Commonly known as:  MARIAN CONTOUR NEXT   Quantity:  100 strip        Use to test blood sugar 2 times weekly or as  directed.   Refills:  6        LISINOPRIL PO   Dose:  5 mg        Take 5 mg by mouth   Refills:  0        METOPROLOL TARTRATE PO   Dose:  100 mg        Take 100 mg by mouth 2 times daily   Refills:  0        MULTIVITAMINS PO   Dose:  1 capsule        Take 1 capsule by mouth daily   Refills:  0        NITROSTAT 0.4 MG sublingual tablet   Dose:  1 tablet   Generic drug:  nitroglycerin        Place 1 tablet under the tongue every 5 minutes as needed   Refills:  0        NONFORMULARY        OTC eye drops as directed   Refills:  0        order for DME   Quantity:  1 Package        Equipment being ordered: Ostomy supplies - Barrier - currently using 66785 Remlap flat cut to fit; Pouch 11219 clear; [employer] barrier rings 7805; Remlap ostomy belt.  Fill per insurance guidelines   Refills:  11        * Ostomy Supplies Misc   Quantity:  1 each        Sondra m9 odor eliminator drops 7717 or 7715 - patient preference   Refills:  11        * Ostomy Supplies POUCH Misc   Quantity:  2 each        Remlap 64098 closed end pouches - per Medicare patient is allowed 60 per month.  Dispense 2 boxes or patient choice   Refills:  11        PLAVIX PO   Dose:  75 mg        Take 75 mg by mouth daily   Refills:  0        polyethylene glycol 0.4%- propylene glycol 0.3% 0.4-0.3 % Soln ophthalmic solution   Commonly known as:  SYSTANE ULTRA   Dose:  1 drop        Place 1 drop into both eyes every hour as needed for dry eyes   Refills:  0        PROSCAR PO   Dose:  5 mg        Take 5 mg by mouth daily   Refills:  0        RANITIDINE HCL PO   Dose:  150 mg        Take 150 mg by mouth 2 times daily   Refills:  0        VITAMIN D3 PO        Take  by mouth daily.   Refills:  0        * Notice:  This list has 6 medication(s) that are the same as other medications prescribed for you. Read the directions carefully, and ask your doctor or other care provider to review them with you.            Prescriptions were sent or printed at these  "locations (1 Prescription)                   [facility] [address]   [address] , [place] [address]    Telephone:  [phone]   Fax:  [phone]   Hours:                  E-Prescribed (1 of 1)         calcium carbonate (OS-WALI 600 MG [place]. CA) 1500 (600 CA) MG tablet                Procedures and tests performed during your visit     CBC with platelets differential    Comprehensive metabolic panel    TSH      Orders Needing Specimen Collection     None      Pending Results     No orders found from 2017 to 2017.            Pending Culture Results     No orders found from 2017 to 2017.            Thank you for choosing [facility]       Thank you for choosing [facility] for your care. Our goal is always to provide you with excellent care. Hearing back from our patients is one way we can continue to improve our services. Please take a few minutes to complete the written survey that you may receive in the mail after you visit with us. Thank you!        [facility] Information     [employer] lets you send messages to your doctor, view your test results, renew your prescriptions, schedule appointments and more. To sign up, go to [email or web address] . Click on \"Log in\" on the left side of the screen, which will take you to the Welcome page. Then click on \"Sign up Now\" on the right side of the page.     You will be asked to enter the access code listed below, as well as some personal information. Please follow the directions to create your username and password.     Your access code is: [email or web address]  Expires: 2017  8:29 AM     Your access code will  in 90 days. If you need help or a new code, please call your [facility] clinic or [phone].        Care EveryWhere ID     This is your Care EveryWhere ID. This could be used by other organizations to access your [facility] medical records  [id number]        After Visit Summary       This is your record. Keep this with " you and show to your community pharmacist(s) and doctor(s) at your next visit.

## 2017-05-26 NOTE — ED NOTES
Pt has hx of Pacemaker/defib. 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

## 2017-05-27 NOTE — ED NOTES
Face to face report given with opportunity to observe patient.    Report given to Lis Gamble   5/26/2017  7:12 PM

## 2017-05-27 NOTE — DISCHARGE INSTRUCTIONS
See attached for home care   Rest, ice  Tylenol for pain  Take extra calcium for the next week  F/U with PCP if not improving or back to base line in 1 week  Return to  with worsening symptoms.           Treating Strains and Sprains  Strains and sprains happen when muscles or other soft tissues near your bones stretch or tear. These injuries can cause bruising, swelling, and pain. To ease your discomfort and speed the healing of your strain or sprain, follow the tips below. Remember, a strain or sprain can take 6 to 8 weeks to heal.     Important Note: Do not give aspirin to children or teens without discussing it with your healthcare provider first.        Ice first, heat later    Use ice for the first 24 to 48 hours after injury. Ice helps prevent swelling and reduce pain. Ice the injury for no more than 20 minutes at a time and allow at least  20 minutes between icing sessions.    Apply heat after the first 72 hours, once the swelling has gone down. Heat relaxes muscles and increases blood flow. Soak the injured area in warm water or use a heating pad set on low for no more than 15 minutes at a time.  Wrap and elevate    Wrap an injured limb firmly with an elastic bandage. This provides support and helps prevent swelling. Don t wear an elastic bandage overnight. Watch for tingling, numbness, or increased pain, and remove the bandage immediately if any of these occurs.    Elevate the injured area to help reduce swelling and throbbing. It s best to raise an injured limb above the level of your heart.     Medicines    Over-the-counter medicines such as acetaminophen or ibuprofen can help reduce pain. Some also help reduce swelling.    Take medicine only as directed.    Rest the area even if medicines are controlling the pain.  Rest    Rest the injured area by not using it for 24 hours.    When you re ready, return slowly to your normal activities. Rest the injured area often.    Don t use or walk on an injured  limb if it hurts.    5818-9258 [employer]. [address] [place] [address], [place] [address]. All rights reserved. This information is not intended as a substitute for professional medical care. Always follow your healthcare professional's instructions.

## 2017-05-27 NOTE — ED NOTES
Pt given discharge papers.  Verbalizes understanding of d/c dx, to follow up with PMD if needed or to be seen in UC.  Pt denies pain.  Pt has not taken his 1800 BP medication yet tonight and will do so when he gets home.  Discharged to home with wife here to transport.

## 2017-05-30 ENCOUNTER — MEDICAL CORRESPONDENCE (OUTPATIENT)
Dept: HEALTH INFORMATION MANAGEMENT | Facility: HOSPITAL | Age: 72
End: 2017-05-30

## 2017-05-30 ASSESSMENT — ENCOUNTER SYMPTOMS
FEVER: 0
NECK STIFFNESS: 0
SHORTNESS OF BREATH: 0
HEADACHES: 0
ARTHRALGIAS: 1
DIFFICULTY URINATING: 0
EYE REDNESS: 0
ABDOMINAL PAIN: 0
CONFUSION: 0
COLOR CHANGE: 0

## 2017-06-05 ENCOUNTER — TELEPHONE (OUTPATIENT)
Dept: INTERNAL MEDICINE | Facility: OTHER | Age: 72
End: 2017-06-05

## 2017-06-05 NOTE — TELEPHONE ENCOUNTER
Left message on patients phone to notify him of appointment tomorrow at at 10:30. Sabina Olivo LPN

## 2017-06-05 NOTE — TELEPHONE ENCOUNTER
Pt has no chest pain, shortness of breath or headaches. Pt states blood pressure has been slowly increasing over the past month- was advised if any chest pain, sob or any sudden change in symptoms call 911 or go to ER- pt agreed and understood- pt would like a follow up tomorrow with you- ok to double book or would you like him to follow up later on- has an appointment with you on 6/20/17- please advise. Thank you. Sabina Olivo LPN

## 2017-06-05 NOTE — TELEPHONE ENCOUNTER
8:57 AM    Reason for Call: Phone Call    Description: Peter's blood pressure is 160 over 103 and would like to have a nurse call him back with advise.    Was an appointment offered for this call? Yes patient would like to talk to nurse first    Preferred method for responding to this message: Telephone Call/[phone]    If we cannot reach you directly, may we leave a detailed response at the number you provided? Yes    Can this message wait until your PCP/provider returns, if available today? Deya,     Amanda Meza

## 2017-06-06 ENCOUNTER — OFFICE VISIT (OUTPATIENT)
Dept: INTERNAL MEDICINE | Facility: OTHER | Age: 72
End: 2017-06-06
Attending: INTERNAL MEDICINE
Payer: MEDICARE

## 2017-06-06 ENCOUNTER — ALLIED HEALTH/NURSE VISIT (OUTPATIENT)
Dept: FAMILY MEDICINE | Facility: OTHER | Age: 72
End: 2017-06-06
Attending: INTERNAL MEDICINE
Payer: MEDICARE

## 2017-06-06 VITALS
BODY MASS INDEX: 25.2 KG/M2 | TEMPERATURE: 96.9 F | SYSTOLIC BLOOD PRESSURE: 170 MMHG | WEIGHT: 176 LBS | HEIGHT: 70 IN | DIASTOLIC BLOOD PRESSURE: 82 MMHG | HEART RATE: 50 BPM | OXYGEN SATURATION: 97 %

## 2017-06-06 DIAGNOSIS — I10 BENIGN ESSENTIAL HYPERTENSION: ICD-10-CM

## 2017-06-06 DIAGNOSIS — E11.8 TYPE 2 DIABETES MELLITUS WITH COMPLICATION, WITHOUT LONG-TERM CURRENT USE OF INSULIN (H): Primary | ICD-10-CM

## 2017-06-06 DIAGNOSIS — I10 BENIGN ESSENTIAL HYPERTENSION: Primary | ICD-10-CM

## 2017-06-06 LAB
CREAT UR-MCNC: 44 MG/DL
EST. AVERAGE GLUCOSE BLD GHB EST-MCNC: 143 MG/DL
HBA1C MFR BLD: 6.6 % (ref 4.3–6)
MICROALBUMIN UR-MCNC: <5 MG/L
MICROALBUMIN/CREAT UR: NORMAL MG/G CR (ref 0–17)

## 2017-06-06 PROCEDURE — 82043 UR ALBUMIN QUANTITATIVE: CPT | Mod: ZL | Performed by: INTERNAL MEDICINE

## 2017-06-06 PROCEDURE — 40000788 ZZHCL STATISTIC ESTIMATED AVERAGE GLUCOSE: Mod: ZL | Performed by: INTERNAL MEDICINE

## 2017-06-06 PROCEDURE — 99213 OFFICE O/P EST LOW 20 MIN: CPT | Performed by: INTERNAL MEDICINE

## 2017-06-06 PROCEDURE — 83036 HEMOGLOBIN GLYCOSYLATED A1C: CPT | Mod: ZL | Performed by: INTERNAL MEDICINE

## 2017-06-06 PROCEDURE — 36415 COLL VENOUS BLD VENIPUNCTURE: CPT | Mod: ZL | Performed by: INTERNAL MEDICINE

## 2017-06-06 PROCEDURE — 99212 OFFICE O/P EST SF 10 MIN: CPT

## 2017-06-06 RX ORDER — LISINOPRIL 40 MG/1
40 TABLET ORAL DAILY
Qty: 90 TABLET | Refills: 3 | Status: SHIPPED | OUTPATIENT
Start: 2017-06-06 | End: 2017-06-16

## 2017-06-06 RX ORDER — LISINOPRIL 20 MG/1
20 TABLET ORAL DAILY
Qty: 30 TABLET | Refills: 1 | Status: SHIPPED | OUTPATIENT
Start: 2017-06-06 | End: 2017-06-06

## 2017-06-06 ASSESSMENT — PAIN SCALES - GENERAL: PAINLEVEL: NO PAIN (0)

## 2017-06-06 NOTE — MR AVS SNAPSHOT
After Visit Summary   6/6/2017    Peter Zhao    MRN: [id number]           Patient Information     Date Of Birth          1945        Visit Information        Provider Department      6/6/2017 2:00 PM HC FP NURSE [facility]        Today's Diagnoses     Benign essential hypertension    -  1       Follow-ups after your visit        Your next 10 appointments already scheduled     Jun 20, 2017  9:15 AM CDT   (Arrive by 9:00 AM)   Office Visit with Alexander Vazquez DO   [facility] ([facility])    [address] [address]   [phone]           Bring a current list of meds and any records pertaining to this visit.  For Physicals, please bring immunization records and any forms needing to be filled out.  Please arrive 10 minutes early to complete paperwork.            Jul 18, 2017  1:30 PM CDT   (Arrive by 1:15 PM)   Return Visit with Tima Moreland MD   [facility] ([facility])    [address] [address]   [phone]              Who to contact     If you have questions or need follow up information about today's clinic visit or your schedule please contact [facility] directly at [phone].  Normal or non-critical lab and imaging results will be communicated to you by MyChart, letter or phone within 4 business days after the clinic has received the results. If you do not hear from us within 7 days, please contact the clinic through Movayahart or phone. If you have a critical or abnormal lab result, we will notify you by phone as soon as possible.  Submit refill requests through [employer] or call your pharmacy and they will forward the refill request to us. Please allow 3 business days for your refill to be completed.          Additional Information About Your Visit        [employer] Information     [employer] lets you send messages to your doctor, view your test results, renew your prescriptions,  "schedule appointments and more. To sign up, go to [email or web address] . Click on \"Log in\" on the left side of the screen, which will take you to the Welcome page. Then click on \"Sign up Now\" on the right side of the page.     You will be asked to enter the access code listed below, as well as some personal information. Please follow the directions to create your username and password.     Your access code is: [email or web address]  Expires: 2017  8:29 AM     Your access code will  in 90 days. If you need help or a new code, please call your [place] clinic or [phone].        Care EveryWhere ID     This is your Care EveryWhere ID. This could be used by other organizations to access your [place] medical records  [id number]         Blood Pressure from Last 3 Encounters:   17 170/82   17 (!) 105/102   17 130/70    Weight from Last 3 Encounters:   17 176 lb (79.8 kg)   17 175 lb (79.4 kg)   17 175 lb (79.4 kg)              Today, you had the following     No orders found for display         Today's Medication Changes          These changes are accurate as of: 17  4:33 PM.  If you have any questions, ask your nurse or doctor.               These medicines have changed or have updated prescriptions.        Dose/Directions    lisinopril 40 MG tablet   Commonly known as:  PRINIVIL/ZESTRIL   This may have changed:    - medication strength  - how much to take  - when to take this   Used for:  Benign essential hypertension   Changed by:  Alexander Vazquez DO        Dose:  40 mg   Take 1 tablet (40 mg) by mouth daily   Quantity:  90 tablet   Refills:  3            Where to get your medicines      These medications were sent to [id number] [address] [place] [address] [address]  [address] [address]     Phone:  [phone]     lisinopril 40 MG tablet                Primary Care Provider Office Phone # Fax #    Alexander Vazquez [phone] " [phone]       [facility] [address] [place] [address]        Thank you!     Thank you for choosing [facility]  for your care. Our goal is always to provide you with excellent care. Hearing back from our patients is one way we can continue to improve our services. Please take a few minutes to complete the written survey that you may receive in the mail after your visit with us. Thank you!             Your Updated Medication List - Protect others around you: Learn how to safely use, store and throw away your medicines at www.disposemymeds.org.          This list is accurate as of: 6/6/17  4:33 PM.  Always use your most recent med list.                   Brand Name Dispense Instructions for use    ASPIRIN PO      Take 325 mg by mouth daily       atorvastatin 40 MG tablet    LIPITOR     Take 40 mg by mouth daily       blood glucose lancing device     1 kit    1 Units daily       * blood glucose monitoring lancets     100 each    1 Units daily Use fresh lancet each day to check blood sugars       * blood glucose monitoring lancets     100 Box    Use to test blood sugar 2 times weekly       * blood glucose monitoring test strip    ACCU-CHEK SMARTVIEW    1 Box    Test blood sugar before breakfast one day, before and after supper the next and not at all the third day       * blood glucose monitoring test strip    MARIAN CONTOUR NEXT    100 strip    Use to test blood sugar 2 times weekly or as directed.       calcium carbonate 1500 (600 CA) MG tablet    OS-WALI 600 mg [place]. Ca    30 tablet    Take 1 tablet (1,500 mg) by mouth daily       lisinopril 40 MG tablet    PRINIVIL/ZESTRIL    90 tablet    Take 1 tablet (40 mg) by mouth daily       METOPROLOL TARTRATE PO      Take 100 mg by mouth 2 times daily       MULTIVITAMINS PO      Take 1 capsule by mouth daily       NITROSTAT 0.4 MG sublingual tablet   Generic drug:  nitroglycerin      Place 1 tablet under the tongue every 5 minutes as  needed       NONFORMULARY      OTC eye drops as directed       order for DME     1 Package    Equipment being ordered: Ostomy supplies - Barrier - currently using [address] [place] flat cut to fit; Pouch 81798 clear; Adapt barrier rings 7805; Sondra ostomy belt.  Fill per insurance guidelines       * Ostomy Supplies Misc     1 each    Sondra m9 odor eliminator drops 7717 or 7715 - patient preference       * Ostomy Supplies POUCH Misc     2 each    [place] [address] closed end pouches - per Medicare patient is allowed 60 per month.  Dispense 2 boxes or patient choice       PLAVIX PO      Take 75 mg by mouth daily       polyethylene glycol 0.4%- propylene glycol 0.3% 0.4-0.3 % Soln ophthalmic solution    SYSTANE ULTRA     Place 1 drop into both eyes every hour as needed for dry eyes       PROSCAR PO      Take 5 mg by mouth daily       RANITIDINE HCL PO      Take 150 mg by mouth 2 times daily       VITAMIN D3 PO      Take  by mouth daily.       * Notice:  This list has 6 medication(s) that are the same as other medications prescribed for you. Read the directions carefully, and ask your doctor or other care provider to review them with you.

## 2017-06-06 NOTE — NURSING NOTE
"Chief Complaint   Patient presents with     Hypertension     bloood pressure has been gradually increasing- no chest pain, no sob, no headaches - pt states was in [facility] a couple weeks ago- he says they thought he had a stroke but noone could explain anything        Initial /82 (BP Location: Left arm, Patient Position: Supine, Cuff Size: Adult Large)  Pulse 50  Temp 96.9  F (36.1  C) (Tympanic)  Ht 5' 10\" (1.778 m)  Wt 176 lb (79.8 kg)  SpO2 97%  BMI 25.25 kg/m2 Estimated body mass index is 25.25 kg/(m^2) as calculated from the following:    Height as of this encounter: 5' 10\" (1.778 m).    Weight as of this encounter: 176 lb (79.8 kg).  Medication Reconciliation: complete   Sabina Olivo LPN      "

## 2017-06-06 NOTE — PATIENT INSTRUCTIONS
Increase Lisinopril from 5 Mg PO daily to 40 mg PO daily-new prescription sent  Follow up with me on 6/20 as scheduled  Labs today

## 2017-06-06 NOTE — PROGRESS NOTES
Internal Medicine:    Chief Complaint   Patient presents with     Hypertension     bloood pressure has been gradually increasing- no chest pain, no sob, no headaches - pt states was in [facility] a couple weeks ago- he says they thought he had a stroke but noone could explain anything      Peter presents today due to concern that his BP has been elevated the last week or so.  He has measurements in the 170-180s.  He denies any chest pain, SOB, or recurrent weakness.  He remains on his Plavix following his TIA.    Also in regard to his DM type 2 he has not had an A1C in awhile.           Patient Active Problem List   Diagnosis     Diabetes mellitus, type 2 (H)     CAD (coronary artery disease)     ACP (advance care planning)     Hyperlipidemia LDL goal <100     Elevated prostate specific antigen (PSA)     HTN (hypertension)     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     H/O migraine     Visual aura     Cataract     S/P colon resection     Hypokalemia     TIA (transient ischemic attack)     Agitation            Past Medical History:   Diagnosis Date     Allergic state      CAD (coronary artery disease)     CABG 2013, stent x 1 in 2014     Cataract      Cataracts, bilateral      Elevated PSA      Gastro-oesophageal reflux disease      Heart murmur      Hyperlipidemia      Hypertension      Pacemaker      Stented coronary artery      Visual aura             Past Surgical History:   Procedure Laterality Date     CARDIAC SURGERY       CATARACT IOL, RT/LT Right 04/2017     COLECTOMY LOW ANTERIOR N/A 2/17/2017    Procedure: COLECTOMY LOW ANTERIOR;  Surgeon: Tima Moreland MD;  Location: [facility]     COLONOSCOPY N/A 2/17/2017    Procedure: COLONOSCOPY;  Surgeon: Tima Moreland MD;  Location: [facility] OR     ENT SURGERY       FLEX SIG (MEDICARE PT.)       GI SURGERY  02/2017    ostemomy     HERNIA REPAIR       LAPAROSCOPIC HERNIORRHAPHY INGUINAL Right 9/18/2015    Procedure: LAPAROSCOPIC  HERNIORRHAPHY INGUINAL;  Surgeon: Solitario Nettles DO;  Location: [facility] OR     PHACOEMULSIFICATION WITH STANDARD INTRAOCULAR LENS IMPLANT Right 4/20/2017    Procedure: PHACOEMULSIFICATION WITH STANDARD INTRAOCULAR LENS IMPLANT;  CATARACT EXTRACTION RIGHT EYE WITH IMPLANT;  Surgeon: Darin Gutierrez MD;  Location: [facility] OR     PROSTATE BIOPSY, NEEDLE, SATURATION SAMPLING  2009     retinopexy-pvd with retinal tear Right 2008     TONSILLECTOMY              Social History   Substance Use Topics     Smoking status: Former Smoker     Smokeless tobacco: Former User     Types: Chew     Quit date: 10/19/2013      Comment: quit in 2000     Alcohol use No            Family History   Problem Relation Age of Onset     DIABETES Mother      C.A.D. Mother      CANCER Maternal Grandmother                Allergies   Allergen Reactions     Acetaminophen      Ciprofloxacin Hives     No Clinical Screening - See Comments      Antibiotic allergy, pt not sure which            Current Outpatient Prescriptions   Medication Sig Dispense Refill     lisinopril (PRINIVIL/ZESTRIL) 40 MG tablet Take 1 tablet (40 mg) by mouth daily 90 tablet 3     blood glucose monitoring (MARIAN CONTOUR NEXT) test strip Use to test blood sugar 2 times weekly or as directed. 100 strip 6     calcium carbonate (OS-WALI 600 MG [place]. CA) 1500 (600 CA) MG tablet Take 1 tablet (1,500 mg) by mouth daily 30 tablet 3     Clopidogrel Bisulfate (PLAVIX PO) Take 75 mg by mouth daily       Finasteride (PROSCAR PO) Take 5 mg by mouth daily       METOPROLOL TARTRATE PO Take 100 mg by mouth 2 times daily       Ostomy Supplies POUCH [place] [address] closed end pouches - per Medicare patient is allowed 60 per month.  Dispense 2 boxes or patient choice 2 each 11     Ostomy Supplies MISC Sondra m9 odor eliminator drops 7717 or 7715 - patient preference 1 each 11     order for DME Equipment being ordered: Ostomy supplies - Barrier - currently using [address] [employer] flat cut to fit;  "Pouch 84831 clear; [employer] barrier rings 7805; [place] ostomy belt.  Fill per insurance guidelines 1 Package 11     blood glucose monitoring (MARIAN MICROLET) lancets Use to test blood sugar 2 times weekly 100 Box 6     atorvastatin (LIPITOR) 40 MG tablet Take 40 mg by mouth daily       NONFORMULARY OTC eye drops as directed       polyethylene glycol 0.4%- propylene glycol 0.3% (SYSTANE ULTRA) 0.4-0.3 % SOLN ophthalmic solution Place 1 drop into both eyes every hour as needed for dry eyes       RANITIDINE HCL PO Take 150 mg by mouth 2 times daily        nitroglycerin (NITROSTAT) 0.4 MG SL tablet Place 1 tablet under the tongue every 5 minutes as needed       Multiple Vitamin (MULTIVITAMINS PO) Take 1 capsule by mouth daily       glucose blood VI test strips (ACCU-CHEK SMARTVIEW) strip Test blood sugar before breakfast one day, before and after supper the next and not at all the third day 1 Box 12     SOFTCLIX LANCETS MISC 1 Units daily Use fresh lancet each day to check blood sugars 100 each 4     Lancets Misc. (ACCU-CHEK SOFTCLIX LANCET DEV) KIT 1 Units daily 1 kit 2     ASPIRIN PO Take 325 mg by mouth daily        Cholecalciferol (VITAMIN D3 PO) Take  by mouth daily.       [DISCONTINUED] lisinopril (PRINIVIL/ZESTRIL) 20 MG tablet Take 1 tablet (20 mg) by mouth daily 30 tablet 1     [DISCONTINUED] LISINOPRIL PO Take 5 mg by mouth          Review Of Systems:    Ears/Nose/Throat: negative  Respiratory: No shortness of breath, dyspnea on exertion, cough, or hemoptysis  Cardiovascular: negative-as above    Gastrointestinal: Ostomy    Genitourinary: negative  Musculoskeletal: negative  Neurologic: negative  Psychiatric: negative  Hematologic/Lymphatic/Immunologic: negative  Endocrine: DM    Objective:   /82 (BP Location: Left arm, Patient Position: Supine, Cuff Size: Adult Large)  Pulse 50  Temp 96.9  F (36.1  C) (Tympanic)  Ht 5' 10\" (1.778 m)  Wt 176 lb (79.8 kg)  SpO2 97%  BMI 25.25 " kg/m2  EXAM:  Constitutional: alert and no distress   Cardiovascular: RRR. No murmurs, clicks gallops or rub  Respiratory:  Lungs clear  Psychiatric: mentation appears normal and affect normal/bright  Abdomen: Abdomen soft, non-tender. BS normal. Ostomy    NEURO: No focal deficits        Orders placed or performed in visit on 06/06/17     lisinopril (PRINIVIL/ZESTRIL) 20 MG tablet *Discontinued*     lisinopril (PRINIVIL/ZESTRIL) 40 MG tablet       Assessment and Plan:    (E11.8) Type 2 diabetes mellitus with complication, without long-term current use of insulin (H)  (primary encounter diagnosis)  Comment: Last A1C was <7 but he has not completed this in the last 8 months or so.    Plan: HEMOGLOBIN A1C, Albumin Random Urine         Quantitative    (I10) Benign essential hypertension  Comment:   Plan: lisinopril (PRINIVIL/ZESTRIL) 40 MG tablet,         DISCONTINUED: lisinopril (PRINIVIL/ZESTRIL) 5         MG tablet        He will follow up with me in 2 weeks for repeat BP check and assessment.      Patient instructions:  Increase Lisinopril from 5 Mg PO daily to 40 mg PO daily-new prescription sent  Follow up with me on 6/20 as scheduled  Labs today      Alexander Vazquez DO

## 2017-06-06 NOTE — MR AVS SNAPSHOT
After Visit Summary   6/6/2017    Peter Zhao    MRN: [id number]           Patient Information     Date Of Birth          1945        Visit Information        Provider Department      6/6/2017 10:45 AM Alexander Vazquez DO [facility]        Today's Diagnoses     Type 2 diabetes mellitus with complication, without long-term current use of insulin (H)    -  1    Benign essential hypertension          Care Instructions    Increase Lisinopril from 5 Mg PO daily to 40 mg PO daily-new prescription sent  Follow up with me on 6/20 as scheduled  Labs today              Follow-ups after your visit        Your next 10 appointments already scheduled     Jun 20, 2017  9:15 AM CDT   (Arrive by 9:00 AM)   Office Visit with Alexander Vazquez DO   [facility] ([facility] [place] [facility])    [address]  [place] [address] [phone]           Bring a current list of meds and any records pertaining to this visit.  For Physicals, please bring immunization records and any forms needing to be filled out.  Please arrive 10 minutes early to complete paperwork.            Jul 18, 2017  1:30 PM CDT   (Arrive by 1:15 PM)   Return Visit with Tima Moreland MD   [facility] ([facility] [place] [facility])    [address]  [place] [address]   [phone]              Who to contact     If you have questions or need follow up information about today's clinic visit or your schedule please contact [facility] directly at [phone].  Normal or non-critical lab and imaging results will be communicated to you by MyChart, letter or phone within 4 business days after the clinic has received the results. If you do not hear from us within 7 days, please contact the clinic through MyChart or phone. If you have a critical or abnormal lab result, we will notify you by phone as soon as possible.  Submit refill requests through [employer] or call your pharmacy and they will  "forward the refill request to us. Please allow 3 business days for your refill to be completed.          Additional Information About Your Visit        [employer] Information     [employer] lets you send messages to your doctor, view your test results, renew your prescriptions, schedule appointments and more. To sign up, go to [email or web address] . Click on \"Log in\" on the left side of the screen, which will take you to the Welcome page. Then click on \"Sign up Now\" on the right side of the page.     You will be asked to enter the access code listed below, as well as some personal information. Please follow the directions to create your username and password.     Your access code is: [email or web address]  Expires: 2017  8:29 AM     Your access code will  in 90 days. If you need help or a new code, please call your [place] clinic or [phone].        Care EveryWhere ID     This is your Care EveryWhere ID. This could be used by other organizations to access your [place] medical records  [id number]        Your Vitals Were     Pulse Temperature Height Pulse Oximetry BMI (Body Mass Index)       50 96.9  F (36.1  C) (Tympanic) 5' 10\" (1.778 m) 97% 25.25 kg/m2        Blood Pressure from Last 3 Encounters:   17 170/82   17 (!) 105/102   17 130/70    Weight from Last 3 Encounters:   17 176 lb (79.8 kg)   17 175 lb (79.4 kg)   17 175 lb (79.4 kg)              We Performed the Following     Albumin Random Urine Quantitative     HEMOGLOBIN A1C          Today's Medication Changes          These changes are accurate as of: 17 10:52 AM.  If you have any questions, ask your nurse or doctor.               These medicines have changed or have updated prescriptions.        Dose/Directions    lisinopril 40 MG tablet   Commonly known as:  PRINIVIL/ZESTRIL   This may have changed:    - medication strength  - how much to take  - when to take this   Used for:  Benign essential hypertension "   Changed by:  Alexander Vazquez DO        Dose:  40 mg   Take 1 tablet (40 mg) by mouth daily   Quantity:  90 tablet   Refills:  3            Where to get your medicines      These medications were sent to [id number] - [place] - [address]   [address] [address]     Phone:  [phone]     lisinopril 40 MG tablet                Primary Care Provider Office Phone # Fax #    Alexander Vazquez [phone]       [facility] [address] [place] [address]        Thank you!     Thank you for choosing [facility]  for your care. Our goal is always to provide you with excellent care. Hearing back from our patients is one way we can continue to improve our services. Please take a few minutes to complete the written survey that you may receive in the mail after your visit with us. Thank you!             Your Updated Medication List - Protect others around you: Learn how to safely use, store and throw away your medicines at www.disposemymeds.org.          This list is accurate as of: 6/6/17 10:52 AM.  Always use your most recent med list.                   Brand Name Dispense Instructions for use    ASPIRIN PO      Take 325 mg by mouth daily       atorvastatin 40 MG tablet    LIPITOR     Take 40 mg by mouth daily       blood glucose lancing device     1 kit    1 Units daily       * blood glucose monitoring lancets     100 each    1 Units daily Use fresh lancet each day to check blood sugars       * blood glucose monitoring lancets     100 Box    Use to test blood sugar 2 times weekly       * blood glucose monitoring test strip    ACCU-CHEK SMARTVIEW    1 Box    Test blood sugar before breakfast one day, before and after supper the next and not at all the third day       * blood glucose monitoring test strip    MARIAN CONTOUR NEXT    100 strip    Use to test blood sugar 2 times weekly or as directed.       calcium carbonate 1500 (600 CA) MG  tablet    OS-WALI 600 mg [place]. Ca    30 tablet    Take 1 tablet (1,500 mg) by mouth daily       lisinopril 40 MG tablet    PRINIVIL/ZESTRIL    90 tablet    Take 1 tablet (40 mg) by mouth daily       METOPROLOL TARTRATE PO      Take 100 mg by mouth 2 times daily       MULTIVITAMINS PO      Take 1 capsule by mouth daily       NITROSTAT 0.4 MG sublingual tablet   Generic drug:  nitroglycerin      Place 1 tablet under the tongue every 5 minutes as needed       NONFORMULARY      OTC eye drops as directed       order for DME     1 Package    Equipment being ordered: Ostomy supplies - Barrier - currently using [address] [place] flat cut to fit; Pouch 24497 clear; Adapt barrier rings 7805; [place] ostomy belt.  Fill per insurance guidelines       * Ostomy Supplies Misc     1 each    [place] m9 odor eliminator drops 7717 or 7715 - patient preference       * Ostomy Supplies POUCH Misc     2 each    Sondra 71048 closed end pouches - per Medicare patient is allowed 60 per month.  Dispense 2 boxes or patient choice       PLAVIX PO      Take 75 mg by mouth daily       polyethylene glycol 0.4%- propylene glycol 0.3% 0.4-0.3 % Soln ophthalmic solution    SYSTANE ULTRA     Place 1 drop into both eyes every hour as needed for dry eyes       PROSCAR PO      Take 5 mg by mouth daily       RANITIDINE HCL PO      Take 150 mg by mouth 2 times daily       VITAMIN D3 PO      Take  by mouth daily.       * Notice:  This list has 6 medication(s) that are the same as other medications prescribed for you. Read the directions carefully, and ask your doctor or other care provider to review them with you.

## 2017-06-06 NOTE — NURSING NOTE
Patient was wondering why he got 20mg lisinopril instead of 40mg like he was told- I told the patient to take 2 tablets of his 20mg and follow up with Dr. Vazquez on 6/20/17 - we will check in with him then and then we will see how he is feeling and then if need to be continued Dr. Vazquez will prescribe him the 40mg tablets at that time. Sabina Olivo LPN

## 2017-06-10 ENCOUNTER — HOSPITAL ENCOUNTER (EMERGENCY)
Facility: HOSPITAL | Age: 72
Discharge: HOME | End: 2017-06-10
Admitting: INTERNAL MEDICINE
Payer: MEDICARE

## 2017-06-10 VITALS
RESPIRATION RATE: 16 BRPM | OXYGEN SATURATION: 96 % | TEMPERATURE: 97.7 F | DIASTOLIC BLOOD PRESSURE: 98 MMHG | HEART RATE: 51 BPM | SYSTOLIC BLOOD PRESSURE: 163 MMHG

## 2017-06-10 DIAGNOSIS — I10 ESSENTIAL HYPERTENSION: ICD-10-CM

## 2017-06-10 PROCEDURE — 99283 EMERGENCY DEPT VISIT LOW MDM: CPT

## 2017-06-10 PROCEDURE — 99283 EMERGENCY DEPT VISIT LOW MDM: CPT | Performed by: INTERNAL MEDICINE

## 2017-06-10 PROCEDURE — 25000132 ZZH RX MED GY IP 250 OP 250 PS 637: Mod: GY | Performed by: INTERNAL MEDICINE

## 2017-06-10 PROCEDURE — A9270 NON-COVERED ITEM OR SERVICE: HCPCS | Mod: GY | Performed by: INTERNAL MEDICINE

## 2017-06-10 RX ORDER — AMLODIPINE BESYLATE 5 MG/1
5 TABLET ORAL DAILY
Qty: 14 TABLET | Refills: 0 | Status: SHIPPED | OUTPATIENT
Start: 2017-06-10 | End: 2017-06-16

## 2017-06-10 RX ORDER — AMLODIPINE BESYLATE 5 MG/1
5 TABLET ORAL ONCE
Status: COMPLETED | OUTPATIENT
Start: 2017-06-10 | End: 2017-06-10

## 2017-06-10 RX ADMIN — AMLODIPINE BESYLATE 5 MG: 5 TABLET ORAL at 23:26

## 2017-06-10 NOTE — ED AVS SNAPSHOT
[facility]    [address] [address]    Phone:  [phone]                                       Peter Zhao   MRN: [id number]    Department:  [facility]   Date of Visit:  6/10/2017           After Visit Summary Signature Page     I have received my discharge instructions, and my questions have been answered. I have discussed any challenges I see with this plan with the nurse or doctor.    ..........................................................................................................................................  Patient/Patient Representative Signature      ..........................................................................................................................................  Patient Representative Print Name and Relationship to Patient    ..................................................               ................................................  Date                                            Time    ..........................................................................................................................................  Reviewed by Signature/Title    ...................................................              ..............................................  Date                                                            Time

## 2017-06-10 NOTE — ED AVS SNAPSHOT
[facility]    [address] [place] [address]    [place] [address]    Phone:  [phone]                                       Peter Zhao   MRN: [id number]    Department:  [facility]   Date of Visit:  6/10/2017           Patient Information     Date Of Birth          1945        Your diagnoses for this visit were:     Essential hypertension       Follow-up Information     Follow up with Alexander Vazquez DO.    Specialty:  Internal Medicine    Contact information:    [facility]  [address] [place] [address]  [phone]          Discharge Instructions         Discharge Instructions: Taking Your Blood Pressure  Blood pressure is the force of blood as it moves from the heart through the blood vessels. You can take your own blood pressure reading using a digital monitor. Take readings as often as your health care provider instructs. Take your readings each time in the same way, using the same arm. Here are guidelines for taking your blood pressure.  1. Relax      Wait at least a half hour after smoking, eating, or exercising. Do not drink coffee, tea, soda, or other caffeinated beverages before checking your blood pressure.     Sit comfortably at a table. Place the monitor near you.    Rest for a few minutes before you begin.  2. Wrap the cuff      Place your arm on the table, palm up. Put your arm in a position that is level with your heart. Wrap the cuff around your upper arm, just above your elbow. It s best to wrap the cuff on bare skin, not over clothing.    Make sure your cuff fits. If it doesn t wrap around your upper arm, order a larger cuff. A cuff that is too large or too small can result in an inaccurate blood pressure reading.  3. Inflate the cuff      Pump the cuff until the scale reads 200. If you have a self-inflating cuff, push the button that starts the pump.    The cuff will tighten, then loosen.    The numbers will change. When they stop changing,  your blood pressure reading will appear.    If you get a reading that is too high or too low for you, relax for a few minutes. Then do the test again.    4. Write down the results    Write down your blood pressure numbers. Brock the date and time. Keep your results in one place, such as a notebook.    Remove the cuff from your arm. Turn off the machine.       6483-7001 The [employer]. [address] [place] [address], [place] [address]. All rights reserved. This information is not intended as a substitute for professional medical care. Always follow your healthcare professional's instructions.          Future Appointments        Provider Department Dept Phone Center    6/20/2017 9:15 AM Alexander Vazquez DO [facility] [phone] [facility]    7/18/2017 1:30 PM Tima Moreland MD [facility] [phone] [place] [facility]         Review of your medicines      START taking        Dose / Directions Last dose taken    amLODIPine 5 MG tablet   Commonly known as:  NORVASC   Dose:  5 mg   Quantity:  14 tablet        Take 1 tablet (5 mg) by mouth daily for 14 days   Refills:  0          Our records show that you are taking the medicines listed below. If these are incorrect, please call your family doctor or clinic.        Dose / Directions Last dose taken    ASPIRIN PO   Dose:  325 mg        Take 325 mg by mouth daily   Refills:  0        atorvastatin 40 MG tablet   Commonly known as:  LIPITOR   Dose:  40 mg        Take 40 mg by mouth daily   Refills:  0        blood glucose lancing device   Dose:  1 Units   Quantity:  1 kit        1 Units daily   Refills:  2        * blood glucose monitoring lancets   Dose:  1 Units   Quantity:  100 each        1 Units daily Use fresh lancet each day to check blood sugars   Refills:  4        * blood glucose monitoring lancets   Quantity:  100 Box        Use to test blood sugar 2 times weekly   Refills:  6        * blood glucose monitoring test strip    Commonly known as:  ACCU-CHEK SMARTVIEW   Quantity:  1 Box        Test blood sugar before breakfast one day, before and after supper the next and not at all the third day   Refills:  12        * blood glucose monitoring test strip   Commonly known as:  MARIAN CONTOUR NEXT   Quantity:  100 strip        Use to test blood sugar 2 times weekly or as directed.   Refills:  6        calcium carbonate 1500 (600 CA) MG tablet   Commonly known as:  OS-WALI 600 mg [place]. Ca   Dose:  1500 mg   Quantity:  30 tablet        Take 1 tablet (1,500 mg) by mouth daily   Refills:  3        lisinopril 40 MG tablet   Commonly known as:  PRINIVIL/ZESTRIL   Dose:  40 mg   Quantity:  90 tablet        Take 1 tablet (40 mg) by mouth daily   Refills:  3        METOPROLOL TARTRATE PO   Dose:  100 mg        Take 100 mg by mouth 2 times daily   Refills:  0        MULTIVITAMINS PO   Dose:  1 capsule        Take 1 capsule by mouth daily   Refills:  0        NITROSTAT 0.4 MG sublingual tablet   Dose:  1 tablet   Generic drug:  nitroglycerin        Place 1 tablet under the tongue every 5 minutes as needed   Refills:  0        NONFORMULARY        OTC eye drops as directed   Refills:  0        order for DME   Quantity:  1 Package        Equipment being ordered: Ostomy supplies - Barrier - currently using 22540 Sondra flat cut to fit; Pouch 07636 clear; [employer] barrier rings 7805; Sondra ostomy belt.  Fill per insurance guidelines   Refills:  11        * Ostomy Supplies Misc   Quantity:  1 each        Sondra m9 odor eliminator drops 7717 or 7715 - patient preference   Refills:  11        * Ostomy Supplies POUCH Misc   Quantity:  2 each        Sondra 28733 closed end pouches - per Medicare patient is allowed 60 per month.  Dispense 2 boxes or patient choice   Refills:  11        PLAVIX PO   Dose:  75 mg        Take 75 mg by mouth daily   Refills:  0        polyethylene glycol 0.4%- propylene glycol 0.3% 0.4-0.3 % Soln ophthalmic solution  "  Commonly known as:  SYSTANE ULTRA   Dose:  1 drop        Place 1 drop into both eyes every hour as needed for dry eyes   Refills:  0        PROSCAR PO   Dose:  5 mg        Take 5 mg by mouth daily   Refills:  0        RANITIDINE HCL PO   Dose:  150 mg        Take 150 mg by mouth 2 times daily   Refills:  0        VITAMIN D3 PO        Take  by mouth daily.   Refills:  0        * Notice:  This list has 6 medication(s) that are the same as other medications prescribed for you. Read the directions carefully, and ask your doctor or other care provider to review them with you.            Prescriptions were sent or printed at these locations (1 Prescription)                   [facility] - [place], [place] - [address]   [address], [place] [address]    Telephone:  [phone]   Fax:  [phone]   Hours:                  E-Prescribed (1 of 1)         amLODIPine (NORVASC) 5 MG tablet                Orders Needing Specimen Collection     None      Pending Results     No orders found from 6/8/2017 to 6/11/2017.            Pending Culture Results     No orders found from 6/8/2017 to 6/11/2017.            Thank you for choosing [facility]       Thank you for choosing [facility] for your care. Our goal is always to provide you with excellent care. Hearing back from our patients is one way we can continue to improve our services. Please take a few minutes to complete the written survey that you may receive in the mail after you visit with us. Thank you!        [employer] Information     [employer] lets you send messages to your doctor, view your test results, renew your prescriptions, schedule appointments and more. To sign up, go to [email or web address] . Click on \"Log in\" on the left side of the screen, which will take you to the Welcome page. Then click on \"Sign up Now\" on the right side of the page.     You will be asked to enter the access code listed below, as well as some personal information. Please follow the " directions to create your username and password.     Your access code is: [email or web address]  Expires: 2017  8:29 AM     Your access code will  in 90 days. If you need help or a new code, please call your [place] clinic or [phone].        Care EveryWhere ID     This is your Care EveryWhere ID. This could be used by other organizations to access your [place] medical records  [id number]        After Visit Summary       This is your record. Keep this with you and show to your community pharmacist(s) and doctor(s) at your next visit.

## 2017-06-11 NOTE — DISCHARGE INSTRUCTIONS
Discharge Instructions: Taking Your Blood Pressure  Blood pressure is the force of blood as it moves from the heart through the blood vessels. You can take your own blood pressure reading using a digital monitor. Take readings as often as your health care provider instructs. Take your readings each time in the same way, using the same arm. Here are guidelines for taking your blood pressure.  1. Relax      Wait at least a half hour after smoking, eating, or exercising. Do not drink coffee, tea, soda, or other caffeinated beverages before checking your blood pressure.     Sit comfortably at a table. Place the monitor near you.    Rest for a few minutes before you begin.  2. Wrap the cuff      Place your arm on the table, palm up. Put your arm in a position that is level with your heart. Wrap the cuff around your upper arm, just above your elbow. It s best to wrap the cuff on bare skin, not over clothing.    Make sure your cuff fits. If it doesn t wrap around your upper arm, order a larger cuff. A cuff that is too large or too small can result in an inaccurate blood pressure reading.  3. Inflate the cuff      Pump the cuff until the scale reads 200. If you have a self-inflating cuff, push the button that starts the pump.    The cuff will tighten, then loosen.    The numbers will change. When they stop changing, your blood pressure reading will appear.    If you get a reading that is too high or too low for you, relax for a few minutes. Then do the test again.    4. Write down the results    Write down your blood pressure numbers. Brock the date and time. Keep your results in one place, such as a notebook.    Remove the cuff from your arm. Turn off the machine.       9891-8865 The [employer]. [address] [place] [address], [place] [address]. All rights reserved. This information is not intended as a substitute for professional medical care. Always follow your healthcare professional's instructions.

## 2017-06-13 DIAGNOSIS — E11.69 TYPE 2 DIABETES MELLITUS WITH OTHER SPECIFIED COMPLICATION, UNSPECIFIED LONG TERM INSULIN USE STATUS: ICD-10-CM

## 2017-06-16 ENCOUNTER — OFFICE VISIT (OUTPATIENT)
Dept: INTERNAL MEDICINE | Facility: OTHER | Age: 72
End: 2017-06-16
Attending: INTERNAL MEDICINE
Payer: MEDICARE

## 2017-06-16 VITALS
HEART RATE: 64 BPM | HEIGHT: 70 IN | WEIGHT: 176 LBS | SYSTOLIC BLOOD PRESSURE: 144 MMHG | OXYGEN SATURATION: 96 % | BODY MASS INDEX: 25.2 KG/M2 | TEMPERATURE: 98.3 F | DIASTOLIC BLOOD PRESSURE: 84 MMHG

## 2017-06-16 DIAGNOSIS — K21.9 GASTROESOPHAGEAL REFLUX DISEASE WITHOUT ESOPHAGITIS: ICD-10-CM

## 2017-06-16 DIAGNOSIS — N40.1 BENIGN NON-NODULAR PROSTATIC HYPERPLASIA WITH LOWER URINARY TRACT SYMPTOMS: ICD-10-CM

## 2017-06-16 DIAGNOSIS — K43.2 INCISIONAL HERNIA, WITHOUT OBSTRUCTION OR GANGRENE: ICD-10-CM

## 2017-06-16 DIAGNOSIS — I10 BENIGN ESSENTIAL HYPERTENSION: Primary | ICD-10-CM

## 2017-06-16 DIAGNOSIS — I20.0 INTERMEDIATE CORONARY SYNDROME (H): ICD-10-CM

## 2017-06-16 DIAGNOSIS — E78.5 HYPERLIPIDEMIA LDL GOAL <100: ICD-10-CM

## 2017-06-16 LAB
ANION GAP SERPL CALCULATED.3IONS-SCNC: 6 MMOL/L (ref 3–14)
BUN SERPL-MCNC: 22 MG/DL (ref 7–30)
CALCIUM SERPL-MCNC: 8.4 MG/DL (ref 8.5–10.1)
CHLORIDE SERPL-SCNC: 110 MMOL/L (ref 94–109)
CO2 SERPL-SCNC: 25 MMOL/L (ref 20–32)
CREAT SERPL-MCNC: 1.07 MG/DL (ref 0.66–1.25)
GFR SERPL CREATININE-BSD FRML MDRD: 68 ML/MIN/1.7M2
GLUCOSE SERPL-MCNC: 101 MG/DL (ref 70–99)
POTASSIUM SERPL-SCNC: 3.9 MMOL/L (ref 3.4–5.3)
SODIUM SERPL-SCNC: 141 MMOL/L (ref 133–144)

## 2017-06-16 PROCEDURE — 99214 OFFICE O/P EST MOD 30 MIN: CPT | Performed by: INTERNAL MEDICINE

## 2017-06-16 PROCEDURE — 99212 OFFICE O/P EST SF 10 MIN: CPT

## 2017-06-16 PROCEDURE — 80048 BASIC METABOLIC PNL TOTAL CA: CPT | Mod: ZL | Performed by: INTERNAL MEDICINE

## 2017-06-16 PROCEDURE — 36415 COLL VENOUS BLD VENIPUNCTURE: CPT | Mod: ZL | Performed by: INTERNAL MEDICINE

## 2017-06-16 RX ORDER — CLOPIDOGREL BISULFATE 75 MG/1
75 TABLET ORAL DAILY
Qty: 90 TABLET | Refills: 3 | Status: SHIPPED | OUTPATIENT
Start: 2017-06-16 | End: 2018-05-27

## 2017-06-16 RX ORDER — NITROGLYCERIN 0.4 MG/1
0.4 TABLET SUBLINGUAL EVERY 5 MIN PRN
Qty: 25 TABLET | Refills: 3 | Status: SHIPPED | OUTPATIENT
Start: 2017-06-16 | End: 2018-06-12

## 2017-06-16 RX ORDER — AMLODIPINE BESYLATE 5 MG/1
5 TABLET ORAL DAILY
Qty: 90 TABLET | Refills: 3 | Status: SHIPPED | OUTPATIENT
Start: 2017-06-16 | End: 2018-05-27

## 2017-06-16 RX ORDER — AMLODIPINE BESYLATE 5 MG/1
5 TABLET ORAL DAILY
Qty: 30 TABLET | Refills: 1 | Status: SHIPPED | OUTPATIENT
Start: 2017-06-16 | End: 2017-08-23

## 2017-06-16 RX ORDER — ATORVASTATIN CALCIUM 40 MG/1
40 TABLET, FILM COATED ORAL DAILY
Qty: 90 TABLET | Refills: 3 | Status: SHIPPED | OUTPATIENT
Start: 2017-06-16 | End: 2018-08-16

## 2017-06-16 RX ORDER — FINASTERIDE 5 MG/1
5 TABLET, FILM COATED ORAL DAILY
Qty: 90 TABLET | Refills: 3 | Status: SHIPPED | OUTPATIENT
Start: 2017-06-16 | End: 2018-08-16

## 2017-06-16 RX ORDER — LISINOPRIL 40 MG/1
40 TABLET ORAL DAILY
Qty: 90 TABLET | Refills: 3 | Status: SHIPPED | OUTPATIENT
Start: 2017-06-16 | End: 2018-06-26

## 2017-06-16 RX ORDER — METOPROLOL TARTRATE 100 MG
100 TABLET ORAL 2 TIMES DAILY
Qty: 180 TABLET | Refills: 3 | Status: SHIPPED | OUTPATIENT
Start: 2017-06-16 | End: 2018-05-27

## 2017-06-16 ASSESSMENT — PAIN SCALES - GENERAL: PAINLEVEL: NO PAIN (0)

## 2017-06-16 NOTE — NURSING NOTE
"Chief Complaint   Patient presents with     Hypertension     pt was in urgent care- asked to bring in his b/p machine to compare- did so- ER doc put him on norvasc 5mg for 1 week and had pt take blood pressures- has results with him today      Medication Request     [facility]-if to continue norvac he is out today so will need a temporary prescription to fill in town- then [facility]      Derm Problem     pt states on his left side lower abdomen is where pt is pointing but stating his \"sack\" is \"Growing out\" not painful - states wound care was not worried before but pt states it is growing        Initial /84 (BP Location: Right arm, Patient Position: Supine, Cuff Size: Adult Regular)  Pulse 64  Temp 98.3  F (36.8  C) (Tympanic)  Ht 5' 10\" (1.778 m)  Wt 176 lb (79.8 kg)  SpO2 96%  BMI 25.25 kg/m2 Estimated body mass index is 25.25 kg/(m^2) as calculated from the following:    Height as of this encounter: 5' 10\" (1.778 m).    Weight as of this encounter: 176 lb (79.8 kg).  Medication Reconciliation: complete   Sabina Olivo LPN      "

## 2017-06-16 NOTE — MR AVS SNAPSHOT
After Visit Summary   6/16/2017    Peter Zhao    MRN: [id number]           Patient Information     Date Of Birth          1945        Visit Information        Provider Department      6/16/2017 11:45 AM Alexander Vazquez DO [facility]        Today's Diagnoses     Benign essential hypertension    -  1    Hyperlipidemia LDL goal <100        Intermediate coronary syndrome (H)        Gastroesophageal reflux disease without esophagitis        Benign non-nodular prostatic hyperplasia with lower urinary tract symptoms           Follow-ups after your visit        Your next 10 appointments already scheduled     Jun 20, 2017  9:15 AM CDT   (Arrive by 9:00 AM)   Office Visit with Alexander Vazquez DO   [facility] [place] ([facility] [place] )    [address]  [place] [address] [phone]           Bring a current list of meds and any records pertaining to this visit.  For Physicals, please bring immunization records and any forms needing to be filled out.  Please arrive 10 minutes early to complete paperwork.            Jul 18, 2017  1:30 PM CDT   (Arrive by 1:15 PM)   Return Visit with Tima Moreland MD   [facility] [place] ([facility] [place] )    [address] [facility]  [place] [address]   [phone]              Who to contact     If you have questions or need follow up information about today's clinic visit or your schedule please contact [facility] directly at [phone].  Normal or non-critical lab and imaging results will be communicated to you by MyChart, letter or phone within 4 business days after the clinic has received the results. If you do not hear from us within 7 days, please contact the clinic through MyChart or phone. If you have a critical or abnormal lab result, we will notify you by phone as soon as possible.  Submit refill requests through [employer] or call your pharmacy and they will forward the  "refill request to us. Please allow 3 business days for your refill to be completed.          Additional Information About Your Visit        [employer] Information     [email or web address] lets you send messages to your doctor, view your test results, renew your prescriptions, schedule appointments and more. To sign up, go to [email or web address] . Click on \"Log in\" on the left side of the screen, which will take you to the Welcome page. Then click on \"Sign up Now\" on the right side of the page.     You will be asked to enter the access code listed below, as well as some personal information. Please follow the directions to create your username and password.     Your access code is: [email or web address]  Expires: 2017  8:29 AM     Your access code will  in 90 days. If you need help or a new code, please call your [place] clinic or [phone].        Care EveryWhere ID     This is your Care EveryWhere ID. This could be used by other organizations to access your [place] medical records  [id number]        Your Vitals Were     Pulse Temperature Height Pulse Oximetry BMI (Body Mass Index)       64 98.3  F (36.8  C) (Tympanic) 5' 10\" (1.778 m) 96% 25.25 kg/m2        Blood Pressure from Last 3 Encounters:   17 144/84   06/10/17 163/98   17 170/82    Weight from Last 3 Encounters:   17 176 lb (79.8 kg)   17 176 lb (79.8 kg)   17 175 lb (79.4 kg)              We Performed the Following     Basic metabolic panel          Today's Medication Changes          These changes are accurate as of: 17 12:01 PM.  If you have any questions, ask your nurse or doctor.               These medicines have changed or have updated prescriptions.        Dose/Directions    * amLODIPine 5 MG tablet   Commonly known as:  NORVASC   This may have changed:  Another medication with the same name was added. Make sure you understand how and when to take each.   Used for:  Benign essential hypertension   Changed by:  " Alexander Vazquez DO        Dose:  5 mg   Take 1 tablet (5 mg) by mouth daily   Quantity:  30 tablet   Refills:  1       * amLODIPine 5 MG tablet   Commonly known as:  NORVASC   This may have changed:  You were already taking a medication with the same name, and this prescription was added. Make sure you understand how and when to take each.   Used for:  Benign essential hypertension   Changed by:  Alexander Vazquez DO        Dose:  5 mg   Take 1 tablet (5 mg) by mouth daily   Quantity:  90 tablet   Refills:  3       finasteride 5 MG tablet   Commonly known as:  PROSCAR   This may have changed:  medication strength   Used for:  Benign non-nodular prostatic hyperplasia with lower urinary tract symptoms   Changed by:  Alexander Vazquez,         Dose:  5 mg   Take 1 tablet (5 mg) by mouth daily   Quantity:  90 tablet   Refills:  3       metoprolol 100 MG tablet   Commonly known as:  LOPRESSOR   This may have changed:  medication strength   Used for:  Benign essential hypertension   Changed by:  Alexander Vazquez DO        Dose:  100 mg   Take 1 tablet (100 mg) by mouth 2 times daily   Quantity:  180 tablet   Refills:  3       ranitidine 150 MG tablet   Commonly known as:  ZANTAC   This may have changed:  medication strength   Used for:  Gastroesophageal reflux disease without esophagitis   Changed by:  Alexander Vazquez DO        Dose:  150 mg   Take 1 tablet (150 mg) by mouth 2 times daily   Quantity:  180 tablet   Refills:  3       * Notice:  This list has 2 medication(s) that are the same as other medications prescribed for you. Read the directions carefully, and ask your doctor or other care provider to review them with you.         Where to get your medicines      These medications were sent to [facility] - [address] [address]     Phone:  [phone]     amLODIPine 5 MG tablet    atorvastatin 40 MG  tablet    clopidogrel 75 MG tablet    finasteride 5 MG tablet    lisinopril 40 MG tablet    metoprolol 100 MG tablet    nitroglycerin 0.4 MG sublingual tablet    ranitidine 150 MG tablet         These medications were sent to [facility] - [place], [place] - [address]   [address] , [place] [address]     Phone:  [phone]     amLODIPine 5 MG tablet                Primary Care Provider Office Phone # Fax #    Alexander ALEMAN TaylorDO [phone]       [facility] [address] [place] [address]        Thank you!     Thank you for choosing [facility]  for your care. Our goal is always to provide you with excellent care. Hearing back from our patients is one way we can continue to improve our services. Please take a few minutes to complete the written survey that you may receive in the mail after your visit with us. Thank you!             Your Updated Medication List - Protect others around you: Learn how to safely use, store and throw away your medicines at www.disposemymeds.org.          This list is accurate as of: 6/16/17 12:01 PM.  Always use your most recent med list.                   Brand Name Dispense Instructions for use    * amLODIPine 5 MG tablet    NORVASC    30 tablet    Take 1 tablet (5 mg) by mouth daily       * amLODIPine 5 MG tablet    NORVASC    90 tablet    Take 1 tablet (5 mg) by mouth daily       ASPIRIN PO      Take 325 mg by mouth daily       atorvastatin 40 MG tablet    LIPITOR    90 tablet    Take 1 tablet (40 mg) by mouth daily       blood glucose lancing device     1 kit    1 Units daily       * blood glucose monitoring lancets     100 each    1 Units daily Use fresh lancet each day to check blood sugars       * blood glucose monitoring lancets     100 Box    Use to test blood sugar 2 times weekly       * blood glucose monitoring test strip    ACCU-CHEK SMARTVIEW    1 Box    Test blood sugar before breakfast one day, before and after  supper the next and not at all the third day       * blood glucose monitoring test strip    MARIAN CONTOUR NEXT    100 strip    Use to test blood sugar 2 times weekly or as directed.       calcium carbonate 1500 (600 CA) MG tablet    OS-WALI 600 mg [place]. Ca    30 tablet    Take 1 tablet (1,500 mg) by mouth daily       clopidogrel 75 MG tablet    PLAVIX    90 tablet    Take 1 tablet (75 mg) by mouth daily       finasteride 5 MG tablet    PROSCAR    90 tablet    Take 1 tablet (5 mg) by mouth daily       lisinopril 40 MG tablet    PRINIVIL/ZESTRIL    90 tablet    Take 1 tablet (40 mg) by mouth daily       metoprolol 100 MG tablet    LOPRESSOR    180 tablet    Take 1 tablet (100 mg) by mouth 2 times daily       MULTIVITAMINS PO      Take 1 capsule by mouth daily       nitroglycerin 0.4 MG sublingual tablet    NITROSTAT    25 tablet    Place 1 tablet (0.4 mg) under the tongue every 5 minutes as needed       NONFORMULARY      OTC eye drops as directed       order for DME     1 Package    Equipment being ordered: Ostomy supplies - Barrier - currently using [address] [place] flat cut to fit; Pouch 30128 clear; [employer] barrier rings 7805; [place] ostomy belt.  Fill per insurance guidelines       * Ostomy Supplies Misc     1 each    [place] m9 odor eliminator drops 7717 or 7715 - patient preference       * Ostomy Supplies POUCH Misc     2 each    Sondra 81595 closed end pouches - per Medicare patient is allowed 60 per month.  Dispense 2 boxes or patient choice       polyethylene glycol 0.4%- propylene glycol 0.3% 0.4-0.3 % Soln ophthalmic solution    SYSTANE ULTRA     Place 1 drop into both eyes every hour as needed for dry eyes       ranitidine 150 MG tablet    ZANTAC    180 tablet    Take 1 tablet (150 mg) by mouth 2 times daily       VITAMIN D3 PO      Take  by mouth daily.       * Notice:  This list has 8 medication(s) that are the same as other medications prescribed for you. Read the directions carefully, and ask your  doctor or other care provider to review them with you.

## 2017-06-16 NOTE — PROGRESS NOTES
"Internal Medicine:    Chief Complaint   Patient presents with     Hypertension     pt was in urgent care- asked to bring in his b/p machine to compare- did so- ER doc put him on norvasc 5mg for 1 week and had pt take blood pressures- has results with him today      Medication Request     pt [facility]-if to continue norvac he is out today so will need a temporary prescription to fill in town- then send rest [facility]      Derm Problem     pt states on his left side lower abdomen is where pt is pointing but stating his \"sack\" is \"Growing out\" not painful - states wound care was not worried before but pt states it is growing      Peter presents today for recent follow up from the ED>  It appears he was in the ED on June 11th but no record of this on EPIC.  It is noted that epic was down around that time.  In either case he presented to the ED due to elevated BPs.  Peter tends to be hypervigilant with checking his BPS and it was reported that in the ED and here in clinic today his BP machines seems to read higher measurements by 10 pts.  He denies any chest pain or SOB.  He was placed on Norvasc 5 mg by ED physician and he questions if he should continue this.  He also questions whether the hernia around his ostomy site is a concern.  He denies any change in stool output and denies any pain at that site.  He also has numerous questions about medications and side effects today.  He is continuing with the Lisinopril 40 mg at this time and needs a BMP today.           Patient Active Problem List   Diagnosis     Diabetes mellitus, type 2 (H)     CAD (coronary artery disease)     ACP (advance care planning)     Hyperlipidemia LDL goal <100     Elevated prostate specific antigen (PSA)     HTN (hypertension)     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     H/O migraine     Visual aura     Cataract     S/P colon resection     Hypokalemia     TIA " (transient ischemic attack)     Agitation            Past Medical History:   Diagnosis Date     Allergic state      CAD (coronary artery disease)     CABG 2013, stent x 1 in 2014     Cataract      Cataracts, bilateral      Elevated PSA      Gastro-oesophageal reflux disease      Heart murmur      Hyperlipidemia      Hypertension      Pacemaker      Stented coronary artery      Visual aura             Past Surgical History:   Procedure Laterality Date     CARDIAC SURGERY       CATARACT IOL, RT/LT Right 04/2017     COLECTOMY LOW ANTERIOR N/A 2/17/2017    Procedure: COLECTOMY LOW ANTERIOR;  Surgeon: Tima Moreland MD;  Location: [facility]     COLONOSCOPY N/A 2/17/2017    Procedure: COLONOSCOPY;  Surgeon: Tima Moreland MD;  Location: [facility] OR     ENT SURGERY       FLEX SIG (MEDICARE PT.)       GI SURGERY  02/2017    ostemomy     HERNIA REPAIR       LAPAROSCOPIC HERNIORRHAPHY INGUINAL Right 9/18/2015    Procedure: LAPAROSCOPIC HERNIORRHAPHY INGUINAL;  Surgeon: Solitario Nettles DO;  Location: [facility] OR     PHACOEMULSIFICATION WITH STANDARD INTRAOCULAR LENS IMPLANT Right 4/20/2017    Procedure: PHACOEMULSIFICATION WITH STANDARD INTRAOCULAR LENS IMPLANT;  CATARACT EXTRACTION RIGHT EYE WITH IMPLANT;  Surgeon: Darin Gutierrez MD;  Location: [facility] OR     PROSTATE BIOPSY, NEEDLE, SATURATION SAMPLING  2009     retinopexy-pvd with retinal tear Right 2008     TONSILLECTOMY              Social History   Substance Use Topics     Smoking status: Former Smoker     Smokeless tobacco: Former User     Types: Chew     Quit date: 10/19/2013      Comment: quit in 2000     Alcohol use No            Family History   Problem Relation Age of Onset     DIABETES Mother      C.A.D. Mother      CANCER Maternal Grandmother                Allergies   Allergen Reactions     Acetaminophen      Ciprofloxacin Hives     No Clinical Screening - See Comments      Antibiotic allergy, pt not sure which            Current Outpatient Prescriptions   Medication  Sig Dispense Refill     amLODIPine (NORVASC) 5 MG tablet Take 1 tablet (5 mg) by mouth daily 30 tablet 1     clopidogrel (PLAVIX) 75 MG tablet Take 1 tablet (75 mg) by mouth daily 90 tablet 3     metoprolol (LOPRESSOR) 100 MG tablet Take 1 tablet (100 mg) by mouth 2 times daily 180 tablet 3     lisinopril (PRINIVIL/ZESTRIL) 40 MG tablet Take 1 tablet (40 mg) by mouth daily 90 tablet 3     atorvastatin (LIPITOR) 40 MG tablet Take 1 tablet (40 mg) by mouth daily 90 tablet 3     nitroglycerin (NITROSTAT) 0.4 MG sublingual tablet Place 1 tablet (0.4 mg) under the tongue every 5 minutes as needed 25 tablet 3     ranitidine (ZANTAC) 150 MG tablet Take 1 tablet (150 mg) by mouth 2 times daily 180 tablet 3     finasteride (PROSCAR) 5 MG tablet Take 1 tablet (5 mg) by mouth daily 90 tablet 3     amLODIPine (NORVASC) 5 MG tablet Take 1 tablet (5 mg) by mouth daily 90 tablet 3     blood glucose monitoring (MARIAN CONTOUR NEXT) test strip Use to test blood sugar 2 times weekly or as directed. 100 strip 6     calcium carbonate (OS-WALI 600 MG [place]. CA) 1500 (600 CA) MG tablet Take 1 tablet (1,500 mg) by mouth daily 30 tablet 3     Ostomy Supplies POUCH [facility] Sondra 53696 closed end pouches - per Medicare patient is allowed 60 per month.  Dispense 2 boxes or patient choice 2 each 11     Ostomy Supplies [facility] [place] m9 odor eliminator drops 7717 or 7715 - patient preference 1 each 11     order for DME Equipment being ordered: Ostomy supplies - Barrier - currently using 97492 Sondra flat cut to fit; Pouch 72115 clear; [employer] barrier rings 7805; [place] ostomy belt.  Fill per insurance guidelines 1 Package 11     blood glucose monitoring (MARIAN MICROLET) lancets Use to test blood sugar 2 times weekly 100 Box 6     NONFORMULARY OTC eye drops as directed       polyethylene glycol 0.4%- propylene glycol 0.3% (SYSTANE ULTRA) 0.4-0.3 % SOLN ophthalmic solution Place 1 drop into both eyes every hour as needed for dry eyes        "Multiple Vitamin (MULTIVITAMINS PO) Take 1 capsule by mouth daily       glucose blood VI test strips (ACCU-CHEK SMARTVIEW) strip Test blood sugar before breakfast one day, before and after supper the next and not at all the third day 1 Box 12     SOFTCLIX LANCETS MISC 1 Units daily Use fresh lancet each day to check blood sugars 100 each 4     Lancets Misc. (ACCU-CHEK SOFTCLIX LANCET DEV) KIT 1 Units daily 1 kit 2     ASPIRIN PO Take 325 mg by mouth daily        Cholecalciferol (VITAMIN D3 PO) Take  by mouth daily.       [DISCONTINUED] amLODIPine (NORVASC) 5 MG tablet Take 1 tablet (5 mg) by mouth daily for 14 days 14 tablet 0     [DISCONTINUED] lisinopril (PRINIVIL/ZESTRIL) 40 MG tablet Take 1 tablet (40 mg) by mouth daily 90 tablet 3     [DISCONTINUED] Clopidogrel Bisulfate (PLAVIX PO) Take 75 mg by mouth daily       [DISCONTINUED] METOPROLOL TARTRATE PO Take 100 mg by mouth 2 times daily       [DISCONTINUED] atorvastatin (LIPITOR) 40 MG tablet Take 40 mg by mouth daily       [DISCONTINUED] nitroglycerin (NITROSTAT) 0.4 MG SL tablet Place 1 tablet under the tongue every 5 minutes as needed         Review Of Systems:    Respiratory: No shortness of breath, dyspnea on exertion, cough, or hemoptysis  Cardiovascular: as above  Gastrointestinal: as above  Genitourinary: negative  Musculoskeletal: negative  Neurologic: negative  Psychiatric: negative  Hematologic/Lymphatic/Immunologic: negative      Objective:   /84 (BP Location: Right arm, Patient Position: Supine, Cuff Size: Adult Regular)  Pulse 64  Temp 98.3  F (36.8  C) (Tympanic)  Ht 5' 10\" (1.778 m)  Wt 176 lb (79.8 kg)  SpO2 96%  BMI 25.25 kg/m2  EXAM:  Constitutional: alert and no distress   Cardiovascular:  RRR. No murmurs, clicks gallops or rub  Respiratory: Lungs clear  Psychiatric: mentation appears normal and affect normal/bright  Abdomen: Ostomy in place with hernia palpable but no pain and no drainage and good ostomy output    NEURO:No focal " deficits        Orders placed or performed in visit on 06/16/17     amLODIPine (NORVASC) 5 MG tablet     clopidogrel (PLAVIX) 75 MG tablet     metoprolol (LOPRESSOR) 100 MG tablet     lisinopril (PRINIVIL/ZESTRIL) 40 MG tablet     atorvastatin (LIPITOR) 40 MG tablet     nitroglycerin (NITROSTAT) 0.4 MG sublingual tablet     ranitidine (ZANTAC) 150 MG tablet     finasteride (PROSCAR) 5 MG tablet     amLODIPine (NORVASC) 5 MG tablet       Assessment and Plan:    (I10) Benign essential hypertension  (primary encounter diagnosis)  Comment: BP measurements generally 140-150s at home in the context of a BP machine which seems to be over estimating his BP values.  He will continue with Norvasc 5 mg Po daily.    Plan: Basic metabolic panel, amLODIPine (NORVASC) 5         MG tablet, clopidogrel (PLAVIX) 75 MG tablet,         metoprolol (LOPRESSOR) 100 MG tablet,         lisinopril (PRINIVIL/ZESTRIL) 40 MG tablet,         amLODIPine (NORVASC) 5 MG tablet    (E78.5) Hyperlipidemia LDL goal <100  Comment: Refills   Plan: atorvastatin (LIPITOR) 40 MG tablet,         nitroglycerin (NITROSTAT) 0.4 MG sublingual         tablet    (I20.0) Intermediate coronary syndrome (H)  Comment: Refill   Plan: nitroglycerin (NITROSTAT) 0.4 MG sublingual         tablet    (K21.9) Gastroesophageal reflux disease without esophagitis  Comment: Refill   Plan: ranitidine (ZANTAC) 150 MG tablet    (N40.1) Benign non-nodular prostatic hyperplasia with lower urinary tract symptoms  Plan: finasteride (PROSCAR) 5 MG tablet    (K43.2) Incisional hernia, without obstruction or gangrene  Comment: Unless pain or no stool in ostomy he can follow up with surgery as scheduled.    Plan: Pain and decreased ostomy output would be reason to go to ED otherwise f/u with surgery as scheduled.      25 minutes of time was spent on this patient's care today.  Greater than 50% of the time was spent face to face with the patient and his wife.  We discussed the fact that his  "BP cuff appears to be \"off\" in that it is measuring higher BPs as compared to ours.  In addition we spent 15 minutes going through his medications as to refills and questions about various medications.  We also discussed the continuation of Norvasc at 5 mg PO daily.  Lastly we discussed his hernia at his ostomy site.      Alexander Vazquez, DO  "

## 2017-06-22 ENCOUNTER — OFFICE VISIT (OUTPATIENT)
Dept: SURGERY | Facility: OTHER | Age: 72
End: 2017-06-22
Attending: SURGERY
Payer: MEDICARE

## 2017-06-22 VITALS
OXYGEN SATURATION: 98 % | HEART RATE: 69 BPM | DIASTOLIC BLOOD PRESSURE: 76 MMHG | TEMPERATURE: 98.4 F | HEIGHT: 70 IN | BODY MASS INDEX: 24.62 KG/M2 | WEIGHT: 172 LBS | SYSTOLIC BLOOD PRESSURE: 130 MMHG

## 2017-06-22 DIAGNOSIS — K43.2 INCISIONAL HERNIA, WITHOUT OBSTRUCTION OR GANGRENE: Primary | ICD-10-CM

## 2017-06-22 PROCEDURE — 99213 OFFICE O/P EST LOW 20 MIN: CPT

## 2017-06-22 PROCEDURE — 99213 OFFICE O/P EST LOW 20 MIN: CPT | Performed by: SURGERY

## 2017-06-22 ASSESSMENT — PAIN SCALES - GENERAL: PAINLEVEL: NO PAIN (0)

## 2017-06-22 NOTE — NURSING NOTE
"Chief Complaint   Patient presents with     Consult     Possible incisional hernia at stoma site-Dr Vazquez is referring-Left lower abdomen.   Not painful-starting to get bigger.         Initial /76 (BP Location: Left arm, Patient Position: Chair, Cuff Size: Adult Regular)  Pulse 69  Temp 98.4  F (36.9  C) (Tympanic)  Ht 5' 10\" (1.778 m)  Wt 172 lb (78 kg)  SpO2 98%  BMI 24.68 kg/m2 Estimated body mass index is 24.68 kg/(m^2) as calculated from the following:    Height as of this encounter: 5' 10\" (1.778 m).    Weight as of this encounter: 172 lb (78 kg).  Medication Reconciliation: marc MULLER      "

## 2017-06-22 NOTE — MR AVS SNAPSHOT
After Visit Summary   6/22/2017    Peter Zhao    MRN: [id number]           Patient Information     Date Of Birth          1945        Visit Information        Provider Department      6/22/2017 1:30 PM Tima Moreland MD [facility]        Today's Diagnoses     Incisional hernia, without obstruction or gangrene    -  1      Care Instructions    Thank you for allowing Dr. Moreland and our surgical team to participate in your care.  If you have a scheduling or an appointment question please contact Darcie, our Health Unit Coordinator at her direct line [phone].   ALL nursing questions or concerns can be directed to your surgical nurse at: [phone].          Follow-ups after your visit        Your next 10 appointments already scheduled     Jul 18, 2017  1:30 PM CDT   (Arrive by 1:15 PM)   Return Visit with MD Sharlene Merrill ([facility] )    [address]  [place] [address]   [phone]              Who to contact     If you have questions or need follow up information about today's clinic visit or your schedule please contact [facility] directly at [phone].  Normal or non-critical lab and imaging results will be communicated to you by MyChart, letter or phone within 4 business days after the clinic has received the results. If you do not hear from us within 7 days, please contact the clinic through MyChart or phone. If you have a critical or abnormal lab result, we will notify you by phone as soon as possible.  Submit refill requests through [employer] or call your pharmacy and they will forward the refill request to us. Please allow 3 business days for your refill to be completed.          Additional Information About Your Visit        MyChart Information     [employer] lets you send messages to your doctor, view your test results, renew your prescriptions, schedule appointments and more. To sign up, go to  [email or web address] . Click on \"Log in\" on the left side of the screen, which will take you to the Welcome page. Then click on \"Sign up Now\" on the right side of the page.     You will be asked to enter the access code listed below, as well as some personal information. Please follow the directions to create your username and password.     Your access code is: [email or web address]  Expires: 2017  8:29 AM     Your access code will  in 90 days. If you need help or a new code, please call your [facility] clinic or [phone].        Care EveryWhere ID     This is your Care EveryWhere ID. This could be used by other organizations to access your [facility] medical records  [id number]        Your Vitals Were     Pulse Temperature Height Pulse Oximetry BMI (Body Mass Index)       69 98.4  F (36.9  C) (Tympanic) 5' 10\" (1.778 m) 98% 24.68 kg/m2        Blood Pressure from Last 3 Encounters:   17 130/76   17 144/84   06/10/17 163/98    Weight from Last 3 Encounters:   17 172 lb (78 kg)   17 176 lb (79.8 kg)   17 176 lb (79.8 kg)              Today, you had the following     No orders found for display       Primary Care Provider Office Phone # Fax #    Alexander Vazquez [phone]       [address] [address]        Equal Access to Services     [facility] : Hadii miroslava roberts hadasho Soomaali, waaxda luqadaha, qaybta kaalmada adeegyabentley, john torres . So [facility] [phone].    ATENCIÓN: Si habla español, tiene a farah disposición servicios gratuitos de asistencia lingüística. Llame al [phone].    We comply with applicable federal civil rights laws and [place] laws. We do not discriminate on the basis of race, color, national origin, age, disability sex, sexual orientation or gender identity.            Thank you!     Thank you for choosing [facility]  for your care. Our goal is always to " provide you with excellent care. Hearing back from our patients is one way we can continue to improve our services. Please take a few minutes to complete the written survey that you may receive in the mail after your visit with us. Thank you!             Your Updated Medication List - Protect others around you: Learn how to safely use, store and throw away your medicines at www.disposemymeds.org.          This list is accurate as of: 6/22/17  5:46 PM.  Always use your most recent med list.                   Brand Name Dispense Instructions for use Diagnosis    * amLODIPine 5 MG tablet    NORVASC    30 tablet    Take 1 tablet (5 mg) by mouth daily    Benign essential hypertension       * amLODIPine 5 MG tablet    NORVASC    90 tablet    Take 1 tablet (5 mg) by mouth daily    Benign essential hypertension       ASPIRIN PO      Take 325 mg by mouth daily        atorvastatin 40 MG tablet    LIPITOR    90 tablet    Take 1 tablet (40 mg) by mouth daily    Hyperlipidemia LDL goal <100       blood glucose lancing device     1 kit    1 Units daily    T2DM (type 2 diabetes mellitus) (H)       * blood glucose monitoring lancets     100 each    1 Units daily Use fresh lancet each day to check blood sugars    T2DM (type 2 diabetes mellitus) (H)       * blood glucose monitoring lancets     100 Box    Use to test blood sugar 2 times weekly    Type 2 diabetes mellitus without complication, without long-term current use of insulin (H)       * blood glucose monitoring test strip    ACCU-CHEK SMARTVIEW    1 Box    Test blood sugar before breakfast one day, before and after supper the next and not at all the third day    T2DM (type 2 diabetes mellitus) (H)       * blood glucose monitoring test strip    MARIAN CONTOUR NEXT    100 strip    Use to test blood sugar 2 times weekly or as directed.    Type 2 diabetes mellitus with other specified complication, unspecified long term insulin use status (H)       calcium carbonate 1500 (600 CA)  MG tablet    OS-WALI 600 mg [place]. Ca    30 tablet    Take 1 tablet (1,500 mg) by mouth daily        clopidogrel 75 MG tablet    PLAVIX    90 tablet    Take 1 tablet (75 mg) by mouth daily    Benign essential hypertension       finasteride 5 MG tablet    PROSCAR    90 tablet    Take 1 tablet (5 mg) by mouth daily    Benign non-nodular prostatic hyperplasia with lower urinary tract symptoms       lisinopril 40 MG tablet    PRINIVIL/ZESTRIL    90 tablet    Take 1 tablet (40 mg) by mouth daily    Benign essential hypertension       metoprolol 100 MG tablet    LOPRESSOR    180 tablet    Take 1 tablet (100 mg) by mouth 2 times daily    Benign essential hypertension       MULTIVITAMINS PO      Take 1 capsule by mouth daily        nitroglycerin 0.4 MG sublingual tablet    NITROSTAT    25 tablet    Place 1 tablet (0.4 mg) under the tongue every 5 minutes as needed    Hyperlipidemia LDL goal <100, Intermediate coronary syndrome (H)       NONFORMULARY      OTC eye drops as directed        order for DME     1 Package    Equipment being ordered: Ostomy supplies - Barrier - currently using 27368 Sondra flat cut to fit; Pouch 06140 clear; [employer] barrier rings 7805; [place] ostomy belt.  Fill per insurance guidelines    Colostomy care (H)       * Ostomy Supplies Misc     1 each    Sond[place] m9 odor eliminator drops 7717 or 7715 - patient preference    Colostomy in place (H)       * Ostomy Supplies POUCH Misc     2 each    [place] [address] closed end pouches - per Medicare patient is allowed 60 per month.  Dispense 2 boxes or patient choice    Colostomy in place (H)       polyethylene glycol 0.4%- propylene glycol 0.3% 0.4-0.3 % Soln ophthalmic solution    SYSTANE ULTRA     Place 1 drop into both eyes every hour as needed for dry eyes        ranitidine 150 MG tablet    ZANTAC    180 tablet    Take 1 tablet (150 mg) by mouth 2 times daily    Gastroesophageal reflux disease without esophagitis       VITAMIN D3 PO      Take  by mouth  daily.        * Notice:  This list has 8 medication(s) that are the same as other medications prescribed for you. Read the directions carefully, and ask your doctor or other care provider to review them with you.

## 2017-06-22 NOTE — PATIENT INSTRUCTIONS
Thank you for allowing Dr. Moreland and our surgical team to participate in your care.  If you have a scheduling or an appointment question please contact Darcie, our Health Unit Coordinator at her direct line [phone].   ALL nursing questions or concerns can be directed to your surgical nurse at: [phone].

## 2017-06-23 NOTE — PROGRESS NOTES
PROGRESS NOTE      DATE OF SERVICE:  06/22/2017      Dr. Vazquez asked me to see Mr. Peter Zhao again.  This patient is well known to me.  He presented with a large bowel obstruction in late January-early February.  This turned out to be secondary to a benign diverticular stricture.  The patient did end up being operated on with a Marianna's type resection with an end-sigmoid colostomy.  This was done because of a discrepancy in size of the bowel at the time of the surgery.  Patient did have difficulties with postoperative confusion, but otherwise did well following the surgery.  I last saw him in mid March and he was doing quite well at that time.  Indeed he continues to do well.  The problem at the moment is that he has developed a bulge in the area of the ostomy.  He states the colostomy works once a day.  Does not have any pain in association with it.  He has been eating.  There has been no blood with the bowel movements.  The lump was first noticed perhaps 6 weeks ago.  It may be getting slightly larger in size.  There has been no difficulty in fitting the appliance.        Unfortunately, since I last saw him, he did experience what has been diagnosed as a TIA.  He developed some numbness of his face and left hand.  This lasted for a few hours but was followed by confusion.  He ended up in the hospital here and then transferred down to [facility].  Had an extensive workup and no cause was found.  He was started on Plavix.      PHYSICAL EXAMINATION:  On examination today, he was in no distress.  On examination of his abdomen, his midline incision has healed well.  The colostomy itself looks good.  He does have a definite bulge around the colostomy and consistent with a stomal hernia.  This does disappear when he lies down.  It was nontender.  I did not get the feeling that there was any small bowel or anything that was trapped in it.  Otherwise, the rest of the abdomen was soft and nontender with no palpable  masses.  There is no costovertebral angle tenderness.      ASSESSMENT AND PLAN:  As far as the stomal hernia is concerned, we do not need to do anything about it at the moment.  It is not causing him any problems.  Indeed the best way of fixing this is to reverse the colostomy.  The original plan was for this to be carried out in September.  I will need to discuss with Dr. Vazquez whether or not this is still going to be appropriate with the transient ischemic attack that he has had.  The patient will need to be off his Plavix prior to surgery.  Apparently Mr. Zhao and his wife were told that he would need to be on the Plavix for approximately 4 months, which would fit into timing for the colostomy reversal.      The patient has an appointment to see me again in July and I will follow up with him at that time.         RUDY RAYO MD             D: 2017 17:46   T: 2017 11:31   MT: ANURAG      Name:     ROGER ZHAO   MRN:      [id number]        Account:      [id number]   :      1945           Visit Date:   2017      Document: [id number]       cc: Alexander Vazquez DO

## 2017-06-28 ENCOUNTER — TELEPHONE (OUTPATIENT)
Dept: WOUND CARE | Facility: HOSPITAL | Age: 72
End: 2017-06-28

## 2017-06-28 NOTE — TELEPHONE ENCOUNTER
"Called Roddy back.  He is concerned that today when he changed his pouch he noticed he could see \"a little bit of skin by my 'shitter' (stoma)\" and is wondering if he needs to change his pouch again.  Says he skin is good - no irritation, weeping, itching etc.  Advised that his pouch should be ok, but next time he could cut the opening a little smaller or put the barrier ring directly on his skin around his stoma.  He said he understands and is relieved.  He will change his pouch if he notices any burning or itching.  "

## 2017-07-05 NOTE — CONSULTS
"Pt seen for ostomy consult as ordered by Dr Tima Moreland.  Pt has end sigmoid colostomy done for a stricture of his lower colon secondary to diverticular disease.  Relevant PMH includes Diverticulosis, CAD and a prior CABG.  He is a non-smoker and is not diabetic.  Of note, he has been experiencing confusion since his surgery which is new to him.  His wife is present; she is willing to help with his ostomy cares but admits to having a queasy stomach.  Pt is pleasantly confused and keeps saying the \"doctor will take care of this\".  However, he was actively watching the pouch change today and was able to do the lock and roll to close the pouch.      Stoma Type: end sigmoid colostomy  Location: ULQ  Mucosa:  Red, moist with mucous and area of brown tissue from 5:00 - 7:00  Stoma Size: 2 1/2 x 2 inches - edematous  Height:  approx 1/2 inch  Mucocutaneous Juncture:  intact  Peristomal Skin:  Intact - some surrounding bruising  Output: mucous and liquid bloody     Pouching system upon arrival: Post op 2 piece pouch    Pouching system applied: 2 piece cut to fit 4 inch barrier (68907) and pouch 45932) stocked in [place] supply room.    Education - provided written educational information and [employer] ostomy start up kit.  Discussed basics of Ostomy education provided including types of barriers and pouches,  pouch change techniques (support skin, clear water only, etc); , pouch change frequency, pt may pass mucous from rectum, measuring stoma and cutting barrier to fit, lock and roll technique and basics of emptying pouch, etc.  Answered all questions.      Barriers - pt confusion, amount of information, wife has some reluctance due to \"queasy stomach\"    Recommendations;  Pt could benefit from home care follow up and wound/ostomy referral after discharge for additional ostomy education.  Plan to re-visit and do pouch change on 2/22 @ 11:00 with wife present to reinforce teaching.   is aware of need for " home care follow up.         <<----- Click to add NO pertinent Past Medical History No pertinent past medical history

## 2017-07-06 ASSESSMENT — ENCOUNTER SYMPTOMS
PALPITATIONS: 0
NUMBNESS: 0
VOICE CHANGE: 0
SLEEP DISTURBANCE: 0
DYSURIA: 0
MYALGIAS: 0
ANAL BLEEDING: 0
BLOOD IN STOOL: 0
BACK PAIN: 0
NECK PAIN: 0
WHEEZING: 0
WEAKNESS: 0
CHILLS: 0
SHORTNESS OF BREATH: 0
COLOR CHANGE: 0
CHEST TIGHTNESS: 0
VOMITING: 0
ABDOMINAL DISTENTION: 0
FEVER: 0
LIGHT-HEADEDNESS: 0
FREQUENCY: 0
DIZZINESS: 0
HEADACHES: 0
FLANK PAIN: 0
DIAPHORESIS: 0
ABDOMINAL PAIN: 0
CONFUSION: 0
COUGH: 0
NAUSEA: 0

## 2017-07-06 NOTE — ED PROVIDER NOTES
History     Chief Complaint   Patient presents with     Hypertension     has had -170's at home last couple days     The history is provided by the patient.     Peter Zhao is a 71 year old male who came for HTN, BP between 130 to 170, no other symptoms    I have reviewed the Medications, Allergies, Past Medical and Surgical History, and Social History in the Epic system.      Review of Systems   Constitutional: Negative for chills, diaphoresis and fever.   HENT: Negative for voice change.    Eyes: Negative for visual disturbance.   Respiratory: Negative for cough, chest tightness, shortness of breath and wheezing.    Cardiovascular: Negative for chest pain, palpitations and leg swelling.   Gastrointestinal: Negative for abdominal distention, abdominal pain, anal bleeding, blood in stool, nausea and vomiting.   Genitourinary: Negative for decreased urine volume, dysuria, flank pain and frequency.   Musculoskeletal: Negative for back pain, gait problem, myalgias and neck pain.   Skin: Negative for color change, pallor and rash.   Neurological: Negative for dizziness, syncope, weakness, light-headedness, numbness and headaches.   Psychiatric/Behavioral: Negative for confusion, sleep disturbance and suicidal ideas.       Physical Exam   BP: 177/100  Pulse: 57  Heart Rate: 53  Temp: 96.8  F (36  C)  Resp: 18  SpO2: 98 %  Physical Exam   Constitutional: He is oriented to person, place, and time. He appears well-developed and well-nourished.   HENT:   Head: Normocephalic and atraumatic.   Mouth/Throat: No oropharyngeal exudate.   Eyes: Conjunctivae are normal. Pupils are equal, round, and reactive to light.   Neck: Normal range of motion. Neck supple. No JVD present. No tracheal deviation present. No thyromegaly present.   Cardiovascular: Normal rate, regular rhythm, normal heart sounds and intact distal pulses.  Exam reveals no gallop and no friction rub.    No murmur heard.  Pulmonary/Chest: Effort normal and  breath sounds normal. No stridor. No respiratory distress. He has no wheezes. He has no rales. He exhibits no tenderness.   Abdominal: Soft. Bowel sounds are normal. He exhibits no distension and no mass. There is no tenderness. There is no rebound and no guarding.   Musculoskeletal: Normal range of motion. He exhibits no edema or tenderness.   Lymphadenopathy:     He has no cervical adenopathy.   Neurological: He is alert and oriented to person, place, and time.   Skin: Skin is warm and dry. No rash noted. No erythema. No pallor.   Psychiatric: His behavior is normal.   Nursing note and vitals reviewed.      ED Course     ED Course     Procedures                 Labs Ordered and Resulted from Time of ED Arrival Up to the Time of Departure from the ED - No data to display    Assessments & Plan (with Medical Decision Making)   HTN, asymptomatic  Amlodipine added to med  Fu with PCP  I have reviewed the nursing notes.    I have reviewed the findings, diagnosis, plan and need for follow up with the patient.      Discharge Medication List as of 6/10/2017 11:29 PM      START taking these medications    Details   amLODIPine (NORVASC) 5 MG tablet Take 1 tablet (5 mg) by mouth daily for 14 days, Disp-14 tablet, R-0, E-Prescribe             Final diagnoses:   Essential hypertension       6/10/2017   [facility]     Michi Benson MD  07/06/17 3350

## 2017-07-15 ENCOUNTER — HOSPITAL ENCOUNTER (EMERGENCY)
Facility: HOSPITAL | Age: 72
Discharge: HOME | End: 2017-07-15
Attending: EMERGENCY MEDICINE | Admitting: EMERGENCY MEDICINE
Payer: MEDICARE

## 2017-07-15 VITALS
RESPIRATION RATE: 18 BRPM | TEMPERATURE: 98.6 F | DIASTOLIC BLOOD PRESSURE: 81 MMHG | SYSTOLIC BLOOD PRESSURE: 117 MMHG | OXYGEN SATURATION: 98 % | HEART RATE: 74 BPM

## 2017-07-15 DIAGNOSIS — Z93.2 STATUS POST ILEOSTOMY (H): ICD-10-CM

## 2017-07-15 LAB
BASOPHILS # BLD AUTO: 0.1 10E9/L (ref 0–0.2)
BASOPHILS NFR BLD AUTO: 0.7 %
DIFFERENTIAL METHOD BLD: NORMAL
EOSINOPHIL # BLD AUTO: 0.3 10E9/L (ref 0–0.7)
EOSINOPHIL NFR BLD AUTO: 4.1 %
ERYTHROCYTE [DISTWIDTH] IN BLOOD BY AUTOMATED COUNT: 13.5 % (ref 10–15)
HCT VFR BLD AUTO: 40.3 % (ref 40–53)
HGB BLD-MCNC: 13.7 G/DL (ref 13.3–17.7)
IMM GRANULOCYTES # BLD: 0.1 10E9/L (ref 0–0.4)
IMM GRANULOCYTES NFR BLD: 0.8 %
INR PPP: 1.01 (ref 0.8–1.2)
LYMPHOCYTES # BLD AUTO: 2 10E9/L (ref 0.8–5.3)
LYMPHOCYTES NFR BLD AUTO: 24.2 %
MCH RBC QN AUTO: 29.7 PG (ref 26.5–33)
MCHC RBC AUTO-ENTMCNC: 34 G/DL (ref 31.5–36.5)
MCV RBC AUTO: 87 FL (ref 78–100)
MONOCYTES # BLD AUTO: 0.8 10E9/L (ref 0–1.3)
MONOCYTES NFR BLD AUTO: 10.1 %
NEUTROPHILS # BLD AUTO: 5 10E9/L (ref 1.6–8.3)
NEUTROPHILS NFR BLD AUTO: 60.1 %
NRBC # BLD AUTO: 0 10*3/UL
NRBC BLD AUTO-RTO: 0 /100
PLATELET # BLD AUTO: 232 10E9/L (ref 150–450)
RBC # BLD AUTO: 4.61 10E12/L (ref 4.4–5.9)
WBC # BLD AUTO: 8.3 10E9/L (ref 4–11)

## 2017-07-15 PROCEDURE — 99283 EMERGENCY DEPT VISIT LOW MDM: CPT

## 2017-07-15 PROCEDURE — 99283 EMERGENCY DEPT VISIT LOW MDM: CPT | Performed by: EMERGENCY MEDICINE

## 2017-07-15 PROCEDURE — 85610 PROTHROMBIN TIME: CPT | Performed by: EMERGENCY MEDICINE

## 2017-07-15 PROCEDURE — 99213 OFFICE O/P EST LOW 20 MIN: CPT

## 2017-07-15 PROCEDURE — 85025 COMPLETE CBC W/AUTO DIFF WBC: CPT | Performed by: EMERGENCY MEDICINE

## 2017-07-15 PROCEDURE — 36415 COLL VENOUS BLD VENIPUNCTURE: CPT | Performed by: EMERGENCY MEDICINE

## 2017-07-15 ASSESSMENT — ENCOUNTER SYMPTOMS
CARDIOVASCULAR NEGATIVE: 1
CONSTITUTIONAL NEGATIVE: 1
BRUISES/BLEEDS EASILY: 1
CHILLS: 0
ABDOMINAL PAIN: 0
RESPIRATORY NEGATIVE: 1
MUSCULOSKELETAL NEGATIVE: 1
PSYCHIATRIC NEGATIVE: 1
NEUROLOGICAL NEGATIVE: 1

## 2017-07-15 NOTE — ED AVS SNAPSHOT
[facility]    [address] [address]    Phone:  [phone]                                       Pteer Zhao   MRN: [id number]    Department:  [facility]   Date of Visit:  7/15/2017           After Visit Summary Signature Page     I have received my discharge instructions, and my questions have been answered. I have discussed any challenges I see with this plan with the nurse or doctor.    ..........................................................................................................................................  Patient/Patient Representative Signature      ..........................................................................................................................................  Patient Representative Print Name and Relationship to Patient    ..................................................               ................................................  Date                                            Time    ..........................................................................................................................................  Reviewed by Signature/Title    ...................................................              ..............................................  Date                                                            Time

## 2017-07-15 NOTE — ED AVS SNAPSHOT
[facility]    [address] [address]    Phone:  [phone]                                       Peter Zhao   MRN: [id number]    Department:  [facility]   Date of Visit:  7/15/2017           Patient Information     Date Of Birth          1945        Your diagnoses for this visit were:     Status post ileostomy (H)        You were seen by Viky Sterling MD.      Follow-up Information     Follow up with [facility]. Call in 1 day.    Specialty:  Wound Care    Why:  Call on Monday to get an appoinment with the Ostomy care nurse.    Contact information:    [address] [address] [phone]    Additional information:    From [facility]: Take [address]. Turn left at [address]. Turn left at the first stoplight on [address]. At the first stop sign, take a right onto [address]. Take a left into the parking lot and continue through until you reach the North enterance of the building.       From [place]: Take [address]. Take the [address] ramp towards [place]. Turn left onto [address]. Take a slight right onto [address]. Turn left at the first stoplight on [address]. At the first stop sign, take a right onto [address]. Take a left into the parking lot and continue through until you reach the North enterance of the building.       From [place]: Take [address]. Take a right at [address]. At the first stop sign, take a right onto [address]. Take a left into the parking lot and continue through until you reach the North enterance of the building.         Follow up with Alexander Vazquez DO.    Specialty:  Internal Medicine    Contact information:    [facility]  [address]  [facility] [address]  [phone]          Discharge Instructions       You must follow up with the wound care nurse on Monday.     Return to the ER  if bleeding from your ostomy site increases.     Discharge References/Attachments     POST OP WOUND CHECK, BLEEDING (ENGLISH)    OSTOMY POUCH, CHANGING YOUR: DISCHARGE INSTRUCTIONS (ENGLISH)    OSTOMY CARE: EMPTYING YOUR POUCH  (ENGLISH)    COLOSTOMY: CARING FOR YOUR STOMA  (ENGLISH)      Future Appointments        Provider Department Dept Phone Center    7/18/2017 1:30 PM Tima Moreland MD [facility] [phone] [place]         Review of your medicines      Our records show that you are taking the medicines listed below. If these are incorrect, please call your family doctor or clinic.        Dose / Directions Last dose taken    * amLODIPine 5 MG tablet   Commonly known as:  NORVASC   Dose:  5 mg   Quantity:  30 tablet        Take 1 tablet (5 mg) by mouth daily   Refills:  1        * amLODIPine 5 MG tablet   Commonly known as:  NORVASC   Dose:  5 mg   Quantity:  90 tablet        Take 1 tablet (5 mg) by mouth daily   Refills:  3        ASPIRIN PO   Dose:  325 mg        Take 325 mg by mouth daily   Refills:  0        atorvastatin 40 MG tablet   Commonly known as:  LIPITOR   Dose:  40 mg   Quantity:  90 tablet        Take 1 tablet (40 mg) by mouth daily   Refills:  3        blood glucose lancing device   Dose:  1 Units   Quantity:  1 kit        1 Units daily   Refills:  2        * blood glucose monitoring lancets   Dose:  1 Units   Quantity:  100 each        1 Units daily Use fresh lancet each day to check blood sugars   Refills:  4        * blood glucose monitoring lancets   Quantity:  100 Box        Use to test blood sugar 2 times weekly   Refills:  6        * blood glucose monitoring test strip   Commonly known as:  ACCU-CHEK SMARTVIEW   Quantity:  1 Box        Test blood sugar before breakfast one day, before and after supper the next and not at all the third day   Refills:  12        * blood glucose monitoring test strip   Commonly known as:  MARIAN CONTOUR NEXT   Quantity:  100 strip         Use to test blood sugar 2 times weekly or as directed.   Refills:  6        calcium carbonate 1500 (600 CA) MG tablet   Commonly known as:  OS-WALI 600 mg [place]. Ca   Dose:  1500 mg   Quantity:  30 tablet        Take 1 tablet (1,500 mg) by mouth daily   Refills:  3        clopidogrel 75 MG tablet   Commonly known as:  PLAVIX   Dose:  75 mg   Quantity:  90 tablet        Take 1 tablet (75 mg) by mouth daily   Refills:  3        finasteride 5 MG tablet   Commonly known as:  PROSCAR   Dose:  5 mg   Quantity:  90 tablet        Take 1 tablet (5 mg) by mouth daily   Refills:  3        lisinopril 40 MG tablet   Commonly known as:  PRINIVIL/ZESTRIL   Dose:  40 mg   Quantity:  90 tablet        Take 1 tablet (40 mg) by mouth daily   Refills:  3        metoprolol 100 MG tablet   Commonly known as:  LOPRESSOR   Dose:  100 mg   Quantity:  180 tablet        Take 1 tablet (100 mg) by mouth 2 times daily   Refills:  3        MULTIVITAMINS PO   Dose:  1 capsule        Take 1 capsule by mouth daily   Refills:  0        nitroGLYcerin 0.4 MG sublingual tablet   Commonly known as:  NITROSTAT   Dose:  0.4 mg   Quantity:  25 tablet        Place 1 tablet (0.4 mg) under the tongue every 5 minutes as needed   Refills:  3        NONFORMULARY        OTC eye drops as directed   Refills:  0        order for DME   Quantity:  1 Package        Equipment being ordered: Ostomy supplies - Barrier - currently using 08155 Sondra flat cut to fit; Pouch 16335 clear; [employer] barrier rings 7805; [place] ostomy belt.  Fill per insurance guidelines   Refills:  11        * Ostomy Supplies Misc   Quantity:  1 each        [place] m9 odor eliminator drops 7717 or 7715 - patient preference   Refills:  11        * Ostomy Supplies POUCH Misc   Quantity:  2 each        Sondra 57051 closed end pouches - per Medicare patient is allowed 60 per month.  Dispense 2 boxes or patient choice   Refills:  11        polyethylene glycol 0.4%- propylene glycol 0.3% 0.4-0.3 %  "Soln ophthalmic solution   Commonly known as:  SYSTANE ULTRA   Dose:  1 drop        Place 1 drop into both eyes every hour as needed for dry eyes   Refills:  0        ranitidine 150 MG tablet   Commonly known as:  ZANTAC   Dose:  150 mg   Quantity:  180 tablet        Take 1 tablet (150 mg) by mouth 2 times daily   Refills:  3        VITAMIN D3 PO        Take  by mouth daily.   Refills:  0        * Notice:  This list has 8 medication(s) that are the same as other medications prescribed for you. Read the directions carefully, and ask your doctor or other care provider to review them with you.            Procedures and tests performed during your visit     CBC with platelets differential    INR      Orders Needing Specimen Collection     None      Pending Results     No orders found from 2017 to 2017.            Pending Culture Results     No orders found from 2017 to 2017.            Thank you for choosing [facility]       Thank you for choosing [facility] for your care. Our goal is always to provide you with excellent care. Hearing back from our patients is one way we can continue to improve our services. Please take a few minutes to complete the written survey that you may receive in the mail after you visit with us. Thank you!        [employer] Information     [employer] lets you send messages to your doctor, view your test results, renew your prescriptions, schedule appointments and more. To sign up, go to [email or web address] . Click on \"Log in\" on the left side of the screen, which will take you to the Welcome page. Then click on \"Sign up Now\" on the right side of the page.     You will be asked to enter the access code listed below, as well as some personal information. Please follow the directions to create your username and password.     Your access code is: [email or web address]  Expires: 2017  8:29 AM     Your access code will  in 90 days. If you need help or a new code, please call your " [facility] or [phone].        Care EveryWhere ID     This is your Care EveryWhere ID. This could be used by other organizations to access your [facility] medical records  [id number]        Equal Access to Services     ANDRY CASPER : Taisha June, waaxda luqadaha, qaybta kaalmada bennieolivebentley, john pulido. So [facility] [phone].    ATENCIÓN: Si habla español, tiene a farah disposición servicios gratuitos de asistencia lingüística. Llame al [phone].    We comply with applicable federal civil rights laws and [place] laws. We do not discriminate on the basis of race, color, national origin, age, disability sex, sexual orientation or gender identity.            After Visit Summary       This is your record. Keep this with you and show to your community pharmacist(s) and doctor(s) at your next visit.

## 2017-07-16 NOTE — ED NOTES
"Pt and wife given verbal and written d/c instruction and both verbalize understanding, yet pt states \"I'm just supposed to go home and bleed\". Pt advised that labwork was WNL and that Dr. Sterling did consult the surgeon-on call, Dr. Villalba and they want pt to monitor bleeding and come back in if it worsens. Pt advised to f/u with Wound care nurse on Monday as they have see pt for bleeding and done cautery in the office. Also advised to keep appointment on Tuesday with Dr. Moreland. Pt and wife then left room and this writer was unable to get d/c vitals.  "

## 2017-07-16 NOTE — ED NOTES
"Pt to room 11 ambulatory with wife accompanying. Pt states that he was doing a dressing/appliance change tonight and after he wiped stoma site with washcloth he noted it had started bleeding. Pt took bag off and bright red blood noted around stoma, with small amount of brown stool starting to come out. Small amount of blood noted in bag. Pt states that colostomy was done in Feb 2017 and that his next appointment with surgeon is Tuesday. Pt notes some \"burning\" discomfort at stoma site. Assessment as charted and call light in reach.  "

## 2017-07-16 NOTE — ED PROVIDER NOTES
"  History     Chief Complaint   Patient presents with     Abdominal Pain     bleeding from colostomy \"burning\"     HPI  Peter Zhao is a 71 year old male who presents today with a bleeding from around his ostomy site. Patient states that he was cleaning his ostomy site when he noticed some bleeding from around the site. Patient believes he may have been cleaning too vigorously. He states he became worried that bleeding may worsen prompting ER visit. Patient denies abdominal pain or any additional complaints.     I have reviewed the Medications, Allergies, Past Medical and Surgical History, and Social History in the Epic system.         Review of Systems   Constitutional: Negative.  Negative for chills.   HENT: Negative.    Respiratory: Negative.    Cardiovascular: Negative.    Gastrointestinal: Negative for abdominal pain.   Genitourinary: Negative.    Musculoskeletal: Negative.    Skin: Negative.    Neurological: Negative.    Hematological: Bruises/bleeds easily.   Psychiatric/Behavioral: Negative.        Physical Exam   BP: 117/81  Pulse: 74  Temp: 98.6  F (37  C)  Resp: 18  SpO2: 98 %  Physical Exam   Constitutional: He appears well-developed and well-nourished.   Pulmonary/Chest: Effort normal.   Abdominal: Soft.   Trace amount of bleeding noted from around ostomy wound edges into the pouch. No clear and active bleeding source noted. Less than 1ml of blood noted in pouch. No abdominal tenderness or distension noted.    Neurological: He is alert.   Skin: Skin is warm and dry.   Psychiatric: He has a normal mood and affect.       ED Course     ED Course     Procedures             Labs Ordered and Resulted from Time of ED Arrival Up to the Time of Departure from the ED   CBC WITH PLATELETS DIFFERENTIAL   INR       Assessments & Plan (with Medical Decision Making)     I have reviewed the nursing notes.    I have reviewed the findings, diagnosis, plan and need for follow up with the patient.    Discharge Medication " List as of 7/15/2017 11:44 PM          Final diagnoses:   Status post ileostomy (H)       7/15/2017   [facility]  Labs above. Given slow ooze, I felt observation was all that was needed for now. Patient adamant that he wants cautery of the bleeding sites. Given the diffuse nature of the bleeding, case discussed with on call surgeon, Dr. Venkat Mckeon, who also concurs that observation is all that is needed for now. Patient to return if sx worsen in any way.       Viky Sterling MD  07/16/17 0730

## 2017-07-16 NOTE — DISCHARGE INSTRUCTIONS
You must follow up with the wound care nurse on Monday.     Return to the ER if bleeding from your ostomy site increases.

## 2017-07-17 ENCOUNTER — TELEPHONE (OUTPATIENT)
Dept: WOUND CARE | Facility: HOSPITAL | Age: 72
End: 2017-07-17

## 2017-07-17 NOTE — TELEPHONE ENCOUNTER
"Pt called to say he is still experiencing some bleeding from his stoma.  He was seen in the ED over the weekend and sent home.  The note from the ED noted he had only minimal bleeding.  However, he continues to see \"a little bit of bleeding\" near his stoma.  He is on Plavix.  Roddy has an appt with Dr Moreland tomorrow.  Agreed to see him for ostomy evaluation tomorrow after his appointment.  Also recommended he cut his pouch open slightly larger so it doesn't touch the stoma.  Pt agrees with this plan.  "

## 2017-07-18 ENCOUNTER — HOSPITAL ENCOUNTER (OUTPATIENT)
Dept: WOUND CARE | Facility: HOSPITAL | Age: 72
Discharge: HOME | End: 2017-07-18
Attending: INTERNAL MEDICINE | Admitting: INTERNAL MEDICINE
Payer: MEDICARE

## 2017-07-18 VITALS — TEMPERATURE: 97.4 F | SYSTOLIC BLOOD PRESSURE: 120 MMHG | DIASTOLIC BLOOD PRESSURE: 83 MMHG | HEART RATE: 61 BPM

## 2017-07-18 PROCEDURE — 99213 OFFICE O/P EST LOW 20 MIN: CPT

## 2017-07-18 NOTE — PROGRESS NOTES
Peter Zhao, 1945  Chief Complaint   Patient presents with     Ostomy       Patient Active Problem List   Diagnosis     Diabetes mellitus, type 2 (H)     CAD (coronary artery disease)     ACP (advance care planning)     Hyperlipidemia LDL goal <100     Elevated prostate specific antigen (PSA)     HTN (hypertension)     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     H/O migraine     Visual aura     Cataract     S/P colon resection     Hypokalemia     TIA (transient ischemic attack)     Agitation       Concerns/Comments:   Peter Zhao is here for evaluation and tx of stomal bleed that has occurred on and off since last week. He had presented over the weekend to the ED for this also. States it was an oozing bleed, did not fill ostomy pouch but quite concerned on how to prevent it and tx it in the future. States his barrier was last changes on Saturday; has template he used at that time with today.    Objective:   Appears as if the ostomy barrier was cut too narrow (top to bottom) which may have been leading to trauma to stoma.  No areas of bleed at this time but there is some apparent disruption to the stoma where pt pointed out the bleed to be occurring.   Also has stool leakage beneath barrier.    Procedures:   Peristomal skin cleansed and evaluated.  Stoma measured and barrier cut allowing 1/16 of an inch of peristomal skin to be visible.  2 piece cut to fit Sondra flat pouching system applied with use of barrier ring; closed end pouch applied.    Assessment/Response to tx:  Most likely the cause of bleed was stomal trauma from barrier being cut too narrow.  No bleed at this time.    Plan of care:   New template provided.  Instructed on use of pressure and ice to stop bleeding in the future.  Explained that a small bleed does not require medical attention and to monitor and apply pressure/ice in the future.    Treatment Goal:  No further stomal bleed.  Self management of  future bleeds.    Supplies provided:  NA    Education:  Ostomy/wound care instructions/education provided. Patient verbalized understanding of instruction/education.    Nurse face to face time: 40min    CC: Alexander Vazquez

## 2017-07-19 ENCOUNTER — OFFICE VISIT (OUTPATIENT)
Dept: SURGERY | Facility: OTHER | Age: 72
End: 2017-07-19
Attending: SURGERY
Payer: MEDICARE

## 2017-07-19 VITALS
OXYGEN SATURATION: 95 % | SYSTOLIC BLOOD PRESSURE: 130 MMHG | TEMPERATURE: 97.4 F | WEIGHT: 165 LBS | HEIGHT: 70 IN | DIASTOLIC BLOOD PRESSURE: 82 MMHG | BODY MASS INDEX: 23.62 KG/M2 | HEART RATE: 65 BPM | RESPIRATION RATE: 18 BRPM

## 2017-07-19 DIAGNOSIS — K56.699 STRICTURE OF SIGMOID COLON (H): Primary | ICD-10-CM

## 2017-07-19 PROCEDURE — 99213 OFFICE O/P EST LOW 20 MIN: CPT | Performed by: SURGERY

## 2017-07-19 PROCEDURE — 99212 OFFICE O/P EST SF 10 MIN: CPT

## 2017-07-19 ASSESSMENT — PAIN SCALES - GENERAL: PAINLEVEL: NO PAIN (0)

## 2017-07-19 NOTE — NURSING NOTE
"Chief Complaint   Patient presents with     Surgical Followup     wound check/ resection of sigmoid colon with end sigmoid colostomy 2/17/17       Initial /85 (BP Location: Left arm, Patient Position: Left side, Cuff Size: Adult Regular)  Pulse 65  Temp 97.4  F (36.3  C) (Tympanic)  Resp 18  Ht 5' 10\" (1.778 m)  Wt 165 lb (74.8 kg)  SpO2 95%  BMI 23.68 kg/m2 Estimated body mass index is 23.68 kg/(m^2) as calculated from the following:    Height as of this encounter: 5' 10\" (1.778 m).    Weight as of this encounter: 165 lb (74.8 kg).  Medication Reconciliation: complete   Susana Owen    "

## 2017-07-19 NOTE — MR AVS SNAPSHOT
"              After Visit Summary   7/19/2017    Peter Zhao    MRN: [id number]           Patient Information     Date Of Birth          1945        Visit Information        Provider Department      7/19/2017 3:00 PM Tima Moreland MD [facility]        Today's Diagnoses     Stricture of sigmoid colon (H)    -  1       Follow-ups after your visit        Your next 10 appointments already scheduled     Aug 23, 2017 10:30 AM CDT   (Arrive by 10:15 AM)   Return Visit with Tima Moreland MD   [facility] ([facility] [place] )    [address]  [place] [address]   [phone]              Who to contact     If you have questions or need follow up information about today's clinic visit or your schedule please contact [facility] directly at [phone].  Normal or non-critical lab and imaging results will be communicated to you by MyChart, letter or phone within 4 business days after the clinic has received the results. If you do not hear from us within 7 days, please contact the clinic through MyChart or phone. If you have a critical or abnormal lab result, we will notify you by phone as soon as possible.  Submit refill requests through iFlipd or call your pharmacy and they will forward the refill request to us. Please allow 3 business days for your refill to be completed.          Additional Information About Your Visit        MyChart Information     iFlipd lets you send messages to your doctor, view your test results, renew your prescriptions, schedule appointments and more. To sign up, go to [email or web address] . Click on \"Log in\" on the left side of the screen, which will take you to the Welcome page. Then click on \"Sign up Now\" on the right side of the page.     You will be asked to enter the access code listed below, as well as some personal information. Please follow the directions to create your username and password.     Your access code " "is: [email or web address]  Expires: 2017  8:29 AM     Your access code will  in 90 days. If you need help or a new code, please call your [place] clinic or [phone].        Care EveryWhere ID     This is your Care EveryWhere ID. This could be used by other organizations to access your [place] medical records  [id number]        Your Vitals Were     Pulse Temperature Respirations Height Pulse Oximetry BMI (Body Mass Index)    65 97.4  F (36.3  C) (Tympanic) 18 5' 10\" (1.778 m) 95% 23.68 kg/m2       Blood Pressure from Last 3 Encounters:   17 130/82   17 120/83   07/15/17 117/81    Weight from Last 3 Encounters:   17 165 lb (74.8 kg)   17 172 lb (78 kg)   17 176 lb (79.8 kg)              Today, you had the following     No orders found for display       Primary Care Provider Office Phone # Fax #    Alexander Vazquez [phone]       [facility] [address] [facility] [facility] [place] [address]        Equal Access to Services     ANDRY CASPER AH: Hadii miroslava ku hadasho Soomaali, waaxda luqadaha, qaybta kaalmada adeegyada, john torres . So [facility] [phone].    ATENCIÓN: Si habla español, tiene a farah disposición servicios gratuitos de asistencia lingüística. Llame al [phone].    We comply with applicable federal civil rights laws and [place] laws. We do not discriminate on the basis of race, color, national origin, age, disability sex, sexual orientation or gender identity.            Thank you!     Thank you for choosing [facility]  for your care. Our goal is always to provide you with excellent care. Hearing back from our patients is one way we can continue to improve our services. Please take a few minutes to complete the written survey that you may receive in the mail after your visit with us. Thank you!             Your Updated Medication List - Protect others around you: Learn how to safely use, store and throw " away your medicines at www.disposemymeds.org.          This list is accurate as of: 7/19/17  4:44 PM.  Always use your most recent med list.                   Brand Name Dispense Instructions for use Diagnosis    * amLODIPine 5 MG tablet    NORVASC    30 tablet    Take 1 tablet (5 mg) by mouth daily    Benign essential hypertension       * amLODIPine 5 MG tablet    NORVASC    90 tablet    Take 1 tablet (5 mg) by mouth daily    Benign essential hypertension       ASPIRIN PO      Take 325 mg by mouth daily        atorvastatin 40 MG tablet    LIPITOR    90 tablet    Take 1 tablet (40 mg) by mouth daily    Hyperlipidemia LDL goal <100       blood glucose lancing device     1 kit    1 Units daily    T2DM (type 2 diabetes mellitus) (H)       * blood glucose monitoring lancets     100 each    1 Units daily Use fresh lancet each day to check blood sugars    T2DM (type 2 diabetes mellitus) (H)       * blood glucose monitoring lancets     100 Box    Use to test blood sugar 2 times weekly    Type 2 diabetes mellitus without complication, without long-term current use of insulin (H)       * blood glucose monitoring test strip    ACCU-CHEK SMARTVIEW    1 Box    Test blood sugar before breakfast one day, before and after supper the next and not at all the third day    T2DM (type 2 diabetes mellitus) (H)       * blood glucose monitoring test strip    MARIAN CONTOUR NEXT    100 strip    Use to test blood sugar 2 times weekly or as directed.    Type 2 diabetes mellitus with other specified complication, unspecified long term insulin use status (H)       calcium carbonate 1500 (600 CA) MG tablet    OS-WALI 600 mg [place]. Ca    30 tablet    Take 1 tablet (1,500 mg) by mouth daily        clopidogrel 75 MG tablet    PLAVIX    90 tablet    Take 1 tablet (75 mg) by mouth daily    Benign essential hypertension       finasteride 5 MG tablet    PROSCAR    90 tablet    Take 1 tablet (5 mg) by mouth daily    Benign non-nodular prostatic  hyperplasia with lower urinary tract symptoms       lisinopril 40 MG tablet    PRINIVIL/ZESTRIL    90 tablet    Take 1 tablet (40 mg) by mouth daily    Benign essential hypertension       metoprolol 100 MG tablet    LOPRESSOR    180 tablet    Take 1 tablet (100 mg) by mouth 2 times daily    Benign essential hypertension       MULTIVITAMINS PO      Take 1 capsule by mouth daily        nitroGLYcerin 0.4 MG sublingual tablet    NITROSTAT    25 tablet    Place 1 tablet (0.4 mg) under the tongue every 5 minutes as needed    Hyperlipidemia LDL goal <100, Intermediate coronary syndrome (H)       NONFORMULARY      OTC eye drops as directed        order for DME     1 Package    Equipment being ordered: Ostomy supplies - Barrier - currently using 73342 Sondra flat cut to fit; Pouch 60170 clear; Adapt barrier rings 7805; Sondra ostomy belt.  Fill per insurance guidelines    Colostomy care (H)       * Ostomy Supplies Misc     1 each    Eagle m9 odor eliminator drops 7717 or 7715 - patient preference    Colostomy in place (H)       * Ostomy Supplies POUCH Misc     2 each    Sondra 06480 closed end pouches - per Medicare patient is allowed 60 per month.  Dispense 2 boxes or patient choice    Colostomy in place (H)       polyethylene glycol 0.4%- propylene glycol 0.3% 0.4-0.3 % Soln ophthalmic solution    SYSTANE ULTRA     Place 1 drop into both eyes every hour as needed for dry eyes        ranitidine 150 MG tablet    ZANTAC    180 tablet    Take 1 tablet (150 mg) by mouth 2 times daily    Gastroesophageal reflux disease without esophagitis       VITAMIN D3 PO      Take  by mouth daily.        * Notice:  This list has 8 medication(s) that are the same as other medications prescribed for you. Read the directions carefully, and ask your doctor or other care provider to review them with you.

## 2017-07-22 NOTE — PROGRESS NOTES
History of Present Illness:  Mr. Zhao and his wife are seen in the clinic today.  He underwent a [place] type resection of his sigmoid colon back in February.  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  He had _____ bowel obstruction at that time.  He comes in today for general followup.  Generally, he has been getting along well.  He was started on Plavix approximately 2 months ago because of a presumed TIA.  He has been getting some minor bleeding from trauma to colostomy, but otherwise, the colostomy is getting along well.  He has developed a stomal hernia, which is not causing him troubles.    Physical Examination:    General:  On examination today, he looks well.    Abdomen:  Soft and nontender.  He does have the stomal hernia, which is basically the same size as when I saw him last month.      Assessment and Plan:  Today, we discussed closing the colostomy.  I explained that this is a big procedure.  It means reopening the abdomen and taking down the colostomy and reanastomosing it to the rectum.  This can be a relatively straightforward operation, but also can be quite difficult depending upon the amount of scarring from his previous procedure.  I did explain that he likely would be in the hospital for at least a week.  It would depend upon how quickly his bowels start to function.  He did develop significant postoperative confusion, and this likely would be a problem again.  I did go over the risks of the procedure, namely the risks of anesthetic, bleeding, infection, as well as anastomotic breakdown.  There is also a possibility that we will not be able to complete the procedure and he would end up with a colostomy again.  This was in addition to the risks of any major surgery as far as his heart, lungs, blood clots, pneumonia, and strokes are concerned.  With the recent presumed transient ischemic attack, he is at higher risk of having a stroke.  He would need to be off of his Plavix.   This would work out reasonably well for doing the procedure in later September.     I also discussed with him that he does not need to have the colostomy closed.  One option is to not reverse it, as it means that he would not have to undergo the rather big operation.  Everything depends upon how much of a problem the colostomy is for him long term wise.      He and his wife are going to think about things.  They will come back and see me in August.  We will, though, start planning on doing the surgery in September.        Tima Moreland MD    D:  07/19/2017 17:44 T:  07/22/2017 15:44  Document:  [id number] SM\RH\AE

## 2017-08-02 ENCOUNTER — TRANSFERRED RECORDS (OUTPATIENT)
Dept: HEALTH INFORMATION MANAGEMENT | Facility: HOSPITAL | Age: 72
End: 2017-08-02

## 2017-08-23 ENCOUNTER — OFFICE VISIT (OUTPATIENT)
Dept: SURGERY | Facility: OTHER | Age: 72
End: 2017-08-23
Attending: SURGERY
Payer: MEDICARE

## 2017-08-23 VITALS
TEMPERATURE: 97 F | OXYGEN SATURATION: 97 % | HEART RATE: 58 BPM | BODY MASS INDEX: 23.62 KG/M2 | SYSTOLIC BLOOD PRESSURE: 119 MMHG | DIASTOLIC BLOOD PRESSURE: 65 MMHG | WEIGHT: 165 LBS | RESPIRATION RATE: 18 BRPM | HEIGHT: 70 IN

## 2017-08-23 DIAGNOSIS — Z90.49 HISTORY OF RESECTION OF LARGE BOWEL: Primary | ICD-10-CM

## 2017-08-23 PROCEDURE — 99213 OFFICE O/P EST LOW 20 MIN: CPT | Performed by: SURGERY

## 2017-08-23 PROCEDURE — 99212 OFFICE O/P EST SF 10 MIN: CPT

## 2017-08-23 RX ORDER — SODIUM, POTASSIUM,MAG SULFATES 17.5-3.13G
2 SOLUTION, RECONSTITUTED, ORAL ORAL SEE ADMIN INSTRUCTIONS
Qty: 2 BOTTLE | Refills: 0 | Status: CANCELLED | OUTPATIENT
Start: 2017-08-23

## 2017-08-23 RX ORDER — CHLORHEXIDINE GLUCONATE 40 MG/ML
SOLUTION TOPICAL ONCE
Status: CANCELLED | OUTPATIENT
Start: 2017-08-23 | End: 2017-08-23

## 2017-08-23 ASSESSMENT — PAIN SCALES - GENERAL: PAINLEVEL: NO PAIN (0)

## 2017-08-23 NOTE — PATIENT INSTRUCTIONS
Thank you for allowing Dr. Moreland and our surgical team to participate in your care.  If you have a scheduling or an appointment question please contact our Health Unit Coordinator, Darcie,  at her direct line [phone].   ALL nursing questions or concerns can be directed to your surgical nurse at: [phone]    You are scheduled for a: Closure of [place]'s sigmoid colostomy, repair stomal hernia  Your procedure date is: Thursday September 21, 2017    Bowel Prep    Clear liquid diet only on  the day before the examination.    Ruiz prep - one bottle at 4pm the day of your examination and follow with Two 16 ounce glasses water.    Ruiz prep - one bottle at 10pm the day before the exam.  Follow with Two 16 ounce glasses of water.    Nothing by mouth after midnight the night prior to your procedure        HOW TO PREPARE-      You need to have a scheduled Pre-Op with your primary care physician within 30 days of your scheduled surgery. Please call as soon as possible to schedule this.       You need a friend or family member available to drive you home AND stay with you for 24 hours after you leave the hospital. You will not be allowed to drive yourself. IF you need to take a taxi or the bus you MUST have a responsible person to ride with you. YOUR PROCEDURE WILL BE CANCELLED IF YOU DO NOT HAVE A RESPONSIBLE ADULT TO DRIVE YOU HOME.       You CANNOT have anything to eat or drink after midnight the night before your surgery, ncluding water and coffee. Your stomach needs to be completely empty. Do NOT chew gum, suck on hard candy, or smoke. You can brush your teeth the morning of surgery.       You need to call our Surgery Education Nurses 1-2 weeks prior to your surgery date at  [phone] or toll free [phone]. Please have you medication and allergy lists ready.      Stop your aspirin or other NSAIDs(Ibuprofen, Motrin, Aleve, Celebrex, Naproxen, etc...) 7 days before your surgery.      Hospital admitting will call  you the day before your surgery with your arrival time. If you are scheduled on a Monday admitting will call you the Friday before.      Please call your primary care physician if you should become ill within 24 hours of scheduled surgery. (ex.vomiting, diarrhea, fever)          You will need to wash the night before AND the morning of you procedure with the supplied Hibiclens. Wash your Surgical area with your bare hands, apply friction and rinse. KEEP IT AWAY FROM YOUR EYES, EARS, NOSE AND MOUTH.

## 2017-08-23 NOTE — PROGRESS NOTES
DATE OF SERVICE:  08/23/2017      HISTORY OF PRESENT ILLNESS:  Mr. Peter Zhao and his wife was seen in the clinic today for followup of his sigmoidcolostomy.  He underwent a laparotomy for an obstructing lesion of the lower sigmoid colon back in February.  This turned out to be a benign sigmoid stricture.  He did end up with a colostomy at that time.  Generally he has gotten along well since then.  He has been eating normally.  Weight has stabilized at approximately 173 pounds.  He is having very little trouble with the colostomy other than the bulge from the stomal hernia.  He denies pain.      PHYSICAL EXAMINATION:  On examination today, he looks well.  His abdomen is soft and nontender.  His main incision looks good.  He does have a stomal hernia which is stable.      We again discussed closure of the colostomy.  I explained to him that this is a big procedure.  It would mean cleaning his bowels out ahead of time.  He would end up with a laparotomy incision again.  He would also end up with an incision around the colostomy site.  I explained that there is a chance I would leave the colostomy site opened if I was worried about infection post-healing.  I explained he would likely be in the hospital for about a week.  It would take a number of days for his bowels to start to recover.  He also would need to avoid lifting for 6-8 weeks following the procedure.      I did go over the risks of the procedure, namely the risks of the anesthetic, bleeding, infection, as well as anastomotic breakdown.  There is also the risk of any major surgery as far as his heart, lungs, blood clots, pneumonias and strokes are concerned.      He is at a little higher risk of a stroke as he did have an episode back in April.  He has been on Plavix since then.  No doubt, he will also develop postoperative confusion, which he had quite significantly after his original surgery.      After some discussion, he felt that he would like to go  ahead and have the surgery done.  Arrangements are being made for it to be carried out in September.  He is going to see Dr. Vazquez prior to the procedure.  He will need to stop his Plavix approximately a week prior to the surgery as well.  We will do a bowel prep and instructions were given today.  A consent was signed.         RUDY RAYO MD             D: 2017 11:57   T: 2017 13:31   MT: JARROD      Name:     ROGER VINSON   MRN:      [id number]        Account:      [id number]   :      1945           Visit Date:   2017      Document: [id number]

## 2017-08-23 NOTE — NURSING NOTE
"Chief Complaint   Patient presents with     Surgical Followup     wound check/ resection of sigmoid colon with end sigmoid colostomy 2/17/17       Initial /65 (BP Location: Right arm, Patient Position: Right side, Cuff Size: Adult Regular)  Pulse 58  Temp 97  F (36.1  C) (Tympanic)  Resp 18  Ht 5' 10\" (1.778 m)  Wt 165 lb (74.8 kg)  SpO2 97%  BMI 23.68 kg/m2 Estimated body mass index is 23.68 kg/(m^2) as calculated from the following:    Height as of this encounter: 5' 10\" (1.778 m).    Weight as of this encounter: 165 lb (74.8 kg).  Medication Reconciliation: complete   Susana Owen    "

## 2017-08-23 NOTE — MR AVS SNAPSHOT
After Visit Summary   8/23/2017    Peter Zhao    MRN: [id number]           Patient Information     Date Of Birth          1945        Visit Information        Provider Department      8/23/2017 10:30 AM Tima Moreland MD [facility]        Care Instructions    Thank you for allowing Dr. Moreland and our surgical team to participate in your care.  If you have a scheduling or an appointment question please contact our Health Unit Coordinator, Darcie,  at her direct line [phone].   ALL nursing questions or concerns can be directed to your surgical nurse at: [phone]    You are scheduled for a: Closure of [place]'s sigmoid colostomy, repair stomal hernia  Your procedure date is: Thursday September 21, 2017    Bowel Prep    Clear liquid diet only on  the day before the examination.    Ruiz prep - one bottle at 4pm the day of your examination and follow with Two 16 ounce glasses water.    Ruiz prep - one bottle at 10pm the day before the exam.  Follow with Two 16 ounce glasses of water.    Nothing by mouth after midnight the night prior to your procedure        HOW TO PREPARE-      You need to have a scheduled Pre-Op with your primary care physician within 30 days of your scheduled surgery. Please call as soon as possible to schedule this.       You need a friend or family member available to drive you home AND stay with you for 24 hours after you leave the hospital. You will not be allowed to drive yourself. IF you need to take a taxi or the bus you MUST have a responsible person to ride with you. YOUR PROCEDURE WILL BE CANCELLED IF YOU DO NOT HAVE A RESPONSIBLE ADULT TO DRIVE YOU HOME.       You CANNOT have anything to eat or drink after midnight the night before your surgery, ncluding water and coffee. Your stomach needs to be completely empty. Do NOT chew gum, suck on hard candy, or smoke. You can brush your teeth the morning of surgery.       You need to call our Surgery  Education Nurses 1-2 weeks prior to your surgery date at  [phone] or toll free [phone]. Please have you medication and allergy lists ready.      Stop your aspirin or other NSAIDs(Ibuprofen, Motrin, Aleve, Celebrex, Naproxen, etc...) 7 days before your surgery.      Hospital admitting will call you the day before your surgery with your arrival time. If you are scheduled on a Monday admitting will call you the Friday before.      Please call your primary care physician if you should become ill within 24 hours of scheduled surgery. (ex.vomiting, diarrhea, fever)          You will need to wash the night before AND the morning of you procedure with the supplied Hibiclens. Wash your Surgical area with your bare hands, apply friction and rinse. KEEP IT AWAY FROM YOUR EYES, EARS, NOSE AND MOUTH.             Follow-ups after your visit        Your next 10 appointments already scheduled     Sep 12, 2017  1:00 PM CDT   (Arrive by 12:45 PM)   Pre-Op physical with Alexander Vazquez,    [facility] [place] ([facility] [place] )    [address]  [facility] [address]   [phone]              Who to contact     If you have questions or need follow up information about today's clinic visit or your schedule please contact [facility] directly at [phone].  Normal or non-critical lab and imaging results will be communicated to you by MyChart, letter or phone within 4 business days after the clinic has received the results. If you do not hear from us within 7 days, please contact the clinic through MyChart or phone. If you have a critical or abnormal lab result, we will notify you by phone as soon as possible.  Submit refill requests through [employer] or call your pharmacy and they will forward the refill request to us. Please allow 3 business days for your refill to be completed.          Additional Information About Your Visit        [employer] Information     [employer] lets you send  "messages to your doctor, view your test results, renew your prescriptions, schedule appointments and more. To sign up, go to [email or web address] . Click on \"Log in\" on the left side of the screen, which will take you to the Welcome page. Then click on \"Sign up Now\" on the right side of the page.     You will be asked to enter the access code listed below, as well as some personal information. Please follow the directions to create your username and password.     Your access code is: [email or web address]  Expires: 2017 10:57 AM     Your access code will  in 90 days. If you need help or a new code, please call your [facility] clinic or [phone].        Care EveryWhere ID     This is your Care EveryWhere ID. This could be used by other organizations to access your [facility] medical records  [id number]        Your Vitals Were     Pulse Temperature Respirations Height Pulse Oximetry BMI (Body Mass Index)    58 97  F (36.1  C) (Tympanic) 18 5' 10\" (1.778 m) 97% 23.68 kg/m2       Blood Pressure from Last 3 Encounters:   17 119/65   17 130/82   17 120/83    Weight from Last 3 Encounters:   17 165 lb (74.8 kg)   17 165 lb (74.8 kg)   17 172 lb (78 kg)              Today, you had the following     No orders found for display         Today's Medication Changes          These changes are accurate as of: 17 10:58 AM.  If you have any questions, ask your nurse or doctor.               These medicines have changed or have updated prescriptions.        Dose/Directions    amLODIPine 5 MG tablet   Commonly known as:  NORVASC   This may have changed:  Another medication with the same name was removed. Continue taking this medication, and follow the directions you see here.   Used for:  Benign essential hypertension   Changed by:  Alexander Vazquez DO        Dose:  5 mg   Take 1 tablet (5 mg) by mouth daily   Quantity:  90 tablet   Refills:  3                Primary Care " Provider Office Phone # Fax #    Alexander Vazquez [phone]       [facility] [address]  [place] [address]        Equal Access to Services     ANDRY CASPER : Hadii aad ku hadcatalinao Soomaali, waaxda luqadaha, qaybta kaalmada adeegyada, john avilan jenyherman mathews melissa pulido. So [facility] [phone].    ATENCIÓN: Si habla español, tiene a farah disposición servicios gratuitos de asistencia lingüística. Llame al [phone].    We comply with applicable federal civil rights laws and [place] laws. We do not discriminate on the basis of race, color, national origin, age, disability sex, sexual orientation or gender identity.            Thank you!     Thank you for choosing [facility]  for your care. Our goal is always to provide you with excellent care. Hearing back from our patients is one way we can continue to improve our services. Please take a few minutes to complete the written survey that you may receive in the mail after your visit with us. Thank you!             Your Updated Medication List - Protect others around you: Learn how to safely use, store and throw away your medicines at www.disposemymeds.org.          This list is accurate as of: 8/23/17 10:58 AM.  Always use your most recent med list.                   Brand Name Dispense Instructions for use Diagnosis    amLODIPine 5 MG tablet    NORVASC    90 tablet    Take 1 tablet (5 mg) by mouth daily    Benign essential hypertension       ASPIRIN PO      Take 325 mg by mouth daily        atorvastatin 40 MG tablet    LIPITOR    90 tablet    Take 1 tablet (40 mg) by mouth daily    Hyperlipidemia LDL goal <100       blood glucose lancing device     1 kit    1 Units daily    T2DM (type 2 diabetes mellitus) (H)       * blood glucose monitoring lancets     100 each    1 Units daily Use fresh lancet each day to check blood sugars    T2DM (type 2 diabetes mellitus) (H)       * blood glucose monitoring lancets      100 Box    Use to test blood sugar 2 times weekly    Type 2 diabetes mellitus without complication, without long-term current use of insulin (H)       * blood glucose monitoring test strip    ACCU-CHEK SMARTVIEW    1 Box    Test blood sugar before breakfast one day, before and after supper the next and not at all the third day    T2DM (type 2 diabetes mellitus) (H)       * blood glucose monitoring test strip    MARIAN CONTOUR NEXT    100 strip    Use to test blood sugar 2 times weekly or as directed.    Type 2 diabetes mellitus with other specified complication, unspecified long term insulin use status (H)       calcium carbonate 1500 (600 CA) MG tablet    OS-WALI 600 mg [place]. Ca    30 tablet    Take 1 tablet (1,500 mg) by mouth daily        clopidogrel 75 MG tablet    PLAVIX    90 tablet    Take 1 tablet (75 mg) by mouth daily    Benign essential hypertension       finasteride 5 MG tablet    PROSCAR    90 tablet    Take 1 tablet (5 mg) by mouth daily    Benign non-nodular prostatic hyperplasia with lower urinary tract symptoms       lisinopril 40 MG tablet    PRINIVIL/ZESTRIL    90 tablet    Take 1 tablet (40 mg) by mouth daily    Benign essential hypertension       metoprolol 100 MG tablet    LOPRESSOR    180 tablet    Take 1 tablet (100 mg) by mouth 2 times daily    Benign essential hypertension       MULTIVITAMINS PO      Take 1 capsule by mouth daily        nitroGLYcerin 0.4 MG sublingual tablet    NITROSTAT    25 tablet    Place 1 tablet (0.4 mg) under the tongue every 5 minutes as needed    Hyperlipidemia LDL goal <100, Intermediate coronary syndrome (H)       NONFORMULARY      OTC eye drops as directed        order for DME     1 Package    Equipment being ordered: Ostomy supplies - Barrier - currently using 82711 Sondra flat cut to fit; Pouch 58580 clear; Adapt barrier rings 7805; Sondra ostomy belt.  Fill per insurance guidelines    Colostomy care (H)       * Ostomy Supplies Misc     1 each    Sondra  m9 odor eliminator drops 7717 or 7715 - patient preference    Colostomy in place (H)       * Ostomy Supplies POUCH Misc     2 each    [place] [address] closed end pouches - per Medicare patient is allowed 60 per month.  Dispense 2 boxes or patient choice    Colostomy in place (H)       polyethylene glycol 0.4%- propylene glycol 0.3% 0.4-0.3 % Soln ophthalmic solution    SYSTANE ULTRA     Place 1 drop into both eyes every hour as needed for dry eyes        ranitidine 150 MG tablet    ZANTAC    180 tablet    Take 1 tablet (150 mg) by mouth 2 times daily    Gastroesophageal reflux disease without esophagitis       VITAMIN D3 PO      Take  by mouth daily.        * Notice:  This list has 6 medication(s) that are the same as other medications prescribed for you. Read the directions carefully, and ask your doctor or other care provider to review them with you.

## 2017-09-05 ENCOUNTER — TELEPHONE (OUTPATIENT)
Dept: SURGERY | Facility: OTHER | Age: 72
End: 2017-09-05

## 2017-09-05 DIAGNOSIS — Z93.3 COLOSTOMY IN PLACE (H): Primary | ICD-10-CM

## 2017-09-05 NOTE — TELEPHONE ENCOUNTER
Patient called and he said his prep for his colonoscopy has not been called into [facility] in [place] for his colonoscopy that is scheduled for 9-21-17 with Dr Moreland. After this is sent to [facility], please call the patient back to let him know it is there for him, thanks. Patient can be reached at  [phone].

## 2017-09-08 RX ORDER — SODIUM, POTASSIUM,MAG SULFATES 17.5-3.13G
2 SOLUTION, RECONSTITUTED, ORAL ORAL SEE ADMIN INSTRUCTIONS
Qty: 2 BOTTLE | Refills: 0 | Status: ON HOLD | OUTPATIENT
Start: 2017-09-08 | End: 2017-09-21

## 2017-09-12 ENCOUNTER — OFFICE VISIT (OUTPATIENT)
Dept: INTERNAL MEDICINE | Facility: OTHER | Age: 72
End: 2017-09-12
Attending: INTERNAL MEDICINE
Payer: MEDICARE

## 2017-09-12 VITALS
TEMPERATURE: 96.1 F | OXYGEN SATURATION: 97 % | DIASTOLIC BLOOD PRESSURE: 70 MMHG | SYSTOLIC BLOOD PRESSURE: 120 MMHG | HEART RATE: 54 BPM | WEIGHT: 176 LBS | BODY MASS INDEX: 25.2 KG/M2 | HEIGHT: 70 IN

## 2017-09-12 DIAGNOSIS — Z01.818 PREOP GENERAL PHYSICAL EXAM: Primary | ICD-10-CM

## 2017-09-12 DIAGNOSIS — E11.8 TYPE 2 DIABETES MELLITUS WITH COMPLICATION, WITHOUT LONG-TERM CURRENT USE OF INSULIN (H): ICD-10-CM

## 2017-09-12 LAB
ALBUMIN SERPL-MCNC: 3.5 G/DL (ref 3.4–5)
ALP SERPL-CCNC: 93 U/L (ref 40–150)
ALT SERPL W P-5'-P-CCNC: 26 U/L (ref 0–70)
ANION GAP SERPL CALCULATED.3IONS-SCNC: 5 MMOL/L (ref 3–14)
AST SERPL W P-5'-P-CCNC: 21 U/L (ref 0–45)
BASOPHILS # BLD AUTO: 0.1 10E9/L (ref 0–0.2)
BASOPHILS NFR BLD AUTO: 0.8 %
BILIRUB SERPL-MCNC: 0.9 MG/DL (ref 0.2–1.3)
BUN SERPL-MCNC: 25 MG/DL (ref 7–30)
CALCIUM SERPL-MCNC: 8.5 MG/DL (ref 8.5–10.1)
CHLORIDE SERPL-SCNC: 109 MMOL/L (ref 94–109)
CO2 SERPL-SCNC: 27 MMOL/L (ref 20–32)
CREAT SERPL-MCNC: 1.23 MG/DL (ref 0.66–1.25)
DIFFERENTIAL METHOD BLD: NORMAL
EOSINOPHIL # BLD AUTO: 0.3 10E9/L (ref 0–0.7)
EOSINOPHIL NFR BLD AUTO: 4.1 %
ERYTHROCYTE [DISTWIDTH] IN BLOOD BY AUTOMATED COUNT: 13.7 % (ref 10–15)
EST. AVERAGE GLUCOSE BLD GHB EST-MCNC: 134 MG/DL
GFR SERPL CREATININE-BSD FRML MDRD: 58 ML/MIN/1.7M2
GLUCOSE SERPL-MCNC: 129 MG/DL (ref 70–99)
HBA1C MFR BLD: 6.3 % (ref 4.3–6)
HCT VFR BLD AUTO: 41.1 % (ref 40–53)
HGB BLD-MCNC: 13.8 G/DL (ref 13.3–17.7)
IMM GRANULOCYTES # BLD: 0 10E9/L (ref 0–0.4)
IMM GRANULOCYTES NFR BLD: 0.5 %
LYMPHOCYTES # BLD AUTO: 1.5 10E9/L (ref 0.8–5.3)
LYMPHOCYTES NFR BLD AUTO: 18.9 %
MCH RBC QN AUTO: 29.9 PG (ref 26.5–33)
MCHC RBC AUTO-ENTMCNC: 33.6 G/DL (ref 31.5–36.5)
MCV RBC AUTO: 89 FL (ref 78–100)
MONOCYTES # BLD AUTO: 0.7 10E9/L (ref 0–1.3)
MONOCYTES NFR BLD AUTO: 9 %
NEUTROPHILS # BLD AUTO: 5.3 10E9/L (ref 1.6–8.3)
NEUTROPHILS NFR BLD AUTO: 66.7 %
NRBC # BLD AUTO: 0 10*3/UL
NRBC BLD AUTO-RTO: 0 /100
PLATELET # BLD AUTO: 214 10E9/L (ref 150–450)
POTASSIUM SERPL-SCNC: 4.2 MMOL/L (ref 3.4–5.3)
PROT SERPL-MCNC: 6.8 G/DL (ref 6.8–8.8)
RBC # BLD AUTO: 4.62 10E12/L (ref 4.4–5.9)
SODIUM SERPL-SCNC: 141 MMOL/L (ref 133–144)
WBC # BLD AUTO: 8 10E9/L (ref 4–11)

## 2017-09-12 PROCEDURE — 85025 COMPLETE CBC W/AUTO DIFF WBC: CPT | Mod: ZL | Performed by: INTERNAL MEDICINE

## 2017-09-12 PROCEDURE — 93010 ELECTROCARDIOGRAM REPORT: CPT | Performed by: INTERNAL MEDICINE

## 2017-09-12 PROCEDURE — 71020 ZZHC CHEST TWO VIEWS, FRONT/LAT: CPT | Mod: TC

## 2017-09-12 PROCEDURE — 80053 COMPREHEN METABOLIC PANEL: CPT | Mod: ZL | Performed by: INTERNAL MEDICINE

## 2017-09-12 PROCEDURE — 40000788 ZZHCL STATISTIC ESTIMATED AVERAGE GLUCOSE: Mod: ZL | Performed by: INTERNAL MEDICINE

## 2017-09-12 PROCEDURE — 36415 COLL VENOUS BLD VENIPUNCTURE: CPT | Mod: ZL | Performed by: INTERNAL MEDICINE

## 2017-09-12 PROCEDURE — 83036 HEMOGLOBIN GLYCOSYLATED A1C: CPT | Mod: ZL | Performed by: INTERNAL MEDICINE

## 2017-09-12 PROCEDURE — 99213 OFFICE O/P EST LOW 20 MIN: CPT | Mod: 25

## 2017-09-12 PROCEDURE — 93005 ELECTROCARDIOGRAM TRACING: CPT

## 2017-09-12 PROCEDURE — 99214 OFFICE O/P EST MOD 30 MIN: CPT | Mod: 25 | Performed by: INTERNAL MEDICINE

## 2017-09-12 ASSESSMENT — PAIN SCALES - GENERAL: PAINLEVEL: NO PAIN (0)

## 2017-09-12 NOTE — PATIENT INSTRUCTIONS
Hold Plavix 7 days prior to surgery  Take metoprolol in morning prior to surgery with small sip water  Take Norvasc at night prior to surgery as usual.  HOLD-Lisinopril morning of surgery  Check BG morning of surgery  HOLD all other medications morning of surgery                Before Your Surgery      Call your surgeon if there is any change in your health. This includes signs of a cold or flu (such as a sore throat, runny nose, cough, rash or fever).    Do not smoke, drink alcohol or take over the counter medicine (unless your surgeon or primary care doctor tells you to) for the 24 hours before and after surgery.    If you take prescribed drugs: Follow your doctor s orders about which medicines to take and which to stop until after surgery.    Eating and drinking prior to surgery: follow the instructions from your surgeon    Take a shower or bath the night before surgery. Use the soap your surgeon gave you to gently clean your skin. If you do not have soap from your surgeon, use your regular soap. Do not shave or scrub the surgery site.  Wear clean pajamas and have clean sheets on your bed.

## 2017-09-12 NOTE — MR AVS SNAPSHOT
After Visit Summary   9/12/2017    Peter Zhao    MRN: [id number]           Patient Information     Date Of Birth          1945        Visit Information        Provider Department      9/12/2017 8:15 AM Alexander Vazquez DO [facility]        Today's Diagnoses     Preop general physical exam    -  1    Type 2 diabetes mellitus with complication, without long-term current use of insulin (H)          Care Instructions    Hold Plavix 7 days prior to surgery  Take metoprolol in morning prior to surgery with small sip water  Take Norvasc at night prior to surgery as usual.  HOLD-Lisinopril morning of surgery  Check BG morning of surgery  HOLD all other medications morning of surgery                Before Your Surgery      Call your surgeon if there is any change in your health. This includes signs of a cold or flu (such as a sore throat, runny nose, cough, rash or fever).    Do not smoke, drink alcohol or take over the counter medicine (unless your surgeon or primary care doctor tells you to) for the 24 hours before and after surgery.    If you take prescribed drugs: Follow your doctor s orders about which medicines to take and which to stop until after surgery.    Eating and drinking prior to surgery: follow the instructions from your surgeon    Take a shower or bath the night before surgery. Use the soap your surgeon gave you to gently clean your skin. If you do not have soap from your surgeon, use your regular soap. Do not shave or scrub the surgery site.  Wear clean pajamas and have clean sheets on your bed.           Follow-ups after your visit        Your next 10 appointments already scheduled     Sep 21, 2017   Procedure with Tima Moreland MD   [facility] ([address] [address] [phone]              Who to contact     If you have questions or need follow up information about today's clinic visit or your  "schedule please contact [facility] directly at [phone].  Normal or non-critical lab and imaging results will be communicated to you by MyChart, letter or phone within 4 business days after the clinic has received the results. If you do not hear from us within 7 days, please contact the clinic through MyChart or phone. If you have a critical or abnormal lab result, we will notify you by phone as soon as possible.  Submit refill requests through "[employer] or call your pharmacy and they will forward the refill request to us. Please allow 3 business days for your refill to be completed.          Additional Information About Your Visit        [employer] Information     "[employer] lets you send messages to your doctor, view your test results, renew your prescriptions, schedule appointments and more. To sign up, go to [email or web address]/"[email or web address] . Click on \"Log in\" on the left side of the screen, which will take you to the Welcome page. Then click on \"Sign up Now\" on the right side of the page.     You will be asked to enter the access code listed below, as well as some personal information. Please follow the directions to create your username and password.     Your access code is: [email or web address]  Expires: 2017 10:57 AM     Your access code will  in 90 days. If you need help or a new code, please call your [facility] clinic or [phone].        Care EveryWhere ID     This is your Care EveryWhere ID. This could be used by other organizations to access your [facility] medical records  [id number]        Your Vitals Were     Pulse Temperature Height Pulse Oximetry BMI (Body Mass Index)       54 96.1  F (35.6  C) (Tympanic) 5' 10\" (1.778 m) 97% 25.25 kg/m2        Blood Pressure from Last 3 Encounters:   17 120/70   17 119/65   17 130/82    Weight from Last 3 Encounters:   17 176 lb (79.8 kg)   17 165 lb (74.8 kg)   17 165 lb (74.8 kg)              We Performed the Following  "    CBC with platelets and differential     Comprehensive metabolic panel     EKG 12-lead complete w/read - (Clinic Performed)     Hemoglobin A1c        Primary Care Provider Office Phone # Fax #    Alexander ALEMAN CaseyDO juanjo [phone]       [facility] [address] [place] [address]        Equal Access to Services     ANDRY CASPER : Hadii aad ku hadasho Soomaali, waaxda luqadaha, qaybta kaalmada adeegyada, waxay idiin hayaan adeherman khiqrakevin latameka . So [facility] [phone].    ATENCIÓN: Si habla español, tiene a farah disposición servicios gratuitos de asistencia lingüística. Jose al [phone].    We comply with applicable federal civil rights laws and [place] laws. We do not discriminate on the basis of race, color, national origin, age, disability sex, sexual orientation or gender identity.            Thank you!     Thank you for choosing [facility]  for your care. Our goal is always to provide you with excellent care. Hearing back from our patients is one way we can continue to improve our services. Please take a few minutes to complete the written survey that you may receive in the mail after your visit with us. Thank you!             Your Updated Medication List - Protect others around you: Learn how to safely use, store and throw away your medicines at www.disposemymeds.org.          This list is accurate as of: 9/12/17  8:50 AM.  Always use your most recent med list.                   Brand Name Dispense Instructions for use Diagnosis    amLODIPine 5 MG tablet    NORVASC    90 tablet    Take 1 tablet (5 mg) by mouth daily    Benign essential hypertension       ASPIRIN PO      Take 325 mg by mouth daily        atorvastatin 40 MG tablet    LIPITOR    90 tablet    Take 1 tablet (40 mg) by mouth daily    Hyperlipidemia LDL goal <100       blood glucose lancing device     1 kit    1 Units daily    T2DM (type 2 diabetes mellitus) (H)       * blood glucose monitoring  lancets     100 each    1 Units daily Use fresh lancet each day to check blood sugars    T2DM (type 2 diabetes mellitus) (H)       * blood glucose monitoring lancets     100 Box    Use to test blood sugar 2 times weekly    Type 2 diabetes mellitus without complication, without long-term current use of insulin (H)       * blood glucose monitoring test strip    ACCU-CHEK SMARTVIEW    1 Box    Test blood sugar before breakfast one day, before and after supper the next and not at all the third day    T2DM (type 2 diabetes mellitus) (H)       * blood glucose monitoring test strip    MARIAN CONTOUR NEXT    100 strip    Use to test blood sugar 2 times weekly or as directed.    Type 2 diabetes mellitus with other specified complication, unspecified long term insulin use status (H)       calcium carbonate 1500 (600 CA) MG tablet    OS-WALI 600 mg [place]. Ca    30 tablet    Take 1 tablet (1,500 mg) by mouth daily        clopidogrel 75 MG tablet    PLAVIX    90 tablet    Take 1 tablet (75 mg) by mouth daily    Benign essential hypertension       finasteride 5 MG tablet    PROSCAR    90 tablet    Take 1 tablet (5 mg) by mouth daily    Benign non-nodular prostatic hyperplasia with lower urinary tract symptoms       lisinopril 40 MG tablet    PRINIVIL/ZESTRIL    90 tablet    Take 1 tablet (40 mg) by mouth daily    Benign essential hypertension       metoprolol 100 MG tablet    LOPRESSOR    180 tablet    Take 1 tablet (100 mg) by mouth 2 times daily    Benign essential hypertension       MULTIVITAMINS PO      Take 1 capsule by mouth daily        Na Sulfate-K Sulfate-Mg Sulf solution    SUPREP BOWEL PREP    2 Bottle    Take 354 mLs (2 Bottles) by mouth See Admin Instructions    Colostomy in place (H)       nitroGLYcerin 0.4 MG sublingual tablet    NITROSTAT    25 tablet    Place 1 tablet (0.4 mg) under the tongue every 5 minutes as needed    Hyperlipidemia LDL goal <100, Intermediate coronary syndrome (H)       NONFORMULARY      OTC  eye drops as directed        order for DME     1 Package    Equipment being ordered: Ostomy supplies - Barrier - currently using 77469 Sondra flat cut to fit; Pouch 88529 clear; [employer] barrier rings 7805; Sondra ostomy belt.  Fill per insurance guidelines    Colostomy care (H)       * Ostomy Supplies Misc     1 each    Sondra m9 odor eliminator drops 7717 or 7715 - patient preference    Colostomy in place (H)       * Ostomy Supplies POUCH Misc     2 each    Sondra 51038 closed end pouches - per Medicare patient is allowed 60 per month.  Dispense 2 boxes or patient choice    Colostomy in place (H)       polyethylene glycol 0.4%- propylene glycol 0.3% 0.4-0.3 % Soln ophthalmic solution    SYSTANE ULTRA     Place 1 drop into both eyes every hour as needed for dry eyes        ranitidine 150 MG tablet    ZANTAC    180 tablet    Take 1 tablet (150 mg) by mouth 2 times daily    Gastroesophageal reflux disease without esophagitis       VITAMIN D3 PO      Take  by mouth daily.        * Notice:  This list has 6 medication(s) that are the same as other medications prescribed for you. Read the directions carefully, and ask your doctor or other care provider to review them with you.

## 2017-09-12 NOTE — NURSING NOTE
"Chief Complaint   Patient presents with     Pre-Op Exam     9/21/17 Colon resction Dr. Merly Su range        Initial /70 (BP Location: Left arm, Patient Position: Supine, Cuff Size: Adult Regular)  Pulse 54  Temp 96.1  F (35.6  C) (Tympanic)  Ht 5' 10\" (1.778 m)  Wt 176 lb (79.8 kg)  SpO2 97%  BMI 25.25 kg/m2 Estimated body mass index is 25.25 kg/(m^2) as calculated from the following:    Height as of this encounter: 5' 10\" (1.778 m).    Weight as of this encounter: 176 lb (79.8 kg).  Medication Reconciliation: complete   Sabina Olivo LPN      "

## 2017-09-12 NOTE — PROGRESS NOTES
[facility] [place]  [address]  [place] [address]  [phone]  Dept: [phone]    PRE-OP EVALUATION:  Today's date: 2017    Peter Zhao (: 1945) presents for pre-operative evaluation assessment as requested by Dr. Moreland.  He requires evaluation and anesthesia risk assessment prior to undergoing surgery/procedure for treatment of colon resection .  Proposed procedure: colon resection    Date of Surgery/ Procedure: 17  Time of Surgery/ Procedure: [facility], [place]  Hospital/Surgical Facility: [facility]  Primary Physician: Alexander Vazquez  Type of Anesthesia Anticipated: to be determined    Patient has a Health Care Directive or Living Will:  NO    1. YES - DO YOU HAVE A HISTORY OF HEART ATTACK, STROKE, STENT, BYPASS OR SURGERY ON AN ARTERY IN THE HEAD, NECK, HEART OR LEG? Double bypass 5 years ago and a valve job 3 years ago   2. NO - Do you ever have any pain or discomfort in your chest?  3. NO - Do you have a history of  Heart Failure?  4. NO - Are you troubled by shortness of breath when: walking on the level, up a slight hill or at night?  5. NO - Do you currently have a cold, bronchitis or other respiratory infection?  6. NO - Do you have a cough, shortness of breath or wheezing?  7. NO - Do you sometimes get pains in the calves of your legs when you walk?  8. NO - Do you or anyone in your family have previous history of blood clots?  9. NO - Do you or does anyone in your family have a serious bleeding problem such as prolonged bleeding following surgeries or cuts?  10. NO - Have you ever had problems with anemia or been told to take iron pills?  11. NO - Have you had any abnormal blood loss such as black, tarry or bloody stools, or abnormal vaginal bleeding?  12. NO - Have you ever had a blood transfusion?  13. NO - Have you or any of your relatives ever had problems with anesthesia?  14. NO - Do you have sleep apnea, excessive snoring or daytime drowsiness?  15. NO - Do you  have any prosthetic heart valves?  16. NO - Do you have prosthetic joints?  17. NO - Is there any chance that you may be pregnant?        HPI:                                                      Brief HPI related to upcoming procedure: Colostomy take down.        DIABETES - Patient has a longstanding history of DiabetesType Type II . Patient is being treated with diet and denies significant side effects. Control has been good. Complicating factors include but are not limited to: cardiac disease, high cholesterol, cerebrovascular accident and HTN.     A1C 6.3                                                                                                          .  HYPERTENSION - Patient has longstanding history of mod-severe HTN , currently denies any symptoms referable to elevated blood pressure. Specifically denies chest pain, palpitations, dyspnea, orthopnea, PND or peripheral edema. Blood pressure readings have been in normal range. Current medication regimen is as listed below. Patient denies any side effects of medication.                                                                                                                                                                       HYPERLIPIDEMIA - Patient has a long history of significant Hyperlipidemia requiring medication for treatment with recent good control. Patient reports no problems or side effects with the medication.                                                                                                                                                       .  CAD - Patient has a longstanding history of mod-severe CAD. Patient denies recent chest pain or NTG use, denies exercise induced dyspnea or PND. Last Stent 2014 and CABG 2013.  Asymptomatic currently                                                                                                                           .  PACERMAKER - Pacemaker.  Paced Rhythm on EKG    CVA -  CVA  spring 2017.  Has been on Plavix since this event.  Patient understands the risk associated with coming off Plavix for a brief time leading up to and following the surgery.                                                                                                  It is noted the patient has had difficulty with anesthesia in the way of agitation/confusion post previous procedures.      MEDICAL HISTORY:                                                    Patient Active Problem List    Diagnosis Date Noted     Agitation 05/19/2017     Priority: Medium     TIA (transient ischemic attack) 04/29/2017     Priority: Medium     Hypokalemia 02/20/2017     Priority: Medium     S/P colon resection 02/18/2017     Priority: Medium     Hyperlipidemia LDL goal <100 02/15/2017     Priority: Medium     Elevated prostate specific antigen (PSA) 02/15/2017     Priority: Medium     HTN (hypertension) 02/15/2017     Priority: Medium     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  Peter NARANJO Pavel has no plan or code status on file. Discussed available resources and provided with information. Confirmed code status reflects current choices pending further ACP discussions.  Confirmed/documented legally designated decision makers.  Added by Daxa Campa           CAD (coronary artery disease)      Priority: Medium     CABG 2013, stent x 1 in 2014       Diabetes mellitus, type 2 (H) 10/31/2013     Priority: Medium      Past Medical History:   Diagnosis Date     Allergic state      CAD (coronary artery disease)     CABG 2013, stent x 1 in 2014     Cataract       Cataracts, bilateral      Elevated PSA      Gastro-oesophageal reflux disease      Heart murmur      Hyperlipidemia      Hypertension      Pacemaker      Stented coronary artery      Visual aura      Past Surgical History:   Procedure Laterality Date     CARDIAC SURGERY       CATARACT IOL, RT/LT Right 04/2017     COLECTOMY LOW ANTERIOR N/A 2/17/2017    Procedure: COLECTOMY LOW ANTERIOR;  Surgeon: Tima Moreland MD;  Location: [facility] OR     COLONOSCOPY N/A 2/17/2017    Procedure: COLONOSCOPY;  Surgeon: Tima Moreland MD;  Location: [facility] OR     ENT SURGERY       FLEX SIG (MEDICARE PT.)       GI SURGERY  02/2017    ostemomy     HERNIA REPAIR       LAPAROSCOPIC HERNIORRHAPHY INGUINAL Right 9/18/2015    Procedure: LAPAROSCOPIC HERNIORRHAPHY INGUINAL;  Surgeon: Solitario Nettles DO;  Location: [facility] OR     PHACOEMULSIFICATION WITH STANDARD INTRAOCULAR LENS IMPLANT Right 4/20/2017    Procedure: PHACOEMULSIFICATION WITH STANDARD INTRAOCULAR LENS IMPLANT;  CATARACT EXTRACTION RIGHT EYE WITH IMPLANT;  Surgeon: Darin Gutierrez MD;  Location: [facility] OR     PROSTATE BIOPSY, NEEDLE, SATURATION SAMPLING  2009     retinopexy-pvd with retinal tear Right 2008     TONSILLECTOMY       Current Outpatient Prescriptions   Medication Sig Dispense Refill     clopidogrel (PLAVIX) 75 MG tablet Take 1 tablet (75 mg) by mouth daily 90 tablet 3     metoprolol (LOPRESSOR) 100 MG tablet Take 1 tablet (100 mg) by mouth 2 times daily 180 tablet 3     lisinopril (PRINIVIL/ZESTRIL) 40 MG tablet Take 1 tablet (40 mg) by mouth daily 90 tablet 3     atorvastatin (LIPITOR) 40 MG tablet Take 1 tablet (40 mg) by mouth daily 90 tablet 3     ranitidine (ZANTAC) 150 MG tablet Take 1 tablet (150 mg) by mouth 2 times daily 180 tablet 3     finasteride (PROSCAR) 5 MG tablet Take 1 tablet (5 mg) by mouth daily 90 tablet 3     amLODIPine (NORVASC) 5 MG tablet Take 1 tablet (5 mg) by mouth daily 90 tablet 3     blood glucose monitoring (MARIAN CONTOUR NEXT) test strip  Use to test blood sugar 2 times weekly or as directed. 100 strip 6     calcium carbonate (OS-WALI 600 MG Elim IRA. CA) 1500 (600 CA) MG tablet Take 1 tablet (1,500 mg) by mouth daily 30 tablet 3     Ostomy Supplies POUCH [facility] [place] [id number] closed end pouches - per Medicare patient is allowed 60 per month.  Dispense 2 boxes or patient choice 2 each 11     Ostomy Supplies MISC [place] m9 odor eliminator drops 7717 or 7715 - patient preference 1 each 11     order for DME Equipment being ordered: Ostomy supplies - Barrier - currently using [address] [address] flat cut to fit; Pouch [address]8475 clear; [employer] barrier rings 7805; [place] ostomy belt.  Fill per insurance guidelines 1 Package 11     blood glucose monitoring (MARIAN MICROLET) lancets Use to test blood sugar 2 times weekly 100 Box 6     polyethylene glycol 0.4%- propylene glycol 0.3% (SYSTANE ULTRA) 0.4-0.3 % SOLN ophthalmic solution Place 1 drop into both eyes every hour as needed for dry eyes       Multiple Vitamin (MULTIVITAMINS PO) Take 1 capsule by mouth daily       glucose blood VI test strips (ACCU-CHEK SMARTVIEW) strip Test blood sugar before breakfast one day, before and after supper the next and not at all the third day 1 Box 12     SOFTCLIX LANCETS MISC 1 Units daily Use fresh lancet each day to check blood sugars 100 each 4     Lancets Misc. (ACCU-CHEK SOFTCLIX LANCET DEV) KIT 1 Units daily 1 kit 2     ASPIRIN PO Take 325 mg by mouth daily        Cholecalciferol (VITAMIN D3 PO) Take  by mouth daily.       Na Sulfate-K Sulfate-Mg Sulf (SUPREP BOWEL PREP) solution Take 354 mLs (2 Bottles) by mouth See Admin Instructions 2 Bottle 0     nitroglycerin (NITROSTAT) 0.4 MG sublingual tablet Place 1 tablet (0.4 mg) under the tongue every 5 minutes as needed (Patient not taking: Reported on 9/12/2017) 25 tablet 3     NONFORMULARY OTC eye drops as directed       OTC products: None, except as noted above    Allergies   Allergen Reactions     Acetaminophen       "Ciprofloxacin Hives     No Clinical Screening - See Comments      Antibiotic allergy, pt not sure which      Latex Allergy: NO    Social History   Substance Use Topics     Smoking status: Former Smoker     Smokeless tobacco: Former User     Types: Chew     Quit date: 10/19/2013      Comment: quit in 2000     Alcohol use No     History   Drug Use No       REVIEW OF SYSTEMS:                                                    C: NEGATIVE for fever, chills, change in weight  I: NEGATIVE for worrisome rashes, moles or lesions  E: NEGATIVE for vision changes or irritation  E/M: NEGATIVE for ear, mouth and throat problems  R: NEGATIVE for significant cough or SOB  B: NEGATIVE for masses, tenderness or discharge  CV: NEGATIVE for chest pain, palpitations or peripheral edema  GI: NEGATIVE for nausea, abdominal pain, heartburn, or change in bowel habits  : NEGATIVE for frequency, dysuria, or hematuria  M: NEGATIVE for significant arthralgias or myalgia  N: NEGATIVE for weakness, dizziness or paresthesias  E: NEGATIVE for temperature intolerance, skin/hair changes  H: NEGATIVE for bleeding problems  P: NEGATIVE for changes in mood or affect    EXAM:                                                    /70 (BP Location: Left arm, Patient Position: Supine, Cuff Size: Adult Regular)  Pulse 54  Temp 96.1  F (35.6  C) (Tympanic)  Ht 5' 10\" (1.778 m)  Wt 176 lb (79.8 kg)  SpO2 97%  BMI 25.25 kg/m2    GENERAL APPEARANCE: healthy, alert and no distress     EYES: EOMI,  PERRL     HENT: ear canals and TM's normal and nose and mouth without ulcers or lesions     NECK: no adenopathy, no asymmetry, masses, or scars and thyroid normal to palpation     RESP: lungs clear to auscultation - no rales, rhonchi or wheezes     CV: Bradycardic but regular with soft systolic murmur, normal S1 S2, no S3 or S4, no click or rub     ABDOMEN:  Ostomy bag present. Otherwise-soft, nontender, no HSM or masses and bowel sounds normal     MS: " extremities normal- no gross deformities noted, no evidence of inflammation in joints, FROM in all extremities.     SKIN: no suspicious lesions or rashes, slight bruising on arms       NEURO: Normal strength and tone, mentation intact and speech normal     PSYCH: mentation appears normal. and affect normal/bright     LYMPHATICS: No axillary, cervical, or supraclavicular nodes    DIAGNOSTICS:                                                      EKG: Paced/bradycardic, unchanged from previous tracings  CXR:  Pacer in Place-otherwise clear CXR per my read   Official read:  Pacer with bioprosthetic bovine aortic      Labs Resulted Today:   Results for orders placed or performed in visit on 09/12/17   CBC with platelets and differential   Result Value Ref Range    WBC 8.0 4.0 - 11.0 10e9/L    RBC Count 4.62 4.4 - 5.9 10e12/L    Hemoglobin 13.8 13.3 - 17.7 g/dL    Hematocrit 41.1 40.0 - 53.0 %    MCV 89 78 - 100 fl    MCH 29.9 26.5 - 33.0 pg    MCHC 33.6 31.5 - 36.5 g/dL    RDW 13.7 10.0 - 15.0 %    Platelet Count 214 150 - 450 10e9/L    Diff Method Automated Method     % Neutrophils 66.7 %    % Lymphocytes 18.9 %    % Monocytes 9.0 %    % Eosinophils 4.1 %    % Basophils 0.8 %    % Immature Granulocytes 0.5 %    Nucleated RBCs 0 0 /100    Absolute Neutrophil 5.3 1.6 - 8.3 10e9/L    Absolute Lymphocytes 1.5 0.8 - 5.3 10e9/L    Absolute Monocytes 0.7 0.0 - 1.3 10e9/L    Absolute Eosinophils 0.3 0.0 - 0.7 10e9/L    Absolute Basophils 0.1 0.0 - 0.2 10e9/L    Abs Immature Granulocytes 0.0 0 - 0.4 10e9/L    Absolute Nucleated RBC 0.0    Comprehensive metabolic panel   Result Value Ref Range    Sodium 141 133 - 144 mmol/L    Potassium 4.2 3.4 - 5.3 mmol/L    Chloride 109 94 - 109 mmol/L    Carbon Dioxide 27 20 - 32 mmol/L    Anion Gap 5 3 - 14 mmol/L    Glucose 129 (H) 70 - 99 mg/dL    Urea Nitrogen 25 7 - 30 mg/dL    Creatinine 1.23 0.66 - 1.25 mg/dL    GFR Estimate 58 (L) >60 mL/min/1.7m2    GFR Estimate If Black 70 >60  mL/min/1.7m2    Calcium 8.5 8.5 - 10.1 mg/dL    Bilirubin Total 0.9 0.2 - 1.3 mg/dL    Albumin 3.5 3.4 - 5.0 g/dL    Protein Total 6.8 6.8 - 8.8 g/dL    Alkaline Phosphatase 93 40 - 150 U/L    ALT 26 0 - 70 U/L    AST 21 0 - 45 U/L   Hemoglobin A1c   Result Value Ref Range    Hemoglobin A1C 6.3 (H) 4.3 - 6.0 %   Estimated Average Glucose   Result Value Ref Range    Estimated Average Glucose 134 mg/dL       Recent Labs   Lab Test  07/15/17   2255  06/16/17   1210  06/06/17   1056  05/26/17   1855 05/09/17  10/04/16   HGB  13.7   --    --   13.4   --    < >   --    PLT  232   --    --   248   --    < >   --    INR  1.01   --    --    --   1.0   < >   --    NA   --   141   --   145*   --    < >   --    POTASSIUM   --   3.9   --   3.5  4.2   < >   --    CR   --   1.07   --   1.23  1.40   < >   --    A1C   --    --   6.6*   --    --    --   6.2    < > = values in this interval not displayed.        IMPRESSION:                                                    Reason for surgery/procedure: Colostomy take down      The proposed surgical procedure is considered LOW risk.    REVISED CARDIAC RISK INDEX  The patient has the following serious cardiovascular risks for perioperative complications such as (MI, PE, VFib and 3  AV Block):  Cerebrovascular Disease (TIA or CVA)  INTERPRETATION: 1 risks: Class II (low risk - 0.9% complication rate)    The patient has the following additional risks for perioperative complications:  No identified additional risks  The ASCVD Risk score (Sully ANAHI Jr, et al., 2013) failed to calculate for the following reasons:    The valid total cholesterol range is 130 to 320 mg/dL    No diagnosis found.    RECOMMENDATIONS:                                                      --Consult hospital rounder / IM to assist post-op medical management    --Patient instructions:  Hold Plavix 7 days prior to surgery  Take metoprolol in morning prior to surgery with small sip water  Take Norvasc at night prior to  surgery as usual.  HOLD-Lisinopril morning of surgery  Check BG morning of surgery  HOLD all other medications morning of surgery        APPROVAL GIVEN to proceed with proposed procedure, without further diagnostic evaluation       Signed Electronically by: Alexander Vazquez DO    Copy of this evaluation report is provided to requesting physician.    [place] Preop Guidelines

## 2017-09-14 NOTE — H&P (VIEW-ONLY)
[facility] [place]  [address]  [place] [address]  [phone]  Dept: [phone]    PRE-OP EVALUATION:  Today's date: 2017    Peter Zhao (: 1945) presents for pre-operative evaluation assessment as requested by Dr. Moreland.  He requires evaluation and anesthesia risk assessment prior to undergoing surgery/procedure for treatment of colon resection .  Proposed procedure: colon resection    Date of Surgery/ Procedure: 17  Time of Surgery/ Procedure: [facility]  Hospital/Surgical Facility: [facility]  Primary Physician: Alexander Vazquez  Type of Anesthesia Anticipated: to be determined    Patient has a Health Care Directive or Living Will:  NO    1. YES - DO YOU HAVE A HISTORY OF HEART ATTACK, STROKE, STENT, BYPASS OR SURGERY ON AN ARTERY IN THE HEAD, NECK, HEART OR LEG? Double bypass 5 years ago and a valve job 3 years ago   2. NO - Do you ever have any pain or discomfort in your chest?  3. NO - Do you have a history of  Heart Failure?  4. NO - Are you troubled by shortness of breath when: walking on the level, up a slight hill or at night?  5. NO - Do you currently have a cold, bronchitis or other respiratory infection?  6. NO - Do you have a cough, shortness of breath or wheezing?  7. NO - Do you sometimes get pains in the calves of your legs when you walk?  8. NO - Do you or anyone in your family have previous history of blood clots?  9. NO - Do you or does anyone in your family have a serious bleeding problem such as prolonged bleeding following surgeries or cuts?  10. NO - Have you ever had problems with anemia or been told to take iron pills?  11. NO - Have you had any abnormal blood loss such as black, tarry or bloody stools, or abnormal vaginal bleeding?  12. NO - Have you ever had a blood transfusion?  13. NO - Have you or any of your relatives ever had problems with anesthesia?  14. NO - Do you have sleep apnea, excessive snoring or daytime drowsiness?  15. NO - Do you  have any prosthetic heart valves?  16. NO - Do you have prosthetic joints?  17. NO - Is there any chance that you may be pregnant?        HPI:                                                      Brief HPI related to upcoming procedure: Colostomy take down.        DIABETES - Patient has a longstanding history of DiabetesType Type II . Patient is being treated with diet and denies significant side effects. Control has been good. Complicating factors include but are not limited to: cardiac disease, high cholesterol, cerebrovascular accident and HTN.     A1C 6.3                                                                                                          .  HYPERTENSION - Patient has longstanding history of mod-severe HTN , currently denies any symptoms referable to elevated blood pressure. Specifically denies chest pain, palpitations, dyspnea, orthopnea, PND or peripheral edema. Blood pressure readings have been in normal range. Current medication regimen is as listed below. Patient denies any side effects of medication.                                                                                                                                                                       HYPERLIPIDEMIA - Patient has a long history of significant Hyperlipidemia requiring medication for treatment with recent good control. Patient reports no problems or side effects with the medication.                                                                                                                                                       .  CAD - Patient has a longstanding history of mod-severe CAD. Patient denies recent chest pain or NTG use, denies exercise induced dyspnea or PND. Last Stent 2014 and CABG 2013.  Asymptomatic currently                                                                                                                           .  PACERMAKER - Pacemaker.  Paced Rhythm on EKG    CVA -  CVA  spring 2017.  Has been on Plavix since this event.  Patient understands the risk associated with coming off Plavix for a brief time leading up to and following the surgery.                                                                                                  It is noted the patient has had difficulty with anesthesia in the way of agitation/confusion post previous procedures.      MEDICAL HISTORY:                                                    Patient Active Problem List    Diagnosis Date Noted     Agitation 05/19/2017     Priority: Medium     TIA (transient ischemic attack) 04/29/2017     Priority: Medium     Hypokalemia 02/20/2017     Priority: Medium     S/P colon resection 02/18/2017     Priority: Medium     Hyperlipidemia LDL goal <100 02/15/2017     Priority: Medium     Elevated prostate specific antigen (PSA) 02/15/2017     Priority: Medium     HTN (hypertension) 02/15/2017     Priority: Medium     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  Peter NARANJO Pavel has no plan or code status on file. Discussed available resources and provided with information. Confirmed code status reflects current choices pending further ACP discussions.  Confirmed/documented legally designated decision makers.  Added by Daxa Campa           CAD (coronary artery disease)      Priority: Medium     CABG 2013, stent x 1 in 2014       Diabetes mellitus, type 2 (H) 10/31/2013     Priority: Medium      Past Medical History:   Diagnosis Date     Allergic state      CAD (coronary artery disease)     CABG 2013, stent x 1 in 2014     Cataract       Cataracts, bilateral      Elevated PSA      Gastro-oesophageal reflux disease      Heart murmur      Hyperlipidemia      Hypertension      Pacemaker      Stented coronary artery      Visual aura      Past Surgical History:   Procedure Laterality Date     CARDIAC SURGERY       CATARACT IOL, RT/LT Right 04/2017     COLECTOMY LOW ANTERIOR N/A 2/17/2017    Procedure: COLECTOMY LOW ANTERIOR;  Surgeon: Tima Moreland MD;  Location: [facility] OR     COLONOSCOPY N/A 2/17/2017    Procedure: COLONOSCOPY;  Surgeon: Tima Moreland MD;  Location: [facility] OR     ENT SURGERY       FLEX SIG (MEDICARE PT.)       GI SURGERY  02/2017    ostemomy     HERNIA REPAIR       LAPAROSCOPIC HERNIORRHAPHY INGUINAL Right 9/18/2015    Procedure: LAPAROSCOPIC HERNIORRHAPHY INGUINAL;  Surgeon: Solitario Nettles DO;  Location: [facility] OR     PHACOEMULSIFICATION WITH STANDARD INTRAOCULAR LENS IMPLANT Right 4/20/2017    Procedure: PHACOEMULSIFICATION WITH STANDARD INTRAOCULAR LENS IMPLANT;  CATARACT EXTRACTION RIGHT EYE WITH IMPLANT;  Surgeon: Darin Gutierrez MD;  Location: [facility] OR     PROSTATE BIOPSY, NEEDLE, SATURATION SAMPLING  2009     retinopexy-pvd with retinal tear Right 2008     TONSILLECTOMY       Current Outpatient Prescriptions   Medication Sig Dispense Refill     clopidogrel (PLAVIX) 75 MG tablet Take 1 tablet (75 mg) by mouth daily 90 tablet 3     metoprolol (LOPRESSOR) 100 MG tablet Take 1 tablet (100 mg) by mouth 2 times daily 180 tablet 3     lisinopril (PRINIVIL/ZESTRIL) 40 MG tablet Take 1 tablet (40 mg) by mouth daily 90 tablet 3     atorvastatin (LIPITOR) 40 MG tablet Take 1 tablet (40 mg) by mouth daily 90 tablet 3     ranitidine (ZANTAC) 150 MG tablet Take 1 tablet (150 mg) by mouth 2 times daily 180 tablet 3     finasteride (PROSCAR) 5 MG tablet Take 1 tablet (5 mg) by mouth daily 90 tablet 3     amLODIPine (NORVASC) 5 MG tablet Take 1 tablet (5 mg) by mouth daily 90 tablet 3     blood glucose monitoring (MARIAN CONTOUR NEXT) test strip  Use to test blood sugar 2 times weekly or as directed. 100 strip 6     calcium carbonate (OS-WALI 600 MG [place]) 1500 (600 [place]) MG tablet Take 1 tablet (1,500 mg) by mouth daily 30 tablet 3     Ostomy Supplies POUCH [facility] [place] [address] closed end pouches - per Medicare patient is allowed 60 per month.  Dispense 2 boxes or patient choice 2 each 11     Ostomy Supplies MISC [place] m9 odor eliminator drops 7717 or 7715 - patient preference 1 each 11     order for DME Equipment being ordered: Ostomy supplies - Barrier - currently using 40467 Sondra flat cut to fit; Pouch 99406 clear; [employer] barrier rings 7805; [place] ostomy belt.  Fill per insurance guidelines 1 Package 11     blood glucose monitoring (MARIAN MICROLET) lancets Use to test blood sugar 2 times weekly 100 Box 6     polyethylene glycol 0.4%- propylene glycol 0.3% (SYSTANE ULTRA) 0.4-0.3 % SOLN ophthalmic solution Place 1 drop into both eyes every hour as needed for dry eyes       Multiple Vitamin (MULTIVITAMINS PO) Take 1 capsule by mouth daily       glucose blood VI test strips (ACCU-CHEK SMARTVIEW) strip Test blood sugar before breakfast one day, before and after supper the next and not at all the third day 1 Box 12     SOFTCLIX LANCETS MISC 1 Units daily Use fresh lancet each day to check blood sugars 100 each 4     Lancets Misc. (ACCU-CHEK SOFTCLIX LANCET DEV) KIT 1 Units daily 1 kit 2     ASPIRIN PO Take 325 mg by mouth daily        Cholecalciferol (VITAMIN D3 PO) Take  by mouth daily.       Na Sulfate-K Sulfate-Mg Sulf (SUPREP BOWEL PREP) solution Take 354 mLs (2 Bottles) by mouth See Admin Instructions 2 Bottle 0     nitroglycerin (NITROSTAT) 0.4 MG sublingual tablet Place 1 tablet (0.4 mg) under the tongue every 5 minutes as needed (Patient not taking: Reported on 9/12/2017) 25 tablet 3     NONFORMULARY OTC eye drops as directed       OTC products: None, except as noted above    Allergies   Allergen Reactions     Acetaminophen       "Ciprofloxacin Hives     No Clinical Screening - See Comments      Antibiotic allergy, pt not sure which      Latex Allergy: NO    Social History   Substance Use Topics     Smoking status: Former Smoker     Smokeless tobacco: Former User     Types: Chew     Quit date: 10/19/2013      Comment: quit in 2000     Alcohol use No     History   Drug Use No       REVIEW OF SYSTEMS:                                                    C: NEGATIVE for fever, chills, change in weight  I: NEGATIVE for worrisome rashes, moles or lesions  E: NEGATIVE for vision changes or irritation  E/M: NEGATIVE for ear, mouth and throat problems  R: NEGATIVE for significant cough or SOB  B: NEGATIVE for masses, tenderness or discharge  CV: NEGATIVE for chest pain, palpitations or peripheral edema  GI: NEGATIVE for nausea, abdominal pain, heartburn, or change in bowel habits  : NEGATIVE for frequency, dysuria, or hematuria  M: NEGATIVE for significant arthralgias or myalgia  N: NEGATIVE for weakness, dizziness or paresthesias  E: NEGATIVE for temperature intolerance, skin/hair changes  H: NEGATIVE for bleeding problems  P: NEGATIVE for changes in mood or affect    EXAM:                                                    /70 (BP Location: Left arm, Patient Position: Supine, Cuff Size: Adult Regular)  Pulse 54  Temp 96.1  F (35.6  C) (Tympanic)  Ht 5' 10\" (1.778 m)  Wt 176 lb (79.8 kg)  SpO2 97%  BMI 25.25 kg/m2    GENERAL APPEARANCE: healthy, alert and no distress     EYES: EOMI,  PERRL     HENT: ear canals and TM's normal and nose and mouth without ulcers or lesions     NECK: no adenopathy, no asymmetry, masses, or scars and thyroid normal to palpation     RESP: lungs clear to auscultation - no rales, rhonchi or wheezes     CV: Bradycardic but regular with soft systolic murmur, normal S1 S2, no S3 or S4, no click or rub     ABDOMEN:  Ostomy bag present. Otherwise-soft, nontender, no HSM or masses and bowel sounds normal     MS: " extremities normal- no gross deformities noted, no evidence of inflammation in joints, FROM in all extremities.     SKIN: no suspicious lesions or rashes, slight bruising on arms       NEURO: Normal strength and tone, mentation intact and speech normal     PSYCH: mentation appears normal. and affect normal/bright     LYMPHATICS: No axillary, cervical, or supraclavicular nodes    DIAGNOSTICS:                                                      EKG: Paced/bradycardic, unchanged from previous tracings  CXR:  Pacer in Place-otherwise clear CXR per my read   Official read:  Pacer with bioprosthetic bovine aortic      Labs Resulted Today:   Results for orders placed or performed in visit on 09/12/17   CBC with platelets and differential   Result Value Ref Range    WBC 8.0 4.0 - 11.0 10e9/L    RBC Count 4.62 4.4 - 5.9 10e12/L    Hemoglobin 13.8 13.3 - 17.7 g/dL    Hematocrit 41.1 40.0 - 53.0 %    MCV 89 78 - 100 fl    MCH 29.9 26.5 - 33.0 pg    MCHC 33.6 31.5 - 36.5 g/dL    RDW 13.7 10.0 - 15.0 %    Platelet Count 214 150 - 450 10e9/L    Diff Method Automated Method     % Neutrophils 66.7 %    % Lymphocytes 18.9 %    % Monocytes 9.0 %    % Eosinophils 4.1 %    % Basophils 0.8 %    % Immature Granulocytes 0.5 %    Nucleated RBCs 0 0 /100    Absolute Neutrophil 5.3 1.6 - 8.3 10e9/L    Absolute Lymphocytes 1.5 0.8 - 5.3 10e9/L    Absolute Monocytes 0.7 0.0 - 1.3 10e9/L    Absolute Eosinophils 0.3 0.0 - 0.7 10e9/L    Absolute Basophils 0.1 0.0 - 0.2 10e9/L    Abs Immature Granulocytes 0.0 0 - 0.4 10e9/L    Absolute Nucleated RBC 0.0    Comprehensive metabolic panel   Result Value Ref Range    Sodium 141 133 - 144 mmol/L    Potassium 4.2 3.4 - 5.3 mmol/L    Chloride 109 94 - 109 mmol/L    Carbon Dioxide 27 20 - 32 mmol/L    Anion Gap 5 3 - 14 mmol/L    Glucose 129 (H) 70 - 99 mg/dL    Urea Nitrogen 25 7 - 30 mg/dL    Creatinine 1.23 0.66 - 1.25 mg/dL    GFR Estimate 58 (L) >60 mL/min/1.7m2    GFR Estimate If Black 70 >60  mL/min/1.7m2    Calcium 8.5 8.5 - 10.1 mg/dL    Bilirubin Total 0.9 0.2 - 1.3 mg/dL    Albumin 3.5 3.4 - 5.0 g/dL    Protein Total 6.8 6.8 - 8.8 g/dL    Alkaline Phosphatase 93 40 - 150 U/L    ALT 26 0 - 70 U/L    AST 21 0 - 45 U/L   Hemoglobin A1c   Result Value Ref Range    Hemoglobin A1C 6.3 (H) 4.3 - 6.0 %   Estimated Average Glucose   Result Value Ref Range    Estimated Average Glucose 134 mg/dL       Recent Labs   Lab Test  07/15/17   2255  06/16/17   1210  06/06/17   1056  05/26/17   1855 05/09/17  10/04/16   HGB  13.7   --    --   13.4   --    < >   --    PLT  232   --    --   248   --    < >   --    INR  1.01   --    --    --   1.0   < >   --    NA   --   141   --   145*   --    < >   --    POTASSIUM   --   3.9   --   3.5  4.2   < >   --    CR   --   1.07   --   1.23  1.40   < >   --    A1C   --    --   6.6*   --    --    --   6.2    < > = values in this interval not displayed.        IMPRESSION:                                                    Reason for surgery/procedure: Colostomy take down      The proposed surgical procedure is considered LOW risk.    REVISED CARDIAC RISK INDEX  The patient has the following serious cardiovascular risks for perioperative complications such as (MI, PE, VFib and 3  AV Block):  Cerebrovascular Disease (TIA or CVA)  INTERPRETATION: 1 risks: Class II (low risk - 0.9% complication rate)    The patient has the following additional risks for perioperative complications:  No identified additional risks  The ASCVD Risk score (Sully ANAHI Jr, et al., 2013) failed to calculate for the following reasons:    The valid total cholesterol range is 130 to 320 mg/dL    No diagnosis found.    RECOMMENDATIONS:                                                      --Consult hospital rounder / IM to assist post-op medical management    --Patient instructions:  Hold Plavix 7 days prior to surgery  Take metoprolol in morning prior to surgery with small sip water  Take Norvasc at night prior to  surgery as usual.  HOLD-Lisinopril morning of surgery  Check BG morning of surgery  HOLD all other medications morning of surgery        APPROVAL GIVEN to proceed with proposed procedure, without further diagnostic evaluation       Signed Electronically by: Alexander Vazquez DO    Copy of this evaluation report is provided to requesting physician.    [place] Preop Guidelines

## 2017-09-15 ENCOUNTER — TELEPHONE (OUTPATIENT)
Dept: SURGERY | Facility: OTHER | Age: 72
End: 2017-09-15

## 2017-09-15 NOTE — TELEPHONE ENCOUNTER
12:44 PM    Reason for Call: Phone Call    Description: Brandee from the [facility] [employer] called and had a question about pt. Stated pt is having procedure done on 09/21/17 and they were wondering about changing pt's status from OR to inpatient. Please call them back at [phone]    Was an appointment offered for this call? No  If yes : Appointment type              Date    Preferred method for responding to this message: Telephone Call  What is your phone number ?    If we cannot reach you directly, may we leave a detailed response at the number you provided? Yes    Can this message wait until your PCP/provider returns, if available today? YES, aware system is done but needs response when able    Mandi Rao

## 2017-09-18 DIAGNOSIS — Z90.49 HISTORY OF RESECTION OF LARGE BOWEL: Primary | ICD-10-CM

## 2017-09-19 ENCOUNTER — TELEPHONE (OUTPATIENT)
Dept: INTERNAL MEDICINE | Facility: OTHER | Age: 72
End: 2017-09-19

## 2017-09-19 NOTE — TELEPHONE ENCOUNTER
Tamara from surgery is asking if you are ok with Peter stopping his regular strength aspirin 7 days prior to surgery- states he has done this on his own and was not instructed to- has stopped his plavix as well. Sabina Olivo LPN

## 2017-09-21 ENCOUNTER — ANESTHESIA (OUTPATIENT)
Dept: SURGERY | Facility: HOSPITAL | Age: 72
DRG: 346 | End: 2017-09-21
Payer: MEDICARE

## 2017-09-21 ENCOUNTER — ANESTHESIA EVENT (OUTPATIENT)
Dept: SURGERY | Facility: HOSPITAL | Age: 72
DRG: 346 | End: 2017-09-21
Payer: MEDICARE

## 2017-09-21 ENCOUNTER — HOSPITAL ENCOUNTER (INPATIENT)
Facility: HOSPITAL | Age: 72
LOS: 5 days | Discharge: HOME HEALTH | DRG: 346 | End: 2017-09-26
Attending: SURGERY | Admitting: SURGERY
Payer: MEDICARE

## 2017-09-21 ENCOUNTER — SURGERY (OUTPATIENT)
Age: 72
End: 2017-09-21

## 2017-09-21 DIAGNOSIS — S31.109D OPEN WOUND OF ABDOMEN, SUBSEQUENT ENCOUNTER: Primary | ICD-10-CM

## 2017-09-21 DIAGNOSIS — Z98.890 S/P COLOSTOMY TAKEDOWN: ICD-10-CM

## 2017-09-21 PROBLEM — Z43.3 ATTENTION TO COLOSTOMY (H): Status: ACTIVE | Noted: 2017-09-21

## 2017-09-21 LAB
GLUCOSE BLDC GLUCOMTR-MCNC: 124 MG/DL (ref 70–99)
GLUCOSE BLDC GLUCOMTR-MCNC: 177 MG/DL (ref 70–99)

## 2017-09-21 PROCEDURE — S0074 INJECTION, CEFOTETAN DISODIU: HCPCS | Performed by: SURGERY

## 2017-09-21 PROCEDURE — 0WQF0ZZ REPAIR ABDOMINAL WALL, OPEN APPROACH: ICD-10-PCS | Performed by: SURGERY

## 2017-09-21 PROCEDURE — 25000132 ZZH RX MED GY IP 250 OP 250 PS 637: Mod: GY | Performed by: SURGERY

## 2017-09-21 PROCEDURE — 44626 REPAIR BOWEL OPENING: CPT | Mod: 80 | Performed by: SURGERY

## 2017-09-21 PROCEDURE — 71000014 ZZH RECOVERY PHASE 1 LEVEL 2 FIRST HR: Performed by: SURGERY

## 2017-09-21 PROCEDURE — 37000008 ZZH ANESTHESIA TECHNICAL FEE, 1ST 30 MIN: Performed by: SURGERY

## 2017-09-21 PROCEDURE — 00HU33Z INSERTION OF INFUSION DEVICE INTO SPINAL CANAL, PERCUTANEOUS APPROACH: ICD-10-PCS | Performed by: NURSE ANESTHETIST, CERTIFIED REGISTERED

## 2017-09-21 PROCEDURE — 37000009 ZZH ANESTHESIA TECHNICAL FEE, EACH ADDTL 15 MIN: Performed by: SURGERY

## 2017-09-21 PROCEDURE — 44626 REPAIR BOWEL OPENING: CPT | Performed by: SURGERY

## 2017-09-21 PROCEDURE — 3E0T3CZ INTRODUCTION OF REGIONAL ANESTHETIC INTO PERIPHERAL NERVES AND PLEXI, PERCUTANEOUS APPROACH: ICD-10-PCS | Performed by: NURSE ANESTHETIST, CERTIFIED REGISTERED

## 2017-09-21 PROCEDURE — 25000128 H RX IP 250 OP 636: Performed by: SURGERY

## 2017-09-21 PROCEDURE — 27210794 ZZH OR GENERAL SUPPLY STERILE: Performed by: SURGERY

## 2017-09-21 PROCEDURE — 25000125 ZZHC RX 250: Performed by: SURGERY

## 2017-09-21 PROCEDURE — 36000063 ZZH SURGERY LEVEL 4 EA 15 ADDTL MIN: Performed by: SURGERY

## 2017-09-21 PROCEDURE — 64488 TAP BLOCK BI INJECTION: CPT | Mod: 59 | Performed by: NURSE ANESTHETIST, CERTIFIED REGISTERED

## 2017-09-21 PROCEDURE — 25000132 ZZH RX MED GY IP 250 OP 250 PS 637: Mod: GY | Performed by: INTERNAL MEDICINE

## 2017-09-21 PROCEDURE — 25000125 ZZHC RX 250: Performed by: NURSE ANESTHETIST, CERTIFIED REGISTERED

## 2017-09-21 PROCEDURE — 44626 REPAIR BOWEL OPENING: CPT | Performed by: NURSE ANESTHETIST, CERTIFIED REGISTERED

## 2017-09-21 PROCEDURE — 40000306 ZZH STATISTIC PRE PROC ASSESS II: Performed by: SURGERY

## 2017-09-21 PROCEDURE — 25000128 H RX IP 250 OP 636: Performed by: NURSE ANESTHETIST, CERTIFIED REGISTERED

## 2017-09-21 PROCEDURE — A9270 NON-COVERED ITEM OR SERVICE: HCPCS | Mod: GY | Performed by: SURGERY

## 2017-09-21 PROCEDURE — 25000566 ZZH SEVOFLURANE, EA 15 MIN: Performed by: ANESTHESIOLOGY

## 2017-09-21 PROCEDURE — 40000275 ZZH STATISTIC RCP TIME EA 10 MIN

## 2017-09-21 PROCEDURE — 0DSN0ZZ REPOSITION SIGMOID COLON, OPEN APPROACH: ICD-10-PCS | Performed by: SURGERY

## 2017-09-21 PROCEDURE — 71000015 ZZH RECOVERY PHASE 1 LEVEL 2 EA ADDTL HR: Performed by: SURGERY

## 2017-09-21 PROCEDURE — 44626 REPAIR BOWEL OPENING: CPT | Performed by: ANESTHESIOLOGY

## 2017-09-21 PROCEDURE — 12000011 ZZH R&B MS OVERFLOW

## 2017-09-21 PROCEDURE — 00000146 ZZHCL STATISTIC GLUCOSE BY METER IP

## 2017-09-21 PROCEDURE — 36000093 ZZH SURGERY LEVEL 4 1ST 30 MIN: Performed by: SURGERY

## 2017-09-21 PROCEDURE — 99223 1ST HOSP IP/OBS HIGH 75: CPT | Mod: AI | Performed by: INTERNAL MEDICINE

## 2017-09-21 PROCEDURE — 40000786 ZZHCL STATISTIC ACTIVE MRSA SURVEILLANCE CULTURE: Performed by: SURGERY

## 2017-09-21 PROCEDURE — 3E0R3CZ INTRODUCTION OF REGIONAL ANESTHETIC INTO SPINAL CANAL, PERCUTANEOUS APPROACH: ICD-10-PCS | Performed by: NURSE ANESTHETIST, CERTIFIED REGISTERED

## 2017-09-21 PROCEDURE — 62326 NJX INTERLAMINAR LMBR/SAC: CPT | Mod: 59 | Performed by: NURSE ANESTHETIST, CERTIFIED REGISTERED

## 2017-09-21 PROCEDURE — 25800025 ZZH RX 258: Performed by: SURGERY

## 2017-09-21 PROCEDURE — 99100 ANES PT EXTEME AGE<1 YR&>70: CPT | Performed by: NURSE ANESTHETIST, CERTIFIED REGISTERED

## 2017-09-21 PROCEDURE — 27110028 ZZH OR GENERAL SUPPLY NON-STERILE: Performed by: SURGERY

## 2017-09-21 PROCEDURE — A9270 NON-COVERED ITEM OR SERVICE: HCPCS | Mod: GY | Performed by: INTERNAL MEDICINE

## 2017-09-21 PROCEDURE — 25000131 ZZH RX MED GY IP 250 OP 636 PS 637: Mod: GY | Performed by: INTERNAL MEDICINE

## 2017-09-21 RX ORDER — CHLORHEXIDINE GLUCONATE 40 MG/ML
SOLUTION TOPICAL ONCE
Status: DISCONTINUED | OUTPATIENT
Start: 2017-09-21 | End: 2017-09-21 | Stop reason: HOSPADM

## 2017-09-21 RX ORDER — ALBUTEROL SULFATE 0.83 MG/ML
2.5 SOLUTION RESPIRATORY (INHALATION) EVERY 4 HOURS PRN
Status: DISCONTINUED | OUTPATIENT
Start: 2017-09-21 | End: 2017-09-21 | Stop reason: HOSPADM

## 2017-09-21 RX ORDER — NALOXONE HYDROCHLORIDE 0.4 MG/ML
.1-.4 INJECTION, SOLUTION INTRAMUSCULAR; INTRAVENOUS; SUBCUTANEOUS
Status: DISCONTINUED | OUTPATIENT
Start: 2017-09-21 | End: 2017-09-21 | Stop reason: HOSPADM

## 2017-09-21 RX ORDER — MORPHINE SULFATE 2 MG/ML
1 INJECTION, SOLUTION INTRAMUSCULAR; INTRAVENOUS
Status: DISCONTINUED | OUTPATIENT
Start: 2017-09-21 | End: 2017-09-26 | Stop reason: HOSPADM

## 2017-09-21 RX ORDER — LIDOCAINE HYDROCHLORIDE AND EPINEPHRINE 15; 5 MG/ML; UG/ML
INJECTION, SOLUTION EPIDURAL PRN
Status: DISCONTINUED | OUTPATIENT
Start: 2017-09-21 | End: 2017-09-21

## 2017-09-21 RX ORDER — LISINOPRIL 20 MG/1
40 TABLET ORAL DAILY
Status: DISCONTINUED | OUTPATIENT
Start: 2017-09-21 | End: 2017-09-26 | Stop reason: HOSPADM

## 2017-09-21 RX ORDER — DEXAMETHASONE SODIUM PHOSPHATE 10 MG/ML
INJECTION, SOLUTION INTRAMUSCULAR; INTRAVENOUS PRN
Status: DISCONTINUED | OUTPATIENT
Start: 2017-09-21 | End: 2017-09-21

## 2017-09-21 RX ORDER — AMLODIPINE BESYLATE 5 MG/1
5 TABLET ORAL DAILY
Status: DISCONTINUED | OUTPATIENT
Start: 2017-09-21 | End: 2017-09-26 | Stop reason: HOSPADM

## 2017-09-21 RX ORDER — PHENYLEPHRINE HCL IN 0.9% NACL 1 MG/10 ML
SYRINGE (ML) INTRAVENOUS PRN
Status: DISCONTINUED | OUTPATIENT
Start: 2017-09-21 | End: 2017-09-21

## 2017-09-21 RX ORDER — LIDOCAINE 40 MG/G
CREAM TOPICAL
Status: DISCONTINUED | OUTPATIENT
Start: 2017-09-21 | End: 2017-09-26 | Stop reason: HOSPADM

## 2017-09-21 RX ORDER — EPHEDRINE SULFATE 50 MG/ML
INJECTION, SOLUTION INTRAVENOUS PRN
Status: DISCONTINUED | OUTPATIENT
Start: 2017-09-21 | End: 2017-09-21

## 2017-09-21 RX ORDER — DEXAMETHASONE SODIUM PHOSPHATE 4 MG/ML
4 INJECTION, SOLUTION INTRA-ARTICULAR; INTRALESIONAL; INTRAMUSCULAR; INTRAVENOUS; SOFT TISSUE
Status: DISCONTINUED | OUTPATIENT
Start: 2017-09-21 | End: 2017-09-21 | Stop reason: HOSPADM

## 2017-09-21 RX ORDER — ONDANSETRON 2 MG/ML
4 INJECTION INTRAMUSCULAR; INTRAVENOUS EVERY 30 MIN PRN
Status: DISCONTINUED | OUTPATIENT
Start: 2017-09-21 | End: 2017-09-21 | Stop reason: HOSPADM

## 2017-09-21 RX ORDER — ROPIVACAINE HYDROCHLORIDE 5 MG/ML
INJECTION, SOLUTION EPIDURAL; INFILTRATION; PERINEURAL PRN
Status: DISCONTINUED | OUTPATIENT
Start: 2017-09-21 | End: 2017-09-21

## 2017-09-21 RX ORDER — NALOXONE HYDROCHLORIDE 0.4 MG/ML
.1-.4 INJECTION, SOLUTION INTRAMUSCULAR; INTRAVENOUS; SUBCUTANEOUS
Status: DISCONTINUED | OUTPATIENT
Start: 2017-09-21 | End: 2017-09-26 | Stop reason: HOSPADM

## 2017-09-21 RX ORDER — FENTANYL CITRATE 50 UG/ML
25-50 INJECTION, SOLUTION INTRAMUSCULAR; INTRAVENOUS
Status: DISCONTINUED | OUTPATIENT
Start: 2017-09-21 | End: 2017-09-21 | Stop reason: HOSPADM

## 2017-09-21 RX ORDER — ATORVASTATIN CALCIUM 40 MG/1
40 TABLET, FILM COATED ORAL DAILY
Status: DISCONTINUED | OUTPATIENT
Start: 2017-09-21 | End: 2017-09-26 | Stop reason: HOSPADM

## 2017-09-21 RX ORDER — DEXTROSE MONOHYDRATE 25 G/50ML
25-50 INJECTION, SOLUTION INTRAVENOUS
Status: DISCONTINUED | OUTPATIENT
Start: 2017-09-21 | End: 2017-09-23

## 2017-09-21 RX ORDER — SODIUM CHLORIDE, SODIUM LACTATE, POTASSIUM CHLORIDE, CALCIUM CHLORIDE 600; 310; 30; 20 MG/100ML; MG/100ML; MG/100ML; MG/100ML
INJECTION, SOLUTION INTRAVENOUS CONTINUOUS PRN
Status: DISCONTINUED | OUTPATIENT
Start: 2017-09-21 | End: 2017-09-21

## 2017-09-21 RX ORDER — MEPERIDINE HYDROCHLORIDE 25 MG/ML
12.5 INJECTION INTRAMUSCULAR; INTRAVENOUS; SUBCUTANEOUS EVERY 5 MIN PRN
Status: DISCONTINUED | OUTPATIENT
Start: 2017-09-21 | End: 2017-09-21 | Stop reason: HOSPADM

## 2017-09-21 RX ORDER — GLYCOPYRROLATE 0.2 MG/ML
INJECTION, SOLUTION INTRAMUSCULAR; INTRAVENOUS PRN
Status: DISCONTINUED | OUTPATIENT
Start: 2017-09-21 | End: 2017-09-21

## 2017-09-21 RX ORDER — KETOROLAC TROMETHAMINE 15 MG/ML
15 INJECTION, SOLUTION INTRAMUSCULAR; INTRAVENOUS EVERY 6 HOURS
Status: COMPLETED | OUTPATIENT
Start: 2017-09-21 | End: 2017-09-22

## 2017-09-21 RX ORDER — NALOXONE HYDROCHLORIDE 0.4 MG/ML
.1-.4 INJECTION, SOLUTION INTRAMUSCULAR; INTRAVENOUS; SUBCUTANEOUS
Status: DISCONTINUED | OUTPATIENT
Start: 2017-09-21 | End: 2017-09-21

## 2017-09-21 RX ORDER — KETOROLAC TROMETHAMINE 30 MG/ML
15 INJECTION, SOLUTION INTRAMUSCULAR; INTRAVENOUS
Status: DISCONTINUED | OUTPATIENT
Start: 2017-09-21 | End: 2017-09-21 | Stop reason: HOSPADM

## 2017-09-21 RX ORDER — FENTANYL CITRATE 50 UG/ML
INJECTION, SOLUTION INTRAMUSCULAR; INTRAVENOUS PRN
Status: DISCONTINUED | OUTPATIENT
Start: 2017-09-21 | End: 2017-09-21

## 2017-09-21 RX ORDER — LIDOCAINE HYDROCHLORIDE 20 MG/ML
INJECTION, SOLUTION INFILTRATION; PERINEURAL PRN
Status: DISCONTINUED | OUTPATIENT
Start: 2017-09-21 | End: 2017-09-21

## 2017-09-21 RX ORDER — ONDANSETRON 2 MG/ML
4 INJECTION INTRAMUSCULAR; INTRAVENOUS EVERY 6 HOURS PRN
Status: DISCONTINUED | OUTPATIENT
Start: 2017-09-21 | End: 2017-09-26 | Stop reason: HOSPADM

## 2017-09-21 RX ORDER — PROPOFOL 10 MG/ML
INJECTION, EMULSION INTRAVENOUS PRN
Status: DISCONTINUED | OUTPATIENT
Start: 2017-09-21 | End: 2017-09-21

## 2017-09-21 RX ORDER — NALBUPHINE HYDROCHLORIDE 20 MG/ML
2.5-5 INJECTION, SOLUTION INTRAMUSCULAR; INTRAVENOUS; SUBCUTANEOUS EVERY 6 HOURS PRN
Status: DISCONTINUED | OUTPATIENT
Start: 2017-09-21 | End: 2017-09-26 | Stop reason: HOSPADM

## 2017-09-21 RX ORDER — NICOTINE POLACRILEX 4 MG
15-30 LOZENGE BUCCAL
Status: DISCONTINUED | OUTPATIENT
Start: 2017-09-21 | End: 2017-09-23

## 2017-09-21 RX ORDER — HYDRALAZINE HYDROCHLORIDE 20 MG/ML
2.5-5 INJECTION INTRAMUSCULAR; INTRAVENOUS EVERY 10 MIN PRN
Status: DISCONTINUED | OUTPATIENT
Start: 2017-09-21 | End: 2017-09-21 | Stop reason: HOSPADM

## 2017-09-21 RX ORDER — METOPROLOL TARTRATE 1 MG/ML
5 INJECTION, SOLUTION INTRAVENOUS EVERY 6 HOURS
Status: DISCONTINUED | OUTPATIENT
Start: 2017-09-22 | End: 2017-09-21

## 2017-09-21 RX ORDER — CEFOTETAN DISODIUM 1 G/10ML
1 INJECTION, POWDER, FOR SOLUTION INTRAMUSCULAR; INTRAVENOUS
Status: DISCONTINUED | OUTPATIENT
Start: 2017-09-21 | End: 2017-09-21

## 2017-09-21 RX ORDER — LABETALOL HYDROCHLORIDE 5 MG/ML
10 INJECTION, SOLUTION INTRAVENOUS
Status: DISCONTINUED | OUTPATIENT
Start: 2017-09-21 | End: 2017-09-21 | Stop reason: HOSPADM

## 2017-09-21 RX ORDER — HYDROMORPHONE HYDROCHLORIDE 1 MG/ML
.3-.5 INJECTION, SOLUTION INTRAMUSCULAR; INTRAVENOUS; SUBCUTANEOUS EVERY 5 MIN PRN
Status: DISCONTINUED | OUTPATIENT
Start: 2017-09-21 | End: 2017-09-21 | Stop reason: HOSPADM

## 2017-09-21 RX ORDER — SODIUM CHLORIDE, SODIUM LACTATE, POTASSIUM CHLORIDE, CALCIUM CHLORIDE 600; 310; 30; 20 MG/100ML; MG/100ML; MG/100ML; MG/100ML
INJECTION, SOLUTION INTRAVENOUS CONTINUOUS
Status: DISCONTINUED | OUTPATIENT
Start: 2017-09-21 | End: 2017-09-21

## 2017-09-21 RX ORDER — SODIUM CHLORIDE, SODIUM LACTATE, POTASSIUM CHLORIDE, CALCIUM CHLORIDE 600; 310; 30; 20 MG/100ML; MG/100ML; MG/100ML; MG/100ML
INJECTION, SOLUTION INTRAVENOUS CONTINUOUS
Status: DISCONTINUED | OUTPATIENT
Start: 2017-09-21 | End: 2017-09-21 | Stop reason: HOSPADM

## 2017-09-21 RX ORDER — ONDANSETRON 4 MG/1
4 TABLET, ORALLY DISINTEGRATING ORAL EVERY 30 MIN PRN
Status: DISCONTINUED | OUTPATIENT
Start: 2017-09-21 | End: 2017-09-21 | Stop reason: HOSPADM

## 2017-09-21 RX ORDER — FINASTERIDE 5 MG/1
5 TABLET, FILM COATED ORAL DAILY
Status: DISCONTINUED | OUTPATIENT
Start: 2017-09-21 | End: 2017-09-26 | Stop reason: HOSPADM

## 2017-09-21 RX ORDER — HETASTARCH 6 G/100ML
INJECTION, SOLUTION INTRAVENOUS CONTINUOUS PRN
Status: DISCONTINUED | OUTPATIENT
Start: 2017-09-21 | End: 2017-09-21

## 2017-09-21 RX ORDER — ONDANSETRON 2 MG/ML
INJECTION INTRAMUSCULAR; INTRAVENOUS PRN
Status: DISCONTINUED | OUTPATIENT
Start: 2017-09-21 | End: 2017-09-21

## 2017-09-21 RX ORDER — DEXTROSE MONOHYDRATE, SODIUM CHLORIDE, AND POTASSIUM CHLORIDE 50; 1.49; 4.5 G/1000ML; G/1000ML; G/1000ML
INJECTION, SOLUTION INTRAVENOUS CONTINUOUS
Status: DISCONTINUED | OUTPATIENT
Start: 2017-09-21 | End: 2017-09-26 | Stop reason: HOSPADM

## 2017-09-21 RX ORDER — NALOXONE HYDROCHLORIDE 0.4 MG/ML
0.1-0.4 INJECTION, SOLUTION INTRAMUSCULAR; INTRAVENOUS; SUBCUTANEOUS
Status: DISCONTINUED | OUTPATIENT
Start: 2017-09-21 | End: 2017-09-21

## 2017-09-21 RX ORDER — NEOSTIGMINE METHYLSULFATE 1 MG/ML
VIAL (ML) INJECTION PRN
Status: DISCONTINUED | OUTPATIENT
Start: 2017-09-21 | End: 2017-09-21

## 2017-09-21 RX ORDER — METOPROLOL TARTRATE 100 MG
100 TABLET ORAL 2 TIMES DAILY
Status: DISCONTINUED | OUTPATIENT
Start: 2017-09-21 | End: 2017-09-26 | Stop reason: HOSPADM

## 2017-09-21 RX ADMIN — ONDANSETRON 4 MG: 2 INJECTION INTRAMUSCULAR; INTRAVENOUS at 12:15

## 2017-09-21 RX ADMIN — INSULIN ASPART 1 UNITS: 100 INJECTION, SOLUTION INTRAVENOUS; SUBCUTANEOUS at 20:38

## 2017-09-21 RX ADMIN — FENTANYL CITRATE 25 MCG: 50 INJECTION, SOLUTION INTRAMUSCULAR; INTRAVENOUS at 10:53

## 2017-09-21 RX ADMIN — SODIUM CHLORIDE, POTASSIUM CHLORIDE, SODIUM LACTATE AND CALCIUM CHLORIDE: 600; 310; 30; 20 INJECTION, SOLUTION INTRAVENOUS at 08:54

## 2017-09-21 RX ADMIN — ATORVASTATIN CALCIUM 40 MG: 40 TABLET, FILM COATED ORAL at 16:43

## 2017-09-21 RX ADMIN — MIDAZOLAM HYDROCHLORIDE 0.5 MG: 1 INJECTION, SOLUTION INTRAMUSCULAR; INTRAVENOUS at 09:31

## 2017-09-21 RX ADMIN — ROCURONIUM BROMIDE 45 MG: 10 INJECTION INTRAVENOUS at 09:58

## 2017-09-21 RX ADMIN — Medication 50 MCG: at 12:55

## 2017-09-21 RX ADMIN — ROCURONIUM BROMIDE 5 MG: 10 INJECTION INTRAVENOUS at 09:39

## 2017-09-21 RX ADMIN — LIDOCAINE HYDROCHLORIDE AND EPINEPHRINE 5 ML: 15; 5 INJECTION, SOLUTION EPIDURAL; INFILTRATION; INTRACAUDAL; PERINEURAL at 09:18

## 2017-09-21 RX ADMIN — FENTANYL CITRATE 25 MCG: 50 INJECTION, SOLUTION INTRAMUSCULAR; INTRAVENOUS at 09:31

## 2017-09-21 RX ADMIN — DEXAMETHASONE SODIUM PHOSPHATE 10 MG: 10 INJECTION, SOLUTION INTRAMUSCULAR; INTRAVENOUS at 10:00

## 2017-09-21 RX ADMIN — GLYCOPYRROLATE 1 MG: 0.2 INJECTION, SOLUTION INTRAMUSCULAR; INTRAVENOUS at 12:43

## 2017-09-21 RX ADMIN — Medication 50 MCG: at 10:02

## 2017-09-21 RX ADMIN — KETOROLAC TROMETHAMINE 15 MG: 15 INJECTION, SOLUTION INTRAMUSCULAR; INTRAVENOUS at 20:39

## 2017-09-21 RX ADMIN — HETASTARCH IN SODIUM CHLORIDE: 6; .9 INJECTION, SOLUTION INTRAVENOUS at 09:50

## 2017-09-21 RX ADMIN — EPHEDRINE SULFATE 5 MG: 50 INJECTION, SOLUTION INTRAVENOUS at 09:53

## 2017-09-21 RX ADMIN — LISINOPRIL 40 MG: 20 TABLET ORAL at 16:43

## 2017-09-21 RX ADMIN — KETOROLAC TROMETHAMINE 15 MG: 15 INJECTION, SOLUTION INTRAMUSCULAR; INTRAVENOUS at 15:34

## 2017-09-21 RX ADMIN — PROPOFOL 120 MG: 10 INJECTION, EMULSION INTRAVENOUS at 09:40

## 2017-09-21 RX ADMIN — Medication 50 MCG: at 11:50

## 2017-09-21 RX ADMIN — DEXAMETHASONE SODIUM PHOSPHATE 5 MG: 10 INJECTION, SOLUTION INTRAMUSCULAR; INTRAVENOUS at 13:21

## 2017-09-21 RX ADMIN — POTASSIUM CHLORIDE, DEXTROSE MONOHYDRATE AND SODIUM CHLORIDE: 150; 5; 450 INJECTION, SOLUTION INTRAVENOUS at 15:39

## 2017-09-21 RX ADMIN — ROCURONIUM BROMIDE 10 MG: 10 INJECTION INTRAVENOUS at 11:40

## 2017-09-21 RX ADMIN — DEXAMETHASONE SODIUM PHOSPHATE 5 MG: 10 INJECTION, SOLUTION INTRAMUSCULAR; INTRAVENOUS at 13:25

## 2017-09-21 RX ADMIN — CEFOTETAN DISODIUM 50 ML: 2 INJECTION, POWDER, FOR SOLUTION INTRAMUSCULAR; INTRAVENOUS at 09:34

## 2017-09-21 RX ADMIN — LIDOCAINE HYDROCHLORIDE 40 MG: 20 INJECTION, SOLUTION INFILTRATION; PERINEURAL at 09:39

## 2017-09-21 RX ADMIN — AMLODIPINE BESYLATE 5 MG: 5 TABLET ORAL at 16:43

## 2017-09-21 RX ADMIN — FINASTERIDE 5 MG: 5 TABLET, FILM COATED ORAL at 17:11

## 2017-09-21 RX ADMIN — Medication 100 MG: at 09:41

## 2017-09-21 RX ADMIN — METOPROLOL TARTRATE 100 MG: 100 TABLET, FILM COATED ORAL at 20:38

## 2017-09-21 RX ADMIN — Medication 5 MG: at 12:43

## 2017-09-21 RX ADMIN — Medication 4 ML/HR: at 14:05

## 2017-09-21 RX ADMIN — Medication 8 ML/HR: at 10:23

## 2017-09-21 RX ADMIN — EPHEDRINE SULFATE 5 MG: 50 INJECTION, SOLUTION INTRAVENOUS at 10:00

## 2017-09-21 RX ADMIN — RANITIDINE 150 MG: 150 TABLET ORAL at 20:38

## 2017-09-21 RX ADMIN — ROCURONIUM BROMIDE 5 MG: 10 INJECTION INTRAVENOUS at 12:06

## 2017-09-21 RX ADMIN — Medication 50 MCG: at 09:58

## 2017-09-21 RX ADMIN — ROPIVACAINE HYDROCHLORIDE 20 ML: 5 INJECTION, SOLUTION EPIDURAL; INFILTRATION; PERINEURAL at 13:25

## 2017-09-21 RX ADMIN — CEFOTETAN DISODIUM 1 G: 1 INJECTION, POWDER, FOR SOLUTION INTRAMUSCULAR; INTRAVENOUS at 20:38

## 2017-09-21 RX ADMIN — FENTANYL CITRATE 25 MCG: 50 INJECTION, SOLUTION INTRAMUSCULAR; INTRAVENOUS at 11:27

## 2017-09-21 RX ADMIN — ROCURONIUM BROMIDE 20 MG: 10 INJECTION INTRAVENOUS at 10:53

## 2017-09-21 RX ADMIN — EPHEDRINE SULFATE 10 MG: 50 INJECTION, SOLUTION INTRAVENOUS at 13:20

## 2017-09-21 RX ADMIN — MIDAZOLAM HYDROCHLORIDE 0.5 MG: 1 INJECTION, SOLUTION INTRAMUSCULAR; INTRAVENOUS at 09:52

## 2017-09-21 RX ADMIN — EPHEDRINE SULFATE 10 MG: 50 INJECTION, SOLUTION INTRAVENOUS at 09:44

## 2017-09-21 RX ADMIN — MIDAZOLAM HYDROCHLORIDE 1 MG: 1 INJECTION, SOLUTION INTRAMUSCULAR; INTRAVENOUS at 10:02

## 2017-09-21 RX ADMIN — EPHEDRINE SULFATE 5 MG: 50 INJECTION, SOLUTION INTRAVENOUS at 10:04

## 2017-09-21 RX ADMIN — ROPIVACAINE HYDROCHLORIDE 20 ML: 5 INJECTION, SOLUTION EPIDURAL; INFILTRATION; PERINEURAL at 13:21

## 2017-09-21 ASSESSMENT — ACTIVITIES OF DAILY LIVING (ADL)
BATHING: 0-->INDEPENDENT
RETIRED_COMMUNICATION: 0-->UNDERSTANDS/COMMUNICATES WITHOUT DIFFICULTY
NUMBER_OF_TIMES_PATIENT_HAS_FALLEN_WITHIN_LAST_SIX_MONTHS: 1
RETIRED_EATING: 0-->INDEPENDENT
DRESS: 0-->INDEPENDENT
COGNITION: 0 - NO COGNITION ISSUES REPORTED
AMBULATION: 0-->INDEPENDENT
TRANSFERRING: 0-->INDEPENDENT
TOILETING: 0-->INDEPENDENT
FALL_HISTORY_WITHIN_LAST_SIX_MONTHS: YES
SWALLOWING: 0-->SWALLOWS FOODS/LIQUIDS WITHOUT DIFFICULTY

## 2017-09-21 ASSESSMENT — PAIN DESCRIPTION - DESCRIPTORS
DESCRIPTORS: DISCOMFORT
DESCRIPTORS: DISCOMFORT

## 2017-09-21 ASSESSMENT — LIFESTYLE VARIABLES: TOBACCO_USE: 1

## 2017-09-21 ASSESSMENT — ENCOUNTER SYMPTOMS: DYSRHYTHMIAS: 1

## 2017-09-21 ASSESSMENT — COPD QUESTIONNAIRES: COPD: 1

## 2017-09-21 NOTE — PROVIDER NOTIFICATION
Verified with Dr. Moreland on IV Lopressor and PO Lopressor orders. Dr. Moreland stated IV Lopressor can be d/c at this time and patient can receive PO Lopressor tonight.

## 2017-09-21 NOTE — OR NURSING
Pt continues to complain of bladder discomfort relatied to catheter. Draining well. Dr. Moreland present and aware, Epidural being cared for by anesthesia, pain now 3/10

## 2017-09-21 NOTE — H&P
I was asked to follow Mr. Peter Zhao with Dr. Moreland after having his colostomy takedown.      HISTORY OF PRESENT ILLNESS:  Mr. Zhao is a 71-year-old gentleman who presented with a bowel obstruction and was found to have a sigmoid stricture that appeared to be benign, probably due to previous diverticulitis.  He had a resection of this and a colostomy placed.  He has healed up nicely.  At this point was ready for a takedown of this.  Was brought to the OR by Dr. Moreland.  He encountered no big problems.  There were minimal adhesions.  He had a reanastomosis performed.  His ileostomy colostomy defect was repaired and apparently there is an open area that they are just going to pack but had no real complicating issues.  I am seeing him in the PACU.  He is very alert, pleasant and having absolutely no pain.  He had an epidural placed.  His only complaint is his foot itches and he does have some discomfort from the Temple catheter that he has currently in place.  In speaking with him, he is, as I said, very alert.  He has had no issues in terms of chest pains, palpitations, syncope.  He has been active.  He built stairs going up to his deer stand just this past week and has been feeling just fine.  He did hold his Plavix that he was on for the last week.      PAST MEDICAL HISTORY:  Significant for:   1.  History of coronary artery disease.  He is status post coronary bypass grafting back in 2013.  He did have a subsequent angiography performed and stent placed in 2014.   2.  Status post bioprosthetic aortic valve replacement.  This was performed at the time of his bypass.  He has had moderately severe aortic stenosis at the time.   3.  Status post a biventricular ICD placement.   4.  History of hypertension.   5.  History of diabetes mellitus type 2 with dietary control.  Last A1c was 6% range.   6.  Hyperlipidemia.   7.  History of GERD.   8.  Elevated PSA.   9.  Status post possible TIA.  He was hospitalized, I  believe, in around May of this year.  He had some issue with some speech difficulty.  Workup was unremarkable.  It was felt that possibly he had a TIA, but it was never really documented, despite a significant workup.  He was empirically placed on Plavix and given some Seroquel.      MEDICATIONS:   1.  Plavix 75 mg daily, which is on hold.   2.  Metoprolol tartrate 100 mg p.o. b.i.d.   3.  Zestril 20 mg daily.   4.  Atorvastatin 40 mg daily.   5.  Nitroglycerin sublingual p.r.n. chest pain.   6.  Zantac 150 mg b.i.d.   7.  Proscar 5 mg daily.   8.  Amlodipine 5 mg daily.   9.  Aspirin 325 daily.   10.  Cholecalciferol 1000 units daily.      ALLERGIES:  He has an allergy to acetaminophen and Cipro.      SOCIAL HISTORY:  He is , retired.  He is a former smoker, quit in 2000.  Nondrinker.      FAMILY HISTORY:  Positive for diabetes and coronary artery disease in mom.      REVIEW OF SYSTEMS:  Pertinent positives and negatives noted above in the HPI.      PHYSICAL EXAMINATION:   GENERAL:  Alert, pleasant gentleman in no obvious distress.   VITAL SIGNS:  Current blood pressure is 115/60.  Heart rates in the 70s, paced rhythm.  His temperature is 97.3.  Sats are 95%.  He is on 1 liter at this point.   HEENT AND NECK:  Normocephalic, atraumatic.  Pupils equal, round, reactive.  Sclerae are clear.  Neck is supple.  N no obvious lymphadenopathy or thyromegaly.  Mucous membranes are moist.   LUNGS:  Actually quite clear to auscultation anterior and posterior.   CARDIAC:  Regular rate and rhythm.  Grade 1/6 to 2/6 systolic murmur, right sternal border.  Neck veins are flat.  I hear no carotid bruits.   ABDOMEN:  His wounds are currently dressed.  Bowel sounds are quiet at this point.   EXTREMITIES:  Without significant cyanosis, clubbing or edema.   NEUROLOGIC:  He is able to move his feet but has minimal sensation.  Temple catheter is in place.      LABORATORY DATA:  Preoperatively, sodium 140, potassium 4.2, chloride  109, CO2 is 27, BUN is 25, creatinine is 1.23 and liver profile was normal.  A1c is 6.3.  His white count is 8000, hemoglobin is 13.8, platelet count was 214,000.      ASSESSMENT:   1.  Status post colostomy takedown.  He tolerated this procedure extremely well without any complications during the surgical procedure.  He does have an epidural in place.  He had a lot of pain last time and did have a lot of postoperative confusion.  The hope is that the epidural will limit the amount of narcotics he requires and help him transition to recovery easier without confusion.  Diet will be held until cleared by Surgery.  He will have dressing changes to his wound on his abdomen per Surgery's recommendations.  Temple catheter is in place due to his epidural placement.  He is unable to get up at this point.   2.  Coronary artery disease.  Stable at this point.  No signs of heart failure, no angina whatsoever.  He does have his biventricular pacer implantable cardioverter-defibrillator and placement functioning fine without troubles.  Never had any discharges.   3.  Echocardiogram showed his left ventricular function was within normal limits.  I do not anticipate any issues with heart failure.  His sats were fine at this time.  He will be given some IV fluids until he is able to take oral adequately.   4.  History of diabetes mellitus, type 2.  He is on just dietary control.  We will check some Accu-Cheks while he is here and cover with sliding scale as needed.  We monitored in our ICU at this point while he has epidural in place.  He will be at bedrest until this is removed.  His hospital stay is likely going to be anywhere from 3 to 7 days, depending on the recovery of his bowel function.      CODE STATUS:  He is full code.         JUMANA URBANO MD             D: 2017 14:51   T: 2017 17:24   MT: DARWIN      Name:     ROGER VINSON   MRN:      [id number]        Account:      [id number]   :      1945            Admitted:     [id number]      Document: [id number]       cc: Alexander Vazquez DO

## 2017-09-21 NOTE — PLAN OF CARE
Anesthesia in to see patient. Increased epidural from 6 ml/hr to 7 ml/hr. Stated to call them if patient experiences tingling in pinkies or if RR changes. Instructed nursing to log roll patient d/t placement of epidural.

## 2017-09-21 NOTE — PLAN OF CARE
Problem: Patient Care Overview  Goal: Plan of Care/Patient Progress Review  Outcome: No Change  Admitted to the floor around 1440 from PACU. A&O. Epidural in place and secured. Tape/dressing remains CDI. Epidural infusing at 7 ml/hr currently. Dermatomes monitored-patient able to feel from neck to toes. Patient is able to wiggle toes and lift legs. Scheduled Toradol given per MAR. Lower abdominal pain rated 3/10 while at rest and 9/10 with movement. Dressing to abdomin CDI. Minimal amounts of shadow drainage outlined. Bowels active in all quadrants. Denies passing flatus. Encouraged to splint abdomin with pillow during movement and coughing. Temple catheter remains patent. NPO with ice chips and meds, tolerating without complaints. Lungs with fine crackles to RLL. Telemetry showing 100% paced 60's-80's. Patient does have pacemaker/defibrillator implanted in left chest. IV infusing at 100 ml/hr. Second IV placed in left AC, saline locked. Blood sugar at 1600-124, no SS coverage needed. Patient resting right now. RR while sleeping 9-11. RR while awake in the teen's, will continue to closely monitor respiratory status while epidural is infusing.

## 2017-09-21 NOTE — PLAN OF CARE
Updated Anesthesia on patient's pain. 3/10 while at rest and 9/10 with movement. Anesthesia with see patient and educate on pain control with epidural.

## 2017-09-21 NOTE — ANESTHESIA PREPROCEDURE EVALUATION
Anesthesia Evaluation     . Pt has had prior anesthetic.     History of anesthetic complications    post-op confusion      ROS/MED HX    ENT/Pulmonary:     (+)allergic rhinitis, tobacco use, Past use quit 10/2013 packs/day  COPD, , . .    Neurologic:     (+)migraines, CVA date: 4/29/2017 without deficits    Cardiovascular:     (+) Dyslipidemia, hypertension-range: on beta blocker (metoprolol), -CAD, -CABG-date: 2012, stent,2014  1 . Taking blood thinners : Instructions Given to patient: plavix held x7 days. . . pacemaker :ICD . dysrhythmias PVCs and Sick Sinus Syndrome, Irregular Heartbeat/Palpitations, valvular problems/murmurs s/p AO Valve Replacement 2012:. Previous cardiac testing date:results:date: results:ECG reviewed date:9/12/2017 results:SB@57, LAFB, PVC's date: results:          METS/Exercise Tolerance:     Hematologic:     (+) History of Transfusion no previous transfusion reaction -      Musculoskeletal:   (+) arthritis, , , -       GI/Hepatic:     (+) GERD Other GI/Hepatic STRICTURE SIGMOID COLON, Diverticular Disease s/p Sigmoid Colectomy  2/2017 2/2017      Renal/Genitourinary:     (+) chronic renal disease (Stage 3 (moderate)), type: CRI, BPH,       Endo:     (+) type II DM Last HgA1c: 6.3 date: 9/12/2017 .      Psychiatric:  - neg psychiatric ROS       Infectious Disease:  - neg infectious disease ROS       Malignancy:      - no malignancy   Other:    - neg other ROS                 Physical Exam      Airway   Mallampati: III  TM distance: >3 FB  Neck ROM: full    Dental   (+) chipped and missing    Cardiovascular   Rhythm and rate: regular and normal  (+) murmur     PE comment: 1-2/6 murmur    Pulmonary    breath sounds clear to auscultation(+) decreased breath sounds                       Anesthesia Plan      History & Physical Review  History and physical reviewed and following examination; no interval change.    ASA Status:  4 .    NPO Status:  > 8 hours    Plan for General, ETT, Periph.  Nerve Block for postop pain and Epidural with Intravenous and Propofol induction. Maintenance will be Balanced.    PONV prophylaxis:  Ondansetron (or other 5HT-3) and Dexamethasone or Solumedrol       Postoperative Care  Postoperative pain management:  IV analgesics, Oral pain medications, Peripheral nerve block (Single Shot), Neuraxial analgesia and Multi-modal analgesia.      Consents  Anesthetic plan, risks, benefits and alternatives discussed with:  Patient and Spouse.  Use of blood products discussed: Yes.   Use of blood products discussed with Patient and Spouse.  Consented to blood products.  .                          .

## 2017-09-21 NOTE — PLAN OF CARE
[facility] Inpatient Admission Note:    Patient admitted to 3126/3126-1 at approximately 1440  via bed accompanied by other: Surgical RN's from surgery . Report received from Dottie RN in SBAR format at 1440 via face to face in room. Patient came up in bed. Patient is alert and oriented X 3, reports pain; rates at 3 on 0-10 scale.  Patient oriented to room, unit, hourly rounding, and plan of care. Explained admission packet and patient handbook with patient bill of rights brochure. Will continue to monitor and document as needed.     Inpatient Nursing criteria listed below was met:      Health care directives status obtained and documented: Yes      Care Everywhere authorization obtained No      MRSA swab completed for patient 65 years and older: Yes      Patient identifies a surrogate decision maker: Yes If yes, who:Raven-wife Contact Information:see Face sheet       Core Measure diagnosis present:Yes. If yes, state diagnosis: Abdominal Surgery       If initial lactic acid >2.0, repeat lactic acid drawn within one hour of arrival to unit: NA. If no, state reason: Surgical pt      Vaccination assessment and education completed: Yes   Vaccinations received prior to admission: Pneumovax yes  Influenza(seasonal)  NO   Vaccination(s) ordered: patient declines      Clergy visit ordered if patient requests: N/A      Skin issues/needs documented: Yes      Isolation Patient: no Education given, correct sign in place and documentation row added to PCS:  No      Fall Prevention Yes: Care plan updated, education given and documented, sticker and magnet in place: Yes      Care Plan initiated: Yes      Education Documented (including assessment): Yes      Patient has discharge needs : unsure If yes, please explain:unsure at this time

## 2017-09-21 NOTE — ANESTHESIA CARE TRANSFER NOTE
Patient: Peter NARANJO Pavel    Procedure(s):  CLOSURE OF NORRIS'S SIGMOID COLOSTOMY, REPAIR STOMAL HERNIA - Wound Class: II-Clean Contaminated    Diagnosis: SIGMOID COLOSTOMY  Diagnosis Additional Information: No value filed.    Anesthesia Type:   General, ETT, Periph. Nerve Block for postop pain, Epidural     Note:  Airway :Room Air  Patient transferred to:PACU        Vitals: (Last set prior to Anesthesia Care Transfer)    CRNA VITALS  9/21/2017 1303 - 9/21/2017 1342      9/21/2017             Pulse: 80    SpO2: 94 %                Electronically Signed By: ALBARO Lord CRNA  September 21, 2017  1:42 PM

## 2017-09-21 NOTE — IP AVS SNAPSHOT
[facility]    [address] [place] [id number]    Phone:  [phone]    Fax:  [phone]                                       After Visit Summary   9/21/2017    Peter Zhao    MRN: [id number]           After Visit Summary Signature Page     I have received my discharge instructions, and my questions have been answered. I have discussed any challenges I see with this plan with the nurse or doctor.    ..........................................................................................................................................  Patient/Patient Representative Signature      ..........................................................................................................................................  Patient Representative Print Name and Relationship to Patient    ..................................................               ................................................  Date                                            Time    ..........................................................................................................................................  Reviewed by Signature/Title    ...................................................              ..............................................  Date                                                            Time

## 2017-09-21 NOTE — BRIEF OP NOTE
[facility] Brief Operative Note    Pre-operative diagnosis: SIGMOID COLOSTOMY   Post-operative diagnosis Previous Colostomy secondary to diverticular stricture.   Procedure: Procedure(s):  CLOSURE OF NORRIS'S SIGMOID COLOSTOMY, REPAIR STOMAL HERNIA - Wound Class: II-Clean Contaminated   Surgeon: Tima Moreland MD   Assistants(s): Dr Chopra   Estimated blood loss: 200 ml    Specimens: Sigmoid colostomy   Findings: Minimal adhesions

## 2017-09-21 NOTE — IP AVS SNAPSHOT
MRN:[id number]                      After Visit Summary   9/21/2017    Peter Zhao    MRN: [id number]           Thank you!     Thank you for choosing [facility] for your care. Our goal is always to provide you with excellent care. Hearing back from our patients is one way we can continue to improve our services. Please take a few minutes to complete the written survey that you may receive in the mail after you visit with us. Thank you!        Patient Information     Date Of Birth          1945        Designated Caregiver       Most Recent Value    Caregiver    Will someone help with your care after discharge? yes    Name of designated caregiver Raven     Phone number of caregiver [phone]    Caregiver address out of town       About your hospital stay     You were admitted on:  September 21, 2017 You last received care in the:  [facility]    You were discharged on:  September 26, 2017        Reason for your hospital stay       Surgery to close colostomy and repair hernia at site of colostomy. Continue your Plavix at home.            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 Continue Plavix at home.                  Who to Call     For medical emergencies, please call 911.  For non-urgent questions about your medical care, please call your primary care provider or clinic, [phone]  For questions related to your surgery, please call your surgery clinic        Attending Provider     Provider Specialty    Tima Moreland MD General Surgery       Primary Care Provider Office Phone # Fax #    Alexander Vazquez [phone]      After Care Instructions     Activity       Your activity upon discharge: no lifting greater than 10 pounds for 6 weeks.            Diet       Follow this diet upon discharge: Orders Placed This Encounter      Advance Diet as Tolerated: Low Residue Diet; Regular Diet Adult            Wound care and  dressings       Instructions to care for your wound at home: may get incision wet in shower but do not soak or scrub. May shower with wound vac in place. Do not need dressings on main wound.                  Follow-up Appointments     Follow-up and recommended labs and tests        Follow up in wound care at the hospital on Friday as scheduled. Dr moreland will see at the same time for recheck and possible suture removal.            Follow-up and recommended labs and tests        Follow up in Wound Care at the hospital on Friday as scheduled. Dr Moreland will you for recheck and possible suture removal.                  Your next 10 appointments already scheduled     Sep 29, 2017  2:00 PM CDT   New Visit with [facility]   [facility] ([facility] )    [address] [place] [id number] [phone]              Additional Services     Home care nursing referral       RN skilled nursing visit. RN to provide wound care.    Your provider has ordered home care nursing services. If you have not been contacted within 2 days of your discharge please call the inpatient department phone number at [phone] .            WOUND CARE REFERRAL ([facility])       WOUND/OSTOMY CENTER ([facility]) TO EVALUATE, INITIATE TREATMENT BASED ON WOUND OSTOMY PROTOCOL, AND RECOMMEND AND REVISE AS NEEDED AN INDIVIDUALIZED TREATMENT CARE PLAN:    Conservative Sharp Debridement of non-viable and loose necrotic tissue  Topical 5% Lidocaine (Xylocaine) ointment - for pain associated with wound care/debridement; apply thin layer   Dakin's Solution (Sodium Hypochlorite Topical 0.125%) to cleanse wounds with odor & signs of infection  Antifungal Powder and Cream (miconazole (Micatin) 2%) - applied topically to areas of fungal dermatitis  Cadexomer Iodine (Iodosorb) 0.9% Topical Gel apply topically to wound beds with slough/soft necrotic tissue with suspected bioburden  Collagenase Enzymatic Debrider (Santyl ointment) -  250units/gram apply topically to wound beds with necrotic debris/slough;   Acetic Acid 0.25% to cleanse wounds with odor and signs of infection  Lidocaine Hydrochloride (Xylocaine) topical solution 4% to assist with Wound Vac dressing remove  Silver Sulfadiazine (Silvadene) topical cream; apply topically to burns/wound beds  Hydrocortisone (Cortaid) 1% cream; apply topically to areas with dermatitis  Silver nitrate (Arazol) topical stick to control minor bleeding, and applied to epibole and hypergranular tissue  Negative Pressure Wound Therapy (Wound Vac) (additional physician order needed)  Triple Antibiotic (Neosporin) ointment for wounds with signs of infection or at risk for infection  Topical agents, dressings, or products per Wound Ostomy Protocol Clinical Guidelines  Compression therapy as indicated for lower extremity ulcers caused by venous insufficiency or complicated by edema.  ARIEL above 0.8 - medium compression; ARIEL 0.6 - 0.8 - light compression  Therapeutic support surfaces for bed and/or chair and off-loading devices to minimize pressure    DIAGNOSTICS THAT MAY BE ORDERED BY CWON OR APRN; ORDERS TO BE PLACED UNDER THE REFERRING PROVIDER OR APRN FOR REVIEW AND ANY NEEDED ACTION:    Wound culture when signs of infection or colonization are present  Ankle Brachial Index (ARIEL) in patients with Lower Extremity ulcerations; Toe Brachial Index (TBI) for diabetics as indicated  Hgb A1c for diabetics or those suspected of undiagnosed diabetes  Albumin or Prealbumin if nutritional concerns   Urinalysis/Urine Culture (UA/UC) if urinary tract infection (UTI) suspected in Urostomy patient  Clostridium difficile toxin B PCR panel stool sample if suspected in Fecal Ostomy patient    ANCILLARY CONSULTS AS NEEDED:  Pedorithist/Prosthetist for specialty shoes/orthotic  Diabetes Education for improved blood glucose during wound healing  Medical Nutrition Therapy for diabetes management and wound and/or ostomy healing  "nutrition   Physical Therapy/Occupational Therapy - assess tissue loads & need for positioning devices, mobility safety/ ADL's, lymphedema treatment    OSTOMY ONLY:  Preoperative stoma site marking if requested by surgeon  Equipment selection/Ostomy supplies based on assessment  Treatment of stomal and peristomal complications per Wound Ostomy Protocol Clinical Guidelines  Colostomy Irrigation routine for constipation: Irrigate with 500-1000mL warm tap water per patient rountine. Do not exceed 1000mL without physician consultation.  Ileostomy Lavage with lubricated soft catheter by instilling 30-50mL normal saline at a time until resolved. May take 1-2 hours with repeat attempts; if not resolved at that point notify surgeon.    NOTE EXCLUSIONS FROM ABOVE ITEMS HERE: BERNARDINO Adam RN                  Pending Results     Date and Time Order Name Status Description    9/21/2017 1212 Surgical pathology exam In process             Statement of Approval     Ordered          09/26/17 0919  I have reviewed and agree with all the recommendations and orders detailed in this document.  EFFECTIVE NOW     Approved and electronically signed by:  Tima Moreland MD             Admission Information     Date & Time Provider Department Dept. Phone    9/21/2017 Tima Moreland MD [facility] [phone]      Your Vitals Were     Blood Pressure Pulse Temperature Respirations Height Weight    111/70 (BP Location: Right arm) 60 98.5  F (36.9  C) (Tympanic) 20 1.778 m (5' 10\") 76.7 kg (169 lb 1.5 oz)    Pulse Oximetry BMI (Body Mass Index)                93% 24.26 kg/m2          MyChart Information     [employer] lets you send messages to your doctor, view your test results, renew your prescriptions, schedule appointments and more. To sign up, go to [email or web address] . Click on \"Log in\" on the left side of the screen, which will take you to the Welcome page. Then click on \"Sign up Now\" on the right side of the " page.     You will be asked to enter the access code listed below, as well as some personal information. Please follow the directions to create your username and password.     Your access code is: [email or web address]  Expires: 2017 10:57 AM     Your access code will  in 90 days. If you need help or a new code, please call your [facility] clinic or [phone].        Care EveryWhere ID     This is your Care EveryWhere ID. This could be used by other organizations to access your [facility] medical records  [id number]        Equal Access to Services     [facility]: Hadii miroslava June, wacuco weber, qajorge ayersaljon espinoza, john torres . So [facility] [phone].    ATENCIÓN: Si habla español, tiene a farah disposición servicios gratuitos de asistencia lingüística. Llame al [phone].    We comply with applicable federal civil rights laws and [place] laws. We do not discriminate on the basis of race, color, national origin, age, disability sex, sexual orientation or gender identity.               Review of your medicines      START taking        Dose / Directions    ibuprofen 600 MG tablet   Commonly known as:  ADVIL/MOTRIN   Used for:  S/P colostomy takedown        Dose:  600 mg   Take 1 tablet (600 mg) by mouth every 6 hours as needed for moderate pain   Quantity:  30 tablet   Refills:  1         CONTINUE these medicines which have NOT CHANGED        Dose / Directions    amLODIPine 5 MG tablet   Commonly known as:  NORVASC   Used for:  Benign essential hypertension        Dose:  5 mg   Take 1 tablet (5 mg) by mouth daily   Quantity:  90 tablet   Refills:  3       ASPIRIN PO        Dose:  325 mg   Take 325 mg by mouth daily   Refills:  0       atorvastatin 40 MG tablet   Commonly known as:  LIPITOR   Used for:  Hyperlipidemia LDL goal <100        Dose:  40 mg   Take 1 tablet (40 mg) by mouth daily   Quantity:  90 tablet   Refills:  3       blood glucose lancing device    Used for:  T2DM (type 2 diabetes mellitus) (H)        Dose:  1 Units   1 Units daily   Quantity:  1 kit   Refills:  2       * blood glucose monitoring lancets   Used for:  T2DM (type 2 diabetes mellitus) (H)        Dose:  1 Units   1 Units daily Use fresh lancet each day to check blood sugars   Quantity:  100 each   Refills:  4       * blood glucose monitoring lancets   Used for:  Type 2 diabetes mellitus without complication, without long-term current use of insulin (H)        Use to test blood sugar 2 times weekly   Quantity:  100 Box   Refills:  6       * blood glucose monitoring test strip   Commonly known as:  ACCU-CHEK SMARTVIEW   Used for:  T2DM (type 2 diabetes mellitus) (H)        Test blood sugar before breakfast one day, before and after supper the next and not at all the third day   Quantity:  1 Box   Refills:  12       * blood glucose monitoring test strip   Commonly known as:  MARIAN CONTOUR NEXT   Used for:  Type 2 diabetes mellitus with other specified complication, unspecified long term insulin use status (H)        Use to test blood sugar 2 times weekly or as directed.   Quantity:  100 strip   Refills:  6       calcium carbonate 1500 (600 CA) MG tablet   Commonly known as:  OS-WALI 600 mg [employer] [place]. Ca        Dose:  1500 mg   Take 1 tablet (1,500 mg) by mouth daily   Quantity:  30 tablet   Refills:  3       clopidogrel 75 MG tablet   Commonly known as:  PLAVIX   Used for:  Benign essential hypertension        Dose:  75 mg   Take 1 tablet (75 mg) by mouth daily   Quantity:  90 tablet   Refills:  3       finasteride 5 MG tablet   Commonly known as:  PROSCAR   Used for:  Benign non-nodular prostatic hyperplasia with lower urinary tract symptoms        Dose:  5 mg   Take 1 tablet (5 mg) by mouth daily   Quantity:  90 tablet   Refills:  3       lisinopril 40 MG tablet   Commonly known as:  PRINIVIL/ZESTRIL   Used for:  Benign essential hypertension        Dose:  40 mg   Take 1 tablet (40 mg) by mouth daily    Quantity:  90 tablet   Refills:  3       metoprolol 100 MG tablet   Commonly known as:  LOPRESSOR   Used for:  Benign essential hypertension        Dose:  100 mg   Take 1 tablet (100 mg) by mouth 2 times daily   Quantity:  180 tablet   Refills:  3       MULTIVITAMINS PO        Dose:  1 capsule   Take 1 capsule by mouth daily   Refills:  0       nitroGLYcerin 0.4 MG sublingual tablet   Commonly known as:  NITROSTAT   Used for:  Hyperlipidemia LDL goal <100, Intermediate coronary syndrome (H)        Dose:  0.4 mg   Place 1 tablet (0.4 mg) under the tongue every 5 minutes as needed   Quantity:  25 tablet   Refills:  3       NONFORMULARY        OTC eye drops as directed   Refills:  0       order for DME   Used for:  Colostomy care (H)        Equipment being ordered: Ostomy supplies - Barrier - currently using 96403 Sondra flat cut to fit; Pouch 10763 clear; Adapt barrier rings 7805; Sondra ostomy belt.  Fill per insurance guidelines   Quantity:  1 Package   Refills:  11       * Ostomy Supplies Misc   Used for:  Colostomy in place (H)        [place] m9 odor eliminator drops 7717 or 7715 - patient preference   Quantity:  1 each   Refills:  11       * Ostomy Supplies POUCH Misc   Used for:  Colostomy in place (H)        Sondra 12171 closed end pouches - per Medicare patient is allowed 60 per month.  Dispense 2 boxes or patient choice   Quantity:  2 each   Refills:  11       polyethylene glycol 0.4%- propylene glycol 0.3% 0.4-0.3 % Soln ophthalmic solution   Commonly known as:  SYSTANE ULTRA        Dose:  1 drop   Place 1 drop into both eyes every hour as needed for dry eyes   Refills:  0       ranitidine 150 MG tablet   Commonly known as:  ZANTAC   Used for:  Gastroesophageal reflux disease without esophagitis        Dose:  150 mg   Take 1 tablet (150 mg) by mouth 2 times daily   Quantity:  180 tablet   Refills:  3       UNABLE TO FIND        Dose:  550 mg   Take 550 mg by mouth 2 times daily Gymnema danielle    Refills:  0       VITAMIN D3 PO        Dose:  1000 Units   Take 1,000 Units by mouth daily   Refills:  0       * Notice:  This list has 6 medication(s) that are the same as other medications prescribed for you. Read the directions carefully, and ask your doctor or other care provider to review them with you.         Where to get your medicines      These medications were sent to [id number] - [place], [place] - [address]   [address] , [place] [address]     Phone:  [phone]     ibuprofen 600 MG tablet                Protect others around you: Learn how to safely use, store and throw away your medicines at www.disposemymeds.org.             Medication List: This is a list of all your medications and when to take them. Check marks below indicate your daily home schedule. Keep this list as a reference.      Medications           Morning Afternoon Evening Bedtime As Needed    amLODIPine 5 MG tablet   Commonly known as:  NORVASC   Take 1 tablet (5 mg) by mouth daily   Last time this was given:  5 mg on 9/25/2017  9:11 PM                                ASPIRIN PO   Take 325 mg by mouth daily                                atorvastatin 40 MG tablet   Commonly known as:  LIPITOR   Take 1 tablet (40 mg) by mouth daily   Last time this was given:  40 mg on 9/25/2017  6:34 PM                                blood glucose lancing device   1 Units daily                                * blood glucose monitoring lancets   1 Units daily Use fresh lancet each day to check blood sugars                                * blood glucose monitoring lancets   Use to test blood sugar 2 times weekly                                * blood glucose monitoring test strip   Commonly known as:  ACCU-CHEK SMARTVIEW   Test blood sugar before breakfast one day, before and after supper the next and not at all the third day                                * blood glucose monitoring test strip   Commonly known as:  [employer] CONTOUR NEXT   Use  to test blood sugar 2 times weekly or as directed.                                calcium carbonate 1500 (600 CA) MG tablet   Commonly known as:  OS-WALI 600 mg [place]. Ca   Take 1 tablet (1,500 mg) by mouth daily                                clopidogrel 75 MG tablet   Commonly known as:  PLAVIX   Take 1 tablet (75 mg) by mouth daily   Last time this was given:  75 mg on 9/26/2017  9:30 AM                                finasteride 5 MG tablet   Commonly known as:  PROSCAR   Take 1 tablet (5 mg) by mouth daily   Last time this was given:  5 mg on 9/25/2017  6:34 PM                                ibuprofen 600 MG tablet   Commonly known as:  ADVIL/MOTRIN   Take 1 tablet (600 mg) by mouth every 6 hours as needed for moderate pain                                lisinopril 40 MG tablet   Commonly known as:  PRINIVIL/ZESTRIL   Take 1 tablet (40 mg) by mouth daily   Last time this was given:  40 mg on 9/26/2017  9:29 AM                                metoprolol 100 MG tablet   Commonly known as:  LOPRESSOR   Take 1 tablet (100 mg) by mouth 2 times daily   Last time this was given:  100 mg on 9/26/2017  9:30 AM                                MULTIVITAMINS PO   Take 1 capsule by mouth daily                                nitroGLYcerin 0.4 MG sublingual tablet   Commonly known as:  NITROSTAT   Place 1 tablet (0.4 mg) under the tongue every 5 minutes as needed                                NONFORMULARY   OTC eye drops as directed                                order for DME   Equipment being ordered: Ostomy supplies - Barrier - currently using 59323 Sondra flat cut to fit; Pouch 11708 clear; Adapt barrier rings 7805; [place] ostomy belt.  Fill per insurance guidelines                                * Ostomy Supplies Misc   Sondra m9 odor eliminator drops 4917 or 7715 - patient preference                                * Ostomy Supplies POUCH [facility]   [place] [address] closed end pouches - per Medicare patient is allowed  60 per month.  Dispense 2 boxes or patient choice                                polyethylene glycol 0.4%- propylene glycol 0.3% 0.4-0.3 % Soln ophthalmic solution   Commonly known as:  SYSTANE ULTRA   Place 1 drop into both eyes every hour as needed for dry eyes                                ranitidine 150 MG tablet   Commonly known as:  ZANTAC   Take 1 tablet (150 mg) by mouth 2 times daily   Last time this was given:  150 mg on 9/26/2017  9:31 AM                                UNABLE TO FIND   Take 550 mg by mouth 2 times daily Gymnema danielle                                VITAMIN D3 PO   Take 1,000 Units by mouth daily                                * Notice:  This list has 6 medication(s) that are the same as other medications prescribed for you. Read the directions carefully, and ask your doctor or other care provider to review them with you.              More Information        Hernia Repair Surgery  A hernia (or  rupture ) is a weakness or defect in the wall of the abdomen. A hernia will not heal on its own. Surgery is needed to repair the defect in the abdominal wall. If not treated, a hernia can get larger. It can also lead to serious health complications. Fortunately hernia surgery can be done quickly and safely. Below is an overview of hernia repair surgery.  Preparing for surgery  Your healthcare provider will talk with you about getting ready for surgery. Follow all the instructions you re given and be sure to:    Tell your healthcare provider about any medicines, supplements, or herbs you take. This includes both prescription and over-the-counter items. Ask if you should stop taking any of them.    Stop taking aspirin, ibuprofen, naproxen, and other NSAIDs 7 to 14 days before surgery.    Arrange for an adult family member or friend to give you a ride home after surgery.    Stop smoking. Smoking affects blood flow and can slow healing.    Gently wash the surgical area the night before  surgery.    Follow any directions you are given for not eating or drinking before surgery.  The day of surgery  Arrive at the hospital or surgical center at your scheduled time. You ll be asked to change into a patient gown. You ll then be given an IV to provide fluids and medicine. Shortly before surgery, an anesthesiologist or nurse anesthetist will talk with you. He or she will explain the types of anesthesia used to prevent pain during surgery. You will have one or more of the following:    Monitored sedation to make you relaxed and sleepy.    Local anesthesia to numb the surgical site.    Regional anesthesia to numb specific areas of your body.    General anesthesia to let you sleep during surgery.  Fixing the weakness  Surgery treats a hernia by repairing the weakness in the abdominal wall. Most hernias are treated using  tension-free  repairs. This is surgery that uses special mesh materials to repair the weak area. The mesh covers the weak area like a patch. The mesh is made of strong, flexible plastic that stays in the body. Over time, nearby tissues grow into the mesh to strengthen the repair.  After surgery  When the procedure is over, you ll be taken to the recovery area to rest. Your blood pressure and heart rate will be monitored. You ll also have a bandage over the surgical site. To help reduce discomfort, you ll be given pain medicines. You may also be given breathing exercises to keep your lungs clear. Later you ll be asked to get up and walk. This helps prevent blood clots in the legs. You can go home when your healthcare provider says you re ready.     Risks and possible complications of hernia surgery    Bleeding    Infection    Numbness or pain in the groin or leg    Risk the hernia will recur    Damage to the testicles or testicular function   Anesthesia risks    Mesh complications    Inability to urinate    Bowel or bladder injury       Date Last Reviewed: 10/1/2016    1379-9048 [employer]. [address] [place] [address], [place] [address]. All rights reserved. This information is not intended as a substitute for professional medical care. Always follow your healthcare professional's instructions.                Preventing Surgical Site Infections     Hand washing reduces the risk of infection.     One risk of having surgery is an infection at the surgical site. The surgical site is any cut the surgeon makes in the skin to do the operation. Surgical site infections can range from minor to severe or even fatal. This sheet tells you more about surgical site infections, what hospitals are doing to prevent them, and how they are treated if they do occur. It also tells you what you can do to prevent these infections.  What causes surgical site infections?  Germs are everywhere. They re on your skin, in the air, and on things you touch. Many germs are good. Some are harmful. Surgical site infections occur when harmful germs enter your body through the incision in your skin. Some infections are caused by germs that are in the air or on objects. But most are caused by germs found on and in your own body.  Who is at risk for surgical site infections?  Anyone can have a surgical site infection. Your risk is greater if you:    Are an older adult    Have a weak immune system or other health conditions or illnesses such as diabetes    Take certain medicines such as steroids    Are a smoker    Have certain types of operations, such as abdominal surgery    Have poor nutrition    Are very overweight    If the operation lasts longer than 2 hours  What are the symptoms of a surgical site infection?    The infection usually starts with increased skin redness, pain, and swelling around the incision. Later you may notice a cloudy or greenish-yellow discharge from the incision and it may develop a foul odor. The incision may separate or open up. You are also likely to have a fever and may feel very ill.    Symptoms  can appear any time from hours to weeks after surgery. Implants such as an artificial knee or hip can become infected at any time after the operation.  How are surgical site infections treated?    Surgical site infections are treated with antibiotics. The type of medicine you get will depend on the germ thought to be causing the infection. Most serious wound infections need local wound care, and in some cases, further surgery.    An infected skin wound may be reopened and cleaned. Sometimes, deep wounds need to be packed with gauze that is changed often until the wound starts to heal from the inside out. Your healthcare provider will figure out the best care needed to treat your surgical site infection.    If an infection occurs where an implant is placed, the implant may be removed.    If you have an infection deeper in your body, you may need another operation to treat it.  What hospitals do to prevent surgical site infections  Many hospitals take these steps to help prevent surgical site infections:    Handwashing. Before the operation, your surgeon and all operating room staff scrub their hands and arms with an antiseptic soap.    Clean skin. The site where your incision is made is carefully cleaned with an antiseptic solution.    Sterile clothing and drapes. Members of your surgical team wear medical uniforms (scrub suits), long-sleeved surgical gowns, masks, caps, shoe covers, and sterile gloves. Your body is fully covered with a large sterile sheet (sterile drape) except for the spot where the incision is made.    Clean air. Operating rooms have special air filters and positive pressure airflow to prevent unfiltered air from entering the room.    Careful use of antibiotics. Antibiotics are given no more than 60 minutes before the incision is made and stopped within 24 hours after surgery. This helps kill germs but avoids problems that can occur when antibiotics are taken longer.    Controlled blood sugar  levels. Your blood sugar level may rise because of the stress of the surgery. Your blood sugar level is watched closely to make sure it stays within a normal range. High blood sugar delays wound healing and increases the chances for infection.    Controlled body temperature. A lower-than-normal temperature during or after surgery prevents oxygen from reaching the wound and makes it harder for your body to fight infection. Hospitals may warm IV fluids, increase the temperature in the operating room, and provide warm-air blankets.    Proper hair removal. Any hair that must be removed is clipped right before the incision, not shaved with a razor. This prevents tiny nicks and cuts through which germs can enter.    Wound care. After surgery, a closed wound is covered with a sterile dressing for a day or two. Open wounds are packed with sterile gauze and covered with a sterile dressing.  What you can do to prevent surgical site infections    Ask questions. Learn what your hospital is doing to prevent infection.    If your doctor tells you to, shower or bathe with plain soap the night before and the day of your operation. Follow the instructions you are given. You may be asked to use a special cleanser that you don t rinse off.    If you smoke, stop for the longest duration possible before and after the operation. Ask your doctor about ways to quit.    Take antibiotics only when your healthcare provider tells you to. Using antibiotics when they re not needed can create germs that are harder to kill. Also, finish all your antibiotics, even if you feel better.    Be sure healthcare workers clean their hands with plain soap and water or with an alcohol-based hand  before and after caring for you. Don t be afraid to remind them.    After surgery, eat healthy foods. Care for your incision as directed by your doctor or nurse.     When to seek medical care  Call your healthcare provider if you have any of the  following:    Increased soreness, pain, or tenderness at the surgical site    A red streak, increased redness, or puffiness near the incision    Yellowish, cloudy, or bad-smelling discharge from the incision    Stitches that dissolve before the wound heals    Fever of 100.4 F (38 C) or higher, or as directed by your healthcare provider    A tired feeling that doesn t go away   Date Last Reviewed: 12/1/2016 2000-2017 [employer]. [address] [place] [address]. All rights reserved. This information is not intended as a substitute for professional medical care. Always follow your healthcare professional's instructions.                Preventing Deep Vein Thrombosis  Healthcare providers use the term venous thromboembolism (VTE) to describe two conditions, deep vein thrombosis (DVT) and pulmonary embolism (PE). They use the term VTE because the two conditions are very closely related. And, because their prevention and treatment are closely related.   DVT is a blood clot or thrombus in a deep vein. Most of these clots develop in the leg or thigh. But, they may form in a vein in the arm, or other part of the body.   Part of the blood clot may separate from the vein. This is called an embolus. It may travel to the lungs and form a pulmonary embolus. This can cut off the flow of blood to a portion of or to the entire lung. A blood clot in the lungs is a medical emergency and may cause death.  Over time, blood clots can also permanently damage veins. They must be treated right away to prevent problems.   Risk factors  Anyone can develop a blood clot. But the following risk factors make a blood clot more likely to happen:    Being inactive for a long period, such as when you re in the hospital, or traveling by plane or car    Injury to a vein from an accident, a broken bone, or surgery    Having blood clots in the past or a family history of blood clots    Blood clotting disorder    Recent  surgery    Cancer and certain cancer treatments    Smoking  Other factors can also put you at higher risk for a blood clot. They include:    Age over 60 years    Pregnancy    Taking birth control pills or hormone replacement    Having other vein problems, such as varicose veins     Being overweight    Having a pacemaker or a central venous catheter. They increase the chance of a blood clot forming in an arm.    Injection drug use. This also increases the chance of a blood clot forming in an arm.  How to prevent DVT  Preventing a blood clot means improving blood flow back to your heart. To help prevent a blood clot:    Talk with your healthcare provider about a program of regular exercise.    If your legs feel swollen or heavy, take a break and sit comfortably or lie down with your feet up.    Keep a healthy weight.    Quit smoking, if you smoke.    Avoid sitting, standing, or lying down for long periods without moving your legs and feet:    When traveling by car, make frequent stops to get out and move around.    On long airplane, train, or bus rides, get up and move around when possible.    If you can t get up, wiggle your toes and tighten your calves to keep your blood moving, as pictured below.  If you need to have surgery, talk with your healthcare provider about a plan to prevent blood clots.   If you are in the hospital, your risk for blood clots increases. Your healthcare provider may prescribe an anticoagulant medicine or a blood thinner to help prevent blood clots. Or your healthcare provider may prescribe a sequential compression device (SCD) or intermittent pneumatic compression (IPC). The device has sleeves that fit around your legs. It applies gentle pressure to help with blood flow and prevent blood clots. Remove the sleeves so that you do not trip or fall when you are walking, like when you use the bathroom or shower. If you need help removing the sleeves, ask the nurse or aid. You may also want to  try the following:    When to seek immediate medical attention  If you have symptoms of a blood clot in your lungs, call 911 or get emergency help. The symptoms are:    Chest pain    Trouble breathing    Fast heartbeat    Coughing (may cough up blood)    Sweating    Fainting  When to call your healthcare provider  If you have symptoms of a blood clot, call your healthcare provider. The symptoms are:    Pain    Swelling    Redness or discoloration in a leg, arm, or other area   Date Last Reviewed: 5/1/2016 2000-2017 [employer]. [address] [place], [place] [id number]. All rights reserved. This information is not intended as a substitute for professional medical care. Always follow your healthcare professional's instructions.                Discharge Instructions for Abdominal Surgery  Abdominal surgery is done through an incision in your belly. It may take a few weeks or longer to heal from the surgery. This sheet gives instructions on how to care for yourself once you re home.  Medicines  Here is what to expect:    You may be prescribed pain medicine. Do not wait until your pain becomes severe before taking the medicine. It may not work as well if you wait too long to take it between doses.    Most surgeons prescribe stool softeners along with opioid prescriptions. Take these as prescribed.     You may be prescribed antibiotics to help treat or prevent infection. Be sure to take all of the antibiotics even if you start to feel better.  Diet  Dietary tips include:    Follow any diet instructions given by your healthcare provider. You may need to start with liquids and then slowly add solid foods back into your diet.     If you have constipation, your healthcare provider may tell you to add more fiber to your diet. You may also be told to use a laxative or stool softener. These can often be bought over the counter.    Drink plenty of fluids.  Activity  Recommendations include the following:    Rest  as often as needed.    Ask your healthcare provider when you can shower or bathe. Have someone nearby in case you need help.    Ask your family and friends to help with chores and errands.    Don t mow the lawn, vacuum, or do any strenuous activities for 4 to 6 weeks.    Avoid lifting anything over 10 pounds for 4 to 6 weeks.    Avoid driving until your healthcare provider says it s OK.    Walk as often as you feel able.    Do the coughing and breathing exercises you were taught in the hospital. If you were given an incentive spirometer to help with breathing, use it as directed. This is important and will help prevent lung infections.     Ask your healthcare provider when you can return to work.  Incision and drain care  Do's and don'ts include the following:    Keep your incision clean and dry. It s OK to wash the skin around your incision with mild soap and water.    If you have a dressing over your incision, change it as you were told. Replace the dressing if it becomes wet or dirty. In most cases, the dressing can be removed after 48 hours.    If you have a drain, record the amount of drainage daily. You may also need to empty the drain and clean the attached tubing daily. Check with your healthcare provider if you can get your drain wet or if it needs to stay dry at all times.    Don t sit in a bathtub, pool, or hot tub until your incision is closed and any drains are removed.    When coughing or sneezing, hold a pillow firmly against your incision with both hands. This is called  splinting.  Doing this helps protect your incision and decreases belly discomfort.    Avoid picking, scratching, or pulling at your incision.    Don t use oils, or creams on your incision. Ask your healthcare provider before using lotions on your incision.  Follow-up  You will have one or more follow-up visits with your healthcare provider. These are needed to check how well you re healing. Your drain, stitches, or staples may also  be removed during these visits.  When to call the healthcare provider  Call your healthcare provider right away if you have any of the following:    Fever of 100.4 F (38 C) or higher, or as advised by your healthcare provider    Chest pain or trouble breathing    Pain or tenderness in the leg    Increased pain, redness, swelling, bleeding, or foul-smelling drainage at the incision site    Incision separates or comes apart    Problems with the drain if you have one    Pain or hardness in your belly that gets worse or isn t relieved by pain medicine    Nausea and vomiting that won t go away    Diarrhea that lasts more than 3 days    Constipation or inability to pass gas for more than 3 days    Dark-colored or bloody urine    Bright red or dark black stools    Itchy, swollen skin; skin rash   Date Last Reviewed: 7/1/2016 2000-2017 [employer]. [address] [place] [address]. All rights reserved. This information is not intended as a substitute for professional medical care. Always follow your healthcare professional's instructions.                Vacuum-Assisted Closure of a Wound  Vacuum-assisted closure (VAC) of a wound is a type of treatment to help wounds heal. It s also known as negative pressure wound therapy. During the treatment, a device lowers air pressure on the wound. This can help the wound heal more quickly.  Understanding the wound VAC system  A wound VAC system has several parts. A foam or gauze dressing is put directly on the wound. The dressing is changed every 24 to 72 hours. An adhesive film covers and seals the dressing and wound. A drainage tube leads from under the adhesive film and connects to a portable vacuum pump. This pump removes air pressure over the wound. It may do this constantly. Or it may do it in cycles. During the treatment, you ll need to carry the portable pump everywhere you go.  Why wound VAC is used  You might need this therapy for a recent traumatic  wound. Or you may need it for a chronic wound. This is a wound that does not heal the way it should over time. This can happen with wounds in people who have diabetes. You may need a wound VAC if you ve had a recent skin graft. And you may need a wound VAC for a large wound. Large wounds can take a longer time to heal.  A wound vacuum system may help your wound heal more quickly by:    Draining extra fluid from the wound    Reducing swelling    Reducing bacteria in the wound    Keeping your wound moist and warm    Helping draw together wound edges    Increasing blood flow to your wound    Decreasing inflammation  Wound VAC offers some other advantages over other types of wound care. It may decrease your overall discomfort. The dressings usually need to be changed less often. And they may be easier to keep in place.  Risks of wound VAC  Wound VAC has some rare risks, such as:    Bleeding (which may be severe)    Wound infection    An abnormal connection between the intestinal tract and the skin (enteric fistula)  Proper training in dressing changes can help reduce the risk for these complications. Also, your healthcare provider will carefully evaluate you to make sure you are a good candidate for the therapy. Certain problems can increase your risk for complications. These include:    Exposed organs or blood vessels    High risk of bleeding from another medical problem    Wound infection    Nearby bone infection    Dead wound tissue    Cancer tissue    Fragile skin, such as from aging or longtime use of topical steroids    Allergy to adhesive    Very poor blood flow to your wound    Wounds close to joints that may reopen because of movement  Your healthcare provider will discuss the risks that apply to you. Make sure to talk with him or her about all of your questions and concerns.  Getting ready for wound VAC  You likely won t need to do much to get ready for wound VAC. In some cases, you may need to wait a while  before having this therapy. For example, your healthcare provider may first need to treat an infection in your wound. Dead or damaged tissue may also need to be removed from your wound.  You or a caregiver may need training on how to use the wound VAC device. This is done if you will be able to have your wound vacuum therapy at home. In other cases, you may need to have your wound vacuum therapy in a healthcare facility.  On the day of your procedure  A healthcare provider will cover your wound with foam or gauze wound dressing. An adhesive film will be put over the dressing and wound. This seals the wound. The foam connects to a drainage tube, which leads to a vacuum pump. This pump is portable. When the pump is turned on, it draws fluid through the foam and out the drainage tubing. The pump may run constantly, or it may cycle off and on. Your exact setup will depend on the specific type of wound vacuum system that you use.  Managing your wound  You may need the dressing changed about once a day. You may need it changed more or less often, depending on your wound. You or your caregiver may be trained to do this at home. Or it may be done by a visiting healthcare provider. Your provider may prescribe a pain medicine. This is to prevent or reduce pain during the dressing change.  You will likely need to use the wound VAC system for several weeks or months. During this time, you ll carry the portable pump everywhere you go.  Nutrition for wound healing  During this time, make sure you follow a healthy diet. This is needed so the wound can heal and to prevent infection. Your healthcare provider can tell you more about what to include in your diet during this time.  Follow up with your healthcare provider if you have a medical condition that led to your wound, such as diabetes. Your provider can help you prevent future wounds.  Follow-up care  Your healthcare provider will carefully keep track of your healing. Make sure  to keep all follow-up appointments.  When to call your healthcare provider  Call your healthcare provider right away if you have any of these:    Fever of 100.4 F (38.0 C) or higher    Increased redness, swelling, or warmth around wound    Increased pain    Bright red blood or blood clots in tubing or the collection chamber of the vacuum   Date Last Reviewed: 2/1/2017 2000-2017 [employer]. [address] [place] [address], [place] [address]. All rights reserved. This information is not intended as a substitute for professional medical care. Always follow your healthcare professional's instructions.

## 2017-09-21 NOTE — ANESTHESIA PREPROCEDURE EVALUATION
Anesthesia Plan          Plan for     See intraoperative notes. Epidural for post-op pain control      Postoperative Care      Consents                          .

## 2017-09-21 NOTE — OP NOTE
DATE OF PROCEDURE:  09/21/2017       PREOPERATIVE DIAGNOSIS:  Sigmoid colostomy secondary to previous diverticular stricture, parastomal hernia.      POSTOPERATIVE DIAGNOSIS:  Sigmoid colostomy secondary to previous diverticular stricture, parastomal hernia.      PROCEDURE PERFORMED:  Closure of Marianna's type sigmoid colostomy plus repair of parastomal hernia.      SURGEON:  Tima Moreland MD      ASSISTANT:  Jaydon Chopra MD      ANESTHETIC:  General.      BLOOD LOSS:  Less than 200 mL.      SPECIMENS:  Sigmoid colostomy.      COMPLICATIONS:  Nil.      INDICATIONS:  Mr. Peter Zhao is a 71-year-old male who had presented in February of this year with a large bowel obstruction.  He had a short history of having had difficulties with his bowels.  X-rays suggest a distal large bowel obstruction in the sigmoid colon.  We did originally try to prep him for colonoscopy but unfortunately obstructed.  He was thus admitted into the hospital urgently.  A sigmoidoscopy was carried out which revealed what appeared to be a benign stricture at the lower end of the sigmoid colon.  He went on to have a laparotomy with resection of the area and creation of an end-Marianna's type colostomy.  His pathology was benign diverticular stricture.  He has done well.  He is now 6 weeks following the procedure and it was felt that we should close the colostomy formed.  Unfortunately, he did develop a parastomal hernia over the last 3 months or so.  He does have some underlying cardiac troubles.  He was seen by Dr. Vazquez preoperatively.      DESCRIPTION OF PROCEDURE:  The patient was prepped and brought to the operating room.  An epidural for postoperative pain control was placed by Anesthesia.  Then, under general anesthetic with endotracheal tube in place, he was placed in the lithotomy position, taking care to protect the bony prominences.  A Temple catheter was inserted.  The colostomy was then closed at the skin level with  interrupted 2-0 silk.  The abdomen and perineum were prepped with Povidone and draped in the usual fashion.  An appropriate timeout was carried out.  The previous lower midline incision was then reopened.  This was carried down through the subcutaneous tissue and the fascia opened in the midline.  He did have some omentum that was stuck to the underside of the abdominal wall in the upper abdomen but fortunately did not have significant adhesions.  The omentum was taken down by sharp dissection.  The patient was placed in the slightly head down position and the bowel was packed away.  We turned attention to the pelvis.  Fortunately, there is no small bowel stuck down into the pelvis.  The appendix was actually stuck down on the right side of the rectal stump.  The appendix and the mesoappendix was dissected away.  We then freed up the end of the rectum for a short distance.  It appeared viable and quite mobile.      Throughout this portion of the case, Dr. Chopra did help with retraction of the bowel and take down of adhesions.  He also proceeded to take down the colostomy.  It was freed up under the fascia.  There was some omentum stuck in area that was taken down.  An incision was then made around the colostomy at the level of the skin.  This was dissected out from the subcutaneous tissue with cautery.  There was also a fairly good sized hernia sac that was dissected away.  Eventually, the bowel was completely freed up from the surrounding fascia and returned to the abdomen.      Fortunately, the sigmoid was quite mobile and we did have significant length to get down into the pelvis.  We did end up resecting the distal 3-4 cm of the sigmoid.  This was the area that had gone through the skin.  There were some peritoneal fat adhesions which were dissected away.  The mesentery was then divided and tied with 2-0 Vicryl ties.  The bowel was then divided.  This area was sent for specimen.      The end of the sigmoid was  then incised.  The largest size we could get in was a 28-Sudanese.  A pursestring suture with 2-0 Prolene was then fashioned and the anvil inserted.      Dr. Chopra then went to the perineum.  The 28-Sudanese EEA stapler was then inserted.  With a bit of difficulty, we were able to guide this up to just below the staple line in the rectum.  The dart on the stapler was then advanced through the anterior wall of the rectum just above the staple line.  The anvil was then placed in the stapler and it was tightened under direct vision.  After getting down to the green line, the stapler was fired.  It was then removed without difficulty.  This did appear to be good anastomosis without tension.  The pelvis was filled with saline.  Dr. Chopra inserted the colonoscope up to the level of the anastomosis which did appear to be intact  with very minimal bleeding.  The bowel was inflated with air.  No air bubbles were seen in the pelvis.  It does appear that the anastomosis was intact.      There was some minimal bleeding in the pelvis which was controlled with 2-0 Vicryl ties.  A search for bleeding ulcer in the abdomen was carried out with some minimal bleeding from the omentum.      The hernia sac in the area of the colostomy was then excised with cautery.  I was able to approximate the muscles with interrupted #1 PDS in Smead-Reyes fashion.  I did end up tacking a bit of the external oblique over top of the sutures with interrupted 0 Vicryl.      We then turned our attention back to the abdomen.  A search for sponges and instruments was carried out.  We then switched to a closing tray.  The abdomen had been irrigated with warm normal saline.  The peritoneum in the lower portion of the incision was closed with running 2-0 Vicryl.  The fascia was closed with running #1 PDS.  This was done in a small bite fashion throughout the length.  The wound was irrigated with saline.  Skin was closed with interrupted 3-0 Prolene.  I did decide  to leave the colostomy site open as there was really no subcutaneous tissue to close and the skin with been under some tension.  Wound was irrigated.  It was then packed with Kerlix soaked in saline and a bulky dressing was applied.      He tolerated the procedure well.  He was returned to the recovery room in good condition.  At the end of procedure, sponge, instrument and needle count was correct as reported by the nurses.         RUDY RAYO MD             D: 2017 14:00   T: 2017 14:45   MT: GILLIAN      Name:     ROGER VINSON   MRN:      [id number]        Account:        [id number]   :      1945           Procedure Date: 2017      Document: [id number]

## 2017-09-21 NOTE — ANESTHESIA PROCEDURE NOTES
Peripheral nerve/Neuraxial procedure note : epidural catheter  Pre-Procedure  Performed by   Referred by DR RAYO  Location: pre-op    Procedure Times:9/21/2017 9:12 AM and 9/21/2017 9:18 AM  Pre-Anesthestic Checklist: patient identified, IV checked, site marked, risks and benefits discussed, informed consent, monitors and equipment checked, pre-op evaluation, at physician/surgeon's request and post-op pain management    Timeout  Correct Patient: Yes   Correct Procedure: Yes   Correct Site: Yes   Correct Laterality: Yes   Correct Position: Yes   Site Marked: Yes   .   Procedure Documentation    Diagnosis:anticipated closure of hartmans sigmoid colostomy.    Procedure:    Epidural catheter.  Insertion Site:T8-9  (midline approach) Injection technique: LORT saline   Local skin infiltrated with 3 mL of 1% lidocaine.  JUDY at 4 cm     Patient Prep;mask, sterile gloves, chlorhexidine gluconate and isopropyl alcohol, patient draped.  .  Needle: Touhy needle Needle Gauge: 18.    Needle Length (Inches) 3.5  # of attempts: 1 and # of redirects:  .   Catheter: 20 G . .  Catheter threaded easily  6 cm epidural space.  10 cm at skin.   .    Assessment/Narrative  Paresthesias: No.  .  .  Aspiration negative for heme or CSF  . Test dose of 4 mL lidocaine 1.5% w/ 1:200,000 epinephrine at 09:18.  Test dose negative for signs of intravascular, subdural or intrathecal injection. Comments:  No complications noted

## 2017-09-21 NOTE — ANESTHESIA PROCEDURE NOTES
Peripheral nerve/Neuraxial procedure note : TAP  Pre-Procedure    Location: OR    Procedure Times:9/21/2017 1:20 PM and 9/21/2017 1:25 PM  Pre-Anesthestic Checklist: patient identified, IV checked, site marked, risks and benefits discussed, informed consent, monitors and equipment checked, pre-op evaluation, at physician/surgeon's request and post-op pain management    Timeout  Correct Patient: Yes   Correct Procedure: Yes   Correct Site: Yes   Correct Laterality: Yes   Correct Position: Yes   Site Marked: Yes   .   Procedure Documentation    Diagnosis:SURGERY.    Procedure:    TAP.     Ultrasound used to identify targeted nerve, plexus, or vascular marker and placed a needle adjacent to it., Ultrasound was used to visualize the spread of the anesthetic in close proximity to the above stated nerve. A permanent image is entered into the patient's record.  Patient Prep;mask, sterile gloves, chlorhexidine gluconate and isopropyl alcohol, patient draped.  .  Needle: insulated Needle Gauge: 20.    Needle Length (Inches) 2  Insertion Method: Single Shot.       Assessment/Narrative  Paresthesias: No.  Injection made incrementally with aspirations every 5 mL..  The placement was negative for: blood aspirated, painful injection and site bleeding.  Bolus given via needle..   Secured via.   Complications: none.

## 2017-09-22 LAB
ALBUMIN SERPL-MCNC: 2.6 G/DL (ref 3.4–5)
ALP SERPL-CCNC: 59 U/L (ref 40–150)
ALT SERPL W P-5'-P-CCNC: 20 U/L (ref 0–70)
ANION GAP SERPL CALCULATED.3IONS-SCNC: 8 MMOL/L (ref 3–14)
ANION GAP SERPL CALCULATED.3IONS-SCNC: 9 MMOL/L (ref 3–14)
AST SERPL W P-5'-P-CCNC: 21 U/L (ref 0–45)
BACTERIA SPEC CULT: NORMAL
BASOPHILS # BLD AUTO: 0 10E9/L (ref 0–0.2)
BASOPHILS NFR BLD AUTO: 0.1 %
BILIRUB SERPL-MCNC: 0.6 MG/DL (ref 0.2–1.3)
BUN SERPL-MCNC: 22 MG/DL (ref 7–30)
BUN SERPL-MCNC: 23 MG/DL (ref 7–30)
CALCIUM SERPL-MCNC: 8 MG/DL (ref 8.5–10.1)
CALCIUM SERPL-MCNC: 8 MG/DL (ref 8.5–10.1)
CHLORIDE SERPL-SCNC: 109 MMOL/L (ref 94–109)
CHLORIDE SERPL-SCNC: 109 MMOL/L (ref 94–109)
CO2 SERPL-SCNC: 20 MMOL/L (ref 20–32)
CO2 SERPL-SCNC: 21 MMOL/L (ref 20–32)
CREAT SERPL-MCNC: 1.21 MG/DL (ref 0.66–1.25)
CREAT SERPL-MCNC: 1.23 MG/DL (ref 0.66–1.25)
DIFFERENTIAL METHOD BLD: ABNORMAL
EOSINOPHIL # BLD AUTO: 0 10E9/L (ref 0–0.7)
EOSINOPHIL NFR BLD AUTO: 0 %
ERYTHROCYTE [DISTWIDTH] IN BLOOD BY AUTOMATED COUNT: 13.6 % (ref 10–15)
GFR SERPL CREATININE-BSD FRML MDRD: 58 ML/MIN/1.7M2
GFR SERPL CREATININE-BSD FRML MDRD: 59 ML/MIN/1.7M2
GLUCOSE BLDC GLUCOMTR-MCNC: 109 MG/DL (ref 70–99)
GLUCOSE BLDC GLUCOMTR-MCNC: 117 MG/DL (ref 70–99)
GLUCOSE BLDC GLUCOMTR-MCNC: 121 MG/DL (ref 70–99)
GLUCOSE BLDC GLUCOMTR-MCNC: 148 MG/DL (ref 70–99)
GLUCOSE BLDC GLUCOMTR-MCNC: 200 MG/DL (ref 70–99)
GLUCOSE BLDC GLUCOMTR-MCNC: 203 MG/DL (ref 70–99)
GLUCOSE SERPL-MCNC: 175 MG/DL (ref 70–99)
GLUCOSE SERPL-MCNC: 175 MG/DL (ref 70–99)
HCT VFR BLD AUTO: 35 % (ref 40–53)
HGB BLD-MCNC: 11.8 G/DL (ref 13.3–17.7)
IMM GRANULOCYTES # BLD: 0 10E9/L (ref 0–0.4)
IMM GRANULOCYTES NFR BLD: 0.2 %
LYMPHOCYTES # BLD AUTO: 0.8 10E9/L (ref 0.8–5.3)
LYMPHOCYTES NFR BLD AUTO: 4.5 %
MCH RBC QN AUTO: 29.7 PG (ref 26.5–33)
MCHC RBC AUTO-ENTMCNC: 33.7 G/DL (ref 31.5–36.5)
MCV RBC AUTO: 88 FL (ref 78–100)
MONOCYTES # BLD AUTO: 1 10E9/L (ref 0–1.3)
MONOCYTES NFR BLD AUTO: 5.7 %
NEUTROPHILS # BLD AUTO: 15.4 10E9/L (ref 1.6–8.3)
NEUTROPHILS NFR BLD AUTO: 89.5 %
NRBC # BLD AUTO: 0 10*3/UL
NRBC BLD AUTO-RTO: 0 /100
PLATELET # BLD AUTO: 180 10E9/L (ref 150–450)
POTASSIUM SERPL-SCNC: 4.9 MMOL/L (ref 3.4–5.3)
POTASSIUM SERPL-SCNC: 4.9 MMOL/L (ref 3.4–5.3)
PROT SERPL-MCNC: 5.4 G/DL (ref 6.8–8.8)
RBC # BLD AUTO: 3.97 10E12/L (ref 4.4–5.9)
SODIUM SERPL-SCNC: 138 MMOL/L (ref 133–144)
SODIUM SERPL-SCNC: 138 MMOL/L (ref 133–144)
SPECIMEN SOURCE: NORMAL
WBC # BLD AUTO: 17.2 10E9/L (ref 4–11)

## 2017-09-22 PROCEDURE — 85025 COMPLETE CBC W/AUTO DIFF WBC: CPT | Performed by: INTERNAL MEDICINE

## 2017-09-22 PROCEDURE — 36415 COLL VENOUS BLD VENIPUNCTURE: CPT | Performed by: INTERNAL MEDICINE

## 2017-09-22 PROCEDURE — S0074 INJECTION, CEFOTETAN DISODIU: HCPCS | Performed by: SURGERY

## 2017-09-22 PROCEDURE — 01996 DLY HOSP MGMT EDRL RX ADMIN: CPT | Performed by: NURSE ANESTHETIST, CERTIFIED REGISTERED

## 2017-09-22 PROCEDURE — 25800025 ZZH RX 258: Performed by: SURGERY

## 2017-09-22 PROCEDURE — 25000128 H RX IP 250 OP 636: Performed by: SURGERY

## 2017-09-22 PROCEDURE — 25000125 ZZHC RX 250: Performed by: SURGERY

## 2017-09-22 PROCEDURE — 27110038 ZZH RX 271: Performed by: NURSE ANESTHETIST, CERTIFIED REGISTERED

## 2017-09-22 PROCEDURE — 25000132 ZZH RX MED GY IP 250 OP 250 PS 637: Mod: GY | Performed by: INTERNAL MEDICINE

## 2017-09-22 PROCEDURE — 12000011 ZZH R&B MS OVERFLOW

## 2017-09-22 PROCEDURE — 80053 COMPREHEN METABOLIC PANEL: CPT | Performed by: INTERNAL MEDICINE

## 2017-09-22 PROCEDURE — A9270 NON-COVERED ITEM OR SERVICE: HCPCS | Mod: GY | Performed by: INTERNAL MEDICINE

## 2017-09-22 PROCEDURE — A9270 NON-COVERED ITEM OR SERVICE: HCPCS | Mod: GY | Performed by: SURGERY

## 2017-09-22 PROCEDURE — 40000275 ZZH STATISTIC RCP TIME EA 10 MIN

## 2017-09-22 PROCEDURE — 25000128 H RX IP 250 OP 636: Performed by: NURSE ANESTHETIST, CERTIFIED REGISTERED

## 2017-09-22 PROCEDURE — 25000132 ZZH RX MED GY IP 250 OP 250 PS 637: Mod: GY | Performed by: SURGERY

## 2017-09-22 PROCEDURE — 88304 TISSUE EXAM BY PATHOLOGIST: CPT | Mod: TC | Performed by: SURGERY

## 2017-09-22 PROCEDURE — 00000146 ZZHCL STATISTIC GLUCOSE BY METER IP

## 2017-09-22 PROCEDURE — 99232 SBSQ HOSP IP/OBS MODERATE 35: CPT | Performed by: INTERNAL MEDICINE

## 2017-09-22 RX ADMIN — POTASSIUM CHLORIDE, DEXTROSE MONOHYDRATE AND SODIUM CHLORIDE: 150; 5; 450 INJECTION, SOLUTION INTRAVENOUS at 12:07

## 2017-09-22 RX ADMIN — CEFOTETAN DISODIUM 1 G: 1 INJECTION, POWDER, FOR SOLUTION INTRAMUSCULAR; INTRAVENOUS at 08:55

## 2017-09-22 RX ADMIN — METOPROLOL TARTRATE 100 MG: 100 TABLET, FILM COATED ORAL at 21:36

## 2017-09-22 RX ADMIN — RANITIDINE 150 MG: 150 TABLET ORAL at 08:56

## 2017-09-22 RX ADMIN — POTASSIUM CHLORIDE, DEXTROSE MONOHYDRATE AND SODIUM CHLORIDE: 150; 5; 450 INJECTION, SOLUTION INTRAVENOUS at 21:42

## 2017-09-22 RX ADMIN — Medication 7 ML/HR: at 19:31

## 2017-09-22 RX ADMIN — RANITIDINE 150 MG: 150 TABLET ORAL at 18:12

## 2017-09-22 RX ADMIN — INSULIN ASPART 1 UNITS: 100 INJECTION, SOLUTION INTRAVENOUS; SUBCUTANEOUS at 03:56

## 2017-09-22 RX ADMIN — INSULIN ASPART 1 UNITS: 100 INJECTION, SOLUTION INTRAVENOUS; SUBCUTANEOUS at 00:22

## 2017-09-22 RX ADMIN — KETOROLAC TROMETHAMINE 15 MG: 15 INJECTION, SOLUTION INTRAMUSCULAR; INTRAVENOUS at 08:55

## 2017-09-22 RX ADMIN — KETOROLAC TROMETHAMINE 15 MG: 15 INJECTION, SOLUTION INTRAMUSCULAR; INTRAVENOUS at 03:56

## 2017-09-22 RX ADMIN — POTASSIUM CHLORIDE, DEXTROSE MONOHYDRATE AND SODIUM CHLORIDE: 150; 5; 450 INJECTION, SOLUTION INTRAVENOUS at 01:35

## 2017-09-22 RX ADMIN — INSULIN ASPART 1 UNITS: 100 INJECTION, SOLUTION INTRAVENOUS; SUBCUTANEOUS at 08:53

## 2017-09-22 RX ADMIN — FINASTERIDE 5 MG: 5 TABLET, FILM COATED ORAL at 18:12

## 2017-09-22 RX ADMIN — ATORVASTATIN CALCIUM 40 MG: 40 TABLET, FILM COATED ORAL at 18:12

## 2017-09-22 ASSESSMENT — PAIN DESCRIPTION - DESCRIPTORS
DESCRIPTORS: TIGHTNESS
DESCRIPTORS: DISCOMFORT

## 2017-09-22 NOTE — PLAN OF CARE
Face to face report given with opportunity to observe patient.    Report given to Elaina HUTTON and Deepak Avery   9/21/2017  7:22 PM

## 2017-09-22 NOTE — PLAN OF CARE
Patient continues to have sr cathter because he has an epidural and he is not 24 hours post op yet. Order to remove is for post op day 2.

## 2017-09-22 NOTE — CONSULTS
Pt seen with Susanna RN for consult orders placed by Dr. Moreland for abdominal wound from colostomy takedown surgery that was left to heal by secondary intention. His wife Raven is present in the room but does not feel comfortable taking part in assisting in dressing changes.  Peter is in good spirits and asking many good questions about his plan of care for tx. All were answered as best as I was able during the visit.  Removed one continuous piece of kerlix from wound base. Cleansed periwound skin with baby wash and water. Gently flushed wound with NS followed by gently cleansing base with 4x4 and applicator into the undermined areas.    Base of wound is 100% granular red, no odor or periwound erythema. Measures 6.0x2.8x2.0cm. Undermines from 10-5:00, at 12:00 is the deepest 3.0.    Loosely tucked NS moist gauze (wrung as dry as able) into undermined areas and to base of wound (one continuous length). Covered with dry 4x4's and affixed with Medipore tape.    Cleansed incision site also with baby wash and water. Apply dry 4x4's affixed with gauze.    Did speak with Dr. Moreland after and daily and PRN dressing changes are appropriate as wound is clean. Did also talk about potential need for home care services to assist with dressing changes on d/c. Does not think there is need for wound vac at this time.    Will plan on seeing pt on Monday at 2:00 for follow up.

## 2017-09-22 NOTE — PLAN OF CARE
Problem: Patient Care Overview  Goal: Plan of Care/Patient Progress Review  Outcome: No Change  Pt alert and oriented, remains bedrest with sr in and patent. Pt turning side to side, deep breathing and coughing, and incentive spirometer in use. Pt remains on the epidural running at 7mL an hour.  Pt sensation diminished in the T12 region, all other sensation present. Medicated with scheduled Toradol. VSS. Tele reading 100% Vpaced.     Problem: Bowel Resection (Adult)  Goal: Anesthesia/Sedation Recovery  Outcome: No Change    09/22/17 0543   OTHER   Anesthesia/Sedation Recovery progressing toward baseline         Face to face report given with opportunity to observe patient.    Report given to Susanna Parekh   9/22/2017  7:29 AM

## 2017-09-22 NOTE — PLAN OF CARE
Patient up to chair with assistance of 2 for safety. Pain well controlled with epidural. Patient has no loss of sensation. Able to stand without difficulty.

## 2017-09-22 NOTE — PLAN OF CARE
Assessment completed see flow sheet.    Miguel is sitting in bed, his PCP is Dr Vazquez, his dentist is Dr Cee and his eye care provider is Dr Gutierrez. He sees Dr Moreland and cardiology at [facility].  He does not know if he has a POA or healthcare directive, information was provided. He uses [email or web address]. Miguel was in the [employer] for 6 years.     Miguel lives at home with his wife and dog Ben, states he feels safe and that his home is well maintained. He performs his own self cares, manages his medication and appointment schedule. He has walkers and a wheelchair at home that he does not use. [place] They both do the shopping and food prep. He drives and has reliable transportation.  He is retired from [employer].     Miguel sleeps well and has no mood concerns. He does not smoke or consume alcohol. His aysha is important to him, he does not want aysha support at this time. His support system is his wife, son and Dr Mares, his neighbor. Stressors/worries include this colostomy takedown surgery. He enjoys being at home at the [place].    Miguel plans to discharge to home, his wife Floridalma will transport. No needs identified. Will remain available for discharge planning.    LOC: alert    Others present: Patient    Dx: colostomy takedown    Lives with: Lives With: spouse    Living at: Living Arrangements: house    Equipment used:  None    Support System: Description of Support System: Supportive, Involved    Primary PCP: Alexander Vazquez    POA/Guardian: no    Health Care Directive: NO    Pharmacy: [facility]    :  no    Homecare/County Services:   no    Adequate Resources for needs (housing, utilities, food/med): YES    Meds and appointments management: YES    Work: NO    Transportation: YES     ADLs: independent    Falls: No    Able to Return to Prior Living Arrangements: YES    [place]: [employer]    Goals: to have a bowel  movement    Barriers: surgical procedure    Needs: Demonstrates Competency    DENAE: Average    ED Visits: 5, various reasons

## 2017-09-22 NOTE — PLAN OF CARE
Updated Dr. Moreland that wound care would be up this afternoon around [id number] to assist with patient's dressing change.

## 2017-09-22 NOTE — ANESTHESIA POSTPROCEDURE EVALUATION
Patient: Peter NARANJO Pavel    Procedure(s):  CLOSURE OF NORRIS'S SIGMOID COLOSTOMY, REPAIR STOMAL HERNIA - Wound Class: II-Clean Contaminated    Diagnosis:SIGMOID COLOSTOMY  Diagnosis Additional Information: No value filed.    Anesthesia Type:  General, ETT, Periph. Nerve Block for postop pain, Epidural    Note:  Anesthesia Post Evaluation    Patient location during evaluation: PACU, ICU, Floor and Bedside  Patient participation: Able to participate in evaluation but full recovery from regional anesthesia has not yet ocurrred but is anticipated to occur within 48 hours  Level of consciousness: awake and alert  Pain management: adequate  Airway patency: patent  Cardiovascular status: acceptable  Respiratory status: acceptable  Hydration status: stable  PONV: none     Anesthetic complications: None          Last vitals:  Vitals:    09/22/17 0300 09/22/17 0400 09/22/17 0500   BP: 101/69 105/64 90/54   Pulse:      Resp: 15 (!) 10 14   Temp:      SpO2: 92% 92% 94%         Electronically Signed By: Davi Perkins MD  September 22, 2017  6:48 AM

## 2017-09-22 NOTE — PLAN OF CARE
Problem: Bowel Resection (Adult)  Goal: Signs and Symptoms of Listed Potential Problems Will be Absent, Minimized or Managed (Bowel Resection)  Signs and symptoms of listed potential problems will be absent, minimized or managed by discharge/transition of care (reference Bowel Resection (Adult) CPG).   Outcome: Improving  Alert but forgetful. BP's soft this morning but improving over shift. Telemetry monitor showing 100% V Paced 50's-70's. O2/capnography removed this morning. Sat's mid 90's while awake, do note to drop to 90-91% while sleeping. Epidural continues at 7 ml/hr. Epidural site remains CDI and secured. No loss of sensation bilaterally from epidural. Pain rated 0/10 while patient is still but patient does state it increases greatly with movement. Lungs diminished. Encouraged use of IS. Bowel sounds active throughout day, denies passing flatus. Abdominal dressing/packing done this afternoon with WOC nurse. Small amounts of shadow drainage outlined. Temple catheter remains in and patent with adequate output. IV infusing at 100 cc/hr. Patient taking sips of clear liquids throughout the day without complaint. Patient needs much encouragement to get out of bed. Up in chair twice for 1-1.5 hours each time. Did ambulate 25 feet in room, tolerated well. PCD's on and off during day. Blood sugars improving-148, 121, and 117. Epidural cartriage due to be change soon, will update oncoming shift and verify.

## 2017-09-22 NOTE — PROGRESS NOTES
[facility]  Hospitalist Progress Note          Assessment and Plan:   1.  Status post colostomy takedown.  He tolerated this procedure extremely well without any complications during the surgical procedure.  He does have an epidural in place.  He had a lot of pain last time and did have a lot of postoperative confusion.  The hope is that the epidural will limit the amount of narcotics he requires and help him transition to recovery easier without confusion which was an issue last time.  Diet will be held until cleared by Surgery.  He will have dressing changes to his wound on his abdomen per Surgery's recommendations.  Temple catheter remains in place.      2.  Coronary artery disease.  Stable at this point.  No signs of heart failure, no chest pain reported. He does have his biventricular pacer implantable cardioverter-defibrillator and placement functioning fine.  BP is also currently stable as he has a history of labile HTN, remains on outpatient medications.  Echo with normal EF.      3.  History of diabetes mellitus, type 2.  He is on just dietary control.  We will check some Accu-Cheks while he is here and cover with sliding scale as needed.  We monitored in our ICU at this point while he has epidural in place.     4. Renal:  Stable    5. Pulmonary:  Stable with bedside IS.    6. Prophylaxis:  Pneumatic boots.  Transition to SQ Lovenox once ok'k by surgical team.      7. Dispo:  Depending on return of bowel activity.     CODE STATUS:  He is full code.               Interval History:   Post op day one colostomy take down.  Without any complaints at this time.  Denies any confusion.  Pain is well controlled with epidural.  Denies and chest pain or SOB. Vitals remain stable.                 Medications:       amLODIPine  5 mg Oral Daily     atorvastatin  40 mg Oral Daily     finasteride  5 mg Oral Daily     lisinopril  40 mg Oral Daily     metoprolol  100 mg Oral BID     ranitidine  150 mg Oral BID      sodium chloride (PF)  3 mL Intracatheter Q8H     ketorolac  15 mg Intravenous Q6H     insulin aspart  1-4 Units Subcutaneous Q4H     Cefotetan (CEFOTAN) intermittent infusion  1 g Intravenous Q12H                  Physical Exam:     Vitals:    17 0200 17 0300 17 0400 17 0500   BP: 103/64 101/69 105/64 90/54   BP Location:       Pulse:       Resp: (!) 7 15 (!) 10 14   Temp:       TempSrc:       SpO2: 92% 92% 92% 94%   Weight:   79 kg (174 lb 2.6 oz)    Height:             Vital Sign Ranges  Temperature Temp  Av.5  F (36.4  C)  Min: 96.5  F (35.8  C)  Max: 98.4  F (36.9  C)   Blood pressure Systolic (24hrs), Av , Min:85 , Max:125        Diastolic (24hrs), Av, Min:45, Max:81      Pulse Pulse  Av  Min: 63  Max: 63   Respirations Resp  Avg: 15.2  Min: 0  Max: 26   Pulse oximetry SpO2  Av.5 %  Min: 83 %  Max: 99 %         Intake/Output Summary (Last 24 hours) at 17 0723  Last data filed at 17 0700   Gross per 24 hour   Intake             5095 ml   Output             1070 ml   Net             4025 ml       General:  Alert and Orientated.  NAD.  Heart:  RRR, S1 S2, with 2/6 systolic murmur, no rubs, no gallops.  Resp: CTA bilaterally.  No wheezes, no rhonchi, no crackles.  Abd: Dressing in place. No pain reported.  No BS.    Ext: No edema noted in either lower extremity  Neuro:  No focal Neurologic deficits noted.    Peripheral IV 17 Right Hand (Active)   Site Assessment [facility] 2017  4:00 AM   Line Status Infusing 2017  4:00 AM   Phlebitis Scale 0-->no symptoms 2017  4:00 AM   Infiltration Scale 0 2017  4:00 AM   Number of days:1       Peripheral IV 17 Left Upper arm (Active)   Site Assessment [facility] 2017  4:00 AM   Line Status Saline locked 2017  4:00 AM   Phlebitis Scale 0-->no symptoms 2017  4:00 AM   Infiltration Scale 0 2017  4:00 AM   Number of days:1       Intrathecal/Epidural Catheter 17 (Active)    Site Assessment Clean, dry, intact 9/22/2017  4:00 AM   Line Status Infusing 9/22/2017  4:00 AM   Dressing Type Other (Comment) 9/22/2017  4:00 AM   Number of days:1       Colostomy (Active)   Number of days:217       Urethral Catheter Double-lumen 16 fr (Active)   Tube Description UT 9/22/2017  7:00 AM   Catheter Care Done 9/22/2017  7:00 AM   Collection Container Standard 9/22/2017  7:00 AM   Securement Method Securing device (Describe) 9/22/2017  7:00 AM   Rationale for Continued Use Strict 1-2 Hour I&O 9/22/2017  7:00 AM   Urine Output 35 mL 9/22/2017  7:00 AM   Number of days:1       Incision/Surgical Site with Packing 09/21/17 Left;Mid Abdomen Qty Placed: 1 (Active)   Incision Assessment [facility] 9/22/2017  4:00 AM   Charisma-Incision Assessment [facility] 9/22/2017  4:00 AM   Dressing Intervention Dressing marked - No change 9/22/2017  4:00 AM   Number of days:1       Incision/Surgical Site 04/20/17 Right Eye (Active)   Number of days:155       Incision/Surgical Site 09/21/17 Abdomen (Active)   Incision Assessment [facility] 9/22/2017  4:00 AM   Charisma-Incision Assessment [facility] 9/22/2017  4:00 AM   Closure MECCA 9/22/2017  4:00 AM   Dressing Intervention Dressing marked - Extended 9/22/2017  4:00 AM   Number of days:1     Temple             Data:     Lab Results   Component Value Date    WBC 17.2 (H) 09/22/2017    HGB 11.8 (L) 09/22/2017    HCT 35.0 (L) 09/22/2017     09/22/2017     09/22/2017    POTASSIUM 4.9 09/22/2017    CHLORIDE 109 09/22/2017    CO2 21 09/22/2017    BUN 22 09/22/2017    CR 1.23 09/22/2017     (H) 09/22/2017    TROPI  04/28/2017     <0.015  The 99th percentile for upper reference range is 0.045 ug/L.  Troponin values in   the range of 0.045 - 0.120 ug/L may be associated with risks of adverse   clinical events.      TROPN  03/27/2013     <0.04  **Risk Stratification**   0.10 - 0.39   Elevated-may indicate increased                 risk of short term adverse                 cardiac events.       >=0.4      Possible cardiac injury.    AST 21 09/12/2017    ALT 26 09/12/2017    ALKPHOS 93 09/12/2017    BILITOTAL 0.9 09/12/2017    INR 1.01 07/15/2017     Lactic Acid   Date Value Ref Range Status   02/18/2017 1.0 0.4 - 2.0 mmol/L Final   02/17/2017 1.7 0.4 - 2.0 mmol/L Final       Alexander Vazquez, DO

## 2017-09-22 NOTE — PROGRESS NOTES
POD # 1  Doing very well, denies abdominal pain with epidural catheter.  Awake and alert, no confusion  Afebrile, BP slightly low with epidural, HR 53  Abdomen - soft, non-tender, not distended  Good urine output  Lab - Hgb 11.8, WBC 17.2, Lytes OK, glucose 203  A - doing well  P - epidural catheter for 24 more hours        Await bowel function        Sips of clear fluids        Start dressing changes

## 2017-09-22 NOTE — PROVIDER NOTIFICATION
"Dr. Vazquez updated on patient's BP this morning, okay to hold Norvasc, Lisinopril, and Metoprolol. Patient concerned about this and continues to state \"if my blood pressure gets to high I will get a stroke\". Updated the patient that his BP is low this morning and his medications need to be held at this time. Patient also concerned about not receiving his Plavix and also states he \"will get another stroke\" if he does not take it. Dr. Moreland educated patient he can not receive his Plavix until the epidural is removed.   "

## 2017-09-23 LAB
ANION GAP SERPL CALCULATED.3IONS-SCNC: 9 MMOL/L (ref 3–14)
BASOPHILS # BLD AUTO: 0.1 10E9/L (ref 0–0.2)
BASOPHILS NFR BLD AUTO: 0.3 %
BUN SERPL-MCNC: 20 MG/DL (ref 7–30)
CALCIUM SERPL-MCNC: 7.9 MG/DL (ref 8.5–10.1)
CHLORIDE SERPL-SCNC: 113 MMOL/L (ref 94–109)
CO2 SERPL-SCNC: 19 MMOL/L (ref 20–32)
CREAT SERPL-MCNC: 1.1 MG/DL (ref 0.66–1.25)
DIFFERENTIAL METHOD BLD: ABNORMAL
EOSINOPHIL # BLD AUTO: 0.1 10E9/L (ref 0–0.7)
EOSINOPHIL NFR BLD AUTO: 0.5 %
ERYTHROCYTE [DISTWIDTH] IN BLOOD BY AUTOMATED COUNT: 14.2 % (ref 10–15)
GFR SERPL CREATININE-BSD FRML MDRD: 66 ML/MIN/1.7M2
GLUCOSE BLDC GLUCOMTR-MCNC: 106 MG/DL (ref 70–99)
GLUCOSE BLDC GLUCOMTR-MCNC: 117 MG/DL (ref 70–99)
GLUCOSE SERPL-MCNC: 131 MG/DL (ref 70–99)
HCT VFR BLD AUTO: 43.4 % (ref 40–53)
HGB BLD-MCNC: 13.3 G/DL (ref 13.3–17.7)
IMM GRANULOCYTES # BLD: 0.1 10E9/L (ref 0–0.4)
IMM GRANULOCYTES NFR BLD: 0.5 %
LYMPHOCYTES # BLD AUTO: 1.5 10E9/L (ref 0.8–5.3)
LYMPHOCYTES NFR BLD AUTO: 9 %
MCH RBC QN AUTO: 29.9 PG (ref 26.5–33)
MCHC RBC AUTO-ENTMCNC: 30.6 G/DL (ref 31.5–36.5)
MCV RBC AUTO: 98 FL (ref 78–100)
MONOCYTES # BLD AUTO: 1.4 10E9/L (ref 0–1.3)
MONOCYTES NFR BLD AUTO: 8.4 %
NEUTROPHILS # BLD AUTO: 13.7 10E9/L (ref 1.6–8.3)
NEUTROPHILS NFR BLD AUTO: 81.3 %
NRBC # BLD AUTO: 0 10*3/UL
NRBC BLD AUTO-RTO: 0 /100
PLATELET # BLD AUTO: 166 10E9/L (ref 150–450)
POTASSIUM SERPL-SCNC: 4.7 MMOL/L (ref 3.4–5.3)
RBC # BLD AUTO: 4.45 10E12/L (ref 4.4–5.9)
SODIUM SERPL-SCNC: 141 MMOL/L (ref 133–144)
WBC # BLD AUTO: 16.8 10E9/L (ref 4–11)

## 2017-09-23 PROCEDURE — 00000146 ZZHCL STATISTIC GLUCOSE BY METER IP

## 2017-09-23 PROCEDURE — 25000132 ZZH RX MED GY IP 250 OP 250 PS 637: Mod: GY | Performed by: INTERNAL MEDICINE

## 2017-09-23 PROCEDURE — 80048 BASIC METABOLIC PNL TOTAL CA: CPT | Performed by: INTERNAL MEDICINE

## 2017-09-23 PROCEDURE — 40000275 ZZH STATISTIC RCP TIME EA 10 MIN

## 2017-09-23 PROCEDURE — 12000000 ZZH R&B MED SURG/OB

## 2017-09-23 PROCEDURE — 99232 SBSQ HOSP IP/OBS MODERATE 35: CPT | Performed by: INTERNAL MEDICINE

## 2017-09-23 PROCEDURE — 25000128 H RX IP 250 OP 636

## 2017-09-23 PROCEDURE — 85025 COMPLETE CBC W/AUTO DIFF WBC: CPT | Performed by: INTERNAL MEDICINE

## 2017-09-23 PROCEDURE — 01996 DLY HOSP MGMT EDRL RX ADMIN: CPT | Performed by: NURSE ANESTHETIST, CERTIFIED REGISTERED

## 2017-09-23 PROCEDURE — 25000132 ZZH RX MED GY IP 250 OP 250 PS 637: Mod: GY | Performed by: SURGERY

## 2017-09-23 PROCEDURE — 36416 COLLJ CAPILLARY BLOOD SPEC: CPT | Performed by: INTERNAL MEDICINE

## 2017-09-23 PROCEDURE — A9270 NON-COVERED ITEM OR SERVICE: HCPCS | Mod: GY | Performed by: INTERNAL MEDICINE

## 2017-09-23 PROCEDURE — A9270 NON-COVERED ITEM OR SERVICE: HCPCS | Mod: GY | Performed by: SURGERY

## 2017-09-23 PROCEDURE — 25000128 H RX IP 250 OP 636: Performed by: INTERNAL MEDICINE

## 2017-09-23 RX ORDER — KETOROLAC TROMETHAMINE 15 MG/ML
15 INJECTION, SOLUTION INTRAMUSCULAR; INTRAVENOUS EVERY 6 HOURS PRN
Status: DISCONTINUED | OUTPATIENT
Start: 2017-09-23 | End: 2017-09-26 | Stop reason: HOSPADM

## 2017-09-23 RX ORDER — KETOROLAC TROMETHAMINE 30 MG/ML
INJECTION, SOLUTION INTRAMUSCULAR; INTRAVENOUS
Status: COMPLETED
Start: 2017-09-23 | End: 2017-09-23

## 2017-09-23 RX ADMIN — METOPROLOL TARTRATE 100 MG: 100 TABLET, FILM COATED ORAL at 09:43

## 2017-09-23 RX ADMIN — FINASTERIDE 5 MG: 5 TABLET, FILM COATED ORAL at 18:07

## 2017-09-23 RX ADMIN — RANITIDINE 150 MG: 150 TABLET ORAL at 19:50

## 2017-09-23 RX ADMIN — KETOROLAC TROMETHAMINE 30 MG: 30 INJECTION, SOLUTION INTRAMUSCULAR; INTRAVENOUS at 09:39

## 2017-09-23 RX ADMIN — KETOROLAC TROMETHAMINE 15 MG: 15 INJECTION, SOLUTION INTRAMUSCULAR; INTRAVENOUS at 17:24

## 2017-09-23 RX ADMIN — LISINOPRIL 40 MG: 20 TABLET ORAL at 09:43

## 2017-09-23 RX ADMIN — AMLODIPINE BESYLATE 5 MG: 5 TABLET ORAL at 19:49

## 2017-09-23 RX ADMIN — METOPROLOL TARTRATE 100 MG: 100 TABLET, FILM COATED ORAL at 19:49

## 2017-09-23 RX ADMIN — ATORVASTATIN CALCIUM 40 MG: 40 TABLET, FILM COATED ORAL at 18:07

## 2017-09-23 RX ADMIN — RANITIDINE 150 MG: 150 TABLET ORAL at 09:45

## 2017-09-23 RX ADMIN — KETOROLAC TROMETHAMINE 15 MG: 15 INJECTION, SOLUTION INTRAMUSCULAR; INTRAVENOUS at 22:55

## 2017-09-23 ASSESSMENT — PAIN DESCRIPTION - DESCRIPTORS
DESCRIPTORS: SORE
DESCRIPTORS: ACHING

## 2017-09-23 NOTE — ANESTHESIA POSTPROCEDURE EVALUATION
Patient: Peter Zhao    * No procedures listed *    Diagnosis:* No pre-op diagnosis entered *  Diagnosis Additional Information: No value filed.    Anesthesia Type:  No value filed.    Note:  Anesthesia Post Evaluation    Patient location during evaluation: Bedside  Patient participation: Able to fully participate in evaluation  Level of consciousness: awake  Pain management: adequate  Airway patency: patent  Cardiovascular status: acceptable  Respiratory status: acceptable  Hydration status: acceptable  PONV: none     Anesthetic complications: None          Last vitals:  Vitals:    09/23/17 1100 09/23/17 1200 09/23/17 1300   BP: 125/71 123/80 129/74   Pulse:      Resp: 12 16 12   Temp:  98.2  F (36.8  C)    SpO2:  93%          Electronically Signed By: ALBARO Long CRNA  September 23, 2017  3:04 PM

## 2017-09-23 NOTE — PLAN OF CARE
Face to face report given with opportunity to observe patient.    Report given to Nora HUTTON and Barbara Avery   9/22/2017  7:36 PM

## 2017-09-23 NOTE — PROGRESS NOTES
POD # 2  Doing well, some lower abdominal pain when moves around, passing flatus, no stool  Afebrile, HR 60, /77  Abdomen - soft, non-tender, a bit distended, both wounds look good  Lab - Hgb 13.3, WBC 16.8, lytes OK  A - doing well post colostomy closure  P - epidural out today        Try to avoid narcotics as cause confusion        Out of ICU after epidural out        Full liquids by mouth        Ambulate

## 2017-09-23 NOTE — PROGRESS NOTES
[facility]  Hospitalist Progress Note          Assessment and Plan:   1.  Status post colostomy takedown.  Post op day #2. He tolerated this procedure extremely well without any complications during the surgical procedure.  He does have an epidural in place.  He had a lot of pain last time and did have a lot of postoperative confusion.  The hope is that the epidural will limit the amount of narcotics he requires and help him transition to recovery easier without confusion which was an issue last time.  Diet will be held until cleared by Surgery.  He will have dressing changes to his wound on his abdomen per Surgery's recommendations.  Temple catheter remains in place until epidural removed.       2.  Coronary artery disease.  Stable at this point.  No signs of heart failure, no chest pain reported. He does have his biventricular pacer implantable cardioverter-defibrillator and placement functioning fine.  BP is also currently stable as he has a history of labile HTN, remains on outpatient medications-ACE/Beta blocker and Amlodipine.  Echo with normal EF.       3.  History of diabetes mellitus, type 2.  Generally diet control at baseline.  Accu-Cheks while he is here and cover with sliding scale as needed.  We monitored in our ICU at this point while he has epidural in place.    4.  Neuro:  History of CVA.  We will restart Plavix once able.       5. Renal:  Stable     6. Pulmonary:  Stable with bedside IS.     7. Prophylaxis:  Pneumatic boots.  Transition to SQ Lovenox once ok'k by surgical team and epidural removed.       8. Dispo:  Depending on return of bowel activity.     CODE STATUS:  He is full code.               Interval History:   Post op day #2 colostomy takedown. Patient remains stable overnight.  Reporting some pain overnight in lower abdomen especially when moving around.  Denies any chest pain or SOB.  Denies any nausea or vomiting.  Still with epidural in place.  Some flatus reported this  morning.                Medications:       amLODIPine  5 mg Oral Daily     atorvastatin  40 mg Oral Daily     finasteride  5 mg Oral Daily     lisinopril  40 mg Oral Daily     metoprolol  100 mg Oral BID     ranitidine  150 mg Oral BID     sodium chloride (PF)  3 mL Intracatheter Q8H     insulin aspart  1-4 Units Subcutaneous Q4H                  Physical Exam:     Vitals:    17 0400 17 0500 17 0605 17 0630   BP: 137/82 145/78 131/73 133/77   BP Location: Left arm      Pulse:       Resp: 14 12   Temp:  98.7  F (37.1  C)     TempSrc:  Tympanic     SpO2: 93% 92% 92% (!) 91%   Weight:       Height:             Vital Sign Ranges  Temperature Temp  Av.4  F (36.9  C)  Min: 97.5  F (36.4  C)  Max: 99.1  F (37.3  C)   Blood pressure Systolic (24hrs), Av , Min:88 , Max:145        Diastolic (24hrs), Av, Min:52, Max:84      Pulse Pulse  Av  Min: 60  Max: 60   Respirations Resp  Av.1  Min: 7  Max: 23   Pulse oximetry SpO2  Av %  Min: 86 %  Max: 98 %         Intake/Output Summary (Last 24 hours) at 17 0718  Last data filed at 17 0510   Gross per 24 hour   Intake             2455 ml   Output             3510 ml   Net            -1055 ml     General:  Alert and Orientated.  NAD.  Heart:  RRR, S1 S2, with 2/6 systolic murmur, no rubs, no gallops.  Resp: CTA bilaterally.  No wheezes, no rhonchi, no crackles.  Abd: Dressing in place. No pain reported.  BS are present this morning.      Ext: No edema noted in either lower extremity  Neuro:  No focal Neurologic deficits noted.    Peripheral IV 17 Right Hand (Active)   Site Assessment WDL 2017 12:00 AM   Line Status Infusing;Checked every 1-2 hour 2017 12:00 AM   Phlebitis Scale 0-->no symptoms 2017 12:00 AM   Infiltration Scale 0 2017 12:00 AM   Extravasation? No 2017 12:00 AM   Number of days:2       Peripheral IV 17 Left Upper arm (Active)   Site Assessment WDL 2017 12:00  AM   Line Status Saline locked 9/23/2017 12:00 AM   Phlebitis Scale 0-->no symptoms 9/23/2017 12:00 AM   Infiltration Scale 0 9/23/2017 12:00 AM   Extravasation? No 9/23/2017 12:00 AM   Number of days:2       Intrathecal/Epidural Catheter 09/21/17 (Active)   Site Assessment Clean, dry, intact 9/23/2017  3:50 AM   Line Status Infusing 9/23/2017  3:50 AM   Dressing Type Transparent 9/23/2017  3:50 AM   Dressing Status Dressing reinforced 9/23/2017  3:50 AM   Number of days:2       Colostomy (Active)   Number of days:218       Urethral Catheter Double-lumen 16 fr (Active)   Tube Description UTV 9/23/2017  3:50 AM   Catheter Care Done 9/23/2017 12:00 AM   Collection Container Standard 9/23/2017  3:50 AM   Securement Method Securing device (Describe) 9/23/2017  3:50 AM   Rationale for Continued Use Epidural/Intrathecal Catheter 9/23/2017  3:50 AM   Urine Output 350 mL 9/23/2017  5:10 AM   Number of days:2       Incision/Surgical Site with Packing 09/21/17 Left;Mid Abdomen Qty Placed: 1 (Active)   Incision Assessment UTV 9/23/2017  3:50 AM   Charisma-Incision Assessment UTV 9/23/2017  3:50 AM   Drainage Description Sanguinous 9/22/2017  3:15 PM   Incision Care Other (Comment) 9/22/2017  8:00 PM   Dressing Intervention Dressing reinforced 9/23/2017  3:50 AM   Packing Assessment moist 9/22/2017  8:00 PM   Drainage Amount none 9/23/2017  3:50 AM   Drainage Appearance/Odor serosanguineous;no odor 9/22/2017  8:00 PM   Number of days:2       Incision/Surgical Site 04/20/17 Right Eye (Active)   Number of days:156       Incision/Surgical Site 09/21/17 Abdomen (Active)   Incision Assessment UTV 9/23/2017  3:50 AM   Charisma-Incision Assessment UTV 9/23/2017  3:50 AM   Closure Sutures 9/22/2017  6:20 PM   Incision Drainage Amount None 9/22/2017  8:00 PM   Dressing Intervention Dressing marked 9/23/2017  3:50 AM   Number of days:2     Temple              Data:     Lab Results   Component Value Date    WBC 16.8 (H) 09/23/2017    HGB 13.3  09/23/2017    HCT 43.4 09/23/2017     09/23/2017     09/23/2017    POTASSIUM 4.7 09/23/2017    CHLORIDE 113 (H) 09/23/2017    CO2 19 (L) 09/23/2017    BUN 20 09/23/2017    CR 1.10 09/23/2017     (H) 09/23/2017    TROPI  04/28/2017     <0.015  The 99th percentile for upper reference range is 0.045 ug/L.  Troponin values in   the range of 0.045 - 0.120 ug/L may be associated with risks of adverse   clinical events.      TROPN  03/27/2013     <0.04  **Risk Stratification**   0.10 - 0.39   Elevated-may indicate increased                 risk of short term adverse                 cardiac events.      >=0.4      Possible cardiac injury.    AST 21 09/22/2017    ALT 20 09/22/2017    ALKPHOS 59 09/22/2017    BILITOTAL 0.6 09/22/2017    INR 1.01 07/15/2017     Lactic Acid   Date Value Ref Range Status   02/18/2017 1.0 0.4 - 2.0 mmol/L Final   02/17/2017 1.7 0.4 - 2.0 mmol/L Final       Alexander Vazquez, DO

## 2017-09-23 NOTE — PLAN OF CARE
Problem: Patient Care Overview  Goal: Plan of Care/Patient Progress Review  Outcome: Improving  VSS. Afebrile.  BBS clear.  Abdomen soft with active bowel sound throughout.  Midline abd. Dressing changed, small amount of dried drainage on old dressing.  Incision appears WDL.  Abdominal surgical wound left abdomen dressing saturated, dressing changed, packed with kelix soaked with NS and covered with 4.4's.  Patient receiving toradol for pain with good relief.  Epidural cath removed at 1500 by nurse anesthetistgabriel to site, clean and dry.  Temple removed at 1530, patient has had full feeling in legs and is able to ambulate and bear weight.

## 2017-09-23 NOTE — PLAN OF CARE
Problem: Goal Outcome Summary  Goal: Goal Outcome Summary  Outcome: Therapy, progress towards functional goals is fair  Pt alert and orientated, however appears anxious and fixated on blood pressure (-140). Pt on room air, lung sounds clear, instructed and encouraged to use IS. Pt has been 100% paced throughout the night (rates 60's). Pt abdominal midline incision c/d/i with unchanged drainage and left abdominal dressing with serosanguineous drainage. Pt has bowel sounds throughout and is passing gas. Pt denies pain at rest, but complains of discomfort with repositioning. Pt epidural rate unchanged with denial of loss of sensation. Pt able to move all extremities and aid in repositioning.

## 2017-09-23 NOTE — PLAN OF CARE
Face to face report given with opportunity to observe patient.    Report given to BRENNEN Dunbar   9/23/2017  7:15 AM

## 2017-09-24 LAB
ALBUMIN SERPL-MCNC: 2.9 G/DL (ref 3.4–5)
ALP SERPL-CCNC: 75 U/L (ref 40–150)
ALT SERPL W P-5'-P-CCNC: 25 U/L (ref 0–70)
ANION GAP SERPL CALCULATED.3IONS-SCNC: 8 MMOL/L (ref 3–14)
AST SERPL W P-5'-P-CCNC: 24 U/L (ref 0–45)
BASOPHILS # BLD AUTO: 0 10E9/L (ref 0–0.2)
BASOPHILS NFR BLD AUTO: 0.4 %
BILIRUB SERPL-MCNC: 1.3 MG/DL (ref 0.2–1.3)
BUN SERPL-MCNC: 17 MG/DL (ref 7–30)
CALCIUM SERPL-MCNC: 8.3 MG/DL (ref 8.5–10.1)
CHLORIDE SERPL-SCNC: 107 MMOL/L (ref 94–109)
CO2 SERPL-SCNC: 23 MMOL/L (ref 20–32)
CREAT SERPL-MCNC: 1.07 MG/DL (ref 0.66–1.25)
DIFFERENTIAL METHOD BLD: ABNORMAL
EOSINOPHIL # BLD AUTO: 0.2 10E9/L (ref 0–0.7)
EOSINOPHIL NFR BLD AUTO: 2.1 %
ERYTHROCYTE [DISTWIDTH] IN BLOOD BY AUTOMATED COUNT: 13.8 % (ref 10–15)
GFR SERPL CREATININE-BSD FRML MDRD: 68 ML/MIN/1.7M2
GLUCOSE BLDC GLUCOMTR-MCNC: 126 MG/DL (ref 70–99)
GLUCOSE SERPL-MCNC: 117 MG/DL (ref 70–99)
HCT VFR BLD AUTO: 38 % (ref 40–53)
HGB BLD-MCNC: 13.1 G/DL (ref 13.3–17.7)
IMM GRANULOCYTES # BLD: 0.1 10E9/L (ref 0–0.4)
IMM GRANULOCYTES NFR BLD: 0.5 %
LYMPHOCYTES # BLD AUTO: 1 10E9/L (ref 0.8–5.3)
LYMPHOCYTES NFR BLD AUTO: 11.4 %
MCH RBC QN AUTO: 30.1 PG (ref 26.5–33)
MCHC RBC AUTO-ENTMCNC: 34.5 G/DL (ref 31.5–36.5)
MCV RBC AUTO: 87 FL (ref 78–100)
MONOCYTES # BLD AUTO: 0.8 10E9/L (ref 0–1.3)
MONOCYTES NFR BLD AUTO: 8.6 %
NEUTROPHILS # BLD AUTO: 7 10E9/L (ref 1.6–8.3)
NEUTROPHILS NFR BLD AUTO: 77 %
NRBC # BLD AUTO: 0 10*3/UL
NRBC BLD AUTO-RTO: 0 /100
PLATELET # BLD AUTO: 197 10E9/L (ref 150–450)
POTASSIUM SERPL-SCNC: 4.1 MMOL/L (ref 3.4–5.3)
PROT SERPL-MCNC: 6.1 G/DL (ref 6.8–8.8)
RBC # BLD AUTO: 4.35 10E12/L (ref 4.4–5.9)
SODIUM SERPL-SCNC: 138 MMOL/L (ref 133–144)
WBC # BLD AUTO: 9.1 10E9/L (ref 4–11)

## 2017-09-24 PROCEDURE — 80053 COMPREHEN METABOLIC PANEL: CPT | Performed by: INTERNAL MEDICINE

## 2017-09-24 PROCEDURE — A9270 NON-COVERED ITEM OR SERVICE: HCPCS | Mod: GY | Performed by: SURGERY

## 2017-09-24 PROCEDURE — 25000132 ZZH RX MED GY IP 250 OP 250 PS 637: Mod: GY | Performed by: INTERNAL MEDICINE

## 2017-09-24 PROCEDURE — 36415 COLL VENOUS BLD VENIPUNCTURE: CPT | Performed by: INTERNAL MEDICINE

## 2017-09-24 PROCEDURE — 85025 COMPLETE CBC W/AUTO DIFF WBC: CPT | Performed by: INTERNAL MEDICINE

## 2017-09-24 PROCEDURE — 25000132 ZZH RX MED GY IP 250 OP 250 PS 637: Mod: GY | Performed by: SURGERY

## 2017-09-24 PROCEDURE — 25000128 H RX IP 250 OP 636: Performed by: INTERNAL MEDICINE

## 2017-09-24 PROCEDURE — 40000275 ZZH STATISTIC RCP TIME EA 10 MIN

## 2017-09-24 PROCEDURE — A9270 NON-COVERED ITEM OR SERVICE: HCPCS | Mod: GY | Performed by: INTERNAL MEDICINE

## 2017-09-24 PROCEDURE — 12000000 ZZH R&B MED SURG/OB

## 2017-09-24 RX ADMIN — AMLODIPINE BESYLATE 5 MG: 5 TABLET ORAL at 20:24

## 2017-09-24 RX ADMIN — METOPROLOL TARTRATE 100 MG: 100 TABLET, FILM COATED ORAL at 20:23

## 2017-09-24 RX ADMIN — RANITIDINE 150 MG: 150 TABLET ORAL at 08:05

## 2017-09-24 RX ADMIN — LISINOPRIL 40 MG: 20 TABLET ORAL at 08:04

## 2017-09-24 RX ADMIN — ENOXAPARIN SODIUM 30 MG: 30 INJECTION SUBCUTANEOUS at 08:07

## 2017-09-24 RX ADMIN — METOPROLOL TARTRATE 100 MG: 100 TABLET, FILM COATED ORAL at 08:05

## 2017-09-24 RX ADMIN — RANITIDINE 150 MG: 150 TABLET ORAL at 20:23

## 2017-09-24 RX ADMIN — KETOROLAC TROMETHAMINE 15 MG: 15 INJECTION, SOLUTION INTRAMUSCULAR; INTRAVENOUS at 08:33

## 2017-09-24 RX ADMIN — ATORVASTATIN CALCIUM 40 MG: 40 TABLET, FILM COATED ORAL at 18:16

## 2017-09-24 RX ADMIN — FINASTERIDE 5 MG: 5 TABLET, FILM COATED ORAL at 18:16

## 2017-09-24 NOTE — PROGRESS NOTES
POD # 3  Doing well, awake and alert, confused at times.  Good pain control, getting out of bed on his own.  Passing flatus, a small amount of blood per rectum  Afebrile, Vitals stable.  Abdomen - soft, non-tender, wounds look good, slightly distended  Lab - Hgb 13.1, WBC 9.1, lytes OK  A - doing well  P - increase diet        Ambulate        Home in day or 2

## 2017-09-24 NOTE — PLAN OF CARE
Pt on room air, vitals stable. Pt up to bathroom with standby assist several times. Pt had small smears of what appeared to be bloody stool. Pt denies pain. IV saline locked, pt tolerated PO well. Pt able to make needs known.

## 2017-09-24 NOTE — PLAN OF CARE
Face to face report given with opportunity to observe patient.  Report given to BRENNEN Jimenez.    Bettina Espinoza  9/23/2017, 7:12 PM

## 2017-09-24 NOTE — PLAN OF CARE
Problem: Patient Care Overview  Goal: Plan of Care/Patient Progress Review  Outcome: No Change  Patient confused at times. Alarms on when someone not in room because he will just get OOB, did use call light twice this shift. Wife at bedside. Ambulated in hallway, [facility]. Patient steady on feet. Denies pain throughout the shift but did received PRN IV Toradol 15 mg prior to dressing change prophylactic. Dressings removed for Dr. Moreland to view incisions this am then new dressing applied per plan of care using sterile technique. Midline incision well approximated, scant dried drainage on posterior part of dressing, no s/s of infection. Open incision L abdomin wet to dry dressing change per Plan of care, serosanguinous drainage on old kerlix and gauze. Patient's bowel sounds active, lungs clear. Voiding without difficulty. Smear of bright red blood in brief per Unit Assistant when patient was cleaned up. Call light within reach.      Face to face report given with opportunity to observe patient.     Report given to Jesus Potts   9/24/2017  3:33 PM

## 2017-09-24 NOTE — PLAN OF CARE
Patient got OOB to void, didn't use call light,alarms on,  steady on feet. Re-directs easily. Patient keeps removing hats from toilet so unable to measure output, re-educated.

## 2017-09-24 NOTE — PLAN OF CARE
Face to face report given with opportunity to observe patient.    Report given to BRENNEN Ornelas   9/23/2017  8:58 PM

## 2017-09-24 NOTE — PLAN OF CARE
Problem: Patient Care Overview  Goal: Plan of Care/Patient Progress Review  Outcome: Improving  Assume care of pt at approximately 2100 full assessment charted by ICU BRENNEN steinberg.   A&O, VSS, afebrile, denies pain-receiving Toradol 15 mg q 6 hrs-denies need for further tx. Bowel sounds active.  Packed drsng on L-abd saturated with serosanguinous fluid- remove  wet 4x4 gauze and replace with same. Packing left in place. Epidural  site w/o drainage or redness. Band-aid covers.  Ice cream before bed. Becomes confused and slightly agitated by shift end--able to get pt back in room and calmed with talking and explaining cares.

## 2017-09-24 NOTE — PROGRESS NOTES
[facility]  Hospitalist Progress Note          Assessment and Plan:   1.  Status post colostomy takedown.  Post op day #3. He tolerated this procedure extremely well without any complications during the surgical procedure.  Epidural removed yesterday.  He had a lot of pain last time and did have a lot of postoperative confusion. Continue to refrain from narcotics due to agitation and confusion with these medications.  Full liquid diet currently.  He will have dressing changes to his wound on his abdomen per Surgery's recommendations. He is passing flatus and some bloody stool.        2.  Coronary artery disease.  Stable at this point.  No signs of heart failure, no chest pain reported. He does have his biventricular pacer implantable cardioverter-defibrillator and placement functioning fine.  BP is also currently stable as he has a history of labile HTN, remains on outpatient medications-ACE/Beta blocker and Amlodipine.  Echo with normal EF.        3.  History of diabetes mellitus, type 2.  Generally diet control at baseline.  Accu-Cheks while he is here and cover with sliding scale as needed.  We monitored in our ICU at this point while he has epidural in place.     4.  Neuro:  History of CVA.  We will restart Plavix once able but will hold off for now due to some bloody stool.        5. Renal:  Stable      6. Pulmonary:  Stable with bedside IS.      7. Prophylaxis:  Pneumatic boots.  Transition to SQ Lovenox for DVT prophylaxis as epidural has been removed.       8. Dispo:  Depending on return of bowel activity.      9.  Code:  Full              Interval History:   Post op day #3 colostomy take down.  Patient doing well.  No acute issues.  Passing some flatus and bloody stools.                Medications:       amLODIPine  5 mg Oral Daily     atorvastatin  40 mg Oral Daily     finasteride  5 mg Oral Daily     lisinopril  40 mg Oral Daily     metoprolol  100 mg Oral BID     ranitidine  150 mg Oral BID      sodium chloride (PF)  3 mL Intracatheter Q8H                  Physical Exam:     Vitals:    17 2042 17 0046 17 0315 17 0500   BP: 146/77 149/72 135/86    BP Location: Left arm Left arm Left arm    Pulse:       Resp:  18    Temp: 97.7  F (36.5  C) 98.3  F (36.8  C) 98.3  F (36.8  C)    TempSrc: Tympanic Tympanic Tympanic    SpO2: 96% 93% 95%    Weight:    76.7 kg (169 lb 1.5 oz)   Height:             Vital Sign Ranges  Temperature Temp  Av.2  F (36.8  C)  Min: 97.7  F (36.5  C)  Max: 98.6  F (37  C)   Blood pressure Systolic (24hrs), Av , Min:118 , Max:152        Diastolic (24hrs), Av, Min:70, Max:89      Pulse Pulse  Av  Min: 60  Max: 60   Respirations Resp  Av.9  Min: 12  Max: 26   Pulse oximetry SpO2  Av.6 %  Min: 93 %  Max: 96 %         Intake/Output Summary (Last 24 hours) at 17 0721  Last data filed at 17 0500   Gross per 24 hour   Intake           1858.9 ml   Output             1645 ml   Net            213.9 ml     General:  Alert and Orientated.  NAD.  Heart:  RRR, S1 S2, with 2/6 systolic murmur, no rubs, no gallops.  Resp: CTA bilaterally.  No wheezes, no rhonchi, no crackles.  Abd: Dressing in place. No pain reported.  BS are present this morning.      Ext: No edema noted in either lower extremity  Neuro:  No focal Neurologic deficits noted.    Peripheral IV 17 Right Hand (Active)   Site Assessment WDL 2017  4:05 AM   Line Status Saline locked 2017  4:05 AM   Phlebitis Scale 0-->no symptoms 2017  4:05 AM   Infiltration Scale 0 2017  4:05 AM   Extravasation? No 2017  7:30 PM   Number of days:3       Peripheral IV 17 Left Upper arm (Active)   Site Assessment WDL 2017  4:05 AM   Line Status Saline locked 2017  4:05 AM   Phlebitis Scale 0-->no symptoms 2017  4:05 AM   Infiltration Scale 0 2017  4:05 AM   Extravasation? No 2017  7:30 PM   Number of days:3       Colostomy  (Active)   Number of days:219       Incision/Surgical Site with Packing 09/21/17 Left;Mid Abdomen Qty Placed: 1 (Active)   Incision Assessment UTV 9/24/2017 12:46 AM   Charisma-Incision Assessment Induration 9/23/2017  7:45 PM   Drainage Description Serosanguinous 9/24/2017 12:46 AM   Incision Care Other (Comment) 9/22/2017  8:00 PM   Dressing Intervention Dressing marked 9/24/2017 12:46 AM   Packing Assessment moist 9/23/2017  7:45 PM   Drainage Amount moderate 9/23/2017  7:45 PM   Drainage Appearance/Odor serosanguineous 9/24/2017 12:46 AM   Number of days:3       Incision/Surgical Site 04/20/17 Right Eye (Active)   Number of days:157       Incision/Surgical Site 09/21/17 Abdomen (Active)   Incision Assessment UTV 9/24/2017 12:46 AM   Charisma-Incision Assessment UTV 9/24/2017 12:46 AM   Closure Sutures 9/22/2017  6:20 PM   Incision Drainage Amount None 9/22/2017  8:00 PM   Dressing Intervention Clean, dry, intact 9/24/2017 12:46 AM   Number of days:3     No line/device             Data:     Lab Results   Component Value Date    WBC 9.1 09/24/2017    HGB 13.1 (L) 09/24/2017    HCT 38.0 (L) 09/24/2017     09/24/2017     09/24/2017    POTASSIUM 4.1 09/24/2017    CHLORIDE 107 09/24/2017    CO2 23 09/24/2017    BUN 17 09/24/2017    CR 1.07 09/24/2017     (H) 09/24/2017    TROPI  04/28/2017     <0.015  The 99th percentile for upper reference range is 0.045 ug/L.  Troponin values in   the range of 0.045 - 0.120 ug/L may be associated with risks of adverse   clinical events.      TROPN  03/27/2013     <0.04  **Risk Stratification**   0.10 - 0.39   Elevated-may indicate increased                 risk of short term adverse                 cardiac events.      >=0.4      Possible cardiac injury.    AST 24 09/24/2017    ALT 25 09/24/2017    ALKPHOS 75 09/24/2017    BILITOTAL 1.3 09/24/2017    INR 1.01 07/15/2017     Lactic Acid   Date Value Ref Range Status   02/18/2017 1.0 0.4 - 2.0 mmol/L Final   02/17/2017 1.7  0.4 - 2.0 mmol/L Final       Alexander CHRISTINA Vazquez DO

## 2017-09-24 NOTE — PROGRESS NOTES
Met briefly with Roddy and his wife. No needs identified. Will remain available for discharge planning.

## 2017-09-25 LAB
ALBUMIN SERPL-MCNC: 2.9 G/DL (ref 3.4–5)
ALP SERPL-CCNC: 76 U/L (ref 40–150)
ALT SERPL W P-5'-P-CCNC: 27 U/L (ref 0–70)
ANION GAP SERPL CALCULATED.3IONS-SCNC: 11 MMOL/L (ref 3–14)
AST SERPL W P-5'-P-CCNC: 19 U/L (ref 0–45)
BASOPHILS # BLD AUTO: 0.1 10E9/L (ref 0–0.2)
BASOPHILS NFR BLD AUTO: 0.7 %
BILIRUB SERPL-MCNC: 1.1 MG/DL (ref 0.2–1.3)
BUN SERPL-MCNC: 18 MG/DL (ref 7–30)
CALCIUM SERPL-MCNC: 8.6 MG/DL (ref 8.5–10.1)
CHLORIDE SERPL-SCNC: 107 MMOL/L (ref 94–109)
CO2 SERPL-SCNC: 22 MMOL/L (ref 20–32)
CREAT SERPL-MCNC: 1.12 MG/DL (ref 0.66–1.25)
DIFFERENTIAL METHOD BLD: ABNORMAL
EOSINOPHIL # BLD AUTO: 0.5 10E9/L (ref 0–0.7)
EOSINOPHIL NFR BLD AUTO: 4.6 %
ERYTHROCYTE [DISTWIDTH] IN BLOOD BY AUTOMATED COUNT: 13.4 % (ref 10–15)
GFR SERPL CREATININE-BSD FRML MDRD: 64 ML/MIN/1.7M2
GLUCOSE SERPL-MCNC: 122 MG/DL (ref 70–99)
HCT VFR BLD AUTO: 38.1 % (ref 40–53)
HGB BLD-MCNC: 13.2 G/DL (ref 13.3–17.7)
IMM GRANULOCYTES # BLD: 0.1 10E9/L (ref 0–0.4)
IMM GRANULOCYTES NFR BLD: 0.8 %
LYMPHOCYTES # BLD AUTO: 1.6 10E9/L (ref 0.8–5.3)
LYMPHOCYTES NFR BLD AUTO: 16 %
MCH RBC QN AUTO: 29.9 PG (ref 26.5–33)
MCHC RBC AUTO-ENTMCNC: 34.6 G/DL (ref 31.5–36.5)
MCV RBC AUTO: 86 FL (ref 78–100)
MONOCYTES # BLD AUTO: 1 10E9/L (ref 0–1.3)
MONOCYTES NFR BLD AUTO: 9.9 %
NEUTROPHILS # BLD AUTO: 6.7 10E9/L (ref 1.6–8.3)
NEUTROPHILS NFR BLD AUTO: 68 %
NRBC # BLD AUTO: 0 10*3/UL
NRBC BLD AUTO-RTO: 0 /100
PLATELET # BLD AUTO: 225 10E9/L (ref 150–450)
POTASSIUM SERPL-SCNC: 4 MMOL/L (ref 3.4–5.3)
PROT SERPL-MCNC: 6.2 G/DL (ref 6.8–8.8)
RBC # BLD AUTO: 4.41 10E12/L (ref 4.4–5.9)
SODIUM SERPL-SCNC: 140 MMOL/L (ref 133–144)
WBC # BLD AUTO: 9.9 10E9/L (ref 4–11)

## 2017-09-25 PROCEDURE — 12000000 ZZH R&B MED SURG/OB

## 2017-09-25 PROCEDURE — 25000128 H RX IP 250 OP 636: Performed by: INTERNAL MEDICINE

## 2017-09-25 PROCEDURE — 80053 COMPREHEN METABOLIC PANEL: CPT | Performed by: INTERNAL MEDICINE

## 2017-09-25 PROCEDURE — A9270 NON-COVERED ITEM OR SERVICE: HCPCS | Mod: GY | Performed by: INTERNAL MEDICINE

## 2017-09-25 PROCEDURE — 99239 HOSP IP/OBS DSCHRG MGMT >30: CPT | Performed by: INTERNAL MEDICINE

## 2017-09-25 PROCEDURE — 25000132 ZZH RX MED GY IP 250 OP 250 PS 637: Mod: GY | Performed by: INTERNAL MEDICINE

## 2017-09-25 PROCEDURE — A9270 NON-COVERED ITEM OR SERVICE: HCPCS | Mod: GY | Performed by: SURGERY

## 2017-09-25 PROCEDURE — 25000132 ZZH RX MED GY IP 250 OP 250 PS 637: Mod: GY | Performed by: SURGERY

## 2017-09-25 PROCEDURE — 85025 COMPLETE CBC W/AUTO DIFF WBC: CPT | Performed by: INTERNAL MEDICINE

## 2017-09-25 PROCEDURE — 36415 COLL VENOUS BLD VENIPUNCTURE: CPT | Performed by: INTERNAL MEDICINE

## 2017-09-25 RX ORDER — CLOPIDOGREL BISULFATE 75 MG/1
75 TABLET ORAL DAILY
Status: DISCONTINUED | OUTPATIENT
Start: 2017-09-25 | End: 2017-09-26 | Stop reason: HOSPADM

## 2017-09-25 RX ADMIN — RANITIDINE 150 MG: 150 TABLET ORAL at 21:11

## 2017-09-25 RX ADMIN — METOPROLOL TARTRATE 100 MG: 100 TABLET, FILM COATED ORAL at 08:44

## 2017-09-25 RX ADMIN — RANITIDINE 150 MG: 150 TABLET ORAL at 08:44

## 2017-09-25 RX ADMIN — ATORVASTATIN CALCIUM 40 MG: 40 TABLET, FILM COATED ORAL at 18:34

## 2017-09-25 RX ADMIN — AMLODIPINE BESYLATE 5 MG: 5 TABLET ORAL at 21:11

## 2017-09-25 RX ADMIN — KETOROLAC TROMETHAMINE 15 MG: 15 INJECTION, SOLUTION INTRAMUSCULAR; INTRAVENOUS at 13:07

## 2017-09-25 RX ADMIN — METOPROLOL TARTRATE 100 MG: 100 TABLET, FILM COATED ORAL at 21:11

## 2017-09-25 RX ADMIN — CLOPIDOGREL 75 MG: 75 TABLET, FILM COATED ORAL at 15:33

## 2017-09-25 RX ADMIN — LISINOPRIL 40 MG: 20 TABLET ORAL at 08:44

## 2017-09-25 RX ADMIN — ENOXAPARIN SODIUM 30 MG: 30 INJECTION SUBCUTANEOUS at 06:58

## 2017-09-25 RX ADMIN — FINASTERIDE 5 MG: 5 TABLET, FILM COATED ORAL at 18:34

## 2017-09-25 ASSESSMENT — PAIN DESCRIPTION - DESCRIPTORS: DESCRIPTORS: SORE

## 2017-09-25 NOTE — PROGRESS NOTES
Call placed to [facility] to inquire to the availability of nursing for dressing changes.

## 2017-09-25 NOTE — PROGRESS NOTES
On license of [facility] is not able to accept Roddy for services as he is not homebound because of a medical reason that makes it taxing for him to get out which means Medicare will not pay for the service. [facility] would be willing to provide dressing changes for him if paid privately at the cost of about $75/nurse visit.     Wound care is not able to bill for routine dressing changes.     Reviewed the above with Roddy and Floridalma as well as the option of coming in to the clinic or to Urgent Care for daily dressing changes.     Floridalma called Medicare and says she was told that the clinic and Urgent Care visits would be a covered benefit as long as doctors orders are written for this.     In discussing the possibility of a wound vac, Floridalma had questions. Sabrina from wound care will come talk to her.

## 2017-09-25 NOTE — PROGRESS NOTES
[facility] is unable to admit him this week. Referral called and faxed to [facility].

## 2017-09-25 NOTE — PROGRESS NOTES
POD # 4  Doing well, good pain control, passing flatus, smears of stool, no isaiah BM, denies cramps  Up walking.  Confusion at times but clears.  Afebrile, Vitals stable  Abdomen - soft, less distension, non-tender, wound looks good  Lab - Hg 13.2, WBC 9.9, lytes OK  A - doing well  P - would like him to have a BM prior to discharge        Home later today or tomorrow        [facility] for dressing changes

## 2017-09-25 NOTE — PROGRESS NOTES
[facility]    Hospitalist Progress Note    Date of Service (when I saw the patient): 09/25/2017    Assessment & Plan   1.  Status post colostomy takedown.  Post op day #4. He tolerated this procedure extremely well without any complications during the surgical procedure.  Epidural removed.  He had a lot of pain last time and did have a lot of postoperative confusion. Continue to refrain from narcotics due to agitation and confusion with these medications.  Tolerating regular diet.  He will have dressing changes to his wound on his abdomen per Surgery's recommendations. He is passing flatus and had some early episodes bloody stool post procedure. No bowel movement yet. Would like to see BM prior to discharge.      2.  Coronary artery disease.  Stable at this point.  No signs of heart failure, no chest pain reported. He does have his biventricular pacer implantable cardioverter-defibrillator and placement functioning fine.  BP is also currently stable as he has a history of labile HTN, remains on outpatient medications-ACE/Beta blocker and Amlodipine.  Echo with normal EF.        3.  History of diabetes mellitus, type 2.  Generally diet control at baseline.  Accu-Cheks while he is here and cover with sliding scale as needed.        4.  Neuro:  History of CVA.  We will restart Plavix today      5. Renal:  Stable      6. Pulmonary:  Stable with bedside IS.      7. Prophylaxis:  Pneumatic boots.  Transitioned to SQ Lovenox for DVT prophylaxis as epidural has been removed.       8. Dispo:  Depending on return of bowel activity.      Code Status: Full Code    Disposition: Expected discharge in 1-2 days pending passage of BM    Marni ANDRES. Sharp    Interval History   Lying in bed comfortably. Has no interval complaints. Passing gas. No BM yet. Tolerating regular diet    -Data reviewed today: I reviewed all new labs and imaging results over the last 24 hours. I personally reviewed no images or EKG's  today.    Physical Exam   Temp: 98.9  F (37.2  C) Temp src: Tympanic BP: 123/80   Heart Rate: 73 Resp: 18 SpO2: 93 % O2 Device: None (Room air)    Vitals:    09/21/17 0829 09/22/17 0400 09/24/17 0500   Weight: 79.8 kg (175 lb 14.8 oz) 79 kg (174 lb 2.6 oz) 76.7 kg (169 lb 1.5 oz)     Vital Signs with Ranges  Temp:  [97.9  F (36.6  C)-99  F (37.2  C)] 98.9  F (37.2  C)  Heart Rate:  [68-85] 73  Resp:  [16-18] 18  BP: (123-133)/(70-80) 123/80  SpO2:  [93 %-97 %] 93 %  I/O last 3 completed shifts:  In: 360 [P.O.:360]  Out: 1500 [Urine:1500]    Peripheral IV 09/21/17 Left Upper arm (Active)   Site Assessment [employer] 9/25/2017  8:16 AM   Line Status Saline locked 9/25/2017  8:16 AM   Phlebitis Scale 0-->no symptoms 9/25/2017  8:16 AM   Infiltration Scale 0 9/25/2017  8:16 AM   Infiltration Site Treatment Method  None 9/25/2017  8:16 AM   Extravasation? No 9/23/2017  7:30 PM   Number of days:4       Colostomy (Active)   Number of days:220       Incision/Surgical Site with Packing 09/21/17 Left;Mid Abdomen Qty Placed: 1 (Active)   Incision Assessment WDL 9/25/2017  8:16 AM   Charisma-Incision Assessment UTV 9/24/2017 11:30 PM   Incision Drainage Amount Moderate 9/24/2017  8:25 AM   Drainage Description Serosanguinous 9/24/2017  8:25 AM   Incision Care Other (Comment) 9/24/2017  8:25 AM   Dressing Intervention Clean, dry, intact 9/25/2017  8:16 AM   Packing Assessment moist 9/24/2017  8:25 AM   Drainage Amount moderate 9/24/2017  8:25 AM   Drainage Appearance/Odor serosanguineous 9/24/2017  8:25 AM   Packing removed by Surgeon 9/24/2017  8:25 AM   Number of days:4       Incision/Surgical Site 04/20/17 Right Eye (Active)   Number of days:158       Incision/Surgical Site 09/21/17 Abdomen (Active)   Incision Assessment WDL 9/25/2017  8:16 AM   Charisma-Incision Assessment UTV 9/24/2017 11:30 PM   Closure Sutures 9/24/2017  8:35 PM   Incision Drainage Amount None 9/24/2017  8:35 PM   Dressing Intervention Clean, dry, intact 9/25/2017  8:16  AM   Number of days:4     No line/device    Constitutional: Alert and oriented x3. No distress    HEENT: NC/AT, PERRL, EOMI, mouth clear, neck supple, no LN.     Cardiovascular: RRR. No murmur, no  rubs, or gallops.      Respiratory: Clear to auscultation bilaterally    Abdomen: Soft, NTND, no organomegaly. Bowel sounds present. Dressing c/d/i    Extremities: Warm/dry. No edema    Neuro:  Non focal, cranial nerves intact, Moves all extremities.    Medications     - MEDICATION INSTRUCTIONS -       fentaNYL-ropivacaine Stopped (09/23/17 1500)     dextrose 5% and 0.45% NaCl + KCl 20 mEq/L Stopped (09/23/17 1947)     - MEDICATION INSTRUCTIONS -         enoxaparin  30 mg Subcutaneous Q24H     amLODIPine  5 mg Oral Daily     atorvastatin  40 mg Oral Daily     finasteride  5 mg Oral Daily     lisinopril  40 mg Oral Daily     metoprolol  100 mg Oral BID     ranitidine  150 mg Oral BID     sodium chloride (PF)  3 mL Intracatheter Q8H       Data     Recent Labs  Lab 09/25/17  0522 09/24/17  0558 09/23/17  0630   WBC 9.9 9.1 16.8*   HGB 13.2* 13.1* 13.3   MCV 86 87 98    197 166    138 141   POTASSIUM 4.0 4.1 4.7   CHLORIDE 107 107 113*   CO2 22 23 19*   BUN 18 17 20   CR 1.12 1.07 1.10   ANIONGAP 11 8 9   WALI 8.6 8.3* 7.9*   * 117* 131*   ALBUMIN 2.9* 2.9*  --    PROTTOTAL 6.2* 6.1*  --    BILITOTAL 1.1 1.3  --    ALKPHOS 76 75  --    ALT 27 25  --    AST 19 24  --      Lactic Acid   Date Value Ref Range Status   02/18/2017 1.0 0.4 - 2.0 mmol/L Final   02/17/2017 1.7 0.4 - 2.0 mmol/L Final       No results found for this or any previous visit (from the past 24 hour(s)).

## 2017-09-25 NOTE — PLAN OF CARE
"Problem: Patient Care Overview  Goal: Plan of Care/Patient Progress Review  Outcome: Improving    09/24/17 1958   OTHER   Plan Of Care Reviewed With patient   Plan of Care Review   Progress improving         Problem: Bowel Resection (Adult)  Goal: Signs and Symptoms of Listed Potential Problems Will be Absent, Minimized or Managed (Bowel Resection)  Signs and symptoms of listed potential problems will be absent, minimized or managed by discharge/transition of care (reference Bowel Resection (Adult) CPG).   Outcome: Improving    09/24/17 1700 09/24/17 1958   Bowel Resection   Problems Assessed (Bowel Resection) all --    Problems Present (Bowel Resection) --  pain         Problem: Surgery Nonspecified (Adult)  Goal: Signs and Symptoms of Listed Potential Problems Will be Absent, Minimized or Managed (Surgery Nonspecified)  Signs and symptoms of listed potential problems will be absent, minimized or managed by discharge/transition of care (reference Surgery Nonspecified (Adult) CPG).   Outcome: Improving    09/24/17 1958   Surgery Nonspecified   Problems Assessed (Surgery) all   Problems Present (Surgery) pain      Most recent vitals: /70  Pulse 60  Temp 98.3  F (36.8  C) (Tympanic)  Resp 18  Ht 1.778 m (5' 10\")  Wt 76.7 kg (169 lb 1.5 oz)  SpO2 95%  BMI 24.26 kg/m2     Pain Management:  Pt denies pain for most of shift, feels \"twinges\" at times over incision areas when moving. Declines pain medications.     Orientation:  Alert and oriented at beginning of shift, as shift progresses, pt becomes disoriented to place and situation, is easily reoriented however.     Cardiac:  WDL. Pacemaker present.     Respiratory:  WDL     GI:  Tolerates low residue diet, orders to advance diet as tolerated, MD would like it to be advanced slowly. One stool during this shift, smear in brief, light red and tan.     :  WDL.      Skin Issues:  Abdominal incisions covered, dressings CDI. Midline abdominal dressing changes at " 2030. Incision CDI, no drainage, no s/s of infection.     IVF:  Saline locked     Mobility:  SBA. Ambulates in halls with steady gait. Alarms present d/t pt being disoriented at times.     Safety:  Alarms active and audible. Uses call light appropriately at times but needs re education. Pt is easily reoriented when he forgets where he is.        9/24/2017  8:01 PM  Deanna Perez           Face to face report given with opportunity to observe patient.    Report given to Gracie Perez   9/24/2017  11:15 PM

## 2017-09-25 NOTE — PLAN OF CARE
Problem: Patient Care Overview  Goal: Plan of Care/Patient Progress Review  Outcome: No Change  Patient has been able to ambulate around the unit with no difficulty, bowel sounds are present, the abdominal surgical incision sites are clean dry intact, per wound care RN they will come up at 1400 today to do the dressing changes. Patient has reported remote left sided abdominal pain for which Toradol has been administered, patient is reporting he is feeling constipated, prune juice has been ordered, and patient is instructed to alert staff if he has results.patient is adequately making his needs known. And has had a poor appetite today.

## 2017-09-25 NOTE — CONSULTS
Pt was seen for reevaluation of abdominal wound from colostomy takedown surgery. Pt doing well, denies pain to wound area, does have some c/o to abdomen below the wound.  Wound bed cleansed with NS moist gauze, periwound skin and incision cleansed with soap and water.     Wound measures 6.2x3.2x3.0cm, 100% red granular base, moderate serosanguinous drainage, no odor or erythema. Undermines from 10-5:00, deepest at 11:00 at 2.2cm.    Dressed with NS moist kerlix roll gauze (one continuous length), covered with dry 4x4's affixed with Medipore tape.    Covered incision with dry gauze affixed with Medipore to protect from rubbing on clothing.    Spoke with Bianca about plan of care for discharge.    Later on, I did get ahold of Dr. Moreland and he recommends NPWT vac placement. Will try to get this released if possible before discharge; it pt is discharged before vac can be obtained will have to be seen in UC for daily dressing changes until the vac is available.

## 2017-09-25 NOTE — PLAN OF CARE
Problem: Patient Care Overview  Goal: Plan of Care/Patient Progress Review  Outcome: Improving    09/25/17 0452   OTHER   Plan Of Care Reviewed With patient   Plan of Care Review   Progress improving      Pt appears to have slept slightly more then previous night.  Slightly restless and gets out of bed to move around multiple times during the shift.  Bed alarm and and personal alarm on while in chair although pt very steady on his feet.  VSS except temp of 99 that went down to 97.9. Confused to time and place but re-directs well. By the morning he has been alert and oriented. Up to void x 2.  Steady on his feet. Pleasant and cooperative.  Dressing on midline and on right side both have been CDI this shift.  Pt denies any pain but winces when getting into bed.  BS are active and pt passing flatus. Lungs are clear bilaterally. Call light within reach and has used it multiple times to get out of bed.  No change to skin integrity this shift.  Educated on Lovenox this am.    Problem: Bowel Resection (Adult)  Goal: Signs and Symptoms of Listed Potential Problems Will be Absent, Minimized or Managed (Bowel Resection)  Signs and symptoms of listed potential problems will be absent, minimized or managed by discharge/transition of care (reference Bowel Resection (Adult) CPG).   Outcome: Improving    09/24/17 2330   Bowel Resection   Problems Assessed (Bowel Resection) all   Problems Present (Bowel Resection) pain  (only when moving in and out of bed splinting encouraged)       Face to face report given with opportunity to observe patient.    Report given to Julia Gamble   9/25/2017  7:39 AM

## 2017-09-26 VITALS
TEMPERATURE: 98 F | RESPIRATION RATE: 18 BRPM | HEART RATE: 60 BPM | WEIGHT: 169.09 LBS | HEIGHT: 70 IN | OXYGEN SATURATION: 94 % | BODY MASS INDEX: 24.21 KG/M2 | DIASTOLIC BLOOD PRESSURE: 59 MMHG | SYSTOLIC BLOOD PRESSURE: 98 MMHG

## 2017-09-26 LAB
ALBUMIN SERPL-MCNC: 2.8 G/DL (ref 3.4–5)
ALP SERPL-CCNC: 80 U/L (ref 40–150)
ALT SERPL W P-5'-P-CCNC: 31 U/L (ref 0–70)
ANION GAP SERPL CALCULATED.3IONS-SCNC: 9 MMOL/L (ref 3–14)
AST SERPL W P-5'-P-CCNC: 20 U/L (ref 0–45)
BASOPHILS # BLD AUTO: 0.1 10E9/L (ref 0–0.2)
BASOPHILS NFR BLD AUTO: 0.5 %
BILIRUB SERPL-MCNC: 0.7 MG/DL (ref 0.2–1.3)
BUN SERPL-MCNC: 25 MG/DL (ref 7–30)
CALCIUM SERPL-MCNC: 8.3 MG/DL (ref 8.5–10.1)
CHLORIDE SERPL-SCNC: 110 MMOL/L (ref 94–109)
CO2 SERPL-SCNC: 22 MMOL/L (ref 20–32)
COPATH REPORT: NORMAL
CREAT SERPL-MCNC: 1.13 MG/DL (ref 0.66–1.25)
DIFFERENTIAL METHOD BLD: ABNORMAL
EOSINOPHIL # BLD AUTO: 0.6 10E9/L (ref 0–0.7)
EOSINOPHIL NFR BLD AUTO: 6 %
ERYTHROCYTE [DISTWIDTH] IN BLOOD BY AUTOMATED COUNT: 13.3 % (ref 10–15)
GFR SERPL CREATININE-BSD FRML MDRD: 64 ML/MIN/1.7M2
GLUCOSE SERPL-MCNC: 121 MG/DL (ref 70–99)
HCT VFR BLD AUTO: 38.3 % (ref 40–53)
HGB BLD-MCNC: 13.5 G/DL (ref 13.3–17.7)
IMM GRANULOCYTES # BLD: 0.1 10E9/L (ref 0–0.4)
IMM GRANULOCYTES NFR BLD: 0.8 %
LYMPHOCYTES # BLD AUTO: 1.6 10E9/L (ref 0.8–5.3)
LYMPHOCYTES NFR BLD AUTO: 16.5 %
MCH RBC QN AUTO: 30.5 PG (ref 26.5–33)
MCHC RBC AUTO-ENTMCNC: 35.2 G/DL (ref 31.5–36.5)
MCV RBC AUTO: 87 FL (ref 78–100)
MONOCYTES # BLD AUTO: 0.9 10E9/L (ref 0–1.3)
MONOCYTES NFR BLD AUTO: 9.4 %
NEUTROPHILS # BLD AUTO: 6.4 10E9/L (ref 1.6–8.3)
NEUTROPHILS NFR BLD AUTO: 66.8 %
NRBC # BLD AUTO: 0 10*3/UL
NRBC BLD AUTO-RTO: 0 /100
PLATELET # BLD AUTO: 245 10E9/L (ref 150–450)
POTASSIUM SERPL-SCNC: 4.1 MMOL/L (ref 3.4–5.3)
PROT SERPL-MCNC: 6 G/DL (ref 6.8–8.8)
RBC # BLD AUTO: 4.43 10E12/L (ref 4.4–5.9)
SODIUM SERPL-SCNC: 141 MMOL/L (ref 133–144)
WBC # BLD AUTO: 9.6 10E9/L (ref 4–11)

## 2017-09-26 PROCEDURE — 25000132 ZZH RX MED GY IP 250 OP 250 PS 637: Mod: GY | Performed by: INTERNAL MEDICINE

## 2017-09-26 PROCEDURE — 2W13X6Z COMPRESSION OF ABDOMINAL WALL USING PRESSURE DRESSING: ICD-10-PCS | Performed by: SURGERY

## 2017-09-26 PROCEDURE — 80053 COMPREHEN METABOLIC PANEL: CPT | Performed by: INTERNAL MEDICINE

## 2017-09-26 PROCEDURE — 85025 COMPLETE CBC W/AUTO DIFF WBC: CPT | Performed by: INTERNAL MEDICINE

## 2017-09-26 PROCEDURE — A9270 NON-COVERED ITEM OR SERVICE: HCPCS | Mod: GY | Performed by: INTERNAL MEDICINE

## 2017-09-26 PROCEDURE — 36415 COLL VENOUS BLD VENIPUNCTURE: CPT | Performed by: INTERNAL MEDICINE

## 2017-09-26 PROCEDURE — 25000132 ZZH RX MED GY IP 250 OP 250 PS 637: Mod: GY | Performed by: SURGERY

## 2017-09-26 PROCEDURE — 25000128 H RX IP 250 OP 636: Performed by: INTERNAL MEDICINE

## 2017-09-26 PROCEDURE — A9270 NON-COVERED ITEM OR SERVICE: HCPCS | Mod: GY | Performed by: SURGERY

## 2017-09-26 RX ORDER — IBUPROFEN 600 MG/1
600 TABLET, FILM COATED ORAL EVERY 6 HOURS PRN
Qty: 30 TABLET | Refills: 1 | Status: SHIPPED | OUTPATIENT
Start: 2017-09-26 | End: 2018-04-30

## 2017-09-26 RX ADMIN — METOPROLOL TARTRATE 100 MG: 100 TABLET, FILM COATED ORAL at 09:30

## 2017-09-26 RX ADMIN — CLOPIDOGREL 75 MG: 75 TABLET, FILM COATED ORAL at 09:30

## 2017-09-26 RX ADMIN — ENOXAPARIN SODIUM 30 MG: 30 INJECTION SUBCUTANEOUS at 07:53

## 2017-09-26 RX ADMIN — RANITIDINE 150 MG: 150 TABLET ORAL at 09:31

## 2017-09-26 RX ADMIN — LISINOPRIL 40 MG: 20 TABLET ORAL at 09:29

## 2017-09-26 RX ADMIN — KETOROLAC TROMETHAMINE 15 MG: 15 INJECTION, SOLUTION INTRAMUSCULAR; INTRAVENOUS at 06:46

## 2017-09-26 ASSESSMENT — PAIN DESCRIPTION - DESCRIPTORS: DESCRIPTORS: SORE

## 2017-09-26 NOTE — PROGRESS NOTES
POD # 5  Generally doing well, minimal pain, bowels have started to work, no cramps. Tolerating solid food.  Afebrile, Vitals stable  Abdomen - soft, non-tender, wounds look good.  Lab - Hgb 13.5, WBC 9.6, Lytes OK  A - doing well  P - wound vac later today        Discharge home        Follow up in wound care on Friday for suture removal        Restart home meds.

## 2017-09-26 NOTE — PLAN OF CARE
Nurse to nurse report called to [facility] home care nurse Link HUTTON they do anticipate doing a home health visit on Monday and they reported they will call to schedule a time.

## 2017-09-26 NOTE — PLAN OF CARE
Face to face report given with opportunity to observe patient.    Report given to dora GAYTAN  9/26/2017  12:38 PM

## 2017-09-26 NOTE — CONSULTS
Pt was seen today for L abdominal wound vac placement. His wife Raven is present. They are aware of potential copay amt and agree to continue with tx. Proof of delivery [employer] form signed and faxed to [facility].  Both are very concerned about complications with the vac and asked many good questions during the visit today.     Periwound skin cleansed with baby wash and water. Cleansed wound with NS and gauze. Barrier wipe and draping applied to periwound skin to protect. Placed one piece of foam (cut into long length) to base of wound and one atop to pad disc. Adequate seal obtained at 125mm/hg continuous low.    Education provided on how to charge the vac, approx battery life, showering with the vac, and vac monitor/screen. Did not provide education on canister change or using draping as both appeared too anxious at this point additional education would not have been beneficial.     They are concerned about what to do after hours and on weekends if the vac should stop working or the dressing should come off for some reason. They do not feel capable of performing a dressing change and prefer to come into UC or the ED if this occurs. They will have Home Care starting on Monday who will be able to assist on call if needed, in the interim if they have issues and need to go the the UC/ED I ensured them that there is a wound vac (Negative pressure wound therapy) policy that addresses the following: the vac dressing needs to be removed and daily wet to dry dressing needs to be applied. If in the event it is over the weekend they would need to come into UC daily for the dressing change until they can be seen by Home Care on Monday. They felt comfortable with this plan.    Will plan on seeing Friday in the [facility]ound Center for reevaluation and tx. Encouraged to call with questions or concerns during normal business hours. Direct phone # provided.

## 2017-09-26 NOTE — PLAN OF CARE
VSS, pt slept well last night. Pt did complain of 6/10 pain this AM in his incisional area, treated with toradol. Pt was able to order his own breakfast this AM, dressings are clean dry and intact. Lungs clear, bowels are audible and active. Pt is alert and oriented x 4, steady on his feet, independent.   Face to face report given with opportunity to observe patient.    Report given to BRENNEN Dumont and Betzy De Souza RN  9/26/2017  8:21 AM

## 2017-09-26 NOTE — PROGRESS NOTES
Spoke with floor RN, wound care RN, Dr Moreland, [facility] and patient & his wife, all would like a discharge plan consisting of wound vac placement (which was occurring while we spoke) with follow up wound care done on Friday of this week, then next Monday [facility] will begin nursing visits to provide wound care.     Name: Peter Zhao    MRN#: [id number]    Reason for Hospitalization: SIGMOID COLOSTOMY  Attention to colostomy (H)    DENAE: average    Discharge Date: 9/26/2017    Patient / Family response to discharge plan: understand and agree    Follow-Up Appt: Future Appointments  Date Time Provider Department Center   9/29/2017 2:00 PM [facility]       Other Providers (Care Coordinator, [facility] Services, PCA services etc): Yes: wound care and [facility]    Discharge Disposition: home via wife with [facility] wound care and [facility]    Tori Calvo RN

## 2017-09-26 NOTE — PLAN OF CARE
Patient discharged at 1:00 PM via wheel chair accompanied by spouse and staff. Prescriptions sent to patients preferred pharmacy. All belongings sent with patient.     Discharge instructions reviewed with patient and wife. Listed belongings gathered and returned to patient. yes    Patient discharged to home.   Report called to Home Care:  [facility], report given to Link HUTTON     Core Measures and Vaccines  Core Measures applicable during stay: No. If yes, state diagnosis: N/A  Pneumonia and Influenza given prior to discharge, if indicated: N/A    Surgical Patient   Surgical Procedures during stay: yes  Did patient receive discharge instruction on wound care and recognition of infection symptoms? Yes    MISC  Follow up appointment made:  Yes  Home and hospital aquired medications returned to patient: N/A  Patient reports pain was well managed at discharge: Yes

## 2017-09-26 NOTE — PLAN OF CARE
"Problem: Patient Care Overview  Goal: Plan of Care/Patient Progress Review  Outcome: No Change  Reason for hospital stay:  Colostomy Takedown and hernia repair     Most recent vitals: /67 (BP Location: Left arm)  Pulse 60  Temp 99.1  F (37.3  C) (Tympanic)  Resp 16  Ht 1.778 m (5' 10\")  Wt 76.7 kg (169 lb 1.5 oz)  SpO2 93%  BMI 24.26 kg/m2     Pain Management:  Rates abdominal pain 1/10 at beginning of shift. Declined anything for pain. Denied any pain rest of shift.     Orientation:  A+O x4     GI:  BS present x4 - tolerating low residue diet - denies any nausea. Passing flatus, having small loose mucous stools with scant amount of bleeding noted in stool.      Skin Issues:  Abdominal dressings clean dry intact - no shadowing noted     IVF:  Saline lock     Mobility:  Up independently with steady gait - ambulated in room and in hallway - alarms off     9/26/2017  12:06 AM  Mario Ware     Face to face report given with opportunity to observe patient.     Report given to Kaiden Ware   9/26/2017  12:11 AM           Problem: Bowel Resection (Adult)  Goal: Signs and Symptoms of Listed Potential Problems Will be Absent, Minimized or Managed (Bowel Resection)  Signs and symptoms of listed potential problems will be absent, minimized or managed by discharge/transition of care (reference Bowel Resection (Adult) CPG).   Outcome: No Change    09/26/17 0004   Bowel Resection   Problems Assessed (Bowel Resection) all   Problems Present (Bowel Resection) pain;wound healing impaired           "

## 2017-09-27 ENCOUNTER — TELEPHONE (OUTPATIENT)
Dept: CASE MANAGEMENT | Facility: HOSPITAL | Age: 72
End: 2017-09-27

## 2017-09-27 ENCOUNTER — MEDICAL CORRESPONDENCE (OUTPATIENT)
Dept: HEALTH INFORMATION MANAGEMENT | Facility: HOSPITAL | Age: 72
End: 2017-09-27

## 2017-09-27 ENCOUNTER — HOSPITAL ENCOUNTER (EMERGENCY)
Facility: HOSPITAL | Age: 72
Discharge: HOME | End: 2017-09-27
Attending: PHYSICIAN ASSISTANT | Admitting: PHYSICIAN ASSISTANT
Payer: MEDICARE

## 2017-09-27 VITALS
SYSTOLIC BLOOD PRESSURE: 139 MMHG | TEMPERATURE: 97.9 F | OXYGEN SATURATION: 95 % | DIASTOLIC BLOOD PRESSURE: 82 MMHG | RESPIRATION RATE: 16 BRPM | HEART RATE: 81 BPM

## 2017-09-27 DIAGNOSIS — Z98.890 POSTOPERATIVE STATE: ICD-10-CM

## 2017-09-27 DIAGNOSIS — K62.5 RECTAL BLEEDING: ICD-10-CM

## 2017-09-27 LAB
ALBUMIN SERPL-MCNC: 3.1 G/DL (ref 3.4–5)
ALP SERPL-CCNC: 113 U/L (ref 40–150)
ALT SERPL W P-5'-P-CCNC: 31 U/L (ref 0–70)
ANION GAP SERPL CALCULATED.3IONS-SCNC: 6 MMOL/L (ref 3–14)
APTT PPP: 26 SEC (ref 24–37)
AST SERPL W P-5'-P-CCNC: 16 U/L (ref 0–45)
BASOPHILS # BLD AUTO: 0.1 10E9/L (ref 0–0.2)
BASOPHILS NFR BLD AUTO: 0.8 %
BILIRUB SERPL-MCNC: 0.4 MG/DL (ref 0.2–1.3)
BUN SERPL-MCNC: 20 MG/DL (ref 7–30)
CALCIUM SERPL-MCNC: 8.2 MG/DL (ref 8.5–10.1)
CHLORIDE SERPL-SCNC: 109 MMOL/L (ref 94–109)
CO2 SERPL-SCNC: 26 MMOL/L (ref 20–32)
CREAT SERPL-MCNC: 1.12 MG/DL (ref 0.66–1.25)
DIFFERENTIAL METHOD BLD: NORMAL
EOSINOPHIL # BLD AUTO: 0.5 10E9/L (ref 0–0.7)
EOSINOPHIL NFR BLD AUTO: 6.2 %
ERYTHROCYTE [DISTWIDTH] IN BLOOD BY AUTOMATED COUNT: 13.5 % (ref 10–15)
GFR SERPL CREATININE-BSD FRML MDRD: 64 ML/MIN/1.7M2
GLUCOSE SERPL-MCNC: 124 MG/DL (ref 70–99)
HCT VFR BLD AUTO: 40.8 % (ref 40–53)
HGB BLD-MCNC: 13.7 G/DL (ref 13.3–17.7)
IMM GRANULOCYTES # BLD: 0.1 10E9/L (ref 0–0.4)
IMM GRANULOCYTES NFR BLD: 1 %
INR PPP: 1.06 (ref 0.8–1.2)
LYMPHOCYTES # BLD AUTO: 1.2 10E9/L (ref 0.8–5.3)
LYMPHOCYTES NFR BLD AUTO: 14.8 %
MCH RBC QN AUTO: 29.8 PG (ref 26.5–33)
MCHC RBC AUTO-ENTMCNC: 33.6 G/DL (ref 31.5–36.5)
MCV RBC AUTO: 89 FL (ref 78–100)
MONOCYTES # BLD AUTO: 0.9 10E9/L (ref 0–1.3)
MONOCYTES NFR BLD AUTO: 11.3 %
NEUTROPHILS # BLD AUTO: 5.1 10E9/L (ref 1.6–8.3)
NEUTROPHILS NFR BLD AUTO: 65.9 %
NRBC # BLD AUTO: 0 10*3/UL
NRBC BLD AUTO-RTO: 0 /100
PLATELET # BLD AUTO: 264 10E9/L (ref 150–450)
POTASSIUM SERPL-SCNC: 3.6 MMOL/L (ref 3.4–5.3)
PROT SERPL-MCNC: 6.5 G/DL (ref 6.8–8.8)
RBC # BLD AUTO: 4.59 10E12/L (ref 4.4–5.9)
SODIUM SERPL-SCNC: 141 MMOL/L (ref 133–144)
WBC # BLD AUTO: 7.8 10E9/L (ref 4–11)

## 2017-09-27 PROCEDURE — 86901 BLOOD TYPING SEROLOGIC RH(D): CPT | Performed by: PHYSICIAN ASSISTANT

## 2017-09-27 PROCEDURE — 86850 RBC ANTIBODY SCREEN: CPT | Performed by: PHYSICIAN ASSISTANT

## 2017-09-27 PROCEDURE — 99214 OFFICE O/P EST MOD 30 MIN: CPT | Mod: 24 | Performed by: SURGERY

## 2017-09-27 PROCEDURE — 99284 EMERGENCY DEPT VISIT MOD MDM: CPT

## 2017-09-27 PROCEDURE — 85025 COMPLETE CBC W/AUTO DIFF WBC: CPT | Performed by: PHYSICIAN ASSISTANT

## 2017-09-27 PROCEDURE — 36415 COLL VENOUS BLD VENIPUNCTURE: CPT | Performed by: PHYSICIAN ASSISTANT

## 2017-09-27 PROCEDURE — 99283 EMERGENCY DEPT VISIT LOW MDM: CPT | Performed by: PHYSICIAN ASSISTANT

## 2017-09-27 PROCEDURE — 99283 EMERGENCY DEPT VISIT LOW MDM: CPT

## 2017-09-27 PROCEDURE — 85730 THROMBOPLASTIN TIME PARTIAL: CPT | Performed by: PHYSICIAN ASSISTANT

## 2017-09-27 PROCEDURE — 80053 COMPREHEN METABOLIC PANEL: CPT | Performed by: PHYSICIAN ASSISTANT

## 2017-09-27 PROCEDURE — 85610 PROTHROMBIN TIME: CPT | Performed by: PHYSICIAN ASSISTANT

## 2017-09-27 PROCEDURE — 86900 BLOOD TYPING SEROLOGIC ABO: CPT | Performed by: PHYSICIAN ASSISTANT

## 2017-09-27 ASSESSMENT — ENCOUNTER SYMPTOMS
WEAKNESS: 0
ABDOMINAL PAIN: 0
ANAL BLEEDING: 1
DIZZINESS: 0
BRUISES/BLEEDS EASILY: 1
NAUSEA: 0
SHORTNESS OF BREATH: 0
LIGHT-HEADEDNESS: 0
VOMITING: 0
BLOOD IN STOOL: 1

## 2017-09-27 NOTE — ED PROVIDER NOTES
"  History     Chief Complaint   Patient presents with     Rectal Bleeding     The history is provided by the patient and the spouse.     Peter Zhao is a 71 year old male who presented to the ED ambulatory along with wife for evaluation of rectal bleeding. His PMH is most appreciable for colostomy closure by Dr. Moreland on 9/12.  Did well postoperative.  Discharged on 9/26.  Complicated by Plavix therapy.  Today he reports 3 small stools described as \"deer shit\" and then one large stool mixed with isaiah red blood.  He called triage and was advised to come to the ED.  He had a small normal looking stool in the triage area with large amount of bright red blood on the toilet paper.  No dizziness.  No chest pain.  Has a wound vac. No fevers.     I have reviewed the Medications, Allergies, Past Medical and Surgical History, and Social History in the Epic system.    Allergies:   Allergies   Allergen Reactions     Acetaminophen      Ciprofloxacin Hives     No Clinical Screening - See Comments      Antibiotic allergy, pt not sure which         No current facility-administered medications on file prior to encounter.   Current Outpatient Prescriptions on File Prior to Encounter:  ibuprofen (ADVIL/MOTRIN) 600 MG tablet Take 1 tablet (600 mg) by mouth every 6 hours as needed for moderate pain   clopidogrel (PLAVIX) 75 MG tablet Take 1 tablet (75 mg) by mouth daily   metoprolol (LOPRESSOR) 100 MG tablet Take 1 tablet (100 mg) by mouth 2 times daily   lisinopril (PRINIVIL/ZESTRIL) 40 MG tablet Take 1 tablet (40 mg) by mouth daily   atorvastatin (LIPITOR) 40 MG tablet Take 1 tablet (40 mg) by mouth daily   ranitidine (ZANTAC) 150 MG tablet Take 1 tablet (150 mg) by mouth 2 times daily   finasteride (PROSCAR) 5 MG tablet Take 1 tablet (5 mg) by mouth daily   amLODIPine (NORVASC) 5 MG tablet Take 1 tablet (5 mg) by mouth daily   Multiple Vitamin (MULTIVITAMINS PO) Take 1 capsule by mouth daily   Lancets Misc. (ACCU-CHEK SOFTCLIX " LANCET DEV) KIT 1 Units daily   ASPIRIN PO Take 325 mg by mouth daily    Cholecalciferol (VITAMIN D3 PO) Take 1,000 Units by mouth daily    UNABLE TO FIND Take 550 mg by mouth 2 times daily Gymnema danielle   nitroglycerin (NITROSTAT) 0.4 MG sublingual tablet Place 1 tablet (0.4 mg) under the tongue every 5 minutes as needed (Patient not taking: Reported on 9/12/2017)   blood glucose monitoring (MARIAN CONTOUR NEXT) test strip Use to test blood sugar 2 times weekly or as directed.   calcium carbonate (OS-WALI 600 MG [place]. CA) 1500 (600 CA) MG tablet Take 1 tablet (1,500 mg) by mouth daily   Ostomy Supplies POUCH IntroNet 42524 closed end pouches - per Medicare patient is allowed 60 per month.  Dispense 2 boxes or patient choice   Ostomy Supplies MISC Morrison m9 odor eliminator drops 7717 or 7715 - patient preference   order for DME Equipment being ordered: Ostomy supplies - Barrier - currently using 62465 Sondra flat cut to fit; Pouch 08621 clear; Adapt barrier rings 7805; Sondra ostomy belt.  Fill per insurance guidelines   blood glucose monitoring (MARIAN MICROLET) lancets Use to test blood sugar 2 times weekly   NONFORMULARY OTC eye drops as directed   polyethylene glycol 0.4%- propylene glycol 0.3% (SYSTANE ULTRA) 0.4-0.3 % SOLN ophthalmic solution Place 1 drop into both eyes every hour as needed for dry eyes   glucose blood VI test strips (ACCU-CHEK SMARTVIEW) strip Test blood sugar before breakfast one day, before and after supper the next and not at all the third day   SOFTCLIX LANCETS MISC 1 Units daily Use fresh lancet each day to check blood sugars       Patient Active Problem List   Diagnosis     Diabetes mellitus, type 2 (H)     CAD (coronary artery disease)     ACP (advance care planning)     Hyperlipidemia LDL goal <100     Elevated prostate specific antigen (PSA)     HTN (hypertension)     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     H/O migraine      "Visual aura     Cataract     S/P colon resection     Hypokalemia     TIA (transient ischemic attack)     Agitation     Attention to colostomy (H)       Past Surgical History:   Procedure Laterality Date     AORTIC VALVE REPLACEMENT      25 mm CE bovine replacement Dr. Wu 8/20/2012     CARDIAC SURGERY       CATARACT IOL, RT/LT Right 04/2017     COLECTOMY LOW ANTERIOR N/A 2/17/2017    Procedure: COLECTOMY LOW ANTERIOR;  Surgeon: Tima Moreland MD;  [facility]     COLONOSCOPY N/A 2/17/2017    Procedure: COLONOSCOPY;  Surgeon: Tima Moreland MD;  Location: [facility]     ENT SURGERY       FLEX SIG (MEDICARE PT.)       GI SURGERY  02/2017    ostemomy     HERNIA REPAIR       LAPAROSCOPIC HERNIORRHAPHY INGUINAL Right 9/18/2015    Procedure: LAPAROSCOPIC HERNIORRHAPHY INGUINAL;  Surgeon: Solitario Nettles DO;  Location: [facility]     PHACOEMULSIFICATION WITH STANDARD INTRAOCULAR LENS IMPLANT Right 4/20/2017    Procedure: PHACOEMULSIFICATION WITH STANDARD INTRAOCULAR LENS IMPLANT;  CATARACT EXTRACTION RIGHT EYE WITH IMPLANT;  Surgeon: Darin Gutierrez MD;  Location: [facility]     PROSTATE BIOPSY, NEEDLE, SATURATION SAMPLING  2009     retinopexy-pvd with retinal tear Right 2008     TAKEDOWN COLOSTOMY N/A 9/21/2017    Procedure: TAKEDOWN COLOSTOMY;  CLOSURE OF NORRIS'S SIGMOID COLOSTOMY, REPAIR STOMAL HERNIA;  Surgeon: Tima Moreland MD;  Location: [facility]     TONSILLECTOMY         Social History   Substance Use Topics     Smoking status: Former Smoker     Smokeless tobacco: Former User     Types: Chew     Quit date: 10/19/2013      Comment: quit in 2000     Alcohol use No       Most Recent Immunizations   Administered Date(s) Administered     TDAP Vaccine (Adacel) 07/04/2014       BMI: Estimated body mass index is 24.26 kg/(m^2) as calculated from the following:    Height as of 9/21/17: 1.778 m (5' 10\").    Weight as of 9/24/17: 76.7 kg (169 lb 1.5 oz).      Review of Systems   Respiratory: Negative for shortness of " breath.    Gastrointestinal: Positive for anal bleeding and blood in stool. Negative for abdominal pain, nausea and vomiting.   Neurological: Negative for dizziness, weakness and light-headedness.   Hematological: Bruises/bleeds easily.       Physical Exam   BP: 141/77  Pulse: 81  Temp: 97.9  F (36.6  C)  Resp: 20  SpO2: 95 %  Physical Exam   Constitutional: He is oriented to person, place, and time. He appears well-developed and well-nourished. No distress.   Pulmonary/Chest: Effort normal.   Abdominal:   Surgical wound looks good    Neurological: He is alert and oriented to person, place, and time.   Skin: Skin is warm and dry.   Nursing note and vitals reviewed.      ED Course     ED Course     Procedures           Results for orders placed or performed during the hospital encounter of 09/27/17 (from the past 24 hour(s))   CBC with platelets differential   Result Value Ref Range    WBC 7.8 4.0 - 11.0 10e9/L    RBC Count 4.59 4.4 - 5.9 10e12/L    Hemoglobin 13.7 13.3 - 17.7 g/dL    Hematocrit 40.8 40.0 - 53.0 %    MCV 89 78 - 100 fl    MCH 29.8 26.5 - 33.0 pg    MCHC 33.6 31.5 - 36.5 g/dL    RDW 13.5 10.0 - 15.0 %    Platelet Count 264 150 - 450 10e9/L    Diff Method Automated Method     % Neutrophils 65.9 %    % Lymphocytes 14.8 %    % Monocytes 11.3 %    % Eosinophils 6.2 %    % Basophils 0.8 %    % Immature Granulocytes 1.0 %    Nucleated RBCs 0 0 /100    Absolute Neutrophil 5.1 1.6 - 8.3 10e9/L    Absolute Lymphocytes 1.2 0.8 - 5.3 10e9/L    Absolute Monocytes 0.9 0.0 - 1.3 10e9/L    Absolute Eosinophils 0.5 0.0 - 0.7 10e9/L    Absolute Basophils 0.1 0.0 - 0.2 10e9/L    Abs Immature Granulocytes 0.1 0 - 0.4 10e9/L    Absolute Nucleated RBC 0.0    Comprehensive metabolic panel   Result Value Ref Range    Sodium 141 133 - 144 mmol/L    Potassium 3.6 3.4 - 5.3 mmol/L    Chloride 109 94 - 109 mmol/L    Carbon Dioxide 26 20 - 32 mmol/L    Anion Gap 6 3 - 14 mmol/L    Glucose 124 (H) 70 - 99 mg/dL    Urea Nitrogen  20 7 - 30 mg/dL    Creatinine 1.12 0.66 - 1.25 mg/dL    GFR Estimate 64 >60 mL/min/1.7m2    GFR Estimate If Black 78 >60 mL/min/1.7m2    Calcium 8.2 (L) 8.5 - 10.1 mg/dL    Bilirubin Total 0.4 0.2 - 1.3 mg/dL    Albumin 3.1 (L) 3.4 - 5.0 g/dL    Protein Total 6.5 (L) 6.8 - 8.8 g/dL    Alkaline Phosphatase 113 40 - 150 U/L    ALT 31 0 - 70 U/L    AST 16 0 - 45 U/L   INR   Result Value Ref Range    INR 1.06 0.80 - 1.20   Partial thromboplastin time   Result Value Ref Range    PTT 26 24.00 - 37.00 sec   ABO/Rh type and screen   Result Value Ref Range    ABO O     RH(D) Pos     Antibody Screen Neg     Test Valid Only At [facility]        Specimen Expires 09/30/2017     Blood Bank Comment       This specimen was not labeled according to requirements for establishing a blood type for   transfusion purposes.  The patient can be transfused with type O red cells until a new   specimen is obtained to confirm the patient's blood type, at which time type-specific   blood can be transfused.       Critical Care time:  none               Labs Ordered and Resulted from Time of ED Arrival Up to the Time of Departure from the ED   COMPREHENSIVE METABOLIC PANEL - Abnormal; Notable for the following:        Result Value    Glucose 124 (*)     Calcium 8.2 (*)     Albumin 3.1 (*)     Protein Total 6.5 (*)     All other components within normal limits   CBC WITH PLATELETS DIFFERENTIAL   INR   PARTIAL THROMBOPLASTIN TIME   ABO/RH TYPE AND SCREEN       Assessments & Plan (with Medical Decision Making)   Graciously evaluated by Dr. Moreland.  Return here for any other concerns. Follow-up as scheduled.     I have reviewed the nursing notes.    I have reviewed the findings, diagnosis, plan and need for follow up with the patient.       Discharge Medication List as of 9/27/2017  7:51 PM          Final diagnoses:   Rectal bleeding   Postoperative state       9/27/2017   [facility]     Ivette Gregg PA-C  09/27/17  2009

## 2017-09-27 NOTE — ED AVS SNAPSHOT
[facility]    [address] [place] [id number]    Phone:  [phone]                                       Peter Zhao   MRN: [id number]    Department:  [facility]   Date of Visit:  9/27/2017           Patient Information     Date Of Birth          1945        Your diagnoses for this visit were:     Rectal bleeding     Postoperative state        You were seen by Ivette Gregg PA-C.      Follow-up Information     Follow up with Alexander Vazquez DO.    Specialty:  Internal Medicine    Why:  As needed    Contact information:    [facility]  [address]  [facility] [address] [phone]          Follow up with Tima Moreland MD.    Specialty:  General Surgery    Contact information:    [facility]  [address] [facility] [address] [phone]          Follow up with [facility].    Specialty:  Wound Care    Contact information:    [address] [place] [address] [phone]    Additional information:    From [facility]: Take [address]. Turn left at [address]. Turn left at the first stoplight on [address]. At the first stop sign, take a right onto [address]. Take a left into the parking lot and continue through until you reach the North enterance of the building.       From [place]: Take [address]. Take the [address] ramp towards [place]. Turn left onto [address]. Take a slight right onto [address] [facility]. Turn left at the first stoplight on [address]. At the first stop sign, take a right onto [address]. Take a left into the parking lot and continue through until you reach the North enterance of the building.       From [place]: Take [address]. Take a right at [address]. At the first stop sign, take a right onto [address]. Take a left into the parking lot and continue through until you reach the North enterance of the  building.         Discharge Instructions       Please follow-up with wound care and Dr. Moreland as scheduled.     Rest.    Return here for any worsening or other concerns.     Future Appointments        Provider Department Dept Phone Center    9/29/2017 2:00 PM Jackie Campos NP [facility] [phone] Myrna Coughlin         Review of your medicines      Our records show that you are taking the medicines listed below. If these are incorrect, please call your family doctor or clinic.        Dose / Directions Last dose taken    amLODIPine 5 MG tablet   Commonly known as:  NORVASC   Dose:  5 mg   Quantity:  90 tablet        Take 1 tablet (5 mg) by mouth daily   Refills:  3        ASPIRIN PO   Dose:  325 mg        Take 325 mg by mouth daily   Refills:  0        atorvastatin 40 MG tablet   Commonly known as:  LIPITOR   Dose:  40 mg   Quantity:  90 tablet        Take 1 tablet (40 mg) by mouth daily   Refills:  3        blood glucose lancing device   Dose:  1 Units   Quantity:  1 kit        1 Units daily   Refills:  2        * blood glucose monitoring lancets   Dose:  1 Units   Quantity:  100 each        1 Units daily Use fresh lancet each day to check blood sugars   Refills:  4        * blood glucose monitoring lancets   Quantity:  100 Box        Use to test blood sugar 2 times weekly   Refills:  6        * blood glucose monitoring test strip   Commonly known as:  ACCU-CHEK SMARTVIEW   Quantity:  1 Box        Test blood sugar before breakfast one day, before and after supper the next and not at all the third day   Refills:  12        * blood glucose monitoring test strip   Commonly known as:  MARIAN CONTOUR NEXT   Quantity:  100 strip        Use to test blood sugar 2 times weekly or as directed.   Refills:  6        calcium carbonate 1500 (600 [place]) MG tablet   Commonly known as:  OS-WALI 600 mg [place]. Ca   Dose:  1500 mg   Quantity:  30 tablet        Take 1 tablet (1,500 mg) by mouth daily   Refills:  3         clopidogrel 75 MG tablet   Commonly known as:  PLAVIX   Dose:  75 mg   Quantity:  90 tablet        Take 1 tablet (75 mg) by mouth daily   Refills:  3        finasteride 5 MG tablet   Commonly known as:  PROSCAR   Dose:  5 mg   Quantity:  90 tablet        Take 1 tablet (5 mg) by mouth daily   Refills:  3        ibuprofen 600 MG tablet   Commonly known as:  ADVIL/MOTRIN   Dose:  600 mg   Quantity:  30 tablet        Take 1 tablet (600 mg) by mouth every 6 hours as needed for moderate pain   Refills:  1        lisinopril 40 MG tablet   Commonly known as:  PRINIVIL/ZESTRIL   Dose:  40 mg   Quantity:  90 tablet        Take 1 tablet (40 mg) by mouth daily   Refills:  3        metoprolol 100 MG tablet   Commonly known as:  LOPRESSOR   Dose:  100 mg   Quantity:  180 tablet        Take 1 tablet (100 mg) by mouth 2 times daily   Refills:  3        MULTIVITAMINS PO   Dose:  1 capsule        Take 1 capsule by mouth daily   Refills:  0        nitroGLYcerin 0.4 MG sublingual tablet   Commonly known as:  NITROSTAT   Dose:  0.4 mg   Quantity:  25 tablet        Place 1 tablet (0.4 mg) under the tongue every 5 minutes as needed   Refills:  3        NONFORMULARY        OTC eye drops as directed   Refills:  0        order for DME   Quantity:  1 Package        Equipment being ordered: Ostomy supplies - Barrier - currently using 95368 Sondra flat cut to fit; Pouch 14843 clear; Adapt barrier rings 7805; Kaplan ostomy belt.  Fill per insurance guidelines   Refills:  11        * Ostomy Supplies Misc   Quantity:  1 each        Sondra m9 odor eliminator drops 7717 or 7715 - patient preference   Refills:  11        * Ostomy Supplies POUCH Misc   Quantity:  2 each        Kaplan 57407 closed end pouches - per Medicare patient is allowed 60 per month.  Dispense 2 boxes or patient choice   Refills:  11        polyethylene glycol 0.4%- propylene glycol 0.3% 0.4-0.3 % Soln ophthalmic solution   Commonly known as:  SYSTANE ULTRA   Dose:  1  "drop        Place 1 drop into both eyes every hour as needed for dry eyes   Refills:  0        ranitidine 150 MG tablet   Commonly known as:  ZANTAC   Dose:  150 mg   Quantity:  180 tablet        Take 1 tablet (150 mg) by mouth 2 times daily   Refills:  3        UNABLE TO FIND   Dose:  550 mg        Take 550 mg by mouth 2 times daily Gymnema danielle   Refills:  0        VITAMIN D3 PO   Dose:  1000 Units        Take 1,000 Units by mouth daily   Refills:  0        * Notice:  This list has 6 medication(s) that are the same as other medications prescribed for you. Read the directions carefully, and ask your doctor or other care provider to review them with you.            Procedures and tests performed during your visit     ABO/Rh type and screen    CBC with platelets differential    Comprehensive metabolic panel    INR    Partial thromboplastin time      Orders Needing Specimen Collection     None      Pending Results     No orders found from 9/25/2017 to 9/28/2017.            Pending Culture Results     No orders found from 9/25/2017 to 9/28/2017.            Thank you for choosing [facility]       Thank you for choosing [facility] for your care. Our goal is always to provide you with excellent care. Hearing back from our patients is one way we can continue to improve our services. Please take a few minutes to complete the written survey that you may receive in the mail after you visit with us. Thank you!        [employer] Information     [employer] lets you send messages to your doctor, view your test results, renew your prescriptions, schedule appointments and more. To sign up, go to [email or web address] . Click on \"Log in\" on the left side of the screen, which will take you to the Welcome page. Then click on \"Sign up Now\" on the right side of the page.     You will be asked to enter the access code listed below, as well as some personal information. Please follow the directions to create your username and password.   "   Your access code is: [email or web address]  Expires: 2017 10:57 AM     Your access code will  in 90 days. If you need help or a new code, please call your [facility] clinic or [phone].        Care EveryWhere ID     This is your Care EveryWhere ID. This could be used by other organizations to access your [facility] medical records  [id number]        Equal Access to Services     [facility] : Hadii miroslava roberts hadcatalinao Sotundeali, waaxda luqadaha, qaybta kaalmada adeegyada, john torres . So [facility] [phone].    ATENCIÓN: Si habla judi, tiene a farah disposición servicios gratuitos de asistencia lingüística. Llame al [phone].    We comply with applicable federal civil rights laws and [place] laws. We do not discriminate on the basis of race, color, national origin, age, disability sex, sexual orientation or gender identity.            After Visit Summary       This is your record. Keep this with you and show to your community pharmacist(s) and doctor(s) at your next visit.

## 2017-09-27 NOTE — TELEPHONE ENCOUNTER
The Patient, Peter, was discharged to home on 9/26/17 from [facility]. I spoke today with Peter regarding the patient's discharge.     He indicates they did receive sufficient information upon discharge. Medications were reviewed in full on discharge, including: Medications to be started; medications to be stopped; medications to be continued from preadmission and any side effects. Prescriptions were received at discharge and were able to be filled. Medications are being taken as prescribed.     He indicates they have an appointment scheduled for 9/29/17 and have transportation available. They are able to confirm their appointment time and have it written down.       Questions regarding discharge instructions or condition have been answered.      Per their request, the following employee (s) can be recognized for their outstanding services delivered:  All good.     Suggestions to improve service: no     He was informed they may receive a survey in the mail from the hospital. Asked if they would kindly complete the survey in order for [facility] to know if services fully met patient needs.

## 2017-09-27 NOTE — ED AVS SNAPSHOT
[facility]    [address] [address]    Phone:  [phone]                                       Peter Zhao   MRN: [id number]    Department:  [facility]   Date of Visit:  9/27/2017           After Visit Summary Signature Page     I have received my discharge instructions, and my questions have been answered. I have discussed any challenges I see with this plan with the nurse or doctor.    ..........................................................................................................................................  Patient/Patient Representative Signature      ..........................................................................................................................................  Patient Representative Print Name and Relationship to Patient    ..................................................               ................................................  Date                                            Time    ..........................................................................................................................................  Reviewed by Signature/Title    ...................................................              ..............................................  Date                                                            Time

## 2017-09-27 NOTE — ED NOTES
Pt reports to er with bright red blood in stool.  Pt states that he recently was discharged after colostomy take down.  Pt has wound vac that was not on when he arrived.  abg incision well approximated and intact.  Pt denies nausea, vomiting or worsening abd pain.  Pt denies fever or chills

## 2017-09-28 NOTE — CONSULTS
SURGICAL CONSULTATION      DATE OF CONSULTATION:  09/27/2017.      HISTORY OF PRESENT ILLNESS:  Peter Zhao was in the emergency room tonight.  This man was discharged home from the hospital yesterday following the closure of a Marianna's type colostomy.  He had done well in the hospital and his bowels have been working.  He does state that earlier today he had two bowel movements.  Around 4:00 he had another bowel movement and did note a fair amount of blood that was present.  Following that, he had a couple of small bowel movements that showed a bit of blood on the surface of the stool.  Because of the bleeding, he contacted the floor and then was referred to the emergency room.  He was seen in the emergency room and I was asked to review him again.      The patient is on Plavix because of a previous TIA.  This was restarted in the hospital.      He denies any abdominal pain.  He has otherwise been feeling well.  He is eating well.      PHYSICAL EXAMINATION:     VITAL SIGNS:  In the emergency room his vitals have been stable.   ABDOMEN:  Soft and nontender with no palpable masses.  His wounds look good.  He has a wound VAC in the area of the old colostomy site.   RECTAL:  Exam was done.  He does have some minimal irritation around the anal canal.  There are no external hemorrhoids.  There is a bit of old blood on the skin.  Digital examination of the rectum was carried out.  There was a bit of old blood and liquid stool in the rectum.  There was no isaiah blood.      His hemoglobin done tonight was 13.7.  Hemoglobin yesterday was 13.5.      ASSESSMENT AND PLAN:  I explained to Mr. Zhao and his wife that likely he has had a bit of bleeding from the anastomosis.  This is not uncommon.  He may have knocked off a little scab that has bled.  Currently, it appears to have stopped bleeding.  I thought it was safe for him to go home.  He has been instructed that he may see a bit of blood with upcoming bowel movements.  If,  though, he starts passing a large amount of blood he is to return.  The patient is scheduled to follow up in wound care on Friday and is scheduled for me to see him at the same time.      His wife is also concerned that his bowels have been fairly frequent.  I reassured them that they will become a bit more regular as time goes on.  Fortunately, he is not having troubles with urgency or incontinence.  They were happy with this approach today.         RUDY RAYO MD             D: 2017 20:19   T: 2017 21:31   MT:       Name:     ROGER VINSON   MRN:      [id number]        Account:       [id number]   :      1945           Consult Date:  2017      Document: [id number]       cc: Alexander Vazquez DO

## 2017-09-28 NOTE — DISCHARGE INSTRUCTIONS
Please follow-up with wound care and Dr. Moreland as scheduled.     Rest.    Return here for any worsening or other concerns.

## 2017-09-28 NOTE — ED NOTES
All discharge instructions and avs discussed with patient.  All questions answered.  Pt verbalized understanding of home care, follow up and medication management as well as wound vac management.  All belongings sent home

## 2017-09-29 ENCOUNTER — OFFICE VISIT (OUTPATIENT)
Dept: WOUND CARE | Facility: OTHER | Age: 72
End: 2017-09-29
Attending: INTERNAL MEDICINE
Payer: MEDICARE

## 2017-09-29 VITALS
DIASTOLIC BLOOD PRESSURE: 72 MMHG | HEIGHT: 70 IN | BODY MASS INDEX: 24.2 KG/M2 | TEMPERATURE: 97.1 F | WEIGHT: 169 LBS | SYSTOLIC BLOOD PRESSURE: 120 MMHG

## 2017-09-29 DIAGNOSIS — Z98.890 S/P COLOSTOMY TAKEDOWN: ICD-10-CM

## 2017-09-29 DIAGNOSIS — S31.109D OPEN WOUND OF ABDOMEN, SUBSEQUENT ENCOUNTER: ICD-10-CM

## 2017-09-29 LAB
ABO + RH BLD: NORMAL
ABO + RH BLD: NORMAL
BLD GP AB SCN SERPL QL: NORMAL
BLOOD BANK CMNT PATIENT-IMP: NORMAL
BLOOD BANK CMNT PATIENT-IMP: NORMAL
SPECIMEN EXP DATE BLD: NORMAL

## 2017-09-29 PROCEDURE — 99212 OFFICE O/P EST SF 10 MIN: CPT | Mod: 25

## 2017-09-29 PROCEDURE — 99213 OFFICE O/P EST LOW 20 MIN: CPT | Mod: 25 | Performed by: NURSE PRACTITIONER

## 2017-09-29 PROCEDURE — 97605 NEG PRS WND THER DME<=50SQCM: CPT | Performed by: NURSE PRACTITIONER

## 2017-09-29 NOTE — PROGRESS NOTES
SUBJECTIVE:  Peter Zhao, 71 year old, male presents with the following Chief Complaint(s) with HPI to follow:   Chief Complaint   Patient presents with     WOUND CARE      HPI:  Peter is here today for evaluation and treatment of his abd wound.  He had his colostomy take down and repair of parastomal hernia completed on 9/21/17.  He originally had the colostomy done in February of 2017 due to a large bowel obstruction.    His wound vac, (or NPWT negative pressure wound therapy), was placed for the first time on 9/26/17.  He was in the ER on 9/27/17 for bowel movement issues and the staff noticed that the wound vac was not running.  They restarted it and he has had no issues since.  He has some mild abd tenderness, but is no longer taking pain medication.    He is passing stool today, a small amount with no more blood noted.  He is eating OK as well.      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 Wu 8/20/2012     CARDIAC SURGERY       CATARACT IOL, RT/LT Right 04/2017     COLECTOMY LOW ANTERIOR N/A 2/17/2017     Procedure: COLECTOMY LOW ANTERIOR;  Surgeon: Tima Moreland MD;  Location: [facility]     COLONOSCOPY N/A 2/17/2017    Procedure: COLONOSCOPY;  Surgeon: Tima Moreland MD;  Location: [facility]     ENT SURGERY       FLEX SIG (MEDICARE PT.)       GI SURGERY  02/2017    ostemomy     HERNIA REPAIR       LAPAROSCOPIC HERNIORRHAPHY INGUINAL Right 9/18/2015    Procedure: LAPAROSCOPIC HERNIORRHAPHY INGUINAL;  Surgeon: Solitario Nettles DO;  Location: [facility]     PHACOEMULSIFICATION WITH STANDARD INTRAOCULAR LENS IMPLANT Right 4/20/2017    Procedure: PHACOEMULSIFICATION WITH STANDARD INTRAOCULAR LENS IMPLANT;  CATARACT EXTRACTION RIGHT EYE WITH IMPLANT;  Surgeon: Darin Gutierrez MD;  Location: [facility]     PROSTATE BIOPSY, NEEDLE, SATURATION SAMPLING  2009     retinopexy-pvd with retinal tear Right 2008     TAKEDOWN COLOSTOMY N/A 9/21/2017    Procedure: TAKEDOWN COLOSTOMY;  CLOSURE OF NORRIS'S SIGMOID COLOSTOMY, REPAIR STOMAL HERNIA;  Surgeon: Tima Moreland MD;  Location: [facility]     TONSILLECTOMY         Family History   Problem Relation Age of Onset     DIABETES Mother      C.A.D. Mother      CANCER Maternal Grandmother        Social History   Substance Use Topics     Smoking status: Former Smoker     Smokeless tobacco: Former User     Types: Chew     Quit date: 10/19/2013      Comment: quit in 2000     Alcohol use No       Current Outpatient Prescriptions   Medication Sig Dispense Refill     ibuprofen (ADVIL/MOTRIN) 600 MG tablet Take 1 tablet (600 mg) by mouth every 6 hours as needed for moderate pain 30 tablet 1     UNABLE TO FIND Take 550 mg by mouth 2 times daily Gymnema danielle       clopidogrel (PLAVIX) 75 MG tablet Take 1 tablet (75 mg) by mouth daily 90 tablet 3     metoprolol (LOPRESSOR) 100 MG tablet Take 1 tablet (100 mg) by mouth 2 times daily 180 tablet 3     lisinopril (PRINIVIL/ZESTRIL) 40 MG tablet Take 1 tablet (40 mg) by mouth daily 90 tablet 3     atorvastatin (LIPITOR) 40 MG tablet Take 1 tablet (40  mg) by mouth daily 90 tablet 3     nitroglycerin (NITROSTAT) 0.4 MG sublingual tablet Place 1 tablet (0.4 mg) under the tongue every 5 minutes as needed (Patient not taking: Reported on 9/12/2017) 25 tablet 3     ranitidine (ZANTAC) 150 MG tablet Take 1 tablet (150 mg) by mouth 2 times daily 180 tablet 3     finasteride (PROSCAR) 5 MG tablet Take 1 tablet (5 mg) by mouth daily 90 tablet 3     amLODIPine (NORVASC) 5 MG tablet Take 1 tablet (5 mg) by mouth daily 90 tablet 3     blood glucose monitoring (MARIAN CONTOUR NEXT) test strip Use to test blood sugar 2 times weekly or as directed. 100 strip 6     calcium carbonate (OS-WALI 600 MG [place]. CA) 1500 (600 CA) MG tablet Take 1 tablet (1,500 mg) by mouth daily 30 tablet 3     Ostomy Supplies POUCH [facility] [address] closed end pouches - per Medicare patient is allowed 60 per month.  Dispense 2 boxes or patient choice 2 each 11     blood glucose monitoring (MARIAN MICROLET) lancets Use to test blood sugar 2 times weekly 100 Box 6     NONFORMULARY OTC eye drops as directed       polyethylene glycol 0.4%- propylene glycol 0.3% (SYSTANE ULTRA) 0.4-0.3 % SOLN ophthalmic solution Place 1 drop into both eyes every hour as needed for dry eyes       Multiple Vitamin (MULTIVITAMINS PO) Take 1 capsule by mouth daily       glucose blood VI test strips (ACCU-CHEK SMARTVIEW) strip Test blood sugar before breakfast one day, before and after supper the next and not at all the third day 1 Box 12     SOFTCLIX LANCETS MISC 1 Units daily Use fresh lancet each day to check blood sugars 100 each 4     Lancets Mis. (ACCU-CHEK SOFTCLIX LANCET DEV) KIT 1 Units daily 1 kit 2     ASPIRIN PO Take 325 mg by mouth daily        Cholecalciferol (VITAMIN D3 PO) Take 1,000 Units by mouth daily          Allergies   Allergen Reactions     Acetaminophen      Ciprofloxacin Hives     No Clinical Screening - See Comments      Antibiotic allergy, pt not sure which       REVIEW OF SYSTEMS  Skin: as  "above  Eyes: glasses  Ears/Nose/Throat: hearing loss  Respiratory: No shortness of breath, dyspnea on exertion, cough, or hemoptysis  Cardiovascular: negative  Gastrointestinal: is stooling, has had some white foamy stool and some hard lumps of stool  Genitourinary: negative  Musculoskeletal: negative  Neurologic: migraine headaches, TIA hx  Psychiatric: negative  Hematologic/Lymphatic/Immunologic: is on Plavix because of stroke history  Endocrine: negative    OBJECTIVE:  Vital signs:  Temp: 97.1  F (36.2  C)   BP: 120/72             Height: 5' 10\" (177.8 cm) Weight: 169 lb (76.7 kg)  Estimated body mass index is 24.25 kg/(m^2) as calculated from the following:    Height as of this encounter: 5' 10\" (1.778 m).    Weight as of this encounter: 169 lb (76.7 kg).  Constitutional: healthy, alert and no distress  Cardiovascular: RRR. No murmurs, clicks gallops or rub  Respiratory:  Good diaphragmatic excursion. Lungs clear  Gastrointestinal: Abdomen soft, generally not tender, except for area of new surgery. BS normal. No masses, organomegaly  : Deferred  Musculoskeletal: extremities normal- no gross deformities noted, gait normal and normal muscle tone  Skin:        Wound description:  Type of Wound- surgical; Location- left abd, Drainage amount-moderate, Drainage color-bloody,  Odor- none; Wound bed-100% red and clean, Surrounding skin-intact.       Measurements: 5.8 X 2.2 cm X 3.2 cm deep and undermines at 12:00 1.5 cm.        Dressing change:  Wound cleansed with saline and gauze, dressed with thin hydrocolloid around the wound, one small piece of black sponge in the undermined area, another piece over the top and a third for the disc landing.  Pump set at 125mm/hg, continuous, good seal obtained.    Neurologic: Gait normal. Sensation grossly WNL.  Psychiatric: mentation appears normal and affect normal/bright    THERAPY GOAL: keep wound clean and infection free, provide NPWT to enhance healing.    ASSESSMENT / " PLAN:  Dr. Moreland was here today and evaluated Peter as well.  He took out the mid line abdominal sutures and placed steri strips.  He discussed Peter's bowels with him and recommended fiber therapy.  Peter has fibercon at home that he will begin to take again.  Peter is also advised to drink plenty of water.  I instructed Peter's wife how to use the transparent dressing to fix dressing leaks and how to put the pump on lock so it will not get turned off accidentally again.  Home care will see him on Monday and will do his NPWT dressing change.  They will also see him on Wednesday and we will see him on Friday.    Jackie Horne  CNP, CWOCN  Urology and Wound Care  September 29, 2017

## 2017-09-29 NOTE — PROGRESS NOTES
DATE OF SERVICE: 2017      Mr. Roger Zhao was seen in Wound Care today.  He was back in for a change of his wound VAC.  I had seen him on Wednesday night in the emergency room.  He had an episode of rectal bleeding.  This has settled.  He has not had any bleeding since then.  He has though noticed that his bowels have been functioning fairly frequently.  He has been passing small amounts of rabbit like stool multiple times a day.  Did go approximately 14 times yesterday.  Was up once overnight.  He denies any fever.  He is not having abdominal pain.  He has been eating normally.  He does not get much fiber in his diet.      PHYSICAL EXAMINATION:  On examination today, he looks well.  His abdomen was soft.  It was minimally tender.  He did not appear to be distended.  His wound looks good.  The sutures were removed today.  The colostomy site also looks quite good.      On examination of the perianal region, he does have a fair amount of skin irritation from the wiping that he has been doing.  There are no external hemorrhoids and no evidence of any infection.      At the moment, I do not think this is Clostridium difficile.  I think it is likely related to his low fiber and perhaps some urgency from the rectum as it has not been used for a while.  Today, I suggested that he start taking some Metamucil or similar type product.  He should take this a couple times a day.  Should also make certain he drinks lots of fluids.  He is going to go back into the wound VAC.  He can bath and shower as far as the wound is concerned.  Steri-Strips can come off in a few days.  I am going to follow up with him in the clinic on 10/13.  He can be seen by one of the other surgeons if there are troubles in the meantime.         RUDY RAYO MD             D: 2017 14:49   T: 2017 18:09   MT: ANURAG      Name:     ROGER ZHAO   MRN:      [id number]        Account:      [id number]   :      1945           Visit  Date:   09/29/2017      Document: [id number]

## 2017-10-06 ENCOUNTER — OFFICE VISIT (OUTPATIENT)
Dept: WOUND CARE | Facility: OTHER | Age: 72
End: 2017-10-06
Attending: NURSE PRACTITIONER
Payer: MEDICARE

## 2017-10-06 VITALS
WEIGHT: 169 LBS | DIASTOLIC BLOOD PRESSURE: 72 MMHG | TEMPERATURE: 98.2 F | SYSTOLIC BLOOD PRESSURE: 128 MMHG | BODY MASS INDEX: 24.2 KG/M2 | HEART RATE: 58 BPM | HEIGHT: 70 IN

## 2017-10-06 DIAGNOSIS — S31.109D OPEN WOUND OF ABDOMEN, SUBSEQUENT ENCOUNTER: Primary | ICD-10-CM

## 2017-10-06 PROCEDURE — 99212 OFFICE O/P EST SF 10 MIN: CPT

## 2017-10-06 PROCEDURE — 97605 NEG PRS WND THER DME<=50SQCM: CPT | Performed by: NURSE PRACTITIONER

## 2017-10-06 PROCEDURE — 2894A VOIDCORRECT: CPT | Performed by: NURSE PRACTITIONER

## 2017-10-06 NOTE — MR AVS SNAPSHOT
After Visit Summary   10/6/2017    Peter Zhao    MRN: [id number]           Patient Information     Date Of Birth          1945        Visit Information        Provider Department      10/6/2017 11:00 AM Jackie Horne NP [facility]        Today's Diagnoses     Open wound of abdomen, subsequent encounter    -  1       Follow-ups after your visit        Your next 10 appointments already scheduled     Oct 13, 2017  2:00 PM CDT   (Arrive by 1:45 PM)   Post Op with Tima Moreland MD   [facility] ([facility] [place] )    [address]  [place] [address]   [phone]            Oct 20, 2017  1:00 PM CDT   (Arrive by 12:45 PM)   Return Visit with Jackie Horne NP   [facility] ([facility] [place] )    [address]  [place] [address] [phone]            Oct 27, 2017  2:00 PM CDT   (Arrive by 1:45 PM)   Return Visit with Jackie Horne NP   [facility] ([facility] [place] )    [address]  [place] [address]   [phone]              Who to contact     If you have questions or need follow up information about today's clinic visit or your schedule please contact [facility] directly at [phone].  Normal or non-critical lab and imaging results will be communicated to you by MyChart, letter or phone within 4 business days after the clinic has received the results. If you do not hear from us within 7 days, please contact the clinic through MyChart or phone. If you have a critical or abnormal lab result, we will notify you by phone as soon as possible.  Submit refill requests through [employer] or call your pharmacy and they will forward the refill request to us. Please allow 3 business days for your refill to be completed.          Additional Information About Your Visit        [employer] Information     [employer] lets you send messages to your doctor, view your test  "results, renew your prescriptions, schedule appointments and more. To sign up, go to [email or web address] . Click on \"Log in\" on the left side of the screen, which will take you to the Welcome page. Then click on \"Sign up Now\" on the right side of the page.     You will be asked to enter the access code listed below, as well as some personal information. Please follow the directions to create your username and password.     Your access code is: [email or web address]  Expires: 2017 10:57 AM     Your access code will  in 90 days. If you need help or a new code, please call your [place] clinic or [phone].        Care EveryWhere ID     This is your Care EveryWhere ID. This could be used by other organizations to access your [place] medical records  [id number]        Your Vitals Were     Pulse Temperature Height BMI (Body Mass Index)          58 98.2  F (36.8  C) 5' 10\" (1.778 m) 24.25 kg/m2         Blood Pressure from Last 3 Encounters:   10/06/17 128/72   17 120/72   17 139/82    Weight from Last 3 Encounters:   10/06/17 169 lb (76.7 kg)   17 169 lb (76.7 kg)   17 169 lb 1.5 oz (76.7 kg)              Today, you had the following     No orders found for display       Primary Care Provider Office Phone # Fax #    Alexander Vazquez [phone]       [facility] [address]        Equal Access to Services     [facility] Hospital[facility]DO : Hadii miroslava roberts hadashfaby Sotessie, waaxda luqadaha, qaybta kaalmada olga, john pulido. So [facility] [phone].    ATENCIÓN: Si habla español, tiene a farah disposición servicios gratuitos de asistencia lingüística. Llame al [phone].    We comply with applicable federal civil rights laws and [place] laws. We do not discriminate on the basis of race, color, national origin, age, disability, sex, sexual orientation, or gender identity.            Thank you!     Thank you for " choosing [facility] [place]  for your care. Our goal is always to provide you with excellent care. Hearing back from our patients is one way we can continue to improve our services. Please take a few minutes to complete the written survey that you may receive in the mail after your visit with us. Thank you!             Your Updated Medication List - Protect others around you: Learn how to safely use, store and throw away your medicines at www.disposemymeds.org.          This list is accurate as of: 10/6/17  4:31 PM.  Always use your most recent med list.                   Brand Name Dispense Instructions for use Diagnosis    amLODIPine 5 MG tablet    NORVASC    90 tablet    Take 1 tablet (5 mg) by mouth daily    Benign essential hypertension       ASPIRIN PO      Take 325 mg by mouth daily        atorvastatin 40 MG tablet    LIPITOR    90 tablet    Take 1 tablet (40 mg) by mouth daily    Hyperlipidemia LDL goal <100       blood glucose lancing device     1 kit    1 Units daily    T2DM (type 2 diabetes mellitus) (H)       * blood glucose monitoring lancets     100 each    1 Units daily Use fresh lancet each day to check blood sugars    T2DM (type 2 diabetes mellitus) (H)       * blood glucose monitoring lancets     100 Box    Use to test blood sugar 2 times weekly    Type 2 diabetes mellitus without complication, without long-term current use of insulin (H)       * blood glucose monitoring test strip    ACCU-CHEK SMARTVIEW    1 Box    Test blood sugar before breakfast one day, before and after supper the next and not at all the third day    T2DM (type 2 diabetes mellitus) (H)       * blood glucose monitoring test strip    MARIAN CONTOUR NEXT    100 strip    Use to test blood sugar 2 times weekly or as directed.    Type 2 diabetes mellitus with other specified complication, unspecified long term insulin use status (H)       calcium carbonate 1500 (600 CA) MG tablet    OS-WALI 600 mg [place]. Ca    30 tablet    Take 1  tablet (1,500 mg) by mouth daily        clopidogrel 75 MG tablet    PLAVIX    90 tablet    Take 1 tablet (75 mg) by mouth daily    Benign essential hypertension       finasteride 5 MG tablet    PROSCAR    90 tablet    Take 1 tablet (5 mg) by mouth daily    Benign non-nodular prostatic hyperplasia with lower urinary tract symptoms       ibuprofen 600 MG tablet    ADVIL/MOTRIN    30 tablet    Take 1 tablet (600 mg) by mouth every 6 hours as needed for moderate pain    S/P colostomy takedown       lisinopril 40 MG tablet    PRINIVIL/ZESTRIL    90 tablet    Take 1 tablet (40 mg) by mouth daily    Benign essential hypertension       metoprolol 100 MG tablet    LOPRESSOR    180 tablet    Take 1 tablet (100 mg) by mouth 2 times daily    Benign essential hypertension       MULTIVITAMINS PO      Take 1 capsule by mouth daily        nitroGLYcerin 0.4 MG sublingual tablet    NITROSTAT    25 tablet    Place 1 tablet (0.4 mg) under the tongue every 5 minutes as needed    Hyperlipidemia LDL goal <100, Intermediate coronary syndrome (H)       NONFORMULARY      OTC eye drops as directed        polyethylene glycol 0.4%- propylene glycol 0.3% 0.4-0.3 % Soln ophthalmic solution    SYSTANE ULTRA     Place 1 drop into both eyes every hour as needed for dry eyes        ranitidine 150 MG tablet    ZANTAC    180 tablet    Take 1 tablet (150 mg) by mouth 2 times daily    Gastroesophageal reflux disease without esophagitis       UNABLE TO FIND      Take 550 mg by mouth 2 times daily Gymnema danielle        VITAMIN D3 PO      Take 1,000 Units by mouth daily        * Notice:  This list has 4 medication(s) that are the same as other medications prescribed for you. Read the directions carefully, and ask your doctor or other care provider to review them with you.

## 2017-10-06 NOTE — PROGRESS NOTES
SUBJECTIVE:  Peter Zhao, 71 year old, male presents with the following Chief Complaint(s) with HPI to follow:   Wound care     HPI:  Peter is here today for evaluation and treatment of his abd wound. He is currently on a wound vac, (or NPWT negative pressure wound therapy), which was placed for the first time on 9/26/17.  He has some mild abd tenderness, but is no longer taking pain medication.    He is doing a bit better with his stool since he started with fiber therapy.  He still had about six stools yesterday, and they were of smaller amounts, but it is decreased from 14/day.  They are not loose or too firm.  He is eating OK.    He had his colostomy take down and repair of parastomal hernia completed on 9/21/17.  He originally had the colostomy done in February of 2017 due to a large bowel obstruction.     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 Wu 8/20/2012     CARDIAC SURGERY       CATARACT IOL, RT/LT Right 04/2017     COLECTOMY LOW ANTERIOR N/A 2/17/2017    Procedure: COLECTOMY LOW ANTERIOR;  Surgeon:  Tima Moreland MD;  Location: [facility]     COLONOSCOPY N/A 2/17/2017    Procedure: COLONOSCOPY;  Surgeon: Tima Moreland MD;  Location: [facility]     ENT SURGERY       FLEX SIG (MEDICARE PT.)       GI SURGERY  02/2017    ostemomy     HERNIA REPAIR       LAPAROSCOPIC HERNIORRHAPHY INGUINAL Right 9/18/2015    Procedure: LAPAROSCOPIC HERNIORRHAPHY INGUINAL;  Surgeon: Solitario Nettles DO;  Location: [facility]     PHACOEMULSIFICATION WITH STANDARD INTRAOCULAR LENS IMPLANT Right 4/20/2017    Procedure: PHACOEMULSIFICATION WITH STANDARD INTRAOCULAR LENS IMPLANT;  CATARACT EXTRACTION RIGHT EYE WITH IMPLANT;  Surgeon: Darin Gutierrez MD;  Location: [facility]     PROSTATE BIOPSY, NEEDLE, SATURATION SAMPLING  2009     retinopexy-pvd with retinal tear Right 2008     TAKEDOWN COLOSTOMY N/A 9/21/2017    Procedure: TAKEDOWN COLOSTOMY;  CLOSURE OF NORRIS'S SIGMOID COLOSTOMY, REPAIR STOMAL HERNIA;  Surgeon: Tima Moreland MD;  Location: [facility]     TONSILLECTOMY         Family History   Problem Relation Age of Onset     DIABETES Mother      C.A.D. Mother      CANCER Maternal Grandmother        Social History   Substance Use Topics     Smoking status: Former Smoker     Smokeless tobacco: Former User     Types: Chew     Quit date: 10/19/2013      Comment: quit in 2000     Alcohol use No       Current Outpatient Prescriptions   Medication Sig Dispense Refill     ibuprofen (ADVIL/MOTRIN) 600 MG tablet Take 1 tablet (600 mg) by mouth every 6 hours as needed for moderate pain 30 tablet 1     UNABLE TO FIND Take 550 mg by mouth 2 times daily Gymnema danielle       clopidogrel (PLAVIX) 75 MG tablet Take 1 tablet (75 mg) by mouth daily 90 tablet 3     metoprolol (LOPRESSOR) 100 MG tablet Take 1 tablet (100 mg) by mouth 2 times daily 180 tablet 3     lisinopril (PRINIVIL/ZESTRIL) 40 MG tablet Take 1 tablet (40 mg) by mouth daily 90 tablet 3     atorvastatin (LIPITOR) 40 MG tablet Take 1 tablet (40 mg) by mouth daily 90 tablet 3      nitroglycerin (NITROSTAT) 0.4 MG sublingual tablet Place 1 tablet (0.4 mg) under the tongue every 5 minutes as needed (Patient not taking: Reported on 9/12/2017) 25 tablet 3     ranitidine (ZANTAC) 150 MG tablet Take 1 tablet (150 mg) by mouth 2 times daily 180 tablet 3     finasteride (PROSCAR) 5 MG tablet Take 1 tablet (5 mg) by mouth daily 90 tablet 3     amLODIPine (NORVASC) 5 MG tablet Take 1 tablet (5 mg) by mouth daily 90 tablet 3     blood glucose monitoring (MARIAN CONTOUR NEXT) test strip Use to test blood sugar 2 times weekly or as directed. 100 strip 6     calcium carbonate (OS-WALI 600 MG [place]. CA) 1500 (600 CA) MG tablet Take 1 tablet (1,500 mg) by mouth daily 30 tablet 3     blood glucose monitoring (MARIAN MICROLET) lancets Use to test blood sugar 2 times weekly 100 Box 6     NONFORMULARY OTC eye drops as directed       polyethylene glycol 0.4%- propylene glycol 0.3% (SYSTANE ULTRA) 0.4-0.3 % SOLN ophthalmic solution Place 1 drop into both eyes every hour as needed for dry eyes       Multiple Vitamin (MULTIVITAMINS PO) Take 1 capsule by mouth daily       glucose blood VI test strips (ACCU-CHEK SMARTVIEW) strip Test blood sugar before breakfast one day, before and after supper the next and not at all the third day 1 Box 12     SOFTCLIX LANCETS MISC 1 Units daily Use fresh lancet each day to check blood sugars 100 each 4     Lancets Misc. (ACCU-CHEK SOFTCLIX LANCET DEV) KIT 1 Units daily 1 kit 2     ASPIRIN PO Take 325 mg by mouth daily        Cholecalciferol (VITAMIN D3 PO) Take 1,000 Units by mouth daily          Allergies   Allergen Reactions     Acetaminophen      Ciprofloxacin Hives     No Clinical Screening - See Comments      Antibiotic allergy, pt not sure which       REVIEW OF SYSTEMS  Skin: as above  Eyes: glasses  Ears/Nose/Throat: hearing loss  Respiratory: No shortness of breath, dyspnea on exertion, cough, or hemoptysis  Cardiovascular: negative  Gastrointestinal: is stooling, has had some  "white foamy stool and some hard lumps of stool  Genitourinary: negative  Musculoskeletal: negative  Neurologic: migraine headaches, TIA hx  Psychiatric: negative  Hematologic/Lymphatic/Immunologic: is on Plavix because of stroke history  Endocrine: negative    OBJECTIVE:  Vital signs:  Vital signs:  Temp: 98.2  F (36.8  C)   BP: 128/72 Pulse: 58           Height: 5' 10\" (177.8 cm) Weight: 169 lb (76.7 kg)  Estimated body mass index is 24.25 kg/(m^2) as calculated from the following:    Height as of this encounter: 5' 10\" (1.778 m).    Weight as of this encounter: 169 lb (76.7 kg).  Constitutional: healthy, alert and no distress  Cardiovascular: RRR. No murmurs, clicks gallops or rub  Respiratory:  Good diaphragmatic excursion. Lungs clear  Gastrointestinal: Abdomen soft, generally not tender, except for area of new surgery. BS normal. No masses, organomegaly  : Deferred  Musculoskeletal: extremities normal- no gross deformities noted, gait normal and normal muscle tone  Skin:        Wound description:  Type of Wound- surgical; Location- left abd, Drainage amount- small;  Drainage color-bloody,  Odor- none; Wound bed-100% red and clean, Surrounding skin-intact.       Measurements: 4.2 X 2.5 cm, 1.7 cm deep (smaller)       Dressing change:  Wound cleansed with saline and gauze, dressed with thin hydrocolloid around the wound, one small piece of black sponge in the undermined area, another piece over the top and a third for the disc landing.  Pump set at 125mm/hg, continuous, good seal obtained.    Neurologic: Gait normal. Sensation grossly WNL.  Psychiatric: mentation appears normal and affect normal/bright    THERAPY GOAL: keep wound clean and infection free, provide NPWT to enhance healing.    ASSESSMENT / PLAN:  Continue NPWT.  Home care will see him on Monday and will do his NPWT dressing change.  They will also see him on Wednesday and we will see him on Friday.    Jackie Horne  CNP, CWOCN  Urology and Wound " Care  October 6, 2017

## 2017-10-13 ENCOUNTER — OFFICE VISIT (OUTPATIENT)
Dept: SURGERY | Facility: OTHER | Age: 72
End: 2017-10-13
Attending: SURGERY
Payer: MEDICARE

## 2017-10-13 VITALS
WEIGHT: 169 LBS | DIASTOLIC BLOOD PRESSURE: 70 MMHG | TEMPERATURE: 96.3 F | SYSTOLIC BLOOD PRESSURE: 122 MMHG | HEART RATE: 63 BPM | OXYGEN SATURATION: 97 % | BODY MASS INDEX: 24.2 KG/M2 | HEIGHT: 70 IN

## 2017-10-13 DIAGNOSIS — K43.2 INCISIONAL HERNIA, WITHOUT OBSTRUCTION OR GANGRENE: Primary | ICD-10-CM

## 2017-10-13 DIAGNOSIS — K56.699 STRICTURE OF SIGMOID COLON (H): ICD-10-CM

## 2017-10-13 PROCEDURE — 99212 OFFICE O/P EST SF 10 MIN: CPT | Performed by: SURGERY

## 2017-10-13 PROCEDURE — 99024 POSTOP FOLLOW-UP VISIT: CPT | Performed by: SURGERY

## 2017-10-13 ASSESSMENT — PAIN SCALES - GENERAL: PAINLEVEL: MILD PAIN (3)

## 2017-10-13 NOTE — MR AVS SNAPSHOT
After Visit Summary   10/13/2017    Peter Zhao    MRN: [id number]           Patient Information     Date Of Birth          1945        Visit Information        Provider Department      10/13/2017 2:00 PM Tima Moreland MD [facility] [place]        Today's Diagnoses     Incisional hernia, without obstruction or gangrene    -  1    Stricture of sigmoid colon          Care Instructions    Thank you for allowing Dr. Moreland and our surgical team to participate in your care. Please call with any scheduling questions or concerns to Darcie at [phone] or for nursing questions Susana [phone]            Follow-ups after your visit        Your next 10 appointments already scheduled     Oct 20, 2017  1:00 PM CDT   (Arrive by 12:45 PM)   Return Visit with Jackie Horne NP   [facility] [place] ([facility] [place] )    [address]  [place] [address]   [phone]            Oct 27, 2017  2:30 PM CDT   (Arrive by 2:15 PM)   Return Visit with Jackie Horne NP   [facility] [place] ([facility] [place] )    [address]  [place] [address]   [phone]            Nov 03, 2017 11:00 AM CDT   (Arrive by 10:45 AM)   Post Op with Tima Moreland MD   [facility] ([facility] [place] )    [address]  [place] [address]   [phone]              Who to contact     If you have questions or need follow up information about today's clinic visit or your schedule please contact [facility] directly at [phone].  Normal or non-critical lab and imaging results will be communicated to you by MyChart, letter or phone within 4 business days after the clinic has received the results. If you do not hear from us within 7 days, please contact the clinic through MyChart or phone. If you have a critical or abnormal lab result, we will notify you by phone as soon as possible.  Submit refill requests  "through [employer] or call your pharmacy and they will forward the refill request to us. Please allow 3 business days for your refill to be completed.          Additional Information About Your Visit        "[employer]hart Information     [employer] lets you send messages to your doctor, view your test results, renew your prescriptions, schedule appointments and more. To sign up, go to [email or web address] . Click on \"Log in\" on the left side of the screen, which will take you to the Welcome page. Then click on \"Sign up Now\" on the right side of the page.     You will be asked to enter the access code listed below, as well as some personal information. Please follow the directions to create your username and password.     Your access code is: [email or web address]  Expires: 2017 10:57 AM     Your access code will  in 90 days. If you need help or a new code, please call your [facility] clinic or [phone].        Care EveryWhere ID     This is your Care EveryWhere ID. This could be used by other organizations to access your [facility] medical records  [id number]        Your Vitals Were     Pulse Temperature Height Pulse Oximetry BMI (Body Mass Index)       63 96.3  F (35.7  C) (Tympanic) 5' 10\" (1.778 m) 97% 24.25 kg/m2        Blood Pressure from Last 3 Encounters:   10/13/17 122/70   10/06/17 128/72   17 120/72    Weight from Last 3 Encounters:   10/13/17 169 lb (76.7 kg)   10/06/17 169 lb (76.7 kg)   17 169 lb (76.7 kg)              Today, you had the following     No orders found for display       Primary Care Provider Office Phone # Fax #    Alexander Vazquez [phone]       [facility] [address]  [place] [address]        Equal Access to Services     [facility] TREMAYNE : Taisha June, walatriceda luqadaha, qaybta kaalmajohn peterson . So [facility] [phone].    ATENCIÓN: Si yamel lau, tiene a farah disposición servicios " lauren de asistencia lingüística. Jose choudhury [phone].    We comply with applicable federal civil rights laws and [place] laws. We do not discriminate on the basis of race, color, national origin, age, disability, sex, sexual orientation, or gender identity.            Thank you!     Thank you for choosing [facility]  for your [facility]. Our goal is always to provide you with excellent care. Hearing back from our patients is one way we can continue to improve our services. Please take a few minutes to complete the written survey that you may receive in the mail after your visit with us. Thank you!             Your Updated Medication List - Protect others around you: Learn how to safely use, store and throw away your medicines at www.disposemymeds.org.          This list is accurate as of: 10/13/17  2:57 PM.  Always use your most recent med list.                   Brand Name Dispense Instructions for use Diagnosis    amLODIPine 5 MG tablet    NORVASC    90 tablet    Take 1 tablet (5 mg) by mouth daily    Benign essential hypertension       ASPIRIN PO      Take 325 mg by mouth daily        atorvastatin 40 MG tablet    LIPITOR    90 tablet    Take 1 tablet (40 mg) by mouth daily    Hyperlipidemia LDL goal <100       blood glucose lancing device     1 kit    1 Units daily    T2DM (type 2 diabetes mellitus) (H)       * blood glucose monitoring lancets     100 each    1 Units daily Use fresh lancet each day to check blood sugars    T2DM (type 2 diabetes mellitus) (H)       * blood glucose monitoring lancets     100 Box    Use to test blood sugar 2 times weekly    Type 2 diabetes mellitus without complication, without long-term current use of insulin (H)       * blood glucose monitoring test strip    ACCU-CHEK SMARTVIEW    1 Box    Test blood sugar before breakfast one day, before and after supper the next and not at all the third day    T2DM (type 2 diabetes mellitus) (H)       * blood glucose monitoring  test strip    MARIAN CONTOUR NEXT    100 strip    Use to test blood sugar 2 times weekly or as directed.    Type 2 diabetes mellitus with other specified complication, unspecified long term insulin use status (H)       calcium carbonate 1500 (600 CA) MG tablet    OS-WALI 600 mg [place]. Ca    30 tablet    Take 1 tablet (1,500 mg) by mouth daily        clopidogrel 75 MG tablet    PLAVIX    90 tablet    Take 1 tablet (75 mg) by mouth daily    Benign essential hypertension       finasteride 5 MG tablet    PROSCAR    90 tablet    Take 1 tablet (5 mg) by mouth daily    Benign non-nodular prostatic hyperplasia with lower urinary tract symptoms       ibuprofen 600 MG tablet    ADVIL/MOTRIN    30 tablet    Take 1 tablet (600 mg) by mouth every 6 hours as needed for moderate pain    S/P colostomy takedown       lisinopril 40 MG tablet    PRINIVIL/ZESTRIL    90 tablet    Take 1 tablet (40 mg) by mouth daily    Benign essential hypertension       metoprolol 100 MG tablet    LOPRESSOR    180 tablet    Take 1 tablet (100 mg) by mouth 2 times daily    Benign essential hypertension       MULTIVITAMINS PO      Take 1 capsule by mouth daily        nitroGLYcerin 0.4 MG sublingual tablet    NITROSTAT    25 tablet    Place 1 tablet (0.4 mg) under the tongue every 5 minutes as needed    Hyperlipidemia LDL goal <100, Intermediate coronary syndrome (H)       NONFORMULARY      OTC eye drops as directed        polyethylene glycol 0.4%- propylene glycol 0.3% 0.4-0.3 % Soln ophthalmic solution    SYSTANE ULTRA     Place 1 drop into both eyes every hour as needed for dry eyes        ranitidine 150 MG tablet    ZANTAC    180 tablet    Take 1 tablet (150 mg) by mouth 2 times daily    Gastroesophageal reflux disease without esophagitis       UNABLE TO FIND      Take 550 mg by mouth 2 times daily Gymnema danielle        VITAMIN D3 PO      Take 1,000 Units by mouth daily        * Notice:  This list has 4 medication(s) that are the same as other  medications prescribed for you. Read the directions carefully, and ask your doctor or other care provider to review them with you.

## 2017-10-13 NOTE — NURSING NOTE
"Chief Complaint   Patient presents with     Surgical Followup     Post op/Colostomy reversal 09/21/2017       Initial There were no vitals taken for this visit. Estimated body mass index is 24.25 kg/(m^2) as calculated from the following:    Height as of 10/6/17: 5' 10\" (1.778 m).    Weight as of 10/6/17: 169 lb (76.7 kg).  Medication Reconciliation: [place]          "

## 2017-10-13 NOTE — PATIENT INSTRUCTIONS
Thank you for allowing Dr. Moreland and our surgical team to participate in your care. Please call with any scheduling questions or concerns to Darcie at [phone] or for nursing questions Susana [phone]

## 2017-10-14 NOTE — PROGRESS NOTES
DATE OF VISIT:  10/13/2017       DATE OF DICTATION:  10/13/2017       HISTORY OF PRESENT ILLNESS:  Mr. Roger Zhao was seen today for followup of the closure of his sigmoid colostomy.  He is now 3 weeks postop.  He is getting along quite well.  I had seen him a couple weeks ago in wound care.  He was having a fair amount of diarrhea at that time.  He did start back on his FiberCon and has noticed significant improvement.  He is now having 3-4 bowel movements a day.  These are formed.  Tends to have a couple small bowel movements in the morning and then a larger bowel movement later in the day.  He denies any cramps.  He has been eating well.  There has been no fever or chills.  He has been tolerating the wound VAC, though notes a bit of burning around the incision.      PHYSICAL EXAMINATION:  On examination today, he looks well.  His abdomen was soft and nontender.  His incision is healing nicely.  There is no evidence of any fascial defect.  I did take off the wound VAC today.  The wound actually looks quite good.  It is almost completely up to the level of the skin.  The granulation tissue looks quite healthy.  He did though have a fair amount of irritation of the skin around the area from the wound VAC itself.  I decided to stop the wound VAC.  A wet-to-dry dressing was done.  I did show his wife how do the dressings and they were given dressing supplies.  He should do the dressing once a day.  He can shower before the dressing is done.      He is going to continue to avoid lifting for another 3-4 weeks.  I am going to see him again in 3 weeks' time.  He is going to follow up in wound care as scheduled next week.  At the moment he seems quite happy with the surgery.         RUDY RAYO MD             D: 10/13/2017 14:48   T: 10/14/2017 08:53   MT: AMANDA      Name:     ROGER ZHAO   MRN:      [id number]        Account:      [id number]   :      1945           Visit Date:   10/13/2017      Document:  [id number]

## 2017-10-18 ENCOUNTER — MEDICAL CORRESPONDENCE (OUTPATIENT)
Dept: HEALTH INFORMATION MANAGEMENT | Facility: HOSPITAL | Age: 72
End: 2017-10-18

## 2017-10-19 ENCOUNTER — TELEPHONE (OUTPATIENT)
Dept: INTERNAL MEDICINE | Facility: OTHER | Age: 72
End: 2017-10-19

## 2017-10-19 NOTE — TELEPHONE ENCOUNTER
Pt left a message on my phone stating he had an operation about a month ago and states he is noticing something different with his stool, not sure if it is blood or what. Returned phone all and wife states he talked to Dr. Moreland and that everything is ok. Sabina Olivo LPN

## 2017-10-20 ENCOUNTER — OFFICE VISIT (OUTPATIENT)
Dept: WOUND CARE | Facility: OTHER | Age: 72
End: 2017-10-20
Attending: NURSE PRACTITIONER
Payer: MEDICARE

## 2017-10-20 VITALS
SYSTOLIC BLOOD PRESSURE: 90 MMHG | BODY MASS INDEX: 24.2 KG/M2 | WEIGHT: 169 LBS | HEART RATE: 68 BPM | HEIGHT: 70 IN | TEMPERATURE: 98.2 F | OXYGEN SATURATION: 98 % | DIASTOLIC BLOOD PRESSURE: 60 MMHG

## 2017-10-20 DIAGNOSIS — S31.109D OPEN WOUND OF ABDOMEN, SUBSEQUENT ENCOUNTER: Primary | ICD-10-CM

## 2017-10-20 PROCEDURE — 99212 OFFICE O/P EST SF 10 MIN: CPT

## 2017-10-20 PROCEDURE — 99213 OFFICE O/P EST LOW 20 MIN: CPT | Performed by: NURSE PRACTITIONER

## 2017-10-20 NOTE — MR AVS SNAPSHOT
"              After Visit Summary   10/20/2017    Peter Zhao    MRN: [id number]           Patient Information     Date Of Birth          1945        Visit Information        Provider Department      10/20/2017 1:00 PM Jackie Horne NP [facility]        Today's Diagnoses     Open wound of abdomen, subsequent encounter    -  1       Follow-ups after your visit        Your next 10 appointments already scheduled     Oct 27, 2017  2:30 PM CDT   (Arrive by 2:15 PM)   Return Visit with Jackie Horne NP   [facility] ([facility] [place] [facility] )    [address]  [place] [address]   [phone]            Nov 03, 2017 11:00 AM CDT   (Arrive by 10:45 AM)   Post Op with Tima Moreland MD   [facility] ([facility] [place] [facility] )    [address]  [place] [address]   [phone]              Who to contact     If you have questions or need follow up information about today's clinic visit or your schedule please contact [facility] directly at [phone].  Normal or non-critical lab and imaging results will be communicated to you by MyChart, letter or phone within 4 business days after the clinic has received the results. If you do not hear from us within 7 days, please contact the clinic through MyChart or phone. If you have a critical or abnormal lab result, we will notify you by phone as soon as possible.  Submit refill requests through [employer] or call your pharmacy and they will forward the refill request to us. Please allow 3 business days for your refill to be completed.          Additional Information About Your Visit        MyChart Information     [employer] lets you send messages to your doctor, view your test results, renew your prescriptions, schedule appointments and more. To sign up, go to [email or web address] . Click on \"Log in\" on the left side of the screen, which will take you to the Welcome page. Then click on " "\"Sign up Now\" on the right side of the page.     You will be asked to enter the access code listed below, as well as some personal information. Please follow the directions to create your username and password.     Your access code is: [email or web address]  Expires: 2017 10:57 AM     Your access code will  in 90 days. If you need help or a new code, please call your [facility] or [phone].        Care EveryWhere ID     This is your Care EveryWhere ID. This could be used by other organizations to access your [place] medical records  [id number]        Your Vitals Were     Pulse Temperature Height Pulse Oximetry BMI (Body Mass Index)       68 98.2  F (36.8  C) 5' 10\" (1.778 m) 98% 24.25 kg/m2        Blood Pressure from Last 3 Encounters:   10/20/17 90/60   10/13/17 122/70   10/06/17 128/72    Weight from Last 3 Encounters:   10/20/17 169 lb (76.7 kg)   10/13/17 169 lb (76.7 kg)   10/06/17 169 lb (76.7 kg)              Today, you had the following     No orders found for display         Today's Medication Changes          These changes are accurate as of: 10/20/17  4:13 PM.  If you have any questions, ask your nurse or doctor.               Start taking these medicines.        Dose/Directions    order for DME   Used for:  Open wound of abdomen, subsequent encounter        optifoam 4X4\" dressings   Quantity:  5 each   Refills:  1            Where to get your medicines      Some of these will need a paper prescription and others can be bought over the counter.  Ask your nurse if you have questions.     Bring a paper prescription for each of these medications     order for DME                Primary Care Provider Office Phone # Fax #    Alexander Vazquez [phone]       [facility] [address] [place] [address]        Equal Access to Services     ANDRY CASPER AH: Hadii aad ku hadasho Soomaali, waaxda luqadaha, qaybta kaalmabentley espinoza, john mathews " latameka pulido. So [facility] [phone].    ATENCIÓN: Si habla judi, tiene a farah disposición servicios gratuitos de asistencia lingüística. Jose al [phone].    We comply with applicable federal civil rights laws and [place] laws. We do not discriminate on the basis of race, color, national origin, age, disability, sex, sexual orientation, or gender identity.            Thank you!     Thank you for choosing [facility]  for your care. Our goal is always to provide you with excellent care. Hearing back from our patients is one way we can continue to improve our services. Please take a few minutes to complete the written survey that you may receive in the mail after your visit with us. Thank you!             Your Updated Medication List - Protect others around you: Learn how to safely use, store and throw away your medicines at www.disposemymeds.org.          This list is accurate as of: 10/20/17  4:13 PM.  Always use your most recent med list.                   Brand Name Dispense Instructions for use Diagnosis    amLODIPine 5 MG tablet    NORVASC    90 tablet    Take 1 tablet (5 mg) by mouth daily    Benign essential hypertension       ASPIRIN PO      Take 325 mg by mouth daily        atorvastatin 40 MG tablet    LIPITOR    90 tablet    Take 1 tablet (40 mg) by mouth daily    Hyperlipidemia LDL goal <100       blood glucose lancing device     1 kit    1 Units daily    T2DM (type 2 diabetes mellitus) (H)       * blood glucose monitoring lancets     100 each    1 Units daily Use fresh lancet each day to check blood sugars    T2DM (type 2 diabetes mellitus) (H)       * blood glucose monitoring lancets     100 Box    Use to test blood sugar 2 times weekly    Type 2 diabetes mellitus without complication, without long-term current use of insulin (H)       * blood glucose monitoring test strip    ACCU-CHEK SMARTVIEW    1 Box    Test blood sugar before breakfast one day, before and after supper the next and  "not at all the third day    T2DM (type 2 diabetes mellitus) (H)       * blood glucose monitoring test strip    MARIAN CONTOUR NEXT    100 strip    Use to test blood sugar 2 times weekly or as directed.    Type 2 diabetes mellitus with other specified complication, unspecified long term insulin use status (H)       calcium carbonate 1500 (600 CA) MG tablet    OS-WALI 600 mg [place]. Ca    30 tablet    Take 1 tablet (1,500 mg) by mouth daily        clopidogrel 75 MG tablet    PLAVIX    90 tablet    Take 1 tablet (75 mg) by mouth daily    Benign essential hypertension       finasteride 5 MG tablet    PROSCAR    90 tablet    Take 1 tablet (5 mg) by mouth daily    Benign non-nodular prostatic hyperplasia with lower urinary tract symptoms       ibuprofen 600 MG tablet    ADVIL/MOTRIN    30 tablet    Take 1 tablet (600 mg) by mouth every 6 hours as needed for moderate pain    S/P colostomy takedown       lisinopril 40 MG tablet    PRINIVIL/ZESTRIL    90 tablet    Take 1 tablet (40 mg) by mouth daily    Benign essential hypertension       metoprolol 100 MG tablet    LOPRESSOR    180 tablet    Take 1 tablet (100 mg) by mouth 2 times daily    Benign essential hypertension       MULTIVITAMINS PO      Take 1 capsule by mouth daily        nitroGLYcerin 0.4 MG sublingual tablet    NITROSTAT    25 tablet    Place 1 tablet (0.4 mg) under the tongue every 5 minutes as needed    Hyperlipidemia LDL goal <100, Intermediate coronary syndrome (H)       NONFORMULARY      OTC eye drops as directed        order for DME     5 each    optifoam 4X4\" dressings    Open wound of abdomen, subsequent encounter       polyethylene glycol 0.4%- propylene glycol 0.3% 0.4-0.3 % Soln ophthalmic solution    SYSTANE ULTRA     Place 1 drop into both eyes every hour as needed for dry eyes        ranitidine 150 MG tablet    ZANTAC    180 tablet    Take 1 tablet (150 mg) by mouth 2 times daily    Gastroesophageal reflux disease without esophagitis       UNABLE TO " FIND      Take 550 mg by mouth 2 times daily Gymnema danielle        VITAMIN D3 PO      Take 1,000 Units by mouth daily        * Notice:  This list has 4 medication(s) that are the same as other medications prescribed for you. Read the directions carefully, and ask your doctor or other care provider to review them with you.

## 2017-10-20 NOTE — PROGRESS NOTES
SUBJECTIVE:  Peter Zhao, 71 year old, male presents with the following Chief Complaint(s) with HPI to follow:   Wound care     HPI:  Peter is here today for evaluation and treatment of his abd wound. He saw his surgeon a week ago (Dr. Moreland), and Dr. Moreland d/c the NPWT (wound vac) on that day.  They have been dressing the wound daily with wet to dry dressings since.  He has been doing very well, is feeling good and is stooling well.   He no longer needs any pain medication.    Past hx:  He had his colostomy take down and repair of parastomal hernia completed on 9/21/17.  He originally had the colostomy done in February of 2017 due to a large bowel obstruction.     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 Wu 8/20/2012     CARDIAC SURGERY       CATARACT IOL, RT/LT Right 04/2017     COLECTOMY LOW ANTERIOR N/A 2/17/2017    Procedure: COLECTOMY LOW ANTERIOR;  Surgeon: Tima Moreland MD;  [facility]     COLONOSCOPY N/A 2/17/2017    Procedure: COLONOSCOPY;  Surgeon: Tima Moreland MD;  Location: [facility]      ENT SURGERY       FLEX SIG (MEDICARE PT.)       GI SURGERY  02/2017    ostemomy     HERNIA REPAIR       LAPAROSCOPIC HERNIORRHAPHY INGUINAL Right 9/18/2015    Procedure: LAPAROSCOPIC HERNIORRHAPHY INGUINAL;  Surgeon: Solitario Nettles DO;  Location: [facility] OR     PHACOEMULSIFICATION WITH STANDARD INTRAOCULAR LENS IMPLANT Right 4/20/2017    Procedure: PHACOEMULSIFICATION WITH STANDARD INTRAOCULAR LENS IMPLANT;  CATARACT EXTRACTION RIGHT EYE WITH IMPLANT;  Surgeon: Darin Gutierrez MD;  Location: [facility] OR     PROSTATE BIOPSY, NEEDLE, SATURATION SAMPLING  2009     retinopexy-pvd with retinal tear Right 2008     TAKEDOWN COLOSTOMY N/A 9/21/2017    Procedure: TAKEDOWN COLOSTOMY;  CLOSURE OF NORRIS'S SIGMOID COLOSTOMY, REPAIR STOMAL HERNIA;  Surgeon: Tima Moreland MD;  Location: [facility]     TONSILLECTOMY         Family History   Problem Relation Age of Onset     DIABETES Mother      C.A.D. Mother      CANCER Maternal Grandmother        Social History   Substance Use Topics     Smoking status: Former Smoker     Smokeless tobacco: Former User     Types: Chew     Quit date: 10/19/2013      Comment: quit in 2000     Alcohol use No       Current Outpatient Prescriptions   Medication Sig Dispense Refill     ibuprofen (ADVIL/MOTRIN) 600 MG tablet Take 1 tablet (600 mg) by mouth every 6 hours as needed for moderate pain 30 tablet 1     UNABLE TO FIND Take 550 mg by mouth 2 times daily Gymnema danielle       clopidogrel (PLAVIX) 75 MG tablet Take 1 tablet (75 mg) by mouth daily 90 tablet 3     metoprolol (LOPRESSOR) 100 MG tablet Take 1 tablet (100 mg) by mouth 2 times daily 180 tablet 3     lisinopril (PRINIVIL/ZESTRIL) 40 MG tablet Take 1 tablet (40 mg) by mouth daily 90 tablet 3     atorvastatin (LIPITOR) 40 MG tablet Take 1 tablet (40 mg) by mouth daily 90 tablet 3     nitroglycerin (NITROSTAT) 0.4 MG sublingual tablet Place 1 tablet (0.4 mg) under the tongue every 5 minutes as needed 25 tablet 3     ranitidine (ZANTAC)  150 MG tablet Take 1 tablet (150 mg) by mouth 2 times daily 180 tablet 3     finasteride (PROSCAR) 5 MG tablet Take 1 tablet (5 mg) by mouth daily 90 tablet 3     amLODIPine (NORVASC) 5 MG tablet Take 1 tablet (5 mg) by mouth daily 90 tablet 3     blood glucose monitoring (MARIAN CONTOUR NEXT) test strip Use to test blood sugar 2 times weekly or as directed. 100 strip 6     calcium carbonate (OS-WALI 600 MG [place]. CA) 1500 (600 CA) MG tablet Take 1 tablet (1,500 mg) by mouth daily 30 tablet 3     blood glucose monitoring (MARIAN MICROLET) lancets Use to test blood sugar 2 times weekly 100 Box 6     NONFORMULARY OTC eye drops as directed       polyethylene glycol 0.4%- propylene glycol 0.3% (SYSTANE ULTRA) 0.4-0.3 % SOLN ophthalmic solution Place 1 drop into both eyes every hour as needed for dry eyes       Multiple Vitamin (MULTIVITAMINS PO) Take 1 capsule by mouth daily       glucose blood VI test strips (ACCU-CHEK SMARTVIEW) strip Test blood sugar before breakfast one day, before and after supper the next and not at all the third day 1 Box 12     SOFTCLIX LANCETS MISC 1 Units daily Use fresh lancet each day to check blood sugars 100 each 4     Lancets Misc. (ACCU-CHEK SOFTCLIX LANCET DEV) KIT 1 Units daily 1 kit 2     ASPIRIN PO Take 325 mg by mouth daily        Cholecalciferol (VITAMIN D3 PO) Take 1,000 Units by mouth daily          Allergies   Allergen Reactions     Acetaminophen      Ciprofloxacin Hives     No Clinical Screening - See Comments      Antibiotic allergy, pt not sure which       REVIEW OF SYSTEMS  Skin: as above  Eyes: glasses  Ears/Nose/Throat: hearing loss  Respiratory: No shortness of breath, dyspnea on exertion, cough, or hemoptysis  Cardiovascular: negative  Gastrointestinal:negative  Genitourinary: negative  Musculoskeletal: negative  Neurologic: migraine headaches, TIA hx  Psychiatric: negative  Hematologic/Lymphatic/Immunologic: is on Plavix because of stroke history  Endocrine:  "negative    OBJECTIVE:  Vital signs:  Vital signs:  Temp: 98.2  F (36.8  C)   BP: 90/60 Pulse: 68     SpO2: 98 %     Height: 5' 10\" (177.8 cm) Weight: 169 lb (76.7 kg)  Estimated body mass index is 24.25 kg/(m^2) as calculated from the following:    Height as of this encounter: 5' 10\" (1.778 m).    Weight as of this encounter: 169 lb (76.7 kg).  Constitutional: healthy, alert and no distress  Cardiovascular: RRR. No murmurs, clicks gallops or rub  Respiratory:  Good diaphragmatic excursion. Lungs clear  Gastrointestinal: Abdomen soft, generally not tender, except for area of new surgery. BS normal. No masses, organomegaly  : Deferred  Musculoskeletal: extremities normal- no gross deformities noted, gait normal and normal muscle tone  Skin:        Wound description:  Type of Wound- surgical; Location- left abd, Drainage amount- small;  Drainage color-bloody,  Odor- none; Wound bed-100% red and clean, Surrounding skin-intact.       Measurements: 2.5 X 0.8 cm, no depth noted (much smaller)       Dressing change:  Wound cleansed with saline and gauze, dressed with a piece of silver hydrofiber and a bordered foam    Neurologic: Gait normal. Sensation grossly WNL.  Psychiatric: mentation appears normal and affect normal/bright    THERAPY GOAL: keep wound clean and infection free    ASSESSMENT / PLAN:  Switch dressing to silver alginate and a bordered foam.  He was able to do it himself for me, but did require a little prompting to get it into the right position.  Home care RN [place] called with report.  He will see me again in a week and then Dr. Moreland the week after.    Jackie Horne  CNP, [facility]  Urology and Wound Care  October 20, 2017        "

## 2017-10-27 ENCOUNTER — OFFICE VISIT (OUTPATIENT)
Dept: WOUND CARE | Facility: OTHER | Age: 72
End: 2017-10-27
Attending: NURSE PRACTITIONER
Payer: MEDICARE

## 2017-10-27 VITALS
DIASTOLIC BLOOD PRESSURE: 64 MMHG | SYSTOLIC BLOOD PRESSURE: 102 MMHG | WEIGHT: 173 LBS | HEART RATE: 64 BPM | HEIGHT: 70 IN | TEMPERATURE: 97.7 F | BODY MASS INDEX: 24.77 KG/M2

## 2017-10-27 DIAGNOSIS — Z98.890 S/P COLOSTOMY TAKEDOWN: ICD-10-CM

## 2017-10-27 DIAGNOSIS — S31.109D OPEN WOUND OF ABDOMEN, SUBSEQUENT ENCOUNTER: Primary | ICD-10-CM

## 2017-10-27 PROCEDURE — 99213 OFFICE O/P EST LOW 20 MIN: CPT | Performed by: NURSE PRACTITIONER

## 2017-10-27 PROCEDURE — 99212 OFFICE O/P EST SF 10 MIN: CPT

## 2017-10-27 NOTE — PROGRESS NOTES
SUBJECTIVE:  Peter Zhao, 71 year old, male presents with the following Chief Complaint(s) with HPI to follow:   Wound care     HPI:  Peter is here today for evaluation and treatment of his abd wound. I last saw him a week ago for this issue and changed his dressing technique to silver hydrofiber and a foam dressing every other day.  His wife is assisting him with this dressing.  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   He has been doing very well, is feeling good and is stooling well.   He no longer needs any pain medication.    Past hx:  He had his colostomy take down and repair of parastomal hernia completed on 9/21/17.  He originally had the colostomy done in February of 2017 due to a large bowel obstruction.     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 Wu 8/20/2012     CARDIAC SURGERY       CATARACT IOL, RT/LT Right 04/2017     COLECTOMY LOW ANTERIOR N/A 2/17/2017    Procedure: COLECTOMY LOW ANTERIOR;  Surgeon: Tima Moreland MD;  [facility]      "COLONOSCOPY N/A 2/17/2017    Procedure: COLONOSCOPY;  Surgeon: Tima Moreland MD;  [facility]     ENT SURGERY       FLEX SIG (MEDICARE PT.)       GI SURGERY  02/2017    ostemomy     HERNIA REPAIR       LAPAROSCOPIC HERNIORRHAPHY INGUINAL Right 9/18/2015    Procedure: LAPAROSCOPIC HERNIORRHAPHY INGUINAL;  Surgeon: Solitario Nettles DO;  [facility]     PHACOEMULSIFICATION WITH STANDARD INTRAOCULAR LENS IMPLANT Right 4/20/2017    Procedure: PHACOEMULSIFICATION WITH STANDARD INTRAOCULAR LENS IMPLANT;  CATARACT EXTRACTION RIGHT EYE WITH IMPLANT;  Surgeon: Darin Gutierrez MD;  Location: [facility]     PROSTATE BIOPSY, NEEDLE, SATURATION SAMPLING  2009     retinopexy-pvd with retinal tear Right 2008     TAKEDOWN COLOSTOMY N/A 9/21/2017    Procedure: TAKEDOWN COLOSTOMY;  CLOSURE OF NORRIS'S SIGMOID COLOSTOMY, REPAIR STOMAL HERNIA;  Surgeon: Tima Moreland MD;  [facility]: [facility]     TONSILLECTOMY         Family History   Problem Relation Age of Onset     DIABETES Mother      C.A.D. Mother      CANCER Maternal Grandmother        Social History   Substance Use Topics     Smoking status: Former Smoker     Smokeless tobacco: Former User     Types: Chew     Quit date: 10/19/2013      Comment: quit in 2000     Alcohol use No       Current Outpatient Prescriptions   Medication Sig Dispense Refill     order for DME optifoam 4X4\" dressings 5 each 1     ibuprofen (ADVIL/MOTRIN) 600 MG tablet Take 1 tablet (600 mg) by mouth every 6 hours as needed for moderate pain 30 tablet 1     UNABLE TO FIND Take 550 mg by mouth 2 times daily Gymnema danielle       clopidogrel (PLAVIX) 75 MG tablet Take 1 tablet (75 mg) by mouth daily 90 tablet 3     metoprolol (LOPRESSOR) 100 MG tablet Take 1 tablet (100 mg) by mouth 2 times daily 180 tablet 3     lisinopril (PRINIVIL/ZESTRIL) 40 MG tablet Take 1 tablet (40 mg) by mouth daily 90 tablet 3     atorvastatin (LIPITOR) 40 MG tablet Take 1 tablet (40 mg) by mouth daily 90 tablet 3     " nitroglycerin (NITROSTAT) 0.4 MG sublingual tablet Place 1 tablet (0.4 mg) under the tongue every 5 minutes as needed 25 tablet 3     ranitidine (ZANTAC) 150 MG tablet Take 1 tablet (150 mg) by mouth 2 times daily 180 tablet 3     finasteride (PROSCAR) 5 MG tablet Take 1 tablet (5 mg) by mouth daily 90 tablet 3     amLODIPine (NORVASC) 5 MG tablet Take 1 tablet (5 mg) by mouth daily 90 tablet 3     blood glucose monitoring (MARIAN CONTOUR NEXT) test strip Use to test blood sugar 2 times weekly or as directed. 100 strip 6     calcium carbonate (OS-WALI 600 MG [place]. CA) 1500 (600 CA) MG tablet Take 1 tablet (1,500 mg) by mouth daily 30 tablet 3     blood glucose monitoring (MARIAN MICROLET) lancets Use to test blood sugar 2 times weekly 100 Box 6     NONFORMULARY OTC eye drops as directed       polyethylene glycol 0.4%- propylene glycol 0.3% (SYSTANE ULTRA) 0.4-0.3 % SOLN ophthalmic solution Place 1 drop into both eyes every hour as needed for dry eyes       Multiple Vitamin (MULTIVITAMINS PO) Take 1 capsule by mouth daily       glucose blood VI test strips (ACCU-CHEK SMARTVIEW) strip Test blood sugar before breakfast one day, before and after supper the next and not at all the third day 1 Box 12     SOFTCLIX LANCETS MISC 1 Units daily Use fresh lancet each day to check blood sugars 100 each 4     Lancets Misc. (ACCU-CHEK SOFTCLIX LANCET DEV) KIT 1 Units daily 1 kit 2     ASPIRIN PO Take 325 mg by mouth daily        Cholecalciferol (VITAMIN D3 PO) Take 1,000 Units by mouth daily          Allergies   Allergen Reactions     Acetaminophen      Ciprofloxacin Hives     No Clinical Screening - See Comments      Antibiotic allergy, pt not sure which       REVIEW OF SYSTEMS  Skin: as above  Eyes: glasses  Ears/Nose/Throat: hearing loss  Respiratory: No shortness of breath, dyspnea on exertion, cough, or hemoptysis  Cardiovascular: negative  Gastrointestinal:negative  Genitourinary: negative  Musculoskeletal:  "negative  Neurologic: migraine headaches, TIA hx  Psychiatric: negative  Hematologic/Lymphatic/Immunologic: is on Plavix because of stroke history  Endocrine: negative    OBJECTIVE:  Vital signs:  Vital signs:  Temp: 97.7  F (36.5  C)   BP: 102/64 Pulse: 64           Height: 5' 10\" (177.8 cm) Weight: 173 lb (78.5 kg)  Estimated body mass index is 24.82 kg/(m^2) as calculated from the following:    Height as of this encounter: 5' 10\" (1.778 m).    Weight as of this encounter: 173 lb (78.5 kg).  Constitutional: healthy, alert and no distress  Cardiovascular: RRR. No murmurs, clicks gallops or rub  Respiratory:  Good diaphragmatic excursion. Lungs clear  Gastrointestinal: Abdomen soft, generally not tender, except for area of new surgery. BS normal. No masses, organomegaly  : Deferred  Musculoskeletal: extremities normal- no gross deformities noted, gait normal and normal muscle tone  Skin:        Wound description:  Type of Wound- surgical; Location- left abd, Drainage amount- small;  Drainage color-bloody,  Odor- none; Wound bed-100% red and clean, Surrounding skin-intact.       Measurements: 1.5 X 0.6 cm, no depth noted (much smaller)       Dressing change:  Wound cleansed with saline and gauze, dressed with a piece of silver hydrofiber and a bordered foam    Neurologic: Gait normal. Sensation grossly WNL.  Psychiatric: mentation appears normal and affect normal/bright    THERAPY GOAL: keep wound clean and infection free    ASSESSMENT / PLAN:  Continue dressing with silver alginate and a bordered foam, decrease frequency to twice weekly.  He will see Dr. Moreland in clinic in one week.   We made an appt for him to see us for final follow up in two weeks, but he may be healed and may not need this appt.    Jackie Horne  CNP, [facility]OCN  Urology and Wound Care  October 27, 2017          "

## 2017-10-27 NOTE — MR AVS SNAPSHOT
"              After Visit Summary   10/27/2017    Peter Zhao    MRN: [id number]           Patient Information     Date Of Birth          1945        Visit Information        Provider Department      10/27/2017 2:30 PM Jackie Horne NP [facility]        Today's Diagnoses     Open wound of abdomen, subsequent encounter    -  1    S/P colostomy takedown           Follow-ups after your visit        Your next 10 appointments already scheduled     Nov 03, 2017 11:00 AM CDT   (Arrive by 10:45 AM)   Post Op with Tima Moreland MD   [facility] [place] ([facility] )    [address]  [place] [address]   [phone]            Nov 10, 2017  2:00 PM CST   (Arrive by 1:45 PM)   Return Visit with Jackie Horne NP   [facility] ([facility] )    [address]  [place] [id number] [phone]              Who to contact     If you have questions or need follow up information about today's clinic visit or your schedule please contact [facility] directly at [phone].  Normal or non-critical lab and imaging results will be communicated to you by MyChart, letter or phone within 4 business days after the clinic has received the results. If you do not hear from us within 7 days, please contact the clinic through MyChart or phone. If you have a critical or abnormal lab result, we will notify you by phone as soon as possible.  Submit refill requests through [employer] or call your pharmacy and they will forward the refill request to us. Please allow 3 business days for your refill to be completed.          Additional Information About Your Visit        MyChart Information     [employer] lets you send messages to your doctor, view your test results, renew your prescriptions, schedule appointments and more. To sign up, go to [email or web address] . Click on \"Log in\" on the left side of the screen, which will take you to the " "Welcome page. Then click on \"Sign up Now\" on the right side of the page.     You will be asked to enter the access code listed below, as well as some personal information. Please follow the directions to create your username and password.     Your access code is: [email or web address]  Expires: 2017 10:57 AM     Your access code will  in 90 days. If you need help or a new code, please call your [facility] clinic or [phone].        Care EveryWhere ID     This is your Care EveryWhere ID. This could be used by other organizations to access your [facility] medical records  [id number]        Your Vitals Were     Pulse Temperature Height BMI (Body Mass Index)          64 97.7  F (36.5  C) 5' 10\" (1.778 m) 24.82 kg/m2         Blood Pressure from Last 3 Encounters:   10/27/17 102/64   10/20/17 90/60   10/13/17 122/70    Weight from Last 3 Encounters:   10/27/17 173 lb (78.5 kg)   10/20/17 169 lb (76.7 kg)   10/13/17 169 lb (76.7 kg)              Today, you had the following     No orders found for display       Primary Care Provider Office Phone # Fax #    Alexander Vazquez [phone]       [facility] [address] [facility] [address]        Equal Access to Services     [facility]: Hadii aad ku hadasho Soomaali, waaxda luqadaha, qaybta kaalmada adeegyada, john banda hayfaizan torres . So [facility] [phone].    ATENCIÓN: Si habla español, tiene a farah disposición servicios gratuitos de asistencia lingüística. Llame al [phone].    We comply with applicable federal civil rights laws and [place] laws. We do not discriminate on the basis of race, color, national origin, age, disability, sex, sexual orientation, or gender identity.            Thank you!     Thank you for choosing [facility]  for your care. Our goal is always to provide you with excellent care. Hearing back from our patients is one way we can continue to improve our services. Please take " a few minutes to complete the written survey that you may receive in the mail after your visit with us. Thank you!             Your Updated Medication List - Protect others around you: Learn how to safely use, store and throw away your medicines at www.disposemymeds.org.          This list is accurate as of: 10/27/17  3:43 PM.  Always use your most recent med list.                   Brand Name Dispense Instructions for use Diagnosis    amLODIPine 5 MG tablet    NORVASC    90 tablet    Take 1 tablet (5 mg) by mouth daily    Benign essential hypertension       ASPIRIN PO      Take 325 mg by mouth daily        atorvastatin 40 MG tablet    LIPITOR    90 tablet    Take 1 tablet (40 mg) by mouth daily    Hyperlipidemia LDL goal <100       blood glucose lancing device     1 kit    1 Units daily    T2DM (type 2 diabetes mellitus) (H)       * blood glucose monitoring lancets     100 each    1 Units daily Use fresh lancet each day to check blood sugars    T2DM (type 2 diabetes mellitus) (H)       * blood glucose monitoring lancets     100 Box    Use to test blood sugar 2 times weekly    Type 2 diabetes mellitus without complication, without long-term current use of insulin (H)       * blood glucose monitoring test strip    ACCU-CHEK SMARTVIEW    1 Box    Test blood sugar before breakfast one day, before and after supper the next and not at all the third day    T2DM (type 2 diabetes mellitus) (H)       * blood glucose monitoring test strip    MARIAN CONTOUR NEXT    100 strip    Use to test blood sugar 2 times weekly or as directed.    Type 2 diabetes mellitus with other specified complication, unspecified long term insulin use status (H)       calcium carbonate 1500 (600 CA) MG tablet    OS-WALI 600 mg [place]. Ca    30 tablet    Take 1 tablet (1,500 mg) by mouth daily        clopidogrel 75 MG tablet    PLAVIX    90 tablet    Take 1 tablet (75 mg) by mouth daily    Benign essential hypertension       finasteride 5 MG tablet     "PROSCAR    90 tablet    Take 1 tablet (5 mg) by mouth daily    Benign non-nodular prostatic hyperplasia with lower urinary tract symptoms       ibuprofen 600 MG tablet    ADVIL/MOTRIN    30 tablet    Take 1 tablet (600 mg) by mouth every 6 hours as needed for moderate pain    S/P colostomy takedown       lisinopril 40 MG tablet    PRINIVIL/ZESTRIL    90 tablet    Take 1 tablet (40 mg) by mouth daily    Benign essential hypertension       metoprolol 100 MG tablet    LOPRESSOR    180 tablet    Take 1 tablet (100 mg) by mouth 2 times daily    Benign essential hypertension       MULTIVITAMINS PO      Take 1 capsule by mouth daily        nitroGLYcerin 0.4 MG sublingual tablet    NITROSTAT    25 tablet    Place 1 tablet (0.4 mg) under the tongue every 5 minutes as needed    Hyperlipidemia LDL goal <100, Intermediate coronary syndrome (H)       NONFORMULARY      OTC eye drops as directed        order for DME     5 each    optifoam 4X4\" dressings    Open wound of abdomen, subsequent encounter       polyethylene glycol 0.4%- propylene glycol 0.3% 0.4-0.3 % Soln ophthalmic solution    SYSTANE ULTRA     Place 1 drop into both eyes every hour as needed for dry eyes        ranitidine 150 MG tablet    ZANTAC    180 tablet    Take 1 tablet (150 mg) by mouth 2 times daily    Gastroesophageal reflux disease without esophagitis       UNABLE TO FIND      Take 550 mg by mouth 2 times daily Gymnema danielle        VITAMIN D3 PO      Take 1,000 Units by mouth daily        * Notice:  This list has 4 medication(s) that are the same as other medications prescribed for you. Read the directions carefully, and ask your doctor or other care provider to review them with you.      "

## 2017-10-31 ENCOUNTER — MEDICAL CORRESPONDENCE (OUTPATIENT)
Dept: HEALTH INFORMATION MANAGEMENT | Facility: HOSPITAL | Age: 72
End: 2017-10-31

## 2017-11-03 ENCOUNTER — OFFICE VISIT (OUTPATIENT)
Dept: SURGERY | Facility: OTHER | Age: 72
End: 2017-11-03
Attending: SURGERY
Payer: MEDICARE

## 2017-11-03 VITALS
DIASTOLIC BLOOD PRESSURE: 60 MMHG | SYSTOLIC BLOOD PRESSURE: 102 MMHG | BODY MASS INDEX: 24.77 KG/M2 | TEMPERATURE: 97.4 F | OXYGEN SATURATION: 90 % | WEIGHT: 173 LBS | HEART RATE: 161 BPM | HEIGHT: 70 IN

## 2017-11-03 DIAGNOSIS — K56.699 STRICTURE OF SIGMOID COLON (H): Primary | ICD-10-CM

## 2017-11-03 PROCEDURE — 99024 POSTOP FOLLOW-UP VISIT: CPT | Performed by: SURGERY

## 2017-11-03 PROCEDURE — 99212 OFFICE O/P EST SF 10 MIN: CPT

## 2017-11-03 ASSESSMENT — PAIN SCALES - GENERAL: PAINLEVEL: NO PAIN (0)

## 2017-11-03 NOTE — NURSING NOTE
"Chief Complaint   Patient presents with     Surgical Followup     open wound of abdomen, s/p colostomy take down on 9/21/17       Initial /60 (BP Location: Right arm, Patient Position: Chair, Cuff Size: Adult Regular)  Pulse 161  Temp 97.4  F (36.3  C) (Tympanic)  Ht 5' 10\" (1.778 m)  Wt 173 lb (78.5 kg)  SpO2 90%  BMI 24.82 kg/m2 Estimated body mass index is 24.82 kg/(m^2) as calculated from the following:    Height as of this encounter: 5' 10\" (1.778 m).    Weight as of this encounter: 173 lb (78.5 kg).  Medication Reconciliation: [place]          "

## 2017-11-03 NOTE — PATIENT INSTRUCTIONS
Thank you for allowing Dr. Moreland and our surgical team to participate in your care. Please call with any scheduling questions or concerns to Darcie at [phone] or for nursing questions Emily.

## 2017-11-03 NOTE — MR AVS SNAPSHOT
"              After Visit Summary   11/3/2017    Peter Zhao    MRN: [id number]           Patient Information     Date Of Birth          1945        Visit Information        Provider Department      11/3/2017 11:00 AM Tima Moreland MD [facility]        Care Instructions    Thank you for allowing Dr. Moreland and our surgical team to participate in your care. Please call with any scheduling questions or concerns to Darcie at [phone] or for nursing questions Emily.            Follow-ups after your visit        Your next 10 appointments already scheduled     Nov 10, 2017  2:00 PM CST   (Arrive by 1:45 PM)   Return Visit with Jackie Horne NP   [facility] ([facility] )    [address]  [facility] [address] [phone]              Who to contact     If you have questions or need follow up information about today's clinic visit or your schedule please contact [facility] directly at [phone].  Normal or non-critical lab and imaging results will be communicated to you by Panravenhart, letter or phone within 4 business days after the clinic has received the results. If you do not hear from us within 7 days, please contact the clinic through [employer] or phone. If you have a critical or abnormal lab result, we will notify you by phone as soon as possible.  Submit refill requests through [employer] or call your pharmacy and they will forward the refill request to us. Please allow 3 business days for your refill to be completed.          Additional Information About Your Visit        PanravenharTumri Information     [employer] lets you send messages to your doctor, view your test results, renew your prescriptions, schedule appointments and more. To sign up, go to [email or web address] . Click on \"Log in\" on the left side of the screen, which will take you to the Welcome page. Then click on \"Sign up Now\" on the right side of the page.     You will be asked " "to enter the access code listed below, as well as some personal information. Please follow the directions to create your username and password.     Your access code is: [email or web address]  Expires: 2017 10:57 AM     Your access code will  in 90 days. If you need help or a new code, please call your [facility] clinic or [phone].        Care EveryWhere ID     This is your Care EveryWhere ID. This could be used by other organizations to access your [facility] medical records  [id number]        Your Vitals Were     Pulse Temperature Height Pulse Oximetry BMI (Body Mass Index)       161 97.4  F (36.3  C) (Tympanic) 5' 10\" (1.778 m) 90% 24.82 kg/m2        Blood Pressure from Last 3 Encounters:   17 102/60   10/27/17 102/64   10/20/17 90/60    Weight from Last 3 Encounters:   17 173 lb (78.5 kg)   10/27/17 173 lb (78.5 kg)   10/20/17 169 lb (76.7 kg)              Today, you had the following     No orders found for display       Primary Care Provider Office Phone # Fax #    Alexander Vazquez [phone]       [facility] [address] [address]        Equal Access to Services     ANDRY CASPER : Hadii aad ku hadasho Soomaali, waaxda luqadaha, qaybta kaalmada adeegyada, waxedwin torres . So [facility] [phone].    ATENCIÓN: Si habla español, tiene a farah disposición servicios gratuitos de asistencia lingüística. Llame al [phone].    We comply with applicable federal civil rights laws and [place] laws. We do not discriminate on the basis of race, color, national origin, age, disability, sex, sexual orientation, or gender identity.            Thank you!     Thank you for choosing [facility]  for your care. Our goal is always to provide you with excellent care. Hearing back from our patients is one way we can continue to improve our services. Please take a few minutes to complete the written survey that you may receive in " the mail after your visit with us. Thank you!             Your Updated Medication List - Protect others around you: Learn how to safely use, store and throw away your medicines at www.disposemymeds.org.          This list is accurate as of: 11/3/17  1:21 PM.  Always use your most recent med list.                   Brand Name Dispense Instructions for use Diagnosis    amLODIPine 5 MG tablet    NORVASC    90 tablet    Take 1 tablet (5 mg) by mouth daily    Benign essential hypertension       ASPIRIN PO      Take 325 mg by mouth daily        atorvastatin 40 MG tablet    LIPITOR    90 tablet    Take 1 tablet (40 mg) by mouth daily    Hyperlipidemia LDL goal <100       blood glucose lancing device     1 kit    1 Units daily    T2DM (type 2 diabetes mellitus) (H)       * blood glucose monitoring lancets     100 each    1 Units daily Use fresh lancet each day to check blood sugars    T2DM (type 2 diabetes mellitus) (H)       * blood glucose monitoring lancets     100 Box    Use to test blood sugar 2 times weekly    Type 2 diabetes mellitus without complication, without long-term current use of insulin (H)       * blood glucose monitoring test strip    ACCU-CHEK SMARTVIEW    1 Box    Test blood sugar before breakfast one day, before and after supper the next and not at all the third day    T2DM (type 2 diabetes mellitus) (H)       * blood glucose monitoring test strip    MARIAN CONTOUR NEXT    100 strip    Use to test blood sugar 2 times weekly or as directed.    Type 2 diabetes mellitus with other specified complication, unspecified long term insulin use status (H)       calcium carbonate 1500 (600 CA) MG tablet    OS-WALI 600 mg [place]. Ca    30 tablet    Take 1 tablet (1,500 mg) by mouth daily        clopidogrel 75 MG tablet    PLAVIX    90 tablet    Take 1 tablet (75 mg) by mouth daily    Benign essential hypertension       finasteride 5 MG tablet    PROSCAR    90 tablet    Take 1 tablet (5 mg) by mouth daily    Benign  "non-nodular prostatic hyperplasia with lower urinary tract symptoms       ibuprofen 600 MG tablet    ADVIL/MOTRIN    30 tablet    Take 1 tablet (600 mg) by mouth every 6 hours as needed for moderate pain    S/P colostomy takedown       lisinopril 40 MG tablet    PRINIVIL/ZESTRIL    90 tablet    Take 1 tablet (40 mg) by mouth daily    Benign essential hypertension       metoprolol 100 MG tablet    LOPRESSOR    180 tablet    Take 1 tablet (100 mg) by mouth 2 times daily    Benign essential hypertension       MULTIVITAMINS PO      Take 1 capsule by mouth daily        nitroGLYcerin 0.4 MG sublingual tablet    NITROSTAT    25 tablet    Place 1 tablet (0.4 mg) under the tongue every 5 minutes as needed    Hyperlipidemia LDL goal <100, Intermediate coronary syndrome (H)       NONFORMULARY      OTC eye drops as directed        order for DME     5 each    optifoam 4X4\" dressings    Open wound of abdomen, subsequent encounter       polyethylene glycol 0.4%- propylene glycol 0.3% 0.4-0.3 % Soln ophthalmic solution    SYSTANE ULTRA     Place 1 drop into both eyes every hour as needed for dry eyes        ranitidine 150 MG tablet    ZANTAC    180 tablet    Take 1 tablet (150 mg) by mouth 2 times daily    Gastroesophageal reflux disease without esophagitis       UNABLE TO FIND      Take 550 mg by mouth 2 times daily Gymnema danielle        VITAMIN D3 PO      Take 1,000 Units by mouth daily        * Notice:  This list has 4 medication(s) that are the same as other medications prescribed for you. Read the directions carefully, and ask your doctor or other care provider to review them with you.      "

## 2017-11-04 NOTE — PROGRESS NOTES
DATE OF VISIT:  2017      Mr. Roger Zhao was seen today for followup of the closure of his Marianna's colostomy carried out 6 weeks ago.  He is getting along quite well.  He is eating well.  He is basically having 1 formed bowel movement daily.  There has been no blood with his bowel movements.  He occasionally gets a bit of pain around the area of the colostomy site.  His wife has been doing dressings on the colostomy site.      PHYSICAL EXAMINATION:  On examination today, he looks well.  His abdomen is soft and nontender.  His midline incision has healed nicely.  There is no evidence of fascial defect.  The colostomy site is also basically healed.  There is still a small scabbed area at the medial end of the lesion.  I did clean this.  There was a bit of proud flesh and I did cauterize this with some silver nitrate.  It was then covered with a Band-Aid.      At the moment he just needs to keep the wound clean with some soap and water and cover with a Band-Aid until it is healed.  Otherwise, he is now 6 weeks following the procedure and he can return to normal activities including lifting.  I have suggested that he work himself into it slowly as he will not be in as good a shape as he was before his surgeries.  Otherwise, there are no restrictions.      At the moment I have not set up an appointment to see him again, but will be happy to reassess him if there are concerns.  He seems quite happy with the results at this time.         RUDY RAYO MD             D: 2017 14:16   T: 2017 22:43   MT:       Name:     ROGER ZHAO   MRN:      [id number]        Account:      [id number]   :      1945           Visit Date:   2017      Document: [id number]

## 2017-11-10 ENCOUNTER — TELEPHONE (OUTPATIENT)
Dept: WOUND CARE | Facility: CLINIC | Age: 72
End: 2017-11-10

## 2017-11-10 ENCOUNTER — TELEPHONE (OUTPATIENT)
Dept: WOUND CARE | Facility: HOSPITAL | Age: 72
End: 2017-11-10

## 2017-11-10 NOTE — TELEPHONE ENCOUNTER
Pt called to say he was getting blisters along the edge of what sounds like his 4 x 4 Optifoam adhesive dressing.  He says he scratches there.  Reports only minimal drainage.  Tried a bandaid, but still have some itching.  Recommended trying some triple antibiotic oint and leaving it open to air.  He may be developing contact dermatitis from tape.

## 2018-04-24 ENCOUNTER — TELEPHONE (OUTPATIENT)
Dept: SURGERY | Facility: OTHER | Age: 73
End: 2018-04-24

## 2018-04-24 NOTE — TELEPHONE ENCOUNTER
Patient called and is inquiring about an appointment with Dr. Moreland. Writer let patient know that Dr. Moreland will be back Friday but we will need preapproval on a day he would like to see patient's. Writer let patient know that someone would call him once we talked with Dr. Moreland.

## 2018-04-27 NOTE — TELEPHONE ENCOUNTER
This patient has been seen by Dr. Moreland a couple times in past for stricture of colon, hernia and had closure of colostomy last fall by Dr. Moreland. Patient states that he had a lump above incision at time of surgery that is now getting larger and would like appointment with Dr. Moreland to look at it.

## 2018-04-30 ENCOUNTER — OFFICE VISIT (OUTPATIENT)
Dept: SURGERY | Facility: OTHER | Age: 73
End: 2018-04-30
Attending: SURGERY
Payer: MEDICARE

## 2018-04-30 VITALS
DIASTOLIC BLOOD PRESSURE: 60 MMHG | TEMPERATURE: 96.4 F | SYSTOLIC BLOOD PRESSURE: 110 MMHG | HEIGHT: 70 IN | BODY MASS INDEX: 26.2 KG/M2 | OXYGEN SATURATION: 96 % | WEIGHT: 183 LBS | HEART RATE: 56 BPM

## 2018-04-30 DIAGNOSIS — K43.2 INCISIONAL HERNIA, WITHOUT OBSTRUCTION OR GANGRENE: Primary | ICD-10-CM

## 2018-04-30 PROCEDURE — G0463 HOSPITAL OUTPT CLINIC VISIT: HCPCS

## 2018-04-30 PROCEDURE — 99213 OFFICE O/P EST LOW 20 MIN: CPT | Performed by: SURGERY

## 2018-04-30 ASSESSMENT — PAIN SCALES - GENERAL: PAINLEVEL: NO PAIN (0)

## 2018-04-30 NOTE — NURSING NOTE
"Chief Complaint   Patient presents with     Consult     Possible hernia/Possible scar tissue?       Initial /60  Pulse 56  Temp 96.4  F (35.8  C) (Tympanic)  Ht 5' 10\" (1.778 m)  Wt 183 lb (83 kg)  SpO2 96%  BMI 26.26 kg/m2 Estimated body mass index is 26.26 kg/(m^2) as calculated from the following:    Height as of this encounter: 5' 10\" (1.778 m).    Weight as of this encounter: 183 lb (83 kg).  Medication Reconciliation: complete   Cely Singh        "

## 2018-04-30 NOTE — MR AVS SNAPSHOT
"              After Visit Summary   2018    Peter Zhao    MRN: [id number]           Patient Information     Date Of Birth          1945        Visit Information        Provider Department      2018 11:00 AM Tima Moreland MD [facility]        Today's Diagnoses     Incisional hernia, without obstruction or gangrene    -  1       Follow-ups after your visit        Who to contact     If you have questions or need follow up information about today's clinic visit or your schedule please contact [facility] directly at [phone].  Normal or non-critical lab and imaging results will be communicated to you by [employer], letter or phone within 4 business days after the clinic has received the results. If you do not hear from us within 7 days, please contact the clinic through [employer] or phone. If you have a critical or abnormal lab result, we will notify you by phone as soon as possible.  Submit refill requests through [employer] or call your pharmacy and they will forward the refill request to us. Please allow 3 business days for your refill to be completed.          Additional Information About Your Visit        MyChart Information     [employer] lets you send messages to your doctor, view your test results, renew your prescriptions, schedule appointments and more. To sign up, go to [email or web address] . Click on \"Log in\" on the left side of the screen, which will take you to the Welcome page. Then click on \"Sign up Now\" on the right side of the page.     You will be asked to enter the access code listed below, as well as some personal information. Please follow the directions to create your username and password.     Your access code is: [email or web address]  Expires: 2018 11:22 AM     Your access code will  in 90 days. If you need help or a new code, please call your [facility] or [phone].        Care EveryWhere ID     This is your Care EveryWhere ID. This could " "be used by other organizations to access your [place] medical records  [id number]        Your Vitals Were     Pulse Temperature Height Pulse Oximetry BMI (Body Mass Index)       56 96.4  F (35.8  C) (Tympanic) 5' 10\" (1.778 m) 96% 26.26 kg/m2        Blood Pressure from Last 3 Encounters:   04/30/18 110/60   11/03/17 102/60   10/27/17 102/64    Weight from Last 3 Encounters:   04/30/18 183 lb (83 kg)   11/03/17 173 lb (78.5 kg)   10/27/17 173 lb (78.5 kg)              Today, you had the following     No orders found for display         Today's Medication Changes          These changes are accurate as of 4/30/18 11:22 AM.  If you have any questions, ask your nurse or doctor.               Stop taking these medicines if you haven't already. Please contact your care team if you have questions.     ibuprofen 600 MG tablet   Commonly known as:  ADVIL/MOTRIN   Stopped by:  Tima Moreland MD           NONFORMULARY   Stopped by:  Tima Moreland MD           polyethylene glycol 0.4%- propylene glycol 0.3% 0.4-0.3 % Soln ophthalmic solution   Commonly known as:  SYSTANE ULTRA   Stopped by:  Tima Moreland MD                    Primary Care Provider Office Phone # Fax #    Alexander ALEMAN DO [place] [phone]       [facility] [address] [place] [id number]        Equal Access to Services     STEVE CASPER AH: Hadii miroslava carreono Sotessie, waaxda luqadaha, qaybta kaalmada bennieyada, john brower adeherman pulido. So [facility] [phone].    ATENCIÓN: Si jakobla judi, tiene a farah disposición servicios gratuitos de asistencia lingüística. Llame al [phone].    We comply with applicable federal civil rights laws and [place] laws. We do not discriminate on the basis of race, color, national origin, age, disability, sex, sexual orientation, or gender identity.            Thank you!     Thank you for choosing [facility]  for your care. Our goal is always to " provide you with excellent care. Hearing back from our patients is one way we can continue to improve our services. Please take a few minutes to complete the written survey that you may receive in the mail after your visit with us. Thank you!             Your Updated Medication List - Protect others around you: Learn how to safely use, store and throw away your medicines at www.disposemymeds.org.          This list is accurate as of 4/30/18 11:22 AM.  Always use your most recent med list.                   Brand Name Dispense Instructions for use Diagnosis    amLODIPine 5 MG tablet    NORVASC    90 tablet    Take 1 tablet (5 mg) by mouth daily    Benign essential hypertension       ASPIRIN PO      Take 325 mg by mouth daily        atorvastatin 40 MG tablet    LIPITOR    90 tablet    Take 1 tablet (40 mg) by mouth daily    Hyperlipidemia LDL goal <100       blood glucose lancing device     1 kit    1 Units daily    T2DM (type 2 diabetes mellitus) (H)       * blood glucose monitoring lancets     100 each    1 Units daily Use fresh lancet each day to check blood sugars    T2DM (type 2 diabetes mellitus) (H)       * blood glucose monitoring lancets     100 Box    Use to test blood sugar 2 times weekly    Type 2 diabetes mellitus without complication, without long-term current use of insulin (H)       * blood glucose monitoring test strip    ACCU-CHEK SMARTVIEW    1 Box    Test blood sugar before breakfast one day, before and after supper the next and not at all the third day    T2DM (type 2 diabetes mellitus) (H)       * blood glucose monitoring test strip    MARIAN CONTOUR NEXT    100 strip    Use to test blood sugar 2 times weekly or as directed.    Type 2 diabetes mellitus with other specified complication, unspecified long term insulin use status (H)       calcium carbonate 1500 (600 Ca) MG tablet    OS-WALI 600 mg [place]. Ca    30 tablet    Take 1 tablet (1,500 mg) by mouth daily        clopidogrel 75 MG tablet     "PLAVIX    90 tablet    Take 1 tablet (75 mg) by mouth daily    Benign essential hypertension       finasteride 5 MG tablet    PROSCAR    90 tablet    Take 1 tablet (5 mg) by mouth daily    Benign non-nodular prostatic hyperplasia with lower urinary tract symptoms       lisinopril 40 MG tablet    PRINIVIL/ZESTRIL    90 tablet    Take 1 tablet (40 mg) by mouth daily    Benign essential hypertension       metoprolol tartrate 100 MG tablet    LOPRESSOR    180 tablet    Take 1 tablet (100 mg) by mouth 2 times daily    Benign essential hypertension       MULTIVITAMINS PO      Take 1 capsule by mouth daily        nitroGLYcerin 0.4 MG sublingual tablet    NITROSTAT    25 tablet    Place 1 tablet (0.4 mg) under the tongue every 5 minutes as needed    Hyperlipidemia LDL goal <100, Intermediate coronary syndrome (H)       order for DME     5 each    optifoam 4X4\" dressings    Open wound of abdomen, subsequent encounter       ranitidine 150 MG tablet    ZANTAC    180 tablet    Take 1 tablet (150 mg) by mouth 2 times daily    Gastroesophageal reflux disease without esophagitis       UNABLE TO FIND      Take 550 mg by mouth 2 times daily Gymnema danielle        VITAMIN D3 PO      Take 1,000 Units by mouth daily        * Notice:  This list has 4 medication(s) that are the same as other medications prescribed for you. Read the directions carefully, and ask your doctor or other care provider to review them with you.      "

## 2018-04-30 NOTE — PROGRESS NOTES
Visit Date:   04/30/2018      SUBJECTIVE:  Mr. Zhao was seen in the clinic today because of concern about a lump above his scar for his previous colostomy closure.  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  He had undergone a sigmoid resection with colostomy formation.  He did have the colostomy closed last September.  When seen in November, he was doing well.  He has unfortunately noticed the development of a lump above the scar for the colostomy closure.  This has been getting a bit bigger.  It does not cause him any pain.        He has put on approximately 10 pounds in weight since I last saw him.  Bowels are functioning well approximately 2-3 times a day.  He has otherwise been feeling well.  He has not been having troubles with hypertension.  There has been no more of his threatened strokes.      CURRENT MEDICATIONS:  Include Norvasc, Lipitor, calcium carbonate, Plavix, Proscar, lisinopril, Lopressor, Nitrostat and ranitidine.  His other medications are aspirin, vitamin D3 and multivitamins.      ALLERGIES:  HE HAS ALLERGIES TO ACETAMINOPHENS AND CIPRO.      PHYSICAL EXAMINATION:     GENERAL:  On examination today, he is a pleasant 72-year-old male who is in no distress.   GASTROINTESTINAL:  On examination of his abdomen, he definitely has put on some weight since I last saw him.  His abdomen is soft, it was nontender.  He does have a diastasis in the upper abdomen in the area of his previous incision.  May have a small hernia present.  The lower part of the incision feels good.  Unfortunately, he does have what feels to be a small incisional hernia sitting above the scar for the colostomy closure.  This was easily reducible.  The defect feels to be approximately 3-4 cm in diameter.  The rest of the abdomen was soft and nontender with no palpable masses.      IMPRESSION:  I  explained to Mr. Zhao that he does have an incisional hernia.  It is a bit surprising that it is above the area of the colostomy closure, but is no doubt in relationship to it.  I explained that generally we fix hernias as they do get bigger with time and there is a risk of incarceration and strangulation.  To fix it, it would be done under general anesthetic.  It could be done as an outpatient.  We would end up making an incision in the area and may end up having to excise the previous scar.  There is a chance that I would end up putting mesh in.  He would have sutures in for approximately a week.  Would need to avoid lifting for 6 weeks.  There is also a small chance that I would leave a drain in place.  I did go over the risks of the procedure namely the risks of the anesthetic, bleeding, infection, as well as the risk of recurrence.  This is in addition to the risks of any major surgery as far as his heart, lungs, blood clots, pneumonias and strokes are concerned.  He will need to stop his Plavix for 7-10 days prior to the procedure.      He would like to have the procedure done, but at the moment wants to wait for a while which I think is reasonable.  He has a trip planned at the end of May. Is hoping to do some of the summer activities that he was unable to do last year so we may be looking at the fall before fixing it.  He will let us know when he wants to have it done.  He is aware that if he starts having pain or its giving him more troubles, that it will need to be fixed prior to that time.        He will also need to see Dr. Vazquez for a preop.         RUDY RAYO MD             D: 2018   T: 2018   MT: PAM      Name:     ROGER ZHAO   MRN:      [id number]        Account:      [id number]   :      1945           Visit Date:   2018      Document: [id number]

## 2018-05-24 ENCOUNTER — OFFICE VISIT (OUTPATIENT)
Dept: FAMILY MEDICINE | Facility: OTHER | Age: 73
End: 2018-05-24
Attending: FAMILY MEDICINE
Payer: MEDICARE

## 2018-05-24 VITALS
WEIGHT: 183 LBS | RESPIRATION RATE: 14 BRPM | DIASTOLIC BLOOD PRESSURE: 62 MMHG | HEIGHT: 70 IN | SYSTOLIC BLOOD PRESSURE: 104 MMHG | BODY MASS INDEX: 26.2 KG/M2 | OXYGEN SATURATION: 96 % | HEART RATE: 70 BPM | TEMPERATURE: 99.6 F

## 2018-05-24 DIAGNOSIS — J01.90 ACUTE SINUSITIS WITH SYMPTOMS > 10 DAYS: Primary | ICD-10-CM

## 2018-05-24 DIAGNOSIS — R09.82 POST-NASAL DRIP: ICD-10-CM

## 2018-05-24 PROCEDURE — G0463 HOSPITAL OUTPT CLINIC VISIT: HCPCS

## 2018-05-24 PROCEDURE — 99213 OFFICE O/P EST LOW 20 MIN: CPT | Performed by: FAMILY MEDICINE

## 2018-05-24 RX ORDER — FLUTICASONE PROPIONATE 50 MCG
1-2 SPRAY, SUSPENSION (ML) NASAL DAILY
Qty: 1 BOTTLE | Refills: 3 | Status: SHIPPED | OUTPATIENT
Start: 2018-05-24 | End: 2018-08-10

## 2018-05-24 ASSESSMENT — PAIN SCALES - GENERAL: PAINLEVEL: NO PAIN (1)

## 2018-05-24 ASSESSMENT — ANXIETY QUESTIONNAIRES
6. BECOMING EASILY ANNOYED OR IRRITABLE: NOT AT ALL
1. FEELING NERVOUS, ANXIOUS, OR ON EDGE: NOT AT ALL
2. NOT BEING ABLE TO STOP OR CONTROL WORRYING: NOT AT ALL
5. BEING SO RESTLESS THAT IT IS HARD TO SIT STILL: NOT AT ALL
3. WORRYING TOO MUCH ABOUT DIFFERENT THINGS: NOT AT ALL
GAD7 TOTAL SCORE: 0
4. TROUBLE RELAXING: NOT AT ALL
7. FEELING AFRAID AS IF SOMETHING AWFUL MIGHT HAPPEN: NOT AT ALL

## 2018-05-24 NOTE — PROGRESS NOTES
SUBJECTIVE:                                                    Peter Zhao is a 72 year old male who presents to clinic today for the following health issues:      RESPIRATORY SYMPTOMS      Duration: 2 weeks    Description  nasal congestion, rhinorrhea, sore throat and cough    Severity: moderate    Accompanying signs and symptoms: None    History (predisposing factors):  none    Precipitating or alleviating factors: Patient states last night was the worst and he almost came to urgent care.    Therapies tried and outcome:  None    No fever, vomiting, diarrhea, headaches    Mild ear pain left    No OTC treatments    Going to [facility] in 4 days      Problem list and histories reviewed & adjusted, as indicated.  Additional history: as documented    Current Outpatient Prescriptions   Medication     amLODIPine (NORVASC) 5 MG tablet     amoxicillin-clavulanate (AUGMENTIN) 875-125 MG per tablet     ASPIRIN PO     atorvastatin (LIPITOR) 40 MG tablet     blood glucose monitoring (MARIAN CONTOUR NEXT) test strip     blood glucose monitoring (MARIAN MICROLET) lancets     calcium carbonate (OS-WALI 600 MG [place]. CA) 1500 (600 CA) MG tablet     Cholecalciferol (VITAMIN D3 PO)     clopidogrel (PLAVIX) 75 MG tablet     finasteride (PROSCAR) 5 MG tablet     fluticasone (FLONASE) 50 MCG/ACT spray     glucose blood VI test strips (ACCU-CHEK SMARTVIEW) strip     Lancets Misc. (ACCU-CHEK SOFTCLIX LANCET DEV) KIT     lisinopril (PRINIVIL/ZESTRIL) 40 MG tablet     metoprolol (LOPRESSOR) 100 MG tablet     Multiple Vitamin (MULTIVITAMINS PO)     nitroglycerin (NITROSTAT) 0.4 MG sublingual tablet     order for DME     ranitidine (ZANTAC) 150 MG tablet     SOFTCLIX LANCETS MISC     UNABLE TO FIND     No current facility-administered medications for this visit.        Patient Active Problem List   Diagnosis     Diabetes mellitus, type 2 (H)     CAD (coronary artery disease)     ACP (advance care planning)     Hyperlipidemia LDL goal <100      Elevated prostate specific antigen (PSA)     HTN (hypertension)     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     H/O migraine     Visual aura     Cataract     S/P colon resection     Hypokalemia     TIA (transient ischemic attack)     Agitation     Attention to colostomy (H)     Past Surgical History:   Procedure Laterality Date     AORTIC VALVE REPLACEMENT      25 mm CE bovine replacement Dr. Wu 8/20/2012     CARDIAC SURGERY       CATARACT IOL, RT/LT Right 04/2017     COLECTOMY LOW ANTERIOR N/A 2/17/2017    Procedure: COLECTOMY LOW ANTERIOR;  Surgeon: Tima Moreland MD;  Location: [facility]     COLONOSCOPY N/A 2/17/2017    Procedure: COLONOSCOPY;  Surgeon: Tima Moreland MD;  Location: [facility]     ENT SURGERY       FLEX SIG (MEDICARE PT.)       GI SURGERY  02/2017    ostemomy     HERNIA REPAIR       LAPAROSCOPIC HERNIORRHAPHY INGUINAL Right 9/18/2015    Procedure: LAPAROSCOPIC HERNIORRHAPHY INGUINAL;  Surgeon: Solitario Nettles DO;  Location: [facility] OR     PHACOEMULSIFICATION WITH STANDARD INTRAOCULAR LENS IMPLANT Right 4/20/2017    Procedure: PHACOEMULSIFICATION WITH STANDARD INTRAOCULAR LENS IMPLANT;  CATARACT EXTRACTION RIGHT EYE WITH IMPLANT;  Surgeon: Darin Gutierrez MD;  Location: [facility] OR     PROSTATE BIOPSY, NEEDLE, SATURATION SAMPLING  2009     retinopexy-pvd with retinal tear Right 2008     TAKEDOWN COLOSTOMY N/A 9/21/2017    Procedure: TAKEDOWN COLOSTOMY;  CLOSURE OF NORRIS'S SIGMOID COLOSTOMY, REPAIR STOMAL HERNIA;  Surgeon: Tima Moreland MD;  Location: [facility] OR     TONSILLECTOMY         Social History   Substance Use Topics     Smoking status: Former Smoker     Smokeless tobacco: Former User     Types: Chew     Quit date: 10/19/2013      Comment: quit in 2000     Alcohol use No     Family History   Problem Relation Age of Onset     DIABETES Mother      C.A.D. Mother      CANCER Maternal Grandmother            ROS:  Constitutional, HEENT, cardiovascular,  "pulmonary, gi and gu systems are negative, except as otherwise noted.    OBJECTIVE:     /62 (BP Location: Right arm, Patient Position: Sitting, Cuff Size: Adult Large)  Pulse 70  Temp 99.6  F (37.6  C) (Tympanic)  Resp 14  Ht 5' 10\" (1.778 m)  Wt 183 lb (83 kg)  SpO2 96%  BMI 26.26 kg/m2  Body mass index is 26.26 kg/(m^2).  GENERAL: healthy, alert and no distress  EYES: Eyes grossly normal to inspection, PERRL and conjunctivae and sclerae normal  HENT: normal cephalic/atraumatic, ear canals and TM's normal, nasal mucosa edematous  and oropharynx with mild erythema, post nasal drainage  NECK: left anterior mobile mass (per patient has been there for years, unchanged); no other adenopathy  RESP: lungs clear to auscultation - no rales, rhonchi or wheezes  CV: regular rate and rhythm, normal S1 S2, no S3 or S4, no murmur, click or rub, no peripheral edema and peripheral pulses strong  MS: no gross musculoskeletal defects noted, no edema  SKIN: no suspicious lesions or rashes  PSYCH: mentation appears normal, affect normal/bright      ASSESSMENT/PLAN:     (J01.90) Acute sinusitis with symptoms > 10 days  (primary encounter diagnosis)  Plan: amoxicillin-clavulanate (AUGMENTIN) 875-125 MG         per tablet, fluticasone (FLONASE) 50 MCG/ACT         spray    (R09.82) Post-nasal drip  Plan: amoxicillin-clavulanate (AUGMENTIN) 875-125 MG         per tablet, fluticasone (FLONASE) 50 MCG/ACT         spray      Patient Instructions   Symptomatic cares - fluids, rest, nasal saline rinses or neti pot.  Complete antibiotic course.  Start nasal steroid spray - need to use daily for it to take effect.  Follow up as needed.              Ely Calzada MD  [facility]      "

## 2018-05-24 NOTE — NURSING NOTE
"Chief Complaint   Patient presents with     URI     sinus congestion       Initial /62 (BP Location: Right arm, Patient Position: Sitting, Cuff Size: Adult Large)  Pulse 70  Temp 99.6  F (37.6  C) (Tympanic)  Resp 14  Ht 5' 10\" (1.778 m)  Wt 183 lb (83 kg)  SpO2 96%  BMI 26.26 kg/m2 Estimated body mass index is 26.26 kg/(m^2) as calculated from the following:    Height as of this encounter: 5' 10\" (1.778 m).    Weight as of this encounter: 183 lb (83 kg).  Medication Reconciliation: complete       Martha Shaw LPN    "

## 2018-05-24 NOTE — PATIENT INSTRUCTIONS
Symptomatic cares - fluids, rest, nasal saline rinses or neti pot.  Complete antibiotic course.  Start nasal steroid spray - need to use daily for it to take effect.  Follow up as needed.

## 2018-05-24 NOTE — MR AVS SNAPSHOT
"              After Visit Summary   5/24/2018    Peter Zhao    MRN: [id number]           Patient Information     Date Of Birth          1945        Visit Information        Provider Department      5/24/2018 2:15 PM E[facility] Jarquin [facility]        Today's Diagnoses     Acute sinusitis with symptoms > 10 days    -  1    Post-nasal drip          Care Instructions    Symptomatic cares - fluids, rest, nasal saline rinses or neti pot.  Complete antibiotic course.  Start nasal steroid spray - need to use daily for it to take effect.  Follow up as needed.              Follow-ups after your visit        Who to contact     If you have questions or need follow up information about today's clinic visit or your schedule please contact [facility] directly at [phone].  Normal or non-critical lab and imaging results will be communicated to you by MyChart, letter or phone within 4 business days after the clinic has received the results. If you do not hear from us within 7 days, please contact the clinic through MyChart or phone. If you have a critical or abnormal lab result, we will notify you by phone as soon as possible.  Submit refill requests through eEvent or call your pharmacy and they will forward the refill request to us. Please allow 3 business days for your refill to be completed.          Additional Information About Your Visit        MyChart Information     eEvent lets you send messages to your doctor, view your test results, renew your prescriptions, schedule appointments and more. To sign up, go to [email or web address] . Click on \"Log in\" on the left side of the screen, which will take you to the Welcome page. Then click on \"Sign up Now\" on the right side of the page.     You will be asked to enter the access code listed below, as well as some personal information. Please follow the directions to create your username and password.     Your access code is: " "[email or web address]  Expires: 2018 11:22 AM     Your access code will  in 90 days. If you need help or a new code, please call your [facility] or [phone].        Care EveryWhere ID     This is your Care EveryWhere ID. This could be used by other organizations to access your [facility] medical records  [id number]        Your Vitals Were     Pulse Temperature Respirations Height Pulse Oximetry BMI (Body Mass Index)    70 99.6  F (37.6  C) (Tympanic) 14 5' 10\" (1.778 m) 96% 26.26 kg/m2       Blood Pressure from Last 3 Encounters:   18 104/62   18 110/60   17 102/60    Weight from Last 3 Encounters:   18 183 lb (83 kg)   18 183 lb (83 kg)   17 173 lb (78.5 kg)              Today, you had the following     No orders found for display         Today's Medication Changes          These changes are accurate as of 18  2:46 PM.  If you have any questions, ask your nurse or doctor.               Start taking these medicines.        Dose/Directions    amoxicillin-clavulanate 875-125 MG per tablet   Commonly known as:  AUGMENTIN   Used for:  Acute sinusitis with symptoms > 10 days, Post-nasal drip   Started by:  Ely Jarquin MD        Dose:  1 tablet   Take 1 tablet by mouth 2 times daily for 7 days   Quantity:  14 tablet   Refills:  0       fluticasone 50 MCG/ACT spray   Commonly known as:  FLONASE   Used for:  Acute sinusitis with symptoms > 10 days, Post-nasal drip   Started by:  Ely Jarquin MD        Dose:  1-2 spray   Spray 1-2 sprays into both nostrils daily   Quantity:  1 Bottle   Refills:  3            Where to get your medicines      These medications were sent to [id number] - [place] - [id number]  [address] [address]     Phone:  [phone]     amoxicillin-clavulanate 875-125 MG per tablet    fluticasone 50 MCG/ACT spray                Primary Care Provider Office Phone # Fax #    Alexander Vazquez [phone] " [phone]       [address] [facility] [address]        Equal Access to Services     ANDRY CASPER : Hadii aad ku hadcatalinafaby Sotessie, walatriceda luqadaha, qaybta kaalmada jenysamy, john veronika austinondina ferminherman laviniaiqrakevin pulido. So [facility] [phone].    ATENCIÓN: Si habla español, tiene a farah disposición servicios gratuitos de asistencia lingüística. Jhoanaame al [phone].    We comply with applicable federal civil rights laws and [place] laws. We do not discriminate on the basis of race, color, national origin, age, disability, sex, sexual orientation, or gender identity.            Thank you!     Thank you for choosing [facility]  for your care. Our goal is always to provide you with excellent care. Hearing back from our patients is one way we can continue to improve our services. Please take a few minutes to complete the written survey that you may receive in the mail after your visit with us. Thank you!             Your Updated Medication List - Protect others around you: Learn how to safely use, store and throw away your medicines at www.disposemymeds.org.          This list is accurate as of 5/24/18  2:46 PM.  Always use your most recent med list.                   Brand Name Dispense Instructions for use Diagnosis    amLODIPine 5 MG tablet    NORVASC    90 tablet    Take 1 tablet (5 mg) by mouth daily    Benign essential hypertension       amoxicillin-clavulanate 875-125 MG per tablet    AUGMENTIN    14 tablet    Take 1 tablet by mouth 2 times daily for 7 days    Acute sinusitis with symptoms > 10 days, Post-nasal drip       ASPIRIN PO      Take 325 mg by mouth daily        atorvastatin 40 MG tablet    LIPITOR    90 tablet    Take 1 tablet (40 mg) by mouth daily    Hyperlipidemia LDL goal <100       blood glucose lancing device     1 kit    1 Units daily    T2DM (type 2 diabetes mellitus) (H)       * blood glucose monitoring lancets     100 each    1 Units daily Use fresh lancet each day to  "check blood sugars    T2DM (type 2 diabetes mellitus) (H)       * blood glucose monitoring lancets     100 Box    Use to test blood sugar 2 times weekly    Type 2 diabetes mellitus without complication, without long-term current use of insulin (H)       * blood glucose monitoring test strip    ACCU-CHEK SMARTVIEW    1 Box    Test blood sugar before breakfast one day, before and after supper the next and not at all the third day    T2DM (type 2 diabetes mellitus) (H)       * blood glucose monitoring test strip    MARIAN CONTOUR NEXT    100 strip    Use to test blood sugar 2 times weekly or as directed.    Type 2 diabetes mellitus with other specified complication, unspecified long term insulin use status (H)       calcium carbonate 1500 (600 Ca) MG tablet    OS-WALI 600 mg [place]. Ca    30 tablet    Take 1 tablet (1,500 mg) by mouth daily        clopidogrel 75 MG tablet    PLAVIX    90 tablet    Take 1 tablet (75 mg) by mouth daily    Benign essential hypertension       finasteride 5 MG tablet    PROSCAR    90 tablet    Take 1 tablet (5 mg) by mouth daily    Benign non-nodular prostatic hyperplasia with lower urinary tract symptoms       fluticasone 50 MCG/ACT spray    FLONASE    1 Bottle    Spray 1-2 sprays into both nostrils daily    Acute sinusitis with symptoms > 10 days, Post-nasal drip       lisinopril 40 MG tablet    PRINIVIL/ZESTRIL    90 tablet    Take 1 tablet (40 mg) by mouth daily    Benign essential hypertension       metoprolol tartrate 100 MG tablet    LOPRESSOR    180 tablet    Take 1 tablet (100 mg) by mouth 2 times daily    Benign essential hypertension       MULTIVITAMINS PO      Take 1 capsule by mouth daily        nitroGLYcerin 0.4 MG sublingual tablet    NITROSTAT    25 tablet    Place 1 tablet (0.4 mg) under the tongue every 5 minutes as needed    Hyperlipidemia LDL goal <100, Intermediate coronary syndrome (H)       order for DME     5 each    optifoam 4X4\" dressings    Open wound of abdomen, " subsequent encounter       ranitidine 150 MG tablet    ZANTAC    180 tablet    Take 1 tablet (150 mg) by mouth 2 times daily    Gastroesophageal reflux disease without esophagitis       UNABLE TO FIND      Take 550 mg by mouth 2 times daily Gymnema danielle        VITAMIN D3 PO      Take 1,000 Units by mouth daily        * Notice:  This list has 4 medication(s) that are the same as other medications prescribed for you. Read the directions carefully, and ask your doctor or other care provider to review them with you.

## 2018-05-25 ENCOUNTER — HOSPITAL ENCOUNTER (EMERGENCY)
Facility: HOSPITAL | Age: 73
Discharge: HOME | End: 2018-05-25
Attending: FAMILY MEDICINE | Admitting: FAMILY MEDICINE
Payer: MEDICARE

## 2018-05-25 ENCOUNTER — APPOINTMENT (OUTPATIENT)
Dept: CT IMAGING | Facility: HOSPITAL | Age: 73
End: 2018-05-25
Attending: FAMILY MEDICINE
Payer: MEDICARE

## 2018-05-25 ENCOUNTER — APPOINTMENT (OUTPATIENT)
Dept: GENERAL RADIOLOGY | Facility: HOSPITAL | Age: 73
End: 2018-05-25
Attending: FAMILY MEDICINE
Payer: MEDICARE

## 2018-05-25 VITALS
RESPIRATION RATE: 18 BRPM | OXYGEN SATURATION: 97 % | SYSTOLIC BLOOD PRESSURE: 119 MMHG | WEIGHT: 178 LBS | TEMPERATURE: 98.4 F | HEIGHT: 70 IN | BODY MASS INDEX: 25.48 KG/M2 | DIASTOLIC BLOOD PRESSURE: 75 MMHG

## 2018-05-25 DIAGNOSIS — R10.84 ABDOMINAL PAIN, GENERALIZED: ICD-10-CM

## 2018-05-25 LAB
ALBUMIN SERPL-MCNC: 3.6 G/DL (ref 3.4–5)
ALBUMIN UR-MCNC: NEGATIVE MG/DL
ALP SERPL-CCNC: 89 U/L (ref 40–150)
ALT SERPL W P-5'-P-CCNC: 23 U/L (ref 0–70)
AMYLASE SERPL-CCNC: 51 U/L (ref 30–110)
ANION GAP SERPL CALCULATED.3IONS-SCNC: 9 MMOL/L (ref 3–14)
APPEARANCE UR: CLEAR
AST SERPL W P-5'-P-CCNC: 23 U/L (ref 0–45)
BACTERIA #/AREA URNS HPF: ABNORMAL /HPF
BASOPHILS # BLD AUTO: 0.1 10E9/L (ref 0–0.2)
BASOPHILS NFR BLD AUTO: 0.4 %
BILIRUB SERPL-MCNC: 0.8 MG/DL (ref 0.2–1.3)
BILIRUB UR QL STRIP: NEGATIVE
BUN SERPL-MCNC: 26 MG/DL (ref 7–30)
CALCIUM SERPL-MCNC: 9.4 MG/DL (ref 8.5–10.1)
CHLORIDE SERPL-SCNC: 104 MMOL/L (ref 94–109)
CO2 SERPL-SCNC: 25 MMOL/L (ref 20–32)
COLOR UR AUTO: YELLOW
CREAT SERPL-MCNC: 1.23 MG/DL (ref 0.66–1.25)
DIFFERENTIAL METHOD BLD: ABNORMAL
EOSINOPHIL # BLD AUTO: 0.4 10E9/L (ref 0–0.7)
EOSINOPHIL NFR BLD AUTO: 3.1 %
ERYTHROCYTE [DISTWIDTH] IN BLOOD BY AUTOMATED COUNT: 13.8 % (ref 10–15)
GFR SERPL CREATININE-BSD FRML MDRD: 58 ML/MIN/1.7M2
GLUCOSE SERPL-MCNC: 117 MG/DL (ref 70–99)
GLUCOSE UR STRIP-MCNC: NEGATIVE MG/DL
HCT VFR BLD AUTO: 42.7 % (ref 40–53)
HGB BLD-MCNC: 14.4 G/DL (ref 13.3–17.7)
HGB UR QL STRIP: NEGATIVE
IMM GRANULOCYTES # BLD: 0.1 10E9/L (ref 0–0.4)
IMM GRANULOCYTES NFR BLD: 0.4 %
KETONES UR STRIP-MCNC: NEGATIVE MG/DL
LEUKOCYTE ESTERASE UR QL STRIP: NEGATIVE
LIPASE SERPL-CCNC: 244 U/L (ref 73–393)
LYMPHOCYTES # BLD AUTO: 1.4 10E9/L (ref 0.8–5.3)
LYMPHOCYTES NFR BLD AUTO: 12.2 %
MCH RBC QN AUTO: 29.5 PG (ref 26.5–33)
MCHC RBC AUTO-ENTMCNC: 33.7 G/DL (ref 31.5–36.5)
MCV RBC AUTO: 88 FL (ref 78–100)
MONOCYTES # BLD AUTO: 1.2 10E9/L (ref 0–1.3)
MONOCYTES NFR BLD AUTO: 10.3 %
MUCOUS THREADS #/AREA URNS LPF: PRESENT /LPF
NEUTROPHILS # BLD AUTO: 8.6 10E9/L (ref 1.6–8.3)
NEUTROPHILS NFR BLD AUTO: 73.6 %
NITRATE UR QL: NEGATIVE
NRBC # BLD AUTO: 0 10*3/UL
NRBC BLD AUTO-RTO: 0 /100
PH UR STRIP: 5.5 PH (ref 4.7–8)
PLATELET # BLD AUTO: 239 10E9/L (ref 150–450)
POTASSIUM SERPL-SCNC: 4.3 MMOL/L (ref 3.4–5.3)
PROT SERPL-MCNC: 7.5 G/DL (ref 6.8–8.8)
RBC # BLD AUTO: 4.88 10E12/L (ref 4.4–5.9)
RBC #/AREA URNS AUTO: <1 /HPF (ref 0–2)
SODIUM SERPL-SCNC: 138 MMOL/L (ref 133–144)
SOURCE: ABNORMAL
SP GR UR STRIP: 1.02 (ref 1–1.03)
UROBILINOGEN UR STRIP-MCNC: 2 MG/DL (ref 0–2)
WBC # BLD AUTO: 11.7 10E9/L (ref 4–11)
WBC #/AREA URNS AUTO: 2 /HPF (ref 0–5)

## 2018-05-25 PROCEDURE — 74019 RADEX ABDOMEN 2 VIEWS: CPT | Mod: TC

## 2018-05-25 PROCEDURE — 82150 ASSAY OF AMYLASE: CPT | Performed by: FAMILY MEDICINE

## 2018-05-25 PROCEDURE — 99285 EMERGENCY DEPT VISIT HI MDM: CPT | Mod: 25

## 2018-05-25 PROCEDURE — 99285 EMERGENCY DEPT VISIT HI MDM: CPT | Mod: Z6 | Performed by: FAMILY MEDICINE

## 2018-05-25 PROCEDURE — 74177 CT ABD & PELVIS W/CONTRAST: CPT | Mod: TC

## 2018-05-25 PROCEDURE — 83690 ASSAY OF LIPASE: CPT | Performed by: FAMILY MEDICINE

## 2018-05-25 PROCEDURE — 81001 URINALYSIS AUTO W/SCOPE: CPT | Performed by: FAMILY MEDICINE

## 2018-05-25 PROCEDURE — 25000128 H RX IP 250 OP 636: Performed by: FAMILY MEDICINE

## 2018-05-25 PROCEDURE — 80053 COMPREHEN METABOLIC PANEL: CPT | Performed by: FAMILY MEDICINE

## 2018-05-25 PROCEDURE — 85025 COMPLETE CBC W/AUTO DIFF WBC: CPT | Performed by: FAMILY MEDICINE

## 2018-05-25 PROCEDURE — 36415 COLL VENOUS BLD VENIPUNCTURE: CPT | Performed by: FAMILY MEDICINE

## 2018-05-25 RX ORDER — IOPAMIDOL 612 MG/ML
100 INJECTION, SOLUTION INTRAVASCULAR ONCE
Status: COMPLETED | OUTPATIENT
Start: 2018-05-25 | End: 2018-05-25

## 2018-05-25 RX ADMIN — IOPAMIDOL 100 ML: 612 INJECTION, SOLUTION INTRAVENOUS at 02:30

## 2018-05-25 ASSESSMENT — ANXIETY QUESTIONNAIRES: GAD7 TOTAL SCORE: 0

## 2018-05-25 ASSESSMENT — PATIENT HEALTH QUESTIONNAIRE - PHQ9: SUM OF ALL RESPONSES TO PHQ QUESTIONS 1-9: 0

## 2018-05-25 NOTE — ED AVS SNAPSHOT
[facility]    [address] [address]    Phone:  [phone]                                       Peter Zhao   MRN: [id number]    Department:  [facility]   Date of Visit:  5/25/2018           Patient Information     Date Of Birth          1945        Your diagnoses for this visit were:     Abdominal pain, generalized        You were seen by Coty Vázquez MD.      Follow-up Information     Follow up with Alexander Vazquez DO.    Specialty:  Internal Medicine    Why:  As needed if symptoms reoccur     Contact information:    [address] [place] [address]  [phone]          Discharge Instructions           Abdominal Pain    Abdominal pain is pain in the stomach or belly area. Everyone has this pain from time to time. In many cases it goes away on its own. But abdominal pain can sometimes be due to a serious problem, such as appendicitis. So it s important to know when to seek help.  Causes of abdominal pain  There are many possible causes of abdominal pain. Common causes in adults include:    Constipation, diarrhea, or gas    Stomach acid flowing back up into the esophagus (acid reflux or heartburn)    Severe acid reflux, called GERD (gastroesophageal reflux disease)    A sore in the lining of the stomach or small intestine (peptic ulcer)    Inflammation of the gallbladder, liver, or pancreas    Gallstones or kidney stones    Appendicitis     Intestinal blockage     An internal organ pushing through a muscle or other tissue (hernia)    Urinary tract infections    In women, menstrual cramps, fibroids, or endometriosis    Inflammation or infection of the intestines  Diagnosing the cause of abdominal pain  Your healthcare provider will do a physical exam help find the cause of your pain. If needed, tests will be ordered. Belly pain has many possible causes. So it can be hard to find the reason for your pain. Giving details about  your pain can help. Tell your provider where and when you feel the pain, and what makes it better or worse. Also let your provider know if you have other symptoms such as:    Fever    Tiredness    Upset stomach (nausea)    Vomiting    Changes in bathroom habits  Treating abdominal pain  Some causes of pain need emergency medical treatment right away. These include appendicitis or a bowel blockage. Other problems can be treated with rest, fluids, or medicines. Your healthcare provider can give you specific instructions for treatment or self-care based on what is causing your pain.  If you have vomiting or diarrhea, sip water or other clear fluids. When you are ready to eat solid foods again, start with small amounts of easy-to-digest, low-fat foods. These include apple sauce, toast, or crackers.   When to seek medical care  Call 911 or go to the hospital right away if you:    Can t pass stool and are vomiting    Are vomiting blood or have bloody diarrhea or black, tarry diarrhea    Have chest, neck, or shoulder pain    Feel like you might pass out    Have pain in your shoulder blades with nausea    Have sudden, severe belly pain    Have new, severe pain unlike any you have felt before    Have a belly that is rigid, hard, and tender to touch  Call your healthcare provider if you have:    Pain for more than 5 days    Bloating for more than 2 days    Diarrhea for more than 5 days    A fever of 100.4 F (38 C) or higher, or as directed by your healthcare provider    Pain that gets worse    Weight loss for no reason    Continued lack of appetite    Blood in your stool  How to prevent abdominal pain  Here are some tips to help prevent abdominal pain:    Eat smaller amounts of food at one time.    Avoid greasy, fried, or other high-fat foods.    Avoid foods that give you gas.    Exercise regularly.    Drink plenty of fluids.  To help prevent GERD symptoms:    Quit smoking.    Reduce alcohol and certain foods that increase  stomach acid.    Avoid aspirin and over-the-counter pain and fever medicines (NSAIDS or nonsteroidal anti-inflammatory drugs), if possible    Lose extra weight.    Finish eating at least 2 hours before you go to bed or lie down.    Raise the head of your bed.  Date Last Reviewed: 7/1/2016 2000-2017 [employer]. [address] [place] [address], [place] [address]. All rights reserved. This information is not intended as a substitute for professional medical care. Always follow your healthcare professional's instructions.      What to expect when you have contrast    During your exam, we will inject  contrast  into your vein or artery. (Contrast is a clear liquid with iodine in it. It shows up on X-rays.)    You may feel warm or hot. You may have a metal taste in your mouth and a slight upset stomach. You may also feel pressure near the kidneys and bladder. These effects will last about 1 to 3 minutes.    Please tell us if you have:    Sneezing     Itching    Hives     Swelling in the face    A hoarse voice    Breathing problems    Other new symptoms    Serious problems are rare.  They may include:    Irregular heartbeat     Seizures    Kidney failure              Tissue damage    Shock      Death    If you have any problems during the exam, we  will treat them right away.    When you get home    Call your hospital if you have any new symptoms in the next 2 days, like hives or swelling. (Phone numbers are at the bottom of this page.) Or call your family doctor.     If you have wheezing or trouble breathing, call 911.    Self-care  -Drink at least 4 extra glasses of water today.   This reduces the stress on your kidneys.  -Keep taking your regular medicines.    The contrast will pass out of your body in your  Urine(pee). This will happen in the next 24 hours. You  will not feel this. Your urine will not  change color.    If you have kidney problems or take metformin    Drink 4 to 8 large glasses of water for the  next  2 days, if you are not on a fluid restriction.    ?If you take metformin (Glucophage or Glucovance) for diabetes, keep taking it.      ?Your kidney function tests are abnormal.  If you take Metformin, do not take it for 48 hours. Please go to your clinic for a blood test within 3 days after your exam before the restarting this medicine.     (Note to provider:please give patient prescription for lab tests.)    ?Special instructions:     I have read and understand the above information.    Patient Sign Here:______________________________________Date:________Time:______    Staff Sign Here:________________________________________Date:_______Time:______      Radiology Departments:     ?[facility]: [phone] ?[facility]: [phone]     ?[facility]: [phone] ?[facility]:[phone]      ?[facility]: [phone]  ?[facility]: [phone]  ?[facility]:[phone]    ?[facility]:[phone]  ?[facility]:[phone]       Review of your medicines      Our records show that you are taking the medicines listed below. If these are incorrect, please call your family doctor or clinic.        Dose / Directions Last dose taken    amLODIPine 5 MG tablet   Commonly known as:  NORVASC   Dose:  5 mg   Quantity:  90 tablet        Take 1 tablet (5 mg) by mouth daily   Refills:  3        amoxicillin-clavulanate 875-125 MG per tablet   Commonly known as:  AUGMENTIN   Dose:  1 tablet   Quantity:  14 tablet        Take 1 tablet by mouth 2 times daily for 7 days   Refills:  0        ASPIRIN PO   Dose:  325 mg        Take 325 mg by mouth daily   Refills:  0        atorvastatin 40 MG tablet   Commonly known as:  LIPITOR   Dose:  40 mg   Quantity:  90 tablet        Take 1 tablet (40 mg) by mouth daily   Refills:  3        blood glucose lancing device   Dose:  1 Units   Quantity:  1 kit        1 Units daily   Refills:  2        * blood glucose monitoring lancets   Dose:  1 Units   Quantity:  100 each        1 Units daily Use fresh  lancet each day to check blood sugars   Refills:  4        * blood glucose monitoring lancets   Quantity:  100 Box        Use to test blood sugar 2 times weekly   Refills:  6        * blood glucose monitoring test strip   Commonly known as:  ACCU-CHEK SMARTVIEW   Quantity:  1 Box        Test blood sugar before breakfast one day, before and after supper the next and not at all the third day   Refills:  12        * blood glucose monitoring test strip   Commonly known as:  MARIAN CONTOUR NEXT   Quantity:  100 strip        Use to test blood sugar 2 times weekly or as directed.   Refills:  6        calcium carbonate 1500 (600 Ca) MG tablet   Commonly known as:  OS-WALI 600 mg [place]. Ca   Dose:  1500 mg   Quantity:  30 tablet        Take 1 tablet (1,500 mg) by mouth daily   Refills:  3        clopidogrel 75 MG tablet   Commonly known as:  PLAVIX   Dose:  75 mg   Quantity:  90 tablet        Take 1 tablet (75 mg) by mouth daily   Refills:  3        finasteride 5 MG tablet   Commonly known as:  PROSCAR   Dose:  5 mg   Quantity:  90 tablet        Take 1 tablet (5 mg) by mouth daily   Refills:  3        fluticasone 50 MCG/ACT spray   Commonly known as:  FLONASE   Dose:  1-2 spray   Quantity:  1 Bottle        Spray 1-2 sprays into both nostrils daily   Refills:  3        lisinopril 40 MG tablet   Commonly known as:  PRINIVIL/ZESTRIL   Dose:  40 mg   Quantity:  90 tablet        Take 1 tablet (40 mg) by mouth daily   Refills:  3        metoprolol tartrate 100 MG tablet   Commonly known as:  LOPRESSOR   Dose:  100 mg   Quantity:  180 tablet        Take 1 tablet (100 mg) by mouth 2 times daily   Refills:  3        MULTIVITAMINS PO   Dose:  1 capsule        Take 1 capsule by mouth daily   Refills:  0        nitroGLYcerin 0.4 MG sublingual tablet   Commonly known as:  NITROSTAT   Dose:  0.4 mg   Quantity:  25 tablet        Place 1 tablet (0.4 mg) under the tongue every 5 minutes as needed   Refills:  3        order for DME   Quantity:   "5 each        optifoam 4X4\" dressings   Refills:  1        ranitidine 150 MG tablet   Commonly known as:  ZANTAC   Dose:  150 mg   Quantity:  180 tablet        Take 1 tablet (150 mg) by mouth 2 times daily   Refills:  3        UNABLE TO FIND   Dose:  550 mg        Take 550 mg by mouth 2 times daily Gymnema danielle   Refills:  0        VITAMIN D3 PO   Dose:  1000 Units        Take 1,000 Units by mouth daily   Refills:  0        * Notice:  This list has 4 medication(s) that are the same as other medications prescribed for you. Read the directions carefully, and ask your doctor or other care provider to review them with you.            Procedures and tests performed during your visit     Amylase    CBC with platelets differential    CT Abdomen Pelvis w Contrast    Comprehensive metabolic panel    Lipase    UA with Microscopic reflex to Culture    XR Abdomen 2 Views      Orders Needing Specimen Collection     None      Pending Results     Date and Time Order Name Status Description    5/25/2018 0154 CT Abdomen Pelvis w Contrast In process     5/25/2018 0128 XR Abdomen 2 Views In process             Pending Culture Results     No orders found from 5/23/2018 to 5/26/2018.            Thank you for choosing [facility]       Thank you for choosing [facility] for your care. Our goal is always to provide you with excellent care. Hearing back from our patients is one way we can continue to improve our services. Please take a few minutes to complete the written survey that you may receive in the mail after you visit with us. Thank you!        iCrederity Information     [employer]Crederity lets you send messages to your doctor, view your test results, renew your prescriptions, schedule appointments and more. To sign up, go to [email or web address] . Click on \"Log in\" on the left side of the screen, which will take you to the Welcome page. Then click on \"Sign up Now\" on the right side of the page.     You will be asked to enter the access code " listed below, as well as some personal information. Please follow the directions to create your username and password.     Your access code is: [email or web address]  Expires: 2018 11:22 AM     Your access code will  in 90 days. If you need help or a new code, please call your [facility] clinic or [phone].        Care EveryWhere ID     This is your Care EveryWhere ID. This could be used by other organizations to access your [facility] medical records  [id number]        Equal Access to Services     ANDRY CASPER : Taisha [place] Sotessie, wacuco luqadaha, qajorge kaalmabentley espinoza, john torers . So [facility] [phone].    ATENCIÓN: Si habla español, tiene a farah disposición servicios gratuitos de asistencia lingüística. Llame al [phone].    We comply with applicable federal civil rights laws and [place] laws. We do not discriminate on the basis of race, color, national origin, age, disability, sex, sexual orientation, or gender identity.            After Visit Summary       This is your record. Keep this with you and show to your community pharmacist(s) and doctor(s) at your next visit.

## 2018-05-25 NOTE — ED NOTES
"Patient presents to the emergency room with concerns of abdominal pain.  States around 4548-3260 he woke up with epigastric/RUQ pain.  Patient is concerned he is \"allergic\" to the ABX he started last night for his sinus infection VS reaction to the shrimp he ate for dinner.  Patient denies any NVD, but does c/o \"belching\"  "

## 2018-05-25 NOTE — ED AVS SNAPSHOT
[facility]    [address] [address]    Phone:  [phone]                                       Peter Zhao   MRN: [id number]    Department[facility]  [facility]   Date of Visit:  5/25/2018           After Visit Summary Signature Page     I have received my discharge instructions, and my questions have been answered. I have discussed any challenges I see with this plan with the nurse or doctor.    ..........................................................................................................................................  Patient/Patient Representative Signature      ..........................................................................................................................................  Patient Representative Print Name and Relationship to Patient    ..................................................               ................................................  Date                                            Time    ..........................................................................................................................................  Reviewed by Signature/Title    ...................................................              ..............................................  Date                                                            Time

## 2018-05-25 NOTE — ED NOTES
DC instructions reviewed with patient and wife.  Both verbalize understanding and have no further questions at this time.  Home to rest.  Encouraged to push fluids 2nd IV contrast.

## 2018-05-25 NOTE — DISCHARGE INSTRUCTIONS
Abdominal Pain    Abdominal pain is pain in the stomach or belly area. Everyone has this pain from time to time. In many cases it goes away on its own. But abdominal pain can sometimes be due to a serious problem, such as appendicitis. So it s important to know when to seek help.  Causes of abdominal pain  There are many possible causes of abdominal pain. Common causes in adults include:    Constipation, diarrhea, or gas    Stomach acid flowing back up into the esophagus (acid reflux or heartburn)    Severe acid reflux, called GERD (gastroesophageal reflux disease)    A sore in the lining of the stomach or small intestine (peptic ulcer)    Inflammation of the gallbladder, liver, or pancreas    Gallstones or kidney stones    Appendicitis     Intestinal blockage     An internal organ pushing through a muscle or other tissue (hernia)    Urinary tract infections    In women, menstrual cramps, fibroids, or endometriosis    Inflammation or infection of the intestines  Diagnosing the cause of abdominal pain  Your healthcare provider will do a physical exam help find the cause of your pain. If needed, tests will be ordered. Belly pain has many possible causes. So it can be hard to find the reason for your pain. Giving details about your pain can help. Tell your provider where and when you feel the pain, and what makes it better or worse. Also let your provider know if you have other symptoms such as:    Fever    Tiredness    Upset stomach (nausea)    Vomiting    Changes in bathroom habits  Treating abdominal pain  Some causes of pain need emergency medical treatment right away. These include appendicitis or a bowel blockage. Other problems can be treated with rest, fluids, or medicines. Your healthcare provider can give you specific instructions for treatment or self-care based on what is causing your pain.  If you have vomiting or diarrhea, sip water or other clear fluids. When you are ready to eat solid foods again,  start with small amounts of easy-to-digest, low-fat foods. These include apple sauce, toast, or crackers.   When to seek medical care  Call 911 or go to the hospital right away if you:    Can t pass stool and are vomiting    Are vomiting blood or have bloody diarrhea or black, tarry diarrhea    Have chest, neck, or shoulder pain    Feel like you might pass out    Have pain in your shoulder blades with nausea    Have sudden, severe belly pain    Have new, severe pain unlike any you have felt before    Have a belly that is rigid, hard, and tender to touch  Call your healthcare provider if you have:    Pain for more than 5 days    Bloating for more than 2 days    Diarrhea for more than 5 days    A fever of 100.4 F (38 C) or higher, or as directed by your healthcare provider    Pain that gets worse    Weight loss for no reason    Continued lack of appetite    Blood in your stool  How to prevent abdominal pain  Here are some tips to help prevent abdominal pain:    Eat smaller amounts of food at one time.    Avoid greasy, fried, or other high-fat foods.    Avoid foods that give you gas.    Exercise regularly.    Drink plenty of fluids.  To help prevent GERD symptoms:    Quit smoking.    Reduce alcohol and certain foods that increase stomach acid.    Avoid aspirin and over-the-counter pain and fever medicines (NSAIDS or nonsteroidal anti-inflammatory drugs), if possible    Lose extra weight.    Finish eating at least 2 hours before you go to bed or lie down.    Raise the head of your bed.  Date Last Reviewed: 7/1/2016 2000-2017 [employer] [address] [place] [address], [place] [address]. All rights reserved. This information is not intended as a substitute for professional medical care. Always follow your healthcare professional's instructions.      What to expect when you have contrast    During your exam, we will inject  contrast  into your vein or artery. (Contrast is a clear liquid with iodine in it. It  shows up on X-rays.)    You may feel warm or hot. You may have a metal taste in your mouth and a slight upset stomach. You may also feel pressure near the kidneys and bladder. These effects will last about 1 to 3 minutes.    Please tell us if you have:    Sneezing     Itching    Hives     Swelling in the face    A hoarse voice    Breathing problems    Other new symptoms    Serious problems are rare.  They may include:    Irregular heartbeat     Seizures    Kidney failure              Tissue damage    Shock      Death    If you have any problems during the exam, we  will treat them right away.    When you get home    Call your hospital if you have any new symptoms in the next 2 days, like hives or swelling. (Phone numbers are at the bottom of this page.) Or call your family doctor.     If you have wheezing or trouble breathing, call 911.    Self-care  -Drink at least 4 extra glasses of water today.   This reduces the stress on your kidneys.  -Keep taking your regular medicines.    The contrast will pass out of your body in your  Urine(pee). This will happen in the next 24 hours. You  will not feel this. Your urine will not  change color.    If you have kidney problems or take metformin    Drink 4 to 8 large glasses of water for the next  2 days, if you are not on a fluid restriction.    ?If you take metformin (Glucophage or Glucovance) for diabetes, keep taking it.      ?Your kidney function tests are abnormal.  If you take Metformin, do not take it for 48 hours. Please go to your clinic for a blood test within 3 days after your exam before the restarting this medicine.     (Note to provider:please give patient prescription for lab tests.)    ?Special instructions:     I have read and understand the above information.    Patient Sign Here:______________________________________Date:________Time:______    Staff Sign Here:________________________________________Date:_______Time:______      Radiology Departments:      ?[facility]: [phone] ?[facility]: [phone]     ?[place]: [phone] ?[facility]:[phone]      ?[place]: [phone]  ?[place]: [phone]  ?[place]:[phone]    ?[facility]:[phone]  ?[facility]:[phone]

## 2018-05-26 ASSESSMENT — ENCOUNTER SYMPTOMS
TROUBLE SWALLOWING: 0
APPETITE CHANGE: 0
MUSCULOSKELETAL NEGATIVE: 1
NEUROLOGICAL NEGATIVE: 1
SORE THROAT: 1
GASTROINTESTINAL NEGATIVE: 1
FACIAL SWELLING: 0
PSYCHIATRIC NEGATIVE: 1
SINUS PAIN: 1
CHILLS: 0
UNEXPECTED WEIGHT CHANGE: 0
SINUS PRESSURE: 1
ACTIVITY CHANGE: 0
FATIGUE: 0
RESPIRATORY NEGATIVE: 1
FEVER: 0
RHINORRHEA: 1
VOICE CHANGE: 0
DIAPHORESIS: 0
HEMATOLOGIC/LYMPHATIC NEGATIVE: 1

## 2018-05-26 NOTE — ED NOTES
Pt calls that he is concerned with his hoarse voice and was told to call MD if his voice is hoarse.  No resp distress or sob. Spoke with Dr Bright

## 2018-05-27 DIAGNOSIS — I10 BENIGN ESSENTIAL HYPERTENSION: ICD-10-CM

## 2018-05-29 RX ORDER — CLOPIDOGREL BISULFATE 75 MG/1
TABLET ORAL
Qty: 90 TABLET | Refills: 1 | Status: SHIPPED | OUTPATIENT
Start: 2018-05-29 | End: 2018-11-25

## 2018-05-29 RX ORDER — METOPROLOL TARTRATE 100 MG
TABLET ORAL
Qty: 180 TABLET | Refills: 2 | Status: SHIPPED | OUTPATIENT
Start: 2018-05-29 | End: 2019-02-24

## 2018-05-29 RX ORDER — AMLODIPINE BESYLATE 5 MG/1
TABLET ORAL
Qty: 90 TABLET | Refills: 2 | Status: ON HOLD | OUTPATIENT
Start: 2018-05-29 | End: 2018-12-01

## 2018-06-12 ENCOUNTER — OFFICE VISIT (OUTPATIENT)
Dept: FAMILY MEDICINE | Facility: OTHER | Age: 73
End: 2018-06-12
Attending: NURSE PRACTITIONER
Payer: MEDICARE

## 2018-06-12 VITALS
DIASTOLIC BLOOD PRESSURE: 60 MMHG | RESPIRATION RATE: 16 BRPM | BODY MASS INDEX: 25.91 KG/M2 | HEIGHT: 70 IN | HEART RATE: 65 BPM | OXYGEN SATURATION: 97 % | WEIGHT: 181 LBS | TEMPERATURE: 97.2 F | SYSTOLIC BLOOD PRESSURE: 112 MMHG

## 2018-06-12 DIAGNOSIS — E78.5 HYPERLIPIDEMIA LDL GOAL <100: ICD-10-CM

## 2018-06-12 DIAGNOSIS — I20.0 INTERMEDIATE CORONARY SYNDROME (H): ICD-10-CM

## 2018-06-12 DIAGNOSIS — J01.90 ACUTE SINUSITIS WITH SYMPTOMS > 10 DAYS: ICD-10-CM

## 2018-06-12 DIAGNOSIS — L20.9 ATOPIC DERMATITIS, UNSPECIFIED TYPE: Primary | ICD-10-CM

## 2018-06-12 PROCEDURE — G0463 HOSPITAL OUTPT CLINIC VISIT: HCPCS

## 2018-06-12 PROCEDURE — 99213 OFFICE O/P EST LOW 20 MIN: CPT | Performed by: NURSE PRACTITIONER

## 2018-06-12 RX ORDER — NITROGLYCERIN 0.4 MG/1
0.4 TABLET SUBLINGUAL EVERY 5 MIN PRN
Qty: 25 TABLET | Refills: 3 | Status: SHIPPED | OUTPATIENT
Start: 2018-06-12 | End: 2019-08-28

## 2018-06-12 RX ORDER — NYSTATIN AND TRIAMCINOLONE ACETONIDE 100000; 1 [USP'U]/G; MG/G
CREAM TOPICAL 2 TIMES DAILY
Qty: 30 G | Refills: 1 | Status: SHIPPED | OUTPATIENT
Start: 2018-06-12 | End: 2018-08-10

## 2018-06-12 ASSESSMENT — ANXIETY QUESTIONNAIRES
5. BEING SO RESTLESS THAT IT IS HARD TO SIT STILL: NOT AT ALL
1. FEELING NERVOUS, ANXIOUS, OR ON EDGE: NOT AT ALL
2. NOT BEING ABLE TO STOP OR CONTROL WORRYING: NOT AT ALL
4. TROUBLE RELAXING: NOT AT ALL
6. BECOMING EASILY ANNOYED OR IRRITABLE: NOT AT ALL
7. FEELING AFRAID AS IF SOMETHING AWFUL MIGHT HAPPEN: NOT AT ALL
3. WORRYING TOO MUCH ABOUT DIFFERENT THINGS: NOT AT ALL
GAD7 TOTAL SCORE: 0

## 2018-06-12 ASSESSMENT — PAIN SCALES - GENERAL: PAINLEVEL: EXTREME PAIN (8)

## 2018-06-12 NOTE — NURSING NOTE
"Chief Complaint   Patient presents with     Sinus Problem       Initial /60  Pulse 65  Temp 97.2  F (36.2  C) (Tympanic)  Resp 16  Ht 5' 10\" (1.778 m)  Wt 181 lb (82.1 kg)  SpO2 97%  BMI 25.97 kg/m2 Estimated body mass index is 25.97 kg/(m^2) as calculated from the following:    Height as of this encounter: 5' 10\" (1.778 m).    Weight as of this encounter: 181 lb (82.1 kg).  Medication Reconciliation: complete    Angie Colby LPN    "

## 2018-06-12 NOTE — PATIENT INSTRUCTIONS
Self-Care for Sinusitis     Drinking plenty of water can help sinuses drain.   Sinusitis can often be managed with self-care. Self-care can keep sinuses moist and make you feel more comfortable. Remember to follow your doctor's instructions closely. This can make a big difference in getting your sinus problem under control.  Drink fluids  Drinking extra fluids helps thin your mucus. This lets it drain from your sinuses more easily. Have a glass of water every hour or two. A humidifier helps in much the same way. Fluids can also offset the drying effects of certain medicines. If you use a humidifier, follow the product maker's instructions on how to use it. Clean it on a regular schedule.  Use saltwater rinses  Rinses help keep your sinuses and nose moist. Mix a teaspoon of salt in 8 ounces of fresh, warm water. Use a bulb syringe to gently squirt the water into your nose a few times a day. You can also buy ready-made saline nasal sprays.  Apply hot or cold packs  Applying heat to the area surrounding your sinuses may make you feel more comfortable. Use a hot water bottle or a hand towel dipped in hot water. Some people also find ice packs effective for relieving pain.  Medicines  Your doctor may prescribe medications to help treat your sinusitis. If you have an infection, antibiotics can help clear it up. If you are prescribed antibiotics, take all pills on schedule until they are gone, even if you feel better. Decongestants help relieve swelling. Use decongestant sprays for short periods only under the direction of your doctor. If you have allergies, your doctor may prescribe medications to help relieve them.   Date Last Reviewed: 10/1/2016    2035-0439 [employer]. [address] [place] [address]. All rights reserved. This information is not intended as a substitute for professional medical care. Always follow your healthcare professional's instructions.

## 2018-06-12 NOTE — MR AVS SNAPSHOT
After Visit Summary   6/12/2018    Peter Zhao    MRN: [id number]           Patient Information     Date Of Birth          1945        Visit Information        Provider Department      6/12/2018 3:10 PM Joselin Langford APRN [facility]        Today's Diagnoses     Atopic dermatitis, unspecified type    -  1    Hyperlipidemia LDL goal <100        Intermediate coronary syndrome (H)        Acute sinusitis with symptoms > 10 days          Care Instructions      Self-Care for Sinusitis     Drinking plenty of water can help sinuses drain.   Sinusitis can often be managed with self-care. Self-care can keep sinuses moist and make you feel more comfortable. Remember to follow your doctor's instructions closely. This can make a big difference in getting your sinus problem under control.  Drink fluids  Drinking extra fluids helps thin your mucus. This lets it drain from your sinuses more easily. Have a glass of water every hour or two. A humidifier helps in much the same way. Fluids can also offset the drying effects of certain medicines. If you use a humidifier, follow the product maker's instructions on how to use it. Clean it on a regular schedule.  Use saltwater rinses  Rinses help keep your sinuses and nose moist. Mix a teaspoon of salt in 8 ounces of fresh, warm water. Use a bulb syringe to gently squirt the water into your nose a few times a day. You can also buy ready-made saline nasal sprays.  Apply hot or cold packs  Applying heat to the area surrounding your sinuses may make you feel more comfortable. Use a hot water bottle or a hand towel dipped in hot water. Some people also find ice packs effective for relieving pain.  Medicines  Your doctor may prescribe medications to help treat your sinusitis. If you have an infection, antibiotics can help clear it up. If you are prescribed antibiotics, take all pills on schedule until they are gone, even if you feel better.  "Decongestants help relieve swelling. Use decongestant sprays for short periods only under the direction of your doctor. If you have allergies, your doctor may prescribe medications to help relieve them.   Date Last Reviewed: 10/1/2016    5039-0627 [employer]. [address] [place] [address]. All rights reserved. This information is not intended as a substitute for professional medical care. Always follow your healthcare professional's instructions.                Follow-ups after your visit        Who to contact     If you have questions or need follow up information about today's clinic visit or your schedule please contact [facility] directly at [phone].  Normal or non-critical lab and imaging results will be communicated to you by MyChart, letter or phone within 4 business days after the clinic has received the results. If you do not hear from us within 7 days, please contact the clinic through MyChart or phone. If you have a critical or abnormal lab result, we will notify you by phone as soon as possible.  Submit refill requests through [employer] or call your pharmacy and they will forward the refill request to us. Please allow 3 business days for your refill to be completed.          Additional Information About Your Visit        Care EveryWhere ID     This is your Care EveryWhere ID. This could be used by other organizations to access your [facility] medical records  [id number]        Your Vitals Were     Pulse Temperature Respirations Height Pulse Oximetry BMI (Body Mass Index)    65 97.2  F (36.2  C) (Tympanic) 16 5' 10\" (1.778 m) 97% 25.97 kg/m2       Blood Pressure from Last 3 Encounters:   06/12/18 112/60   05/25/18 119/75   05/24/18 104/62    Weight from Last 3 Encounters:   06/12/18 181 lb (82.1 kg)   05/25/18 178 lb (80.7 kg)   05/24/18 183 lb (83 kg)              Today, you had the following     No orders found for display         Today's Medication Changes    "       These changes are accurate as of 6/12/18  3:48 PM.  If you have any questions, ask your nurse or doctor.               Start taking these medicines.        Dose/Directions    nystatin-triamcinolone cream   Commonly known as:  MYCOLOG II   Used for:  Atopic dermatitis, unspecified type   Started by:  Joselin Langford APRN CNP        Apply topically 2 times daily   Quantity:  30 g   Refills:  1            Where to get your medicines      These medications were sent to [id number] - [place], [place] - [address]   [address] , [facility] [address]     Phone:  [phone]     nitroGLYcerin 0.4 MG sublingual tablet    nystatin-triamcinolone cream                Primary Care Provider Office Phone # Fax #    Alexander ASH Vazquez,  [phone]       [facility] [address]        Equal Access to Services     [facility]: Hadii miroslava roberts hadasho Sotessie, waaxda luqadaha, qaybta kaalmada adeegyada, john banda hayfaizan torres . [facility] [phone].    ATENCIÓN: Si habla español, tiene a farah disposición servicios gratuitos de asistencia lingüística. Llame al [phone].    We comply with applicable federal civil rights laws and [place] laws. We do not discriminate on the basis of race, color, national origin, age, disability, sex, sexual orientation, or gender identity.            Thank you!     Thank you for choosing [facility]  for your care. Our goal is always to provide you with excellent care. Hearing back from our patients is one way we can continue to improve our services. Please take a few minutes to complete the written survey that you may receive in the mail after your visit with us. Thank you!             Your Updated Medication List - Protect others around you: Learn how to safely use, store and throw away your medicines at www.disposemymeds.org.          This list is accurate as of 6/12/18  3:48 PM.  Always use your most recent med  list.                   Brand Name Dispense Instructions for use Diagnosis    amLODIPine 5 MG tablet    NORVASC    90 tablet    TAKE 1 TABLET DAILY    Benign essential hypertension       ASPIRIN PO      Take 325 mg by mouth daily        atorvastatin 40 MG tablet    LIPITOR    90 tablet    Take 1 tablet (40 mg) by mouth daily    Hyperlipidemia LDL goal <100       blood glucose lancing device     1 kit    1 Units daily    T2DM (type 2 diabetes mellitus) (H)       * blood glucose monitoring lancets     100 each    1 Units daily Use fresh lancet each day to check blood sugars    T2DM (type 2 diabetes mellitus) (H)       * blood glucose monitoring lancets     100 Box    Use to test blood sugar 2 times weekly    Type 2 diabetes mellitus without complication, without long-term current use of insulin (H)       * blood glucose monitoring test strip    ACCU-CHEK SMARTVIEW    1 Box    Test blood sugar before breakfast one day, before and after supper the next and not at all the third day    T2DM (type 2 diabetes mellitus) (H)       * blood glucose monitoring test strip    MARIAN CONTOUR NEXT    100 strip    Use to test blood sugar 2 times weekly or as directed.    Type 2 diabetes mellitus with other specified complication, unspecified long term insulin use status (H)       calcium carbonate 1500 (600 Ca) MG tablet    OS-WALI 600 mg Inupiat. Ca    30 tablet    Take 1 tablet (1,500 mg) by mouth daily        clopidogrel 75 MG tablet    PLAVIX    90 tablet    TAKE 1 TABLET DAILY    Benign essential hypertension       finasteride 5 MG tablet    PROSCAR    90 tablet    Take 1 tablet (5 mg) by mouth daily    Benign non-nodular prostatic hyperplasia with lower urinary tract symptoms       fluticasone 50 MCG/ACT spray    FLONASE    1 Bottle    Spray 1-2 sprays into both nostrils daily    Acute sinusitis with symptoms > 10 days, Post-nasal drip       lisinopril 40 MG tablet    PRINIVIL/ZESTRIL    90 tablet    Take 1 tablet (40 mg) by mouth  "daily    Benign essential hypertension       metoprolol tartrate 100 MG tablet    LOPRESSOR    180 tablet    TAKE 1 TABLET TWICE A DAY    Benign essential hypertension       MULTIVITAMINS PO      Take 1 capsule by mouth daily        nitroGLYcerin 0.4 MG sublingual tablet    NITROSTAT    25 tablet    Place 1 tablet (0.4 mg) under the tongue every 5 minutes as needed    Hyperlipidemia LDL goal <100, Intermediate coronary syndrome (H)       nystatin-triamcinolone cream    MYCOLOG II    30 g    Apply topically 2 times daily    Atopic dermatitis, unspecified type       order for DME     5 each    optifoam 4X4\" dressings    Open wound of abdomen, subsequent encounter       ranitidine 150 MG tablet    ZANTAC    180 tablet    Take 1 tablet (150 mg) by mouth 2 times daily    Gastroesophageal reflux disease without esophagitis       UNABLE TO FIND      Take 550 mg by mouth 2 times daily Gymscot santos        VITAMIN D3 PO      Take 1,000 Units by mouth daily        * Notice:  This list has 4 medication(s) that are the same as other medications prescribed for you. Read the directions carefully, and ask your doctor or other care provider to review them with you.      "

## 2018-06-12 NOTE — PROGRESS NOTES
SUBJECTIVE:   Peter Zhao is a 72 year old male who presents to clinic today for the following health issues:      Sinuses      Duration: 5-17-18    Description (location/character/radiation): headache, sinus pressure, pain in roof of his mouth    Intensity:  8/10    Accompanying signs and symptoms: relates having a tooth on top and one on bottom 2-3 weeks ago.  Trying to get better so he can have them worked on    History (similar episodes/previous evaluation): ongoing since 5-, seen by Dr. Jarquin 5-24-18    Precipitating or alleviating factors: IBU, Benadryl and nasal spray have helped, but not clearing things up for long.  Concerns with use of IBU and Plavix.    Therapies tried and outcome: nasal spray, IBU, benadryl           Problem list and histories reviewed & adjusted, as indicated.  Additional history: as documented    Patient Active Problem List   Diagnosis     Diabetes mellitus, type 2 (H)     CAD (coronary artery disease)     ACP (advance care planning)     Hyperlipidemia LDL goal <100     Elevated prostate specific antigen (PSA)     HTN (hypertension)     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     H/O migraine     Visual aura     Cataract     S/P colon resection     Hypokalemia     TIA (transient ischemic attack)     Agitation     Attention to colostomy (H)     Past Surgical History:   Procedure Laterality Date     AORTIC VALVE REPLACEMENT      25 mm CE bovine replacement Dr. Wu 8/20/2012     CARDIAC SURGERY       CATARACT IOL, RT/LT Right 04/2017     COLECTOMY LOW ANTERIOR N/A 2/17/2017    Procedure: COLECTOMY LOW ANTERIOR;  Surgeon: Tima Moreland MD;  [facility]     COLONOSCOPY N/A 2/17/2017    Procedure: COLONOSCOPY;  Surgeon: Tima Moreland MD;  [facility]     ENT SURGERY       FLEX SIG (MEDICARE PT.)       GI SURGERY  02/2017    ostemomy     HERNIA REPAIR       LAPAROSCOPIC HERNIORRHAPHY INGUINAL Right 9/18/2015    Procedure:  LAPAROSCOPIC HERNIORRHAPHY INGUINAL;  Surgeon: Solitario Nettles DO;  [facility]     PHACOEMULSIFICATION WITH STANDARD INTRAOCULAR LENS IMPLANT Right 4/20/2017    Procedure: PHACOEMULSIFICATION WITH STANDARD INTRAOCULAR LENS IMPLANT;  CATARACT EXTRACTION RIGHT EYE WITH IMPLANT;  Surgeon: Darin Gutierrez MD;  [facility]     PROSTATE BIOPSY, NEEDLE, SATURATION SAMPLING  2009     retinopexy-pvd with retinal tear Right 2008     TAKEDOWN COLOSTOMY N/A 9/21/2017    Procedure: TAKEDOWN COLOSTOMY;  CLOSURE OF NORRIS'S SIGMOID COLOSTOMY, REPAIR STOMAL HERNIA;  Surgeon: Tima Moreland MD;  Location: [facility]     TONSILLECTOMY         Social History   Substance Use Topics     Smoking status: Former Smoker     Smokeless tobacco: Former User     Types: Chew     Quit date: 10/19/2013      Comment: quit in 2000     Alcohol use No     Family History   Problem Relation Age of Onset     DIABETES Mother      C.A.D. Mother      CANCER Maternal Grandmother          Current Outpatient Prescriptions   Medication Sig Dispense Refill     amLODIPine (NORVASC) 5 MG tablet TAKE 1 TABLET DAILY 90 tablet 2     ASPIRIN PO Take 325 mg by mouth daily        atorvastatin (LIPITOR) 40 MG tablet Take 1 tablet (40 mg) by mouth daily 90 tablet 3     blood glucose monitoring (MARIAN CONTOUR NEXT) test strip Use to test blood sugar 2 times weekly or as directed. 100 strip 6     blood glucose monitoring (MARIAN MICROLET) lancets Use to test blood sugar 2 times weekly 100 Box 6     calcium carbonate (OS-WALI 600 MG [place]. [place]) 1500 (600 [place]) MG tablet Take 1 tablet (1,500 mg) by mouth daily 30 tablet 3     Cholecalciferol (VITAMIN D3 PO) Take 1,000 Units by mouth daily        clopidogrel (PLAVIX) 75 MG tablet TAKE 1 TABLET DAILY 90 tablet 1     finasteride (PROSCAR) 5 MG tablet Take 1 tablet (5 mg) by mouth daily 90 tablet 3     fluticasone (FLONASE) 50 MCG/ACT spray Spray 1-2 sprays into both nostrils daily 1 Bottle 3     glucose blood VI test  "strips (ACCU-CHEK SMARTVIEW) strip Test blood sugar before breakfast one day, before and after supper the next and not at all the third day 1 Box 12     Lancets Misc. (ACCU-CHEK SOFTCLIX LANCET DEV) KIT 1 Units daily 1 kit 2     lisinopril (PRINIVIL/ZESTRIL) 40 MG tablet Take 1 tablet (40 mg) by mouth daily 90 tablet 3     metoprolol tartrate (LOPRESSOR) 100 MG tablet TAKE 1 TABLET TWICE A  tablet 2     Multiple Vitamin (MULTIVITAMINS PO) Take 1 capsule by mouth daily       nitroGLYcerin (NITROSTAT) 0.4 MG sublingual tablet Place 1 tablet (0.4 mg) under the tongue every 5 minutes as needed 25 tablet 3     nystatin-triamcinolone (MYCOLOG II) cream Apply topically 2 times daily 30 g 1     order for DME optifoam 4X4\" dressings 5 each 1     ranitidine (ZANTAC) 150 MG tablet Take 1 tablet (150 mg) by mouth 2 times daily 180 tablet 3     SOFTCLIX LANCETS MISC 1 Units daily Use fresh lancet each day to check blood sugars 100 each 4     UNABLE TO FIND Take 550 mg by mouth 2 times daily Gymnema danielle       [DISCONTINUED] nitroglycerin (NITROSTAT) 0.4 MG sublingual tablet Place 1 tablet (0.4 mg) under the tongue every 5 minutes as needed 25 tablet 3     Allergies   Allergen Reactions     Acetaminophen      Ciprofloxacin Hives     No Clinical Screening - See Comments      Antibiotic allergy, pt not sure which       Reviewed and updated as needed this visit by clinical staff  Tobacco       Reviewed and updated as needed this visit by Provider         ROS:  CONSTITUTIONAL:NEGATIVE for fever, chills, change in weight  INTEGUMENTARY/SKIN: NEGATIVE for worrisome rashes, moles or lesions  ENT/MOUTH: pain in your mouth, neck/throat, sinus and headache  RESP:cough once productive otherwise non productive  CV: NEGATIVE for chest pain, palpitations or peripheral edema  NEURO: frontal headache    OBJECTIVE:     /60  Pulse 65  Temp 97.2  F (36.2  C) (Tympanic)  Resp 16  Ht 5' 10\" (1.778 m)  Wt 181 lb (82.1 kg)  " SpO2 97%  BMI 25.97 kg/m2  Body mass index is 25.97 kg/(m^2).   GENERAL: healthy, alert and no distress  HENT: normal cephalic/atraumatic, ear canals and TM's normal, nose and mouth without ulcers or lesions, nasal mucosa edematous , oropharynx clear, oral mucous membranes moist and sinuses: not tender  NECK: no adenopathy, no asymmetry, masses, or scars and thyroid normal to palpation  RESP: lungs clear to auscultation - no rales, rhonchi or wheezes  CV: regular rate and rhythm, normal S1 S2, no S3 or S4, no murmur, click or rub, no peripheral edema and peripheral pulses strong  ABDOMEN: soft, nontender, no hepatosplenomegaly, no masses and bowel sounds normal  PSYCH: mentation appears normal, affect normal/bright  LYMPH: normal ant/post cervical, supraclavicular nodes    Diagnostic Test Results:  none     ASSESSMENT/PLAN:     1. Atopic dermatitis, unspecified type  Re-ordered   - nystatin-triamcinolone (MYCOLOG II) cream; Apply topically 2 times daily  Dispense: 30 g; Refill: 1    2. Hyperlipidemia LDL goal <100  Re-ordered medication  - nitroGLYcerin (NITROSTAT) 0.4 MG sublingual tablet; Place 1 tablet (0.4 mg) under the tongue every 5 minutes as needed  Dispense: 25 tablet; Refill: 3    3. Intermediate coronary syndrome (H)  Re-ordered medication  - nitroGLYcerin (NITROSTAT) 0.4 MG sublingual tablet; Place 1 tablet (0.4 mg) under the tongue every 5 minutes as needed  Dispense: 25 tablet; Refill: 3    4. Acute sinusitis with symptoms > 10 days  Continue symptomatic treatment. Ibuprofen helps with his headache and sore throat.  He is encouraged to try taking it twice a day and also to start taking the Flonase again. He can continue OTC cough medicine as needed. He should follow up if symptoms do not improve or worsen.   Peter agrees with the plan and all questions answered.           See Patient Instructions    ALBARO Jiménez [facility]

## 2018-06-13 ASSESSMENT — ANXIETY QUESTIONNAIRES: GAD7 TOTAL SCORE: 0

## 2018-06-13 ASSESSMENT — PATIENT HEALTH QUESTIONNAIRE - PHQ9: SUM OF ALL RESPONSES TO PHQ QUESTIONS 1-9: 0

## 2018-06-26 DIAGNOSIS — I10 BENIGN ESSENTIAL HYPERTENSION: ICD-10-CM

## 2018-06-26 RX ORDER — LISINOPRIL 40 MG/1
TABLET ORAL
Qty: 90 TABLET | Refills: 1 | Status: ON HOLD | OUTPATIENT
Start: 2018-06-26 | End: 2018-12-01

## 2018-07-05 NOTE — TELEPHONE ENCOUNTER
Peter wants to know how he can get a RX for his bags?   no back pain, no musculoskeletal pain, no neck pain, and no weakness.

## 2018-07-06 ENCOUNTER — TELEPHONE (OUTPATIENT)
Dept: INTERNAL MEDICINE | Facility: OTHER | Age: 73
End: 2018-07-06

## 2018-07-06 NOTE — TELEPHONE ENCOUNTER
Left message on pt phone notifying him he is due for a diabetic check up with Dr. Vazquez. Sabina Olivo LPN

## 2018-07-10 NOTE — TELEPHONE ENCOUNTER
Pt was returning nurse call. Advised him of note about making diabetic follow up. Pt stated he will be having surgery in Sept and will need a preop. He does not want to come in right now only to have to come in then. Please call him back at [phone]

## 2018-08-10 ENCOUNTER — OFFICE VISIT (OUTPATIENT)
Dept: INTERNAL MEDICINE | Facility: OTHER | Age: 73
End: 2018-08-10
Attending: INTERNAL MEDICINE
Payer: MEDICARE

## 2018-08-10 VITALS
BODY MASS INDEX: 25.83 KG/M2 | HEART RATE: 56 BPM | SYSTOLIC BLOOD PRESSURE: 122 MMHG | TEMPERATURE: 96.9 F | WEIGHT: 180 LBS | DIASTOLIC BLOOD PRESSURE: 82 MMHG | OXYGEN SATURATION: 97 %

## 2018-08-10 DIAGNOSIS — K43.2 INCISIONAL HERNIA, WITHOUT OBSTRUCTION OR GANGRENE: ICD-10-CM

## 2018-08-10 DIAGNOSIS — E11.9 TYPE 2 DIABETES MELLITUS WITHOUT COMPLICATION, WITHOUT LONG-TERM CURRENT USE OF INSULIN (H): ICD-10-CM

## 2018-08-10 DIAGNOSIS — E78.5 HYPERLIPIDEMIA LDL GOAL <100: ICD-10-CM

## 2018-08-10 DIAGNOSIS — I10 ESSENTIAL HYPERTENSION: Primary | ICD-10-CM

## 2018-08-10 PROCEDURE — 99214 OFFICE O/P EST MOD 30 MIN: CPT | Performed by: INTERNAL MEDICINE

## 2018-08-10 PROCEDURE — G0463 HOSPITAL OUTPT CLINIC VISIT: HCPCS

## 2018-08-10 ASSESSMENT — PAIN SCALES - GENERAL: PAINLEVEL: NO PAIN (0)

## 2018-08-10 NOTE — PROGRESS NOTES
SUBJECTIVE:   Peter Zhao is a 72 year old male who presents to clinic today for the following health issues:      Diabetes Follow-up    Patient is checking blood sugars: once a week.  Results are as follows:         am - 100-110's    Diabetic concerns: none- pt does not believe he has diabetes      Symptoms of hypoglycemia (low blood sugar): none     Paresthesias (numbness or burning in feet) or sores: No     Date of last diabetic eye exam: about 5-6 years ago    Diabetes Management Resources    Hyperlipidemia Follow-Up      Rate your low fat/cholesterol diet?: good    Taking statin?  Yes, no muscle aches from statin    Other lipid medications/supplements?:  none    Hypertension Follow-up      Outpatient blood pressures are being checked at home.  Results are .    Low Salt Diet: low salt    BP Readings from Last 2 Encounters:   06/12/18 112/60   05/25/18 119/75     Hemoglobin A1C (%)   Date Value   09/12/2017 6.3 (H)   06/06/2017 6.6 (H)     LDL Cholesterol Calculated (mg/dL)   Date Value   04/30/2017 35     Vascular Disease Follow-up:  Coronary Artery Disease (CAD)      Chest pain or pressure, left side neck or arm pain: No    Shortness of breath/increased sweats/nausea with exertion: No    Pain in calves walking 1-2 blocks: No    Worsened or new symptoms since last visit: No    Nitroglycerin use: no    Daily aspirin use: Yes      Amount of exercise or physical activity: None    Problems taking medications regularly: No    Medication side effects: none    Diet: low salt and low fat/cholesterol    Peter presents today for follow up.  He was diagnosed with DM about 1 year ago with an A1C of 6.6.  Most recent A1C was 6.3.  He questions the diagnosis in that his average blood glucose in the morning is 111.  He also reports a hernia which he would like repaired this late fall, but he has to follow up with the surgeon to further discuss this.  He declines any labs today.        Problem list and histories reviewed &  adjusted, as indicated.  Additional history: as documented    Patient Active Problem List   Diagnosis     Type 2 diabetes mellitus without complication, without long-term current use of insulin (H)     CAD (coronary artery disease)     ACP (advance care planning)     Hyperlipidemia LDL goal <100     Elevated prostate specific antigen (PSA)     HTN (hypertension)     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     H/O migraine     Visual aura     Cataract     S/P colon resection     Hypokalemia     TIA (transient ischemic attack)     Agitation     Attention to colostomy (H)     Past Surgical History:   Procedure Laterality Date     AORTIC VALVE REPLACEMENT      25 mm CE bovine replacement Dr. Wu 8/20/2012     CARDIAC SURGERY       CATARACT IOL, RT/LT Right 04/2017     COLECTOMY LOW ANTERIOR N/A 2/17/2017    Procedure: COLECTOMY LOW ANTERIOR;  Surgeon: Tima Moreland MD;  [facility]     COLONOSCOPY N/A 2/17/2017    Procedure: COLONOSCOPY;  Surgeon: Tima Moreland MD;  [facility]     ENT SURGERY       FLEX SIG (MEDICARE PT.)       GI SURGERY  02/2017    ostemomy     HERNIA REPAIR       LAPAROSCOPIC HERNIORRHAPHY INGUINAL Right 9/18/2015    Procedure: LAPAROSCOPIC HERNIORRHAPHY INGUINAL;  Surgeon: Solitario Nettles DO;  [facility]     PHACOEMULSIFICATION WITH STANDARD INTRAOCULAR LENS IMPLANT Right 4/20/2017    Procedure: PHACOEMULSIFICATION WITH STANDARD INTRAOCULAR LENS IMPLANT;  CATARACT EXTRACTION RIGHT EYE WITH IMPLANT;  Surgeon: Darin Gutierrez MD;  [facility]     PROSTATE BIOPSY, NEEDLE, SATURATION SAMPLING  2009     retinopexy-pvd with retinal tear Right 2008     TAKEDOWN COLOSTOMY N/A 9/21/2017    Procedure: TAKEDOWN COLOSTOMY;  CLOSURE OF NORRIS'S SIGMOID COLOSTOMY, REPAIR STOMAL HERNIA;  Surgeon: Tima Moreland MD;  [facility]     TONSILLECTOMY         Social History   Substance Use Topics     Smoking status: Former Smoker      Smokeless tobacco: Former User     Types: Chew     Quit date: 10/19/2013      Comment: quit in 2000     Alcohol use No     Family History   Problem Relation Age of Onset     Diabetes Mother      C.A.D. Mother      Cancer Maternal Grandmother          Current Outpatient Prescriptions   Medication Sig Dispense Refill     amLODIPine (NORVASC) 5 MG tablet TAKE 1 TABLET DAILY 90 tablet 2     ASPIRIN PO Take 325 mg by mouth daily        atorvastatin (LIPITOR) 40 MG tablet Take 1 tablet (40 mg) by mouth daily 90 tablet 3     calcium carbonate (OS-AWLI 600 MG [place]. [place]) 1500 (600 [place]) MG tablet Take 1 tablet (1,500 mg) by mouth daily 30 tablet 3     Cholecalciferol (VITAMIN D3 PO) Take 1,000 Units by mouth daily        clopidogrel (PLAVIX) 75 MG tablet TAKE 1 TABLET DAILY 90 tablet 1     finasteride (PROSCAR) 5 MG tablet Take 1 tablet (5 mg) by mouth daily 90 tablet 3     glucose blood VI test strips (ACCU-CHEK SMARTVIEW) strip Test blood sugar before breakfast one day, before and after supper the next and not at all the third day 1 Box 12     Lancets Misc. (ACCU-CHEK SOFTCLIX LANCET DEV) KIT 1 Units daily 1 kit 2     lisinopril (PRINIVIL/ZESTRIL) 40 MG tablet TAKE 1 TABLET DAILY 90 tablet 1     metoprolol tartrate (LOPRESSOR) 100 MG tablet TAKE 1 TABLET TWICE A  tablet 2     Multiple Vitamin (MULTIVITAMINS PO) Take 1 capsule by mouth daily       ranitidine (ZANTAC) 150 MG tablet Take 1 tablet (150 mg) by mouth 2 times daily 180 tablet 3     UNABLE TO FIND Take 550 mg by mouth 2 times daily Gymnema danielle       nitroGLYcerin (NITROSTAT) 0.4 MG sublingual tablet Place 1 tablet (0.4 mg) under the tongue every 5 minutes as needed (Patient not taking: Reported on 8/10/2018) 25 tablet 3     Allergies   Allergen Reactions     Acetaminophen      Ciprofloxacin Hives     No Clinical Screening - See Comments      Antibiotic allergy, pt not sure which     Recent Labs   Lab Test  05/25/18   0128  09/27/17   1811  09/26/17   0591    09/12/17   0820   06/06/17   1056  05/26/17   1855  05/03/17  04/30/17  10/04/16   A1C   --    --    --    --   6.3*   --   6.6*   --    --    --    --    --    --   6.2   LDL   --    --    --    --    --    --    --    --    --    --    --   35   --    --    HDL   --    --    --    --    --    --    --    --    --    --    --   33*   --    --    TRIG   --    --    --    --    --    --    --    --    --    --    --   305*   --    --    ALT  23  31  31   < >  26   --    --   21   < >   --    < >   --    < >   --    CR  1.23  1.12  1.13   < >  1.23   < >   --   1.23   < >   --    < >   --    < >   --    GFRESTIMATED  58*  64  64   < >  58*   < >   --   58*   --    --    < >   --    < >   --    GFRESTBLACK  70  78  77   < >  70   < >   --   70   --    --    < >   --    < >   --    POTASSIUM  4.3  3.6  4.1   < >  4.2   < >   --   3.5   < >   --    < >   --    < >   --    TSH   --    --    --    --    --    --    --   0.91   --   1.040   --    --    < >   --     < > = values in this interval not displayed.      BP Readings from Last 3 Encounters:   08/10/18 122/82   06/12/18 112/60   05/25/18 119/75    Wt Readings from Last 3 Encounters:   08/10/18 180 lb (81.6 kg)   06/12/18 181 lb (82.1 kg)   05/25/18 178 lb (80.7 kg)           Reviewed and updated as needed this visit by clinical staff       Reviewed and updated as needed this visit by Provider       ROS:  Constitutional, HEENT, cardiovascular, pulmonary, gi and gu systems are negative, except as otherwise noted.    OBJECTIVE:     /82 (BP Location: Left arm, Patient Position: Sitting, Cuff Size: Adult Regular)  Pulse 56  Temp 96.9  F (36.1  C) (Tympanic)  Wt 180 lb (81.6 kg)  SpO2 97%  BMI 25.83 kg/m2  Body mass index is 25.83 kg/(m^2).   GENERAL: Alert and no distress  RESP: lungs clear to auscultation - no rales, rhonchi or wheezes  ABDOMEN: soft, nontender, small hernia at pervious incision site  MS: no gross musculoskeletal defects noted, no  edema  SKIN: no suspicious lesions or rashes  NEURO: Monofilament testing reveals sensation intact in both feet.    PSYCH: mentation appears normal, affect normal/bright    Diagnostic Test Results:  No results found for this or any previous visit (from the past 24 hour(s)).  Results for orders placed or performed during the hospital encounter of 05/25/18   XR Abdomen 2 Views    Narrative    XR ABDOMEN 2 VW   5/25/2018 1:52 AM    History:Male,age  72 years, abdominal pain;     Comparison: None    FINDINGS: Two views are submitted. Large volume of stool is seen  within the colon. There is no evidence of free air or evidence of  obstruction. Postoperative changes seen within the pelvis consistent  with prostatectomy.      Impression    IMPRESSION:  1.   Moderate volume of stool, no acute abnormality.     2.  Postoperative changes in the pelvis.    MAGALIS LEMUS MD   CT Abdomen Pelvis w Contrast    Narrative    EXAMINATION: CT ABDOMEN PELVIS W CONTRAST, 5/25/2018 2:42 AM    TECHNIQUE:  Helical CT images from the lung bases through the  symphysis pubis were obtained  with IV contrast. Contrast dose:    COMPARISON: February 2017    HISTORY: Right upper quadrant pain    FINDINGS:    There is dependent atelectasis at the lung bases.    There is some small low-density lesions of the liver most likely  cysts. There is no biliary ductal enlargement. Mild gallbladder wall  thickening is noted no calcified gallstones are seen.    The spleen and pancreas appear normal.    The adrenal glands are normal.    The right and left kidneys are free of masses or hydronephrosis.    The periaortic lymph nodes are normal in caliber. There is a 4.7 cm  saccular infrarenal abdominal aortic aneurysm.    No intraperitoneal masses or inflammatory changes are noted. Is a  small umbilical hernia containing a loop of bowel. There is a left  lower quadrant hernia seen containing fat and there is a hernia seen  in the midline of the abdomen below  the level the umbilicus.    In the pelvis the bladder and rectum appear normal.    There is a mild L1 compression fracture stable from 2017      Impression    IMPRESSION: Mild gallbladder wall thickening. No calcified gallstone  seen.     ANA HERNADEZ MD   CBC with platelets differential   Result Value Ref Range    WBC 11.7 (H) 4.0 - 11.0 10e9/L    RBC Count 4.88 4.4 - 5.9 10e12/L    Hemoglobin 14.4 13.3 - 17.7 g/dL    Hematocrit 42.7 40.0 - 53.0 %    MCV 88 78 - 100 fl    MCH 29.5 26.5 - 33.0 pg    MCHC 33.7 31.5 - 36.5 g/dL    RDW 13.8 10.0 - 15.0 %    Platelet Count 239 150 - 450 10e9/L    Diff Method Automated Method     % Neutrophils 73.6 %    % Lymphocytes 12.2 %    % Monocytes 10.3 %    % Eosinophils 3.1 %    % Basophils 0.4 %    % Immature Granulocytes 0.4 %    Nucleated RBCs 0 0 /100    Absolute Neutrophil 8.6 (H) 1.6 - 8.3 10e9/L    Absolute Lymphocytes 1.4 0.8 - 5.3 10e9/L    Absolute Monocytes 1.2 0.0 - 1.3 10e9/L    Absolute Eosinophils 0.4 0.0 - 0.7 10e9/L    Absolute Basophils 0.1 0.0 - 0.2 10e9/L    Abs Immature Granulocytes 0.1 0 - 0.4 10e9/L    Absolute Nucleated RBC 0.0    Amylase   Result Value Ref Range    Amylase 51 30 - 110 U/L   Lipase   Result Value Ref Range    Lipase 244 73 - 393 U/L   UA with Microscopic reflex to Culture   Result Value Ref Range    Color Urine Yellow     Appearance Urine Clear     Glucose Urine Negative NEG^Negative mg/dL    Bilirubin Urine Negative NEG^Negative    Ketones Urine Negative NEG^Negative mg/dL    Specific Gravity Urine 1.017 1.003 - 1.035    Blood Urine Negative NEG^Negative    pH Urine 5.5 4.7 - 8.0 pH    Protein Albumin Urine Negative NEG^Negative mg/dL    Urobilinogen mg/dL 2.0 0.0 - 2.0 mg/dL    Nitrite Urine Negative NEG^Negative    Leukocyte Esterase Urine Negative NEG^Negative    Source Midstream Urine     WBC Urine 2 0 - 5 /HPF    RBC Urine <1 0 - 2 /HPF    Bacteria Urine None (A) NEG^Negative /HPF    Mucous Urine Present (A) NEG^Negative /LPF    Comprehensive metabolic panel   Result Value Ref Range    Sodium 138 133 - 144 mmol/L    Potassium 4.3 3.4 - 5.3 mmol/L    Chloride 104 94 - 109 mmol/L    Carbon Dioxide 25 20 - 32 mmol/L    Anion Gap 9 3 - 14 mmol/L    Glucose 117 (H) 70 - 99 mg/dL    Urea Nitrogen 26 7 - 30 mg/dL    Creatinine 1.23 0.66 - 1.25 mg/dL    GFR Estimate 58 (L) >60 mL/min/1.7m2    GFR Estimate If Black 70 >60 mL/min/1.7m2    Calcium 9.4 8.5 - 10.1 mg/dL    Bilirubin Total 0.8 0.2 - 1.3 mg/dL    Albumin 3.6 3.4 - 5.0 g/dL    Protein Total 7.5 6.8 - 8.8 g/dL    Alkaline Phosphatase 89 40 - 150 U/L    ALT 23 0 - 70 U/L    AST 23 0 - 45 U/L       ASSESSMENT/PLAN:     Problem List Items Addressed This Visit     Type 2 diabetes mellitus without complication, without long-term current use of insulin (H)    Hyperlipidemia LDL goal <100    HTN (hypertension) - Primary         25 minutes spent on this patient's care today.  Greater than 50% of the time was spent face to face with the patient.  He had numerous questions regarding DM, his diagnosis and what normal glucose measurements are.  In addition we discussed his concerns in the timing of his hernia repair.     Alexander Vazquez, [facility]

## 2018-08-10 NOTE — MR AVS SNAPSHOT
After Visit Summary   8/10/2018    Peter Zhao    MRN: [id number]           Patient Information     Date Of Birth          1945        Visit Information        Provider Department      8/10/2018 10:45 AM Alexander Vazquez,  [facility] [place]        Today's Diagnoses     Essential hypertension    -  1    Hyperlipidemia LDL goal <100        Type 2 diabetes mellitus without complication, without long-term current use of insulin (H)           Follow-ups after your visit        Your next 10 appointments already scheduled     Aug 20, 2018 10:00 AM CDT   (Arrive by 9:45 AM)   New Visit with Tima Moreland MD   [facility] [place] ([facility] )    [address]  [place] [address]   [phone]              Who to contact     If you have questions or need follow up information about today's clinic visit or your schedule please contact [facility] directly at [phone].  Normal or non-critical lab and imaging results will be communicated to you by MyChart, letter or phone within 4 business days after the clinic has received the results. If you do not hear from us within 7 days, please contact the clinic through MyChart or phone. If you have a critical or abnormal lab result, we will notify you by phone as soon as possible.  Submit refill requests through [employer] or call your pharmacy and they will forward the refill request to us. Please allow 3 business days for your refill to be completed.          Additional Information About Your Visit        Care EveryWhere ID     This is your Care EveryWhere ID. This could be used by other organizations to access your [facility] medical records  [id number]        Your Vitals Were     Pulse Temperature Pulse Oximetry BMI (Body Mass Index)          56 96.9  F (36.1  C) (Tympanic) 97% 25.83 kg/m2         Blood Pressure from Last 3 Encounters:   08/10/18 122/82   06/12/18 112/60   05/25/18 119/75    Weight  from Last 3 Encounters:   08/10/18 81.6 kg (180 lb)   06/12/18 82.1 kg (181 lb)   05/25/18 80.7 kg (178 lb)              Today, you had the following     No orders found for display         Today's Medication Changes          These changes are accurate as of 8/10/18 11:12 AM.  If you have any questions, ask your nurse or doctor.               These medicines have changed or have updated prescriptions.        Dose/Directions    blood glucose monitoring test strip   Commonly known as:  ACCU-CHEK SMARTVIEW   This may have changed:  Another medication with the same name was removed. Continue taking this medication, and follow the directions you see here.   Used for:  T2DM (type 2 diabetes mellitus) (H)   Changed by:  Alexander Vazquez DO        Test blood sugar before breakfast one day, before and after supper the next and not at all the third day   Quantity:  1 Box   Refills:  12         Stop taking these medicines if you haven't already. Please contact your care team if you have questions.     blood glucose monitoring lancets   Stopped by:  Alexander Vazquez DO           fluticasone 50 MCG/ACT spray   Commonly known as:  FLONASE   Stopped by:  Alexander Vazquez DO           nystatin-triamcinolone cream   Commonly known as:  MYCOLOG II   Stopped by:  Alexander Vazquez DO           order for DME   Stopped by:  Alexander Vazquez DO                    Primary Care Provider Office Phone # Fax #    Alexander Vazquez [phone]       [address] [place] [address]        Equal Access to Services     [facility]: Hadii aad ku hadasho Soomaali, waaxda luqadaha, qaybta kaalmada adeegyada, waxay veronika torres . So [facility] [phone].    ATENCIÓN: Si habla español, tiene a farah disposición servicios gratuitos de asistencia lingüística. Llame al [phone].    We comply with applicable federal civil rights laws and [place] laws. We do not  discriminate on the basis of race, color, national origin, age, disability, sex, sexual orientation, or gender identity.            Thank you!     [facility] you [facility]  [facility]. Our goal is always to provide you with excellent care. Hearing back from our patients is one way we can continue to improve our services. Please take a few minutes to complete the written survey that you may receive in the mail after your visit with us. Thank you!             Your Updated Medication List - Protect others around you: Learn how to safely use, store and throw away your medicines at www.disposemymeds.org.          This list is accurate as of 8/10/18 11:12 AM.  Always use your most recent med list.                   Brand Name Dispense Instructions for use Diagnosis    amLODIPine 5 MG tablet    NORVASC    90 tablet    TAKE 1 TABLET DAILY    Benign essential hypertension       ASPIRIN PO      Take 325 mg by mouth daily        atorvastatin 40 MG tablet    LIPITOR    90 tablet    Take 1 tablet (40 mg) by mouth daily    Hyperlipidemia LDL goal <100       blood glucose lancing device     1 kit    1 Units daily    T2DM (type 2 diabetes mellitus) (H)       blood glucose monitoring test strip    ACCU-CHEK SMARTVIEW    1 Box    Test blood sugar before breakfast one day, before and after supper the next and not at all the third day    T2DM (type 2 diabetes mellitus) (H)       calcium carbonate 1500 (600 Ca) MG tablet    OS-WALI 600 mg [place]. Ca    30 tablet    Take 1 tablet (1,500 mg) by mouth daily        clopidogrel 75 MG tablet    PLAVIX    90 tablet    TAKE 1 TABLET DAILY    Benign essential hypertension       finasteride 5 MG tablet    PROSCAR    90 tablet    Take 1 tablet (5 mg) by mouth daily    Benign non-nodular prostatic hyperplasia with lower urinary tract symptoms       lisinopril 40 MG tablet    PRINIVIL/ZESTRIL    90 tablet    TAKE 1 TABLET DAILY    Benign essential hypertension       metoprolol  tartrate 100 MG tablet    LOPRESSOR    180 tablet    TAKE 1 TABLET TWICE A DAY    Benign essential hypertension       MULTIVITAMINS PO      Take 1 capsule by mouth daily        nitroGLYcerin 0.4 MG sublingual tablet    NITROSTAT    25 tablet    Place 1 tablet (0.4 mg) under the tongue every 5 minutes as needed    Hyperlipidemia LDL goal <100, Intermediate coronary syndrome (H)       ranitidine 150 MG tablet    ZANTAC    180 tablet    Take 1 tablet (150 mg) by mouth 2 times daily    Gastroesophageal reflux disease without esophagitis       UNABLE TO FIND      Take 550 mg by mouth 2 times daily Gymnema danielle        VITAMIN D3 PO      Take 1,000 Units by mouth daily

## 2018-08-16 DIAGNOSIS — N40.1 BENIGN NON-NODULAR PROSTATIC HYPERPLASIA WITH LOWER URINARY TRACT SYMPTOMS: ICD-10-CM

## 2018-08-16 DIAGNOSIS — E78.5 HYPERLIPIDEMIA LDL GOAL <100: ICD-10-CM

## 2018-08-16 DIAGNOSIS — K21.9 GASTROESOPHAGEAL REFLUX DISEASE WITHOUT ESOPHAGITIS: ICD-10-CM

## 2018-08-17 RX ORDER — ATORVASTATIN CALCIUM 40 MG/1
TABLET, FILM COATED ORAL
Qty: 90 TABLET | Refills: 3 | Status: SHIPPED | OUTPATIENT
Start: 2018-08-17 | End: 2019-08-11

## 2018-08-17 RX ORDER — FINASTERIDE 5 MG/1
TABLET, FILM COATED ORAL
Qty: 90 TABLET | Refills: 3 | Status: SHIPPED | OUTPATIENT
Start: 2018-08-17 | End: 2019-08-11

## 2018-08-20 ENCOUNTER — HOSPITAL ENCOUNTER (OUTPATIENT)
Facility: HOSPITAL | Age: 73
End: 2018-08-20
Attending: SURGERY | Admitting: SURGERY
Payer: MEDICARE

## 2018-08-20 ENCOUNTER — OFFICE VISIT (OUTPATIENT)
Dept: SURGERY | Facility: OTHER | Age: 73
End: 2018-08-20
Attending: SURGERY
Payer: MEDICARE

## 2018-08-20 VITALS
OXYGEN SATURATION: 98 % | WEIGHT: 180 LBS | HEIGHT: 70 IN | BODY MASS INDEX: 25.77 KG/M2 | SYSTOLIC BLOOD PRESSURE: 102 MMHG | HEART RATE: 58 BPM | RESPIRATION RATE: 15 BRPM | DIASTOLIC BLOOD PRESSURE: 62 MMHG | TEMPERATURE: 96.2 F

## 2018-08-20 DIAGNOSIS — K43.2 INCISIONAL HERNIA, WITHOUT OBSTRUCTION OR GANGRENE: ICD-10-CM

## 2018-08-20 PROCEDURE — G0463 HOSPITAL OUTPT CLINIC VISIT: HCPCS

## 2018-08-20 PROCEDURE — 99213 OFFICE O/P EST LOW 20 MIN: CPT | Performed by: SURGERY

## 2018-08-20 RX ORDER — CEFAZOLIN SODIUM 2 G/100ML
2 INJECTION, SOLUTION INTRAVENOUS
Status: CANCELLED | OUTPATIENT
Start: 2018-08-20 | End: 2038-08-21

## 2018-08-20 RX ORDER — CEFAZOLIN SODIUM 1 G/50ML
1 INJECTION, SOLUTION INTRAVENOUS SEE ADMIN INSTRUCTIONS
Status: CANCELLED | OUTPATIENT
Start: 2018-08-20 | End: 2019-08-20

## 2018-08-20 ASSESSMENT — PAIN SCALES - GENERAL: PAINLEVEL: NO PAIN (1)

## 2018-08-20 NOTE — PATIENT INSTRUCTIONS
Thank you for allowing Dr. Moreland and our surgical team to participate in your care.  If you have a scheduling or an appointment question please contact our Health Unit Coordinator at her direct line [phone].   ALL nursing questions or concerns can be directed to your clinic surgical nurse at: [phone]   Call the surgery nurse or your primary care provider if you should become ill within 1-2 weeks of your procedure and we will reschedule it when you are healthy. This includes signs or symptoms of a cold or the flu. This can include fever, chills, sore throat, cough, chest congestions, productive cough, runny nose.    You are scheduled for : incisional hernia repair  Your procedure date is: 12/4/18      HOW TO PREPARE-    Preop appointment needed within the 30 days prior to your procedure    A responsible adult friend or family member must be available to drive you home AND stay with you for 24 hours after you leave the hospital. You will not be allowed to drive yourself. If you need to take a taxi or the bus you MUST have a responsible adult to ride with you. YOUR PROCEDURE WILL BE CANCELLED IF YOU DO NOT HAVE A RESPONSIBLE ADULT TO DRIVE YOU HOME.     Unless otherwise instructed, DO NOT have anything to eat or drink after midnight the night before your surgery, except clear liquids (water, clear juice, clear broth, plain coffee or tea without cream or milk) up until 2 hours prior to arrival time. Your stomach needs to be completely empty. Do NOT chew gum, suck on hard candy, or smoke. You can brush your teeth the morning of surgery.     Please call our Surgery Education Nurses 1-2 weeks prior to your surgery date at  [phone] or toll free [phone]. Please have you medication and allergy lists ready.    Stop your aspirin or other NSAIDs(Ibuprofen, Motrin, Aleve, Celebrex, Naproxen, etc...) 7 days before your surgery unless otherwise instructed.    Hospital admitting will call you the day before  your surgery with your arrival time. If you are scheduled on a Monday, you will be contacted the Friday before surgery.     You will need to wash the night before AND the morning of you procedure with the supplied Hibiclens following the instructions in your surgery handbook.     Return to clinic for a postop appointment 1-2 week(s) from your surgery date.

## 2018-08-20 NOTE — MR AVS SNAPSHOT
After Visit Summary   8/20/2018    Peter Zhao    MRN: [id number]           Patient Information     Date Of Birth          1945        Visit Information        Provider Department      8/20/2018 10:00 AM Tmia Moreland MD [facility] [place]        Today's Diagnoses     Incisional hernia, without obstruction or gangrene          Care Instructions    Thank you for allowing Dr. Moreland and our surgical team to participate in your care.  If you have a scheduling or an appointment question please contact our Health Unit Coordinator at her direct line [phone].   ALL nursing questions or concerns can be directed to your clinic surgical nurse at: [phone]   Call the surgery nurse or your primary care provider if you should become ill within 1-2 weeks of your procedure and we will reschedule it when you are healthy. This includes signs or symptoms of a cold or the flu. This can include fever, chills, sore throat, cough, chest congestions, productive cough, runny nose.    You are scheduled for : incisional hernia repair  Your procedure date is: 12/4/18      HOW TO PREPARE-    Preop appointment needed within the 30 days prior to your procedure    A responsible adult friend or family member must be available to drive you home AND stay with you for 24 hours after you leave the hospital. You will not be allowed to drive yourself. If you need to take a taxi or the bus you MUST have a responsible adult to ride with you. YOUR PROCEDURE WILL BE CANCELLED IF YOU DO NOT HAVE A RESPONSIBLE ADULT TO DRIVE YOU HOME.     Unless otherwise instructed, DO NOT have anything to eat or drink after midnight the night before your surgery, except clear liquids (water, clear juice, clear broth, plain coffee or tea without cream or milk) up until 2 hours prior to arrival time. Your stomach needs to be completely empty. Do NOT chew gum, suck on hard candy, or smoke. You can brush your teeth the morning of surgery.  "    Please call our Surgery Education Nurses 1-2 weeks prior to your surgery date at  [phone] or toll free [phone]. Please have you medication and allergy lists ready.    Stop your aspirin or other NSAIDs(Ibuprofen, Motrin, Aleve, Celebrex, Naproxen, etc...) 7 days before your surgery unless otherwise instructed.    Hospital admitting will call you the day before your surgery with your arrival time. If you are scheduled on a Monday, you will be contacted the Friday before surgery.     You will need to wash the night before AND the morning of you procedure with the supplied Hibiclens following the instructions in your surgery handbook.     Return to clinic for a postop appointment 1-2 week(s) from your surgery date.            Follow-ups after your visit        Who to contact     If you have questions or need follow up information about today's clinic visit or your schedule please contact [facility] directly at [phone].  Normal or non-critical lab and imaging results will be communicated to you by MyChart, letter or phone within 4 business days after the clinic has received the results. If you do not hear from us within 7 days, please contact the clinic through [employer] or phone. If you have a critical or abnormal lab result, we will notify you by phone as soon as possible.  Submit refill requests through [employer] or call your pharmacy and they will forward the refill request to us. Please allow 3 business days for your refill to be completed.          Additional Information About Your Visit        Care EveryWhere ID     This is your Care EveryWhere ID. This could be used by other organizations to access your [facility] medical records  [id number]        Your Vitals Were     Pulse Temperature Respirations Height Pulse Oximetry BMI (Body Mass Index)    58 96.2  F (35.7  C) (Tympanic) 15 5' 10\" (1.778 m) 98% 25.83 kg/m2       Blood Pressure from Last 3 Encounters:   08/20/18 102/62   08/10/18 " 122/82   06/12/18 112/60    Weight from Last 3 Encounters:   08/20/18 180 lb (81.6 kg)   08/10/18 180 lb (81.6 kg)   06/12/18 181 lb (82.1 kg)              Today, you had the following     No orders found for display       Primary Care Provider Office Phone # Fax #    Alexander Vazquez [phone] [address] [facility] [address]        Equal Access to Services     ANDRY CASPER : Hadii aad ku hadasho Soomaali, waaxda luqadaha, qaybta kaalmada adeegyada, waxay idiin hayaan adeeg bisi torres . So [facility] [phone].    ATENCIÓN: Si habla español, tiene a farah disposición servicios gratuitos de asistencia lingüística. [facility] [phone].    We comply with applicable federal civil rights laws and [place] laws. We do not discriminate on the basis of race, color, national origin, age, disability, sex, sexual orientation, or gender identity.            Thank you!     Thank you for choosing [facility]  for your care. Our goal is always to provide you with excellent care. Hearing back from our patients is one way we can continue to improve our services. Please take a few minutes to complete the written survey that you may receive in the mail after your visit with us. Thank you!             Your Updated Medication List - Protect others around you: Learn how to safely use, store and throw away your medicines at www.disposemymeds.org.          This list is accurate as of 8/20/18 10:24 AM.  Always use your most recent med list.                   Brand Name Dispense Instructions for use Diagnosis    amLODIPine 5 MG tablet    NORVASC    90 tablet    TAKE 1 TABLET DAILY    Benign essential hypertension       ASPIRIN PO      Take 325 mg by mouth daily        atorvastatin 40 MG tablet    LIPITOR    90 tablet    TAKE 1 TABLET DAILY    Hyperlipidemia LDL goal <100       blood glucose lancing device     1 kit    1 Units daily    T2DM (type 2 diabetes mellitus) (H)       blood  glucose monitoring test strip    ACCU-CHEK SMARTVIEW    1 Box    Test blood sugar before breakfast one day, before and after supper the next and not at all the third day    T2DM (type 2 diabetes mellitus) (H)       calcium carbonate 1500 (600 Ca) MG tablet    OS-WALI 600 mg [place]. Ca    30 tablet    Take 1 tablet (1,500 mg) by mouth daily        clopidogrel 75 MG tablet    PLAVIX    90 tablet    TAKE 1 TABLET DAILY    Benign essential hypertension       finasteride 5 MG tablet    PROSCAR    90 tablet    TAKE 1 TABLET DAILY    Benign non-nodular prostatic hyperplasia with lower urinary tract symptoms       lisinopril 40 MG tablet    PRINIVIL/ZESTRIL    90 tablet    TAKE 1 TABLET DAILY    Benign essential hypertension       metoprolol tartrate 100 MG tablet    LOPRESSOR    180 tablet    TAKE 1 TABLET TWICE A DAY    Benign essential hypertension       MULTIVITAMINS PO      Take 1 capsule by mouth daily        nitroGLYcerin 0.4 MG sublingual tablet    NITROSTAT    25 tablet    Place 1 tablet (0.4 mg) under the tongue every 5 minutes as needed    Hyperlipidemia LDL goal <100, Intermediate coronary syndrome (H)       ranitidine 150 MG tablet    ZANTAC    180 tablet    TAKE 1 TABLET TWICE A DAY    Gastroesophageal reflux disease without esophagitis       UNABLE TO FIND      Take 550 mg by mouth 2 times daily Gymnema danielle        VITAMIN D3 PO      Take 1,000 Units by mouth daily

## 2018-08-20 NOTE — PROGRESS NOTES
Visit Date:   08/20/2018      Mr. Zhao was seen in the clinic today for followup of his incisional hernias.  This man originally presented with an obstruction of his sigmoid colon secondary to diverticular disease.  Did end up having a Marianna's resection of the sigmoid colon with a sigmoid colostomy.  He did end up having the colostomy reversed last November.  Unfortunately, he has gone on and developed an incisional hernia in association with his midline incision and the site of the stoma.  I did see him back in the spring and we talked about repairing the hernias.  He wanted to wait through to the fall and follows up now with me.  He does state he has been getting along well.  Does not have any pain in association with the hernias.  He has not noted any increase in size.  His appetite has been good.  Bowels function generally twice a day.  He did have an episode 2 or 3 weeks ago where he developed some vomiting and diarrhea which sounds to have been a viral illness and was self-limited.      Otherwise, he states he has been getting along well.  He does have a history of coronary artery disease.  Has had a previous bypass surgery.  He has not though been having any recent cardiac issues.  There was an episode of some confusion a year ago and there was a concern that he may have had a TIA and is on Plavix because of this.  He has not had any recent troubles.  He is a borderline diabetic.      He remains quite active at home, though has been avoiding lifting.      CURRENT MEDICATIONS:  Include Norvasc, Lipitor, Os-Clark, Plavix, Proscar, Prinivil, Lopressor, Nitrostat, Zantac, aspirin, vitamin D3 and multivitamins.      ALLERGIES:  HE DOES HAVE ALLERGIES TO ACETAMINOPHEN AND CIPRO.      PHYSICAL EXAMINATION:     GENERAL:  On examination today, he is a pleasant 72-year-old male who is in no distress.   HEAD AND NECK:  Examination was unremarkable.  There were no masses.  There is no thyroid enlargement.  There is no  scleral icterus.   ABDOMEN:  His abdomen was soft and nontender.  He does have a definite hernia in association with the midline incision, particularly around the umbilicus and extending inferiorly.  Tough to tell whether this is diastasis or actually a hernia.  He also has a hernia in association with the stoma.  The hernias are easily reducible and nontender.  The abdomen is otherwise soft and nontender with no palpable masses.  There were no groin hernias.      He did have a CT scan done in May because of an episode of abdominal pain at that time which does confirm a midline incisional hernia at the level of the umbilicus and extending inferiorly.  Appears to be a defect extending down almost to the symphysis.  There is also a defect in association with the stomal site.      I explained to Mr. Zhao that he should likely have the hernias fixed.  The risk is that they will get bigger with time.  There is a risk of incarceration with bowel obstruction and a risk of strangulation.  I explained to fix them is a relatively large operation.  I would end up reopening the midline incision.  There is a chance I would have to reopen the stomal site.  We would end up having to insert mesh.  I would expect that he would need to be in hospital for 2 or 3 days for pain relief.  He would need to avoid lifting for approximately 6 weeks.  There is a chance that I would leave a drain or 2 in place.  I did go over the risks of the surgery, namely the risks of the anesthetic, bleeding, infection, as well as injury to the bowel with resulting increased risk of infection.  There is also long-term wise a risk of recurrence of the hernias.  This was in addition to the risks of any major surgery as far as his heart and lungs, blood clots, pneumonias and strokes are concerned.  He did have troubles with postoperative confusion with his original surgery, but we did not have troubles with the last surgery.  It will be important to try and  avoid narcotics if possible. He will also need to stop his Plavix a week prior to the procedure.      He would like to have the procedure done.  He wants to wait until late November, so he can get his fall chores done.  I think this is fine unless he starts having symptoms.  He will need to see Dr. Vazquez preop.  A consent was signed today.         RUDY RAYO MD             D: 2018   T: 2018   MT: PAM      Name:     ROGER VINSON   MRN:      [id number]        Account:      [id number]   :      1945           Visit Date:   2018      Document: [id number]

## 2018-08-20 NOTE — NURSING NOTE
"Chief Complaint   Patient presents with     Consult     referred by Dr Vazquez for hernia, discuss surgery, causing no pain or problems       Initial /62 (BP Location: Right arm, Patient Position: Sitting, Cuff Size: Adult Regular)  Pulse 58  Temp 96.2  F (35.7  C) (Tympanic)  Resp 15  Ht 5' 10\" (1.778 m)  Wt 180 lb (81.6 kg)  SpO2 98%  BMI 25.83 kg/m2 Estimated body mass index is 25.83 kg/(m^2) as calculated from the following:    Height as of this encounter: 5' 10\" (1.778 m).    Weight as of this encounter: 180 lb (81.6 kg).  Medication Reconciliation: complete    Herlinda Gamble LPN    "

## 2018-10-22 NOTE — PLAN OF CARE
Problem: Bowel Obstruction (Adult)  Goal: Signs and Symptoms of Listed Potential Problems Will be Absent or Manageable (Bowel Obstruction)  Signs and symptoms of listed potential problems will be absent or manageable by discharge/transition of care (reference Bowel Obstruction (Adult) CPG).   Outcome: Improving    02/21/17 1722   Bowel Obstruction   Problems Assessed (Bowel Obstruction) all   Problems Present (Bowel Obstruction) electrolyte/acid-base imbalance      Potassium was replaced this am with a 3.6 recheck. Pt cooperative thus far. Has become slightly agitated. Up and down from chair to bed, ambulating in hallways for distraction. Pt does not call for help so safety alarms placed this afternoon. Bowels are active. Stoma red and pt producing stool. States passing lots of gas. Diet advanced and pt tolerating regular. Denies abd pain but c/o dry eyes and sore throat. Received eye drops and lozenges at bedside. Was able to flush through the occluded port from days. 2 ports push hard but are flushable. Pt confused to time. Oriented to self, situation, and place.    Face to face report given with opportunity to observe patient.    Report given to Harika FAY   2/21/2017  7:14 PM        Good nutrition is important when healing from an illness, injury, or surgery. Follow any nutrition recommendations given to you during your hospital stay.  If you were given an oral nutrition supplement while in the hospital, continue to take this supplement at home. You can take it with meals, in-between meals, and/or before bedtime. These supplements can be purchased at most local grocery stores, pharmacies, and chain super-stores.  If you have any questions about your diet or nutrition, call the hospital and ask for the dietitian.   General diet

## 2018-10-27 ENCOUNTER — HOSPITAL ENCOUNTER (EMERGENCY)
Facility: HOSPITAL | Age: 73
Discharge: HOME | End: 2018-10-27
Attending: NURSE PRACTITIONER | Admitting: NURSE PRACTITIONER
Payer: MEDICARE

## 2018-10-27 VITALS
TEMPERATURE: 97.7 F | OXYGEN SATURATION: 97 % | DIASTOLIC BLOOD PRESSURE: 59 MMHG | RESPIRATION RATE: 16 BRPM | SYSTOLIC BLOOD PRESSURE: 91 MMHG

## 2018-10-27 DIAGNOSIS — J00 NASOPHARYNGITIS: ICD-10-CM

## 2018-10-27 PROCEDURE — G0463 HOSPITAL OUTPT CLINIC VISIT: HCPCS

## 2018-10-27 PROCEDURE — 99213 OFFICE O/P EST LOW 20 MIN: CPT | Performed by: NURSE PRACTITIONER

## 2018-10-27 RX ORDER — AZITHROMYCIN 250 MG/1
TABLET, FILM COATED ORAL
Qty: 6 TABLET | Refills: 0 | Status: SHIPPED | OUTPATIENT
Start: 2018-10-27 | End: 2018-11-01

## 2018-10-27 NOTE — ED PROVIDER NOTES
History     Chief Complaint   Patient presents with     Nasal Congestion     x 2 days     Cough     x 2 days - dry cough     The history is provided by the patient and the spouse. No  was used.     Peter Zhao is a 72 year old male who presents with a cough and stuffed nose for 5 days. Has a sore throat on the right side.   Does fine when he's up, laying down elizalde his symptoms much worse. Appetite is good, no fever. Symptoms started at the hospital when his wife was having surgery, he was up vomiting and diarrhea then developed the URI symptoms the next day.     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  Peter Zhao has no plan or code status on file. Discussed available resources and provided with information. Confirmed code status reflects current choices pending further ACP discussions.  Confirmed/documented legally designated decision makers.  Added by Daxa Campa           CAD (coronary artery disease)      Priority: Medium     CABG 2013, stent x 1 in 2014        Type 2 diabetes mellitus without complication, without long-term current use of insulin (H) 10/31/2013     Priority: Medium        Past Medical History:    Past Medical History:   Diagnosis Date     Allergic state      CAD (coronary artery disease)      Cataract      Cataracts, bilateral      Elevated PSA      Gastro-oesophageal reflux disease      Heart murmur      Hyperlipidemia      Hypertension      Pacemaker      Stented coronary artery      Visual aura        Past Surgical History:    Past Surgical History:   Procedure Laterality Date     AORTIC VALVE REPLACEMENT      25 mm CE bovine replacement Dr. Wu 8/20/2012     CARDIAC SURGERY       CATARACT IOL, RT/LT Right 04/2017     COLECTOMY LOW ANTERIOR N/A 2/17/2017    Procedure: COLECTOMY LOW ANTERIOR;  Surgeon: Tima Moreland MD;  [facility]     COLONOSCOPY N/A 2/17/2017    Procedure: COLONOSCOPY;  Surgeon: Tima Moreland MD;  [facility]     ENT SURGERY       FLEX SIG (MEDICARE PT.)       GI SURGERY  02/2017    ostemomy     HERNIA REPAIR       LAPAROSCOPIC HERNIORRHAPHY INGUINAL Right 9/18/2015    Procedure: LAPAROSCOPIC HERNIORRHAPHY INGUINAL;  Surgeon: Solitario Nettles, DO;  Location[facility]     PHACOEMULSIFICATION WITH STANDARD INTRAOCULAR LENS IMPLANT Right 4/20/2017    Procedure: PHACOEMULSIFICATION WITH STANDARD INTRAOCULAR LENS IMPLANT;  CATARACT EXTRACTION RIGHT EYE WITH IMPLANT;  Surgeon: Darin Gutierrez MD;  Location: [facility]     PROSTATE BIOPSY, NEEDLE, SATURATION SAMPLING  2009     retinopexy-pvd with retinal tear Right 2008     TAKEDOWN COLOSTOMY N/A 9/21/2017    Procedure: TAKEDOWN COLOSTOMY;  CLOSURE OF NORRIS'S SIGMOID COLOSTOMY, REPAIR STOMAL HERNIA;  Surgeon: Tima Moreland MD;  Location: [facility]     TONSILLECTOMY         Family History:    Family History   Problem Relation Age of Onset     Diabetes Mother      C.A.D. Mother      Cancer Maternal Grandmother        Social History:  Marital Status:   [2]  Social  History   Substance Use Topics     Smoking status: Former Smoker     Smokeless tobacco: Former User     Types: Chew     Quit date: 10/19/2013      Comment: quit in 2000     Alcohol use No        Medications:      amLODIPine (NORVASC) 5 MG tablet   atorvastatin (LIPITOR) 40 MG tablet   azithromycin (ZITHROMAX Z-DEVON) 250 MG tablet   calcium carbonate (OS-WALI 600 MG [place]. CA) 1500 (600 CA) MG tablet   Cholecalciferol (VITAMIN D3 PO)   clopidogrel (PLAVIX) 75 MG tablet   finasteride (PROSCAR) 5 MG tablet   glucose blood VI test strips (ACCU-CHEK SMARTVIEW) strip   Lancets Misc. (ACCU-CHEK SOFTCLIX LANCET DEV) KIT   lisinopril (PRINIVIL/ZESTRIL) 40 MG tablet   metoprolol tartrate (LOPRESSOR) 100 MG tablet   Multiple Vitamin (MULTIVITAMINS PO)   ranitidine (ZANTAC) 150 MG tablet   UNABLE TO FIND   ASPIRIN PO   nitroGLYcerin (NITROSTAT) 0.4 MG sublingual tablet         Review of Systems   Constitutional: Negative for appetite change and fever.        Not sleeping well d/t symptoms.    HENT: Positive for postnasal drip, rhinorrhea and sinus pressure. Negative for ear pain and sore throat.    Respiratory: Positive for cough. Negative for wheezing.    Gastrointestinal: Negative for abdominal pain.   Musculoskeletal: Negative.        Physical Exam   BP: 91/59 (reports BP is always low)  Heart Rate: 77  Temp: 97.7  F (36.5  C)  Resp: 16  SpO2: 97 %      Physical Exam   Constitutional: He is oriented to person, place, and time. He appears well-developed and well-nourished. No distress.   HENT:   Head: Normocephalic and atraumatic.   Right Ear: Tympanic membrane and external ear normal.   Left Ear: Tympanic membrane and external ear normal.   Nose: Nose normal. Right sinus exhibits no maxillary sinus tenderness and no frontal sinus tenderness. Left sinus exhibits no maxillary sinus tenderness and no frontal sinus tenderness.   Mouth/Throat: Uvula is midline, oropharynx is clear and moist and mucous membranes are normal. No  oropharyngeal exudate.   Eyes: Conjunctivae and lids are normal. Right eye exhibits no discharge. Left eye exhibits no discharge. No scleral icterus.   Neck: Normal range of motion. Neck supple.   Cardiovascular: Normal rate, regular rhythm and normal heart sounds.    Pulmonary/Chest: Effort normal and breath sounds normal.   Abdominal: Soft.   Musculoskeletal: Normal range of motion.   Lymphadenopathy:     He has no cervical adenopathy.   Neurological: He is alert and oriented to person, place, and time.   Skin: Skin is warm and dry. He is not diaphoretic.   Psychiatric: He has a normal mood and affect. His behavior is normal.   Nursing note and vitals reviewed.      ED Course     ED Course     Procedures          No results found for this or any previous visit (from the past 24 hour(s)).    Medications - No data to display    Assessments & Plan (with Medical Decision Making)     I have reviewed the nursing notes.    I have reviewed the findings, diagnosis, plan and need for follow up with the patient.  Afebrile, benign exam.   Will treat d/t progressive symptoms, age, exposure to illness and wife's recent surgery.   Advised rest, fluids and increase vitamins.   Take medications as ordered.  Given Epic educational materials.   F/u with PCP in 3-5 days if not improving.  Return here if symptoms worsen.     Discharge Medication List as of 10/27/2018 11:48 AM      START taking these medications    Details   azithromycin (ZITHROMAX Z-DEVON) 250 MG tablet Two tablets on the first day, then one tablet daily for the next 4 days, Disp-6 tablet, R-0, E-Prescribe             Final diagnoses:   Nasopharyngitis       10/27/2018   [facility]     Hattie Sandoval NP  10/28/18 [id number]

## 2018-10-27 NOTE — ED AVS SNAPSHOT
[facility]    [address] [address]    Phone:  [phone]                                       Peter Zhao   MRN: [id number]    Department:  [facility]   Date of Visit:  10/27/2018           Patient Information     Date Of Birth          1945        Your diagnoses for this visit were:     Nasopharyngitis        You were seen by Hattie Sandoval NP.      Follow-up Information     Follow up with Alexander Vazquez DO. Schedule an appointment as soon as possible for a visit in 5 days.    Specialty:  Internal Medicine    Why:  if not improving    Contact information:    [address]  [facility] [address]  [phone]          Follow up with [facility].    Specialty:  EMERGENCY MEDICINE    Why:  If symptoms worsen    Contact information:    [address] [address] [phone]    Additional information:    From [place]: Take [address]. Turn left at [address]. Turn left at the first stoplight on [address]. At the first stop sign, take a right onto [address]. Take a left into the parking lot and continue through until you reach the North enterance of the building.       From [place]: Take [address]. Take the [address] ramp towards [place]. Turn left onto [address]. Take a slight right onto [address] [facility]. Turn left at the first stoplight on [address]. At the first stop sign, take a right onto [address]. Take a left into the parking lot and continue through until you reach the North enterance of the building.       From [place]: Take [address]. Take a right at [address]. At the first stop sign, take a right onto [address]. Take a left into the parking lot and continue through until you reach the North enterance of the building.         Discharge Instructions         Take antibiotics as ordered.  Use saline nasal  spray.   Drink lots of warm liquids and rest.   Follow up with PCP if not improving in the next 3-5 days.  Return here if symptoms worsen.     Understanding the Cold Virus  Colds are the most common illness that people get. Most adults get 2 or 3 colds per year, and most children get 5 to 7 colds per year. Colds may be caused by over 200 types of viruses. The most common of these are rhinoviruses ( rhino  refers to the nose).  What causes a cold virus?  All colds start with infection by a virus. You can be infected by more than one cold virus at a time. Infection with cold viruses happens when:    You breathe in a virus from the air. This can happen when someone with a cold sneezes or coughs near you.    You touch your eyes, nose, or mouth when your hand has a cold virus on it. This can happen if you touch an object that has the cold virus on it.  What are the symptoms of a cold virus?  Almost all colds involve a stuffy nose. Other common symptoms include:    Runny nose    Sneezing    Sore throat    Headache    Cough  How is a cold treated?  Colds usually last 5 to 10 days. Treatment focuses on relieving symptoms. Treatments may include:    Decongestant medicines. Several types of decongestants are available without prescription. These may help reduce stuffy or runny nose symptoms.    Prescription or over-the-counter nasal sprays. These may help reduce nasal symptoms, including stuffiness.    Prescription or over-the-counter pain medicines. These can help with headaches and sore throat.    Self-care. This includes extra rest, using humidifiers, and drinking more fluids. These help you feel better while you are getting over a cold.  Antibiotics are not helpful for a cold. They do not make a cold shorter or relieve symptoms. Taking antibiotics when you don t need them can make them work less well when you need them for another illness.  Follow all directions for using medicines, especially when giving them to  children. Contact your healthcare provider if you have any questions about using cold medicines safely.  Can a cold be prevented?  You can help reduce the spread of cold viruses. This can help both you and others avoid getting colds. Follow these tips:    Wash your hands well anytime you may have come into contact with cold viruses. Wash your hands for at least 20 seconds. When you can t wash with soap and water, use an alcohol-based hand .    Don t touch your nose, eyes, or mouth, especially after touching something that may have a cold virus on it.    Cover your mouth and nose when you cough or sneeze. Throw away tissues after using them.    Disinfect things you touch often, such as phones and keyboards.      Stay home when you have a cold.  What are the possible complications of a cold virus?  Colds usually go away by themselves. But it s not unusual to get another type of infection while you have a cold. These can include:    Sinus infection    Lung infection, such as bronchitis or pneumonia    Ear infection  If you have asthma or chronic bronchitis, a cold can make your condition worse.     When should I call my healthcare provider?  Call your healthcare provider right away if you have any of these:    Fever of 100.4 F (38 C) or higher, or as directed    Cough, chest pain, or shortness of breath that gets worse    Symptoms don t get better or get worse after about 10 days    Headache, sleepiness, or confusion that gets worse   Date Last Reviewed: 3/28/2016    7162-0693 [employer]. [address] [place] [address]. All rights reserved. This information is not intended as a substitute for professional medical care. Always follow your healthcare professional's instructions.          Your next 10 appointments already scheduled     Nov 16, 2018 11:15 AM CST   (Arrive by 11:00 AM)   Pre-Op physical with Alexander Vazquez,    [facility] ([facility] )    [address]  [facility] [address]   [phone]            Dec 04, 2018   Procedure with Tima Moreland MD   [facility] ([facility] )    [address] [place] [address] [phone]                 Review of your medicines      START taking        Dose / Directions Last dose taken    azithromycin 250 MG tablet   Commonly known as:  ZITHROMAX Z-DEVON   Quantity:  6 tablet        Two tablets on the first day, then one tablet daily for the next 4 days   Refills:  0          Our records show that you are taking the medicines listed below. If these are incorrect, please call your family doctor or clinic.        Dose / Directions Last dose taken    amLODIPine 5 MG tablet   Commonly known as:  NORVASC   Quantity:  90 tablet        TAKE 1 TABLET DAILY   Refills:  2        ASPIRIN PO   Dose:  325 mg        Take 325 mg by mouth daily   Refills:  0        atorvastatin 40 MG tablet   Commonly known as:  LIPITOR   Quantity:  90 tablet        TAKE 1 TABLET DAILY   Refills:  3        blood glucose lancing device   Dose:  1 Units   Quantity:  1 kit        1 Units daily   Refills:  2        blood glucose monitoring test strip   Commonly known as:  ACCU-CHEK SMARTVIEW   Quantity:  1 Box        Test blood sugar before breakfast one day, before and after supper the next and not at all the third day   Refills:  12        calcium carbonate 600 mg (elemental) 1500 (600 Ca) MG tablet   Commonly known as:  OS-WALI   Dose:  1500 mg   Quantity:  30 tablet        Take 1 tablet (1,500 mg) by mouth daily   Refills:  3        clopidogrel 75 MG tablet   Commonly known as:  PLAVIX   Quantity:  90 tablet        TAKE 1 TABLET DAILY   Refills:  1        finasteride 5 MG tablet   Commonly known as:  PROSCAR   Quantity:  90 tablet        TAKE 1 TABLET DAILY   Refills:  3        lisinopril 40 MG tablet   Commonly known as:  PRINIVIL/ZESTRIL   Quantity:  90 tablet        TAKE 1 TABLET DAILY  "  Refills:  1        metoprolol tartrate 100 MG tablet   Commonly known as:  LOPRESSOR   Quantity:  180 tablet        TAKE 1 TABLET TWICE A DAY   Refills:  2        MULTIVITAMINS PO   Dose:  1 capsule        Take 1 capsule by mouth daily   Refills:  0        nitroGLYcerin 0.4 MG sublingual tablet   Commonly known as:  NITROSTAT   Dose:  0.4 mg   Quantity:  25 tablet        Place 1 tablet (0.4 mg) under the tongue every 5 minutes as needed   Refills:  3        ranitidine 150 MG tablet   Commonly known as:  ZANTAC   Quantity:  180 tablet        TAKE 1 TABLET TWICE A DAY   Refills:  3        UNABLE TO FIND   Dose:  550 mg        Take 550 mg by mouth 2 times daily Gymnema danielle   Refills:  0        VITAMIN D3 PO   Dose:  1000 Units        Take 1,000 Units by mouth daily   Refills:  0                Prescriptions were sent or printed at these locations (1 Prescription)                   [id number] [place]  [address] [address]   [address], [place] [address]    Telephone:  [phone]   Fax:  [phone]   Hours:                  E-Prescribed (1 of 1)         azithromycin (ZITHROMAX Z-DEVON) 250 MG tablet                Orders Needing Specimen Collection     None      Pending Results     No orders found from 10/25/2018 to 10/28/2018.            Pending Culture Results     No orders found from 10/25/2018 to 10/28/2018.            Thank you for choosing [facility]       Thank you for choosing [facility] for your care. Our goal is always to provide you with excellent care. Hearing back from our patients is one way we can continue to improve our services. Please take a few minutes to complete the written survey that you may receive in the mail after you visit with us. Thank you!        [employer] Information     [employer] lets you send messages to your doctor, view your test results, renew your prescriptions, schedule appointments and more. To sign up, go to [email or web address] . Click on \"Log in\" on the left " "side of the screen, which will take you to the Welcome page. Then click on \"Sign up Now\" on the right side of the page.     You will be asked to enter the access code listed below, as well as some personal information. Please follow the directions to create your username and password.     Your access code is: [email or web address]  Expires: 2019 11:48 AM     Your access code will  in 90 days. If you need help or a new code, please call your [place] clinic or [phone].        Care EveryWhere ID     This is your Care EveryWhere ID. This could be used by other organizations to access your [place] medical records  [id number]        Equal Access to Services     ANDRY CASPER : Taisha June, maurilio weber, fabricio espinoza, john pulido. So [facility] [phone].    ATENCIÓN: Si habla español, tiene a farah disposición servicios gratuitos de asistencia lingüística. Llame al [phone].    We comply with applicable federal civil rights laws and [place] laws. We do not discriminate on the basis of race, color, national origin, age, disability, sex, sexual orientation, or gender identity.            After Visit Summary       This is your record. Keep this with you and show to your community pharmacist(s) and doctor(s) at your next visit.                  "

## 2018-10-27 NOTE — ED AVS SNAPSHOT
[facility]    [address] [address]    Phone:  [phone]                                       Peter Zhao   MRN: [id number]    Department:  [facility]   Date of Visit:  10/27/2018           After Visit Summary Signature Page     I have received my discharge instructions, and my questions have been answered. I have discussed any challenges I see with this plan with the nurse or doctor.    ..........................................................................................................................................  Patient/Patient Representative Signature      ..........................................................................................................................................  Patient Representative Print Name and Relationship to Patient    ..................................................               ................................................  Date                                   Time    ..........................................................................................................................................  Reviewed by Signature/Title    ...................................................              ..............................................  Date                                               Time          22EPIC Rev 08/18

## 2018-10-27 NOTE — DISCHARGE INSTRUCTIONS
Take antibiotics as ordered.  Use saline nasal spray.   Drink lots of warm liquids and rest.   Follow up with PCP if not improving in the next 3-5 days.  Return here if symptoms worsen.     Understanding the Cold Virus  Colds are the most common illness that people get. Most adults get 2 or 3 colds per year, and most children get 5 to 7 colds per year. Colds may be caused by over 200 types of viruses. The most common of these are rhinoviruses ( rhino  refers to the nose).  What causes a cold virus?  All colds start with infection by a virus. You can be infected by more than one cold virus at a time. Infection with cold viruses happens when:    You breathe in a virus from the air. This can happen when someone with a cold sneezes or coughs near you.    You touch your eyes, nose, or mouth when your hand has a cold virus on it. This can happen if you touch an object that has the cold virus on it.  What are the symptoms of a cold virus?  Almost all colds involve a stuffy nose. Other common symptoms include:    Runny nose    Sneezing    Sore throat    Headache    Cough  How is a cold treated?  Colds usually last 5 to 10 days. Treatment focuses on relieving symptoms. Treatments may include:    Decongestant medicines. Several types of decongestants are available without prescription. These may help reduce stuffy or runny nose symptoms.    Prescription or over-the-counter nasal sprays. These may help reduce nasal symptoms, including stuffiness.    Prescription or over-the-counter pain medicines. These can help with headaches and sore throat.    Self-care. This includes extra rest, using humidifiers, and drinking more fluids. These help you feel better while you are getting over a cold.  Antibiotics are not helpful for a cold. They do not make a cold shorter or relieve symptoms. Taking antibiotics when you don t need them can make them work less well when you need them for another illness.  Follow all directions for using  medicines, especially when giving them to children. Contact your healthcare provider if you have any questions about using cold medicines safely.  Can a cold be prevented?  You can help reduce the spread of cold viruses. This can help both you and others avoid getting colds. Follow these tips:    Wash your hands well anytime you may have come into contact with cold viruses. Wash your hands for at least 20 seconds. When you can t wash with soap and water, use an alcohol-based hand .    Don t touch your nose, eyes, or mouth, especially after touching something that may have a cold virus on it.    Cover your mouth and nose when you cough or sneeze. Throw away tissues after using them.    Disinfect things you touch often, such as phones and keyboards.      Stay home when you have a cold.  What are the possible complications of a cold virus?  Colds usually go away by themselves. But it s not unusual to get another type of infection while you have a cold. These can include:    Sinus infection    Lung infection, such as bronchitis or pneumonia    Ear infection  If you have asthma or chronic bronchitis, a cold can make your condition worse.     When should I call my healthcare provider?  Call your healthcare provider right away if you have any of these:    Fever of 100.4 F (38 C) or higher, or as directed    Cough, chest pain, or shortness of breath that gets worse    Symptoms don t get better or get worse after about 10 days    Headache, sleepiness, or confusion that gets worse   Date Last Reviewed: 3/28/2016    9879-0670 [employer]. [address] [place] [address], [place] [address]. All rights reserved. This information is not intended as a substitute for professional medical care. Always follow your healthcare professional's instructions.

## 2018-10-27 NOTE — ED TRIAGE NOTES
Pt presents today with c/o nasal congestion and cough. Cough lasted all night. States he wants to take nasal spray and cold medication but unsure with his medications. Started 3 days ago. Cough was continuous all night. Rates pain at 0.

## 2018-10-28 ASSESSMENT — ENCOUNTER SYMPTOMS
MUSCULOSKELETAL NEGATIVE: 1
SINUS PRESSURE: 1
ABDOMINAL PAIN: 0
APPETITE CHANGE: 0
RHINORRHEA: 1
SORE THROAT: 0
COUGH: 1
WHEEZING: 0
FEVER: 0

## 2018-11-16 ENCOUNTER — RADIANT APPOINTMENT (OUTPATIENT)
Dept: GENERAL RADIOLOGY | Facility: OTHER | Age: 73
End: 2018-11-16
Attending: INTERNAL MEDICINE
Payer: MEDICARE

## 2018-11-16 ENCOUNTER — ALLIED HEALTH/NURSE VISIT (OUTPATIENT)
Dept: INTERNAL MEDICINE | Facility: OTHER | Age: 73
End: 2018-11-16
Attending: INTERNAL MEDICINE
Payer: MEDICARE

## 2018-11-16 VITALS
HEART RATE: 68 BPM | OXYGEN SATURATION: 97 % | DIASTOLIC BLOOD PRESSURE: 60 MMHG | WEIGHT: 179 LBS | SYSTOLIC BLOOD PRESSURE: 102 MMHG | TEMPERATURE: 96.4 F | BODY MASS INDEX: 25.68 KG/M2

## 2018-11-16 DIAGNOSIS — K43.9 ABDOMINAL WALL HERNIA: ICD-10-CM

## 2018-11-16 DIAGNOSIS — Z01.818 PREOP GENERAL PHYSICAL EXAM: ICD-10-CM

## 2018-11-16 DIAGNOSIS — I10 ESSENTIAL HYPERTENSION: ICD-10-CM

## 2018-11-16 DIAGNOSIS — Z95.2 HX OF AORTIC VALVE REPLACEMENT: ICD-10-CM

## 2018-11-16 DIAGNOSIS — E78.5 HYPERLIPIDEMIA LDL GOAL <100: ICD-10-CM

## 2018-11-16 DIAGNOSIS — G45.9 TIA (TRANSIENT ISCHEMIC ATTACK): ICD-10-CM

## 2018-11-16 DIAGNOSIS — Z01.818 PREOP GENERAL PHYSICAL EXAM: Primary | ICD-10-CM

## 2018-11-16 DIAGNOSIS — Z95.810 ICD (IMPLANTABLE CARDIOVERTER-DEFIBRILLATOR) IN PLACE: ICD-10-CM

## 2018-11-16 DIAGNOSIS — Z95.2 H/O AORTIC VALVE REPLACEMENT: ICD-10-CM

## 2018-11-16 DIAGNOSIS — I25.10 CORONARY ARTERY DISEASE INVOLVING NATIVE CORONARY ARTERY OF NATIVE HEART WITHOUT ANGINA PECTORIS: ICD-10-CM

## 2018-11-16 DIAGNOSIS — E11.9 TYPE 2 DIABETES MELLITUS WITHOUT COMPLICATION, WITHOUT LONG-TERM CURRENT USE OF INSULIN (H): ICD-10-CM

## 2018-11-16 DIAGNOSIS — Z01.818 PRE-OP EXAM: Primary | ICD-10-CM

## 2018-11-16 LAB
ALBUMIN SERPL-MCNC: 3.7 G/DL (ref 3.4–5)
ALP SERPL-CCNC: 99 U/L (ref 40–150)
ALT SERPL W P-5'-P-CCNC: 23 U/L (ref 0–70)
ANION GAP SERPL CALCULATED.3IONS-SCNC: 7 MMOL/L (ref 3–14)
AST SERPL W P-5'-P-CCNC: 30 U/L (ref 0–45)
BASOPHILS # BLD AUTO: 0.1 10E9/L (ref 0–0.2)
BASOPHILS NFR BLD AUTO: 0.8 %
BILIRUB SERPL-MCNC: 0.7 MG/DL (ref 0.2–1.3)
BUN SERPL-MCNC: 32 MG/DL (ref 7–30)
CALCIUM SERPL-MCNC: 8.4 MG/DL (ref 8.5–10.1)
CHLORIDE SERPL-SCNC: 107 MMOL/L (ref 94–109)
CO2 SERPL-SCNC: 27 MMOL/L (ref 20–32)
CREAT SERPL-MCNC: 1.37 MG/DL (ref 0.66–1.25)
DIFFERENTIAL METHOD BLD: NORMAL
EOSINOPHIL # BLD AUTO: 0.4 10E9/L (ref 0–0.7)
EOSINOPHIL NFR BLD AUTO: 4.3 %
ERYTHROCYTE [DISTWIDTH] IN BLOOD BY AUTOMATED COUNT: 13.8 % (ref 10–15)
GFR SERPL CREATININE-BSD FRML MDRD: 51 ML/MIN/1.7M2
GLUCOSE SERPL-MCNC: 118 MG/DL (ref 70–99)
HCT VFR BLD AUTO: 45.5 % (ref 40–53)
HGB BLD-MCNC: 15.1 G/DL (ref 13.3–17.7)
IMM GRANULOCYTES # BLD: 0 10E9/L (ref 0–0.4)
IMM GRANULOCYTES NFR BLD: 0.5 %
LYMPHOCYTES # BLD AUTO: 1.4 10E9/L (ref 0.8–5.3)
LYMPHOCYTES NFR BLD AUTO: 15.8 %
MCH RBC QN AUTO: 29.4 PG (ref 26.5–33)
MCHC RBC AUTO-ENTMCNC: 33.2 G/DL (ref 31.5–36.5)
MCV RBC AUTO: 89 FL (ref 78–100)
MONOCYTES # BLD AUTO: 0.9 10E9/L (ref 0–1.3)
MONOCYTES NFR BLD AUTO: 10 %
NEUTROPHILS # BLD AUTO: 6.1 10E9/L (ref 1.6–8.3)
NEUTROPHILS NFR BLD AUTO: 68.6 %
NRBC # BLD AUTO: 0 10*3/UL
NRBC BLD AUTO-RTO: 0 /100
PLATELET # BLD AUTO: 271 10E9/L (ref 150–450)
POTASSIUM SERPL-SCNC: 4.3 MMOL/L (ref 3.4–5.3)
PROT SERPL-MCNC: 7.3 G/DL (ref 6.8–8.8)
RBC # BLD AUTO: 5.13 10E12/L (ref 4.4–5.9)
SODIUM SERPL-SCNC: 141 MMOL/L (ref 133–144)
WBC # BLD AUTO: 8.9 10E9/L (ref 4–11)

## 2018-11-16 PROCEDURE — 99215 OFFICE O/P EST HI 40 MIN: CPT | Performed by: INTERNAL MEDICINE

## 2018-11-16 PROCEDURE — 71046 X-RAY EXAM CHEST 2 VIEWS: CPT | Mod: TC

## 2018-11-16 PROCEDURE — 93005 ELECTROCARDIOGRAM TRACING: CPT | Performed by: INTERNAL MEDICINE

## 2018-11-16 PROCEDURE — 36415 COLL VENOUS BLD VENIPUNCTURE: CPT | Mod: ZL | Performed by: INTERNAL MEDICINE

## 2018-11-16 PROCEDURE — G0463 HOSPITAL OUTPT CLINIC VISIT: HCPCS

## 2018-11-16 PROCEDURE — G0463 HOSPITAL OUTPT CLINIC VISIT: HCPCS | Mod: 25

## 2018-11-16 PROCEDURE — 85025 COMPLETE CBC W/AUTO DIFF WBC: CPT | Mod: ZL | Performed by: INTERNAL MEDICINE

## 2018-11-16 PROCEDURE — 93010 ELECTROCARDIOGRAM REPORT: CPT | Performed by: INTERNAL MEDICINE

## 2018-11-16 PROCEDURE — 80053 COMPREHEN METABOLIC PANEL: CPT | Mod: ZL | Performed by: INTERNAL MEDICINE

## 2018-11-16 ASSESSMENT — ANXIETY QUESTIONNAIRES
4. TROUBLE RELAXING: NOT AT ALL
2. NOT BEING ABLE TO STOP OR CONTROL WORRYING: NOT AT ALL
1. FEELING NERVOUS, ANXIOUS, OR ON EDGE: NOT AT ALL
6. BECOMING EASILY ANNOYED OR IRRITABLE: NOT AT ALL
7. FEELING AFRAID AS IF SOMETHING AWFUL MIGHT HAPPEN: NOT AT ALL
3. WORRYING TOO MUCH ABOUT DIFFERENT THINGS: NOT AT ALL
GAD7 TOTAL SCORE: 0
5. BEING SO RESTLESS THAT IT IS HARD TO SIT STILL: NOT AT ALL

## 2018-11-16 ASSESSMENT — PAIN SCALES - GENERAL: PAINLEVEL: NO PAIN (0)

## 2018-11-16 ASSESSMENT — PATIENT HEALTH QUESTIONNAIRE - PHQ9: SUM OF ALL RESPONSES TO PHQ QUESTIONS 1-9: 0

## 2018-11-16 NOTE — MR AVS SNAPSHOT
"              After Visit Summary   11/16/2018    Peter Zhao    MRN: [id number]           Patient Information     Date Of Birth          1945        Visit Information        Provider Department      11/16/2018 3:00 PM HC IM NURSE [facility]        Today's Diagnoses     Preop general physical exam    -  1       Follow-ups after your visit        Your next 10 appointments already scheduled     Dec 04, 2018   Procedure with Tima Moreland MD   [facility] ([facility] )    [address] [place] [id number] [phone]              Who to contact     If you have questions or need follow up information about today's clinic visit or your schedule please contact [facility] [place] directly at [phone].  Normal or non-critical lab and imaging results will be communicated to you by ModiFacehart, letter or phone within 4 business days after the clinic has received the results. If you do not hear from us within 7 days, please contact the clinic through ModiFacehart or phone. If you have a critical or abnormal lab result, we will notify you by phone as soon as possible.  Submit refill requests through reQwip or call your pharmacy and they will forward the refill request to us. Please allow 3 business days for your refill to be completed.          Additional Information About Your Visit        ModiFacehart Information     reQwip lets you send messages to your doctor, view your test results, renew your prescriptions, schedule appointments and more. To sign up, go to [email or web address] . Click on \"Log in\" on the left side of the screen, which will take you to the Welcome page. Then click on \"Sign up Now\" on the right side of the page.     You will be asked to enter the access code listed below, as well as some personal information. Please follow the directions to create your username and password.     Your access code is: [email or web address]  Expires: 1/25/2019 " 10:48 AM     Your access code will  in 90 days. If you need help or a new code, please call your [place] clinic or [phone].        Care EveryWhere ID     This is your Care EveryWhere ID. This could be used by other organizations to access your [place] medical records  [id number]         Blood Pressure from Last 3 Encounters:   18 102/60   10/27/18 91/59   18 102/62    Weight from Last 3 Encounters:   18 179 lb (81.2 kg)   18 180 lb (81.6 kg)   08/10/18 180 lb (81.6 kg)              Today, you had the following     No orders found for display         Today's Medication Changes          These changes are accurate as of 18  3:24 PM.  If you have any questions, ask your nurse or doctor.               Start taking these medicines.        Dose/Directions    order for DME   Used for:  Abdominal wall hernia   Started by:  Alexander Vazquez DO        Equipment being ordered: Abdominal binders   Quantity:  2 Device   Refills:  1            Where to get your medicines      Some of these will need a paper prescription and others can be bought over the counter.  Ask your nurse if you have questions.     Bring a paper prescription for each of these medications     order for DME                Primary Care Provider Office Phone # Fax #    Alexander Vazquez [phone]       [address] [place] [address]        Equal Access to Services     STEVE CASPER AH: Hadarelis [place] Sotessie, waaxda luqadaha, qaybta kaalmada john espinoza . So [facility] [phone].    ATENCIÓN: Si habla español, tiene a farah disposición servicios gratuitos de asistencia lingüística. Jose al [phone].    We comply with applicable federal civil rights laws and [place] laws. We do not discriminate on the basis of race, color, national origin, age, disability, sex, sexual orientation, or gender identity.            Thank you!     Thank you  for choosing [facility]  for your care. Our goal is always to provide you with excellent care. Hearing back from our patients is one way we can continue to improve our services. Please take a few minutes to complete the written survey that you may receive in the mail after your visit with us. Thank you!             Your Updated Medication List - Protect others around you: Learn how to safely use, store and throw away your medicines at www.disposemymeds.org.          This list is accurate as of 11/16/18  3:24 PM.  Always use your most recent med list.                   Brand Name Dispense Instructions for use Diagnosis    amLODIPine 5 MG tablet    NORVASC    90 tablet    TAKE 1 TABLET DAILY    Benign essential hypertension       ASPIRIN PO      Take 325 mg by mouth daily        atorvastatin 40 MG tablet    LIPITOR    90 tablet    TAKE 1 TABLET DAILY    Hyperlipidemia LDL goal <100       blood glucose lancing device     1 kit    1 Units daily    T2DM (type 2 diabetes mellitus) (H)       blood glucose monitoring test strip    ACCU-CHEK SMARTVIEW    1 Box    Test blood sugar before breakfast one day, before and after supper the next and not at all the third day    T2DM (type 2 diabetes mellitus) (H)       calcium carbonate 600 mg (elemental) 1500 (600 Ca) MG tablet    OS-WALI    30 tablet    Take 1 tablet (1,500 mg) by mouth daily        clopidogrel 75 MG tablet    PLAVIX    90 tablet    TAKE 1 TABLET DAILY    Benign essential hypertension       finasteride 5 MG tablet    PROSCAR    90 tablet    TAKE 1 TABLET DAILY    Benign non-nodular prostatic hyperplasia with lower urinary tract symptoms       lisinopril 40 MG tablet    PRINIVIL/ZESTRIL    90 tablet    TAKE 1 TABLET DAILY    Benign essential hypertension       metoprolol tartrate 100 MG tablet    LOPRESSOR    180 tablet    TAKE 1 TABLET TWICE A DAY    Benign essential hypertension       MULTIVITAMINS PO      Take 1 capsule by mouth daily         nitroGLYcerin 0.4 MG sublingual tablet    NITROSTAT    25 tablet    Place 1 tablet (0.4 mg) under the tongue every 5 minutes as needed    Hyperlipidemia LDL goal <100, Intermediate coronary syndrome (H)       order for DME     2 Device    Equipment being ordered: Abdominal binders    Abdominal wall hernia       ranitidine 150 MG tablet    ZANTAC    180 tablet    TAKE 1 TABLET TWICE A DAY    Gastroesophageal reflux disease without esophagitis       UNABLE TO FIND      Take 550 mg by mouth 2 times daily Gymnema danielle        VITAMIN D3 PO      Take 1,000 Units by mouth daily

## 2018-11-16 NOTE — PROGRESS NOTES
Pt wanted to know results form lab and xray from today. Pt was notified the provider has not had time to look at them yet and he will be called with results. Pt states to just leave a message on his phone. Sabina Olivo LPN

## 2018-11-16 NOTE — MR AVS SNAPSHOT
After Visit Summary   11/16/2018    Peter Zhao    MRN: [id number]           Patient Information     Date Of Birth          1945        Visit Information        Provider Department      11/16/2018 11:15 AM Alexander Vazquez,  [facility]        Today's Diagnoses     Pre-op exam    -  1    Preop general physical exam          Care Instructions      Before Your Surgery      Call your surgeon if there is any change in your health. This includes signs of a cold or flu (such as a sore throat, runny nose, cough, rash or fever).    Do not smoke, drink alcohol or take over the counter medicine (unless your surgeon or primary care doctor tells you to) for the 24 hours before and after surgery.    If you take prescribed drugs: Follow your doctor s orders about which medicines to take and which to stop until after surgery.    Eating and drinking prior to surgery: follow the instructions from your surgeon    Take a shower or bath the night before surgery. Use the soap your surgeon gave you to gently clean your skin. If you do not have soap from your surgeon, use your regular soap. Do not shave or scrub the surgery site.  Wear clean pajamas and have clean sheets on your bed.           Follow-ups after your visit        Your next 10 appointments already scheduled     Dec 04, 2018   Procedure with Tima Moreland MD   [facility] ([address] [id number] [phone]              Who to contact     If you have questions or need follow up information about today's clinic visit or your schedule please contact [facility] directly at [phone].  Normal or non-critical lab and imaging results will be communicated to you by MyChart, letter or phone within 4 business days after the clinic has received the results. If you do not hear from us within 7 days, please contact the clinic through MyChart or phone.  "If you have a critical or abnormal lab result, we will notify you by phone as soon as possible.  Submit refill requests through iNeed or call your pharmacy and they will forward the refill request to us. Please allow 3 business days for your refill to be completed.          Additional Information About Your Visit        Roth Buildershart Information     iNeed lets you send messages to your doctor, view your test results, renew your prescriptions, schedule appointments and more. To sign up, go to [email or web address] . Click on \"Log in\" on the left side of the screen, which will take you to the Welcome page. Then click on \"Sign up Now\" on the right side of the page.     You will be asked to enter the access code listed below, as well as some personal information. Please follow the directions to create your username and password.     Your access code is: [email or web address]  Expires: 2019 10:48 AM     Your access code will  in 90 days. If you need help or a new code, please call your [place] clinic or [phone].        Care EveryWhere ID     This is your Care EveryWhere ID. This could be used by other organizations to access your [place] medical records  [id number]        Your Vitals Were     Pulse Temperature Pulse Oximetry BMI (Body Mass Index)          68 96.4  F (35.8  C) (Tympanic) 97% 25.68 kg/m2         Blood Pressure from Last 3 Encounters:   18 102/60   10/27/18 91/59   18 102/62    Weight from Last 3 Encounters:   18 179 lb (81.2 kg)   18 180 lb (81.6 kg)   08/10/18 180 lb (81.6 kg)              We Performed the Following     CBC with platelets and differential     Comprehensive metabolic panel (BMP + Alb, Alk Phos, ALT, AST, Total. Bili, TP)     EKG 12-lead complete w/read - (Clinic Performed)     XR CHEST 2 VW (Clinic Performed)        Primary Care Provider Office Phone # Fax #    Alexander Vazquez [phone]       [address] [place] [address] " [phone]        Equal Access to Services     [facility]: Hadii miroslava roberts velmafaby Michelleali, walatriceda luqadaha, qaybta kaalmada olga, john pulido. So [facility] [phone].    ATENCIÓN: Si habla español, tiene a farah disposición servicios gratuitos de asistencia lingüística. Llame al [phone].    We comply with applicable federal civil rights laws and [place] laws. We do not discriminate on the basis of race, color, national origin, age, disability, sex, sexual orientation, or gender identity.            Thank you!     Thank you for choosing [facility]  for your care. Our goal is always to provide you with excellent care. Hearing back from our patients is one way we can continue to improve our services. Please take a few minutes to complete the written survey that you may receive in the mail after your visit with us. Thank you!             Your Updated Medication List - Protect others around you: Learn how to safely use, store and throw away your medicines at www.disposemymeds.org.          This list is accurate as of 11/16/18 11:20 AM.  Always use your most recent med list.                   Brand Name Dispense Instructions for use Diagnosis    amLODIPine 5 MG tablet    NORVASC    90 tablet    TAKE 1 TABLET DAILY    Benign essential hypertension       ASPIRIN PO      Take 325 mg by mouth daily        atorvastatin 40 MG tablet    LIPITOR    90 tablet    TAKE 1 TABLET DAILY    Hyperlipidemia LDL goal <100       blood glucose lancing device     1 kit    1 Units daily    T2DM (type 2 diabetes mellitus) (H)       blood glucose monitoring test strip    ACCU-CHEK SMARTVIEW    1 Box    Test blood sugar before breakfast one day, before and after supper the next and not at all the third day    T2DM (type 2 diabetes mellitus) (H)       calcium carbonate 600 mg (elemental) 1500 (600 Ca) MG tablet    OS-WALI    30 tablet    Take 1 tablet (1,500 mg) by mouth daily         clopidogrel 75 MG tablet    PLAVIX    90 tablet    TAKE 1 TABLET DAILY    Benign essential hypertension       finasteride 5 MG tablet    PROSCAR    90 tablet    TAKE 1 TABLET DAILY    Benign non-nodular prostatic hyperplasia with lower urinary tract symptoms       lisinopril 40 MG tablet    PRINIVIL/ZESTRIL    90 tablet    TAKE 1 TABLET DAILY    Benign essential hypertension       metoprolol tartrate 100 MG tablet    LOPRESSOR    180 tablet    TAKE 1 TABLET TWICE A DAY    Benign essential hypertension       MULTIVITAMINS PO      Take 1 capsule by mouth daily        nitroGLYcerin 0.4 MG sublingual tablet    NITROSTAT    25 tablet    Place 1 tablet (0.4 mg) under the tongue every 5 minutes as needed    Hyperlipidemia LDL goal <100, Intermediate coronary syndrome (H)       ranitidine 150 MG tablet    ZANTAC    180 tablet    TAKE 1 TABLET TWICE A DAY    Gastroesophageal reflux disease without esophagitis       UNABLE TO FIND      Take 550 mg by mouth 2 times daily Gymnema danielle        VITAMIN D3 PO      Take 1,000 Units by mouth daily

## 2018-11-16 NOTE — PROGRESS NOTES
[facility] - [place]  [address]  [place] [address]  [phone]  Dept: [phone]    PRE-OP EVALUATION:  Today's date: 2018    Peter Zhao (: 1945) presents for pre-operative evaluation assessment as requested by Dr. Moreland.  He requires evaluation and anesthesia risk assessment prior to undergoing surgery/procedure for treatment of Hernia repair .    Proposed Surgery/ Procedure: Hernia Repair  Date of Surgery/ Procedure: 18  Time of Surgery/ Procedure: d  Hospital/Surgical Facility: [facility]  Primary Physician: Alexander Vazquez  Type of Anesthesia Anticipated: to be determined    Patient has a Health Care Directive or Living Will:  YES    1. YES - DO YOU HAVE A HISTORY OF HEART ATTACK, STROKE, STENT, BYPASS OR SURGERY ON AN ARTERY IN THE HEAD, NECK, HEART OR LEG? Possible TIA unknown date  2. NO - Do you ever have any pain or discomfort in your chest?  3. NO - Do you have a history of  Heart Failure?- pacemaker   4. NO - Are you troubled by shortness of breath when: walking on the level, up a slight hill or at night?  5. NO - Do you currently have a cold, bronchitis or other respiratory infection?  6. NO - Do you have a cough, shortness of breath or wheezing?  7. NO - Do you sometimes get pains in the calves of your legs when you walk?  8. NO - Do you or anyone in your family have previous history of blood clots?  9. NO - Do you or does anyone in your family have a serious bleeding problem such as prolonged bleeding following surgeries or cuts?  10. NO - Have you ever had problems with anemia or been told to take iron pills?  11. NO - Have you had any abnormal blood loss such as black, tarry or bloody stools, or abnormal vaginal bleeding?  12. NO - Have you ever had a blood transfusion?  13. NO - Have you or any of your relatives ever had problems with anesthesia?  14. NO - Do you have sleep apnea, excessive snoring or daytime drowsiness?  15. YES - DO YOU  HAVE ANY PROSTHETIC HEART VALVES? Aorta   16. NO - Do you have prosthetic joints?  17. NO - Is there any chance that you may be pregnant?      HPI:     HPI related to upcoming procedure: Abdominal hernia repairs      CAD - Patient has a longstanding history of moderate-severe CAD. Patient denies recent chest pain or NTG use, denies exercise induced dyspnea or PND. ICD in place.                                                                                                                                          DIABETES - Patient has a longstanding history of DiabetesType Type II . Patient is being treated with diet and denies significant side effects. Control has been good. Complicating factors include but are not limited to: hypertension, hyperlipidemia, CAD/PVD and CVA.                                                                                                                     HYPERLIPIDEMIA - Patient has a long history of significant Hyperlipidemia requiring medication for treatment with recent fair control. Patient reports no problems or side effects with the medication.                                                                                                                                          HYPERTENSION - Patient has longstanding history of HTN , currently denies any symptoms referable to elevated blood pressure. Specifically denies chest pain, palpitations, dyspnea, orthopnea, PND or peripheral edema. Blood pressure readings have been in normal range. Current medication regimen is as listed below. Patient denies any side effects of medication.                                                                                                                                                                                          .    MEDICAL HISTORY:     Patient Active Problem List    Diagnosis Date Noted     Attention to colostomy (H) 09/21/2017     Priority: Medium     Agitation 05/19/2017      Priority: Medium     TIA (transient ischemic attack) 04/29/2017     Priority: Medium     Hypokalemia 02/20/2017     Priority: Medium     S/P colon resection 02/18/2017     Priority: Medium     Hyperlipidemia LDL goal <100 02/15/2017     Priority: Medium     Elevated prostate specific antigen (PSA) 02/15/2017     Priority: Medium     HTN (hypertension) 02/15/2017     Priority: Medium     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  Peter Zhao has no plan or code status on file. Discussed available resources and provided with information. Confirmed code status reflects current choices pending further ACP discussions.  Confirmed/documented legally designated decision makers.  Added by Daxa Campa           CAD (coronary artery disease)      Priority: Medium     CABG 2013, stent x 1 in 2014       Type 2 diabetes mellitus without complication, without long-term current use of insulin (H) 10/31/2013     Priority: Medium      Past Medical History:   Diagnosis Date     Allergic state      CAD (coronary artery disease)     CABG 2013, stent x 1 in 2014     Cataract      Cataracts, bilateral      Elevated PSA      Gastro-oesophageal reflux disease      Heart murmur      Hyperlipidemia      Hypertension      Pacemaker      Stented coronary artery      Visual aura      Past Surgical History:   Procedure Laterality Date     AORTIC VALVE REPLACEMENT      25 mm CE bovine replacement Dr. Wu 8/20/2012     CARDIAC SURGERY       CATARACT IOL, RT/LT Right 04/2017     COLECTOMY LOW ANTERIOR N/A 2/17/2017    Procedure: COLECTOMY LOW ANTERIOR;   Surgeon: Tima Moreland MD;  Location: [facility]     COLONOSCOPY N/A 2/17/2017    Procedure: COLONOSCOPY;  Surgeon: Tima Moreland MD;  Location: [facility]     ENT SURGERY       FLEX SIG (MEDICARE PT.)       GI SURGERY  02/2017    ostemomy     HERNIA REPAIR       LAPAROSCOPIC HERNIORRHAPHY INGUINAL Right 9/18/2015    Procedure: LAPAROSCOPIC HERNIORRHAPHY INGUINAL;  Surgeon: Solitario Nettles DO;  Location: [facility]     PHACOEMULSIFICATION WITH STANDARD INTRAOCULAR LENS IMPLANT Right 4/20/2017    Procedure: PHACOEMULSIFICATION WITH STANDARD INTRAOCULAR LENS IMPLANT;  CATARACT EXTRACTION RIGHT EYE WITH IMPLANT;  Surgeon: Darin Gutierrez MD;  Location: [facility] OR     PROSTATE BIOPSY, NEEDLE, SATURATION SAMPLING  2009     retinopexy-pvd with retinal tear Right 2008     TAKEDOWN COLOSTOMY N/A 9/21/2017    Procedure: TAKEDOWN COLOSTOMY;  CLOSURE OF NORRIS'S SIGMOID COLOSTOMY, REPAIR STOMAL HERNIA;  Surgeon: Tima Moreland MD;  Location: [facility] OR     TONSILLECTOMY       Current Outpatient Prescriptions   Medication Sig Dispense Refill     amLODIPine (NORVASC) 5 MG tablet TAKE 1 TABLET DAILY 90 tablet 2     ASPIRIN PO Take 325 mg by mouth daily        atorvastatin (LIPITOR) 40 MG tablet TAKE 1 TABLET DAILY 90 tablet 3     calcium carbonate (OS-WALI 600 MG [place]. CA) 1500 (600 CA) MG tablet Take 1 tablet (1,500 mg) by mouth daily 30 tablet 3     Cholecalciferol (VITAMIN D3 PO) Take 1,000 Units by mouth daily        clopidogrel (PLAVIX) 75 MG tablet TAKE 1 TABLET DAILY 90 tablet 1     finasteride (PROSCAR) 5 MG tablet TAKE 1 TABLET DAILY 90 tablet 3     glucose blood VI test strips (ACCU-CHEK SMARTVIEW) strip Test blood sugar before breakfast one day, before and after supper the next and not at all the third day 1 Box 12     Lancets Misc. (ACCU-CHEK SOFTCLIX LANCET DEV) KIT 1 Units daily 1 kit 2     lisinopril (PRINIVIL/ZESTRIL) 40 MG tablet TAKE 1 TABLET DAILY 90 tablet 1     metoprolol tartrate (LOPRESSOR) 100 MG tablet TAKE 1  TABLET TWICE A  tablet 2     Multiple Vitamin (MULTIVITAMINS PO) Take 1 capsule by mouth daily       nitroGLYcerin (NITROSTAT) 0.4 MG sublingual tablet Place 1 tablet (0.4 mg) under the tongue every 5 minutes as needed 25 tablet 3     ranitidine (ZANTAC) 150 MG tablet TAKE 1 TABLET TWICE A  tablet 3     UNABLE TO FIND Take 550 mg by mouth 2 times daily Gymnema danielle       OTC products: None, except as noted above    Allergies   Allergen Reactions     Acetaminophen      Ciprofloxacin Hives     No Clinical Screening - See Comments      Antibiotic allergy, pt not sure which      Latex Allergy: NO    Social History   Substance Use Topics     Smoking status: Former Smoker     Smokeless tobacco: Former User     Types: Chew     Quit date: 10/19/2013      Comment: quit in 2000     Alcohol use No     History   Drug Use No       REVIEW OF SYSTEMS:   Constitutional, neuro, ENT, endocrine, pulmonary, cardiac, gastrointestinal, genitourinary, musculoskeletal, integument and psychiatric systems are negative, except as otherwise noted.    EXAM:   There were no vitals taken for this visit.    GENERAL APPEARANCE: Alert and no distress     EYES: EOMI,  PERRL     HENT: ear canals and TM's normal and nose and mouth without ulcers or lesions     NECK: no adenopathy, no asymmetry, masses, or scars and thyroid normal to palpation, no bruits.      RESP: lungs clear to auscultation - no rales, rhonchi or wheezes     CV: regular rates and rhythm, normal S1 S2, no S3 or S4 and no murmur, click or rub     ABDOMEN:  soft, nontender, several abdominal hernias were present without pain with palpation.   MS: OA in hands     SKIN: no suspicious lesions or rashes     NEURO: Normal strength and tone, sensory exam grossly normal, mentation intact and speech normal     PSYCH: mentation appears normal. and affect normal/bright      DIAGNOSTICS:   EKG: Paced rhythm    Chest XRay  XR CHEST 2 VW     HISTORY: 72 years Male ; Pre-op  exam     COMPARISON: 9/12/2017     TECHNIQUE: 2 views of the chest were obtained.     FINDINGS: Again seen are changes of median sternotomy and cardiac  defibrillator placement.     Heart size and pulmonary vascularity are within normal limits. Lungs  are clear.            IMPRESSION: Clear chest.     KRISTI LIMA MD    Recent Labs   Lab Test  05/25/18   0128  09/27/17   1811   09/12/17   0820  07/15/17   2255   06/06/17   1056   HGB  14.4  13.7   < >  13.8  13.7   --    --    PLT  239  264   < >  214  232   --    --    INR   --   1.06   --    --   1.01   --    --    NA  138  141   < >  141   --    < >   --    POTASSIUM  4.3  3.6   < >  4.2   --    < >   --    CR  1.23  1.12   < >  1.23   --    < >   --    A1C   --    --    --   6.3*   --    --   6.6*    < > = values in this interval not displayed.     Results for orders placed or performed in visit on 11/16/18   XR CHEST 2 VW (Clinic Performed)    Narrative    XR CHEST 2 VW    HISTORY: 72 years Male ; Pre-op exam    COMPARISON: 9/12/2017    TECHNIQUE: 2 views of the chest were obtained.    FINDINGS: Again seen are changes of median sternotomy and cardiac  defibrillator placement.    Heart size and pulmonary vascularity are within normal limits. Lungs  are clear.        Impression    IMPRESSION: Clear chest.    KRISTI LIMA MD   Comprehensive metabolic panel (BMP + Alb, Alk Phos, ALT, AST, Total. Bili, TP)   Result Value Ref Range    Sodium 141 133 - 144 mmol/L    Potassium 4.3 3.4 - 5.3 mmol/L    Chloride 107 94 - 109 mmol/L    Carbon Dioxide 27 20 - 32 mmol/L    Anion Gap 7 3 - 14 mmol/L    Glucose 118 (H) 70 - 99 mg/dL    Urea Nitrogen 32 (H) 7 - 30 mg/dL    Creatinine 1.37 (H) 0.66 - 1.25 mg/dL    GFR Estimate 51 (L) >60 mL/min/1.7m2    GFR Estimate If Black 61 >60 mL/min/1.7m2    Calcium 8.4 (L) 8.5 - 10.1 mg/dL    Bilirubin Total 0.7 0.2 - 1.3 mg/dL    Albumin 3.7 3.4 - 5.0 g/dL    Protein Total 7.3 6.8 - 8.8 g/dL    Alkaline Phosphatase 99 40 - 150  U/L    ALT 23 0 - 70 U/L    AST 30 0 - 45 U/L   CBC with platelets and differential   Result Value Ref Range    WBC 8.9 4.0 - 11.0 10e9/L    RBC Count 5.13 4.4 - 5.9 10e12/L    Hemoglobin 15.1 13.3 - 17.7 g/dL    Hematocrit 45.5 40.0 - 53.0 %    MCV 89 78 - 100 fl    MCH 29.4 26.5 - 33.0 pg    MCHC 33.2 31.5 - 36.5 g/dL    RDW 13.8 10.0 - 15.0 %    Platelet Count 271 150 - 450 10e9/L    Diff Method Automated Method     % Neutrophils 68.6 %    % Lymphocytes 15.8 %    % Monocytes 10.0 %    % Eosinophils 4.3 %    % Basophils 0.8 %    % Immature Granulocytes 0.5 %    Nucleated RBCs 0 0 /100    Absolute Neutrophil 6.1 1.6 - 8.3 10e9/L    Absolute Lymphocytes 1.4 0.8 - 5.3 10e9/L    Absolute Monocytes 0.9 0.0 - 1.3 10e9/L    Absolute Eosinophils 0.4 0.0 - 0.7 10e9/L    Absolute Basophils 0.1 0.0 - 0.2 10e9/L    Abs Immature Granulocytes 0.0 0 - 0.4 10e9/L    Absolute Nucleated RBC 0.0        IMPRESSION:   Reason for surgery/procedure: Abdominal hernia repairs    The proposed surgical procedure is considered INTERMEDIATE risk.    REVISED CARDIAC RISK INDEX  The patient has the following serious cardiovascular risks for perioperative complications such as (MI, PE, VFib and 3  AV Block):  Cerebrovascular Disease (TIA or CVA)  INTERPRETATION: 1 risks: Class II (low risk - 0.9% complication rate)    The patient has the following additional risks for perioperative complications:  No identified additional risks  The ASCVD Risk score (Strafford DC Jr, et al., 2013) failed to calculate for the following reasons:    The patient has a prior MCI or stroke diagnosis      ICD-10-CM    1. Pre-op exam Z01.818 XR CHEST 2 VW (Clinic Performed)     Comprehensive metabolic panel (BMP + Alb, Alk Phos, ALT, AST, Total. Bili, TP)     CBC with platelets and differential     EKG 12-lead complete w/read - (Clinic Performed)   2. Preop general physical exam Z01.818        RECOMMENDATIONS:     --Consult hospital rounder / IM to assist post-op medical  management    --Patient is contemplating this procedure as he has had difficult with anesthesia and confusion in the past.  His hernias are not giving him any trouble outside of cosmesis and the fact he cannot wear jeans.  He has not tried an abdominal binder which was prescribed today.  His history of CAD/ICD-pacer/AVR along with TIA and the need to hold Plavix and Asa x 1 week are also factors as he fears a TIIA or CVA.  We did complete the pre op today and discussed the risks at length.  Patient will consider this surgery and let us know next week if he plans to proceed.      APPROVAL GIVEN to proceed with proposed procedure, without further diagnostic evaluation     45 minutes was spent on this patient's care today.  Greater than 50% of the time was spent face to face with the patient in counseling as above. He had numerous questions all of which were answered to best of my ability.     Signed Electronically by: Alexander Vazquez DO    Copy of this evaluation report is provided to requesting physician.    [facility] Preop Guidelines    Revised Cardiac Risk Index

## 2018-11-17 ASSESSMENT — ANXIETY QUESTIONNAIRES: GAD7 TOTAL SCORE: 0

## 2018-11-19 ENCOUNTER — TELEPHONE (OUTPATIENT)
Dept: INTERNAL MEDICINE | Facility: OTHER | Age: 73
End: 2018-11-19

## 2018-11-19 NOTE — TELEPHONE ENCOUNTER
Pt  Called to ask how long plavix will still be in his system once he gets off of it. Sabina Olivo LPN

## 2018-11-21 NOTE — H&P (VIEW-ONLY)
[facility] [place]  [address]  [place] [address]  [phone]  Dept: [phone]    PRE-OP EVALUATION:  Today's date: 2018    Peter Zhao (: 1945) presents for pre-operative evaluation assessment as requested by Dr. Morealnd.  He requires evaluation and anesthesia risk assessment prior to undergoing surgery/procedure for treatment of Hernia repair .    Proposed Surgery/ Procedure: Hernia Repair  Date of Surgery/ Procedure: 18  Time of Surgery/ Procedure: d  Hospital/Surgical Facility: [facility]  Primary Physician: Alexander Vazquez  Type of Anesthesia Anticipated: to be determined    Patient has a Health Care Directive or Living Will:  YES    1. YES - DO YOU HAVE A HISTORY OF HEART ATTACK, STROKE, STENT, BYPASS OR SURGERY ON AN ARTERY IN THE HEAD, NECK, HEART OR LEG? Possible TIA unknown date  2. NO - Do you ever have any pain or discomfort in your chest?  3. NO - Do you have a history of  Heart Failure?- pacemaker   4. NO - Are you troubled by shortness of breath when: walking on the level, up a slight hill or at night?  5. NO - Do you currently have a cold, bronchitis or other respiratory infection?  6. NO - Do you have a cough, shortness of breath or wheezing?  7. NO - Do you sometimes get pains in the calves of your legs when you walk?  8. NO - Do you or anyone in your family have previous history of blood clots?  9. NO - Do you or does anyone in your family have a serious bleeding problem such as prolonged bleeding following surgeries or cuts?  10. NO - Have you ever had problems with anemia or been told to take iron pills?  11. NO - Have you had any abnormal blood loss such as black, tarry or bloody stools, or abnormal vaginal bleeding?  12. NO - Have you ever had a blood transfusion?  13. NO - Have you or any of your relatives ever had problems with anesthesia?  14. NO - Do you have sleep apnea, excessive snoring or daytime drowsiness?  15. YES - DO YOU  HAVE ANY PROSTHETIC HEART VALVES? Aorta   16. NO - Do you have prosthetic joints?  17. NO - Is there any chance that you may be pregnant?      HPI:     HPI related to upcoming procedure: Abdominal hernia repairs      CAD - Patient has a longstanding history of moderate-severe CAD. Patient denies recent chest pain or NTG use, denies exercise induced dyspnea or PND. ICD in place.                                                                                                                                          DIABETES - Patient has a longstanding history of DiabetesType Type II . Patient is being treated with diet and denies significant side effects. Control has been good. Complicating factors include but are not limited to: hypertension, hyperlipidemia, CAD/PVD and CVA.                                                                                                                     HYPERLIPIDEMIA - Patient has a long history of significant Hyperlipidemia requiring medication for treatment with recent fair control. Patient reports no problems or side effects with the medication.                                                                                                                                          HYPERTENSION - Patient has longstanding history of HTN , currently denies any symptoms referable to elevated blood pressure. Specifically denies chest pain, palpitations, dyspnea, orthopnea, PND or peripheral edema. Blood pressure readings have been in normal range. Current medication regimen is as listed below. Patient denies any side effects of medication.                                                                                                                                                                                          .    MEDICAL HISTORY:     Patient Active Problem List    Diagnosis Date Noted     Attention to colostomy (H) 09/21/2017     Priority: Medium     Agitation 05/19/2017      Priority: Medium     TIA (transient ischemic attack) 04/29/2017     Priority: Medium     Hypokalemia 02/20/2017     Priority: Medium     S/P colon resection 02/18/2017     Priority: Medium     Hyperlipidemia LDL goal <100 02/15/2017     Priority: Medium     Elevated prostate specific antigen (PSA) 02/15/2017     Priority: Medium     HTN (hypertension) 02/15/2017     Priority: Medium     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  Peter Zhao has no plan or code status on file. Discussed available resources and provided with information. Confirmed code status reflects current choices pending further ACP discussions.  Confirmed/documented legally designated decision makers.  Added by Daxa Campa           CAD (coronary artery disease)      Priority: Medium     CABG 2013, stent x 1 in 2014       Type 2 diabetes mellitus without complication, without long-term current use of insulin (H) 10/31/2013     Priority: Medium      Past Medical History:   Diagnosis Date     Allergic state      CAD (coronary artery disease)     CABG 2013, stent x 1 in 2014     Cataract      Cataracts, bilateral      Elevated PSA      Gastro-oesophageal reflux disease      Heart murmur      Hyperlipidemia      Hypertension      Pacemaker      Stented coronary artery      Visual aura      Past Surgical History:   Procedure Laterality Date     AORTIC VALVE REPLACEMENT      25 mm CE bovine replacement Dr. Wu 8/20/2012     CARDIAC SURGERY       CATARACT IOL, RT/LT Right 04/2017     COLECTOMY LOW ANTERIOR N/A 2/17/2017    Procedure: COLECTOMY LOW ANTERIOR;   Surgeon: Tima Moreland MD;  Location: [facility]     COLONOSCOPY N/A 2/17/2017    Procedure: COLONOSCOPY;  Surgeon: Tima Moreland MD;  Location: [facility]     ENT SURGERY       FLEX SIG (MEDICARE PT.)       GI SURGERY  02/2017    ostemomy     HERNIA REPAIR       LAPAROSCOPIC HERNIORRHAPHY INGUINAL Right 9/18/2015    Procedure: LAPAROSCOPIC HERNIORRHAPHY INGUINAL;  Surgeon: Solitario Nettles DO;  Location: [facility]     PHACOEMULSIFICATION WITH STANDARD INTRAOCULAR LENS IMPLANT Right 4/20/2017    Procedure: PHACOEMULSIFICATION WITH STANDARD INTRAOCULAR LENS IMPLANT;  CATARACT EXTRACTION RIGHT EYE WITH IMPLANT;  Surgeon: Darin Gutierrez MD;  Location: [facility]     PROSTATE BIOPSY, NEEDLE, SATURATION SAMPLING  2009     retinopexy-pvd with retinal tear Right 2008     TAKEDOWN COLOSTOMY N/A 9/21/2017    Procedure: TAKEDOWN COLOSTOMY;  CLOSURE OF NORRIS'S SIGMOID COLOSTOMY, REPAIR STOMAL HERNIA;  Surgeon: Tima Moreland MD;  Location: [facility] OR     TONSILLECTOMY       Current Outpatient Prescriptions   Medication Sig Dispense Refill     amLODIPine (NORVASC) 5 MG tablet TAKE 1 TABLET DAILY 90 tablet 2     ASPIRIN PO Take 325 mg by mouth daily        atorvastatin (LIPITOR) 40 MG tablet TAKE 1 TABLET DAILY 90 tablet 3     calcium carbonate (OS-WALI 600 MG Habematolel. CA) 1500 (600 CA) MG tablet Take 1 tablet (1,500 mg) by mouth daily 30 tablet 3     Cholecalciferol (VITAMIN D3 PO) Take 1,000 Units by mouth daily        clopidogrel (PLAVIX) 75 MG tablet TAKE 1 TABLET DAILY 90 tablet 1     finasteride (PROSCAR) 5 MG tablet TAKE 1 TABLET DAILY 90 tablet 3     glucose blood VI test strips (ACCU-CHEK SMARTVIEW) strip Test blood sugar before breakfast one day, before and after supper the next and not at all the third day 1 Box 12     Lancets Misc. (ACCU-CHEK SOFTCLIX LANCET DEV) KIT 1 Units daily 1 kit 2     lisinopril (PRINIVIL/ZESTRIL) 40 MG tablet TAKE 1 TABLET DAILY 90 tablet 1     metoprolol tartrate (LOPRESSOR) 100 MG tablet TAKE 1  TABLET TWICE A  tablet 2     Multiple Vitamin (MULTIVITAMINS PO) Take 1 capsule by mouth daily       nitroGLYcerin (NITROSTAT) 0.4 MG sublingual tablet Place 1 tablet (0.4 mg) under the tongue every 5 minutes as needed 25 tablet 3     ranitidine (ZANTAC) 150 MG tablet TAKE 1 TABLET TWICE A  tablet 3     UNABLE TO FIND Take 550 mg by mouth 2 times daily Gymnema danielle       OTC products: None, except as noted above    Allergies   Allergen Reactions     Acetaminophen      Ciprofloxacin Hives     No Clinical Screening - See Comments      Antibiotic allergy, pt not sure which      Latex Allergy: NO    Social History   Substance Use Topics     Smoking status: Former Smoker     Smokeless tobacco: Former User     Types: Chew     Quit date: 10/19/2013      Comment: quit in 2000     Alcohol use No     History   Drug Use No       REVIEW OF SYSTEMS:   Constitutional, neuro, ENT, endocrine, pulmonary, cardiac, gastrointestinal, genitourinary, musculoskeletal, integument and psychiatric systems are negative, except as otherwise noted.    EXAM:   There were no vitals taken for this visit.    GENERAL APPEARANCE: Alert and no distress     EYES: EOMI,  PERRL     HENT: ear canals and TM's normal and nose and mouth without ulcers or lesions     NECK: no adenopathy, no asymmetry, masses, or scars and thyroid normal to palpation, no bruits.      RESP: lungs clear to auscultation - no rales, rhonchi or wheezes     CV: regular rates and rhythm, normal S1 S2, no S3 or S4 and no murmur, click or rub     ABDOMEN:  soft, nontender, several abdominal hernias were present without pain with palpation.   MS: OA in hands     SKIN: no suspicious lesions or rashes     NEURO: Normal strength and tone, sensory exam grossly normal, mentation intact and speech normal     PSYCH: mentation appears normal. and affect normal/bright      DIAGNOSTICS:   EKG: Paced rhythm    Chest XRay  XR CHEST 2 VW     HISTORY: 72 years Male ; Pre-op  exam     COMPARISON: 9/12/2017     TECHNIQUE: 2 views of the chest were obtained.     FINDINGS: Again seen are changes of median sternotomy and cardiac  defibrillator placement.     Heart size and pulmonary vascularity are within normal limits. Lungs  are clear.            IMPRESSION: Clear chest.     KRISTI LIMA MD    Recent Labs   Lab Test  05/25/18   0128  09/27/17   1811   09/12/17   0820  07/15/17   2255   06/06/17   1056   HGB  14.4  13.7   < >  13.8  13.7   --    --    PLT  239  264   < >  214  232   --    --    INR   --   1.06   --    --   1.01   --    --    NA  138  141   < >  141   --    < >   --    POTASSIUM  4.3  3.6   < >  4.2   --    < >   --    CR  1.23  1.12   < >  1.23   --    < >   --    A1C   --    --    --   6.3*   --    --   6.6*    < > = values in this interval not displayed.     Results for orders placed or performed in visit on 11/16/18   XR CHEST 2 VW (Clinic Performed)    Narrative    XR CHEST 2 VW    HISTORY: 72 years Male ; Pre-op exam    COMPARISON: 9/12/2017    TECHNIQUE: 2 views of the chest were obtained.    FINDINGS: Again seen are changes of median sternotomy and cardiac  defibrillator placement.    Heart size and pulmonary vascularity are within normal limits. Lungs  are clear.        Impression    IMPRESSION: Clear chest.    KRISTI LIMA MD   Comprehensive metabolic panel (BMP + Alb, Alk Phos, ALT, AST, Total. Bili, TP)   Result Value Ref Range    Sodium 141 133 - 144 mmol/L    Potassium 4.3 3.4 - 5.3 mmol/L    Chloride 107 94 - 109 mmol/L    Carbon Dioxide 27 20 - 32 mmol/L    Anion Gap 7 3 - 14 mmol/L    Glucose 118 (H) 70 - 99 mg/dL    Urea Nitrogen 32 (H) 7 - 30 mg/dL    Creatinine 1.37 (H) 0.66 - 1.25 mg/dL    GFR Estimate 51 (L) >60 mL/min/1.7m2    GFR Estimate If Black 61 >60 mL/min/1.7m2    Calcium 8.4 (L) 8.5 - 10.1 mg/dL    Bilirubin Total 0.7 0.2 - 1.3 mg/dL    Albumin 3.7 3.4 - 5.0 g/dL    Protein Total 7.3 6.8 - 8.8 g/dL    Alkaline Phosphatase 99 40 - 150  U/L    ALT 23 0 - 70 U/L    AST 30 0 - 45 U/L   CBC with platelets and differential   Result Value Ref Range    WBC 8.9 4.0 - 11.0 10e9/L    RBC Count 5.13 4.4 - 5.9 10e12/L    Hemoglobin 15.1 13.3 - 17.7 g/dL    Hematocrit 45.5 40.0 - 53.0 %    MCV 89 78 - 100 fl    MCH 29.4 26.5 - 33.0 pg    MCHC 33.2 31.5 - 36.5 g/dL    RDW 13.8 10.0 - 15.0 %    Platelet Count 271 150 - 450 10e9/L    Diff Method Automated Method     % Neutrophils 68.6 %    % Lymphocytes 15.8 %    % Monocytes 10.0 %    % Eosinophils 4.3 %    % Basophils 0.8 %    % Immature Granulocytes 0.5 %    Nucleated RBCs 0 0 /100    Absolute Neutrophil 6.1 1.6 - 8.3 10e9/L    Absolute Lymphocytes 1.4 0.8 - 5.3 10e9/L    Absolute Monocytes 0.9 0.0 - 1.3 10e9/L    Absolute Eosinophils 0.4 0.0 - 0.7 10e9/L    Absolute Basophils 0.1 0.0 - 0.2 10e9/L    Abs Immature Granulocytes 0.0 0 - 0.4 10e9/L    Absolute Nucleated RBC 0.0        IMPRESSION:   Reason for surgery/procedure: Abdominal hernia repairs    The proposed surgical procedure is considered INTERMEDIATE risk.    REVISED CARDIAC RISK INDEX  The patient has the following serious cardiovascular risks for perioperative complications such as (MI, PE, VFib and 3  AV Block):  Cerebrovascular Disease (TIA or CVA)  INTERPRETATION: 1 risks: Class II (low risk - 0.9% complication rate)    The patient has the following additional risks for perioperative complications:  No identified additional risks  The ASCVD Risk score ([place] DC Jr, et al., 2013) failed to calculate for the following reasons:    The patient has a prior MCI or stroke diagnosis      ICD-10-CM    1. Pre-op exam Z01.818 XR CHEST 2 VW (Clinic Performed)     Comprehensive metabolic panel (BMP + Alb, Alk Phos, ALT, AST, Total. Bili, TP)     CBC with platelets and differential     EKG 12-lead complete w/read - (Clinic Performed)   2. Preop general physical exam Z01.818        RECOMMENDATIONS:     --Consult hospital rounder / IM to assist post-op medical  management    --Patient is contemplating this procedure as he has had difficult with anesthesia and confusion in the past.  His hernias are not giving him any trouble outside of cosmesis and the fact he cannot wear jeans.  He has not tried an abdominal binder which was prescribed today.  His history of CAD/ICD-pacer/AVR along with TIA and the need to hold Plavix and Asa x 1 week are also factors as he fears a TIIA or CVA.  We did complete the pre op today and discussed the risks at length.  Patient will consider this surgery and let us know next week if he plans to proceed.      APPROVAL GIVEN to proceed with proposed procedure, without further diagnostic evaluation     45 minutes was spent on this patient's care today.  Greater than 50% of the time was spent face to face with the patient in counseling as above. He had numerous questions all of which were answered to best of my ability.     Signed Electronically by: Alexander Vazquez DO    Copy of this evaluation report is provided to requesting physician.    [place] Preop Guidelines    Revised Cardiac Risk Index

## 2018-11-27 ENCOUNTER — TELEPHONE (OUTPATIENT)
Dept: SURGERY | Facility: OTHER | Age: 73
End: 2018-11-27

## 2018-11-27 NOTE — TELEPHONE ENCOUNTER
"This patient was scheduled for incisional hernia repair with Dr. Moreland on 12/4/18, but is cancelling for now due to \"vomiting and diarrhea like you wouldn't believe\" that just started last night. States he feels very sick and thinks it is a stomach virus as his wife just had same symptoms last week lasting several days.  He will restart his Plavix. Wants to know if he can reschedule with Dr. Moreland. He is aware that he needs to be well for 1-2 weeks before surgery.    Message sent to Dr. Moreland to review.        "

## 2018-11-30 ENCOUNTER — TELEPHONE (OUTPATIENT)
Dept: SURGERY | Facility: OTHER | Age: 73
End: 2018-11-30

## 2018-11-30 ENCOUNTER — HOSPITAL ENCOUNTER (OUTPATIENT)
Facility: HOSPITAL | Age: 73
Setting detail: OBSERVATION
Discharge: HOME | End: 2018-12-01
Attending: PHYSICIAN ASSISTANT | Admitting: INTERNAL MEDICINE
Payer: MEDICARE

## 2018-11-30 DIAGNOSIS — I10 BENIGN ESSENTIAL HYPERTENSION: ICD-10-CM

## 2018-11-30 DIAGNOSIS — K52.9 GASTROENTERITIS: Primary | ICD-10-CM

## 2018-11-30 DIAGNOSIS — N18.9 ACUTE KIDNEY INJURY SUPERIMPOSED ON CHRONIC KIDNEY DISEASE (H): ICD-10-CM

## 2018-11-30 DIAGNOSIS — N17.9 ACUTE KIDNEY INJURY SUPERIMPOSED ON CHRONIC KIDNEY DISEASE (H): ICD-10-CM

## 2018-11-30 DIAGNOSIS — E86.1 HYPOVOLEMIA: ICD-10-CM

## 2018-11-30 DIAGNOSIS — R19.7 DIARRHEA, UNSPECIFIED TYPE: ICD-10-CM

## 2018-11-30 LAB
ALBUMIN SERPL-MCNC: 3.5 G/DL (ref 3.4–5)
ALP SERPL-CCNC: 85 U/L (ref 40–150)
ALT SERPL W P-5'-P-CCNC: 29 U/L (ref 0–70)
ANION GAP SERPL CALCULATED.3IONS-SCNC: 12 MMOL/L (ref 3–14)
AST SERPL W P-5'-P-CCNC: 26 U/L (ref 0–45)
BASOPHILS # BLD AUTO: 0 10E9/L (ref 0–0.2)
BASOPHILS NFR BLD AUTO: 0.4 %
BILIRUB SERPL-MCNC: 0.7 MG/DL (ref 0.2–1.3)
BUN SERPL-MCNC: 81 MG/DL (ref 7–30)
C DIFF TOX B STL QL: NEGATIVE
CALCIUM SERPL-MCNC: 8.6 MG/DL (ref 8.5–10.1)
CHLORIDE SERPL-SCNC: 104 MMOL/L (ref 94–109)
CO2 SERPL-SCNC: 19 MMOL/L (ref 20–32)
CREAT SERPL-MCNC: 3.34 MG/DL (ref 0.66–1.25)
DIFFERENTIAL METHOD BLD: NORMAL
EOSINOPHIL # BLD AUTO: 0 10E9/L (ref 0–0.7)
EOSINOPHIL NFR BLD AUTO: 0.4 %
ERYTHROCYTE [DISTWIDTH] IN BLOOD BY AUTOMATED COUNT: 14.1 % (ref 10–15)
GFR SERPL CREATININE-BSD FRML MDRD: 18 ML/MIN/1.7M2
GLUCOSE SERPL-MCNC: 115 MG/DL (ref 70–99)
HCT VFR BLD AUTO: 47 % (ref 40–53)
HGB BLD-MCNC: 16 G/DL (ref 13.3–17.7)
IMM GRANULOCYTES # BLD: 0.1 10E9/L (ref 0–0.4)
IMM GRANULOCYTES NFR BLD: 0.6 %
LACTATE BLD-SCNC: 1.8 MMOL/L (ref 0.7–2)
LYMPHOCYTES # BLD AUTO: 1.6 10E9/L (ref 0.8–5.3)
LYMPHOCYTES NFR BLD AUTO: 19.5 %
MAGNESIUM SERPL-MCNC: 2.6 MG/DL (ref 1.6–2.3)
MCH RBC QN AUTO: 29.1 PG (ref 26.5–33)
MCHC RBC AUTO-ENTMCNC: 34 G/DL (ref 31.5–36.5)
MCV RBC AUTO: 86 FL (ref 78–100)
MONOCYTES # BLD AUTO: 1 10E9/L (ref 0–1.3)
MONOCYTES NFR BLD AUTO: 12.4 %
NEUTROPHILS # BLD AUTO: 5.6 10E9/L (ref 1.6–8.3)
NEUTROPHILS NFR BLD AUTO: 66.7 %
NRBC # BLD AUTO: 0 10*3/UL
NRBC BLD AUTO-RTO: 0 /100
PLATELET # BLD AUTO: 264 10E9/L (ref 150–450)
POTASSIUM SERPL-SCNC: 3.5 MMOL/L (ref 3.4–5.3)
PROT SERPL-MCNC: 7.6 G/DL (ref 6.8–8.8)
RBC # BLD AUTO: 5.5 10E12/L (ref 4.4–5.9)
SODIUM SERPL-SCNC: 135 MMOL/L (ref 133–144)
SPECIMEN SOURCE: NORMAL
WBC # BLD AUTO: 8.4 10E9/L (ref 4–11)

## 2018-11-30 PROCEDURE — 25000128 H RX IP 250 OP 636: Performed by: PHYSICIAN ASSISTANT

## 2018-11-30 PROCEDURE — 96361 HYDRATE IV INFUSION ADD-ON: CPT

## 2018-11-30 PROCEDURE — 99285 EMERGENCY DEPT VISIT HI MDM: CPT | Mod: 25

## 2018-11-30 PROCEDURE — G0378 HOSPITAL OBSERVATION PER HR: HCPCS

## 2018-11-30 PROCEDURE — 99284 EMERGENCY DEPT VISIT MOD MDM: CPT | Mod: Z6 | Performed by: PHYSICIAN ASSISTANT

## 2018-11-30 PROCEDURE — A9270 NON-COVERED ITEM OR SERVICE: HCPCS | Mod: GY | Performed by: INTERNAL MEDICINE

## 2018-11-30 PROCEDURE — 85025 COMPLETE CBC W/AUTO DIFF WBC: CPT | Performed by: PHYSICIAN ASSISTANT

## 2018-11-30 PROCEDURE — 83735 ASSAY OF MAGNESIUM: CPT | Performed by: PHYSICIAN ASSISTANT

## 2018-11-30 PROCEDURE — 83605 ASSAY OF LACTIC ACID: CPT | Performed by: PHYSICIAN ASSISTANT

## 2018-11-30 PROCEDURE — 36415 COLL VENOUS BLD VENIPUNCTURE: CPT | Performed by: PHYSICIAN ASSISTANT

## 2018-11-30 PROCEDURE — 25000128 H RX IP 250 OP 636: Performed by: INTERNAL MEDICINE

## 2018-11-30 PROCEDURE — 99220 ZZC INITIAL OBSERVATION CARE,LEVL III: CPT | Performed by: INTERNAL MEDICINE

## 2018-11-30 PROCEDURE — 96360 HYDRATION IV INFUSION INIT: CPT

## 2018-11-30 PROCEDURE — 87493 C DIFF AMPLIFIED PROBE: CPT | Performed by: PHYSICIAN ASSISTANT

## 2018-11-30 PROCEDURE — 25000132 ZZH RX MED GY IP 250 OP 250 PS 637: Mod: GY | Performed by: INTERNAL MEDICINE

## 2018-11-30 PROCEDURE — 80053 COMPREHEN METABOLIC PANEL: CPT | Performed by: PHYSICIAN ASSISTANT

## 2018-11-30 RX ORDER — ACETAMINOPHEN 325 MG/1
650 TABLET ORAL EVERY 4 HOURS PRN
Status: DISCONTINUED | OUTPATIENT
Start: 2018-11-30 | End: 2018-11-30

## 2018-11-30 RX ORDER — ONDANSETRON 2 MG/ML
4 INJECTION INTRAMUSCULAR; INTRAVENOUS EVERY 6 HOURS PRN
Status: DISCONTINUED | OUTPATIENT
Start: 2018-11-30 | End: 2018-12-01 | Stop reason: HOSPADM

## 2018-11-30 RX ORDER — NALOXONE HYDROCHLORIDE 0.4 MG/ML
.1-.4 INJECTION, SOLUTION INTRAMUSCULAR; INTRAVENOUS; SUBCUTANEOUS
Status: DISCONTINUED | OUTPATIENT
Start: 2018-11-30 | End: 2018-12-01 | Stop reason: HOSPADM

## 2018-11-30 RX ORDER — FINASTERIDE 5 MG/1
5 TABLET, FILM COATED ORAL EVERY 24 HOURS
Status: DISCONTINUED | OUTPATIENT
Start: 2018-11-30 | End: 2018-12-01 | Stop reason: HOSPADM

## 2018-11-30 RX ORDER — DIPHENOXYLATE HCL/ATROPINE 2.5-.025MG
1 TABLET ORAL 4 TIMES DAILY PRN
Status: DISCONTINUED | OUTPATIENT
Start: 2018-11-30 | End: 2018-12-01 | Stop reason: HOSPADM

## 2018-11-30 RX ORDER — ATORVASTATIN CALCIUM 40 MG/1
40 TABLET, FILM COATED ORAL DAILY
Status: DISCONTINUED | OUTPATIENT
Start: 2018-12-01 | End: 2018-12-01 | Stop reason: HOSPADM

## 2018-11-30 RX ORDER — ASPIRIN 325 MG
325 TABLET ORAL EVERY EVENING
Status: DISCONTINUED | OUTPATIENT
Start: 2018-11-30 | End: 2018-12-01 | Stop reason: HOSPADM

## 2018-11-30 RX ORDER — SODIUM CHLORIDE 9 MG/ML
INJECTION, SOLUTION INTRAVENOUS CONTINUOUS
Status: DISCONTINUED | OUTPATIENT
Start: 2018-11-30 | End: 2018-11-30

## 2018-11-30 RX ORDER — ACETAMINOPHEN 650 MG/1
650 SUPPOSITORY RECTAL EVERY 4 HOURS PRN
Status: DISCONTINUED | OUTPATIENT
Start: 2018-11-30 | End: 2018-11-30

## 2018-11-30 RX ORDER — ONDANSETRON 4 MG/1
4 TABLET, ORALLY DISINTEGRATING ORAL EVERY 6 HOURS PRN
Status: DISCONTINUED | OUTPATIENT
Start: 2018-11-30 | End: 2018-12-01 | Stop reason: HOSPADM

## 2018-11-30 RX ORDER — CLOPIDOGREL BISULFATE 75 MG/1
75 TABLET ORAL DAILY
Status: DISCONTINUED | OUTPATIENT
Start: 2018-12-01 | End: 2018-12-01 | Stop reason: HOSPADM

## 2018-11-30 RX ORDER — GYMNEMA LEAF 100 %
550 POWDER (GRAM) MISCELLANEOUS 2 TIMES DAILY
COMMUNITY

## 2018-11-30 RX ORDER — INFLUENZA A VIRUS A/VICTORIA/4897/2022 IVR-238 (H1N1) ANTIGEN (FORMALDEHYDE INACTIVATED), INFLUENZA A VIRUS A/CALIFORNIA/122/2022 SAN-022 (H3N2) ANTIGEN (FORMALDEHYDE INACTIVATED), AND INFLUENZA B VIRUS B/MICHIGAN/01/2021 ANTIGEN (FORMALDEHYDE INACTIVATED) 60; 60; 60 UG/.5ML; UG/.5ML; UG/.5ML
1 INJECTION, SUSPENSION INTRAMUSCULAR ONCE
COMMUNITY
Start: 2018-10-12 | End: 2018-12-07

## 2018-11-30 RX ORDER — METOPROLOL TARTRATE 100 MG
100 TABLET ORAL 2 TIMES DAILY
Status: DISCONTINUED | OUTPATIENT
Start: 2018-11-30 | End: 2018-11-30

## 2018-11-30 RX ORDER — METOPROLOL TARTRATE 50 MG
50 TABLET ORAL 2 TIMES DAILY
Status: DISCONTINUED | OUTPATIENT
Start: 2018-11-30 | End: 2018-12-01 | Stop reason: HOSPADM

## 2018-11-30 RX ORDER — SODIUM CHLORIDE, SODIUM LACTATE, POTASSIUM CHLORIDE, CALCIUM CHLORIDE 600; 310; 30; 20 MG/100ML; MG/100ML; MG/100ML; MG/100ML
INJECTION, SOLUTION INTRAVENOUS CONTINUOUS
Status: DISCONTINUED | OUTPATIENT
Start: 2018-11-30 | End: 2018-12-01 | Stop reason: HOSPADM

## 2018-11-30 RX ADMIN — SODIUM CHLORIDE: 9 INJECTION, SOLUTION INTRAVENOUS at 15:50

## 2018-11-30 RX ADMIN — FINASTERIDE 5 MG: 5 TABLET, FILM COATED ORAL at 20:59

## 2018-11-30 RX ADMIN — METOPROLOL TARTRATE 50 MG: 50 TABLET, FILM COATED ORAL at 20:59

## 2018-11-30 RX ADMIN — ASPIRIN 325 MG ORAL TABLET 325 MG: 325 PILL ORAL at 20:59

## 2018-11-30 RX ADMIN — SODIUM CHLORIDE 1000 ML: 9 INJECTION, SOLUTION INTRAVENOUS at 14:51

## 2018-11-30 RX ADMIN — SODIUM CHLORIDE 1000 ML: 9 INJECTION, SOLUTION INTRAVENOUS at 13:48

## 2018-11-30 RX ADMIN — DIPHENOXYLATE HYDROCHLORIDE AND ATROPINE SULFATE 1 TABLET: 2.5; .025 TABLET ORAL at 20:59

## 2018-11-30 RX ADMIN — SODIUM CHLORIDE, POTASSIUM CHLORIDE, SODIUM LACTATE AND CALCIUM CHLORIDE: 600; 310; 30; 20 INJECTION, SOLUTION INTRAVENOUS at 16:19

## 2018-11-30 RX ADMIN — OMEPRAZOLE 20 MG: 20 CAPSULE, DELAYED RELEASE ORAL at 20:59

## 2018-11-30 ASSESSMENT — ENCOUNTER SYMPTOMS
FATIGUE: 1
FEVER: 0
ACTIVITY CHANGE: 1
ABDOMINAL PAIN: 0
APPETITE CHANGE: 1
CHILLS: 0
DIARRHEA: 1
BRUISES/BLEEDS EASILY: 1
VOMITING: 0

## 2018-11-30 NOTE — IP AVS SNAPSHOT
[facility]    [address] [address]    Phone:  [phone]    Fax:  [phone]                                       After Visit Summary   11/30/2018    Peter Zhao    MRN: [id number]           After Visit Summary Signature Page     I have received my discharge instructions, and my questions have been answered. I have discussed any challenges I see with this plan with the nurse or doctor.    ..........................................................................................................................................  Patient/Patient Representative Signature      ..........................................................................................................................................  Patient Representative Print Name and Relationship to Patient    ..................................................               ................................................  Date                                   Time    ..........................................................................................................................................  Reviewed by Signature/Title    ...................................................              ..............................................  Date                                               Time          22EPIC Rev 08/18

## 2018-11-30 NOTE — ED NOTES
The patient is ready for admission transfer to room 3226 via stretcher. No further diarrhea since ED arrival. Wife accompanies the patient.

## 2018-11-30 NOTE — IP AVS SNAPSHOT
MRN:[id number]                      After Visit Summary   11/30/2018    Peter Zhao    MRN: [id number]           Thank you!     Thank you for choosing [facility] for your care. Our goal is always to provide you with excellent care. Hearing back from our patients is one way we can continue to improve our services. Please take a few minutes to complete the written survey that you may receive in the mail after you visit with us. Thank you!        Patient Information     Date Of Birth          1945        Designated Caregiver       Most Recent Value    Caregiver    Will someone help with your care after discharge? yes    Name of designated caregiver Wife    Phone number of caregiver  see file    Caregiver address see file      About your hospital stay     You were admitted on:  November 30, 2018 You last received care in the:  [facility]    You were discharged on:  December 1, 2018        Reason for your hospital stay       This is a 72 year old man who presented after 5 days of diarrheal stools beginning with chills and some myalgias. Clinical impression has been of an infectious gastroenteritis with volume depletion and acute on chronic kidney injury. He improved rapidly with no further diarrhea and good tolerance of oral intake. On the day of discharge, he is anxious to leave the hospital.                  Who to Call     For medical emergencies, please call 911.  For non-urgent questions about your medical care, please call your primary care provider or clinic, [phone]          Attending Provider     Provider Specialty    Ivette Gregg PA-C Emergency Medicine    Dylan Adkins MD Internal Medicine       Primary Care Provider Office Phone # Fax #    Alexander Vazquez [phone]      After Care Instructions     Activity       Your activity upon discharge: activity as tolerated            Activity       Your activity upon discharge: activity as  tolerated            Diet       Follow this diet upon discharge: Orders Placed This Encounter  Cardiac Diet Adult            Discharge Instructions       Metamucil 1 Tsp in 8 oz water or juice daily for 1 week, then resume every other day  May resume amlodipine 12/3 or when systolic blood pressure over 115                  Follow-up Appointments     Follow-up and recommended labs and tests        Follow up with primary care provider, Alexander Vazquez, within 3-5 days for hospital follow- up.  BMP in 3-5 days                  Your next 10 appointments already scheduled     Dec 14, 2018  3:00 PM CST   (Arrive by 2:45 PM)   SHORT with Alexander Vazquez, DO   [facility] [place] ([facility] )    [address]  [place] [address]   [phone]              Future tests that were ordered for you     **Basic metabolic panel FUTURE 14d                 Further instructions from your care team       Follow up with primary care provider, Alexander Vazquez, within 3-5 days for hospital follow- up.  BMP in 3-5 days     12/14 Arrive @ 2:45PM, for a 3PM appointment. This was the earliest available for our current , however Please Call the clinic on Monday to see if you can get in sooner, ideally your follow up would be with in 3-5 days.     You will need to have a Basic Metabolic Panel drawn at the clinic lab with in 3-5 days.     ON 12/3 START TAKING THE FOLLOWING:    Amlodipine (Norvasc) 5 mg, START TAKING 12/3    Lisinopril 40 mg 1/2 Tablet daily on 12/3 for 3 days, then 1 tablet daily      Pending Results     Date and Time Order Name Status Description    12/1/2018 0053 Active MRSA Surveillance Culture Preliminary             Statement of Approval     Ordered          12/01/18 1204  I have reviewed and agree with all the recommendations and orders detailed in this document.  EFFECTIVE NOW     Approved and electronically signed by:  Dylan Adkins MD            "  Admission Information     Date & Time Provider Department Dept. Phone    2018 Dylan Adkins MD [facility] [phone]      Your Vitals Were     Blood Pressure Pulse Temperature Respirations Height Weight    97/53 73 97.4  F (36.3  C) (Temporal) 16 1.753 m (5' 9\") 76.7 kg (169 lb)    Pulse Oximetry BMI (Body Mass Index)                98% 24.96 kg/m2          MyChart Information     [facility] lets you send messages to your doctor, view your test results, renew your prescriptions, schedule appointments and more. To sign up, go to [email or web address] . Click on \"Log in\" on the left side of the screen, which will take you to the Welcome page. Then click on \"Sign up Now\" on the right side of the page.     You will be asked to enter the access code listed below, as well as some personal information. Please follow the directions to create your username and password.     Your access code is: [email or web address]  Expires: 2019 10:48 AM     Your access code will  in 90 days. If you need help or a new code, please call your [facility] clinic or [phone].        Care EveryWhere ID     This is your Care EveryWhere ID. This could be used by other organizations to access your [facility] medical records  [id number]        Equal Access to Services     ANDRY CASPER AH: Hadii miroslava roberts hadlinda Sotessie, waaxda luqadaha, qaybta kaalmada olga, john torres . So [facility] [phone].    ATENCIÓN: Si habla español, tiene a farah disposición servicios gratuitos de asistencia lingüística. Jose al [phone].    We comply with applicable federal civil rights laws and [place] laws. We do not discriminate on the basis of race, color, national origin, age, disability, sex, sexual orientation, or gender identity.               Review of your medicines      START taking        Dose / Directions    diphenoxylate-atropine 2.5-0.025 MG tablet   Commonly known as:  LOMOTIL   Used for:  " Gastroenteritis        Dose:  1 tablet   Take 1 tablet by mouth 4 times daily as needed for diarrhea   Quantity:  40 tablet   Refills:  0         CONTINUE these medicines which may have CHANGED, or have new prescriptions. If we are uncertain of the size of tablets/capsules you have at home, strength may be listed as something that might have changed.        Dose / Directions    amLODIPine 5 MG tablet   Commonly known as:  NORVASC   This may have changed:    - See the new instructions.  - These instructions start on 12/3/2018. If you are unsure what to do until then, ask your doctor or other care provider.   Used for:  Benign essential hypertension        Dose:  5 mg   Start taking on:  12/3/2018   Take 1 tablet (5 mg) by mouth daily   Quantity:  90 tablet   Refills:  2       lisinopril 40 MG tablet   Commonly known as:  PRINIVIL/ZESTRIL   This may have changed:  See the new instructions.   Used for:  Benign essential hypertension        1/2 tab daily beginning 12/3 for 3 days then 1 tab daily   Quantity:  90 tablet   Refills:  1         CONTINUE these medicines which have NOT CHANGED        Dose / Directions    ASPIRIN PO        Dose:  325 mg   Take 325 mg by mouth daily   Refills:  0       atorvastatin 40 MG tablet   Commonly known as:  LIPITOR   Used for:  Hyperlipidemia LDL goal <100        TAKE 1 TABLET DAILY   Quantity:  90 tablet   Refills:  3       blood glucose lancing device   Used for:  T2DM (type 2 diabetes mellitus) (H)        Dose:  1 Units   1 Units daily   Quantity:  1 kit   Refills:  2       blood glucose monitoring test strip   Commonly known as:  ACCU-CHEK SMARTVIEW   Used for:  T2DM (type 2 diabetes mellitus) (H)        Test blood sugar before breakfast one day, before and after supper the next and not at all the third day   Quantity:  1 Box   Refills:  12       calcium carbonate 600 mg (elemental) 1500 (600 Ca) MG tablet   Commonly known as:  OS-WALI        Dose:  1500 mg   Take 1 tablet (1,500  mg) by mouth daily   Quantity:  30 tablet   Refills:  3       clopidogrel 75 MG tablet   Commonly known as:  PLAVIX   Used for:  Benign essential hypertension        TAKE 1 TABLET DAILY   Quantity:  90 tablet   Refills:  1       finasteride 5 MG tablet   Commonly known as:  PROSCAR   Used for:  Benign non-nodular prostatic hyperplasia with lower urinary tract symptoms        TAKE 1 TABLET DAILY   Quantity:  90 tablet   Refills:  3       FLUZONE HIGH-DOSE 0.5 ML injection   Generic drug:  influenza Vac Split High-Dose        Dose:  1 Dose   Inject 1 Dose as directed once   Refills:  0       Gymnema Sylvestris Leaf Powd        Dose:  550 mg   550 mg 2 times daily   Refills:  0       metoprolol tartrate 100 MG tablet   Commonly known as:  LOPRESSOR   Used for:  Benign essential hypertension        TAKE 1 TABLET TWICE A DAY   Quantity:  180 tablet   Refills:  2       MULTIVITAMINS PO        Dose:  1 capsule   Take 1 capsule by mouth daily   Refills:  0       nitroGLYcerin 0.4 MG sublingual tablet   Commonly known as:  NITROSTAT   Used for:  Hyperlipidemia LDL goal <100, Intermediate coronary syndrome (H)        Dose:  0.4 mg   Place 1 tablet (0.4 mg) under the tongue every 5 minutes as needed   Quantity:  25 tablet   Refills:  3       order for DME   Used for:  Abdominal wall hernia        Equipment being ordered: Abdominal binders   Quantity:  2 Device   Refills:  1       ranitidine 150 MG tablet   Commonly known as:  ZANTAC   Used for:  Gastroesophageal reflux disease without esophagitis        TAKE 1 TABLET TWICE A DAY   Quantity:  180 tablet   Refills:  3       VITAMIN D3 PO        Dose:  1000 Units   Take 1,000 Units by mouth daily   Refills:  0            Where to get your medicines      These medications were sent to [id number] - [place] - [address] [facility] [address] [id number] [place] [address]     Phone:  [phone]     amLODIPine 5 MG tablet         Some of these will need a paper prescription and  others can be bought over the counter. Ask your nurse if you have questions.     You don't need a prescription for these medications     diphenoxylate-atropine 2.5-0.025 MG tablet                Protect others around you: Learn how to safely use, store and throw away your medicines at www.disposemymeds.org.             Medication List: This is a list of all your medications and when to take them. Check marks below indicate your daily home schedule. Keep this list as a reference.      Medications           Morning Afternoon Evening Bedtime As Needed    amLODIPine 5 MG tablet   Commonly known as:  NORVASC   Take 1 tablet (5 mg) by mouth daily   Start taking on:  12/3/2018                                ASPIRIN PO   Take 325 mg by mouth daily   Last time this was given:  325 mg on 11/30/2018  8:59 PM                                atorvastatin 40 MG tablet   Commonly known as:  LIPITOR   TAKE 1 TABLET DAILY   Last time this was given:  40 mg on 12/1/2018  8:23 AM                                blood glucose lancing device   1 Units daily                                blood glucose monitoring test strip   Commonly known as:  ACCU-CHEK SMARTVIEW   Test blood sugar before breakfast one day, before and after supper the next and not at all the third day                                calcium carbonate 600 mg (elemental) 1500 (600 Ca) MG tablet   Commonly known as:  OS-WALI   Take 1 tablet (1,500 mg) by mouth daily                                clopidogrel 75 MG tablet   Commonly known as:  PLAVIX   TAKE 1 TABLET DAILY   Last time this was given:  75 mg on 12/1/2018  8:23 AM                                diphenoxylate-atropine 2.5-0.025 MG tablet   Commonly known as:  LOMOTIL   Take 1 tablet by mouth 4 times daily as needed for diarrhea   Last time this was given:  1 tablet on 11/30/2018  8:59 PM                                finasteride 5 MG tablet   Commonly known as:  PROSCAR   TAKE 1 TABLET DAILY   Last time this was  given:  5 mg on 11/30/2018  8:59 PM                                FLUZONE HIGH-DOSE 0.5 ML injection   Inject 1 Dose as directed once   Generic drug:  influenza Vac Split High-Dose                                Gymnema Sylvestris Leaf Powd   550 mg 2 times daily                                lisinopril 40 MG tablet   Commonly known as:  PRINIVIL/ZESTRIL   1/2 tab daily beginning 12/3 for 3 days then 1 tab daily                                metoprolol tartrate 100 MG tablet   Commonly known as:  LOPRESSOR   TAKE 1 TABLET TWICE A DAY   Last time this was given:  50 mg on 12/1/2018  8:23 AM                                MULTIVITAMINS PO   Take 1 capsule by mouth daily                                nitroGLYcerin 0.4 MG sublingual tablet   Commonly known as:  NITROSTAT   Place 1 tablet (0.4 mg) under the tongue every 5 minutes as needed                                order for DME   Equipment being ordered: Abdominal binders                                ranitidine 150 MG tablet   Commonly known as:  ZANTAC   TAKE 1 TABLET TWICE A DAY                                VITAMIN D3 PO   Take 1,000 Units by mouth daily                                          More Information        Atropine; Diphenoxylate tablets  Brand Names: Lomotil, Lonox, Vi-Atro  What is this medicine?  ATROPINE; DIPHENOXYLATE (A troe peen dye fen OX i late) is used to treat diarrhea.  How should I use this medicine?  Take this medicine by mouth with a glass of water. Follow the directions on the prescription label. You can take the tablets with food. Take your doses at regular intervals. Do not take your medicine more often than directed. Once your diarrhea has been brought under control your doctor or health care professional may reduce your doses.  Talk to your pediatrician regarding the use of this medicine in children. Special care may be needed.  Elderly patients may be more sensitive to the effects of this medicine.  What side effects may I  notice from receiving this medicine?  Side effects that you should report to your doctor or health care professional as soon as possible:    allergic reactions like skin rash, itching or hives, swelling of the face, lips, or tongue    bloated, swollen feeling    breathing problems    changes in vision    fast, irregular heartbeat    stomach pain  Side effects that usually do not require medical attention (report to your doctor or health care professional if they continue or are bothersome):    headache    loss of appetite    mood changes    nausea, vomiting    numbness or tingling in the hands and feet  What may interact with this medicine?    alcohol    antihistamines for allergy, cough and cold    barbiturate medicines for inducing sleep or treating seizures    certain medicines for depression, anxiety, or psychotic disturbances    certain medicines for sleep    medicines for movement abnormalities as in Parkinson's disease, or for gastrointestinal problems    muscle relaxants    narcotic medicines (opiates) for pain    What if I miss a dose?  If you miss a dose, take it as soon as you can. If it is almost time for your next dose, take only that dose. Do not take double or extra doses.  Where should I keep my medicine?  Keep out of the reach of children. This medicine can be abused. Keep your medicine in a safe place to protect it from theft. Do not share this medicine with anyone. Selling or giving away this medicine is dangerous and against the law.  This medicine may cause accidental overdose and death if taken by other adults, children, or pets. Mix any unused medicine with a substance like cat litter or coffee grounds. Then throw the medicine away in a sealed container like a sealed bag or a coffee can with a lid. Do not use the medicine after the expiration date.  Store at room temperature between 15 and 30 degrees C (59 and 86 degrees F). Protect from light. Keep container tightly closed.  What should I  tell my health care provider before I take this medicine?  They need to know if you have any of these conditions:    bacterial food poisoning    colitis    dehydration    Down's syndrome    jaundice or liver disease    an unusual or allergic reaction to atropine, diphenoxylate, other medicines, foods, dyes, or preservatives    pregnant or trying to get pregnant    breast-feeding  What should I watch for while using this medicine?  If your symptoms do not start to get better after taking this medicine for two days, check with your doctor or health care professional, you may have a problem that needs further evaluation. Check with your doctor or health care professional right away if you develop a fever or bloody diarrhea.  You may get drowsy or dizzy. Do not drive, use machinery, or do anything that needs mental alertness until you know how this medicine affects you. Alcohol can increase possible drowsiness and dizziness. Avoid alcoholic drinks.  Your mouth may get dry. Chewing sugarless gum or sucking hard candy, and drinking plenty of water may help. Contact your doctor if the problem does not go away or is severe. Drinking plenty of water can also help prevent dehydration that can occur with diarrhea.  NOTE:This sheet is a summary. It may not cover all possible information. If you have questions about this medicine, talk to your doctor, pharmacist, or health care provider. Copyright  2018 [employer]

## 2018-11-30 NOTE — ED NOTES
The patient presents with report of lightheadedness and fatigue following a diarrhea and vomiting illness since last Friday. States every time he has had intake since the vomiting ceased a couple of days ago he would have twice the amount in watery diarrhea stool. Reports his wife had a similar 5 day illness the week prior. The patient states he runs a normal systolic BP of . The patient reports he canceled a hernia surgery that was scheduled next Tuesday with Dr. Moreland and that he has not received a call back to reschedule the surgery.   Disorder of lymphatics  lymph node dissection left axilla 2010  H/O knee surgery  20 years ago  H/O skin graft  fat graft  chest area left 2x   2015 and 2016  for pain control as per patient  History of excision of lesion  left side of chest  melanoma 2010

## 2018-11-30 NOTE — H&P
[facility]    History and Physical  Hospitalist       Date of Admission:  11/30/2018  Date of Service (when I saw the patient): 11/30/18    Assessment & Plan   Peter Zhao is a 72 year old man who presents with 5 days of watery diarrhea. His wife had similar symptoms several days before. He has noted some orthostatic disequilibrium as well.  After several days he primarily had symptoms at night and he hoped these would improve but they have been persistent.  Beginning several days ago he first took one dose without response and then yesterday several doses of loperamide and since then has had minimal stool output.  No chest pain or pressure.  The symptoms did begin with chills without isaiah fever or sweats.  He has had some myalgias particularly over the past several days.  He is also noted bifrontal headache, photophobia, and no nuchal rigidity.  As his symptoms were persistent he presented to emergency department where he was noted to have mildly decreased blood pressures which have responded to crystalloid fluid repletion of approximately 2 L.  Acute kidney injury was noted with creatinine 3.3 (prior baseline approximately 1.1-1 0.3).  He is referred to observation for further evaluation and management       1.  Infectious gastroenteritis   C. difficile infection unlikely with no fever or leukocytosis although he did have a prescription for Augmentin in May of this year and azithromycin in October.  Symptoms quite consistent with a viral process.  Could consider food poisoning as this began with his wife who symptoms were approximately 3 days before his after they ate at a [employer] restaurant although this amount of dwell time before symptoms began would be quite uncharacteristic.  While he does appear to be volume depleted otherwise reasonably well compensated and does not appear toxic.  Usual supportive treatment including fluid repletion.  Will allow oral intake to the extent he can tolerate this  "although he has had \"dry heaves\" and marked increase in diarrheal output with any fluid intake over the past 5 days or so.  Would anticipate that his diarrheal process is nearly resolved however.  He did take antidiarrheals but overall stool output has decreased in the usual time course of a community-acquired infectious gastroenteritis is usually 3-5 days.  2.  Volume depletion  Marked by mild hypotension.  He has not had a marked degree of tachycardia however is chronically treated with metoprolol which may blunt his response.  Orthostatic hypotension.  Clinically shows minimally cool extremities although with brisk capillary refill and diminished jugular venous pressure is all consistent with volume depletion.  Anticipate these will improve with continued intravenous fluid repletion as well as, as tolerated, oral intake.  Change intravenous fluid to lactated Ringer's to minimize chloride concentration which may minimize renal injury.  3. acute kidney injury  Established chronic kidney disease approximately stage III with creatinine approximately 1.1-1 0.3.    Related to his volume depletion.  Urinalysis to evaluate sediment.  However anticipate a relatively uncomplicated acute kidney injury which is likely to have rapid improvement with fluid repletion.  4.  Cardiac  Multiple issues in the past including primary hypertension, coronary artery disease, aorta coronary bypass grafting, pacemaker/implanted defibrillator/cardioverter.  No chest discomfort.  He notes he is reasonably active.  No evidence of any active ischemia.  Continue his chronic medications for coronary disease.  Continue metoprolol, but at half his usual dose.  Withhold lisinopril and amlodipine until blood pressure shows more increase.  5.  Planned incisional hernia repair  The planned incisional hernia repair with Dr. Moreland in a week or so.  In the past, he developed on obstruction of his sigmoid colon secondary to diverticular disease.  " Requiring a Marianna's resection of the sigmoid colon with a sigmoid colostomy and takedown.  While his current presentation is of relatively modest severity not unreasonable to defer proposed operative procedure until renal function has fully returned to its prior baseline.    # Pain Assessment:  Current Pain Score 11/30/2018   Patient currently in pain? -   Pain score (0-10) 0   Pain location -   Pain descriptors -   Peter torres pain level was assessed and he currently denies pain.            DVT Prophylaxis: Not mandated for this predicted short hospital stay  Code Status: Full Code    Disposition: Expected discharge in 1-2 days depending on his gut function  Dylan Fu    Primary Care Physician   Alexander Vazquez    Chief Complaint   Diarrheal stools for the past 5 days    History is obtained from the patient    History of Present Illness   Peter Zhao is a 72 year old man who presents with 5 days of watery diarrheal stools first relatively incessant and subsequently happening largely at night.  Increased stool output with any attempts at oral intake.  He has had no isaiah emesis but did develop dry heaves.  He first attempted to keep up with his fluid intake but on finding that his stool output increased with any intake is diminished this somewhat.  He has noted some orthostatic disequilibrium.  Immediately before his symptoms began he first noted chills without isaiah fevers or sweats.  He had a mild bifrontal headache and photophobia.  No nuchal rigidity.  After his first incessant diarrheal stools he began to note diarrhea primarily at night.  One day ago he attempted a dose of loperamide which had no impact on his stool output.  He did take 2 separate doses today and since then has had no stool output.  He notes that his wife had quite similar although not as prolonged symptoms beginning approximately 3 days before he did.  Both of them and eaten at a Chinese restaurant before the symptoms began.   No other sick contacts.    Past Medical History    I have reviewed this patient's medical history and updated it with pertinent information if needed.   Past Medical History:   Diagnosis Date     Allergic state      CAD (coronary artery disease)     CABG 2013, stent x 1 in 2014     Cataract      Cataracts, bilateral      Elevated PSA      Gastro-oesophageal reflux disease      Heart murmur      Hyperlipidemia      Hypertension      Pacemaker      Stented coronary artery      Visual aura        Past Surgical History   I have reviewed this patient's surgical history and updated it with pertinent information if needed.  Past Surgical History:   Procedure Laterality Date     AORTIC VALVE REPLACEMENT      25 mm CE bovine replacement Dr. Wu 8/20/2012     CARDIAC SURGERY       CATARACT IOL, RT/LT Right 04/2017     COLECTOMY LOW ANTERIOR N/A 2/17/2017    Procedure: COLECTOMY LOW ANTERIOR;  Surgeon: Tima Moreland MD;  Location: [facility]     COLONOSCOPY N/A 2/17/2017    Procedure: COLONOSCOPY;  Surgeon: Tima Moreland MD;  Location: [facility] OR     ENT SURGERY       FLEX SIG (MEDICARE PT.)       GI SURGERY  02/2017    ostemomy     HERNIA REPAIR       LAPAROSCOPIC HERNIORRHAPHY INGUINAL Right 9/18/2015    Procedure: LAPAROSCOPIC HERNIORRHAPHY INGUINAL;  Surgeon: Solitario Nettles DO;  Location: [facility] OR     PHACOEMULSIFICATION WITH STANDARD INTRAOCULAR LENS IMPLANT Right 4/20/2017    Procedure: PHACOEMULSIFICATION WITH STANDARD INTRAOCULAR LENS IMPLANT;  CATARACT EXTRACTION RIGHT EYE WITH IMPLANT;  Surgeon: Darin Gutierrez MD;  Location: [facility]     PROSTATE BIOPSY, NEEDLE, SATURATION SAMPLING  2009     retinopexy-pvd with retinal tear Right 2008     TAKEDOWN COLOSTOMY N/A 9/21/2017    Procedure: TAKEDOWN COLOSTOMY;  CLOSURE OF NORRIS'S SIGMOID COLOSTOMY, REPAIR STOMAL HERNIA;  Surgeon: Tima Moreland MD;  Location: [facility]     TONSILLECTOMY         Prior to Admission Medications   Prior to Admission Medications    Prescriptions Last Dose Informant Patient Reported? Taking?   ASPIRIN PO 2018 at 2000 Self Yes Yes   Sig: Take 325 mg by mouth daily    Cholecalciferol (VITAMIN D3 PO) 2018 at 0800 Self Yes Yes   Sig: Take 1,000 Units by mouth daily    FLUZONE HIGH-DOSE 0.5 ML injection Unknown at Unknown time  Yes No   Sig: Inject 1 Dose as directed once   Lancets Misc. (ACCU-CHEK SOFTCLIX LANCET DEV) KIT Past Month at Unknown time Self No Yes   Si Units daily   Multiple Vitamin (MULTIVITAMINS PO) 2018 at 0800 Self Yes Yes   Sig: Take 1 capsule by mouth daily   UNABLE TO FIND 2018 at 0800 Self Yes Yes   Sig: Take 550 mg by mouth 2 times daily Gymnema danielle   amLODIPine (NORVASC) 5 MG tablet 2018 at 2000 Self No Yes   Sig: TAKE 1 TABLET DAILY   atorvastatin (LIPITOR) 40 MG tablet 2018 at 0800 Self No Yes   Sig: TAKE 1 TABLET DAILY   calcium carbonate (OS-WALI 600 MG [place]. CA) 1500 (600 CA) MG tablet 2018 at 2000 Self No Yes   Sig: Take 1 tablet (1,500 mg) by mouth daily   clopidogrel (PLAVIX) 75 MG tablet 2018 at 0800 Self No Yes   Sig: TAKE 1 TABLET DAILY   finasteride (PROSCAR) 5 MG tablet 2018 at 2000 Self No Yes   Sig: TAKE 1 TABLET DAILY   glucose blood VI test strips (ACCU-CHEK SMARTVIEW) strip Past Week at Unknown time Self No Yes   Sig: Test blood sugar before breakfast one day, before and after supper the next and not at all the third day   lisinopril (PRINIVIL/ZESTRIL) 40 MG tablet 2018 at 0800 Self No Yes   Sig: TAKE 1 TABLET DAILY   metoprolol tartrate (LOPRESSOR) 100 MG tablet 2018 at 0800 Self No Yes   Sig: TAKE 1 TABLET TWICE A DAY   nitroGLYcerin (NITROSTAT) 0.4 MG sublingual tablet Unknown at Unknown time Self No No   Sig: Place 1 tablet (0.4 mg) under the tongue every 5 minutes as needed   Patient not taking: Reported on 2018   order for DME  Self No No   Sig: Equipment being ordered: Abdominal binders   ranitidine (ZANTAC) 150 MG  tablet 11/30/2018 at 0800 Self No Yes   Sig: TAKE 1 TABLET TWICE A DAY      Facility-Administered Medications: None     Allergies   Allergies   Allergen Reactions     Acetaminophen      Ciprofloxacin Hives     No Clinical Screening - See Comments      Antibiotic allergy, pt not sure which       Social History   I have reviewed this patient's social history and updated it with pertinent information if needed. Peter Zhao  reports that he has quit smoking. He quit smokeless tobacco use about 5 years ago. His smokeless tobacco use included Chew. He reports that he does not drink alcohol or use illicit drugs.    Family History   I have reviewed this patient's family history and updated it with pertinent information if needed.   Family History   Problem Relation Age of Onset     Diabetes Mother      C.A.D. Mother      Cancer Maternal Grandmother        Review of Systems   The 10 point Review of Systems is negative other than noted in the HPI.    Physical Exam   Temp: 96.5  F (35.8  C) Temp src: Tympanic BP: 90/58 Pulse: 73 Heart Rate: 69 Resp: 18 SpO2: 95 % O2 Device: None (Room air)    Vital Signs with Ranges  Temp:  [96.5  F (35.8  C)] 96.5  F (35.8  C)  Pulse:  [73] 73  Heart Rate:  [68-77] 69  Resp:  [18] 18  BP: (62-92)/(41-72) 90/58  SpO2:  [75 %-99 %] 95 %  169 lbs 0 oz      Awake, alert, mildly fatigued pleasant interactive man lying on gurney in emergency department.  Speech is clear.  HEENT: Pupils equal, conjugate. No icterus or nystagmus. Oral mucosa moist. No facial asymmetry.   Neck: Supple, jugular veins flat. Trachea midline   Chest: No chest wall movement asymmetry. Aeration preserved bases. Accessory muscles not in use. Expiratory time not increased. No tidal wheezes. No rhonchi. No discrete crackles.   Cardiac: PMI not displaced. S1, S2 unremarkable. No S3, S4. P2 not accentuated. No murmurs. Carotid upstroke preserved. Carotid amplitude preserved.   Abdomen: Soft. No palpation or percussion  tenderness. No distention.  Active bowel sounds in all quadrants.  Midline surgical incision and stomal incision with mild incisional hernias noted liver and spleen not increased in size. No bruits, masses, or pulsations.   Extremities: No lower extremity edema.  Extremities minimally cool distally.  Brisk capillary refill.  No eccymoses, clubbing.   Neurologic: Mental state above. Motor 5/5 and bilaterally equal. Tone preserved. No fasiculations or tremors. Sensation intact to light touch. DTR 2/4 and bilaterally equal.   Data   Data reviewed today:      Recent Labs  Lab 11/30/18  1344   WBC 8.4   HGB 16.0   MCV 86         POTASSIUM 3.5   CHLORIDE 104   CO2 19*   BUN 81*   CR 3.34*   ANIONGAP 12   WALI 8.6   *   ALBUMIN 3.5   PROTTOTAL 7.6   BILITOTAL 0.7   ALKPHOS 85   ALT 29   AST 26       Lactic Acid   Date Value Ref Range Status   11/30/2018 1.8 0.7 - 2.0 mmol/L Final   02/18/2017 1.0 0.4 - 2.0 mmol/L Final   02/17/2017 1.7 0.4 - 2.0 mmol/L Final       No results found for this or any previous visit (from the past 24 hour(s)).

## 2018-11-30 NOTE — TELEPHONE ENCOUNTER
Left message for patient to return our call regarding setting up a consult with Dr Chopra about having his hernia repaired.  Patient was scheduled with Dr Moreland, but was sick and had to cancel.  ELIAS MULLER

## 2018-11-30 NOTE — ED PROVIDER NOTES
"  History     Chief Complaint   Patient presents with     Dehydration     \"have been having diarrhea stools since Monday night\"      Hypotension     \"at home 70/60\"      The history is provided by the patient.     Peter Zhao is a 72 year old male who presented to the emergency department ambulatory along with wife for evaluation of an abrupt onset of diarrhea beginning 5 days ago.  He reports that Monday night the symptoms started and have been rather profuse and multiple since that time.  Began taking antidiarrheals yesterday that seem to be slightly improving.  Denies any vomiting.  Denies any fevers.  Reports rather appreciable hypotension today at home.  Feels quite weak and fatigued.  Unable to move around without appreciable fatigue and shortness of breath.  Denies any chest pains.  Past medical history would be most appreciable for antibiotic usage the end of October.  He has been around no sick contacts with similar symptoms.  No recent travel.  Denies any hematochezia.    Problem List:    Patient Active Problem List    Diagnosis Date Noted     H/O aortic valve replacement 11/16/2018     Priority: Medium     Hx of aortic valve replacement 11/16/2018     Priority: Medium     ICD (implantable cardioverter-defibrillator) in place 11/16/2018     Priority: Medium     Attention to colostomy (H) 09/21/2017     Priority: Medium     Agitation 05/19/2017     Priority: Medium     TIA (transient ischemic attack) 04/29/2017     Priority: Medium     Hypokalemia 02/20/2017     Priority: Medium     S/P colon resection 02/18/2017     Priority: Medium     Hyperlipidemia LDL goal <100 02/15/2017     Priority: Medium     Elevated prostate specific antigen (PSA) 02/15/2017     Priority: Medium     HTN (hypertension) 02/15/2017     Priority: Medium     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 02/15/2017     Priority: Medium     H/O migraine 02/15/2017     Priority: Medium     Visual aura " 02/15/2017     Priority: Medium     Cataract 02/15/2017     Priority: Medium     ACP (advance care planning) 10/04/2016     Priority: Medium     Advance Care Planning 10/4/2016: ACP Review of Chart / Resources Provided:  Reviewed chart for advance care plan.  Peter Zhao has no plan or code status on file. Discussed available resources and provided with information. Confirmed code status reflects current choices pending further ACP discussions.  Confirmed/documented legally designated decision makers.  Added by Daxa Campa           CAD (coronary artery disease)      Priority: Medium     CABG 2013, stent x 1 in 2014       Type 2 diabetes mellitus without complication, without long-term current use of insulin (H) 10/31/2013     Priority: Medium        Past Medical History:    Past Medical History:   Diagnosis Date     Allergic state      CAD (coronary artery disease)      Cataract      Cataracts, bilateral      Elevated PSA      Gastro-oesophageal reflux disease      Heart murmur      Hyperlipidemia      Hypertension      Pacemaker      Stented coronary artery      Visual aura        Past Surgical History:    Past Surgical History:   Procedure Laterality Date     AORTIC VALVE REPLACEMENT      25 mm CE bovine replacement Dr. Wu 8/20/2012     CARDIAC SURGERY       CATARACT IOL, RT/LT Right 04/2017     COLECTOMY LOW ANTERIOR N/A 2/17/2017    Procedure: COLECTOMY LOW ANTERIOR;  Surgeon: Tima Moreland MD;  Location: [facility]     COLONOSCOPY N/A 2/17/2017    Procedure: COLONOSCOPY;  Surgeon: Tima Moreland MD;  Location: [facility]     ENT SURGERY       FLEX SIG (MEDICARE PT.)       GI SURGERY  02/2017    ostemomy     HERNIA REPAIR       LAPAROSCOPIC HERNIORRHAPHY INGUINAL Right 9/18/2015    Procedure: LAPAROSCOPIC HERNIORRHAPHY INGUINAL;  Surgeon: Solitario Nettles DO;  Location: [facility]     PHACOEMULSIFICATION WITH STANDARD INTRAOCULAR LENS IMPLANT Right 4/20/2017    Procedure: PHACOEMULSIFICATION WITH STANDARD  INTRAOCULAR LENS IMPLANT;  CATARACT EXTRACTION RIGHT EYE WITH IMPLANT;  Surgeon: Darin Gutierrez MD;  Location: [facility]     PROSTATE BIOPSY, NEEDLE, SATURATION SAMPLING  2009     retinopexy-pvd with retinal tear Right 2008     TAKEDOWN COLOSTOMY N/A 9/21/2017    Procedure: TAKEDOWN COLOSTOMY;  CLOSURE OF NORRIS'S SIGMOID COLOSTOMY, REPAIR STOMAL HERNIA;  Surgeon: Tima Moreland MD;  Location: [facility]     TONSILLECTOMY         Family History:    Family History   Problem Relation Age of Onset     Diabetes Mother      C.A.D. Mother      Cancer Maternal Grandmother        Social History:  Marital Status:   [2]  Social History   Substance Use Topics     Smoking status: Former Smoker     Smokeless tobacco: Former User     Types: Chew     Quit date: 10/19/2013      Comment: quit in 2000     Alcohol use No        Medications:      amLODIPine (NORVASC) 5 MG tablet   ASPIRIN PO   atorvastatin (LIPITOR) 40 MG tablet   calcium carbonate (OS-WALI 600 MG [place]. CA) 1500 (600 CA) MG tablet   Cholecalciferol (VITAMIN D3 PO)   clopidogrel (PLAVIX) 75 MG tablet   finasteride (PROSCAR) 5 MG tablet   glucose blood VI test strips (ACCU-CHEK SMARTVIEW) strip   Lancets Misc. (ACCU-CHEK SOFTCLIX LANCET DEV) KIT   lisinopril (PRINIVIL/ZESTRIL) 40 MG tablet   metoprolol tartrate (LOPRESSOR) 100 MG tablet   Multiple Vitamin (MULTIVITAMINS PO)   ranitidine (ZANTAC) 150 MG tablet   UNABLE TO FIND   FLUZONE HIGH-DOSE 0.5 ML injection   nitroGLYcerin (NITROSTAT) 0.4 MG sublingual tablet   order for DME         Review of Systems   Constitutional: Positive for activity change, appetite change and fatigue. Negative for chills and fever.   HENT: Negative.    Respiratory:        REMY    Cardiovascular: Negative for chest pain.   Gastrointestinal: Positive for diarrhea. Negative for abdominal pain and vomiting.   Genitourinary: Negative.    Skin: Negative.    Hematological: Bruises/bleeds easily.       Physical Exam   BP: (!) 87/41  Pulse:  73  Heart Rate: 77  Temp: 96.5  F (35.8  C)  Resp: 18  Weight: 76.7 kg (169 lb)  SpO2: (!) 75 % (fingers cold)      Physical Exam   Constitutional: He is oriented to person, place, and time. No distress.   Weak appearing    Cardiovascular: Normal rate and regular rhythm.    Pulmonary/Chest: Effort normal and breath sounds normal.   Abdominal: Soft. There is no tenderness.   Neurological: He is alert and oriented to person, place, and time.   Skin: Skin is warm and dry.   Psychiatric: He has a normal mood and affect.   Nursing note and vitals reviewed.      ED Course     ED Course     Procedures               Critical Care time:  none               Results for orders placed or performed during the hospital encounter of 11/30/18 (from the past 24 hour(s))   CBC with platelets differential   Result Value Ref Range    WBC 8.4 4.0 - 11.0 10e9/L    RBC Count 5.50 4.4 - 5.9 10e12/L    Hemoglobin 16.0 13.3 - 17.7 g/dL    Hematocrit 47.0 40.0 - 53.0 %    MCV 86 78 - 100 fl    MCH 29.1 26.5 - 33.0 pg    MCHC 34.0 31.5 - 36.5 g/dL    RDW 14.1 10.0 - 15.0 %    Platelet Count 264 150 - 450 10e9/L    Diff Method Automated Method     % Neutrophils 66.7 %    % Lymphocytes 19.5 %    % Monocytes 12.4 %    % Eosinophils 0.4 %    % Basophils 0.4 %    % Immature Granulocytes 0.6 %    Nucleated RBCs 0 0 /100    Absolute Neutrophil 5.6 1.6 - 8.3 10e9/L    Absolute Lymphocytes 1.6 0.8 - 5.3 10e9/L    Absolute Monocytes 1.0 0.0 - 1.3 10e9/L    Absolute Eosinophils 0.0 0.0 - 0.7 10e9/L    Absolute Basophils 0.0 0.0 - 0.2 10e9/L    Abs Immature Granulocytes 0.1 0 - 0.4 10e9/L    Absolute Nucleated RBC 0.0    Comprehensive metabolic panel   Result Value Ref Range    Sodium 135 133 - 144 mmol/L    Potassium 3.5 3.4 - 5.3 mmol/L    Chloride 104 94 - 109 mmol/L    Carbon Dioxide 19 (L) 20 - 32 mmol/L    Anion Gap 12 3 - 14 mmol/L    Glucose 115 (H) 70 - 99 mg/dL    Urea Nitrogen 81 (H) 7 - 30 mg/dL    Creatinine 3.34 (H) 0.66 - 1.25 mg/dL    GFR  Estimate 18 (L) >60 mL/min/1.7m2    GFR Estimate If Black 22 (L) >60 mL/min/1.7m2    Calcium 8.6 8.5 - 10.1 mg/dL    Bilirubin Total 0.7 0.2 - 1.3 mg/dL    Albumin 3.5 3.4 - 5.0 g/dL    Protein Total 7.6 6.8 - 8.8 g/dL    Alkaline Phosphatase 85 40 - 150 U/L    ALT 29 0 - 70 U/L    AST 26 0 - 45 U/L   Lactic acid whole blood   Result Value Ref Range    Lactic Acid 1.8 0.7 - 2.0 mmol/L   Magnesium   Result Value Ref Range    Magnesium 2.6 (H) 1.6 - 2.3 mg/dL       Medications   0.9% sodium chloride BOLUS (not administered)     Followed by   0.9% sodium chloride BOLUS (1,000 mLs Intravenous New Bag 11/30/18 [id number])     Followed by   sodium chloride 0.9% infusion (not administered)       Assessments & Plan (with Medical Decision Making)   Rather profound dehydration from the diarrhea.  CLAIRE.  Hypotension responded to IV fluids. Looks ill.  Certainly would fit any reasonable criteria for observation and IV hydration.  No stools in the ED. Discussed and accepted by Dr. Adkins.        I have reviewed the nursing notes.    I have reviewed the findings, diagnosis, plan and need for follow up with the patient.       Current Discharge Medication List          Final diagnoses:   Diarrhea, unspecified type   Hypovolemia   Acute kidney injury superimposed on chronic kidney disease (H)       11/30/2018   [facility]     Ivette Gregg PA-C  11/30/18 [id number]

## 2018-12-01 VITALS
OXYGEN SATURATION: 98 % | BODY MASS INDEX: 25.03 KG/M2 | HEART RATE: 73 BPM | TEMPERATURE: 97.4 F | WEIGHT: 169 LBS | SYSTOLIC BLOOD PRESSURE: 97 MMHG | DIASTOLIC BLOOD PRESSURE: 53 MMHG | RESPIRATION RATE: 16 BRPM | HEIGHT: 69 IN

## 2018-12-01 LAB
ALBUMIN UR-MCNC: NEGATIVE MG/DL
ANION GAP SERPL CALCULATED.3IONS-SCNC: 5 MMOL/L (ref 3–14)
APPEARANCE UR: CLEAR
BACTERIA #/AREA URNS HPF: ABNORMAL /HPF
BASOPHILS # BLD AUTO: 0 10E9/L (ref 0–0.2)
BASOPHILS NFR BLD AUTO: 0.5 %
BILIRUB UR QL STRIP: NEGATIVE
BUN SERPL-MCNC: 65 MG/DL (ref 7–30)
CALCIUM SERPL-MCNC: 8.2 MG/DL (ref 8.5–10.1)
CHLORIDE SERPL-SCNC: 115 MMOL/L (ref 94–109)
CO2 SERPL-SCNC: 22 MMOL/L (ref 20–32)
COLOR UR AUTO: ABNORMAL
CREAT SERPL-MCNC: 2.23 MG/DL (ref 0.66–1.25)
DIFFERENTIAL METHOD BLD: ABNORMAL
EOSINOPHIL # BLD AUTO: 0.2 10E9/L (ref 0–0.7)
EOSINOPHIL NFR BLD AUTO: 2.8 %
ERYTHROCYTE [DISTWIDTH] IN BLOOD BY AUTOMATED COUNT: 14 % (ref 10–15)
GFR SERPL CREATININE-BSD FRML MDRD: 29 ML/MIN/1.7M2
GLUCOSE SERPL-MCNC: 98 MG/DL (ref 70–99)
GLUCOSE UR STRIP-MCNC: NEGATIVE MG/DL
HCT VFR BLD AUTO: 39.6 % (ref 40–53)
HGB BLD-MCNC: 13.4 G/DL (ref 13.3–17.7)
HGB UR QL STRIP: NEGATIVE
IMM GRANULOCYTES # BLD: 0 10E9/L (ref 0–0.4)
IMM GRANULOCYTES NFR BLD: 0.5 %
KETONES UR STRIP-MCNC: NEGATIVE MG/DL
LEUKOCYTE ESTERASE UR QL STRIP: NEGATIVE
LYMPHOCYTES # BLD AUTO: 1.5 10E9/L (ref 0.8–5.3)
LYMPHOCYTES NFR BLD AUTO: 26.6 %
MCH RBC QN AUTO: 29.1 PG (ref 26.5–33)
MCHC RBC AUTO-ENTMCNC: 33.8 G/DL (ref 31.5–36.5)
MCV RBC AUTO: 86 FL (ref 78–100)
MONOCYTES # BLD AUTO: 0.9 10E9/L (ref 0–1.3)
MONOCYTES NFR BLD AUTO: 15.5 %
MUCOUS THREADS #/AREA URNS LPF: PRESENT /LPF
NEUTROPHILS # BLD AUTO: 3.1 10E9/L (ref 1.6–8.3)
NEUTROPHILS NFR BLD AUTO: 54.1 %
NITRATE UR QL: NEGATIVE
NRBC # BLD AUTO: 0 10*3/UL
NRBC BLD AUTO-RTO: 0 /100
PH UR STRIP: 5.5 PH (ref 4.7–8)
PLATELET # BLD AUTO: 175 10E9/L (ref 150–450)
POTASSIUM SERPL-SCNC: 4 MMOL/L (ref 3.4–5.3)
RBC # BLD AUTO: 4.61 10E12/L (ref 4.4–5.9)
RBC #/AREA URNS AUTO: 0 /HPF (ref 0–2)
SODIUM SERPL-SCNC: 142 MMOL/L (ref 133–144)
SOURCE: ABNORMAL
SP GR UR STRIP: 1.01 (ref 1–1.03)
UROBILINOGEN UR STRIP-MCNC: NORMAL MG/DL (ref 0–2)
WBC # BLD AUTO: 5.8 10E9/L (ref 4–11)
WBC #/AREA URNS AUTO: <1 /HPF (ref 0–5)

## 2018-12-01 PROCEDURE — 40000786 ZZHCL STATISTIC ACTIVE MRSA SURVEILLANCE CULTURE: Performed by: INTERNAL MEDICINE

## 2018-12-01 PROCEDURE — 85025 COMPLETE CBC W/AUTO DIFF WBC: CPT | Performed by: INTERNAL MEDICINE

## 2018-12-01 PROCEDURE — A9270 NON-COVERED ITEM OR SERVICE: HCPCS | Mod: GY | Performed by: INTERNAL MEDICINE

## 2018-12-01 PROCEDURE — 81001 URINALYSIS AUTO W/SCOPE: CPT | Performed by: INTERNAL MEDICINE

## 2018-12-01 PROCEDURE — 25000132 ZZH RX MED GY IP 250 OP 250 PS 637: Mod: GY | Performed by: INTERNAL MEDICINE

## 2018-12-01 PROCEDURE — 99217 ZZC OBSERVATION CARE DISCHARGE: CPT | Performed by: INTERNAL MEDICINE

## 2018-12-01 PROCEDURE — 80048 BASIC METABOLIC PNL TOTAL CA: CPT | Performed by: INTERNAL MEDICINE

## 2018-12-01 PROCEDURE — 25000128 H RX IP 250 OP 636: Performed by: INTERNAL MEDICINE

## 2018-12-01 PROCEDURE — 36415 COLL VENOUS BLD VENIPUNCTURE: CPT | Performed by: INTERNAL MEDICINE

## 2018-12-01 PROCEDURE — G0378 HOSPITAL OBSERVATION PER HR: HCPCS

## 2018-12-01 RX ORDER — DIPHENOXYLATE HCL/ATROPINE 2.5-.025MG
1 TABLET ORAL 4 TIMES DAILY PRN
Qty: 40 TABLET | COMMUNITY
Start: 2018-12-01 | End: 2018-12-07

## 2018-12-01 RX ORDER — LISINOPRIL 40 MG/1
TABLET ORAL
Qty: 90 TABLET | Refills: 1 | COMMUNITY
Start: 2018-12-01 | End: 2018-12-23

## 2018-12-01 RX ORDER — AMLODIPINE BESYLATE 5 MG/1
5 TABLET ORAL DAILY
Qty: 90 TABLET | Refills: 2 | COMMUNITY
Start: 2018-12-03 | End: 2018-12-01

## 2018-12-01 RX ORDER — CALCIUM CARBONATE 500 MG/1
1000 TABLET, CHEWABLE ORAL ONCE
Status: COMPLETED | OUTPATIENT
Start: 2018-12-01 | End: 2018-12-01

## 2018-12-01 RX ORDER — AMLODIPINE BESYLATE 5 MG/1
5 TABLET ORAL DAILY
Qty: 90 TABLET | Refills: 2 | Status: SHIPPED | OUTPATIENT
Start: 2018-12-03 | End: 2019-02-24

## 2018-12-01 RX ADMIN — CLOPIDOGREL 75 MG: 75 TABLET, FILM COATED ORAL at 08:23

## 2018-12-01 RX ADMIN — CALCIUM CARBONATE (ANTACID) CHEW TAB 500 MG 1000 MG: 500 CHEW TAB at 00:41

## 2018-12-01 RX ADMIN — SODIUM CHLORIDE, POTASSIUM CHLORIDE, SODIUM LACTATE AND CALCIUM CHLORIDE: 600; 310; 30; 20 INJECTION, SOLUTION INTRAVENOUS at 00:24

## 2018-12-01 RX ADMIN — METOPROLOL TARTRATE 50 MG: 50 TABLET, FILM COATED ORAL at 08:23

## 2018-12-01 RX ADMIN — ATORVASTATIN CALCIUM 40 MG: 40 TABLET, FILM COATED ORAL at 08:23

## 2018-12-01 NOTE — PLAN OF CARE
"Problem: Patient Care Overview  Goal: Plan of Care/Patient Progress Review  -diagnostic tests and consults completed and resulted  -vital signs normal or at patient baseline  Nurse to notify provider when observation goals have been met and patient is ready for discharge.   Outcome: No Change  Pt A&Ox3. He c/o heartburn, wanted something besides prilosec, states \"that stuff doesn't work\". Afternoon nurse discussed lm billings for heartburn and pt was willing to try it. Ended up getting order on nights for two tums tablets for heartburn. Pt has then denied any heartburn since. Afebrile. HR 60-70s. BP intially 99/58, rechecked 107/63. RR 16 and sating greater than 95% on room air. Lung sounds are clear. Bowel sounds are hyperactive. He has had no bowel movements this shift. Voiding adequately. UA sent to lab. Denies any nausea. Up independently in room. Call light within reach and pt calls appropriately. Wife stayed with patient throughout night.     Face to face report given with opportunity to observe patient.  Report given to BRENNEN Richey.    Martha Baca  12/1/2018, 7:30 AM          "

## 2018-12-01 NOTE — PROVIDER NOTIFICATION
Notified provider of pt c/o heartburn would like either tums or lm billings. One time dose of two tablets tums received.

## 2018-12-01 NOTE — PLAN OF CARE
Face to face report given with opportunity to observe patient.    Report given to Susan Ware   11/30/2018  11:42 PM

## 2018-12-01 NOTE — PLAN OF CARE
"Problem: Patient Care Overview  Goal: Plan of Care/Patient Progress Review  -diagnostic tests and consults completed and resulted  -vital signs normal or at patient baseline  Nurse to notify provider when observation goals have been met and patient is ready for discharge.  Outcome: No Change  Reason for hospital stay:  Gastroenteritis, Dehydration    Most recent vitals: BP 99/58  Pulse 73  Temp 98.5  F (36.9  C) (Temporal)  Resp 16  Ht 1.753 m (5' 9\")  Wt 76.7 kg (169 lb)  SpO2 95%  BMI 24.96 kg/m2    Pain Management:  Denied any pain this shift.     Orientation:  A+O x4    Cardiac:  Hypotensive upon admit to floor with BP 89/54 with HR 60s. Fluids infusing at 125mL/hr. BP increased to 114/67 with HR 76 at 2053.    Respiratory:  Lungs clear - sats 92- 96% on RA.     GI:  Had 1 loose stool this shift. Gave lomotil x1 per order.  Denies any abd pain.  Tolerating regular diet. Sample sent for C-Diff which was negative. Complains of heartburn - received order for prilosec.     IVF:  LR infusing at 125mL/hr    Mobility:  Up independently in room with steady gait.       12/1/2018  12:54 AM  Mario Ware"

## 2018-12-01 NOTE — DISCHARGE SUMMARY
[facility]    Discharge Summary  Hospitalist    Date of Admission:  11/30/2018  Date of Discharge:  12/1/2018  Discharging Provider: Dylan Fu  Date of Service (when I saw the patient): 12/01/18    Discharge Diagnoses   Acute kidney injury, improved  Volume depletion, resolved  Hyperchloremia related to initial saline fluid received intravenously   probable viral infectious gastroenteritis, improved  Established chronic kidney disease approximately stage III  Establish primary hypertension  Established coronary artery disease without evidence of acute ischemia  Established incisional hernias requiring prior sigmoid colon resection and subsequent takedown    History of Present Illness   Peter Zhao is a 72 year old man who presents with 5 days of watery diarrhea. His wife had similar symptoms several days before. He has noted some orthostatic disequilibrium as well.  After several days he primarily had symptoms at night and he hoped these would improve but they have been persistent.  Beginning several days ago he first took one dose without response and then yesterday several doses of loperamide and since then has had minimal stool output.  No chest pain or pressure.  The symptoms did begin with chills without isaiah fever or sweats.  He has had some myalgias particularly over the past several days.  He is also noted bifrontal headache, photophobia, and no nuchal rigidity.  As his symptoms were persistent he presented to emergency department where he was noted to have mildly decreased blood pressures which have responded to crystalloid fluid repletion of approximately 2 L.  Acute kidney injury was noted with creatinine 3.3 (prior baseline approximately 1.1-1 0.3).  Hospital Course   Peter Zhao was admitted on 11/30/2018.  The following problems were addressed during his hospitalization:  1.  Infectious gastroenteritis   Markedly improved with symptomatic management.  No further diarrheal  stools.  He has been able to tolerate oral intake without difficulty.  Walking actively.  Almost certainly this is an infectious, likely viral gastroenteritis.  Stool did not show C. difficile toxin and in any event CDI unlikely with no fever or leukocytosis although he did have a prescription for Augmentin in May of this year and azithromycin in October.  Could consider food poisoning as this began with his wife who symptoms were approximately 3 days before his after they ate at a [employer] restaurant although this amount of dwell time before symptoms began would be quite uncharacteristic.    2.  Volume depletion  Hyperchloremia  Responded really usual crystalloid fluid repletion.  Moderate hyperchloremia quite consistent with the initial saline fluids he received.  This should resolve without any further changes in treatment.  Improved blood pressure.  He has not had a marked degree of tachycardia however is chronically treated with metoprolol which may blunt his response.    Walking actively without symptomatic orthostasis.   3. acute kidney injury  Improved although still show some azotemia.  Continue further improvement.  Encouraged continuing emphasis on oral liquid intake.  Urinary sediment unremarkable but more telling is the rapid decrease in his degree of azotemia.  In addition he has established chronic kidney disease approximately stage III with creatinine approximately 1.1-1 0.3.     repletion.  4.    Primary hypertension   Establish primary hypertension.  Blood pressures remain still slightly decreased.  Extensive discussion with him regarding management of hypertension in the context of volume depletion.  He is quite anxious to resume his oral medications especially amlodipine as he recalls an episode of CNS dysfunction related to hypertension.  He did have an episode of TIA or multiple areas of CNS infarct with clinical findings out of proportion to imaging findings in April 2015.  I do not find  evidence of markedly elevated blood pressures at that time.  In any event I discussed with him that as he measures his blood pressure at home regularly he should withhold amlodipine until his systolic pressure is 115-120 or resume this in another several days.  Recommended that he withhold lisinopril for the same period of time then resume half his usual dose for 3 days then resuming his usual dose.  Will resume his usual dose of metoprolol 100 mg twice daily beginning tonight as this will have less of a prominent effect on his blood pressure.    5.  Coronary artery disease/aortic valve disease  Multiple issues in the past including primary hypertension, coronary artery disease, aorta coronary bypass grafting, pacemaker/implanted defibrillator/cardioverter and bioprosthetic aortic valve replacement.  No chest discomfort.  He notes he is reasonably active.  No evidence of any active ischemia.  Continue his chronic medications for coronary disease.  Continue metoprolol, but at half his usual dose.  Withhold lisinopril and amlodipine until blood pressure shows more increase.  5.  Planned incisional hernia repair  The planned incisional hernia repair with Dr. Moreland in a week or so.  In the past, he developed on obstruction of his sigmoid colon secondary to diverticular disease.  Requiring a Marianna's resection of the sigmoid colon with a sigmoid colostomy and takedown.  While his current presentation is of relatively modest severity not unreasonable to defer proposed operative procedure until renal function has fully returned to its prior baseline.  In fact Dr. Moreland was able to see him informally while he was in hospital and recommended delay in the procedure which will be re-arranged in a later date.    Dylan Fu    Code Status   Full Code       Primary Care Physician   Alexander Vazquez    Vital signs:  Temp: 97.4  F (36.3  C) Temp src: Temporal BP: 97/53 Pulse: 73 Heart Rate: 74 Resp: 16 SpO2: 98  "% O2 Device: None (Room air)   Height: 175.3 cm (5' 9\") Weight: 76.7 kg (169 lb)  Estimated body mass index is 24.96 kg/(m^2) as calculated from the following:    Height as of this encounter: 1.753 m (5' 9\").    Weight as of this encounter: 76.7 kg (169 lb).        Awake, alert, mildly distractible man sitting on bed on medical wards.  Speech is clear.  Concentration preserved   HEENT: Pupils equal, conjugate. No icterus or nystagmus. Oral mucosa moist. No facial asymmetry.   Neck: Supple, jugular veins not elevated. Trachea midline   Chest: No chest wall movement asymmetry. Aeration preserved bases. Accessory muscles not in use. Expiratory time not increased. No tidal wheezes. No rhonchi. No discrete crackles.  Expected midsternal incision.  No sternal instability.  Cardiac: PMI not displaced. S1, S2 unremarkable. No S3, S4. P2 not accentuated. No murmurs. Carotid upstroke preserved. Carotid amplitude preserved.   Abdomen: Soft.  Midline well-healed surgical incision.  No palpation or percussion tenderness. No distention.  Active bowel sounds in all quadrants.. Liver and spleen not increased in size. No bruits, masses, or pulsations.   Extremities: No lower extremity edema.  Warm distally.  Brisk capillary refill.  No eccymoses, clubbing.   Neurologic: Mental state above. Motor 5/5 and bilaterally equal. Tone preserved. No fasiculations or tremors.  # Discharge Pain Plan:   - Patient currently has NO PAIN and is not being prescribed pain medications on discharge.      Discharge Disposition   Discharged to home  Condition at discharge: Stable    Consultations This Hospital Stay   None    Time Spent on this Encounter   I, Dylan Fu, personally saw the patient today and spent greater than 30 minutes discharging this patient.    Discharge Orders     **Basic metabolic panel FUTURE 14d     Reason for your hospital stay   This is a 72 year old man who presented after 5 days of diarrheal stools beginning with " chills and some myalgias. Clinical impression has been of an infectious gastroenteritis with volume depletion and acute on chronic kidney injury. He improved rapidly with no further diarrhea and good tolerance of oral intake. On the day of discharge, he is anxious to leave the hospital.     Follow-up and recommended labs and tests    Follow up with primary care provider, Alexander Vazquez, within 3-5 days for hospital follow- up.  BMP in 3-5 days     Activity   Your activity upon discharge: activity as tolerated     Activity   Your activity upon discharge: activity as tolerated     Discharge Instructions   Metamucil 1 Tsp in 8 oz water or juice daily for 1 week, then resume every other day  May resume amlodipine 12/3 or when systolic blood pressure over 115     Full Code     Full Code     Diet   Follow this diet upon discharge: Orders Placed This Encounter  Cardiac Diet Adult       Discharge Medications   Current Discharge Medication List      START taking these medications    Details   diphenoxylate-atropine (LOMOTIL) 2.5-0.025 MG tablet Take 1 tablet by mouth 4 times daily as needed for diarrhea  Qty: 40 tablet    Associated Diagnoses: Gastroenteritis         CONTINUE these medications which have CHANGED    Details   amLODIPine (NORVASC) 5 MG tablet Take 1 tablet (5 mg) by mouth daily  Qty: 90 tablet, Refills: 2    Associated Diagnoses: Benign essential hypertension      lisinopril (PRINIVIL/ZESTRIL) 40 MG tablet 1/2 tab daily beginning 12/3 for 3 days then 1 tab daily  Qty: 90 tablet, Refills: 1    Associated Diagnoses: Benign essential hypertension         CONTINUE these medications which have NOT CHANGED    Details   ASPIRIN PO Take 325 mg by mouth daily       atorvastatin (LIPITOR) 40 MG tablet TAKE 1 TABLET DAILY  Qty: 90 tablet, Refills: 3    Associated Diagnoses: Hyperlipidemia LDL goal <100      calcium carbonate (OS-WALI 600 MG [place]. CA) 1500 (600 CA) MG tablet Take 1 tablet (1,500 mg) by mouth  daily  Qty: 30 tablet, Refills: 3      Cholecalciferol (VITAMIN D3 PO) Take 1,000 Units by mouth daily       clopidogrel (PLAVIX) 75 MG tablet TAKE 1 TABLET DAILY  Qty: 90 tablet, Refills: 1    Associated Diagnoses: Benign essential hypertension      finasteride (PROSCAR) 5 MG tablet TAKE 1 TABLET DAILY  Qty: 90 tablet, Refills: 3    Associated Diagnoses: Benign non-nodular prostatic hyperplasia with lower urinary tract symptoms      glucose blood VI test strips (ACCU-CHEK SMARTVIEW) strip Test blood sugar before breakfast one day, before and after supper the next and not at all the third day  Qty: 1 Box, Refills: 12    Associated Diagnoses: T2DM (type 2 diabetes mellitus) (H)      Gymnema Sylvestris Leaf POWD 550 mg 2 times daily      Lancets Misc. (ACCU-CHEK SOFTCLIX LANCET DEV) KIT 1 Units daily  Qty: 1 kit, Refills: 2    Associated Diagnoses: T2DM (type 2 diabetes mellitus) (H)      metoprolol tartrate (LOPRESSOR) 100 MG tablet TAKE 1 TABLET TWICE A DAY  Qty: 180 tablet, Refills: 2    Associated Diagnoses: Benign essential hypertension      Multiple Vitamin (MULTIVITAMINS PO) Take 1 capsule by mouth daily      ranitidine (ZANTAC) 150 MG tablet TAKE 1 TABLET TWICE A DAY  Qty: 180 tablet, Refills: 3    Associated Diagnoses: Gastroesophageal reflux disease without esophagitis      FLUZONE HIGH-DOSE 0.5 ML injection Inject 1 Dose as directed once      nitroGLYcerin (NITROSTAT) 0.4 MG sublingual tablet Place 1 tablet (0.4 mg) under the tongue every 5 minutes as needed  Qty: 25 tablet, Refills: 3    Comments: Please dispense in 2 bottles  Associated Diagnoses: Hyperlipidemia LDL goal <100; Intermediate coronary syndrome (H)      order for DME Equipment being ordered: Abdominal binders  Qty: 2 Device, Refills: 1    Associated Diagnoses: Abdominal wall hernia           Allergies   Allergies   Allergen Reactions     Acetaminophen      Ciprofloxacin Hives     No Clinical Screening - See Comments      Antibiotic allergy,  pt not sure which     Data   Most Recent 3 CBC's:  Recent Labs   Lab Test  12/01/18   0620  11/30/18   1344  11/16/18   1130   WBC  5.8  8.4  8.9   HGB  13.4  16.0  15.1   MCV  86  86  89   PLT  175  264  271      Most Recent 3 BMP's:  Recent Labs   Lab Test  12/01/18   0620  11/30/18   1344  11/16/18   1130   NA  142  135  141   POTASSIUM  4.0  3.5  4.3   CHLORIDE  115*  104  107   CO2  22  19*  27   BUN  65*  81*  32*   CR  2.23*  3.34*  1.37*   ANIONGAP  5  12  7   WALI  8.2*  8.6  8.4*   GLC  98  115*  118*     Most Recent 2 LFT's:  Recent Labs   Lab Test  11/30/18   1344  11/16/18   1130   AST  26  30   ALT  29  23   ALKPHOS  85  99   BILITOTAL  0.7  0.7     Most Recent INR's and Anticoagulation Dosing History:  Anticoagulation Dose History     Recent Dosing and Labs Latest Ref Rng & Units 3/27/2013 4/6/2017 5/9/2017 7/15/2017 9/27/2017    INR 0.80 - 1.20 1.08 1.1 1.0 1.01 1.06        Most Recent 3 Troponin's:  Recent Labs   Lab Test  04/28/17 2010 02/17/17   0130  01/28/17   0615   TROPI  <0.015  The 99th percentile for upper reference range is 0.045 ug/L.  Troponin values in   the range of 0.045 - 0.120 ug/L may be associated with risks of adverse   clinical events.    <0.015  The 99th percentile for upper reference range is 0.045 ug/L.  Troponin values in   the range of 0.045 - 0.120 ug/L may be associated with risks of adverse   clinical events.    <0.015  The 99th percentile for upper reference range is 0.045 ug/L.  Troponin values in   the range of 0.045 - 0.120 ug/L may be associated with risks of adverse   clinical events.       Most Recent Cholesterol Panel:  Recent Labs   Lab Test 04/30/17   CHOL  129   LDL  35   HDL  33*   TRIG  305*     Most Recent 6 Bacteria Isolates From Any Culture (See EPIC Reports for Culture Details):  Recent Labs   Lab Test  12/01/18   0300  09/21/17   1649  02/17/17   0430   CULT  Culture in progress  No MRSA isolated  No MRSA isolated     Most Recent TSH, T4 and A1c  Labs:  Recent Labs   Lab Test  09/12/17   0820   05/26/17   1855   TSH   --    --   0.91   A1C  6.3*   < >   --     < > = values in this interval not displayed.     Results for orders placed or performed in visit on 11/16/18   XR CHEST 2 VW (Clinic Performed)    Narrative    XR CHEST 2 VW    HISTORY: 72 years Male ; Pre-op exam    COMPARISON: 9/12/2017    TECHNIQUE: 2 views of the chest were obtained.    FINDINGS: Again seen are changes of median sternotomy and cardiac  defibrillator placement.    Heart size and pulmonary vascularity are within normal limits. Lungs  are clear.        Impression    IMPRESSION: Clear chest.    KRISTI LIMA MD

## 2018-12-01 NOTE — PROGRESS NOTES
Assessment completed see flowsheet.      LOC: alert    Others present: Patient and Family; wifeRaven     Dx: hypovolemia, diarrhea       Lives with: Lives With: spouse, Raven and dog, Ben    Living at: Living Arrangements: house    Equipment used: none; has walker and wheelchair at home    Support System: Description of Support System: Involved, Supportive        Primary PCP: Alexander Vazquez    POA/Guardian: No      Health Care Directive: YES wifeRaven and sonKarsten identified as decision makers     Pharmacy: [facility] [place] [facility]     :  Not connected     Homecare/[facility]:   Not connected       Adequate Resources for needs (housing, utilities, food/med): YES    Meds and appointments management: YES    Work: NO    Transportation: YES       ADLs: independent     Falls: No    Able to Return to Prior Living Arrangements: YES    : YES-  [employer] for [employer] years.  Received paperwork recently that he needs to complete to receive  identification.  Wanting to obtain hearing aides        Goals: return home     Barriers: no barriers identified     Needs: Demonstrates Competency    DENAE: none     Discharge Plan: return home via Raven rosales     No questions or concerns identified.  Miguel eager to be able to return home.  He believes this may happen today.

## 2018-12-01 NOTE — PLAN OF CARE
Patient discharged at 1:00 PM via ambulation accompanied by spouse and staff. Prescriptions sent with patient to fill . All belongings sent with patient.     Discharge instructions reviewed with patient and spouse. Listed belongings gathered and returned to patient. Yes    Patient discharged to home.       Core Measures and Vaccines  Core Measures applicable during stay: NO   Pneumonia and Influenza given prior to discharge, if indicated: N/A    Surgical Patient   Surgical Procedures during stay: No  Did patient receive discharge instruction on wound care and recognition of infection symptoms? N/A    MISC  Follow up appointment made:  Yes  Home and hospital aquired medications returned to patient: N/A  Patient reports pain was well managed at discharge: Yes

## 2018-12-01 NOTE — PROGRESS NOTES
Name: Peter Zhao    MRN#: [id number]    Reason for Hospitalization: Hypovolemia [E86.1]  Acute kidney injury superimposed on chronic kidney disease (H) [N17.9, N18.9]  Diarrhea, unspecified type [R19.7]    DENAE: none    Discharge Date: 12/1/2018    Patient / Family response to discharge plan: agreeable     Follow-Up Appt: No future appointments.    Other Providers (Care Coordinator, County Services, PCA services etc): No    Discharge Disposition: home via wife, Raven Jimenez Rogelio

## 2018-12-01 NOTE — PLAN OF CARE
"[facility] Observation Note:  Patient is registered to observation and is in 3226/3226-1 at approximately 1600 via cart accompanied by spouse from emergency room . Report received from Gabriella HUTTON in SBAR format at 1545 via telephone. Patient ambulated to bed via self.. Patient is alert and oriented X 3, denies pain; rates at 0 on 0-10 scale.  Patient oriented to room, unit, hourly rounding, and plan of care. Explained the admission packet including \"What is Observation Status\" and patient handbook with patient bill of rights brochure. Will continue to monitor and document as needed.  Nursing Observation criteria listed below was met:  Health care directives status obtained and documented: Yes  Care Everywhere authorization completed No    MRSA swab completed for patient 65 years and older: No    If initial lactic acid >2.0, repeat lactic acid drawn within one hour of arrival to unit: NA.     Patient identifies a surrogate decision maker: Yes If yes, who: wife Raven  Contact Information: see file   Vaccination assessment and education completed: Yes   Vaccinations received prior to hospitalization: Pneumovax yes  Influenza(seasonal)  YES   Vaccination(s) ordered: not given today because patient is up to date    Skin issues/needs documented:Yes  Isolation Patient: YES Education given and documented, correct sign in place and documentation row added to PCS:  Yes    Fall Prevention: Observation fall risk completed:  Yes Education given and documented, sticker and magnet in place: N/A    General Care Plan initiated with observation goal(s): Yes  Education (including assessment) Documented: Yes  New medication information given to patient and documented: Yes  Patient elects to use own medications from home during hospitalization:  No If yes, a MD order was obtained to use Medications from Home:  No and home medications were sent to Pharmacy for verification for use during hospitalization: No  Patient has discharge " needs (If yes, please explain): No

## 2018-12-01 NOTE — DISCHARGE INSTRUCTIONS
Follow up with primary care provider, Alexander Vazquez, within 3-5 days for hospital follow- up.  BMP in 3-5 days     12/14 Arrive @ 2:45PM, for a 3PM appointment. This was the earliest available for our current , however Please Call the clinic on Monday to see if you can get in sooner, ideally your follow up would be with in 3-5 days.     You will need to have a Basic Metabolic Panel drawn at the clinic lab with in 3-5 days.     ON 12/3 START TAKING THE FOLLOWING:    Amlodipine (Norvasc) 5 mg, START TAKING 12/3    Lisinopril 40 mg 1/2 Tablet daily on 12/3 for 3 days, then 1 tablet daily

## 2018-12-02 LAB
BACTERIA SPEC CULT: NORMAL
SPECIMEN SOURCE: NORMAL

## 2018-12-03 ENCOUNTER — TELEPHONE (OUTPATIENT)
Dept: CASE MANAGEMENT | Facility: HOSPITAL | Age: 73
End: 2018-12-03

## 2018-12-03 ENCOUNTER — TELEPHONE (OUTPATIENT)
Dept: INTERNAL MEDICINE | Facility: OTHER | Age: 73
End: 2018-12-03

## 2018-12-03 NOTE — TELEPHONE ENCOUNTER
10:09 AM    Reason for Call: OVERBOOK    Patient is having the following symptoms: hosp follow up in 3-5 days for  minutes.    The patient is requesting an appointment for Friday in [place] with Provider.    Was an appointment offered for this call? No  If yes : Appointment type              Date    Preferred method for responding to this message: Telephone Call  What is your phone number ?[phone]    If we cannot reach you directly, may we leave a detailed response at the number you provided? Yes    Can this message wait until your PCP/provider returns, if unavailable today? Not applicable, provider is in    Purvi Manrique

## 2018-12-03 NOTE — PROGRESS NOTES
SUBJECTIVE:   Peter Zhao is a 72 year old male who presents to clinic today for the following health issues:          Hospital Follow-up Visit:    Hospital/Nursing Home/IP Rehab Facility: [facility]  Date of Admission: 11/30/18  Date of Discharge: 12/1/18  Reason(s) for Admission: acute kidney injury            Problems taking medications regularly:  None       Medication changes since discharge: None       Problems adhering to non-medication therapy:  None    Summary of hospitalization:  [facility] discharge summary reviewed  Diagnostic Tests/Treatments reviewed.  Follow up needed: none  Other Healthcare Providers Involved in Patient s Care:         None  Update since discharge: improved.     Post Discharge Medication Reconciliation: discharge medications reconciled, continue medications without change.  Plan of care communicated with patient     Coding guidelines for this visit:  Type of Medical   Decision Making Face-to-Face Visit       within 7 Days of discharge Face-to-Face Visit        within 14 days of discharge   Moderate Complexity [id number]   High Complexity [id number]          Peter presents today after recent hospitalization for gastroenteritis which led to numerous episodes of diarrhea and subsequent acute kidney injury with a presenting Cr over 3.  He now is improved but will need repeat Cr.  HE denies any ongoing N/V/D.  No fevers.  He feels he is back to his baseline.      Problem list and histories reviewed & adjusted, as indicated.  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 Wu 8/20/2012     CARDIAC SURGERY       CATARACT IOL, RT/LT Right 04/2017     COLECTOMY LOW ANTERIOR N/A 2/17/2017    Procedure: COLECTOMY LOW ANTERIOR;  Surgeon: Tima Moreland MD;  [facility]     COLONOSCOPY N/A 2/17/2017    Procedure: COLONOSCOPY;  Surgeon: Tima Moreland MD;  Location: [facility]     ENT SURGERY       FLEX SIG (MEDICARE PT.)       GI SURGERY  02/2017    ostemomy     HERNIA REPAIR       LAPAROSCOPIC HERNIORRHAPHY INGUINAL Right 9/18/2015    Procedure: LAPAROSCOPIC HERNIORRHAPHY INGUINAL;  Surgeon: Solitario Nettles DO;  Location: [facility]     PHACOEMULSIFICATION WITH STANDARD INTRAOCULAR LENS IMPLANT Right 4/20/2017    Procedure: PHACOEMULSIFICATION WITH STANDARD INTRAOCULAR LENS IMPLANT;  CATARACT EXTRACTION RIGHT EYE WITH IMPLANT;  Surgeon: Darin Gutierrez MD;  Location: [facility]     PROSTATE BIOPSY, NEEDLE, SATURATION SAMPLING  2009     retinopexy-pvd with retinal tear Right 2008     TAKEDOWN COLOSTOMY N/A 9/21/2017    Procedure: TAKEDOWN COLOSTOMY;  CLOSURE OF NORRIS'S SIGMOID COLOSTOMY, REPAIR STOMAL HERNIA;  Surgeon: Tima Moreland MD;  Location: [facility]     TONSILLECTOMY         Social History   Substance Use Topics     Smoking status: Former Smoker     Smokeless tobacco: Former User     Types: Chew     Quit date: 10/19/2013      Comment: quit in 2000     Alcohol use No     Family History   Problem Relation Age of Onset     Diabetes Mother      C.A.D. Mother      Cancer Maternal Grandmother          Current Outpatient Prescriptions   Medication Sig Dispense Refill      amLODIPine (NORVASC) 5 MG tablet Take 1 tablet (5 mg) by mouth daily 90 tablet 2     ASPIRIN PO Take 325 mg by mouth daily        atorvastatin (LIPITOR) 40 MG tablet TAKE 1 TABLET DAILY 90 tablet 3     calcium carbonate (OS-WALI 600 MG [place]. CA) 1500 (600 CA) MG tablet Take 1 tablet (1,500 mg) by mouth daily 30 tablet 3     clopidogrel (PLAVIX) 75 MG tablet TAKE 1 TABLET DAILY 90 tablet 1     finasteride (PROSCAR) 5 MG tablet TAKE 1 TABLET DAILY 90 tablet 3     glucose blood VI test strips (ACCU-CHEK SMARTVIEW) strip Test blood sugar before breakfast one day, before and after supper the next and not at all the third day 1 Box 12     Gymnema Sylvestris Leaf POWD 550 mg 2 times daily       Lancets Misc. (ACCU-CHEK SOFTCLIX LANCET DEV) KIT 1 Units daily 1 kit 2     lisinopril (PRINIVIL/ZESTRIL) 40 MG tablet 1/2 tab daily beginning 12/3 for 3 days then 1 tab daily 90 tablet 1     metoprolol tartrate (LOPRESSOR) 100 MG tablet TAKE 1 TABLET TWICE A  tablet 2     Multiple Vitamin (MULTIVITAMINS PO) Take 1 capsule by mouth daily       ranitidine (ZANTAC) 150 MG tablet TAKE 1 TABLET TWICE A  tablet 3     nitroGLYcerin (NITROSTAT) 0.4 MG sublingual tablet Place 1 tablet (0.4 mg) under the tongue every 5 minutes as needed (Patient not taking: Reported on 11/16/2018) 25 tablet 3     order for DME Equipment being ordered: Abdominal binders 2 Device 1     Allergies   Allergen Reactions     Acetaminophen      Ciprofloxacin Hives     No Clinical Screening - See Comments      Antibiotic allergy, pt not sure which     Recent Labs   Lab Test  12/07/18   0757  12/01/18   0620  11/30/18   1344  11/16/18   1130  05/25/18   0128   09/12/17   0820   06/06/17   1056  05/26/17   1855  05/03/17  04/30/17  10/04/16   A1C   --    --    --    --    --    --   6.3*   --   6.6*   --    --    --    --    --    --   6.2   LDL   --    --    --    --    --    --    --    --    --    --    --    --    --   35   --    --    HDL   --    --     --    --    --    --    --    --    --    --    --    --    --   33*   --    --    TRIG   --    --    --    --    --    --    --    --    --    --    --    --    --   305*   --    --    ALT   --    --   29  23  23   < >  26   --    --   21   < >   --    < >   --    < >   --    CR  1.34*  2.23*  3.34*  1.37*  1.23   < >  1.23   < >   --   1.23   < >   --    < >   --    < >   --    GFRESTIMATED  52*  29*  18*  51*  58*   < >  58*   < >   --   58*   --    --    < >   --    < >   --    GFRESTBLACK  63  35*  22*  61  70   < >  70   < >   --   70   --    --    < >   --    < >   --    POTASSIUM  4.4  4.0  3.5  4.3  4.3   < >  4.2   < >   --   3.5   < >   --    < >   --    < >   --    TSH   --    --    --    --    --    --    --    --    --   0.91   --   1.040   --    --    < >   --     < > = values in this interval not displayed.      BP Readings from Last 3 Encounters:   12/07/18 102/62   12/01/18 97/53   11/16/18 102/60    Wt Readings from Last 3 Encounters:   12/07/18 176 lb (79.8 kg)   11/30/18 169 lb (76.7 kg)   11/16/18 179 lb (81.2 kg)                    Reviewed and updated as needed this visit by clinical staff       Reviewed and updated as needed this visit by Provider         ROS:  Constitutional, HEENT, cardiovascular, pulmonary, gi and gu systems are negative, except as otherwise noted.    OBJECTIVE:     /62  Pulse 64  Temp 96  F (35.6  C) (Tympanic)  Wt 176 lb (79.8 kg)  SpO2 96%  BMI 25.99 kg/m2  Body mass index is 25.99 kg/(m^2).   GENERAL: healthy, alert and no distress  RESP: lungs clear to auscultation - no rales, rhonchi or wheezes  CV: regular rate and rhythm, normal S1 S2, no S3 or S4, no murmurs  ABDOMEN: soft, nontender, without hepatosplenomegaly or masses  MS: no gross musculoskeletal defects noted, no edema  SKIN: no suspicious lesions or rashes  NEURO: Normal strength and tone, mentation intact and speech normal  PSYCH: mentation appears normal, affect normal/bright    Diagnostic  Test Results:  Results for orders placed or performed in visit on 12/07/18 (from the past 24 hour(s))   **Basic metabolic panel FUTURE 14d   Result Value Ref Range    Sodium 140 133 - 144 mmol/L    Potassium 4.4 3.4 - 5.3 mmol/L    Chloride 108 94 - 109 mmol/L    Carbon Dioxide 25 20 - 32 mmol/L    Anion Gap 7 3 - 14 mmol/L    Glucose 143 (H) 70 - 99 mg/dL    Urea Nitrogen 24 7 - 30 mg/dL    Creatinine 1.34 (H) 0.66 - 1.25 mg/dL    GFR Estimate 52 (L) >60 mL/min/1.7m2    GFR Estimate If Black 63 >60 mL/min/1.7m2    Calcium 8.6 8.5 - 10.1 mg/dL     Results for orders placed or performed in visit on 12/07/18   **Basic metabolic panel FUTURE 14d   Result Value Ref Range    Sodium 140 133 - 144 mmol/L    Potassium 4.4 3.4 - 5.3 mmol/L    Chloride 108 94 - 109 mmol/L    Carbon Dioxide 25 20 - 32 mmol/L    Anion Gap 7 3 - 14 mmol/L    Glucose 143 (H) 70 - 99 mg/dL    Urea Nitrogen 24 7 - 30 mg/dL    Creatinine 1.34 (H) 0.66 - 1.25 mg/dL    GFR Estimate 52 (L) >60 mL/min/1.7m2    GFR Estimate If Black 63 >60 mL/min/1.7m2    Calcium 8.6 8.5 - 10.1 mg/dL       ASSESSMENT/PLAN:     Problem List Items Addressed This Visit     Acute kidney injury (H) CMP today.        Other Visit Diagnoses     Well controlled type 2 diabetes mellitus (H)    -  Primary    Relevant Orders    Comprehensive metabolic panel    Hemoglobin A1c    Lipid Profile    Albumin Random Urine Quantitative with Creat Ratio    Gastroenteritis - Resolved            Patient will follow up with Dr Chopra in near future to discuss hernia repair.      Alexander Vazquez,   [facility]

## 2018-12-04 ENCOUNTER — TELEPHONE (OUTPATIENT)
Dept: CASE MANAGEMENT | Facility: HOSPITAL | Age: 73
End: 2018-12-04

## 2018-12-07 ENCOUNTER — OFFICE VISIT (OUTPATIENT)
Dept: INTERNAL MEDICINE | Facility: OTHER | Age: 73
End: 2018-12-07
Attending: INTERNAL MEDICINE
Payer: MEDICARE

## 2018-12-07 VITALS
OXYGEN SATURATION: 96 % | TEMPERATURE: 96 F | HEART RATE: 64 BPM | SYSTOLIC BLOOD PRESSURE: 102 MMHG | WEIGHT: 176 LBS | DIASTOLIC BLOOD PRESSURE: 62 MMHG | BODY MASS INDEX: 25.99 KG/M2

## 2018-12-07 DIAGNOSIS — N18.9 ACUTE KIDNEY INJURY SUPERIMPOSED ON CHRONIC KIDNEY DISEASE (H): ICD-10-CM

## 2018-12-07 DIAGNOSIS — N17.9 ACUTE KIDNEY INJURY (H): ICD-10-CM

## 2018-12-07 DIAGNOSIS — E11.9 WELL CONTROLLED TYPE 2 DIABETES MELLITUS (H): Primary | ICD-10-CM

## 2018-12-07 DIAGNOSIS — N17.9 ACUTE KIDNEY INJURY SUPERIMPOSED ON CHRONIC KIDNEY DISEASE (H): ICD-10-CM

## 2018-12-07 DIAGNOSIS — K52.9 GASTROENTERITIS: ICD-10-CM

## 2018-12-07 LAB
ALBUMIN SERPL-MCNC: 3.5 G/DL (ref 3.4–5)
ALP SERPL-CCNC: 86 U/L (ref 40–150)
ALT SERPL W P-5'-P-CCNC: 24 U/L (ref 0–70)
ANION GAP SERPL CALCULATED.3IONS-SCNC: 6 MMOL/L (ref 3–14)
ANION GAP SERPL CALCULATED.3IONS-SCNC: 7 MMOL/L (ref 3–14)
AST SERPL W P-5'-P-CCNC: 19 U/L (ref 0–45)
BILIRUB SERPL-MCNC: 0.6 MG/DL (ref 0.2–1.3)
BUN SERPL-MCNC: 24 MG/DL (ref 7–30)
BUN SERPL-MCNC: 25 MG/DL (ref 7–30)
CALCIUM SERPL-MCNC: 8.5 MG/DL (ref 8.5–10.1)
CALCIUM SERPL-MCNC: 8.6 MG/DL (ref 8.5–10.1)
CHLORIDE SERPL-SCNC: 108 MMOL/L (ref 94–109)
CHLORIDE SERPL-SCNC: 109 MMOL/L (ref 94–109)
CHOLEST SERPL-MCNC: 119 MG/DL
CO2 SERPL-SCNC: 25 MMOL/L (ref 20–32)
CO2 SERPL-SCNC: 25 MMOL/L (ref 20–32)
CREAT SERPL-MCNC: 1.34 MG/DL (ref 0.66–1.25)
CREAT SERPL-MCNC: 1.35 MG/DL (ref 0.66–1.25)
CREAT UR-MCNC: 181 MG/DL
EST. AVERAGE GLUCOSE BLD GHB EST-MCNC: 160 MG/DL
GFR SERPL CREATININE-BSD FRML MDRD: 52 ML/MIN/1.7M2
GFR SERPL CREATININE-BSD FRML MDRD: 52 ML/MIN/1.7M2
GLUCOSE SERPL-MCNC: 143 MG/DL (ref 70–99)
GLUCOSE SERPL-MCNC: 146 MG/DL (ref 70–99)
HBA1C MFR BLD: 7.2 % (ref 0–5.6)
HDLC SERPL-MCNC: 28 MG/DL
LDLC SERPL CALC-MCNC: 49 MG/DL
MICROALBUMIN UR-MCNC: 6 MG/L
MICROALBUMIN/CREAT UR: 3.41 MG/G CR (ref 0–17)
NONHDLC SERPL-MCNC: 91 MG/DL
POTASSIUM SERPL-SCNC: 4.4 MMOL/L (ref 3.4–5.3)
POTASSIUM SERPL-SCNC: 4.4 MMOL/L (ref 3.4–5.3)
PROT SERPL-MCNC: 7 G/DL (ref 6.8–8.8)
SODIUM SERPL-SCNC: 140 MMOL/L (ref 133–144)
SODIUM SERPL-SCNC: 140 MMOL/L (ref 133–144)
TRIGL SERPL-MCNC: 211 MG/DL

## 2018-12-07 PROCEDURE — G0463 HOSPITAL OUTPT CLINIC VISIT: HCPCS

## 2018-12-07 PROCEDURE — 80048 BASIC METABOLIC PNL TOTAL CA: CPT | Mod: ZL | Performed by: INTERNAL MEDICINE

## 2018-12-07 PROCEDURE — 82043 UR ALBUMIN QUANTITATIVE: CPT | Mod: ZL | Performed by: INTERNAL MEDICINE

## 2018-12-07 PROCEDURE — 83036 HEMOGLOBIN GLYCOSYLATED A1C: CPT | Mod: ZL | Performed by: INTERNAL MEDICINE

## 2018-12-07 PROCEDURE — 80061 LIPID PANEL: CPT | Mod: ZL | Performed by: INTERNAL MEDICINE

## 2018-12-07 PROCEDURE — 40000788 ZZHCL STATISTIC ESTIMATED AVERAGE GLUCOSE: Mod: ZL | Performed by: INTERNAL MEDICINE

## 2018-12-07 PROCEDURE — 80053 COMPREHEN METABOLIC PANEL: CPT | Mod: ZL | Performed by: INTERNAL MEDICINE

## 2018-12-07 PROCEDURE — 99214 OFFICE O/P EST MOD 30 MIN: CPT | Performed by: INTERNAL MEDICINE

## 2018-12-07 PROCEDURE — 36415 COLL VENOUS BLD VENIPUNCTURE: CPT | Mod: ZL | Performed by: INTERNAL MEDICINE

## 2018-12-07 ASSESSMENT — ANXIETY QUESTIONNAIRES
6. BECOMING EASILY ANNOYED OR IRRITABLE: NOT AT ALL
4. TROUBLE RELAXING: NOT AT ALL
2. NOT BEING ABLE TO STOP OR CONTROL WORRYING: NOT AT ALL
GAD7 TOTAL SCORE: 0
5. BEING SO RESTLESS THAT IT IS HARD TO SIT STILL: NOT AT ALL
7. FEELING AFRAID AS IF SOMETHING AWFUL MIGHT HAPPEN: NOT AT ALL
1. FEELING NERVOUS, ANXIOUS, OR ON EDGE: NOT AT ALL
3. WORRYING TOO MUCH ABOUT DIFFERENT THINGS: NOT AT ALL

## 2018-12-07 ASSESSMENT — PATIENT HEALTH QUESTIONNAIRE - PHQ9: SUM OF ALL RESPONSES TO PHQ QUESTIONS 1-9: 0

## 2018-12-07 ASSESSMENT — PAIN SCALES - GENERAL: PAINLEVEL: NO PAIN (0)

## 2018-12-07 NOTE — MR AVS SNAPSHOT
"              After Visit Summary   12/7/2018    Peter Zhao    MRN: [id number]           Patient Information     Date Of Birth          1945        Visit Information        Provider Department      12/7/2018 8:15 AM Alexander Vazquez DO [facility]        Today's Diagnoses     Well controlled type 2 diabetes mellitus (H)    -  1       Follow-ups after your visit        Your next 10 appointments already scheduled     Dec 11, 2018  2:00 PM CST   (Arrive by 1:45 PM)   CONSULT with Jaydon Chopra MD   [facility] ([facility] )    [address]  [place] [address]   [phone]            Dec 14, 2018  3:00 PM CST   (Arrive by 2:45 PM)   SHORT with Alexander Vazquez DO   [facility] ([facility]    [address]  [place] [address]   [phone]              Who to contact     If you have questions or need follow up information about today's clinic visit or your schedule please contact [facility] directly at [phone].  Normal or non-critical lab and imaging results will be communicated to you by MyChart, letter or phone within 4 business days after the clinic has received the results. If you do not hear from us within 7 days, please contact the clinic through MyChart or phone. If you have a critical or abnormal lab result, we will notify you by phone as soon as possible.  Submit refill requests through [employer] or call your pharmacy and they will forward the refill request to us. Please allow 3 business days for your refill to be completed.          Additional Information About Your Visit        MyChart Information     [employer] lets you send messages to your doctor, view your test results, renew your prescriptions, schedule appointments and more. To sign up, go to [email or web address] . Click on \"Log in\" on the left side of the screen, which will take you to " "the Welcome page. Then click on \"Sign up Now\" on the right side of the page.     You will be asked to enter the access code listed below, as well as some personal information. Please follow the directions to create your username and password.     Your access code is: [email or web address]  Expires: 3/7/2019  7:50 AM     Your access code will  in 90 days. If you need help or a new code, please call your [facility] clinic or [phone].        Care EveryWhere ID     This is your Care EveryWhere ID. This could be used by other organizations to access your [facility] medical records  [id number]        Your Vitals Were     Pulse Temperature Pulse Oximetry BMI (Body Mass Index)          64 96  F (35.6  C) (Tympanic) 96% 25.99 kg/m2         Blood Pressure from Last 3 Encounters:   18 102/62   18 97/53   18 102/60    Weight from Last 3 Encounters:   18 176 lb (79.8 kg)   18 169 lb (76.7 kg)   18 179 lb (81.2 kg)              We Performed the Following     Albumin Random Urine Quantitative with Creat Ratio     Comprehensive metabolic panel     Hemoglobin A1c     Lipid Profile          Today's Medication Changes          These changes are accurate as of 18  8:22 AM.  If you have any questions, ask your nurse or doctor.               Stop taking these medicines if you haven't already. Please contact your care team if you have questions.     diphenoxylate-atropine 2.5-0.025 MG tablet   Commonly known as:  LOMOTIL   Stopped by:  Alexander Vazquez DO           FLUZONE HIGH-DOSE 0.5 ML injection   Generic drug:  influenza Vac Split High-Dose   Stopped by:  Alexander Vazquez DO           VITAMIN D3 PO   Stopped by:  Alexander Vazquez DO                    Primary Care Provider Office Phone # Fax #    Alexander Vazquez [phone] [address] [facility] [address]        Equal Access to Services     ANDRY CASPER AH: Taisha June, " walatriceda bayleeadaha, qaybta kaalmada olga, john basurtoondina ah. So [facility] [phone].    ATENCIÓN: Si yamel lau, tiene a farah disposición servicios gratuitos de asistencia lingüística. Jose al [phone].    We comply with applicable federal civil rights laws and [place] laws. We do not discriminate on the basis of race, color, national origin, age, disability, sex, sexual orientation, or gender identity.            Thank you!     Thank you for choosing [facility]  for your care. Our goal is always to provide you with excellent care. Hearing back from our patients is one way we can continue to improve our services. Please take a few minutes to complete the written survey that you may receive in the mail after your visit with us. Thank you!             Your Updated Medication List - Protect others around you: Learn how to safely use, store and throw away your medicines at www.disposemymeds.org.          This list is accurate as of 12/7/18  8:22 AM.  Always use your most recent med list.                   Brand Name Dispense Instructions for use Diagnosis    amLODIPine 5 MG tablet    NORVASC    90 tablet    Take 1 tablet (5 mg) by mouth daily    Benign essential hypertension       ASPIRIN PO      Take 325 mg by mouth daily        atorvastatin 40 MG tablet    LIPITOR    90 tablet    TAKE 1 TABLET DAILY    Hyperlipidemia LDL goal <100       blood glucose lancing device     1 kit    1 Units daily    T2DM (type 2 diabetes mellitus) (H)       blood glucose monitoring test strip    ACCU-CHEK SMARTVIEW    1 Box    Test blood sugar before breakfast one day, before and after supper the next and not at all the third day    T2DM (type 2 diabetes mellitus) (H)       calcium carbonate 600 mg (elemental) 1500 (600 Ca) MG tablet    OS-WALI    30 tablet    Take 1 tablet (1,500 mg) by mouth daily        clopidogrel 75 MG tablet    PLAVIX    90 tablet    TAKE 1 TABLET DAILY    Benign  essential hypertension       finasteride 5 MG tablet    PROSCAR    90 tablet    TAKE 1 TABLET DAILY    Benign non-nodular prostatic hyperplasia with lower urinary tract symptoms       Gymnema Sylvestris Leaf Powd      550 mg 2 times daily        lisinopril 40 MG tablet    PRINIVIL/ZESTRIL    90 tablet    1/2 tab daily beginning 12/3 for 3 days then 1 tab daily    Benign essential hypertension       metoprolol tartrate 100 MG tablet    LOPRESSOR    180 tablet    TAKE 1 TABLET TWICE A DAY    Benign essential hypertension       MULTIVITAMINS PO      Take 1 capsule by mouth daily        nitroGLYcerin 0.4 MG sublingual tablet    NITROSTAT    25 tablet    Place 1 tablet (0.4 mg) under the tongue every 5 minutes as needed    Hyperlipidemia LDL goal <100, Intermediate coronary syndrome (H)       order for DME     2 Device    Equipment being ordered: Abdominal binders    Abdominal wall hernia       ranitidine 150 MG tablet    ZANTAC    180 tablet    TAKE 1 TABLET TWICE A DAY    Gastroesophageal reflux disease without esophagitis

## 2018-12-07 NOTE — NURSING NOTE
"Chief Complaint   Patient presents with     Hospital F/U       Initial /62  Pulse 64  Temp 96  F (35.6  C) (Tympanic)  Wt 176 lb (79.8 kg)  SpO2 96%  BMI 25.99 kg/m2 Estimated body mass index is 25.99 kg/(m^2) as calculated from the following:    Height as of 11/30/18: 5' 9\" (1.753 m).    Weight as of this encounter: 176 lb (79.8 kg).  Medication Reconciliation: complete    Sabina Olivo LPN    "

## 2018-12-08 ASSESSMENT — ANXIETY QUESTIONNAIRES: GAD7 TOTAL SCORE: 0

## 2018-12-18 ENCOUNTER — OFFICE VISIT (OUTPATIENT)
Dept: SURGERY | Facility: OTHER | Age: 73
End: 2018-12-18
Attending: SURGERY
Payer: MEDICARE

## 2018-12-18 VITALS
DIASTOLIC BLOOD PRESSURE: 52 MMHG | HEIGHT: 69 IN | WEIGHT: 176 LBS | HEART RATE: 61 BPM | TEMPERATURE: 96.6 F | SYSTOLIC BLOOD PRESSURE: 90 MMHG | BODY MASS INDEX: 26.07 KG/M2 | OXYGEN SATURATION: 94 %

## 2018-12-18 DIAGNOSIS — K43.2 INCISIONAL HERNIA, WITHOUT OBSTRUCTION OR GANGRENE: Primary | ICD-10-CM

## 2018-12-18 PROCEDURE — G0463 HOSPITAL OUTPT CLINIC VISIT: HCPCS

## 2018-12-18 PROCEDURE — 99213 OFFICE O/P EST LOW 20 MIN: CPT | Performed by: SURGERY

## 2018-12-18 ASSESSMENT — MIFFLIN-ST. JEOR: SCORE: 1528.71

## 2018-12-18 ASSESSMENT — PAIN SCALES - GENERAL: PAINLEVEL: NO PAIN (0)

## 2018-12-18 NOTE — PROGRESS NOTES
"Chief Complaint   Patient presents with     Consult     Hernia consult- Patient has consulted with Dr Moreland.         Initial BP 90/52 (BP Location: Right arm, Patient Position: Chair, Cuff Size: Adult Regular)   Pulse 61   Temp 96.6  F (35.9  C) (Tympanic)   Ht 1.753 m (5' 9\")   Wt 79.8 kg (176 lb)   SpO2 94%   BMI 25.99 kg/m   Estimated body mass index is 25.99 kg/m  as calculated from the following:    Height as of this encounter: 1.753 m (5' 9\").    Weight as of this encounter: 79.8 kg (176 lb).  Medication Reconciliation: complete    Elisha Gibbs LPN    "

## 2018-12-18 NOTE — PATIENT INSTRUCTIONS
Thank you for allowing Dr. Chopra and our surgical team to participate in your care.  If you have a scheduling or an appointment question please contact Darcie, our Health Unit Coordinator at her direct line [phone].   ALL nursing questions or concerns can be directed to your surgical nurse at: [phone]

## 2018-12-21 NOTE — PROGRESS NOTES
[facility] Surgery Consultation    CC:  Incisional hernia    HPI:  This 73 year old year old male is seen at the request of Dr. Moreland for evaluation of incisional hernias. He is a patient well known to Dr. Moreland who underwent closure of Wilder's in 9/2017. He subsequently developed a low midline incisional hernia as well as one at the old stoma site. He denies that it causes him pain. He denies that either are effecting his bowel movements. He is doing all the things he wishes at this point. He does believe that the hernias may be contributing to not being able o fit in his pants and needing to use suspenders.     Past Medical History:   Diagnosis Date     Allergic state      CAD (coronary artery disease)     CABG 2013, stent x 1 in 2014     Cataract      Cataracts, bilateral      Elevated PSA      Gastro-oesophageal reflux disease      Heart murmur      Hyperlipidemia      Hypertension      Pacemaker      Stented coronary artery      Visual aura        Past Surgical History:   Procedure Laterality Date     AORTIC VALVE REPLACEMENT      25 mm CE bovine replacement Dr. Wu 8/20/2012     CARDIAC SURGERY       CATARACT IOL, RT/LT Right 04/2017     COLECTOMY LOW ANTERIOR N/A 2/17/2017    Procedure: COLECTOMY LOW ANTERIOR;  Surgeon: Tima Moreland MD;  [facility]: [facility]     COLONOSCOPY N/A 2/17/2017    Procedure: COLONOSCOPY;  Surgeon: Tima Moreland MD;  Location: [facility]     ENT SURGERY       FLEX SIG (MEDICARE PT.)       GI SURGERY  02/2017    ostemomy     HERNIA REPAIR       LAPAROSCOPIC HERNIORRHAPHY INGUINAL Right 9/18/2015    Procedure: LAPAROSCOPIC HERNIORRHAPHY INGUINAL;  Surgeon: Solitario Nettles DO;  Location: [facility]     PHACOEMULSIFICATION WITH STANDARD INTRAOCULAR LENS IMPLANT Right 4/20/2017    Procedure: PHACOEMULSIFICATION WITH STANDARD INTRAOCULAR LENS IMPLANT;  CATARACT EXTRACTION RIGHT EYE WITH IMPLANT;  Surgeon: Darin Gutierrez MD;  Location: [facility]     PROSTATE BIOPSY, NEEDLE,  "SATURATION SAMPLING  2009     retinopexy-pvd with retinal tear Right 2008     TAKEDOWN COLOSTOMY N/A 9/21/2017    Procedure: TAKEDOWN COLOSTOMY;  CLOSURE OF NORRIS'S SIGMOID COLOSTOMY, REPAIR STOMAL HERNIA;  Surgeon: Tima Moreland MD;  Location: [facility]     TONSILLECTOMY         Allergies   Allergen Reactions     Acetaminophen      Ciprofloxacin Hives     No Clinical Screening - See Comments      Antibiotic allergy, pt not sure which       Current Outpatient Medications   Medication     amLODIPine (NORVASC) 5 MG tablet     ASPIRIN PO     atorvastatin (LIPITOR) 40 MG tablet     calcium carbonate (OS-WALI 600 MG [place]. CA) 1500 (600 CA) MG tablet     clopidogrel (PLAVIX) 75 MG tablet     finasteride (PROSCAR) 5 MG tablet     glucose blood VI test strips (ACCU-CHEK SMARTVIEW) strip     Gymnema Sylvestris Leaf POWD     Lancets Misc. (ACCU-CHEK SOFTCLIX LANCET DEV) KIT     lisinopril (PRINIVIL/ZESTRIL) 40 MG tablet     metoprolol tartrate (LOPRESSOR) 100 MG tablet     Multiple Vitamin (MULTIVITAMINS PO)     nitroGLYcerin (NITROSTAT) 0.4 MG sublingual tablet     order for DME     ranitidine (ZANTAC) 150 MG tablet     No current facility-administered medications for this visit.        HABITS:    Social History     Tobacco Use     Smoking status: Former Smoker     Smokeless tobacco: Former User     Types: Chew     Quit date: 10/19/2013     Tobacco comment: quit in 2000   Substance Use Topics     Alcohol use: No     Drug use: No       Family History   Problem Relation Age of Onset     Diabetes Mother      C.A.D. Mother      Cancer Maternal Grandmother        REVIEW OF SYSTEMS:  Ten point review of systems negative except those mentioned in the HPI.     OBJECTIVE:    BP 90/52 (BP Location: Right arm, Patient Position: Chair, Cuff Size: Adult Regular)   Pulse 61   Temp 96.6  F (35.9  C) (Tympanic)   Ht 1.753 m (5' 9\")   Wt 79.8 kg (176 lb)   SpO2 94%   BMI 25.99 kg/m      GENERAL: Generally appears well, in no distress " with appropriate affect.  HEENT:   Sclerae anicteric - normocephalic atraumatic   Respiratory:  No acute distress, no splinting   Cardiovascular:  Regular Rate and Rhythm  Abdomen: overweight abdomen, no distinct hernia felt at low midline, old stomal hernia.   :  deferred  Extremities:  Extremities normal. No deformities, edema, or skin discoloration.  Skin:  no suspicious lesions or rashes  Neurological: grossly intact  Psych:  Alert, oriented, affect appropriate with normal decision making ability.    IMPRESSION:  73 y.o. Male presents with incisional hernias from a yepez's reversal. Noted to have this back in May and was scheduled ot have them fixed when he got sick with the flu and had to cancel. They are completely asymptomatic with the patients biggest complaint being he can't fit into the 19 new pairs of jeans he just bought. I had a isaiah discussion that I am not sure that after the surgery this will be something he can achieve. I discussed the possibility of a laparoscopic repair. I feel that waiting to repair until these hernias are more symptomatic seems reasonable at this point as we have known about these since May and he has yet to have any issues in relation to them. I discussed that if starts having any issues with his bowels, or they start to become painful or are interfering with his life in any way he should return and we will set him up for operative repair.     PLAN:    Observation at this point.     Jaydon Chopra MD,     12/20/2018  9:37 PM

## 2018-12-23 DIAGNOSIS — I10 BENIGN ESSENTIAL HYPERTENSION: ICD-10-CM

## 2018-12-24 NOTE — TELEPHONE ENCOUNTER
Lisinopril       Last Written Prescription Date:  12/1/2018  Last Fill Quantity: 90,   # refills: 1  Last Office Visit: 12/18/2018  Future Office visit:

## 2018-12-26 RX ORDER — LISINOPRIL 40 MG/1
TABLET ORAL
Qty: 90 TABLET | Refills: 0 | Status: SHIPPED | OUTPATIENT
Start: 2018-12-26 | End: 2019-03-24

## 2018-12-26 NOTE — TELEPHONE ENCOUNTER
Vital Signs 11/30/2018 11/30/2018 12/1/2018 12/1/2018 12/1/2018   Systolic 89 114 99  107   Diastolic 54 67 58  63   Pulse          Vital Signs 12/1/2018 12/1/2018 12/1/2018 12/7/2018 12/7/2018   Systolic  92 97  102   Diastolic  52 53  62   Pulse     64     Vital Signs 12/18/2018 12/18/2018   Systolic  90   Diastolic  52   Pulse  61   Note BP's.See below.Pended.Thank you.

## 2019-01-14 ENCOUNTER — TELEPHONE (OUTPATIENT)
Dept: INTERNAL MEDICINE | Facility: OTHER | Age: 74
End: 2019-01-14

## 2019-01-15 NOTE — PROGRESS NOTES
SUBJECTIVE:   Peter Zhao is a 73 year old male who presents to clinic today for the following health issues:      Musculoskeletal problem/pain      Duration: about 1 year    Description  Location: left hip    Intensity:  mild    Accompanying signs and symptoms: none    History  Previous similar problem: no   Previous evaluation:  none    Precipitating or alleviating factors:  Trauma or overuse: no   Aggravating factors include: sleeping on it and wearing big boots     Therapies tried and outcome: icee-hot- makes pain go away also uses Aleve- helps pain    Peter presents today due to left hip pain.  No trauma is reported.  He states his left hip has been uncomfortable for the last year, however he rates the discomfort at a 1/10.  He reports sometimes it bothers him when sleeping and lying on that side.        Problem list and histories reviewed & adjusted, as indicated.  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 Wu 8/20/2012     CARDIAC SURGERY       CATARACT IOL, RT/LT Right 04/2017     COLECTOMY LOW ANTERIOR N/A 2/17/2017    Procedure: COLECTOMY LOW ANTERIOR;  Surgeon: Tima Moreland MD;  Location:  [facility]     COLONOSCOPY N/A 2/17/2017    Procedure: COLONOSCOPY;  Surgeon: Tima Moreland MD;  Location: [facility]     ENT SURGERY       FLEX SIG (MEDICARE PT.)       GI SURGERY  02/2017    ostemomy     HERNIA REPAIR       LAPAROSCOPIC HERNIORRHAPHY INGUINAL Right 9/18/2015    Procedure: LAPAROSCOPIC HERNIORRHAPHY INGUINAL;  Surgeon: Solitario Nettles DO;  Location: [facility]     PHACOEMULSIFICATION WITH STANDARD INTRAOCULAR LENS IMPLANT Right 4/20/2017    Procedure: PHACOEMULSIFICATION WITH STANDARD INTRAOCULAR LENS IMPLANT;  CATARACT EXTRACTION RIGHT EYE WITH IMPLANT;  Surgeon: Darin Gutierrez MD;  Location: [facility]     PROSTATE BIOPSY, NEEDLE, SATURATION SAMPLING  2009     retinopexy-pvd with retinal tear Right 2008     TAKEDOWN COLOSTOMY N/A 9/21/2017    Procedure: TAKEDOWN COLOSTOMY;  CLOSURE OF NORRIS'S SIGMOID COLOSTOMY, REPAIR STOMAL HERNIA;  Surgeon: Tima Moreland MD;  Location: [facility]     TONSILLECTOMY         Social History     Tobacco Use     Smoking status: Former Smoker     Smokeless tobacco: Former User     Types: Chew     Quit date: 10/19/2013     Tobacco comment: quit in 2000   Substance Use Topics     Alcohol use: No     Family History   Problem Relation Age of Onset     Diabetes Mother      C.A.D. Mother      Cancer Maternal Grandmother          Current Outpatient Medications   Medication Sig Dispense Refill     amLODIPine (NORVASC) 5 MG tablet Take 1 tablet (5 mg) by mouth daily 90 tablet 2     ASPIRIN PO Take 325 mg by mouth daily        atorvastatin (LIPITOR) 40 MG tablet TAKE 1 TABLET DAILY 90 tablet 3     calcium carbonate (OS-WALI 600 MG [place]. CA) 1500 (600 CA) MG tablet Take 1 tablet (1,500 mg) by mouth daily 30 tablet 3     clopidogrel (PLAVIX) 75 MG tablet TAKE 1 TABLET DAILY 90 tablet 1     finasteride (PROSCAR) 5 MG tablet TAKE 1 TABLET DAILY 90 tablet 3     glucose blood VI test strips (ACCU-CHEK SMARTVIEW) strip Test blood sugar before breakfast one day, before and after supper the  next and not at all the third day 1 Box 12     Gymnema Sylvestris Leaf POWD 550 mg 2 times daily       Lancets Misc. (ACCU-CHEK SOFTCLIX LANCET DEV) KIT 1 Units daily 1 kit 2     lisinopril (PRINIVIL/ZESTRIL) 40 MG tablet TAKE 1 TABLET DAILY 90 tablet 0     metoprolol tartrate (LOPRESSOR) 100 MG tablet TAKE 1 TABLET TWICE A  tablet 2     Multiple Vitamin (MULTIVITAMINS PO) Take 1 capsule by mouth daily       ranitidine (ZANTAC) 150 MG tablet TAKE 1 TABLET TWICE A  tablet 3     traMADol (ULTRAM) 50 MG tablet Take 1 tablet (50 mg) by mouth daily as needed for severe pain 30 tablet 0     nitroGLYcerin (NITROSTAT) 0.4 MG sublingual tablet Place 1 tablet (0.4 mg) under the tongue every 5 minutes as needed (Patient not taking: Reported on 1/16/2019) 25 tablet 3     Allergies   Allergen Reactions     Acetaminophen      Ciprofloxacin Hives     No Clinical Screening - See Comments      Antibiotic allergy, pt not sure which     BP Readings from Last 3 Encounters:   01/16/19 120/68   12/18/18 90/52   12/07/18 102/62    Wt Readings from Last 3 Encounters:   01/16/19 80.7 kg (178 lb)   12/18/18 79.8 kg (176 lb)   12/07/18 79.8 kg (176 lb)         Reviewed and updated as needed this visit by clinical staff       Reviewed and updated as needed this visit by Provider       ROS:      OBJECTIVE:     /68   Pulse 55   Temp 96.6  F (35.9  C) (Tympanic)   Wt 80.7 kg (178 lb)   SpO2 98%   BMI 26.29 kg/m    Body mass index is 26.29 kg/m .   GENERAL: Alert and no distress  MS: Internal and external rotation of the left hip is without pain.    SKIN: Bruising on hands   NEURO: Normal strength and tone, mentation intact and speech normal  PSYCH: mentation appears normal, affect normal/bright    Diagnostic Test Results:  Results for orders placed or performed in visit on 01/16/19 (from the past 24 hour(s))   XR HIP LT G/E 2 VW (Clinic Performed)    Narrative    PROCEDURE:  XR HIP LEFT 2-3 VIEWS    HISTORY: Hip pain,  left    COMPARISON:  None.    TECHNIQUE:  2 views of the left hip were obtained.    FINDINGS:  No fracture or dislocation is identified. The joint spaces  are preserved.  There is subchondral cystic changes seen in the roof  of the acetabulum. Atherosclerotic calcifications are seen in the  common artery on the left.      Impression    IMPRESSION: Mild degenerative changes of the left hip      ANA HERNADEZ MD     Results for orders placed or performed in visit on 01/16/19   XR HIP LT G/E 2 VW (Clinic Performed)    Narrative    PROCEDURE:  XR HIP LEFT 2-3 VIEWS    HISTORY: Hip pain, left    COMPARISON:  None.    TECHNIQUE:  2 views of the left hip were obtained.    FINDINGS:  No fracture or dislocation is identified. The joint spaces  are preserved.  There is subchondral cystic changes seen in the roof  of the acetabulum. Atherosclerotic calcifications are seen in the  common artery on the left.      Impression    IMPRESSION: Mild degenerative changes of the left hip      ANA HERNADEZ MD       ASSESSMENT/PLAN:     Problem List Items Addressed This Visit     None      Visit Diagnoses     Hip pain, left    -  Primary    Relevant Medications    traMADol (ULTRAM) 50 MG tablet    Other Relevant Orders    XR HIP LT G/E 2 VW (Clinic Performed) (Completed)         At this time his symptoms and findings only appear to be mild.  He will take tramadol at bedtime if needed.  Otherwise, if worsens we will have him see orthopedics.  He was instructed to refrain from NSAIDs due to his kidney function and chronic Plavix/Asa use.      Alexander Vazquez, [facility]

## 2019-01-16 ENCOUNTER — OFFICE VISIT (OUTPATIENT)
Dept: INTERNAL MEDICINE | Facility: OTHER | Age: 74
End: 2019-01-16
Attending: INTERNAL MEDICINE
Payer: MEDICARE

## 2019-01-16 ENCOUNTER — ANCILLARY PROCEDURE (OUTPATIENT)
Dept: GENERAL RADIOLOGY | Facility: OTHER | Age: 74
End: 2019-01-16
Attending: INTERNAL MEDICINE
Payer: MEDICARE

## 2019-01-16 VITALS
TEMPERATURE: 96.6 F | OXYGEN SATURATION: 98 % | SYSTOLIC BLOOD PRESSURE: 120 MMHG | BODY MASS INDEX: 26.29 KG/M2 | WEIGHT: 178 LBS | HEART RATE: 55 BPM | DIASTOLIC BLOOD PRESSURE: 68 MMHG

## 2019-01-16 DIAGNOSIS — M25.552 HIP PAIN, LEFT: Primary | ICD-10-CM

## 2019-01-16 DIAGNOSIS — M25.552 HIP PAIN, LEFT: ICD-10-CM

## 2019-01-16 PROCEDURE — 99213 OFFICE O/P EST LOW 20 MIN: CPT | Performed by: INTERNAL MEDICINE

## 2019-01-16 PROCEDURE — 73502 X-RAY EXAM HIP UNI 2-3 VIEWS: CPT | Mod: TC,FY

## 2019-01-16 PROCEDURE — G0463 HOSPITAL OUTPT CLINIC VISIT: HCPCS

## 2019-01-16 RX ORDER — TRAMADOL HYDROCHLORIDE 50 MG/1
50 TABLET ORAL DAILY PRN
Qty: 30 TABLET | Refills: 0 | Status: SHIPPED | OUTPATIENT
Start: 2019-01-16 | End: 2019-01-19

## 2019-01-16 ASSESSMENT — PAIN SCALES - GENERAL: PAINLEVEL: NO PAIN (0)

## 2019-01-16 ASSESSMENT — ANXIETY QUESTIONNAIRES
4. TROUBLE RELAXING: NOT AT ALL
2. NOT BEING ABLE TO STOP OR CONTROL WORRYING: NOT AT ALL
GAD7 TOTAL SCORE: 0
1. FEELING NERVOUS, ANXIOUS, OR ON EDGE: NOT AT ALL
5. BEING SO RESTLESS THAT IT IS HARD TO SIT STILL: NOT AT ALL
3. WORRYING TOO MUCH ABOUT DIFFERENT THINGS: NOT AT ALL
7. FEELING AFRAID AS IF SOMETHING AWFUL MIGHT HAPPEN: NOT AT ALL
6. BECOMING EASILY ANNOYED OR IRRITABLE: NOT AT ALL

## 2019-01-16 ASSESSMENT — PATIENT HEALTH QUESTIONNAIRE - PHQ9: SUM OF ALL RESPONSES TO PHQ QUESTIONS 1-9: 0

## 2019-01-16 NOTE — NURSING NOTE
"Chief Complaint   Patient presents with     Musculoskeletal Problem       Initial /68   Pulse 55   Temp 96.6  F (35.9  C) (Tympanic)   Wt 80.7 kg (178 lb)   SpO2 98%   BMI 26.29 kg/m   Estimated body mass index is 26.29 kg/m  as calculated from the following:    Height as of 12/18/18: 1.753 m (5' 9\").    Weight as of this encounter: 80.7 kg (178 lb).  Medication Reconciliation: complete    Sabina Olivo LPN  "

## 2019-01-17 ASSESSMENT — ANXIETY QUESTIONNAIRES: GAD7 TOTAL SCORE: 0

## 2019-02-24 DIAGNOSIS — I10 BENIGN ESSENTIAL HYPERTENSION: ICD-10-CM

## 2019-02-25 RX ORDER — AMLODIPINE BESYLATE 5 MG/1
TABLET ORAL
Qty: 30 TABLET | Refills: 1 | Status: SHIPPED | OUTPATIENT
Start: 2019-02-25 | End: 2019-04-25

## 2019-02-25 RX ORDER — METOPROLOL TARTRATE 100 MG
TABLET ORAL
Qty: 60 TABLET | Refills: 0 | Status: SHIPPED | OUTPATIENT
Start: 2019-02-25 | End: 2019-03-05

## 2019-03-24 DIAGNOSIS — I10 BENIGN ESSENTIAL HYPERTENSION: ICD-10-CM

## 2019-03-25 RX ORDER — LISINOPRIL 40 MG/1
TABLET ORAL
Qty: 90 TABLET | Refills: 1 | Status: SHIPPED | OUTPATIENT
Start: 2019-03-25 | End: 2019-08-28

## 2019-03-27 ENCOUNTER — TELEPHONE (OUTPATIENT)
Dept: INTERNAL MEDICINE | Facility: OTHER | Age: 74
End: 2019-03-27

## 2019-03-27 DIAGNOSIS — E11.9 T2DM (TYPE 2 DIABETES MELLITUS) (H): ICD-10-CM

## 2019-03-27 DIAGNOSIS — E11.69 TYPE 2 DIABETES MELLITUS WITH OTHER SPECIFIED COMPLICATION (H): ICD-10-CM

## 2019-03-27 DIAGNOSIS — E11.9 TYPE 2 DIABETES MELLITUS WITHOUT COMPLICATION, UNSPECIFIED WHETHER LONG TERM INSULIN USE (H): ICD-10-CM

## 2019-03-27 DIAGNOSIS — E11.9 DIABETES MELLITUS (H): Primary | ICD-10-CM

## 2019-04-01 RX ORDER — LANCING DEVICE/LANCETS
1 KIT MISCELLANEOUS DAILY
Qty: 1 EACH | Refills: 3 | Status: SHIPPED | OUTPATIENT
Start: 2019-04-01 | End: 2019-04-01

## 2019-04-01 RX ORDER — LANCING DEVICE/LANCETS
1 KIT MISCELLANEOUS DAILY
Qty: 1 EACH | Refills: 1 | Status: SHIPPED | OUTPATIENT
Start: 2019-04-01 | End: 2019-04-01

## 2019-04-01 NOTE — TELEPHONE ENCOUNTER
Insurance needs lancet and strips to match.Per pharmacy the patient had stated he checks it once weekly. Insurance will not accept as directed? Called pt and he takes every Wed.AM. Pended. He would like call back on home phone went sent.Thank you.

## 2019-04-02 RX ORDER — LANCING DEVICE/LANCETS
KIT MISCELLANEOUS
Qty: 1 EACH | Refills: 1 | Status: SHIPPED | OUTPATIENT
Start: 2019-04-02 | End: 2019-04-02

## 2019-04-03 RX ORDER — LANCETS
EACH MISCELLANEOUS
Qty: 100 EACH | Refills: 1 | Status: SHIPPED | OUTPATIENT
Start: 2019-04-03 | End: 2020-02-21

## 2019-04-03 NOTE — TELEPHONE ENCOUNTER
Received a fax from [facility] pharmacy stating that patient is needing lancets not lancing device. Please send in an RX for Lancets.   Maryjane Madera, CMA

## 2019-04-05 DIAGNOSIS — I10 BENIGN ESSENTIAL HYPERTENSION: ICD-10-CM

## 2019-04-08 RX ORDER — METOPROLOL TARTRATE 100 MG
TABLET ORAL
Qty: 60 TABLET | Refills: 3 | Status: SHIPPED | OUTPATIENT
Start: 2019-04-08 | End: 2019-07-07

## 2019-04-09 ENCOUNTER — HOSPITAL ENCOUNTER (EMERGENCY)
Facility: HOSPITAL | Age: 74
Discharge: HOME | End: 2019-04-09
Attending: NURSE PRACTITIONER | Admitting: NURSE PRACTITIONER
Payer: MEDICARE

## 2019-04-09 VITALS
TEMPERATURE: 96.3 F | DIASTOLIC BLOOD PRESSURE: 73 MMHG | OXYGEN SATURATION: 97 % | SYSTOLIC BLOOD PRESSURE: 114 MMHG | RESPIRATION RATE: 14 BRPM

## 2019-04-09 DIAGNOSIS — Z13.9 SCREENING FOR CONDITION: ICD-10-CM

## 2019-04-09 PROCEDURE — G0463 HOSPITAL OUTPT CLINIC VISIT: HCPCS

## 2019-04-09 PROCEDURE — 99212 OFFICE O/P EST SF 10 MIN: CPT | Mod: Z6 | Performed by: NURSE PRACTITIONER

## 2019-04-09 NOTE — ED PROVIDER NOTES
History     Chief Complaint   Patient presents with     Foreign Body in Ear     concerned about possible q tip piece in rt ear. notes cleaned his ear and then notes a piece of the swap was missing and he was not able to find it     HPI  Peter Zhao is a 73 year old male who presents for a concern that he has a piece of a cotton swab in his right ear.  He was cleaning his ear this morning and lost the tip of his cotton swab.  He denies pain, ear drainage, muffled hearing.  He wears hearing aids and states he was told to clean his ears daily prior to putting in his hearing aids.  No history of COM or ear surgery.      Allergies:  Allergies   Allergen Reactions     Acetaminophen      Ciprofloxacin Hives     No Clinical Screening - See Comments      Antibiotic allergy, pt not sure which       Problem List:    Patient Active Problem List    Diagnosis Date Noted     Acute kidney injury (H) 11/30/2018     Priority: Medium     H/O aortic valve replacement 11/16/2018     Priority: Medium     Hx of aortic valve replacement 11/16/2018     Priority: Medium     ICD (implantable cardioverter-defibrillator) in place 11/16/2018     Priority: Medium     Attention to colostomy (H) 09/21/2017     Priority: Medium     Agitation 05/19/2017     Priority: Medium     TIA (transient ischemic attack) 04/29/2017     Priority: Medium     Hypokalemia 02/20/2017     Priority: Medium     S/P colon resection 02/18/2017     Priority: Medium     Hyperlipidemia LDL goal <100 02/15/2017     Priority: Medium     Elevated prostate specific antigen (PSA) 02/15/2017     Priority: Medium     HTN (hypertension) 02/15/2017     Priority: Medium     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 02/15/2017     Priority: Medium     H/O migraine 02/15/2017     Priority: Medium     Visual aura 02/15/2017     Priority: Medium     Cataract 02/15/2017     Priority: Medium     ACP (advance care planning) 10/04/2016     Priority: Medium      Advance Care Planning 10/4/2016: ACP Review of Chart / Resources Provided:  Reviewed chart for advance care plan.  Peter Zhao has no plan or code status on file. Discussed available resources and provided with information. Confirmed code status reflects current choices pending further ACP discussions.  Confirmed/documented legally designated decision makers.  Added by Daxa Campa           CAD (coronary artery disease)      Priority: Medium     CABG 2013, stent x 1 in 2014       Type 2 diabetes mellitus without complication, without long-term current use of insulin (H) 10/31/2013     Priority: Medium        Past Medical History:    Past Medical History:   Diagnosis Date     Allergic state      CAD (coronary artery disease)      Cataract      Cataracts, bilateral      Elevated PSA      Gastro-oesophageal reflux disease      Heart murmur      Hyperlipidemia      Hypertension      Pacemaker      Stented coronary artery      Visual aura        Past Surgical History:    Past Surgical History:   Procedure Laterality Date     AORTIC VALVE REPLACEMENT      25 mm CE bovine replacement Dr. Wu 8/20/2012     CARDIAC SURGERY       CATARACT IOL, RT/LT Right 04/2017     COLECTOMY LOW ANTERIOR N/A 2/17/2017    Procedure: COLECTOMY LOW ANTERIOR;  Surgeon: Tima Moreland MD;  [facility]     COLONOSCOPY N/A 2/17/2017    Procedure: COLONOSCOPY;  Surgeon: Tima Moreland MD;  [facility]     COLOSTOMY  02/17/2017    Sigmoid Colostomy performed.      LAPAROSCOPIC HERNIORRHAPHY INGUINAL Right 9/18/2015    Procedure: LAPAROSCOPIC HERNIORRHAPHY INGUINAL;  Surgeon: Solitario Nettles DO;  [facility]     PHACOEMULSIFICATION WITH STANDARD INTRAOCULAR LENS IMPLANT Right 4/20/2017    Procedure: PHACOEMULSIFICATION WITH STANDARD INTRAOCULAR LENS IMPLANT;  CATARACT EXTRACTION RIGHT EYE WITH IMPLANT;  Surgeon: Darin Gutierrez MD;  Location: [facility]     PROSTATE BIOPSY, NEEDLE, SATURATION SAMPLING  2009      retinopexy-pvd with retinal tear Right 2008     SIGMOIDOSCOPY FLEXIBLE N/A 02/17/2017    Surgeon: Tima Moreland MD;  Location: [facility]; No specimens taken from procedure.      TAKEDOWN COLOSTOMY N/A 9/21/2017    Procedure: TAKEDOWN COLOSTOMY;  CLOSURE OF NORRIS'S SIGMOID COLOSTOMY, REPAIR STOMAL HERNIA;  Surgeon: Tima Moreland MD;  [facility]: [facility]     TONSILLECTOMY         Family History:    Family History   Problem Relation Age of Onset     Diabetes Mother      C.A.D. Mother      Cancer Maternal Grandmother        Social History:  Marital Status:   [2]  Social History     Tobacco Use     Smoking status: Former Smoker     Smokeless tobacco: Former User     Types: Chew     Quit date: 10/19/2013     Tobacco comment: quit in 2000   Substance Use Topics     Alcohol use: No     Drug use: No        Medications:      amLODIPine (NORVASC) 5 MG tablet   ASPIRIN PO   atorvastatin (LIPITOR) 40 MG tablet   blood glucose (CONTOUR NEXT TEST) test strip   blood glucose monitoring (ACCU-CHEK MULTICLIX) lancets   blood glucose monitoring (SOFTCLIX) lancets   clopidogrel (PLAVIX) 75 MG tablet   Gymnema Sylvestris Leaf POWD   lisinopril (PRINIVIL/ZESTRIL) 40 MG tablet   metoprolol tartrate (LOPRESSOR) 100 MG tablet   Multiple Vitamin (MULTIVITAMINS PO)   ranitidine (ZANTAC) 150 MG tablet   calcium carbonate (OS-WALI 600 MG [place]. CA) 1500 (600 CA) MG tablet   finasteride (PROSCAR) 5 MG tablet   glucose blood VI test strips (ACCU-CHEK SMARTVIEW) strip   nitroGLYcerin (NITROSTAT) 0.4 MG sublingual tablet         Review of Systems   Constitutional: Negative.    HENT:        Per HPI       Physical Exam   BP: 114/73  Heart Rate: 71  Temp: 96.3  F (35.7  C)  Resp: 14  SpO2: 97 %      Physical Exam   Constitutional: He appears well-developed. He is cooperative.   HENT:   Right Ear: Tympanic membrane, external ear and ear canal normal.   Left Ear: Tympanic membrane, external ear and ear canal normal.   No FB noted bilateral ear  canals   Neck: Normal range of motion.   Cardiovascular: Normal rate.   Pulmonary/Chest: Effort normal.   Neurological: He is alert.   Nursing note and vitals reviewed.      ED Course        Procedures    Assessments & Plan (with Medical Decision Making)     I have reviewed the nursing notes.    I have reviewed the findings, diagnosis, plan and need for follow up with the patient.  ASSESSMENT / PLAN:  (Z13.9) Screening for condition  Comment: no FB noted in bilateral ear canals  Plan:  Discussed recommendations to avoid cotton swabs in ears.  Advised cleaning ears by letting shower water irrigate ears or using OTC irrigation kit as needed.             Medication List      ASK your doctor about these medications    azithromycin 250 MG tablet  Commonly known as:  ZITHROMAX Z-DEVON  Two tablets on the first day, then one tablet daily for the next 4 days  Ask about: Should I take this medication?     traMADol 50 MG tablet  Commonly known as:  ULTRAM  50 mg, Oral, DAILY PRN  Ask about: Should I take this medication?            Final diagnoses:   Screening for condition       4/9/2019   [facility]     Martha Elizabeth NP  04/10/19 [id number]

## 2019-04-09 NOTE — ED AVS SNAPSHOT
[facility]  [address] [address]  Phone:  [phone]                                    Peter Zhao   MRN: [id number]    Department:  [facility]   Date of Visit:  4/9/2019           After Visit Summary Signature Page    I have received my discharge instructions, and my questions have been answered. I have discussed any challenges I see with this plan with the nurse or doctor.    ..........................................................................................................................................  Patient/Patient Representative Signature      ..........................................................................................................................................  Patient Representative Print Name and Relationship to Patient    ..................................................               ................................................  Date                                   Time    ..........................................................................................................................................  Reviewed by Signature/Title    ...................................................              ..............................................  Date                                               Time          22EPIC Rev 08/18

## 2019-04-10 ASSESSMENT — ENCOUNTER SYMPTOMS: CONSTITUTIONAL NEGATIVE: 1

## 2019-04-25 DIAGNOSIS — I10 BENIGN ESSENTIAL HYPERTENSION: ICD-10-CM

## 2019-04-26 RX ORDER — AMLODIPINE BESYLATE 5 MG/1
TABLET ORAL
Qty: 30 TABLET | Refills: 0 | Status: SHIPPED | OUTPATIENT
Start: 2019-04-26 | End: 2019-05-08

## 2019-04-26 NOTE — TELEPHONE ENCOUNTER
amLODIPine (NORVASC) 5 MG tablet   Last Written Prescription Date:  01/16/2019  Last Fill Quantity: 30,   # refills: 1  Last Office Visit: 01/16/2019  Future Office visit:       Routing refill request to provider for review/approval because:

## 2019-05-26 DIAGNOSIS — I10 BENIGN ESSENTIAL HYPERTENSION: ICD-10-CM

## 2019-05-29 RX ORDER — CLOPIDOGREL BISULFATE 75 MG/1
TABLET ORAL
Qty: 90 TABLET | Refills: 0 | Status: SHIPPED | OUTPATIENT
Start: 2019-05-29 | End: 2019-08-25

## 2019-07-11 NOTE — PROVIDER NOTIFICATION
At approx 2130 called Dr. Moreland for clarification on Lovenox, received order for 40 once a day, notified MD of patient having L sided bruising on flank, no new orders at this time.   
Notified Dr. Duran that pt's Metoprolol to be held if he is paced. Pt currently v-paced, 100's. MD states ok to give.     2:18 PM  02/18/2017  Joselyn Seth    
Notified dr. Moreland of patient having elevated heart rate 110-130's and lower bp's 90/50's, decreased urine output 50 mL q 2 hr, blood tinged urine (unchanged). And patients confusion. Received order for normal saline bolus 1000 mL over 2 hours. Dr. Moreland verbalized to call back if pressures do not improve. Heart rate if symptomatic.   
Notified dr. Moreland of patient removing NG tube, no new orders.   
Spoke with Dr. Duran to inform him that patient has not had any output from his NGT this shift. MD requests that it be irrigated PRN. MD also made aware that pt has been recently complaining of nausea, PRN Zofran administered. MD made aware that pt has had some dark yoon sputum with dark red tinged blood. No new order at this time. Inquired if patient can get up OOB to ambulate or sit in chair. MD states that it is ok for pt to get up OOB with epidural in place.     12:48 PM  02/18/2017  Joselyn Seth    
Spoke with MD in regards to removing the central line as patient is going to be d/c'd tomorrow and is not requiring any medications through it. MD ok'd for nursing staff to do this and did not order for peripheral line to be placed as patient is stable at this time and not receiving IV medicaitons.   
Spoke with Raleigh Campbell from anesthesia who states that Mr. Zhao can dangle at the side of the bed, but not get up OOB because his legs will be very weak.    12:56 PM  02/18/2017  Joselyn Seth    
Spoke with Raleigh from anesthesia who states that he gave the pt a bolus of the epidural and that he increase the infusion rate to 10 mL per hour. Raleigh also states that he assessed patient and that he is very strong, and may be able to get up OOB to chair if he feels that he could tolerate it.     1:25 PM  02/18/2017  Joselyn Seth    
3 = assistive equipment and person

## 2019-08-06 DIAGNOSIS — I10 BENIGN ESSENTIAL HYPERTENSION: ICD-10-CM

## 2019-08-07 RX ORDER — AMLODIPINE BESYLATE 5 MG/1
TABLET ORAL
Qty: 90 TABLET | Refills: 0 | Status: SHIPPED | OUTPATIENT
Start: 2019-08-07 | End: 2019-08-28

## 2019-08-25 DIAGNOSIS — I10 BENIGN ESSENTIAL HYPERTENSION: ICD-10-CM

## 2019-08-26 RX ORDER — CLOPIDOGREL BISULFATE 75 MG/1
TABLET ORAL
Qty: 90 TABLET | Refills: 0 | Status: SHIPPED | OUTPATIENT
Start: 2019-08-26 | End: 2019-08-28

## 2019-08-27 NOTE — PROGRESS NOTES
Subjective     Peter Zhao is a 73 year old male who presents to clinic today for the following health issues:    HPI   Diabetes Follow-up      How often are you checking your blood sugar? A few times a week    What time of day are you checking your blood sugars (select all that apply)?  Before meals    Have you had any blood sugars above 200?  No    Have you had any blood sugars below 70?  No    What symptoms do you notice when your blood sugar is low?  None    What concerns do you have today about your diabetes? None     Do you have any of these symptoms? (Select all that apply)  No numbness or tingling in feet.  No redness, sores or blisters on feet.  No complaints of excessive thirst.  No reports of blurry vision.  No significant changes to weight.     Have you had a diabetic eye exam in the last 12 months? No    Diabetes Management Resources    Hyperlipidemia Follow-Up      Are you having any of the following symptoms? (Select all that apply)  No complaints of shortness of breath, chest pain or pressure.  No increased sweating or nausea with activity.  No left-sided neck or arm pain.  No complaints of pain in calves when walking 1-2 blocks.    Are you regularly taking any medication or supplement to lower your cholesterol?   Yes- lipitor    Are you having muscle aches or other side effects that you think could be caused by your cholesterol lowering medication?  No    Hypertension Follow-up      Do you check your blood pressure regularly outside of the clinic? Yes     Are you following a low salt diet? Yes    Are your blood pressures ever more than 140 on the top number (systolic) OR more   than 90 on the bottom number (diastolic), for example 140/90? No    BP Readings from Last 2 Encounters:   08/28/19 106/64   04/09/19 114/73     Hemoglobin A1C (%)   Date Value   12/07/2018 7.2 (H)   09/12/2017 6.3 (H)     LDL Cholesterol Calculated (mg/dL)   Date Value   12/07/2018 49   04/30/2017 Christy Green is a 73 year  old male with a history of CAD s/p bypass in 2013 and stenting in 2014, aortic valve replaced with bioprosthetic valve, ICD placement, HTN, TIA, elevated PSA and Type 2 diabetes who presents today for follow up and medication refills.  He denies any chest pain or SOB.  No firing of ICD is reported.  He state his blood glucose measurements are usually around 100-110 in the morning.          Patient Active Problem List   Diagnosis     Type 2 diabetes mellitus without complication, without long-term current use of insulin (H)     CAD (coronary artery disease)     ACP (advance care planning)     Hyperlipidemia LDL goal <100     Elevated prostate specific antigen (PSA)     HTN (hypertension)     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     H/O migraine     Visual aura     Cataract     S/P colon resection     Hypokalemia     TIA (transient ischemic attack)     Agitation     Attention to colostomy (H)     H/O aortic valve replacement     Hx of aortic valve replacement     ICD (implantable cardioverter-defibrillator) in place     Acute kidney injury (H)     Past Surgical History:   Procedure Laterality Date     AORTIC VALVE REPLACEMENT      25 mm CE bovine replacement Dr. Wu 8/20/2012     CARDIAC SURGERY       CATARACT IOL, RT/LT Right 04/2017     COLECTOMY LOW ANTERIOR N/A 2/17/2017    Procedure: COLECTOMY LOW ANTERIOR;  Surgeon: Tima Moreland MD;  Location: [facility]     COLONOSCOPY N/A 2/17/2017    Procedure: COLONOSCOPY;  Surgeon: Tima Moreland MD;  Location: [facility]     COLOSTOMY  02/17/2017    Sigmoid Colostomy performed.      LAPAROSCOPIC HERNIORRHAPHY INGUINAL Right 9/18/2015    Procedure: LAPAROSCOPIC HERNIORRHAPHY INGUINAL;  Surgeon: Solitario Nettles, DO;  Location: [facility]     PHACOEMULSIFICATION WITH STANDARD INTRAOCULAR LENS IMPLANT Right 4/20/2017    Procedure: PHACOEMULSIFICATION WITH STANDARD INTRAOCULAR LENS IMPLANT;  CATARACT EXTRACTION RIGHT EYE WITH IMPLANT;   Surgeon: Darin Gutierrez MD;  Location: [facility]     PROSTATE BIOPSY, NEEDLE, SATURATION SAMPLING  2009     retinopexy-pvd with retinal tear Right 2008     SIGMOIDOSCOPY FLEXIBLE N/A 02/17/2017    Surgeon: Tima Moreland MD;  Location: [facility]; No specimens taken from procedure.      TAKEDOWN COLOSTOMY N/A 9/21/2017    Procedure: TAKEDOWN COLOSTOMY;  CLOSURE OF NORRIS'S SIGMOID COLOSTOMY, REPAIR STOMAL HERNIA;  Surgeon: Tima Moreland MD;  Location: [facility]     TONSILLECTOMY         Social History     Tobacco Use     Smoking status: Former Smoker     Smokeless tobacco: Former User     Types: Chew     Quit date: 10/19/2013     Tobacco comment: quit in 2000   Substance Use Topics     Alcohol use: No     Family History   Problem Relation Age of Onset     Diabetes Mother      C.A.D. Mother      Cancer Maternal Grandmother          Current Outpatient Medications   Medication Sig Dispense Refill     amLODIPine (NORVASC) 5 MG tablet Take 1 tablet (5 mg) by mouth daily 90 tablet 3     ASPIRIN PO Take 325 mg by mouth daily        atorvastatin (LIPITOR) 40 MG tablet Take 1 tablet (40 mg) by mouth daily 90 tablet 3     blood glucose (CONTOUR NEXT TEST) test strip USE TO TEST BLOOD SUGAR ONCE WEEKLY 50 strip 1     blood glucose monitoring (ACCU-CHEK MULTICLIX) lancets Use to test blood sugar once weekly or as directed 102 each 3     blood glucose monitoring (SOFTCLIX) lancets Use to test blood sugar 1 time weekly 100 each 1     calcium carbonate (OS-WALI 600 MG [place]. [place]) 1500 (600 CA) MG tablet Take 1 tablet (1,500 mg) by mouth daily 30 tablet 3     clopidogrel (PLAVIX) 75 MG tablet Take 1 tablet (75 mg) by mouth daily 90 tablet 3     finasteride (PROSCAR) 5 MG tablet Take 1 tablet (5 mg) by mouth daily 90 tablet 3     glucose blood VI test strips (ACCU-CHEK SMARTVIEW) strip Test blood sugar before breakfast one day, before and after supper the next and not at all the third day 1 Box 12     Gymnema Sylvestris Leaf POWD 550 mg  2 times daily       lisinopril (PRINIVIL/ZESTRIL) 40 MG tablet Take 1 tablet (40 mg) by mouth daily 90 tablet 3     metoprolol tartrate (LOPRESSOR) 100 MG tablet Take 1 tablet (100 mg) by mouth 2 times daily 180 tablet 3     Multiple Vitamin (MULTIVITAMINS PO) Take 1 capsule by mouth daily       nitroGLYcerin (NITROSTAT) 0.4 MG sublingual tablet Place 1 tablet (0.4 mg) under the tongue every 5 minutes as needed 25 tablet 3     ranitidine (ZANTAC) 150 MG tablet Take 1 tablet (150 mg) by mouth 2 times daily 180 tablet 3     Vitamin D, Cholecalciferol, 1000 units CAPS Take 1 capsule by mouth daily       Allergies   Allergen Reactions     Acetaminophen      Ciprofloxacin Hives     No Clinical Screening - See Comments      Antibiotic allergy, pt not sure which     Recent Labs   Lab Test 12/07/18  0757 12/01/18  0620 11/30/18  1344 11/16/18  1130  09/12/17  0820  06/06/17  1056 05/26/17  1855  05/03/17 04/30/17   A1C 7.2*  --   --   --   --  6.3*  --  6.6*  --   --   --   --   --    LDL 49  --   --   --   --   --   --   --   --   --   --   --  35   HDL 28*  --   --   --   --   --   --   --   --   --   --   --  33*   TRIG 211*  --   --   --   --   --   --   --   --   --   --   --  305*   ALT 24  --  29 23   < > 26  --   --  21   < >  --    < >  --    CR 1.35*  1.34* 2.23* 3.34* 1.37*   < > 1.23   < >  --  1.23   < >  --    < >  --    GFRESTIMATED 52*  52* 29* 18* 51*   < > 58*   < >  --  58*  --   --    < >  --    GFRESTBLACK 63  63 35* 22* 61   < > 70   < >  --  70  --   --    < >  --    POTASSIUM 4.4  4.4 4.0 3.5 4.3   < > 4.2   < >  --  3.5   < >  --    < >  --    TSH  --   --   --   --   --   --   --   --  0.91  --  1.040  --   --     < > = values in this interval not displayed.      BP Readings from Last 3 Encounters:   08/28/19 106/64   04/09/19 114/73   01/16/19 120/68    Wt Readings from Last 3 Encounters:   08/28/19 84.8 kg (187 lb)   01/16/19 80.7 kg (178 lb)   12/18/18 79.8 kg (176 lb)            Reviewed  and updated as needed this visit by Provider         Review of Systems   ROS COMP: Constitutional, cardiovascular, pulmonary, GI, , musculoskeletal, neuro, skin, endocrine systems are negative, except as otherwise noted.      Objective    /64 (BP Location: Right arm, Patient Position: Chair, Cuff Size: Adult Regular)   Pulse 60   Temp 97.6  F (36.4  C) (Tympanic)   Wt 84.8 kg (187 lb)   SpO2 95%   BMI 27.62 kg/m    Body mass index is 27.62 kg/m .  Physical Exam   GENERAL:Alert and no distress  EYES: Eyes grossly normal to inspection, PERRL and conjunctivae and sclerae normal  RESP: lungs clear to auscultation - no rales, rhonchi or wheezes  CV: regular rate and rhythm, normal S1 S2, no S3 or S4, no murmur, click or rub, no peripheral edema and peripheral pulses strong  MS: no gross musculoskeletal defects noted, no edema  SKIN: no suspicious lesions or rashes  NEURO: Monofilament testing reveal sensation intact in both LEs.    PSYCH: mentation appears normal, affect normal/bright    Diagnostic Test Results:  No results found for this or any previous visit (from the past 24 hour(s)).  Results for orders placed or performed in visit on 01/16/19   XR HIP LT G/E 2 VW (Clinic Performed)    Narrative    PROCEDURE:  XR HIP LEFT 2-3 VIEWS    HISTORY: Hip pain, left    COMPARISON:  None.    TECHNIQUE:  2 views of the left hip were obtained.    FINDINGS:  No fracture or dislocation is identified. The joint spaces  are preserved.  There is subchondral cystic changes seen in the roof  of the acetabulum. Atherosclerotic calcifications are seen in the  common artery on the left.      Impression    IMPRESSION: Mild degenerative changes of the left hip      ANA HERNADEZ MD           Assessment & Plan   Problem List Items Addressed This Visit        Endocrine    Type 2 diabetes mellitus without complication, without long-term current use of insulin (H) - Primary    Relevant Orders    Hemoglobin A1c (Completed)     TSH with free T4 reflex (Completed)    C FOOT EXAM  NO CHARGE (Completed)    Hyperlipidemia LDL goal <100    Relevant Medications    atorvastatin (LIPITOR) 40 MG tablet    nitroGLYcerin (NITROSTAT) 0.4 MG sublingual tablet    Hypokalemia    Relevant Orders    Comprehensive metabolic panel (Completed)    CBC with platelets differential (Completed)       Other    H/O aortic valve replacement      Other Visit Diagnoses     Benign non-nodular prostatic hyperplasia with lower urinary tract symptoms        Relevant Medications    finasteride (PROSCAR) 5 MG tablet    Benign essential hypertension        Relevant Medications    amLODIPine (NORVASC) 5 MG tablet    clopidogrel (PLAVIX) 75 MG tablet    lisinopril (PRINIVIL/ZESTRIL) 40 MG tablet    metoprolol tartrate (LOPRESSOR) 100 MG tablet    Gastroesophageal reflux disease without esophagitis        Relevant Medications    ranitidine (ZANTAC) 150 MG tablet    Screening for prostate cancer        Relevant Orders    PSA, screen (Completed)    Intermediate coronary syndrome (H)        Relevant Medications    nitroGLYcerin (NITROSTAT) 0.4 MG sublingual tablet             Follow up in 4 months.      Alexander Vazquez,   [facility] [place] [facility]

## 2019-08-28 ENCOUNTER — TELEPHONE (OUTPATIENT)
Dept: INTERNAL MEDICINE | Facility: OTHER | Age: 74
End: 2019-08-28

## 2019-08-28 ENCOUNTER — OFFICE VISIT (OUTPATIENT)
Dept: INTERNAL MEDICINE | Facility: OTHER | Age: 74
End: 2019-08-28
Attending: INTERNAL MEDICINE
Payer: MEDICARE

## 2019-08-28 VITALS
BODY MASS INDEX: 27.62 KG/M2 | OXYGEN SATURATION: 95 % | SYSTOLIC BLOOD PRESSURE: 106 MMHG | DIASTOLIC BLOOD PRESSURE: 64 MMHG | WEIGHT: 187 LBS | HEART RATE: 60 BPM | TEMPERATURE: 97.6 F

## 2019-08-28 DIAGNOSIS — I20.0 INTERMEDIATE CORONARY SYNDROME (H): ICD-10-CM

## 2019-08-28 DIAGNOSIS — Z12.5 SCREENING FOR PROSTATE CANCER: ICD-10-CM

## 2019-08-28 DIAGNOSIS — Z95.2 H/O AORTIC VALVE REPLACEMENT: ICD-10-CM

## 2019-08-28 DIAGNOSIS — K21.9 GASTROESOPHAGEAL REFLUX DISEASE WITHOUT ESOPHAGITIS: ICD-10-CM

## 2019-08-28 DIAGNOSIS — E87.6 HYPOKALEMIA: ICD-10-CM

## 2019-08-28 DIAGNOSIS — E11.9 TYPE 2 DIABETES MELLITUS WITHOUT COMPLICATION, WITHOUT LONG-TERM CURRENT USE OF INSULIN (H): Primary | ICD-10-CM

## 2019-08-28 DIAGNOSIS — E78.5 HYPERLIPIDEMIA LDL GOAL <100: ICD-10-CM

## 2019-08-28 DIAGNOSIS — N40.1 BENIGN NON-NODULAR PROSTATIC HYPERPLASIA WITH LOWER URINARY TRACT SYMPTOMS: ICD-10-CM

## 2019-08-28 DIAGNOSIS — I10 BENIGN ESSENTIAL HYPERTENSION: ICD-10-CM

## 2019-08-28 LAB
ALBUMIN SERPL-MCNC: 3.7 G/DL (ref 3.4–5)
ALP SERPL-CCNC: 92 U/L (ref 40–150)
ALT SERPL W P-5'-P-CCNC: 27 U/L (ref 0–70)
ANION GAP SERPL CALCULATED.3IONS-SCNC: 7 MMOL/L (ref 3–14)
AST SERPL W P-5'-P-CCNC: 24 U/L (ref 0–45)
BASOPHILS # BLD AUTO: 0.1 10E9/L (ref 0–0.2)
BASOPHILS NFR BLD AUTO: 0.6 %
BILIRUB SERPL-MCNC: 0.7 MG/DL (ref 0.2–1.3)
BUN SERPL-MCNC: 31 MG/DL (ref 7–30)
CALCIUM SERPL-MCNC: 8.6 MG/DL (ref 8.5–10.1)
CHLORIDE SERPL-SCNC: 107 MMOL/L (ref 94–109)
CO2 SERPL-SCNC: 26 MMOL/L (ref 20–32)
CREAT SERPL-MCNC: 1.23 MG/DL (ref 0.66–1.25)
DIFFERENTIAL METHOD BLD: NORMAL
EOSINOPHIL # BLD AUTO: 0.3 10E9/L (ref 0–0.7)
EOSINOPHIL NFR BLD AUTO: 3.9 %
ERYTHROCYTE [DISTWIDTH] IN BLOOD BY AUTOMATED COUNT: 14.5 % (ref 10–15)
EST. AVERAGE GLUCOSE BLD GHB EST-MCNC: 148 MG/DL
GFR SERPL CREATININE-BSD FRML MDRD: 57 ML/MIN/{1.73_M2}
GLUCOSE SERPL-MCNC: 158 MG/DL (ref 70–99)
HBA1C MFR BLD: 6.8 % (ref 0–5.6)
HCT VFR BLD AUTO: 44.4 % (ref 40–53)
HGB BLD-MCNC: 14.9 G/DL (ref 13.3–17.7)
LYMPHOCYTES # BLD AUTO: 1.4 10E9/L (ref 0.8–5.3)
LYMPHOCYTES NFR BLD AUTO: 16.4 %
MCH RBC QN AUTO: 29.7 PG (ref 26.5–33)
MCHC RBC AUTO-ENTMCNC: 33.6 G/DL (ref 31.5–36.5)
MCV RBC AUTO: 88 FL (ref 78–100)
MONOCYTES # BLD AUTO: 0.8 10E9/L (ref 0–1.3)
MONOCYTES NFR BLD AUTO: 9.4 %
NEUTROPHILS # BLD AUTO: 5.8 10E9/L (ref 1.6–8.3)
NEUTROPHILS NFR BLD AUTO: 69.7 %
PLATELET # BLD AUTO: 225 10E9/L (ref 150–450)
POTASSIUM SERPL-SCNC: 4.4 MMOL/L (ref 3.4–5.3)
PROT SERPL-MCNC: 7 G/DL (ref 6.8–8.8)
PSA SERPL-ACNC: 3.83 UG/L (ref 0–4)
RBC # BLD AUTO: 5.02 10E12/L (ref 4.4–5.9)
SODIUM SERPL-SCNC: 140 MMOL/L (ref 133–144)
TSH SERPL DL<=0.005 MIU/L-ACNC: 0.95 MU/L (ref 0.4–4)
WBC # BLD AUTO: 8.3 10E9/L (ref 4–11)

## 2019-08-28 PROCEDURE — G0103 PSA SCREENING: HCPCS | Mod: ZL | Performed by: INTERNAL MEDICINE

## 2019-08-28 PROCEDURE — 40000788 ZZHCL STATISTIC ESTIMATED AVERAGE GLUCOSE: Mod: ZL | Performed by: INTERNAL MEDICINE

## 2019-08-28 PROCEDURE — 84443 ASSAY THYROID STIM HORMONE: CPT | Mod: ZL | Performed by: INTERNAL MEDICINE

## 2019-08-28 PROCEDURE — 85025 COMPLETE CBC W/AUTO DIFF WBC: CPT | Mod: ZL | Performed by: INTERNAL MEDICINE

## 2019-08-28 PROCEDURE — 80053 COMPREHEN METABOLIC PANEL: CPT | Mod: ZL | Performed by: INTERNAL MEDICINE

## 2019-08-28 PROCEDURE — G0463 HOSPITAL OUTPT CLINIC VISIT: HCPCS

## 2019-08-28 PROCEDURE — 36415 COLL VENOUS BLD VENIPUNCTURE: CPT | Mod: ZL | Performed by: INTERNAL MEDICINE

## 2019-08-28 PROCEDURE — 83036 HEMOGLOBIN GLYCOSYLATED A1C: CPT | Mod: ZL | Performed by: INTERNAL MEDICINE

## 2019-08-28 PROCEDURE — 99214 OFFICE O/P EST MOD 30 MIN: CPT | Performed by: INTERNAL MEDICINE

## 2019-08-28 RX ORDER — FAMOTIDINE 20 MG
1 TABLET ORAL DAILY
COMMUNITY
End: 2021-06-08

## 2019-08-28 RX ORDER — AMLODIPINE BESYLATE 5 MG/1
5 TABLET ORAL DAILY
Qty: 90 TABLET | Refills: 3 | Status: SHIPPED | OUTPATIENT
Start: 2019-08-28 | End: 2020-06-24

## 2019-08-28 RX ORDER — FINASTERIDE 5 MG/1
1 TABLET, FILM COATED ORAL DAILY
Qty: 90 TABLET | Refills: 3 | Status: SHIPPED | OUTPATIENT
Start: 2019-08-28 | End: 2020-11-30

## 2019-08-28 RX ORDER — ATORVASTATIN CALCIUM 40 MG/1
40 TABLET, FILM COATED ORAL DAILY
Qty: 90 TABLET | Refills: 3 | Status: SHIPPED | OUTPATIENT
Start: 2019-08-28 | End: 2020-06-25

## 2019-08-28 RX ORDER — LISINOPRIL 40 MG/1
40 TABLET ORAL DAILY
Qty: 90 TABLET | Refills: 3 | Status: SHIPPED | OUTPATIENT
Start: 2019-08-28 | End: 2020-07-27

## 2019-08-28 RX ORDER — METOPROLOL TARTRATE 100 MG
100 TABLET ORAL 2 TIMES DAILY
Qty: 180 TABLET | Refills: 3 | Status: SHIPPED | OUTPATIENT
Start: 2019-08-28 | End: 2020-07-27

## 2019-08-28 RX ORDER — NITROGLYCERIN 0.4 MG/1
0.4 TABLET SUBLINGUAL EVERY 5 MIN PRN
Qty: 25 TABLET | Refills: 3 | Status: SHIPPED | OUTPATIENT
Start: 2019-08-28 | End: 2020-01-23

## 2019-08-28 RX ORDER — CLOPIDOGREL BISULFATE 75 MG/1
75 TABLET ORAL DAILY
Qty: 90 TABLET | Refills: 3 | Status: SHIPPED | OUTPATIENT
Start: 2019-08-28 | End: 2020-06-24

## 2019-08-28 ASSESSMENT — PAIN SCALES - GENERAL: PAINLEVEL: NO PAIN (0)

## 2019-08-28 NOTE — TELEPHONE ENCOUNTER
To: Dr. Vazquez nurse  Patient stopped at  before leaving the clinic today (08/29/19) and mentioned it would be o.k. To leave lab results on answering machine.  Thank you

## 2019-08-28 NOTE — NURSING NOTE
"Chief Complaint   Patient presents with     Hypertension     Diabetes     Lipids       Initial /64 (BP Location: Right arm, Patient Position: Chair, Cuff Size: Adult Regular)   Pulse 60   Temp 97.6  F (36.4  C) (Tympanic)   Wt 84.8 kg (187 lb)   SpO2 95%   BMI 27.62 kg/m   Estimated body mass index is 27.62 kg/m  as calculated from the following:    Height as of 12/18/18: 1.753 m (5' 9\").    Weight as of this encounter: 84.8 kg (187 lb).  Medication Reconciliation: complete     FERMIN MUNOZ LPN      "

## 2019-11-18 ENCOUNTER — TELEPHONE (OUTPATIENT)
Dept: INTERNAL MEDICINE | Facility: OTHER | Age: 74
End: 2019-11-18

## 2019-11-18 NOTE — TELEPHONE ENCOUNTER
Pt notified of provider's comments  Has called his pharmacy they said it was ok,  He may quit the medication, he also takes Tums.    Tejal Krueger LPN

## 2019-11-18 NOTE — TELEPHONE ENCOUNTER
Pt calling, saw on TV Zantac is being taken off the shelf  Quit the medication  Now what should he do??    Please call pt.    Tejal Krueger LPN

## 2019-11-22 ENCOUNTER — MEDICAL CORRESPONDENCE (OUTPATIENT)
Dept: HEALTH INFORMATION MANAGEMENT | Facility: CLINIC | Age: 74
End: 2019-11-22

## 2019-12-06 ENCOUNTER — TELEPHONE (OUTPATIENT)
Dept: INTERNAL MEDICINE | Facility: OTHER | Age: 74
End: 2019-12-06

## 2019-12-06 NOTE — TELEPHONE ENCOUNTER
9:14 AM    Reason for Call: Phone Call    Description: Pt called and states that he has some questions on stopping his medication and would like to talk to  nurse.     Was an appointment offered for this call? No  If yes : Appointment type              Date    Preferred method for responding to this message: Telephone Call  What is your phone number ?[phone]    If we cannot reach you directly, may we leave a detailed response at the number you provided? Yes    Can this message wait until your PCP/provider returns, if available today? No, pcp is out     Rizwana Haas

## 2019-12-09 NOTE — TELEPHONE ENCOUNTER
Patient is having dental surgery and they will be removing all his teeth. Was told it was approved by you to stop Plavix. Patient is very nervous about stopping Plavix. He is wondering if you would approve him staying on Plavix and removing a couple teeth at a time. Oral Surgeon patient should discuss this with you.   Please advise   FERMIN MUNOZ LPN

## 2019-12-12 NOTE — TELEPHONE ENCOUNTER
Attempted to make follow up phone call with patient. Unable to reach patient at this time. First attempt. Will attempt again.     Right shoulder: NV intact, FROM, mild TTP at anterior aspect, 2+ pulses, no swelling, no deformity.  Right knee: NV intact, +TTP at anterior aspect, FROM, +swelling.

## 2020-01-21 DIAGNOSIS — E78.5 HYPERLIPIDEMIA LDL GOAL <100: ICD-10-CM

## 2020-01-21 DIAGNOSIS — I20.0 INTERMEDIATE CORONARY SYNDROME (H): ICD-10-CM

## 2020-01-21 NOTE — TELEPHONE ENCOUNTER
nitroGLYcerin (NITROSTAT) 0.4 MG sublingual tablet  Last visit date with prescribing provider: 8-  Last refill date: 8-  Quantity: 25, Refills: 3    Tejal Krueger LPN

## 2020-01-23 RX ORDER — NITROGLYCERIN 0.4 MG/1
TABLET SUBLINGUAL
Qty: 25 TABLET | Refills: 0 | Status: SHIPPED | OUTPATIENT
Start: 2020-01-23 | End: 2023-01-24

## 2020-02-20 DIAGNOSIS — E11.9 TYPE 2 DIABETES MELLITUS WITHOUT COMPLICATION, UNSPECIFIED WHETHER LONG TERM INSULIN USE (H): ICD-10-CM

## 2020-02-20 NOTE — TELEPHONE ENCOUNTER
Test strip  Last Written Prescription Date: 4/2/19  Last Fill Quantity: 50 # of Refills: 1  Last Office Visit: 8/28/19

## 2020-02-21 DIAGNOSIS — E11.9 T2DM (TYPE 2 DIABETES MELLITUS) (H): ICD-10-CM

## 2020-02-21 DIAGNOSIS — E11.9 TYPE 2 DIABETES MELLITUS WITHOUT COMPLICATION, UNSPECIFIED WHETHER LONG TERM INSULIN USE (H): ICD-10-CM

## 2020-02-25 NOTE — TELEPHONE ENCOUNTER
Supplies reordered on 2/21/20 but [facility] needed change in directions, changed and pending for signature.

## 2020-03-02 ENCOUNTER — HEALTH MAINTENANCE LETTER (OUTPATIENT)
Age: 75
End: 2020-03-02

## 2020-04-02 ENCOUNTER — TRANSFERRED RECORDS (OUTPATIENT)
Dept: HEALTH INFORMATION MANAGEMENT | Facility: CLINIC | Age: 75
End: 2020-04-02

## 2020-04-02 ENCOUNTER — TELEPHONE (OUTPATIENT)
Dept: INTERNAL MEDICINE | Facility: OTHER | Age: 75
End: 2020-04-02

## 2020-04-02 DIAGNOSIS — K21.9 GASTROESOPHAGEAL REFLUX DISEASE WITHOUT ESOPHAGITIS: Primary | ICD-10-CM

## 2020-04-02 RX ORDER — FAMOTIDINE 40 MG/1
40 TABLET, FILM COATED ORAL DAILY
Qty: 90 TABLET | Refills: 3 | Status: SHIPPED | OUTPATIENT
Start: 2020-04-02 | End: 2020-04-09

## 2020-04-02 NOTE — TELEPHONE ENCOUNTER
Patient called stating he had been watching TV and it stated that Zantac has been causing cancer and now he would like to have his prescription changed. The pharmacy suggested Famotidine. He would like an order for Famotidine sent to the pharmacy. He wasn't sure if the ranitidine was safe to take and would like the providers opinion. Please call patient back. Please send medication to [facility].  Ashley A. Lechevalier, LPN on 4/2/2020 at 10:55 AM

## 2020-04-08 ENCOUNTER — TRANSFERRED RECORDS (OUTPATIENT)
Dept: HEALTH INFORMATION MANAGEMENT | Facility: CLINIC | Age: 75
End: 2020-04-08

## 2020-04-08 DIAGNOSIS — K21.9 GASTROESOPHAGEAL REFLUX DISEASE WITHOUT ESOPHAGITIS: ICD-10-CM

## 2020-04-08 NOTE — TELEPHONE ENCOUNTER
pepcid      Last Written Prescription Date:  04/02/2020  Last Fill Quantity: 90,   # refills: 3  Last Office Visit: 08/28/2019

## 2020-04-09 RX ORDER — FAMOTIDINE 40 MG/1
40 TABLET, FILM COATED ORAL DAILY
Qty: 90 TABLET | Refills: 3 | Status: SHIPPED | OUTPATIENT
Start: 2020-04-09 | End: 2020-05-20

## 2020-04-22 ENCOUNTER — TRANSFERRED RECORDS (OUTPATIENT)
Dept: HEALTH INFORMATION MANAGEMENT | Facility: CLINIC | Age: 75
End: 2020-04-22

## 2020-04-28 ENCOUNTER — TELEPHONE (OUTPATIENT)
Dept: INTERNAL MEDICINE | Facility: OTHER | Age: 75
End: 2020-04-28

## 2020-04-28 DIAGNOSIS — I10 ESSENTIAL HYPERTENSION: Primary | ICD-10-CM

## 2020-04-28 RX ORDER — ENALAPRIL MALEATE 10 MG/1
10 TABLET ORAL DAILY
Qty: 30 TABLET | Refills: 1 | Status: SHIPPED | OUTPATIENT
Start: 2020-04-28 | End: 2021-06-08

## 2020-04-28 NOTE — TELEPHONE ENCOUNTER
[facility] pharmacy requesting alternative for lisinopril due to medication being backordered. Pharmacy did not give an alternative.  Please advise, thank you.

## 2020-05-20 ENCOUNTER — TELEPHONE (OUTPATIENT)
Dept: INTERNAL MEDICINE | Facility: OTHER | Age: 75
End: 2020-05-20

## 2020-05-20 NOTE — TELEPHONE ENCOUNTER
Reason for call:  Medication    1. Medication Name? famotidine (PEPCID) 40 MG tablet  2. Is this request for a refill? No  3. What Pharmacy do you use? n/a  4. Have you contacted your pharmacy? No    5. If yes, when?  (Please note that the turn-around-time for prescriptions is 72 business hours; I am sending your request at this time. SEND TO  [facility] Refill Pool  )  Description: Pt states he was given the Rx for famotidine to replace ranitidine (ZANTAC) 150 MG tablet. He stopped the ranitidine (ZANTAC) 150 MG tablet on 04/08/2020. He states since stopping taking that medication, he has not taken any of the new medication as he no longer needs it. (He has only needed to take 1 Tums since that date) He states [facility] told him to contact provider to have him contact [facility] to [facility] this Rx. Please call pt with any questions.  Was an appointment offered for this a call? No   Preferred method for responding to this messageTelephone Call [phone]  If we cannot reach you directly, may we leave a detailed response at the number you provided? Yes  Can this message wait until your PCP/Provider returns if not available today? N/a, provider is schedule for today (virtual visits)

## 2020-05-20 NOTE — TELEPHONE ENCOUNTER
Patient returned call. Patient stated he has not taken the Famotidine since it was ordered. Patient stated he has only needed to take one tums in the time frame of no longer getting the Ranitidine. Patient is requesting this medication to be removed from his medication list and to advise PCP regarding this medication that he no longer would like this medication prescribed. Medication removed from medication list.

## 2020-06-23 DIAGNOSIS — I10 BENIGN ESSENTIAL HYPERTENSION: ICD-10-CM

## 2020-06-23 DIAGNOSIS — E78.5 HYPERLIPIDEMIA LDL GOAL <100: ICD-10-CM

## 2020-06-23 NOTE — TELEPHONE ENCOUNTER
amlodipine      Last Written Prescription Date:  8/28/19  Last Fill Quantity: 90,   # refills: 3  Last Office Visit: 8/28/19  Future Office visit:       Routing refill request to provider for review/approval because:      atorvastatin      Last Written Prescription Date:  8/28/19  Last Fill Quantity: 90,   # refills: 3  Last Office Visit: 8/28/19  Future Office visit:       Routing refill request to provider for review/approval because:      plavix      Last Written Prescription Date:  8/28/19  Last Fill Quantity: 90,   # refills: 3  Last Office Visit: 8/28/19  Future Office visit:       Routing refill request to provider for review/approval because:

## 2020-06-24 RX ORDER — CLOPIDOGREL BISULFATE 75 MG/1
75 TABLET ORAL DAILY
Qty: 90 TABLET | Refills: 0 | Status: SHIPPED | OUTPATIENT
Start: 2020-06-24 | End: 2020-12-21

## 2020-06-24 RX ORDER — AMLODIPINE BESYLATE 5 MG/1
5 TABLET ORAL DAILY
Qty: 90 TABLET | Refills: 0 | Status: SHIPPED | OUTPATIENT
Start: 2020-06-24 | End: 2020-12-21

## 2020-06-24 NOTE — TELEPHONE ENCOUNTER
LDL Cholesterol Calculated   Date Value Ref Range Status   12/07/2018 49 <100 mg/dL Final     Comment:     Desirable:       <100 mg/dl     Placed lipid.    No upcoming appts. Lab appt?

## 2020-06-25 RX ORDER — ATORVASTATIN CALCIUM 40 MG/1
40 TABLET, FILM COATED ORAL DAILY
Qty: 90 TABLET | Refills: 3 | Status: SHIPPED | OUTPATIENT
Start: 2020-06-25 | End: 2021-09-08

## 2020-07-14 ENCOUNTER — TRANSFERRED RECORDS (OUTPATIENT)
Dept: HEALTH INFORMATION MANAGEMENT | Facility: CLINIC | Age: 75
End: 2020-07-14

## 2020-07-23 DIAGNOSIS — I10 BENIGN ESSENTIAL HYPERTENSION: ICD-10-CM

## 2020-07-27 RX ORDER — METOPROLOL TARTRATE 100 MG
TABLET ORAL
Qty: 180 TABLET | Refills: 3 | Status: SHIPPED | OUTPATIENT
Start: 2020-07-27 | End: 2021-07-12

## 2020-07-27 RX ORDER — LISINOPRIL 40 MG/1
TABLET ORAL
Qty: 90 TABLET | Refills: 3 | Status: SHIPPED | OUTPATIENT
Start: 2020-07-27 | End: 2021-07-12

## 2020-10-21 ENCOUNTER — TRANSFERRED RECORDS (OUTPATIENT)
Dept: HEALTH INFORMATION MANAGEMENT | Facility: CLINIC | Age: 75
End: 2020-10-21

## 2020-11-01 DIAGNOSIS — L20.9 ATOPIC DERMATITIS, UNSPECIFIED TYPE: ICD-10-CM

## 2020-11-02 RX ORDER — NYSTATIN AND TRIAMCINOLONE ACETONIDE 100000; 1 [USP'U]/G; MG/G
CREAM TOPICAL
Qty: 30 G | Refills: 0 | Status: SHIPPED | OUTPATIENT
Start: 2020-11-02 | End: 2021-06-08

## 2020-11-02 NOTE — TELEPHONE ENCOUNTER
Nystatin cteam      Last Written Prescription Date:  Not on medication list  Last Fill Quantity: ,   # refills:   Last Office Visit: 8/28/2020  Future Office visit:

## 2020-11-30 DIAGNOSIS — N40.1 BENIGN NON-NODULAR PROSTATIC HYPERPLASIA WITH LOWER URINARY TRACT SYMPTOMS: ICD-10-CM

## 2020-11-30 RX ORDER — FINASTERIDE 5 MG/1
1 TABLET, FILM COATED ORAL DAILY
Qty: 90 TABLET | Refills: 0 | Status: SHIPPED | OUTPATIENT
Start: 2020-11-30 | End: 2021-03-09

## 2020-11-30 NOTE — TELEPHONE ENCOUNTER
Proscar  Last Written Prescription Date: 8.29.19  Last Fill Quantity: 90,   # refills: 3  Last Office Visit: 8.28.19  Future Office visit:       Routing refill request to provider for review/approval because:  Patient due for appt.    Skylar Martinez RN

## 2020-12-19 DIAGNOSIS — I10 BENIGN ESSENTIAL HYPERTENSION: ICD-10-CM

## 2020-12-20 ENCOUNTER — HEALTH MAINTENANCE LETTER (OUTPATIENT)
Age: 75
End: 2020-12-20

## 2020-12-21 RX ORDER — AMLODIPINE BESYLATE 5 MG/1
TABLET ORAL
Qty: 90 TABLET | Refills: 0 | Status: SHIPPED | OUTPATIENT
Start: 2020-12-21 | End: 2021-03-01

## 2020-12-21 RX ORDER — CLOPIDOGREL BISULFATE 75 MG/1
TABLET ORAL
Qty: 90 TABLET | Refills: 0 | Status: SHIPPED | OUTPATIENT
Start: 2020-12-21 | End: 2021-03-01

## 2020-12-21 NOTE — TELEPHONE ENCOUNTER
clopidogrel (PLAVIX) 75 MG tablet  Last Written Prescription Date:  6/24/2020  Last Fill Quantity: 90,   # refills: 0  Last Office Visit: 8/28/2019  Future Office visit:      amLODIPine (NORVASC) 5 MG tablet      Last Written Prescription Date:  6/24/2020  Last Fill Quantity: 90,   # refills: 0  Last Office Visit: 8/28/2019  Future Office visit:

## 2020-12-26 NOTE — PLAN OF CARE
Wound vac placed per wound care RN, patient and his wife verbalize an understanding.   rolling walker

## 2021-01-19 ENCOUNTER — TRANSFERRED RECORDS (OUTPATIENT)
Dept: HEALTH INFORMATION MANAGEMENT | Facility: CLINIC | Age: 76
End: 2021-01-19

## 2021-01-20 ENCOUNTER — HOSPITAL ENCOUNTER (EMERGENCY)
Facility: HOSPITAL | Age: 76
Discharge: HOME | End: 2021-01-20
Attending: NURSE PRACTITIONER | Admitting: NURSE PRACTITIONER
Payer: MEDICARE

## 2021-01-20 ENCOUNTER — NURSE TRIAGE (OUTPATIENT)
Dept: INTERNAL MEDICINE | Facility: OTHER | Age: 76
End: 2021-01-20

## 2021-01-20 VITALS
DIASTOLIC BLOOD PRESSURE: 88 MMHG | RESPIRATION RATE: 18 BRPM | SYSTOLIC BLOOD PRESSURE: 130 MMHG | TEMPERATURE: 96.4 F | HEART RATE: 63 BPM | OXYGEN SATURATION: 96 %

## 2021-01-20 DIAGNOSIS — S51.812A SKIN TEAR OF FOREARM WITHOUT COMPLICATION, LEFT, INITIAL ENCOUNTER: ICD-10-CM

## 2021-01-20 PROCEDURE — 99213 OFFICE O/P EST LOW 20 MIN: CPT | Performed by: NURSE PRACTITIONER

## 2021-01-20 PROCEDURE — G0463 HOSPITAL OUTPT CLINIC VISIT: HCPCS

## 2021-01-20 ASSESSMENT — ENCOUNTER SYMPTOMS
NAUSEA: 0
ACTIVITY CHANGE: 1
HEADACHES: 0
DIZZINESS: 0
WOUND: 1
CHILLS: 0
VOMITING: 0
SHORTNESS OF BREATH: 0
LIGHT-HEADEDNESS: 0
FEVER: 0

## 2021-01-20 NOTE — ED PROVIDER NOTES
History     Chief Complaint   Patient presents with     WOUND CARE     L forearm skin tear     HPI  Peter Zhao is a 75 year old male who tripped and caught his left forearm on the railing of his steps tonight. He has a wound on his left forearm that will not stop bleeding. Has wound covered. He is on Plavix for TIA's. Did not hit his head.  Quit smoking 38 years ago. Denies numbness and tingling. Denies fevers, chills, nausea, and vomiting.    Allergies:  Allergies   Allergen Reactions     Ciprofloxacin Hives     No Clinical Screening - See Comments      Antibiotic allergy, pt not sure which       Problem List:    Patient Active Problem List    Diagnosis Date Noted     Acute kidney injury (H) 11/30/2018     Priority: Medium     H/O aortic valve replacement 11/16/2018     Priority: Medium     Hx of aortic valve replacement 11/16/2018     Priority: Medium     ICD (implantable cardioverter-defibrillator) in place 11/16/2018     Priority: Medium     Attention to colostomy (H) 09/21/2017     Priority: Medium     Agitation 05/19/2017     Priority: Medium     TIA (transient ischemic attack) 04/29/2017     Priority: Medium     Hypokalemia 02/20/2017     Priority: Medium     S/P colon resection 02/18/2017     Priority: Medium     Hyperlipidemia LDL goal <100 02/15/2017     Priority: Medium     Elevated prostate specific antigen (PSA) 02/15/2017     Priority: Medium     HTN (hypertension) 02/15/2017     Priority: Medium     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  Peter Zhao has no plan or code status on file. Discussed available  resources and provided with information. Confirmed code status reflects current choices pending further ACP discussions.  Confirmed/documented legally designated decision makers.  Added by Daxa Campa           CAD (coronary artery disease)      Priority: Medium     CABG 2013, stent x 1 in 2014       Type 2 diabetes mellitus without complication, without long-term current use of insulin (H) 10/31/2013     Priority: Medium        Past Medical History:    Past Medical History:   Diagnosis Date     Allergic state      CAD (coronary artery disease)      Cataract      Cataracts, bilateral      Elevated PSA      Gastro-oesophageal reflux disease      Heart murmur      Hyperlipidemia      Hypertension      Pacemaker      Stented coronary artery      Visual aura        Past Surgical History:    Past Surgical History:   Procedure Laterality Date     AORTIC VALVE REPLACEMENT      25 mm CE bovine replacement Dr. Wu 8/20/2012     CARDIAC SURGERY       CATARACT IOL, RT/LT Right 04/2017     COLECTOMY LOW ANTERIOR N/A 2/17/2017    Procedure: COLECTOMY LOW ANTERIOR;  Surgeon: Tima Moreland MD;  [facility]     COLONOSCOPY N/A 2/17/2017    Procedure: COLONOSCOPY;  Surgeon: Tima oMreland MD;  [facility]: [facility]     COLOSTOMY  02/17/2017    Sigmoid Colostomy performed.      LAPAROSCOPIC HERNIORRHAPHY INGUINAL Right 9/18/2015    Procedure: LAPAROSCOPIC HERNIORRHAPHY INGUINAL;  Surgeon: Solitario Nettles DO;  Location: [facility]     PHACOEMULSIFICATION WITH STANDARD INTRAOCULAR LENS IMPLANT Right 4/20/2017    Procedure: PHACOEMULSIFICATION WITH STANDARD INTRAOCULAR LENS IMPLANT;  CATARACT EXTRACTION RIGHT EYE WITH IMPLANT;  Surgeon: Darin Gutierrez MD;  Location: [facility]     PROSTATE BIOPSY, NEEDLE, SATURATION SAMPLING  2009     retinopexy-pvd with retinal tear Right 2008     SIGMOIDOSCOPY FLEXIBLE N/A 02/17/2017    Surgeon: Tima Moreland MD;  Location: [facility]; No specimens taken from procedure.      TAKEDOWN  COLOSTOMY N/A 9/21/2017    Procedure: TAKEDOWN COLOSTOMY;  CLOSURE OF NORRIS'S SIGMOID COLOSTOMY, REPAIR STOMAL HERNIA;  Surgeon: Tima Moreland MD;  Location: [facility]     TONSILLECTOMY         Family History:    Family History   Problem Relation Age of Onset     Diabetes Mother      C.A.D. Mother      Cancer Maternal Grandmother        Social History:  Marital Status:   [2]  Social History     Tobacco Use     Smoking status: Former Smoker     Smokeless tobacco: Former User     Types: Chew     Quit date: 10/19/2013     Tobacco comment: quit in 2000   Substance Use Topics     Alcohol use: No     Drug use: No        Medications:         amLODIPine (NORVASC) 5 MG tablet       ASPIRIN PO       atorvastatin (LIPITOR) 40 MG tablet       blood glucose (ACCU-CHEK SMARTVIEW) test strip       blood glucose monitoring (ACCU-CHEK COMPACT CARE KIT) meter device kit       blood glucose monitoring (ACCU-CHEK MULTICLIX) lancets       calcium carbonate (OS-WALI 600 MG [place]. CA) 1500 (600 CA) MG tablet       clopidogrel (PLAVIX) 75 MG tablet       enalapril (VASOTEC) 10 MG tablet       finasteride (PROSCAR) 5 MG tablet       Gymnema Sylvestris Leaf POWD       lisinopril (ZESTRIL) 40 MG tablet       metoprolol tartrate (LOPRESSOR) 100 MG tablet       Multiple Vitamin (MULTIVITAMINS PO)       nitroGLYcerin (NITROSTAT) 0.4 MG sublingual tablet       nystatin-triamcinolone (MYCOLOG II) 335717-9.1 UNIT/GM-% external cream       Vitamin D, Cholecalciferol, 1000 units CAPS          Review of Systems   Constitutional: Positive for activity change. Negative for chills and fever.   Respiratory: Negative for shortness of breath.    Gastrointestinal: Negative for nausea and vomiting.   Skin: Positive for wound (left arm skin tear).   Neurological: Negative for dizziness, light-headedness and headaches.       Physical Exam   BP: 130/88  Pulse: 63  Temp: 96.4  F (35.8  C)  Resp: 18  SpO2: 96 %      Physical Exam  Vitals signs and nursing  note reviewed.   Constitutional:       General: He is in acute distress (mild).   Cardiovascular:      Rate and Rhythm: Normal rate.   Pulmonary:      Effort: Pulmonary effort is normal.   Musculoskeletal:         General: Tenderness and signs of injury present.      Left forearm: He exhibits tenderness and laceration. He exhibits no bony tenderness and no swelling.        Arms:    Skin:     Findings: No bruising or erythema.   Neurological:      Mental Status: He is alert and oriented to person, place, and time.   Psychiatric:         Behavior: Behavior normal.         ED Course        Procedures           No results found for this or any previous visit (from the past 24 hour(s)).    Medications - No data to display    Assessments & Plan (with Medical Decision Making)     I have reviewed the nursing notes.    I have reviewed the findings, diagnosis, plan and need for follow up with the patient.  (O42.719K) Skin tear of forearm without complication, left, initial encounter  Comment: 75 year old male who tripped and caught his left forearm on the railing of his steps tonight. He has a wound on his left forearm that will not stop bleeding. Has wound covered. He is on Plavix for TIA's. Did not hit his head.  Quit smoking 38 years ago. Denies numbness and tingling. Denies fevers, chills, nausea, and vomiting.    MDM: 1.5 cm round skin tear/abrasion. Bleeding controlled at this time. Left radial pulse 3+    Wound cleaned with chloraprep, triple antibiotic and pressure dressing applied.    Plan: education provided for superficial lacerations  Apply bacitracin and do daily dressing changes for the next 48 to 72 hours. You may shower. If you have increased pain, redness at wound site, fevers, or abnormal drainage (purulent/pus) you need to see your primary care provider or return to Urgent Care/ER immediately. Acetaminophen/tylenol   for pain. If wound start to bleed apply pressure until it stops. Use coban tape that will  apply pressure to wound to prevent it from bleeding.  These discharge instructions and medications were reviewed with him and understanding verbalized.    Discharge Medication List as of 1/20/2021  4:10 PM          Final diagnoses:   Skin tear of forearm without complication, left, initial encounter       1/20/2021   [facility]       Mercedez German, CNP  01/22/21 0796

## 2021-01-20 NOTE — ED TRIAGE NOTES
Pt is here with c/o injuring skin on top of left forearm  Pt reports he slipped and bumped it on his railing at home  Pt is on blood thinners

## 2021-01-20 NOTE — DISCHARGE INSTRUCTIONS
Apply bacitracin and do daily dressing changes for the next 48 to 72 hours. You may shower. If you have increased pain, redness at wound site, fevers, or abnormal drainage (purulent/pus) you need to see your primary care provider or return to Urgent Care/ER immediately. Acetaminophen/tylenol   for pain. If wound start to bleed apply pressure until it stops. Use coban tape that will apply pressure to wound to prevent it from bleeding.

## 2021-01-20 NOTE — ED TRIAGE NOTES
Reports he slipped on the steps at home today. Hit his left forearm on the railing. Did not fall on the ground. Skin tear to L forearm that won't stop bleeding. Is on plavix.

## 2021-01-20 NOTE — TELEPHONE ENCOUNTER
"Patient called stating he cut his arm after he slipped on stairs outside. Patient denies any head injury. Patient states laceration is about 1.5 inches by 1 inch. Patient states bleed had stopped but started again and is having a hard time getting it to stop. Patient reports being on blood thinner. Per protocol patient advised to be seen in UC/ED. Patient verbalized understanding.    Reason for Disposition    Skin is split open or gaping (or length > 1/2 inch or 12 mm on the skin, 1/4 inch or 6 mm on the face)    Additional Information    Negative: [1] Major bleeding (e.g., actively dripping or spurting) AND [2] can't be stopped    Negative: Amputation    Negative: Shock suspected (e.g., cold/pale/clammy skin, too weak to stand, low BP, rapid pulse)    Negative: [1] Knife wound (or other possibly deep cut) AND [2] to chest, abdomen, back, neck, or head    Negative: [1] Cutter (self-mutilator) AND [2] suicidal or out-of-control    Negative: Sounds like a life-threatening emergency to the triager    Negative: [1] Animal bite AND [2] broken skin    Negative: [1] Human bite AND [2] broken skin    Negative: [1] Bleeding AND [2] won't stop after 10 minutes of direct pressure (using correct technique)    Answer Assessment - Initial Assessment Questions  1. APPEARANCE of INJURY: \"What does the injury look like?\"       On left arm about 1.5 in long by inch wide  2. SIZE: \"How large is the cut?\"       See above  3. BLEEDING: \"Is it bleeding now?\" If so, ask: \"Is it difficult to stop?\"       Yes. Had stopped but started again when he was working  4. LOCATION: \"Where is the injury located?\"       Left lower arm  5. ONSET: \"How long ago did the injury occur?\"       10 am this morning  6. MECHANISM: \"Tell me how it happened.\"       Slipped on step and arm hit railing  7. TETANUS: \"When was the last tetanus booster?\"      Not sure  8. PREGNANCY: \"Is there any chance you are pregnant?\" \"When was your last menstrual period?\"      " n/a    Protocols used: CUTS AND [id number]

## 2021-03-01 DIAGNOSIS — I10 BENIGN ESSENTIAL HYPERTENSION: ICD-10-CM

## 2021-03-01 RX ORDER — AMLODIPINE BESYLATE 5 MG/1
TABLET ORAL
Qty: 90 TABLET | Refills: 0 | Status: SHIPPED | OUTPATIENT
Start: 2021-03-01 | End: 2021-10-08

## 2021-03-01 RX ORDER — CLOPIDOGREL BISULFATE 75 MG/1
TABLET ORAL
Qty: 90 TABLET | Refills: 0 | Status: SHIPPED | OUTPATIENT
Start: 2021-03-01 | End: 2021-09-23

## 2021-03-01 NOTE — TELEPHONE ENCOUNTER
amLODIPine (NORVASC) 5 MG tablet     Last Written Prescription Date:  12/21/20  Last Fill Quantity: 90,   # refills: 0  Last Office Visit: 8/28/2019  Future Office visit:       clopidogrel (PLAVIX) 75 MG tablet     Last Written Prescription Date:  12/21/20  Last Fill Quantity: 90,   # refills: 0  Last Office Visit: 8/28/2019  Future Office visit:       Routing refill request to provider for review/approval

## 2021-03-05 NOTE — TELEPHONE ENCOUNTER
Received call from patient.  He has had left hip pain for the last 2 months.  He rates pain 5/10.  Denies injury.  Patient is hoping to get fit in with  as soon as possible or if  can refer him to see someone for that hip.    Attempted to offer multiple appointments with  this week and patient declined.  He wants to talk with 's nurse.    [phone]   Number Of Stages: 1

## 2021-03-09 DIAGNOSIS — N40.1 BENIGN NON-NODULAR PROSTATIC HYPERPLASIA WITH LOWER URINARY TRACT SYMPTOMS: ICD-10-CM

## 2021-03-09 RX ORDER — FINASTERIDE 5 MG/1
1 TABLET, FILM COATED ORAL DAILY
Qty: 90 TABLET | Refills: 0 | Status: SHIPPED | OUTPATIENT
Start: 2021-03-09 | End: 2021-05-25

## 2021-04-18 ENCOUNTER — HEALTH MAINTENANCE LETTER (OUTPATIENT)
Age: 76
End: 2021-04-18

## 2021-04-21 ENCOUNTER — TRANSFERRED RECORDS (OUTPATIENT)
Dept: HEALTH INFORMATION MANAGEMENT | Facility: CLINIC | Age: 76
End: 2021-04-21

## 2021-05-08 NOTE — MR AVS SNAPSHOT
After Visit Summary   9/29/2017    Peter Zhao    MRN: [id number]           Patient Information     Date Of Birth          1945        Visit Information        Provider Department      9/29/2017 2:00 PM Jackie Horne NP [facility]        Today's Diagnoses     Open wound of abdomen, subsequent encounter        S/P colostomy takedown           Follow-ups after your visit        Your next 10 appointments already scheduled     Oct 06, 2017 11:00 AM CDT   (Arrive by 10:45 AM)   Return Visit with Jackie Horne NP   [facility] [place] ([facility] [place] )    [address]  [place] [address]   [phone]            Oct 13, 2017 10:00 AM CDT   (Arrive by 9:45 AM)   Return Visit with Jackie Horne NP   [facility] [place] ([facility] [place] )    [address]  [place] [address]   [phone]            Oct 20, 2017  1:00 PM CDT   (Arrive by 12:45 PM)   Return Visit with Jackie Horne NP   [facility] [place] ([facility] [place] )    [address]  [place] [address]   [phone]            Oct 27, 2017  2:00 PM CDT   (Arrive by 1:45 PM)   Return Visit with Jackie Horne NP   [facility] [place] ([facility] [place] )    [address]  [place] [address]   [phone]              Who to contact     If you have questions or need follow up information about today's clinic visit or your schedule please contact [facility] directly at [phone].  Normal or non-critical lab and imaging results will be communicated to you by MyChart, letter or phone within 4 business days after the clinic has received the results. If you do not hear from us within 7 days, please contact the clinic through MyChart or phone. If you have a critical or abnormal lab result, we will notify you by phone as soon as possible.  Submit refill requests through [employer] or call your  [Fall prevention counseling provided] : Fall prevention counseling provided "pharmacy and they will forward the refill request to us. Please allow 3 business days for your refill to be completed.          Additional Information About Your Visit        MyChart Information     [employer] lets you send messages to your doctor, view your test results, renew your prescriptions, schedule appointments and more. To sign up, go to [email or web address] . Click on \"Log in\" on the left side of the screen, which will take you to the Welcome page. Then click on \"Sign up Now\" on the right side of the page.     You will be asked to enter the access code listed below, as well as some personal information. Please follow the directions to create your username and password.     Your access code is: [email or web address]  Expires: 2017 10:57 AM     Your access code will  in 90 days. If you need help or a new code, please call your [place] clinic or [phone].        Care EveryWhere ID     This is your [facility] EveryWhere ID. This could be used by other organizations to access your [facility] medical records  [id number]        Your Vitals Were     Temperature Height BMI (Body Mass Index)             97.1  F (36.2  C) 5' 10\" (1.778 m) 24.25 kg/m2          Blood Pressure from Last 3 Encounters:   17 120/72   17 139/82   17 98/59    Weight from Last 3 Encounters:   17 169 lb (76.7 kg)   17 169 lb 1.5 oz (76.7 kg)   17 176 lb (79.8 kg)              Today, you had the following     No orders found for display         Today's Medication Changes          These changes are accurate as of: 17  3:12 PM.  If you have any questions, ask your nurse or doctor.               Stop taking these medicines if you haven't already. Please contact your care team if you have questions.     order for DME   Stopped by:  Jackie Horne NP           Ostomy Supplies Misc   Stopped by:  Jackie Horne NP           Ostomy Supplies POUCH Misc   Stopped by:  Jackie Horne NP                    " [Behavioral health counseling provided] : Behavioral health counseling provided Primary Care Provider Office Phone # Fax #    Alexander Vazquez [phone]       [facility] [address]  [place] [address]        Equal Access to Services     ANDRY CASPER : Hadii aad ku hadcatalinao Sotundeali, waaxda luqadaha, qaybta kaalmada adeegyada, waxedwin avilan jenyherman mathews melissa pulido. So [facility] [phone].    ATENCIÓN: Si habla español, tiene a farah disposición servicios gratuitos de asistencia lingüística. Llame al [phone].    We comply with applicable federal civil rights laws and [place] laws. We do not discriminate on the basis of race, color, national origin, age, disability, sex, sexual orientation, or gender identity.            Thank you!     Thank you for choosing [facility]  for your care. Our goal is always to provide you with excellent care. Hearing back from our patients is one way we can continue to improve our services. Please take a few minutes to complete the written survey that you may receive in the mail after your visit with us. Thank you!             Your Updated Medication List - Protect others around you: Learn how to safely use, store and throw away your medicines at www.disposemymeds.org.          This list is accurate as of: 9/29/17  3:12 PM.  Always use your most recent med list.                   Brand Name Dispense Instructions for use Diagnosis    amLODIPine 5 MG tablet    NORVASC    90 tablet    Take 1 tablet (5 mg) by mouth daily    Benign essential hypertension       ASPIRIN PO      Take 325 mg by mouth daily        atorvastatin 40 MG tablet    LIPITOR    90 tablet    Take 1 tablet (40 mg) by mouth daily    Hyperlipidemia LDL goal <100       blood glucose lancing device     1 kit    1 Units daily    T2DM (type 2 diabetes mellitus) (H)       * blood glucose monitoring lancets     100 each    1 Units daily Use fresh lancet each day to check blood sugars    T2DM (type 2 diabetes mellitus) (H)       * blood glucose  monitoring lancets     100 Box    Use to test blood sugar 2 times weekly    Type 2 diabetes mellitus without complication, without long-term current use of insulin (H)       * blood glucose monitoring test strip    ACCU-CHEK SMARTVIEW    1 Box    Test blood sugar before breakfast one day, before and after supper the next and not at all the third day    T2DM (type 2 diabetes mellitus) (H)       * blood glucose monitoring test strip    MARIAN CONTOUR NEXT    100 strip    Use to test blood sugar 2 times weekly or as directed.    Type 2 diabetes mellitus with other specified complication, unspecified long term insulin use status (H)       calcium carbonate 1500 (600 CA) MG tablet    OS-WALI 600 mg [place]. Ca    30 tablet    Take 1 tablet (1,500 mg) by mouth daily        clopidogrel 75 MG tablet    PLAVIX    90 tablet    Take 1 tablet (75 mg) by mouth daily    Benign essential hypertension       finasteride 5 MG tablet    PROSCAR    90 tablet    Take 1 tablet (5 mg) by mouth daily    Benign non-nodular prostatic hyperplasia with lower urinary tract symptoms       ibuprofen 600 MG tablet    ADVIL/MOTRIN    30 tablet    Take 1 tablet (600 mg) by mouth every 6 hours as needed for moderate pain    S/P colostomy takedown       lisinopril 40 MG tablet    PRINIVIL/ZESTRIL    90 tablet    Take 1 tablet (40 mg) by mouth daily    Benign essential hypertension       metoprolol 100 MG tablet    LOPRESSOR    180 tablet    Take 1 tablet (100 mg) by mouth 2 times daily    Benign essential hypertension       MULTIVITAMINS PO      Take 1 capsule by mouth daily        nitroGLYcerin 0.4 MG sublingual tablet    NITROSTAT    25 tablet    Place 1 tablet (0.4 mg) under the tongue every 5 minutes as needed    Hyperlipidemia LDL goal <100, Intermediate coronary syndrome (H)       NONFORMULARY      OTC eye drops as directed        polyethylene glycol 0.4%- propylene glycol 0.3% 0.4-0.3 % Soln ophthalmic solution    SYSTANE ULTRA     Place 1 drop into  both eyes every hour as needed for dry eyes        ranitidine 150 MG tablet    ZANTAC    180 tablet    Take 1 tablet (150 mg) by mouth 2 times daily    Gastroesophageal reflux disease without esophagitis       UNABLE TO FIND      Take 550 mg by mouth 2 times daily Gymnema danielle        VITAMIN D3 PO      Take 1,000 Units by mouth daily        * Notice:  This list has 4 medication(s) that are the same as other medications prescribed for you. Read the directions carefully, and ask your doctor or other care provider to review them with you.

## 2021-05-24 ENCOUNTER — NURSE TRIAGE (OUTPATIENT)
Dept: INTERNAL MEDICINE | Facility: OTHER | Age: 76
End: 2021-05-24

## 2021-05-24 ENCOUNTER — HOSPITAL ENCOUNTER (EMERGENCY)
Facility: HOSPITAL | Age: 76
Discharge: HOME | End: 2021-05-24
Attending: PHYSICIAN ASSISTANT | Admitting: PHYSICIAN ASSISTANT
Payer: MEDICARE

## 2021-05-24 ENCOUNTER — TELEPHONE (OUTPATIENT)
Dept: INTERNAL MEDICINE | Facility: OTHER | Age: 76
End: 2021-05-24

## 2021-05-24 VITALS
TEMPERATURE: 97 F | DIASTOLIC BLOOD PRESSURE: 82 MMHG | RESPIRATION RATE: 16 BRPM | HEART RATE: 66 BPM | SYSTOLIC BLOOD PRESSURE: 125 MMHG | OXYGEN SATURATION: 98 %

## 2021-05-24 DIAGNOSIS — S51.811A SKIN TEAR OF FOREARM WITHOUT COMPLICATION, RIGHT, INITIAL ENCOUNTER: ICD-10-CM

## 2021-05-24 PROCEDURE — G0463 HOSPITAL OUTPT CLINIC VISIT: HCPCS

## 2021-05-24 PROCEDURE — 99213 OFFICE O/P EST LOW 20 MIN: CPT | Performed by: PHYSICIAN ASSISTANT

## 2021-05-24 ASSESSMENT — ENCOUNTER SYMPTOMS
FEVER: 0
BRUISES/BLEEDS EASILY: 1

## 2021-05-24 NOTE — TELEPHONE ENCOUNTER
8:23 AM    Reason for Call: Phone Call    Description: pt would like to get worked in to schedule for a arm laceration/ please call pt    Was an appointment offered for this call? No  If yes : Appointment type              Date    Preferred method for responding to this message: Telephone Call  What is your phone number ? [phone]    If we cannot reach you directly, may we leave a detailed response at the number you provided? Yes    Can this message wait until your PCP/provider returns, if available today? YES, No, provider is in    Julieta Nguyen

## 2021-05-24 NOTE — TELEPHONE ENCOUNTER
Pt does not want appt with anyone but his doctor, pt will send a my chart message with the letter he got from ER

## 2021-05-24 NOTE — TELEPHONE ENCOUNTER
Pt called and on Friday a part to a water pump hit his right arm.Forearm. Skin if flopping to the area..About 2 inches big.There is a hole the size of a cigarette he is worried about because it was still bleeding yesterday. He is on Plavix.It is not bleeding at this time.He has a bandage on it.No pain.No red or warmth.No fever.    Advised UC.    He is worried if he should have his tooth surgery on Friday because of this. Updated him to call back and will update MD after being seen for his arm.    Please note.      Skylar Martinez RN

## 2021-05-24 NOTE — TELEPHONE ENCOUNTER
2:59 PM    Reason for Call: Phone Call    Description: pt called stating he needs a follow up appt, on wed 5/26/21 per the ER,  also he has some dental surgery on Friday 5/28/21, he would like to talk with someone in further detail, pls call back pt     Was an appointment offered for this call? No  If yes : Appointment type              Date    Preferred method for responding to this message: Telephone Call  What is your phone number ?    If we cannot reach you directly, may we leave a detailed response at the number you provided? Yes    Can this message wait until your PCP/provider returns, if available today? Deya Givens

## 2021-05-24 NOTE — ED TRIAGE NOTES
"Laceration to RLE. States Friday wood came down from roof and \"scraped the skin off.\" concerned because he has dental surgery Friday.   "

## 2021-05-24 NOTE — DISCHARGE INSTRUCTIONS
There is no particular treatment for your skin tear today.     Continue your current treatment regimen and follow-up in the clinic.  You should require no topical or oral antibiotics at this time.  The wound will need to close by secondary intention.     Follow-up in the clinic in a few days for recheck.    Return here for any other concerns or questions.

## 2021-05-24 NOTE — ED PROVIDER NOTES
History     Chief Complaint   Patient presents with     Laceration     The history is provided by the patient.     Peter Zhao is a 75 year old male who presented to the urgent care ambulatory for evaluation of a skin tear in the right forearm.  Happened 3 days ago.  No other questions or concerns.    Allergies:  Allergies   Allergen Reactions     Ciprofloxacin Hives     No Clinical Screening - See Comments      Antibiotic allergy, pt not sure which       Problem List:    Patient Active Problem List    Diagnosis Date Noted     Acute kidney injury (H) 11/30/2018     Priority: Medium     H/O aortic valve replacement 11/16/2018     Priority: Medium     Hx of aortic valve replacement 11/16/2018     Priority: Medium     ICD (implantable cardioverter-defibrillator) in place 11/16/2018     Priority: Medium     Attention to colostomy (H) 09/21/2017     Priority: Medium     Agitation 05/19/2017     Priority: Medium     TIA (transient ischemic attack) 04/29/2017     Priority: Medium     Hypokalemia 02/20/2017     Priority: Medium     S/P colon resection 02/18/2017     Priority: Medium     Hyperlipidemia LDL goal <100 02/15/2017     Priority: Medium     Elevated prostate specific antigen (PSA) 02/15/2017     Priority: Medium     HTN (hypertension) 02/15/2017     Priority: Medium     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  Peter Zhao has no plan or code status on file. Discussed available resources and provided with information. Confirmed code status reflects current choices pending further ACP discussions.  Confirmed/documented legally  designated decision makers.  Added by Daxa Campa           CAD (coronary artery disease)      Priority: Medium     CABG 2013, stent x 1 in 2014       Type 2 diabetes mellitus without complication, without long-term current use of insulin (H) 10/31/2013     Priority: Medium        Past Medical History:    Past Medical History:   Diagnosis Date     Allergic state      CAD (coronary artery disease)      Cataract      Cataracts, bilateral      Elevated PSA      Gastro-oesophageal reflux disease      Heart murmur      Hyperlipidemia      Hypertension      Pacemaker      Stented coronary artery      Visual aura        Past Surgical History:    Past Surgical History:   Procedure Laterality Date     AORTIC VALVE REPLACEMENT      25 mm CE bovine replacement Dr. Wu 8/20/2012     CARDIAC SURGERY       CATARACT IOL, RT/LT Right 04/2017     COLECTOMY LOW ANTERIOR N/A 2/17/2017    Procedure: COLECTOMY LOW ANTERIOR;  Surgeon: Tima Morleand MD;  Location: [facility]     COLONOSCOPY N/A 2/17/2017    Procedure: COLONOSCOPY;  Surgeon: Tima Moreland MD;  Location: [facility]     COLOSTOMY  02/17/2017    Sigmoid Colostomy performed.      LAPAROSCOPIC HERNIORRHAPHY INGUINAL Right 9/18/2015    Procedure: LAPAROSCOPIC HERNIORRHAPHY INGUINAL;  Surgeon: Solitario Nettles DO;  Location: [facility]     PHACOEMULSIFICATION WITH STANDARD INTRAOCULAR LENS IMPLANT Right 4/20/2017    Procedure: PHACOEMULSIFICATION WITH STANDARD INTRAOCULAR LENS IMPLANT;  CATARACT EXTRACTION RIGHT EYE WITH IMPLANT;  Surgeon: Darin Gutierrez MD;  Location: [facility]     PROSTATE BIOPSY, NEEDLE, SATURATION SAMPLING  2009     retinopexy-pvd with retinal tear Right 2008     SIGMOIDOSCOPY FLEXIBLE N/A 02/17/2017    Surgeon: Tima Moreland MD;  Location: [facility]; No specimens taken from procedure.      TAKEDOWN COLOSTOMY N/A 9/21/2017    Procedure: TAKEDOWN COLOSTOMY;  CLOSURE OF NORRIS'S SIGMOID COLOSTOMY, REPAIR STOMAL HERNIA;  Surgeon: Tima Moreland MD;   Location: [facility]     TONSILLECTOMY         Family History:    Family History   Problem Relation Age of Onset     Diabetes Mother      C.A.D. Mother      Cancer Maternal Grandmother        Social History:  Marital Status:   [2]  Social History     Tobacco Use     Smoking status: Former Smoker     Smokeless tobacco: Former User     Types: Chew     Quit date: 10/19/2013     Tobacco comment: quit in 2000   Substance Use Topics     Alcohol use: No     Drug use: No        Medications:    amLODIPine (NORVASC) 5 MG tablet  clopidogrel (PLAVIX) 75 MG tablet  finasteride (PROSCAR) 5 MG tablet  Gymnema Sylvestris Leaf POWD  lisinopril (ZESTRIL) 40 MG tablet  metoprolol tartrate (LOPRESSOR) 100 MG tablet  Multiple Vitamin (MULTIVITAMINS PO)  Vitamin D, Cholecalciferol, 1000 units CAPS  ASPIRIN PO  atorvastatin (LIPITOR) 40 MG tablet  blood glucose (ACCU-CHEK SMARTVIEW) test strip  blood glucose monitoring (ACCU-CHEK COMPACT CARE KIT) meter device kit  blood glucose monitoring (ACCU-CHEK MULTICLIX) lancets  calcium carbonate (OS-WALI 600 MG [place]. CA) 1500 (600 CA) MG tablet  enalapril (VASOTEC) 10 MG tablet  nitroGLYcerin (NITROSTAT) 0.4 MG sublingual tablet  nystatin-triamcinolone (MYCOLOG II) 817230-5.1 UNIT/GM-% external cream          Review of Systems   Constitutional: Negative for fever.   Musculoskeletal:        Skin tear to the right forearm.   Hematological: Bruises/bleeds easily.        On Plavix       Physical Exam   BP: 125/82  Pulse: 66  Temp: 97  F (36.1  C)  Resp: 16  SpO2: 98 %      Physical Exam  Vitals signs and nursing note reviewed.   Constitutional:       General: He is not in acute distress.     Appearance: Normal appearance. He is normal weight. He is not ill-appearing, toxic-appearing or diaphoretic.      Comments: Smiling and talkative 75-year-old male found seated upright on the exam chair in no distress.   Musculoskeletal:      Comments: Examination the right arm reveals a 4 cm mild skin tear  without evidence of active drainage, cellulitis, abscess, or other concerns.  Examination of the right upper extremity is unremarkable.  He has no pain upon palpation or active range of motion of the wrist, digits, elbow, radius or ulna, shoulder, etc.   Skin:     General: Skin is warm and dry.      Capillary Refill: Capillary refill takes less than 2 seconds.   Neurological:      General: No focal deficit present.      Mental Status: He is alert and oriented to person, place, and time.         ED Course        Procedures               Critical Care time:  none               No results found for this or any previous visit (from the past 24 hour(s)).    Medications - No data to display    Assessments & Plan (with Medical Decision Making)   75-year-old male that sustained a mild injury from working on Friday.  There is no evidence of cellulitis or infection.  Examination of the upper extremities otherwise unremarkable.  No reasonable indication for imaging.  Long detailed discussion.  No reasonable indication for antibiotics.  Follow-up in the clinic.  Return here as needed.  Discussed red flag signs or symptoms for prompt return.  He voiced complete understanding and was happy agreeable. Nursing will check on tetanus status.     This document was prepared using a combination of typing and voice generated software.  While every attempt was made for accuracy, spelling and grammatical errors may exist.        I have reviewed the nursing notes.    I have reviewed the findings, diagnosis, plan and need for follow up with the patient.          New Prescriptions    No medications on file       Final diagnoses:   Skin tear of forearm without complication, right, initial encounter       5/24/2021   [facility]     Ivette Gregg PA-C  05/24/21 [id number]

## 2021-05-25 DIAGNOSIS — N40.1 BENIGN NON-NODULAR PROSTATIC HYPERPLASIA WITH LOWER URINARY TRACT SYMPTOMS: ICD-10-CM

## 2021-05-25 RX ORDER — FINASTERIDE 5 MG/1
TABLET, FILM COATED ORAL
Qty: 90 TABLET | Refills: 0 | Status: SHIPPED | OUTPATIENT
Start: 2021-05-25 | End: 2021-08-17

## 2021-05-25 NOTE — TELEPHONE ENCOUNTER
Finasteride 5 mg      Last Written Prescription Date:  3/09/21  Last Fill Quantity: 90,   # refills: 0  Last Office Visit: 8/25/19  Future Office visit:       Routing refill request to provider for review/approval because:  Drug not on the [facility], [facility] or [facility] refill protocol or controlled substance

## 2021-06-02 NOTE — PATIENT INSTRUCTIONS
Preventive Health Recommendations:     See your health care provider every year to    Review health changes.     Discuss preventive care.      Review your medicines if your doctor has prescribed any.      Talk with your health care provider about whether you should have a test to screen for prostate cancer (PSA).    Every 3 years, have a diabetes test (fasting glucose). If you are at risk for diabetes, you should have this test more often.    Every 5 years, have a cholesterol test. Have this test more often if you are at risk for high cholesterol or heart disease.     Every 10 years, have a colonoscopy. Or, have a yearly FIT test (stool test). These exams will check for colon cancer.    Talk to with your health care provider about screening for Abdominal Aortic Aneurysm if you have a family history of AAA or have a history of smoking.    Shots:     Get a flu shot each year.     Get a tetanus shot every 10 years.     Talk to your doctor about your pneumonia vaccines. There are now two you should receive - Pneumovax (PPSV 23) and Prevnar (PCV 13).     Talk to your pharmacist about a shingles vaccine.     Talk to your doctor about the hepatitis B vaccine.  Nutrition:     Eat at least 5 servings of fruits and vegetables each day.     Eat whole-grain bread, whole-wheat pasta and brown rice instead of white grains and rice.     Get adequate Calcium and Vitamin D.   Lifestyle    Exercise for at least 150 minutes a week (30 minutes a day, 5 days a week). This will help you control your weight and prevent disease.     Limit alcohol to one drink per day.     No smoking.     Wear sunscreen to prevent skin cancer.    See your dentist every six months for an exam and cleaning.    See your eye doctor every 1 to 2 years to screen for conditions such as glaucoma, macular degeneration, cataracts, etc.    Personalized Prevention Plan  You are due for the preventive services outlined below.  Your care team is available to assist you  in scheduling these services.  If you have already completed any of these items, please share that information with your care team to update in your medical record.  Health Maintenance Due   Topic Date Due     Eye Exam  Never done     Hepatitis C Screening  Never done     Annual Wellness Visit  Never done     Zoster (Shingles) Vaccine (2 of 3) 12/10/2013     Cholesterol Lab  12/07/2019     Kidney Microalbumin Urine Test  12/07/2019     A1C Lab  02/28/2020     Basic Metabolic Panel  08/28/2020     Diabetic Foot Exam  08/28/2020     FALL RISK ASSESSMENT  08/28/2020     PHQ-2  01/01/2021     Patient Education   Personalized Prevention Plan  You are due for the preventive services outlined below.  Your care team is available to assist you in scheduling these services.  If you have already completed any of these items, please share that information with your care team to update in your medical record.  Health Maintenance Due   Topic Date Due     Eye Exam  Never done     Hepatitis C Screening  Never done     Zoster (Shingles) Vaccine (2 of 3) 12/10/2013     Cholesterol Lab  12/07/2019     Kidney Microalbumin Urine Test  12/07/2019     A1C Lab  02/28/2020     Basic Metabolic Panel  08/28/2020     Diabetic Foot Exam  08/28/2020     FALL RISK ASSESSMENT  08/28/2020

## 2021-06-02 NOTE — PROGRESS NOTES
"3  SUBJECTIVE:   CC: Peter Zhao is an 75 year old male who presents for preventive health visit.     {Split Bill scripting  The purpose of this visit is to discuss your medical history and prevent health problems before you are sick. You may be responsible for a co-pay, coinsurance, or deductible if your visit today includes services such as checking on a sore throat, having an x-ray or lab test, or treating and evaluating a new or existing condition :945183}  Patient has been advised of split billing requirements and indicates understanding: {Yes and No:171876}  Healthy Habits:    Do you get at least three servings of calcium containing foods daily (dairy, green leafy vegetables, etc.)? { :440724::\"yes\"}    Amount of exercise or daily activities, outside of work: { :129886}    Problems taking medications regularly { :022840::\"No\"}    Medication side effects: { :510627::\"No\"}    Have you had an eye exam in the past two years? { :482869}    Do you see a dentist twice per year? { :920092}    Do you have sleep apnea, excessive snoring or daytime drowsiness?{ :262092}  {Outside tests to abstract? :999126}    {additional problems to add (Optional):936378}    Today's PHQ-2 Score:   PHQ-2 ( 1999 Pfizer) 8/28/2019   Q1: Little interest or pleasure in doing things 0   Q2: Feeling down, depressed or hopeless 0   PHQ-2 Score 0     {PHQ-2 LOOK IN ASSESSMENTS (Optional) :672081}  Abuse: Current or Past(Physical, Sexual or Emotional)- {YES/NO/NA:565861}  Do you feel safe in your environment? {YES/NO/NA:499344}        Social History     Tobacco Use     Smoking status: Former Smoker     Smokeless tobacco: Former User     Types: Chew     Quit date: 10/19/2013     Tobacco comment: quit in 2000   Substance Use Topics     Alcohol use: No     If you drink alcohol do you typically have >3 drinks per day or >7 drinks per week? {ETOH :639267}                      Last PSA:   PSA   Date Value Ref Range Status   08/28/2019 3.83 0 - 4 ug/L " "Final     Comment:     Assay Method:  Chemiluminescence using Siemens Vista analyzer       Reviewed orders with patient. Reviewed health maintenance and updated orders accordingly - {Yes/No:438788::\"Yes\"}  {Chronicprobdata (Optional):317040}    Reviewed and updated as needed this visit by clinical staff                 Reviewed and updated as needed this visit by Provider                {HISTORY OPTIONS (Optional):973049}    ROS:  { :987615::\"CONSTITUTIONAL: NEGATIVE for fever, chills, change in weight\",\"INTEGUMENTARY/SKIN: NEGATIVE for worrisome rashes, moles or lesions\",\"EYES: NEGATIVE for vision changes or irritation\",\"ENT: NEGATIVE for ear, mouth and throat problems\",\"RESP: NEGATIVE for significant cough or SOB\",\"CV: NEGATIVE for chest pain, palpitations or peripheral edema\",\"GI: NEGATIVE for nausea, abdominal pain, heartburn, or change in bowel habits\",\" male: negative for dysuria, hematuria, decreased urinary stream, erectile dysfunction, urethral discharge\",\"MUSCULOSKELETAL: NEGATIVE for significant arthralgias or myalgia\",\"NEURO: NEGATIVE for weakness, dizziness or paresthesias\",\"PSYCHIATRIC: NEGATIVE for changes in mood or affect\"}    OBJECTIVE:   There were no vitals taken for this visit.  EXAM:  {Exam Choices:285634}    {Diagnostic Test Results (Optional):785327::\"Diagnostic Test Results:\",\"Labs reviewed in Epic\"}    ASSESSMENT/PLAN:   {Diag Picklist:047239}    Patient has been advised of split billing requirements and indicates understanding: {YES / NO:563211::\"Yes\"}  COUNSELING:  {MALE COUNSELING MESSAGES:800136::\"Reviewed preventive health counseling, as reflected in patient instructions\"}    Estimated body mass index is 27.62 kg/m  as calculated from the following:    Height as of 12/18/18: 1.753 m (5' 9\").    Weight as of 8/28/19: 84.8 kg (187 lb).    {Weight Management Plan (ACO) Complete if BMI is abnormal-  Ages 18-64  BMI >24.9.  Age 65+ with BMI <23 or >30 (Optional):703115}    He reports that " he has quit smoking. He quit smokeless tobacco use about 7 years ago.  His smokeless tobacco use included chew.      Counseling Resources:  ATP IV Guidelines  Pooled Cohorts Equation Calculator  FRAX Risk Assessment  ICSI Preventive Guidelines  Dietary Guidelines for Americans, 2010  USDA's MyPlate  ASA Prophylaxis  Lung CA Screening    Alexander Vazquez,   [facility]

## 2021-06-08 ENCOUNTER — OFFICE VISIT (OUTPATIENT)
Dept: INTERNAL MEDICINE | Facility: OTHER | Age: 76
End: 2021-06-08
Attending: INTERNAL MEDICINE
Payer: MEDICARE

## 2021-06-08 VITALS
SYSTOLIC BLOOD PRESSURE: 120 MMHG | TEMPERATURE: 97 F | BODY MASS INDEX: 25.34 KG/M2 | HEART RATE: 57 BPM | OXYGEN SATURATION: 96 % | WEIGHT: 177 LBS | DIASTOLIC BLOOD PRESSURE: 70 MMHG | HEIGHT: 70 IN

## 2021-06-08 DIAGNOSIS — Z95.2 H/O AORTIC VALVE REPLACEMENT: ICD-10-CM

## 2021-06-08 DIAGNOSIS — Z00.00 ENCOUNTER FOR MEDICARE ANNUAL WELLNESS EXAM: ICD-10-CM

## 2021-06-08 DIAGNOSIS — G45.9 TIA (TRANSIENT ISCHEMIC ATTACK): ICD-10-CM

## 2021-06-08 DIAGNOSIS — E11.9 TYPE 2 DIABETES MELLITUS WITHOUT COMPLICATION, WITHOUT LONG-TERM CURRENT USE OF INSULIN (H): ICD-10-CM

## 2021-06-08 DIAGNOSIS — E78.5 HYPERLIPIDEMIA LDL GOAL <100: ICD-10-CM

## 2021-06-08 DIAGNOSIS — Z00.00 ROUTINE GENERAL MEDICAL EXAMINATION AT A HEALTH CARE FACILITY: Primary | ICD-10-CM

## 2021-06-08 DIAGNOSIS — Z12.5 SCREENING FOR PROSTATE CANCER: ICD-10-CM

## 2021-06-08 DIAGNOSIS — I10 ESSENTIAL HYPERTENSION: ICD-10-CM

## 2021-06-08 DIAGNOSIS — Z95.810 ICD (IMPLANTABLE CARDIOVERTER-DEFIBRILLATOR) IN PLACE: ICD-10-CM

## 2021-06-08 DIAGNOSIS — I25.10 CORONARY ARTERY DISEASE INVOLVING NATIVE CORONARY ARTERY OF NATIVE HEART WITHOUT ANGINA PECTORIS: ICD-10-CM

## 2021-06-08 LAB
ALBUMIN SERPL-MCNC: 3.8 G/DL (ref 3.4–5)
ALP SERPL-CCNC: 98 U/L (ref 40–150)
ALT SERPL W P-5'-P-CCNC: 24 U/L (ref 0–70)
ANION GAP SERPL CALCULATED.3IONS-SCNC: 5 MMOL/L (ref 3–14)
AST SERPL W P-5'-P-CCNC: 19 U/L (ref 0–45)
BASOPHILS # BLD AUTO: 0.1 10E9/L (ref 0–0.2)
BASOPHILS NFR BLD AUTO: 0.5 %
BILIRUB SERPL-MCNC: 0.7 MG/DL (ref 0.2–1.3)
BUN SERPL-MCNC: 26 MG/DL (ref 7–30)
CALCIUM SERPL-MCNC: 9 MG/DL (ref 8.5–10.1)
CHLORIDE SERPL-SCNC: 107 MMOL/L (ref 94–109)
CHOLEST SERPL-MCNC: 142 MG/DL
CO2 SERPL-SCNC: 24 MMOL/L (ref 20–32)
CREAT SERPL-MCNC: 1.19 MG/DL (ref 0.66–1.25)
DIFFERENTIAL METHOD BLD: NORMAL
EOSINOPHIL # BLD AUTO: 0.3 10E9/L (ref 0–0.7)
EOSINOPHIL NFR BLD AUTO: 3.1 %
ERYTHROCYTE [DISTWIDTH] IN BLOOD BY AUTOMATED COUNT: 14.1 % (ref 10–15)
EST. AVERAGE GLUCOSE BLD GHB EST-MCNC: 143 MG/DL
GFR SERPL CREATININE-BSD FRML MDRD: 59 ML/MIN/{1.73_M2}
GLUCOSE SERPL-MCNC: 121 MG/DL (ref 70–99)
HBA1C MFR BLD: 6.6 % (ref 0–5.6)
HCT VFR BLD AUTO: 44.7 % (ref 40–53)
HDLC SERPL-MCNC: 26 MG/DL
HGB BLD-MCNC: 15.1 G/DL (ref 13.3–17.7)
LDLC SERPL CALC-MCNC: 65 MG/DL
LYMPHOCYTES # BLD AUTO: 1.6 10E9/L (ref 0.8–5.3)
LYMPHOCYTES NFR BLD AUTO: 16.9 %
MCH RBC QN AUTO: 29.5 PG (ref 26.5–33)
MCHC RBC AUTO-ENTMCNC: 33.8 G/DL (ref 31.5–36.5)
MCV RBC AUTO: 87 FL (ref 78–100)
MONOCYTES # BLD AUTO: 1 10E9/L (ref 0–1.3)
MONOCYTES NFR BLD AUTO: 10.1 %
NEUTROPHILS # BLD AUTO: 6.7 10E9/L (ref 1.6–8.3)
NEUTROPHILS NFR BLD AUTO: 69.4 %
NONHDLC SERPL-MCNC: 116 MG/DL
PLATELET # BLD AUTO: 250 10E9/L (ref 150–450)
POTASSIUM SERPL-SCNC: 4.5 MMOL/L (ref 3.4–5.3)
PROT SERPL-MCNC: 7.3 G/DL (ref 6.8–8.8)
PSA SERPL-ACNC: 4.76 UG/L (ref 0–4)
RBC # BLD AUTO: 5.12 10E12/L (ref 4.4–5.9)
SODIUM SERPL-SCNC: 136 MMOL/L (ref 133–144)
TRIGL SERPL-MCNC: 256 MG/DL
TSH SERPL DL<=0.005 MIU/L-ACNC: 0.86 MU/L (ref 0.4–4)
WBC # BLD AUTO: 9.6 10E9/L (ref 4–11)

## 2021-06-08 PROCEDURE — 999N001182 HC STATISTIC ESTIMATED AVERAGE GLUCOSE: Mod: ZL | Performed by: INTERNAL MEDICINE

## 2021-06-08 PROCEDURE — 83036 HEMOGLOBIN GLYCOSYLATED A1C: CPT | Mod: ZL | Performed by: INTERNAL MEDICINE

## 2021-06-08 PROCEDURE — G0439 PPPS, SUBSEQ VISIT: HCPCS | Performed by: INTERNAL MEDICINE

## 2021-06-08 PROCEDURE — G0103 PSA SCREENING: HCPCS | Mod: ZL | Performed by: INTERNAL MEDICINE

## 2021-06-08 PROCEDURE — 36415 COLL VENOUS BLD VENIPUNCTURE: CPT | Mod: ZL | Performed by: INTERNAL MEDICINE

## 2021-06-08 PROCEDURE — 85025 COMPLETE CBC W/AUTO DIFF WBC: CPT | Mod: ZL | Performed by: INTERNAL MEDICINE

## 2021-06-08 PROCEDURE — G0463 HOSPITAL OUTPT CLINIC VISIT: HCPCS

## 2021-06-08 PROCEDURE — 84443 ASSAY THYROID STIM HORMONE: CPT | Mod: ZL | Performed by: INTERNAL MEDICINE

## 2021-06-08 PROCEDURE — 80053 COMPREHEN METABOLIC PANEL: CPT | Mod: ZL | Performed by: INTERNAL MEDICINE

## 2021-06-08 PROCEDURE — 80061 LIPID PANEL: CPT | Mod: ZL | Performed by: INTERNAL MEDICINE

## 2021-06-08 ASSESSMENT — PAIN SCALES - GENERAL: PAINLEVEL: MODERATE PAIN (5)

## 2021-06-08 ASSESSMENT — MIFFLIN-ST. JEOR: SCORE: 1539.12

## 2021-06-08 NOTE — NURSING NOTE
"Chief Complaint   Patient presents with     Physical       Initial /70 (BP Location: Left arm, Patient Position: Chair, Cuff Size: Adult Regular)   Pulse 57   Temp 97  F (36.1  C) (Tympanic)   Ht 1.778 m (5' 10\")   Wt 80.3 kg (177 lb)   SpO2 96%   BMI 25.40 kg/m   Estimated body mass index is 25.4 kg/m  as calculated from the following:    Height as of this encounter: 1.778 m (5' 10\").    Weight as of this encounter: 80.3 kg (177 lb).  Medication Reconciliation: complete  FERMIN MUNOZ LPN  "

## 2021-06-08 NOTE — PROGRESS NOTES
"SUBJECTIVE:   Peter Zhao is a 75 year old male who presents for Preventive Visit.      Patient has been advised of split billing requirements and indicates understanding: Yes  Are you in the first 12 months of your Medicare Part B coverage?  No    Physical Health:    In general, how would you rate your overall physical health? good    Outside of work, how many days during the week do you exercise? 6-7 days/week    Outside of work, approximately how many minutes a day do you exercise?greater than 60 minutes    If you drink alcohol do you typically have >3 drinks per day or >7 drinks per week? No    Do you usually eat at least 4 servings of fruit and vegetables a day, include whole grains & fiber and avoid regularly eating high fat or \"junk\" foods? NO    Do you have any problems taking medications regularly?  No    Do you have any side effects from medications? none    Needs assistance for the following daily activities: no assistance needed    Which of the following safety concerns are present in your home?  none identified     Hearing impairment: Yes, Feel that people are mumbling or not speaking clearly.    In the past 6 months, have you been bothered by leaking of urine? no    Mental Health:    In general, how would you rate your overall mental or emotional health? good  PHQ-2 Score:      Do you feel safe in your environment? Yes    Have you ever done Advance Care Planning? (For example, a Health Directive, POLST, or a discussion with a medical provider or your loved ones about your wishes): No, advance care planning information given to patient to review.  Patient declined advance care planning discussion at this time.    Additional concerns to address?      Fall risk:  Fallen 2 or more times in the past year?: No  Any fall with injury in the past year?: No    Cognitive Screenin) Repeat 3 items (Leader, Season, Table)    2) Clock draw: NORMAL  3) 3 item recall: Recalls NO objects   Results: 0 items " recalled: PROBABLE COGNITIVE IMPAIRMENT, **INFORM PROVIDER**    Mini-CogTM Copyright HOLLIE Sue. Licensed by the author for use in [facility]; reprinted with permission ([email or web address]). All rights reserved.      Do you have sleep apnea, excessive snoring or daytime drowsiness?: no        Diabetes Follow-up    How often are you checking your blood sugar? A few times a week  What time of day are you checking your blood sugars (select all that apply)?  Before meals  Have you had any blood sugars above 200?  No  Have you had any blood sugars below 70?  No    What symptoms do you notice when your blood sugar is low?  None    What concerns do you have today about your diabetes? None     Do you have any of these symptoms? (Select all that apply)  No numbness or tingling in feet.  No redness, sores or blisters on feet.  No complaints of excessive thirst.  No reports of blurry vision.  No significant changes to weight.    Have you had a diabetic eye exam in the last 12 months? No        BP Readings from Last 2 Encounters:   06/08/21 120/70   05/24/21 125/82     Hemoglobin A1C (%)   Date Value   08/28/2019 6.8 (H)   12/07/2018 7.2 (H)     LDL Cholesterol Calculated (mg/dL)   Date Value   12/07/2018 49   04/30/2017 35         Hyperlipidemia Follow-Up      Are you regularly taking any medication or supplement to lower your cholesterol?   Yes- Lipitor    Are you having muscle aches or other side effects that you think could be caused by your cholesterol lowering medication?  No    Hypertension Follow-up      Do you check your blood pressure regularly outside of the clinic? Yes     Are you following a low salt diet? Yes    Are your blood pressures ever more than 140 on the top number (systolic) OR more   than 90 on the bottom number (diastolic), for example 140/90? No      Reviewed and updated as needed this visit by clinical staff   Allergies  Meds              Reviewed and updated as needed this visit by Provider                 Social History     Tobacco Use     Smoking status: Former Smoker     Smokeless tobacco: Former User     Types: Chew     Quit date: 10/19/2013     Tobacco comment: quit in 2000   Substance Use Topics     Alcohol use: No                           Current providers sharing in care for this patient include:   Patient Care Team:  Alexander Vazquez DO as PCP - General (Internal Medicine)  Alexander Vazquez DO as Assigned PCP    The following health maintenance items are reviewed in Epic and correct as of today:  Health Maintenance   Topic Date Due     EYE EXAM  Never done     HEPATITIS C SCREENING  Never done     ZOSTER IMMUNIZATION (2 of 3) 12/10/2013     MICROALBUMIN  12/07/2019     FALL RISK ASSESSMENT  08/28/2020     A1C  12/08/2021     MEDICARE ANNUAL WELLNESS VISIT  06/08/2022     BMP  06/08/2022     LIPID  06/08/2022     DIABETIC FOOT EXAM  06/08/2022     DTAP/TDAP/TD IMMUNIZATION (2 - Td) 07/04/2024     ADVANCE CARE PLANNING  06/08/2026     COLORECTAL CANCER SCREENING  02/17/2027     PHQ-2  Completed     INFLUENZA VACCINE  Completed     Pneumococcal Vaccine: 65+ Years  Completed     COVID-19 Vaccine  Completed     IPV IMMUNIZATION  Aged Out     MENINGITIS IMMUNIZATION  Aged Out     Lab work is in process  BP Readings from Last 3 Encounters:   06/08/21 120/70   05/24/21 125/82   01/20/21 130/88    Wt Readings from Last 3 Encounters:   06/08/21 80.3 kg (177 lb)   08/28/19 84.8 kg (187 lb)   01/16/19 80.7 kg (178 lb)                  Patient Active Problem List   Diagnosis     Type 2 diabetes mellitus without complication, without long-term current use of insulin (H)     CAD (coronary artery disease)     ACP (advance care planning)     Hyperlipidemia LDL goal <100     Elevated prostate specific antigen (PSA)     HTN (hypertension)     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 Wu 8/20/2012     CARDIAC SURGERY       CATARACT IOL, RT/LT Right 04/2017     COLECTOMY LOW ANTERIOR N/A 2/17/2017    Procedure: COLECTOMY LOW ANTERIOR;  Surgeon: Tima Moreland MD;  Location: [facility]     COLONOSCOPY N/A 2/17/2017    Procedure: COLONOSCOPY;  Surgeon: Tima Moreland MD;  Location: [facility]     COLOSTOMY  02/17/2017    Sigmoid Colostomy performed.      LAPAROSCOPIC HERNIORRHAPHY INGUINAL Right 9/18/2015    Procedure: LAPAROSCOPIC HERNIORRHAPHY INGUINAL;  Surgeon: Solitario Nettles DO;  Location: [facility]     PHACOEMULSIFICATION WITH STANDARD INTRAOCULAR LENS IMPLANT Right 4/20/2017    Procedure: PHACOEMULSIFICATION WITH STANDARD INTRAOCULAR LENS IMPLANT;  CATARACT EXTRACTION RIGHT EYE WITH IMPLANT;  Surgeon: Darin Gutierrez MD;  Location: [facility]     PROSTATE BIOPSY, NEEDLE, SATURATION SAMPLING  2009     retinopexy-pvd with retinal tear Right 2008     SIGMOIDOSCOPY FLEXIBLE N/A 02/17/2017    Surgeon: Tima Moreland MD;  Location: [facility]; No specimens taken from procedure.      TAKEDOWN COLOSTOMY N/A 9/21/2017    Procedure: TAKEDOWN COLOSTOMY;  CLOSURE OF NORRIS'S SIGMOID COLOSTOMY, REPAIR STOMAL HERNIA;  Surgeon: Tima Moreland MD;  Location: [facility]     TONSILLECTOMY         Social History     Tobacco Use     Smoking status: Former Smoker     Smokeless tobacco: Former User     Types: Chew     Quit date: 10/19/2013     Tobacco comment: quit in 2000   Substance Use Topics     Alcohol use: No     Family History   Problem Relation Age of Onset     Diabetes Mother      C.A.D. Mother      Cancer Maternal Grandmother          Current Outpatient  Medications   Medication Sig Dispense Refill     amLODIPine (NORVASC) 5 MG tablet TAKE 1 TABLET DAILY 90 tablet 0     ASPIRIN PO Take 325 mg by mouth daily        atorvastatin (LIPITOR) 40 MG tablet Take 1 tablet (40 mg) by mouth daily 90 tablet 3     blood glucose (ACCU-CHEK SMARTVIEW) test strip Test blood sugar before breakfast one day, before and after supper the next and not at all the third day 100 each 11     blood glucose monitoring (ACCU-CHEK COMPACT CARE KIT) meter device kit Use to test blood sugar before breakfast one day, before and after supper next day and no testing 3rd day. 1 kit 0     blood glucose monitoring (ACCU-CHEK MULTICLIX) lancets Use to test blood sugar before breakfast one day, before and after supper next day and no testing 3rd day. 102 each 3     clopidogrel (PLAVIX) 75 MG tablet TAKE 1 TABLET DAILY 90 tablet 0     finasteride (PROSCAR) 5 MG tablet TAKE 1 TABLET DAILY 90 tablet 0     Gymnema Sylvestris Leaf POWD 550 mg 2 times daily       lisinopril (ZESTRIL) 40 MG tablet TAKE 1 TABLET DAILY 90 tablet 3     metoprolol tartrate (LOPRESSOR) 100 MG tablet TAKE 1 TABLET TWICE A  tablet 3     Multiple Vitamin (MULTIVITAMINS PO) Take 1 capsule by mouth daily       nitroGLYcerin (NITROSTAT) 0.4 MG sublingual tablet DISSOLVE ONE TABLET UNDER THE TONGUE EVERY 5 MINUTES AS NEEDED FOR CHEST PAIN.  DO NOT EXCEED A TOTAL OF 3 DOSES IN 15 MINUTES (Patient not taking: Reported on 6/8/2021) 25 tablet 0     Allergies   Allergen Reactions     Ciprofloxacin Hives     Unknown [No Clinical Screening - See Comments]      Antibiotic allergy, pt not sure which     Recent Labs   Lab Test 08/28/19  1146 12/07/18  0757 11/30/18  1344 11/30/18  1344 09/12/17  0820 09/12/17  0820 05/26/17  1855 05/26/17  1855 04/30/17  0000 04/30/17   A1C 6.8* 7.2*  --   --   --  6.3*   < >  --   --   --    LDL  --  49  --   --   --   --   --   --   --  35   HDL  --  28*  --   --   --   --   --   --   --  33*   TRIG  --   "211*  --   --   --   --   --   --   --  305*   ALT 27 24  --  29   < > 26  --  21   < >  --    CR 1.23 1.35*  1.34*   < > 3.34*   < > 1.23   < > 1.23   < >  --    GFRESTIMATED 57* 52*  52*   < > 18*   < > 58*   < > 58*   < >  --    GFRESTBLACK 66 63  63   < > 22*   < > 70   < > 70   < >  --    POTASSIUM 4.4 4.4  4.4   < > 3.5   < > 4.2   < > 3.5   < >  --    TSH 0.95  --   --   --   --   --   --  0.91   < >  --     < > = values in this interval not displayed.          ROS:  Constitutional, HEENT, cardiovascular, pulmonary, GI, , musculoskeletal, neuro, skin, endocrine and psych systems are negative, except as otherwise noted.    OBJECTIVE:   /70 (BP Location: Left arm, Patient Position: Chair, Cuff Size: Adult Regular)   Pulse 57   Temp 97  F (36.1  C) (Tympanic)   Ht 1.778 m (5' 10\")   Wt 80.3 kg (177 lb)   SpO2 96%   BMI 25.40 kg/m   Estimated body mass index is 25.4 kg/m  as calculated from the following:    Height as of this encounter: 1.778 m (5' 10\").    Weight as of this encounter: 80.3 kg (177 lb).  EXAM:   GENERAL: healthy, alert and no distress  EYES: Eyes grossly normal to inspection, PERRL and conjunctivae and sclerae normal  NECK: no adenopathy, no asymmetry, masses, or scars and thyroid normal to palpation  RESP: lungs clear to auscultation - no rales, rhonchi or wheezes  CV: RRR with 3/6 systolic murmur. Small cyst along sternotomy   ABDOMEN: Several hernias in lower abdomen  MS: no gross musculoskeletal defects noted, no edema  SKIN: no suspicious lesions or rashes  NEURO: Normal strength and tone, mentation intact and speech normal  PSYCH: mentation appears normal, affect normal/bright    Diagnostic Test Results:  No results found for this or any previous visit (from the past 24 hour(s)).  No results found for any visits on 06/08/21.    ASSESSMENT / PLAN:   1. Routine general medical examination at a health care facility    - Comprehensive metabolic panel (BMP + Alb, Alk Phos, ALT, " "AST, Total. Bili, TP)    2. Encounter for Medicare annual wellness exam    - CBC with platelets and differential    3. Type 2 diabetes mellitus without complication, without long-term current use of insulin (H)    - Hemoglobin A1c  - TSH with free T4 reflex    4. Hyperlipidemia LDL goal <100    - Lipid Profile (Chol, Trig, HDL, LDL calc)    5. Coronary artery disease involving native coronary artery of native heart without angina pectoris    - Comprehensive metabolic panel (BMP + Alb, Alk Phos, ALT, AST, Total. Bili, TP)  - Lipid Profile (Chol, Trig, HDL, LDL calc)    6. H/O aortic valve replacement      7. Essential hypertension    - Comprehensive metabolic panel (BMP + Alb, Alk Phos, ALT, AST, Total. Bili, TP)    8. TIA (transient ischemic attack)    - Lipid Profile (Chol, Trig, HDL, LDL calc)    9. ICD (implantable cardioverter-defibrillator) in place      10. Screening for prostate cancer    - PSA, screen    Patient has been advised of split billing requirements and indicates understanding: Yes    COUNSELING:  Reviewed preventive health counseling, as reflected in patient instructions       Regular exercise       Fall risk prevention    Estimated body mass index is 25.4 kg/m  as calculated from the following:    Height as of this encounter: 1.778 m (5' 10\").    Weight as of this encounter: 80.3 kg (177 lb).        He reports that he has quit smoking. He quit smokeless tobacco use about 7 years ago.  His smokeless tobacco use included chew.    Appropriate preventive services were discussed with this patient, including applicable screening as appropriate for cardiovascular disease, diabetes, osteopenia/osteoporosis, and glaucoma.  As appropriate for age/gender, discussed screening for colorectal cancer, prostate cancer, breast cancer, and cervical cancer. Checklist reviewing preventive services available has been given to the patient.    Reviewed patients plan of care and provided an AVS. The Intermediate Care " Plan ( asthma action plan, low back pain action plan, and migraine action plan) for Peter meets the Care Plan requirement. This Care Plan has been established and reviewed with the Patient.    Counseling Resources:  ATP IV Guidelines  Pooled Cohorts Equation Calculator  Breast Cancer Risk Calculator  BRCA-Related Cancer Risk Assessment: FHS-7 Tool  FRAX Risk Assessment  ICSI Preventive Guidelines  Dietary Guidelines for Americans, 2010  USDA's MyPlate  ASA Prophylaxis  Lung CA Screening    Alexander Vazquez,   [facility]

## 2021-06-09 DIAGNOSIS — R97.20 ELEVATED PROSTATE SPECIFIC ANTIGEN (PSA): Primary | ICD-10-CM

## 2021-06-11 ENCOUNTER — TELEPHONE (OUTPATIENT)
Dept: INTERNAL MEDICINE | Facility: OTHER | Age: 76
End: 2021-06-11

## 2021-06-11 NOTE — TELEPHONE ENCOUNTER
Call back from patient on lab results. Patient states he missed a call. Notified to return call to set up a lab appt.     Patient has been scheduled for an appt with lab in 2 months per Dr. Vazquez order. Lab Scheduled for 8/9/21 at 11 am. Patient verbalized understanding.           used

## 2021-07-09 DIAGNOSIS — I10 BENIGN ESSENTIAL HYPERTENSION: ICD-10-CM

## 2021-07-12 RX ORDER — METOPROLOL TARTRATE 100 MG
TABLET ORAL
Qty: 180 TABLET | Refills: 3 | Status: SHIPPED | OUTPATIENT
Start: 2021-07-12 | End: 2022-06-15

## 2021-07-12 RX ORDER — LISINOPRIL 40 MG/1
TABLET ORAL
Qty: 90 TABLET | Refills: 3 | Status: SHIPPED | OUTPATIENT
Start: 2021-07-12 | End: 2022-06-15

## 2021-07-12 NOTE — TELEPHONE ENCOUNTER
Medications last signed by Dr. Meneses. Please advise, thank you.    metoprolol tartrate (LOPRESSOR) 100 MG tablet      Last Written Prescription Date:  07/27/20  Last Fill Quantity: 180,   # refills: 3  Last Office Visit: 06/08/21    lisinopril (ZESTRIL) 40 MG tablet      Last Written Prescription Date:  07/27/20  Last Fill Quantity: 90,   # refills: 3

## 2021-07-26 NOTE — PLAN OF CARE
Problem: Goal Outcome Summary  Goal: Goal Outcome Summary  Outcome: No Change  Patient alert, with some forgetfulness. Denies pain, moving IND in bed. Using IS reaching approx 1200. IFV infusing through central lines, IV ABX given. Having low grade temp T max 100.2. SCD's on patient this shift. Patient having elevated heart rate 110-130's and lower BP's 90/40's MD notified, Bolus given. Heart rate 90's at rest at this time. Colostomy patent putting out brown liquid stool. NG in place to low intermittent wall suction having brown returns. o2 sats low to mid 90's on 2 LPM nasal cannula. Temple cath patent having red/brown colored urine MD aware. Patient denies having nausea/vomiting. Abdomin rounded/soft. Dressing to abdomin staying within borders. CVP line zeroed at approx 0000, reading 8-10. Epidural in place, dermatomes being checked.     Problem: Bowel Obstruction (Adult)  Goal: Signs and Symptoms of Listed Potential Problems Will be Absent or Manageable (Bowel Obstruction)  Signs and symptoms of listed potential problems will be absent or manageable by discharge/transition of care (reference Bowel Obstruction (Adult) CPG).   Outcome: No Change    02/18/17 0521   Bowel Obstruction   Problems Assessed (Bowel Obstruction) pain   Problems Present (Bowel Obstruction) none            Yes

## 2021-07-27 ENCOUNTER — TRANSFERRED RECORDS (OUTPATIENT)
Dept: HEALTH INFORMATION MANAGEMENT | Facility: CLINIC | Age: 76
End: 2021-07-27

## 2021-08-09 ENCOUNTER — LAB (OUTPATIENT)
Dept: LAB | Facility: OTHER | Age: 76
End: 2021-08-09
Payer: MEDICARE

## 2021-08-09 DIAGNOSIS — R97.20 ELEVATED PROSTATE SPECIFIC ANTIGEN (PSA): ICD-10-CM

## 2021-08-09 LAB — PSA SERPL-MCNC: 5.04 UG/L (ref 0–4)

## 2021-08-09 PROCEDURE — 36415 COLL VENOUS BLD VENIPUNCTURE: CPT | Mod: ZL

## 2021-08-09 PROCEDURE — 84153 ASSAY OF PSA TOTAL: CPT | Mod: ZL

## 2021-08-10 DIAGNOSIS — R97.20 ELEVATED PROSTATE SPECIFIC ANTIGEN (PSA): Primary | ICD-10-CM

## 2021-08-12 ENCOUNTER — TELEPHONE (OUTPATIENT)
Dept: INTERNAL MEDICINE | Facility: OTHER | Age: 76
End: 2021-08-12

## 2021-08-12 NOTE — TELEPHONE ENCOUNTER
Patient would like  to call him back regarding his lab results.  He will be gone this afternoon from noon on.  [phone]

## 2021-08-17 DIAGNOSIS — N40.1 BENIGN NON-NODULAR PROSTATIC HYPERPLASIA WITH LOWER URINARY TRACT SYMPTOMS: ICD-10-CM

## 2021-08-17 RX ORDER — FINASTERIDE 5 MG/1
TABLET, FILM COATED ORAL
Qty: 90 TABLET | Refills: 1 | Status: SHIPPED | OUTPATIENT
Start: 2021-08-17 | End: 2022-02-28

## 2021-09-18 ENCOUNTER — HOSPITAL ENCOUNTER (EMERGENCY)
Facility: HOSPITAL | Age: 76
Discharge: HOME | End: 2021-09-18
Attending: NURSE PRACTITIONER | Admitting: NURSE PRACTITIONER
Payer: MEDICARE

## 2021-09-18 VITALS
SYSTOLIC BLOOD PRESSURE: 136 MMHG | TEMPERATURE: 99.2 F | DIASTOLIC BLOOD PRESSURE: 85 MMHG | OXYGEN SATURATION: 94 % | RESPIRATION RATE: 18 BRPM | HEART RATE: 97 BPM

## 2021-09-18 DIAGNOSIS — U07.1 COVID-19 VIRUS INFECTION: Primary | ICD-10-CM

## 2021-09-18 LAB
ALBUMIN UR-MCNC: NEGATIVE MG/DL
ANION GAP SERPL CALCULATED.3IONS-SCNC: 9 MMOL/L (ref 3–14)
APPEARANCE UR: CLEAR
BASOPHILS # BLD AUTO: 0 10E3/UL (ref 0–0.2)
BASOPHILS NFR BLD AUTO: 0 %
BILIRUB UR QL STRIP: NEGATIVE
BUN SERPL-MCNC: 29 MG/DL (ref 7–30)
CALCIUM SERPL-MCNC: 8.8 MG/DL (ref 8.5–10.1)
CHLORIDE BLD-SCNC: 108 MMOL/L (ref 94–109)
CO2 SERPL-SCNC: 21 MMOL/L (ref 20–32)
COLOR UR AUTO: ABNORMAL
CREAT SERPL-MCNC: 1.33 MG/DL (ref 0.66–1.25)
EOSINOPHIL # BLD AUTO: 0.1 10E3/UL (ref 0–0.7)
EOSINOPHIL NFR BLD AUTO: 1 %
ERYTHROCYTE [DISTWIDTH] IN BLOOD BY AUTOMATED COUNT: 14.2 % (ref 10–15)
GFR SERPL CREATININE-BSD FRML MDRD: 52 ML/MIN/1.73M2
GLUCOSE BLD-MCNC: 130 MG/DL (ref 70–99)
GLUCOSE UR STRIP-MCNC: NEGATIVE MG/DL
HCT VFR BLD AUTO: 44.7 % (ref 40–53)
HGB BLD-MCNC: 14.7 G/DL (ref 13.3–17.7)
HGB UR QL STRIP: NEGATIVE
HYALINE CASTS: 2 /LPF
IMM GRANULOCYTES # BLD: 0 10E3/UL
IMM GRANULOCYTES NFR BLD: 0 %
KETONES UR STRIP-MCNC: NEGATIVE MG/DL
LACTATE SERPL-SCNC: 0.9 MMOL/L (ref 0.7–2)
LEUKOCYTE ESTERASE UR QL STRIP: NEGATIVE
LYMPHOCYTES # BLD AUTO: 0.8 10E3/UL (ref 0.8–5.3)
LYMPHOCYTES NFR BLD AUTO: 10 %
MCH RBC QN AUTO: 28.9 PG (ref 26.5–33)
MCHC RBC AUTO-ENTMCNC: 32.9 G/DL (ref 31.5–36.5)
MCV RBC AUTO: 88 FL (ref 78–100)
MONOCYTES # BLD AUTO: 1.2 10E3/UL (ref 0–1.3)
MONOCYTES NFR BLD AUTO: 16 %
MUCOUS THREADS #/AREA URNS LPF: PRESENT /LPF
NEUTROPHILS # BLD AUTO: 5.6 10E3/UL (ref 1.6–8.3)
NEUTROPHILS NFR BLD AUTO: 73 %
NITRATE UR QL: NEGATIVE
NRBC # BLD AUTO: 0 10E3/UL
NRBC BLD AUTO-RTO: 0 /100
PH UR STRIP: 5.5 [PH] (ref 4.7–8)
PLATELET # BLD AUTO: 198 10E3/UL (ref 150–450)
POTASSIUM BLD-SCNC: 4.2 MMOL/L (ref 3.4–5.3)
RBC # BLD AUTO: 5.08 10E6/UL (ref 4.4–5.9)
RBC URINE: 2 /HPF
SARS-COV-2 RNA RESP QL NAA+PROBE: POSITIVE
SODIUM SERPL-SCNC: 138 MMOL/L (ref 133–144)
SP GR UR STRIP: 1.02 (ref 1–1.03)
SQUAMOUS EPITHELIAL: 0 /HPF
UROBILINOGEN UR STRIP-MCNC: NORMAL MG/DL
WBC # BLD AUTO: 7.8 10E3/UL (ref 4–11)
WBC URINE: 2 /HPF

## 2021-09-18 PROCEDURE — 36415 COLL VENOUS BLD VENIPUNCTURE: CPT | Performed by: NURSE PRACTITIONER

## 2021-09-18 PROCEDURE — 250N000013 HC RX MED GY IP 250 OP 250 PS 637: Performed by: NURSE PRACTITIONER

## 2021-09-18 PROCEDURE — 80048 BASIC METABOLIC PNL TOTAL CA: CPT | Performed by: NURSE PRACTITIONER

## 2021-09-18 PROCEDURE — 83605 ASSAY OF LACTIC ACID: CPT | Performed by: NURSE PRACTITIONER

## 2021-09-18 PROCEDURE — 81003 URINALYSIS AUTO W/O SCOPE: CPT | Performed by: NURSE PRACTITIONER

## 2021-09-18 PROCEDURE — G0463 HOSPITAL OUTPT CLINIC VISIT: HCPCS

## 2021-09-18 PROCEDURE — 99214 OFFICE O/P EST MOD 30 MIN: CPT | Performed by: NURSE PRACTITIONER

## 2021-09-18 PROCEDURE — 85025 COMPLETE CBC W/AUTO DIFF WBC: CPT | Performed by: NURSE PRACTITIONER

## 2021-09-18 PROCEDURE — U0003 INFECTIOUS AGENT DETECTION BY NUCLEIC ACID (DNA OR RNA); SEVERE ACUTE RESPIRATORY SYNDROME CORONAVIRUS 2 (SARS-COV-2) (CORONAVIRUS DISEASE [COVID-19]), AMPLIFIED PROBE TECHNIQUE, MAKING USE OF HIGH THROUGHPUT TECHNOLOGIES AS DESCRIBED BY CMS-2020-01-R: HCPCS | Performed by: NURSE PRACTITIONER

## 2021-09-18 PROCEDURE — C9803 HOPD COVID-19 SPEC COLLECT: HCPCS

## 2021-09-18 RX ORDER — ACETAMINOPHEN 325 MG/1
975 TABLET ORAL ONCE
Status: COMPLETED | OUTPATIENT
Start: 2021-09-18 | End: 2021-09-18

## 2021-09-18 RX ORDER — ACETAMINOPHEN 500 MG
TABLET ORAL
Qty: 60 TABLET | Refills: 0 | COMMUNITY
Start: 2021-09-18

## 2021-09-18 RX ADMIN — ACETAMINOPHEN 975 MG: 325 TABLET, FILM COATED ORAL at 18:15

## 2021-09-18 ASSESSMENT — ENCOUNTER SYMPTOMS
COUGH: 1
DIZZINESS: 0
HEADACHES: 0
APPETITE CHANGE: 0
FEVER: 1
NUMBNESS: 0
CONFUSION: 1
CHILLS: 0
VOMITING: 0
DIARRHEA: 0
ABDOMINAL PAIN: 0
WEAKNESS: 0
SHORTNESS OF BREATH: 0
NAUSEA: 0

## 2021-09-18 NOTE — ED TRIAGE NOTES
Pt presents with fever and having some confusion. Wife was concerned he may be having a stroke and states that he is now back to normal. Pt was confused around 1400pm and has just been tired today. Pt is not showing any signs of confusion now.

## 2021-09-18 NOTE — ED PROVIDER NOTES
"  History     Chief Complaint   Patient presents with     Fever     temp 101.7, wife notes he had a hallucination while driving home from [employer]. \"he was talking about someone that wasn't there\", notes chills, fatigue     HPI  Peter Zhao is a 75 year old male who presents to urgent care with his wife for concerns of fever and confusion.  Patient's wife states earlier today he had patient was yelling at him and appeared to be confused.  She noticed that he had a fever so she brought him in for evaluation.  His wife thinks that he was having a stroke due to his symptoms.  Upon arrival to this facility she notes that he is acting like himself.  He has a mild cough.  He denies pain, shortness of breath, nausea, vomiting or diarrhea.  No abdominal pain.  No urinary symptoms.  He states he is eating and drinking well.    Patient is talking and answering questions appropriately this time.    Allergies:  Allergies   Allergen Reactions     Ciprofloxacin Hives     Unknown [No Clinical Screening - See Comments]      Antibiotic allergy, pt not sure which       Problem List:    Patient Active Problem List    Diagnosis Date Noted     Acute kidney injury (H) 11/30/2018     Priority: Medium     H/O aortic valve replacement 11/16/2018     Priority: Medium     Hx of aortic valve replacement 11/16/2018     Priority: Medium     ICD (implantable cardioverter-defibrillator) in place 11/16/2018     Priority: Medium     Attention to colostomy (H) 09/21/2017     Priority: Medium     Agitation 05/19/2017     Priority: Medium     TIA (transient ischemic attack) 04/29/2017     Priority: Medium     Hypokalemia 02/20/2017     Priority: Medium     S/P colon resection 02/18/2017     Priority: Medium     Hyperlipidemia LDL goal <100 02/15/2017     Priority: Medium     Elevated prostate specific antigen (PSA) 02/15/2017     Priority: Medium     HTN (hypertension) 02/15/2017     Priority: Medium     Presence of combination internal cardiac " defibrillator (ICD) and pacemaker 02/15/2017     Priority: Medium     H/O migraine 02/15/2017     Priority: Medium     Visual aura 02/15/2017     Priority: Medium     Cataract 02/15/2017     Priority: Medium     ACP (advance care planning) 10/04/2016     Priority: Medium     Advance Care Planning 10/4/2016: ACP Review of Chart / Resources Provided:  Reviewed chart for advance care plan.  Peter Zhao has no plan or code status on file. Discussed available resources and provided with information. Confirmed code status reflects current choices pending further ACP discussions.  Confirmed/documented legally designated decision makers.  Added by Daxa Campa           CAD (coronary artery disease)      Priority: Medium     CABG 2013, stent x 1 in 2014       Type 2 diabetes mellitus without complication, without long-term current use of insulin (H) 10/31/2013     Priority: Medium        Past Medical History:    Past Medical History:   Diagnosis Date     Allergic state      CAD (coronary artery disease)      Cataract      Cataracts, bilateral      Elevated PSA      Gastro-oesophageal reflux disease      Heart murmur      Hyperlipidemia      Hypertension      Pacemaker      Stented coronary artery      Visual aura        Past Surgical History:    Past Surgical History:   Procedure Laterality Date     AORTIC VALVE REPLACEMENT      25 mm CE bovine replacement Dr. Wu 8/20/2012     CARDIAC SURGERY       CATARACT IOL, RT/LT Right 04/2017     COLECTOMY LOW ANTERIOR N/A 2/17/2017    Procedure: COLECTOMY LOW ANTERIOR;  Surgeon: Tima Moreland MD;  [facility]     COLONOSCOPY N/A 2/17/2017    Procedure: COLONOSCOPY;  Surgeon: Tima Moreland MD;  [facility]     COLOSTOMY  02/17/2017    Sigmoid Colostomy performed.      LAPAROSCOPIC HERNIORRHAPHY INGUINAL Right 9/18/2015    Procedure: LAPAROSCOPIC HERNIORRHAPHY INGUINAL;  Surgeon: Solitario Nettles DO;  [facility]     PHACOEMULSIFICATION WITH STANDARD  INTRAOCULAR LENS IMPLANT Right 4/20/2017    Procedure: PHACOEMULSIFICATION WITH STANDARD INTRAOCULAR LENS IMPLANT;  CATARACT EXTRACTION RIGHT EYE WITH IMPLANT;  Surgeon: Darin Gutierrez MD;  [facility]     PROSTATE BIOPSY, NEEDLE, SATURATION SAMPLING  2009     retinopexy-pvd with retinal tear Right 2008     SIGMOIDOSCOPY FLEXIBLE N/A 02/17/2017    Surgeon: Tima Moreland MD;  Location: [facility]; No specimens taken from procedure.      TAKEDOWN COLOSTOMY N/A 9/21/2017    Procedure: TAKEDOWN COLOSTOMY;  CLOSURE OF NORRIS'S SIGMOID COLOSTOMY, REPAIR STOMAL HERNIA;  Surgeon: Tima Moreland MD;  Location: [facility]     TONSILLECTOMY         Family History:    Family History   Problem Relation Age of Onset     Diabetes Mother      C.A.D. Mother      Cancer Maternal Grandmother        Social History:  Marital Status:   [2]  Social History     Tobacco Use     Smoking status: Former Smoker     Smokeless tobacco: Former User     Types: Chew     Quit date: 10/19/2013     Tobacco comment: quit in 2000   Substance Use Topics     Alcohol use: No     Drug use: No        Medications:    acetaminophen (TYLENOL) 500 MG tablet  amLODIPine (NORVASC) 5 MG tablet  ASPIRIN PO  atorvastatin (LIPITOR) 40 MG tablet  blood glucose (ACCU-CHEK SMARTVIEW) test strip  blood glucose monitoring (ACCU-CHEK COMPACT CARE KIT) meter device kit  blood glucose monitoring (ACCU-CHEK MULTICLIX) lancets  clopidogrel (PLAVIX) 75 MG tablet  finasteride (PROSCAR) 5 MG tablet  Gymnema Sylvestris Leaf POWD  lisinopril (ZESTRIL) 40 MG tablet  metoprolol tartrate (LOPRESSOR) 100 MG tablet  Multiple Vitamin (MULTIVITAMINS PO)  nitroGLYcerin (NITROSTAT) 0.4 MG sublingual tablet          Review of Systems   Constitutional: Positive for fever. Negative for appetite change and chills.   Respiratory: Positive for cough. Negative for shortness of breath.    Cardiovascular: Negative for chest pain.   Gastrointestinal: Negative for abdominal pain, diarrhea,  nausea and vomiting.   Neurological: Negative for dizziness, weakness, numbness and headaches.   Psychiatric/Behavioral: Positive for confusion (Resolved).   All other systems reviewed and are negative.      Physical Exam   BP: 136/85  Pulse: 97  Temp: (!) 101.7  F (38.7  C)  Resp: 18  SpO2: 94 %      Physical Exam  Vitals and nursing note reviewed.   Constitutional:       Appearance: Normal appearance. He is not ill-appearing.   HENT:      Head: Normocephalic and atraumatic.      Right Ear: Tympanic membrane and ear canal normal.      Left Ear: Tympanic membrane normal.      Nose: Nose normal. No congestion or rhinorrhea.      Mouth/Throat:      Mouth: Mucous membranes are moist.      Pharynx: No oropharyngeal exudate or posterior oropharyngeal erythema.   Eyes:      General: No visual field deficit.     Pupils: Pupils are equal, round, and reactive to light.   Cardiovascular:      Rate and Rhythm: Normal rate and regular rhythm.      Heart sounds: Normal heart sounds.   Pulmonary:      Effort: Pulmonary effort is normal. No respiratory distress.      Breath sounds: Normal breath sounds. No stridor. No wheezing, rhonchi or rales.   Abdominal:      General: Bowel sounds are normal.      Palpations: Abdomen is soft.      Tenderness: There is no abdominal tenderness. There is no right CVA tenderness, left CVA tenderness, guarding or rebound.   Musculoskeletal:         General: Normal range of motion.      Cervical back: Neck supple.   Skin:     General: Skin is warm and dry.      Capillary Refill: Capillary refill takes less than 2 seconds.   Neurological:      General: No focal deficit present.      Mental Status: He is alert and oriented to person, place, and time.      GCS: GCS eye subscore is 4. GCS verbal subscore is 5. GCS motor subscore is 6.      Cranial Nerves: No cranial nerve deficit or facial asymmetry.      Sensory: Sensation is intact. No sensory deficit.      Motor: No weakness or pronator drift.       Coordination: Coordination is intact. Coordination normal.      Gait: Gait is intact. Gait normal.      Comments: Patient alert and answering questions appropriately.  Cranial nerves II to XII intact.   Psychiatric:         Mood and Affect: Mood normal.         ED Course        Procedures                Results for orders placed or performed during the hospital encounter of 09/18/21 (from the past 24 hour(s))   Symptomatic COVID-19 Virus (Coronavirus) by PCR Nasopharyngeal    Specimen: Nasopharyngeal; Swab   Result Value Ref Range    SARS CoV2 PCR Positive (A) Negative    Narrative    Testing was performed using the Xpert Xpress SARS-CoV-2 Assay on the   [employer]. Additional information about   this Emergency Use Authorization (EUA) assay can be found via the Lab   Guide. This test should be ordered for the detection of SARS-CoV-2 in   individuals who meet SARS-CoV-2 clinical and/or epidemiological   criteria. Test performance is unknown in asymptomatic patients. This   test is for in vitro diagnostic use under the FDA EUA for   laboratories certified under CLIA to perform high complexity testing.   This test has not been FDA cleared or approved. A negative result   does not rule out the presence of PCR inhibitors in the specimen or   target RNA in concentration below the limit of detection for the   assay. The possibility of a false negative should be considered if   the patient's recent exposure or clinical presentation suggests   COVID-19. This test was validated by [facility]. This laboratory is certified under the Clinical Laboratory Improve  ment Amendments (CLIA) as qualified to perform high complexity testing.   CBC with platelets differential    Narrative    The following orders were created for panel order CBC with platelets differential.  Procedure                               Abnormality         Status                     ---------                                -----------         ------                     CBC with platelets and d...[[id number]]                      Final result                 Please view results for these tests on the individual orders.   UA with Microscopic reflex to Culture    Specimen: Urine, Midstream   Result Value Ref Range    Color Urine Light Yellow Colorless, Straw, Light Yellow, Yellow    Appearance Urine Clear Clear    Glucose Urine Negative Negative mg/dL    Bilirubin Urine Negative Negative    Ketones Urine Negative Negative mg/dL    Specific Gravity Urine 1.022 1.003 - 1.035    Blood Urine Negative Negative    pH Urine 5.5 4.7 - 8.0    Protein Albumin Urine Negative Negative mg/dL    Urobilinogen Urine Normal Normal, 2.0 mg/dL    Nitrite Urine Negative Negative    Leukocyte Esterase Urine Negative Negative    Mucus Urine Present (A) None Seen /LPF    RBC Urine 2 <=2 /HPF    WBC Urine 2 <=5 /HPF    Squamous Epithelials Urine 0 <=1 /HPF    Hyaline Casts Urine 2 <=2 /LPF    Narrative    Urine Culture not indicated   Basic metabolic panel   Result Value Ref Range    Sodium 138 133 - 144 mmol/L    Potassium 4.2 3.4 - 5.3 mmol/L    Chloride 108 94 - 109 mmol/L    Carbon Dioxide (CO2) 21 20 - 32 mmol/L    Anion Gap 9 3 - 14 mmol/L    Urea Nitrogen 29 7 - 30 mg/dL    Creatinine 1.33 (H) 0.66 - 1.25 mg/dL    Calcium 8.8 8.5 - 10.1 mg/dL    Glucose 130 (H) 70 - 99 mg/dL    GFR Estimate 52 (L) >60 mL/min/1.73m2   Lactic acid whole blood   Result Value Ref Range    Lactic Acid 0.9 0.7 - 2.0 mmol/L   CBC with platelets and differential   Result Value Ref Range    WBC Count 7.8 4.0 - 11.0 10e3/uL    RBC Count 5.08 4.40 - 5.90 10e6/uL    Hemoglobin 14.7 13.3 - 17.7 g/dL    Hematocrit 44.7 40.0 - 53.0 %    MCV 88 78 - 100 fL    MCH 28.9 26.5 - 33.0 pg    MCHC 32.9 31.5 - 36.5 g/dL    RDW 14.2 10.0 - 15.0 %    Platelet Count 198 150 - 450 10e3/uL    % Neutrophils 73 %    % Lymphocytes 10 %    % Monocytes 16 %    % Eosinophils 1 %    %  Basophils 0 %    % Immature Granulocytes 0 %    NRBCs per 100 WBC 0 <1 /100    Absolute Neutrophils 5.6 1.6 - 8.3 10e3/uL    Absolute Lymphocytes 0.8 0.8 - 5.3 10e3/uL    Absolute Monocytes 1.2 0.0 - 1.3 10e3/uL    Absolute Eosinophils 0.1 0.0 - 0.7 10e3/uL    Absolute Basophils 0.0 0.0 - 0.2 10e3/uL    Absolute Immature Granulocytes 0.0 <=0.0 10e3/uL    Absolute NRBCs 0.0 10e3/uL       Medications   acetaminophen (TYLENOL) tablet 975 mg (975 mg Oral Given 9/18/21 1815)       Assessments & Plan (with Medical Decision Making)     I have reviewed the nursing notes.    75-year-old male that was brought in by his house for evaluation of a fever and concerns for delirium.  Upon arrival to this facility patient was alert, awake and acting like himself per spouse's report.  Physical exam unremarkable.  Patient was noted to have a fever which was treated with Tylenol.  No leukocytosis.  No electrolyte abnormalities.  Mildly elevated creatinine at 1.33.  Urinalysis negative for infection.  Patient tested positive for COVID-19.    Patient's respirations are unlabored.  His vital signs are within normal limits.  His fever improved after Tylenol was given.  Patient is stable for discharge home with symptomatic treatment.  Recommended pushing fluids.  APAP/ibuprofen as needed for fever.  Patient spouse requested a prescription for Tylenol.  Get well loop referral placed.  Follow-up with PCP as needed.  Return to ED/UC for any worsening or concerning symptoms.    I have reviewed the findings, diagnosis, plan and need for follow up with the patient.  This document was prepared using a combination of typing and voice generated software.  While every attempt was made for accuracy, spelling and grammatical errors may exist.    New Prescriptions    ACETAMINOPHEN (TYLENOL) 500 MG TABLET    Take 1 to 2 tablets every 6 hours as needed for fever or pain.       Final diagnoses:   COVID-19 virus infection       9/18/2021   [facility]     [facility], Prudence, CNP  09/23/21 9001

## 2021-09-19 NOTE — DISCHARGE INSTRUCTIONS
Continue taking Tylenol as needed for the fever.  Encourage him to drink fluids.    Follow-up with his doctor as needed if no improvement in symptoms.    Return to emergency room for any concerning symptoms.

## 2021-09-22 ENCOUNTER — TELEPHONE (OUTPATIENT)
Dept: INTERNAL MEDICINE | Facility: OTHER | Age: 76
End: 2021-09-22

## 2021-09-22 NOTE — TELEPHONE ENCOUNTER
Reason for call:  RESULTS   Pt called and and stated that he tested positive on 9/18/21 at [place] [facility].. Pt was sent home and prescribed tylenol and to quarantine for 10 days,  Pt would like a c/b to discuss how long this virus can last and when should he test again to get a result of neg.     Was an appointment offered for this a call? No  If Yes :  Appointment type                 Date    Preferred method for responding to this message: Telephone Call   What is your phone number ?  [phone]  If we cannot reach you directly, may we leave a detailed response at the number you provided? Yes  Can this message wait until your PCP/Provider returns if not available today? No

## 2021-09-22 NOTE — TELEPHONE ENCOUNTER
Called patient back and advised patient to quarantine for 10 days from when symptoms started. Patient asked when he should get re tested. Advised patient he does not need to get re tested and that he could test positive for 3 months. Advised patient he can stop quarantine after the 10 days and he must be fever free and off fever reducing medications for 72 hours and other symptoms must be improved. Patient stated he had diarrhea one and then it was gone for two days and now he has it today. Advised patient to eat a bland diet, saltine crackers, bananas, soup, toast, etc, and water or half water and sports drinks or lemon-lime soda like 7 UP. Advised for patient to call back or go to ER/UC for any new or worsening symptoms or if symptoms are not  Improving. Patient verbalized understanding.

## 2021-09-23 ENCOUNTER — TELEPHONE (OUTPATIENT)
Dept: INTERNAL MEDICINE | Facility: OTHER | Age: 76
End: 2021-09-23

## 2021-09-23 DIAGNOSIS — I10 BENIGN ESSENTIAL HYPERTENSION: ICD-10-CM

## 2021-09-23 RX ORDER — CLOPIDOGREL BISULFATE 75 MG/1
75 TABLET ORAL DAILY
Qty: 30 TABLET | Refills: 0 | Status: SHIPPED | OUTPATIENT
Start: 2021-09-23 | End: 2022-01-11

## 2021-09-23 RX ORDER — CLOPIDOGREL BISULFATE 75 MG/1
75 TABLET ORAL DAILY
Qty: 90 TABLET | Refills: 0 | Status: SHIPPED | OUTPATIENT
Start: 2021-09-23 | End: 2021-09-23

## 2021-10-01 DIAGNOSIS — R97.20 ELEVATED PROSTATE SPECIFIC ANTIGEN (PSA): Primary | ICD-10-CM

## 2021-10-03 ENCOUNTER — HEALTH MAINTENANCE LETTER (OUTPATIENT)
Age: 76
End: 2021-10-03

## 2021-10-04 ENCOUNTER — LAB (OUTPATIENT)
Dept: LAB | Facility: OTHER | Age: 76
End: 2021-10-04
Attending: UROLOGY
Payer: MEDICARE

## 2021-10-04 ENCOUNTER — OFFICE VISIT (OUTPATIENT)
Dept: UROLOGY | Facility: OTHER | Age: 76
End: 2021-10-04
Attending: INTERNAL MEDICINE
Payer: MEDICARE

## 2021-10-04 VITALS
SYSTOLIC BLOOD PRESSURE: 118 MMHG | WEIGHT: 165 LBS | DIASTOLIC BLOOD PRESSURE: 68 MMHG | HEART RATE: 62 BPM | TEMPERATURE: 97.8 F | HEIGHT: 70 IN | OXYGEN SATURATION: 98 % | BODY MASS INDEX: 23.62 KG/M2

## 2021-10-04 DIAGNOSIS — R97.20 ELEVATED PROSTATE SPECIFIC ANTIGEN (PSA): ICD-10-CM

## 2021-10-04 LAB
ALBUMIN UR-MCNC: NEGATIVE MG/DL
APPEARANCE UR: CLEAR
BILIRUB UR QL STRIP: NEGATIVE
COLOR UR AUTO: NORMAL
GLUCOSE UR STRIP-MCNC: NEGATIVE MG/DL
HGB UR QL STRIP: NEGATIVE
KETONES UR STRIP-MCNC: NEGATIVE MG/DL
LEUKOCYTE ESTERASE UR QL STRIP: NEGATIVE
NITRATE UR QL: NEGATIVE
PH UR STRIP: 5.5 [PH] (ref 4.7–8)
SP GR UR STRIP: 1.01 (ref 1–1.03)
UROBILINOGEN UR STRIP-MCNC: NORMAL MG/DL

## 2021-10-04 PROCEDURE — 81003 URINALYSIS AUTO W/O SCOPE: CPT | Mod: ZL

## 2021-10-04 PROCEDURE — 99202 OFFICE O/P NEW SF 15 MIN: CPT | Performed by: UROLOGY

## 2021-10-04 PROCEDURE — G0463 HOSPITAL OUTPT CLINIC VISIT: HCPCS

## 2021-10-04 ASSESSMENT — MIFFLIN-ST. JEOR: SCORE: 1484.69

## 2021-10-04 ASSESSMENT — PAIN SCALES - GENERAL: PAINLEVEL: NO PAIN (0)

## 2021-10-04 NOTE — NURSING NOTE
"Chief Complaint   Patient presents with     Consult     Elevated PSA-Dr Vazquez is referring       Initial /68 (BP Location: Left arm, Patient Position: Chair, Cuff Size: Adult Regular)   Pulse 62   Temp 97.8  F (36.6  C) (Tympanic)   Ht 1.778 m (5' 10\")   Wt 74.8 kg (165 lb)   SpO2 98%   BMI 23.68 kg/m   Estimated body mass index is 23.68 kg/m  as calculated from the following:    Height as of this encounter: 1.778 m (5' 10\").    Weight as of this encounter: 74.8 kg (165 lb).  Medication Reconciliation: complete  ELIAS MULLER LPN    Review of Systems:    Weight loss:    No     Recent fever/chills:  No   Night sweats:   No  Current skin rash:  No   Recent hair loss:  No  Heat intolerance:  No   Cold intolerance:  No  Chest pain:   No   Palpitations:   No  Shortness of breath:  No   Wheezing:   No  Constipation:    No   Diarrhea:   No   Nausea:   No   Vomiting:   No   Kidney/side pain:  No   Back pain:   No  Frequent headaches:  No   Dizziness:     No  Leg swelling:   No   Calf pain:    No    Parents, brothers or sisters with history of kidney cancer?   No  Parents, brothers or sisters with history of bladder cancer: No    "

## 2021-10-04 NOTE — PROGRESS NOTES
History     Chief Complaint:      Consult (Elevated PSA-Dr Vazquez is referring)      HPI   Peter Zhao is a 75 year old male who presents for further evaluation of a  PSA of 5.04.  Peter has no family history of prostate cancer.  His grandfather  at 106 without any cancers his father  at 81 without cancer.  He has never had blood in his urine.  Urinalysis is negative for infection he has a good stream and occasional nocturia overall no complaints with urination.  He has had a pacemaker defibrillator and heart valve replaced.  I have looked at his PSAs from the past years and they have been relatively stable and actually are normal age-specific PSA.'s     Allergies:    Allergies   Allergen Reactions     Ciprofloxacin Hives     Unknown [No Clinical Screening - See Comments]      Antibiotic allergy, pt not sure which        Medications:      acetaminophen (TYLENOL) 500 MG tablet  amLODIPine (NORVASC) 5 MG tablet  ASPIRIN PO  atorvastatin (LIPITOR) 40 MG tablet  blood glucose (ACCU-CHEK SMARTVIEW) test strip  blood glucose monitoring (ACCU-CHEK COMPACT CARE KIT) meter device kit  blood glucose monitoring (ACCU-CHEK MULTICLIX) lancets  clopidogrel (PLAVIX) 75 MG tablet  finasteride (PROSCAR) 5 MG tablet  Gymnema Sylvestris Leaf POWD  lisinopril (ZESTRIL) 40 MG tablet  metoprolol tartrate (LOPRESSOR) 100 MG tablet  Multiple Vitamin (MULTIVITAMINS PO)  nitroGLYcerin (NITROSTAT) 0.4 MG sublingual tablet        Problem List:      Patient Active Problem List    Diagnosis Date Noted     Acute kidney injury (H) 2018     Priority: Medium     H/O aortic valve replacement 2018     Priority: Medium     Hx of aortic valve replacement 2018     Priority: Medium     ICD (implantable cardioverter-defibrillator) in place 2018     Priority: Medium     Attention to colostomy (H) 2017     Priority: Medium     Agitation 2017     Priority: Medium     TIA (transient ischemic attack)  04/29/2017     Priority: Medium     Hypokalemia 02/20/2017     Priority: Medium     S/P colon resection 02/18/2017     Priority: Medium     Hyperlipidemia LDL goal <100 02/15/2017     Priority: Medium     Elevated prostate specific antigen (PSA) 02/15/2017     Priority: Medium     HTN (hypertension) 02/15/2017     Priority: Medium     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  Peter Zhao has no plan or code status on file. Discussed available resources and provided with information. Confirmed code status reflects current choices pending further ACP discussions.  Confirmed/documented legally designated decision makers.  Added by Daxa Campa           CAD (coronary artery disease)      Priority: Medium     CABG 2013, stent x 1 in 2014       Type 2 diabetes mellitus without complication, without long-term current use of insulin (H) 10/31/2013     Priority: Medium        Past Medical History:      Past Medical History:   Diagnosis Date     Allergic state      CAD (coronary artery disease)      Cataract      Cataracts, bilateral      Elevated PSA      Gastro-oesophageal reflux disease      Heart murmur      Hyperlipidemia      Hypertension      Pacemaker      Stented coronary artery      Visual aura        Past Surgical History:      Past Surgical History:   Procedure Laterality Date     AORTIC VALVE REPLACEMENT      25 mm CE bovine replacement Dr. Wu 8/20/2012     CARDIAC SURGERY       CATARACT IOL, RT/LT Right 04/2017     COLECTOMY LOW ANTERIOR N/A 2/17/2017    Procedure: COLECTOMY LOW ANTERIOR;  Surgeon: Tima Moreland MD;   "Location: [facility]     COLONOSCOPY N/A 2/17/2017    Procedure: COLONOSCOPY;  Surgeon: Tima Moreland MD;  [facility]     COLOSTOMY  02/17/2017    Sigmoid Colostomy performed.      LAPAROSCOPIC HERNIORRHAPHY INGUINAL Right 9/18/2015    Procedure: LAPAROSCOPIC HERNIORRHAPHY INGUINAL;  Surgeon: Solitario Nettles DO;  Location: [facility]     PHACOEMULSIFICATION WITH STANDARD INTRAOCULAR LENS IMPLANT Right 4/20/2017    Procedure: PHACOEMULSIFICATION WITH STANDARD INTRAOCULAR LENS IMPLANT;  CATARACT EXTRACTION RIGHT EYE WITH IMPLANT;  Surgeon: Darin Gutierrez MD;  Location: [facility]     PROSTATE BIOPSY, NEEDLE, SATURATION SAMPLING  2009     retinopexy-pvd with retinal tear Right 2008     SIGMOIDOSCOPY FLEXIBLE N/A 02/17/2017    Surgeon: Tima Moreland MD;  Location: [facility]; No specimens taken from procedure.      TAKEDOWN COLOSTOMY N/A 9/21/2017    Procedure: TAKEDOWN COLOSTOMY;  CLOSURE OF NORRIS'S SIGMOID COLOSTOMY, REPAIR STOMAL HERNIA;  Surgeon: Tima Moreland MD;  Location: [facility]     TONSILLECTOMY         Family History:      Family History   Problem Relation Age of Onset     Diabetes Mother      C.A.D. Mother      Cancer Maternal Grandmother        Social History:    Marital Status:   [2]  Social History     Tobacco Use     Smoking status: Former Smoker     Smokeless tobacco: Former User     Types: Chew     Quit date: 10/19/2013     Tobacco comment: quit in 2000   Substance Use Topics     Alcohol use: No     Drug use: No        Review of Systems   All other systems reviewed and are negative.        Physical Exam   Vitals:  /68 (BP Location: Left arm, Patient Position: Chair, Cuff Size: Adult Regular)   Pulse 62   Temp 97.8  F (36.6  C) (Tympanic)   Ht 1.778 m (5' 10\")   Wt 74.8 kg (165 lb)   SpO2 98%   BMI 23.68 kg/m        Physical Exam  Constitutional:       Appearance: Normal appearance. He is normal weight.   Pulmonary:      Effort: Pulmonary effort is normal.   Abdominal:          " Comments: Patient has a large midline abdominal incision from previous colon surgeries.  He has an old colostomy incision with a large hernia.  No other abdominal pain.   Genitourinary:     Comments: penis has redundant foreskin is easily retracted.  Meatus is normal.  Shaft of the penis is normal.  Testicles are both descended without any masses, supratesticular masses or inguinal hernias.  External rectal area is normal normal rectal tone prostate gland is enlarged but soft and smooth and not concerning for any malignancy.  Neurological:      Mental Status: He is alert.         PSA   Date Value Ref Range Status   06/08/2021 4.76 (H) 0 - 4 ug/L Final     Comment:     Assay Method:  Chemiluminescence using Siemens Vista analyzer   08/28/2019 3.83 0 - 4 ug/L Final     Comment:     Assay Method:  Chemiluminescence using Siemens Vista analyzer     PSA Tumor Marker   Date Value Ref Range Status   08/09/2021 5.04 (H) 0.00 - 4.00 ug/L Final     Impression: Normal age-specific PSA and normal digital rectal exam  Plan   Plan: I talked with Peter and he has no family history of prostate cancer his PSA is normal for age specific PSA and prostate feels fine.  Overall I have minimal concerns for prostate cancer and would recommend no further follow-up at his age with his other comorbid medical conditions.      No follow-ups on file.    Floridalma Chan MD  [facility]

## 2021-10-04 NOTE — LETTER
10/4/2021       RE: Peter Zhao  [address] [facility] [address] [place] [id number]     Dear Colleague,    Thank you for referring your patient, Peter Zhao, to the [facility]. Please see a copy of my visit note below.      History     Chief Complaint:      Consult (Elevated PSA-Dr Vazquez is referring)      HPI   Peter Zhao is a 75 year old male who presents for further evaluation of a  PSA of 5.04.  Peter has no family history of prostate cancer.  His grandfather  at 106 without any cancers his father  at 81 without cancer.  He has never had blood in his urine.  Urinalysis is negative for infection he has a good stream and occasional nocturia overall no complaints with urination.  He has had a pacemaker defibrillator and heart valve replaced.  I have looked at his PSAs from the past years and they have been relatively stable and actually are normal age-specific PSA.'s     Allergies:    Allergies   Allergen Reactions     Ciprofloxacin Hives     Unknown [No Clinical Screening - See Comments]      Antibiotic allergy, pt not sure which        Medications:      acetaminophen (TYLENOL) 500 MG tablet  amLODIPine (NORVASC) 5 MG tablet  ASPIRIN PO  atorvastatin (LIPITOR) 40 MG tablet  blood glucose (ACCU-CHEK SMARTVIEW) test strip  blood glucose monitoring (ACCU-CHEK COMPACT CARE KIT) meter device kit  blood glucose monitoring (ACCU-CHEK MULTICLIX) lancets  clopidogrel (PLAVIX) 75 MG tablet  finasteride (PROSCAR) 5 MG tablet  Gymnema Sylvestris Leaf POWD  lisinopril (ZESTRIL) 40 MG tablet  metoprolol tartrate (LOPRESSOR) 100 MG tablet  Multiple Vitamin (MULTIVITAMINS PO)  nitroGLYcerin (NITROSTAT) 0.4 MG sublingual tablet        Problem List:      Patient Active Problem List    Diagnosis Date Noted     Acute kidney injury (H) 2018     Priority: Medium     H/O aortic valve replacement 2018     Priority: Medium      Hx of aortic valve replacement 11/16/2018     Priority: Medium     ICD (implantable cardioverter-defibrillator) in place 11/16/2018     Priority: Medium     Attention to colostomy (H) 09/21/2017     Priority: Medium     Agitation 05/19/2017     Priority: Medium     TIA (transient ischemic attack) 04/29/2017     Priority: Medium     Hypokalemia 02/20/2017     Priority: Medium     S/P colon resection 02/18/2017     Priority: Medium     Hyperlipidemia LDL goal <100 02/15/2017     Priority: Medium     Elevated prostate specific antigen (PSA) 02/15/2017     Priority: Medium     HTN (hypertension) 02/15/2017     Priority: Medium     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  Peter MARCELLA Zhao has no plan or code status on file. Discussed available resources and provided with information. Confirmed code status reflects current choices pending further ACP discussions.  Confirmed/documented legally designated decision makers.  Added by Daxa Campa           CAD (coronary artery disease)      Priority: Medium     CABG 2013, stent x 1 in 2014       Type 2 diabetes mellitus without complication, without long-term current use of insulin (H) 10/31/2013     Priority: Medium        Past Medical History:      Past Medical History:   Diagnosis Date     Allergic state      CAD (coronary artery disease)      Cataract      Cataracts, bilateral      Elevated PSA      Gastro-oesophageal reflux disease      Heart murmur      Hyperlipidemia      Hypertension      Pacemaker      Stented coronary artery      Visual aura        Past Surgical History:      Past  Surgical History:   Procedure Laterality Date     AORTIC VALVE REPLACEMENT      25 mm CE bovine replacement Dr. Wu 8/20/2012     CARDIAC SURGERY       CATARACT IOL, RT/LT Right 04/2017     COLECTOMY LOW ANTERIOR N/A 2/17/2017    Procedure: COLECTOMY LOW ANTERIOR;  Surgeon: Tima Moreland MD;  [facility]: [facility]     COLONOSCOPY N/A 2/17/2017    Procedure: COLONOSCOPY;  Surgeon: Tima Moreland MD;  [facility]     COLOSTOMY  02/17/2017    Sigmoid Colostomy performed.      LAPAROSCOPIC HERNIORRHAPHY INGUINAL Right 9/18/2015    Procedure: LAPAROSCOPIC HERNIORRHAPHY INGUINAL;  Surgeon: Solitario Nettles DO;  Location: [facility]     PHACOEMULSIFICATION WITH STANDARD INTRAOCULAR LENS IMPLANT Right 4/20/2017    Procedure: PHACOEMULSIFICATION WITH STANDARD INTRAOCULAR LENS IMPLANT;  CATARACT EXTRACTION RIGHT EYE WITH IMPLANT;  Surgeon: Darin Gutierrez MD;  Location: [facility]     PROSTATE BIOPSY, NEEDLE, SATURATION SAMPLING  2009     retinopexy-pvd with retinal tear Right 2008     SIGMOIDOSCOPY FLEXIBLE N/A 02/17/2017    Surgeon: Tima Moreland MD;  Location: [facility]; No specimens taken from procedure.      TAKEDOWN COLOSTOMY N/A 9/21/2017    Procedure: TAKEDOWN COLOSTOMY;  CLOSURE OF NORRIS'S SIGMOID COLOSTOMY, REPAIR STOMAL HERNIA;  Surgeon: Tima Moreland MD;  Location: [facility]     TONSILLECTOMY         Family History:      Family History   Problem Relation Age of Onset     Diabetes Mother      C.A.D. Mother      Cancer Maternal Grandmother        Social History:    Marital Status:   [2]  Social History     Tobacco Use     Smoking status: Former Smoker     Smokeless tobacco: Former User     Types: Chew     Quit date: 10/19/2013     Tobacco comment: quit in 2000   Substance Use Topics     Alcohol use: No     Drug use: No        Review of Systems   All other systems reviewed and are negative.        Physical Exam   Vitals:  /68 (BP Location: Left arm, Patient Position: Chair, Cuff Size: Adult  "Regular)   Pulse 62   Temp 97.8  F (36.6  C) (Tympanic)   Ht 1.778 m (5' 10\")   Wt 74.8 kg (165 lb)   SpO2 98%   BMI 23.68 kg/m        Physical Exam  Constitutional:       Appearance: Normal appearance. He is normal weight.   Pulmonary:      Effort: Pulmonary effort is normal.   Abdominal:          Comments: Patient has a large midline abdominal incision from previous colon surgeries.  He has an old colostomy incision with a large hernia.  No other abdominal pain.   Genitourinary:     Comments: penis has redundant foreskin is easily retracted.  Meatus is normal.  Shaft of the penis is normal.  Testicles are both descended without any masses, supratesticular masses or inguinal hernias.  External rectal area is normal normal rectal tone prostate gland is enlarged but soft and smooth and not concerning for any malignancy.  Neurological:      Mental Status: He is alert.         PSA   Date Value Ref Range Status   06/08/2021 4.76 (H) 0 - 4 ug/L Final     Comment:     Assay Method:  Chemiluminescence using Siemens Vista analyzer   08/28/2019 3.83 0 - 4 ug/L Final     Comment:     Assay Method:  Chemiluminescence using Siemens Vista analyzer     PSA Tumor Marker   Date Value Ref Range Status   08/09/2021 5.04 (H) 0.00 - 4.00 ug/L Final     Impression: Normal age-specific PSA and normal digital rectal exam  Plan   Plan: I talked with Peter and he has no family history of prostate cancer his PSA is normal for age specific PSA and prostate feels fine.  Overall I have minimal concerns for prostate cancer and would recommend no further follow-up at his age with his other comorbid medical conditions.      No follow-ups on file.    Floridalma Chan MD  [facility] [place]              Again, thank you for allowing me to participate in the care of your patient.      Sincerely,    Floridalma Chan MD    "

## 2021-10-06 ENCOUNTER — TRANSFERRED RECORDS (OUTPATIENT)
Dept: HEALTH INFORMATION MANAGEMENT | Facility: CLINIC | Age: 76
End: 2021-10-06

## 2021-10-08 DIAGNOSIS — I10 BENIGN ESSENTIAL HYPERTENSION: ICD-10-CM

## 2021-10-08 RX ORDER — AMLODIPINE BESYLATE 5 MG/1
5 TABLET ORAL DAILY
Qty: 90 TABLET | Refills: 1 | Status: SHIPPED | OUTPATIENT
Start: 2021-10-08 | End: 2022-04-18

## 2021-10-08 RX ORDER — AMLODIPINE BESYLATE 5 MG/1
5 TABLET ORAL DAILY
Qty: 30 TABLET | Refills: 0 | Status: SHIPPED | OUTPATIENT
Start: 2021-10-08 | End: 2021-10-08

## 2021-10-08 NOTE — TELEPHONE ENCOUNTER
Pt called, needs short term supply of med sent to local pharmacy and 90 day supply sent to mail order. Refills signed.

## 2021-10-20 DIAGNOSIS — E11.9 TYPE 2 DIABETES MELLITUS WITHOUT COMPLICATION, UNSPECIFIED WHETHER LONG TERM INSULIN USE (H): ICD-10-CM

## 2021-10-20 RX ORDER — BLOOD SUGAR DIAGNOSTIC
STRIP MISCELLANEOUS
Qty: 25 STRIP | Refills: 0 | Status: SHIPPED | OUTPATIENT
Start: 2021-10-20 | End: 2022-10-07

## 2021-10-20 NOTE — TELEPHONE ENCOUNTER
blood glucose (ACCU-CHEK SMARTVIEW) test strip    Patient requesting #25 strips until refill is received from [facility] mail service.

## 2021-10-24 DIAGNOSIS — E11.9 TYPE 2 DIABETES MELLITUS WITHOUT COMPLICATION, UNSPECIFIED WHETHER LONG TERM INSULIN USE (H): ICD-10-CM

## 2021-10-27 ENCOUNTER — TRANSFERRED RECORDS (OUTPATIENT)
Dept: HEALTH INFORMATION MANAGEMENT | Facility: CLINIC | Age: 76
End: 2021-10-27

## 2022-01-11 ENCOUNTER — TRANSFERRED RECORDS (OUTPATIENT)
Dept: HEALTH INFORMATION MANAGEMENT | Facility: CLINIC | Age: 77
End: 2022-01-11

## 2022-01-11 DIAGNOSIS — I10 BENIGN ESSENTIAL HYPERTENSION: ICD-10-CM

## 2022-01-11 NOTE — TELEPHONE ENCOUNTER
Richi from [facility] mail order pharmacy calling for Plavix refill for the patient.    Plavix     Last Written Prescription Date:  9.23.2021  Last Fill Quantity: 30,   # refills: 0  Last Office Visit: 6.8.2021  Future Office visit:       Routing refill request to provider for review/approval because:    Pt needs 90 days supply. Last filled on 9.23.2021.    Pended.    Skylar Martinez RN

## 2022-01-12 RX ORDER — CLOPIDOGREL BISULFATE 75 MG/1
75 TABLET ORAL DAILY
Qty: 90 TABLET | Refills: 0 | Status: SHIPPED | OUTPATIENT
Start: 2022-01-12 | End: 2022-04-04

## 2022-01-22 ENCOUNTER — HEALTH MAINTENANCE LETTER (OUTPATIENT)
Age: 77
End: 2022-01-22

## 2022-02-28 DIAGNOSIS — N40.1 BENIGN NON-NODULAR PROSTATIC HYPERPLASIA WITH LOWER URINARY TRACT SYMPTOMS: ICD-10-CM

## 2022-02-28 RX ORDER — FINASTERIDE 5 MG/1
1 TABLET, FILM COATED ORAL DAILY
Qty: 90 TABLET | Refills: 1 | Status: SHIPPED | OUTPATIENT
Start: 2022-02-28 | End: 2022-06-15

## 2022-04-01 DIAGNOSIS — I10 BENIGN ESSENTIAL HYPERTENSION: ICD-10-CM

## 2022-04-04 RX ORDER — CLOPIDOGREL BISULFATE 75 MG/1
TABLET ORAL
Qty: 90 TABLET | Refills: 0 | Status: SHIPPED | OUTPATIENT
Start: 2022-04-04 | End: 2022-06-15

## 2022-04-13 ENCOUNTER — TRANSFERRED RECORDS (OUTPATIENT)
Dept: HEALTH INFORMATION MANAGEMENT | Facility: CLINIC | Age: 77
End: 2022-04-13

## 2022-04-16 DIAGNOSIS — I10 BENIGN ESSENTIAL HYPERTENSION: ICD-10-CM

## 2022-04-18 RX ORDER — AMLODIPINE BESYLATE 5 MG/1
TABLET ORAL
Qty: 90 TABLET | Refills: 1 | Status: SHIPPED | OUTPATIENT
Start: 2022-04-18 | End: 2022-06-15

## 2022-04-18 NOTE — TELEPHONE ENCOUNTER
norvasc      Last Written Prescription Date:  10/8/21  Last Fill Quantity: 90,   # refills: 1  Last Office Visit: 6/8/21  Future Office visit:

## 2022-06-15 ENCOUNTER — LAB (OUTPATIENT)
Dept: LAB | Facility: OTHER | Age: 77
End: 2022-06-15
Attending: INTERNAL MEDICINE
Payer: MEDICARE

## 2022-06-15 ENCOUNTER — OFFICE VISIT (OUTPATIENT)
Dept: INTERNAL MEDICINE | Facility: OTHER | Age: 77
End: 2022-06-15
Attending: INTERNAL MEDICINE
Payer: MEDICARE

## 2022-06-15 VITALS
SYSTOLIC BLOOD PRESSURE: 108 MMHG | TEMPERATURE: 98.1 F | HEART RATE: 77 BPM | DIASTOLIC BLOOD PRESSURE: 58 MMHG | OXYGEN SATURATION: 94 % | WEIGHT: 169.7 LBS | BODY MASS INDEX: 24.29 KG/M2 | HEIGHT: 70 IN

## 2022-06-15 DIAGNOSIS — Z12.5 SCREENING FOR PROSTATE CANCER: ICD-10-CM

## 2022-06-15 DIAGNOSIS — L98.9 SKIN LESION: ICD-10-CM

## 2022-06-15 DIAGNOSIS — I10 BENIGN ESSENTIAL HYPERTENSION: ICD-10-CM

## 2022-06-15 DIAGNOSIS — E11.9 TYPE 2 DIABETES, HBA1C GOAL < 7% (H): ICD-10-CM

## 2022-06-15 DIAGNOSIS — Z13.220 SCREENING FOR HYPERLIPIDEMIA: Primary | ICD-10-CM

## 2022-06-15 DIAGNOSIS — N40.1 BENIGN NON-NODULAR PROSTATIC HYPERPLASIA WITH LOWER URINARY TRACT SYMPTOMS: ICD-10-CM

## 2022-06-15 DIAGNOSIS — Z13.220 SCREENING FOR HYPERLIPIDEMIA: ICD-10-CM

## 2022-06-15 DIAGNOSIS — Z00.00 ROUTINE HISTORY AND PHYSICAL EXAMINATION OF ADULT: ICD-10-CM

## 2022-06-15 DIAGNOSIS — Z00.00 ENCOUNTER FOR MEDICARE ANNUAL WELLNESS EXAM: Primary | ICD-10-CM

## 2022-06-15 DIAGNOSIS — E78.5 HYPERLIPIDEMIA LDL GOAL <100: ICD-10-CM

## 2022-06-15 LAB
ALBUMIN SERPL-MCNC: 3.6 G/DL (ref 3.4–5)
ALP SERPL-CCNC: 78 U/L (ref 40–150)
ALT SERPL W P-5'-P-CCNC: 22 U/L (ref 0–70)
ANION GAP SERPL CALCULATED.3IONS-SCNC: 6 MMOL/L (ref 3–14)
AST SERPL W P-5'-P-CCNC: 18 U/L (ref 0–45)
BASOPHILS # BLD AUTO: 0.1 10E3/UL (ref 0–0.2)
BASOPHILS NFR BLD AUTO: 1 %
BILIRUB SERPL-MCNC: 1.1 MG/DL (ref 0.2–1.3)
BUN SERPL-MCNC: 32 MG/DL (ref 7–30)
CALCIUM SERPL-MCNC: 9.1 MG/DL (ref 8.5–10.1)
CHLORIDE BLD-SCNC: 108 MMOL/L (ref 94–109)
CHOLEST SERPL-MCNC: 130 MG/DL
CO2 SERPL-SCNC: 24 MMOL/L (ref 20–32)
CREAT SERPL-MCNC: 1.42 MG/DL (ref 0.66–1.25)
EOSINOPHIL # BLD AUTO: 0.2 10E3/UL (ref 0–0.7)
EOSINOPHIL NFR BLD AUTO: 2 %
ERYTHROCYTE [DISTWIDTH] IN BLOOD BY AUTOMATED COUNT: 14.5 % (ref 10–15)
EST. AVERAGE GLUCOSE BLD GHB EST-MCNC: 143 MG/DL
FASTING STATUS PATIENT QL REPORTED: YES
GFR SERPL CREATININE-BSD FRML MDRD: 51 ML/MIN/1.73M2
GLUCOSE BLD-MCNC: 132 MG/DL (ref 70–99)
HBA1C MFR BLD: 6.6 % (ref 0–5.6)
HCT VFR BLD AUTO: 45 % (ref 40–53)
HDLC SERPL-MCNC: 30 MG/DL
HGB BLD-MCNC: 15.4 G/DL (ref 13.3–17.7)
LDLC SERPL CALC-MCNC: 64 MG/DL
LYMPHOCYTES # BLD AUTO: 1.4 10E3/UL (ref 0.8–5.3)
LYMPHOCYTES NFR BLD AUTO: 16 %
MCH RBC QN AUTO: 29.8 PG (ref 26.5–33)
MCHC RBC AUTO-ENTMCNC: 34.2 G/DL (ref 31.5–36.5)
MCV RBC AUTO: 87 FL (ref 78–100)
MONOCYTES # BLD AUTO: 0.9 10E3/UL (ref 0–1.3)
MONOCYTES NFR BLD AUTO: 10 %
NEUTROPHILS # BLD AUTO: 6.7 10E3/UL (ref 1.6–8.3)
NEUTROPHILS NFR BLD AUTO: 72 %
NONHDLC SERPL-MCNC: 100 MG/DL
PLATELET # BLD AUTO: 216 10E3/UL (ref 150–450)
POTASSIUM BLD-SCNC: 4.6 MMOL/L (ref 3.4–5.3)
PROT SERPL-MCNC: 7.2 G/DL (ref 6.8–8.8)
PSA SERPL-MCNC: 5.43 UG/L (ref 0–4)
RBC # BLD AUTO: 5.16 10E6/UL (ref 4.4–5.9)
SODIUM SERPL-SCNC: 138 MMOL/L (ref 133–144)
TRIGL SERPL-MCNC: 181 MG/DL
TSH SERPL DL<=0.005 MIU/L-ACNC: 1.31 MU/L (ref 0.4–4)
WBC # BLD AUTO: 9.2 10E3/UL (ref 4–11)

## 2022-06-15 PROCEDURE — 83036 HEMOGLOBIN GLYCOSYLATED A1C: CPT | Mod: ZL | Performed by: INTERNAL MEDICINE

## 2022-06-15 PROCEDURE — G0439 PPPS, SUBSEQ VISIT: HCPCS | Performed by: INTERNAL MEDICINE

## 2022-06-15 PROCEDURE — 80061 LIPID PANEL: CPT | Mod: ZL

## 2022-06-15 PROCEDURE — 36415 COLL VENOUS BLD VENIPUNCTURE: CPT | Mod: ZL

## 2022-06-15 PROCEDURE — 85025 COMPLETE CBC W/AUTO DIFF WBC: CPT | Mod: ZL

## 2022-06-15 PROCEDURE — 80053 COMPREHEN METABOLIC PANEL: CPT | Mod: ZL

## 2022-06-15 PROCEDURE — 99213 OFFICE O/P EST LOW 20 MIN: CPT | Mod: 25 | Performed by: INTERNAL MEDICINE

## 2022-06-15 PROCEDURE — 36415 COLL VENOUS BLD VENIPUNCTURE: CPT | Mod: ZL | Performed by: INTERNAL MEDICINE

## 2022-06-15 PROCEDURE — 84443 ASSAY THYROID STIM HORMONE: CPT | Mod: ZL

## 2022-06-15 PROCEDURE — G0463 HOSPITAL OUTPT CLINIC VISIT: HCPCS

## 2022-06-15 PROCEDURE — G0103 PSA SCREENING: HCPCS | Mod: ZL

## 2022-06-15 RX ORDER — ATORVASTATIN CALCIUM 40 MG/1
40 TABLET, FILM COATED ORAL DAILY
Qty: 90 TABLET | Refills: 3 | Status: SHIPPED | OUTPATIENT
Start: 2022-06-15 | End: 2023-01-24

## 2022-06-15 RX ORDER — CLOPIDOGREL BISULFATE 75 MG/1
75 TABLET ORAL DAILY
Qty: 90 TABLET | Refills: 3 | Status: SHIPPED | OUTPATIENT
Start: 2022-06-15 | End: 2023-01-24

## 2022-06-15 RX ORDER — AMLODIPINE BESYLATE 5 MG/1
5 TABLET ORAL DAILY
Qty: 90 TABLET | Refills: 3 | Status: SHIPPED | OUTPATIENT
Start: 2022-06-15 | End: 2023-01-24

## 2022-06-15 RX ORDER — LISINOPRIL 40 MG/1
40 TABLET ORAL DAILY
Qty: 90 TABLET | Refills: 3 | Status: SHIPPED | OUTPATIENT
Start: 2022-06-15 | End: 2023-01-24

## 2022-06-15 RX ORDER — METOPROLOL TARTRATE 100 MG
100 TABLET ORAL 2 TIMES DAILY
Qty: 180 TABLET | Refills: 3 | Status: SHIPPED | OUTPATIENT
Start: 2022-06-15 | End: 2022-07-06

## 2022-06-15 RX ORDER — FINASTERIDE 5 MG/1
1 TABLET, FILM COATED ORAL DAILY
Qty: 90 TABLET | Refills: 3 | Status: SHIPPED | OUTPATIENT
Start: 2022-06-15 | End: 2023-01-24

## 2022-06-15 ASSESSMENT — ENCOUNTER SYMPTOMS
DYSURIA: 0
CONSTIPATION: 0
HEARTBURN: 0
WEAKNESS: 0
PARESTHESIAS: 0
MYALGIAS: 1
DIZZINESS: 0
PALPITATIONS: 0
HEMATOCHEZIA: 0
COUGH: 0
SORE THROAT: 0
EYE PAIN: 0
ARTHRALGIAS: 0
ABDOMINAL PAIN: 0
HEMATURIA: 0
FEVER: 0
NERVOUS/ANXIOUS: 0
SHORTNESS OF BREATH: 1
CHILLS: 0
FREQUENCY: 0
JOINT SWELLING: 1
DIARRHEA: 0
NAUSEA: 0
HEADACHES: 0

## 2022-06-15 ASSESSMENT — ANXIETY QUESTIONNAIRES
2. NOT BEING ABLE TO STOP OR CONTROL WORRYING: NOT AT ALL
GAD7 TOTAL SCORE: 0
GAD7 TOTAL SCORE: 0
1. FEELING NERVOUS, ANXIOUS, OR ON EDGE: NOT AT ALL
6. BECOMING EASILY ANNOYED OR IRRITABLE: NOT AT ALL
4. TROUBLE RELAXING: NOT AT ALL
7. FEELING AFRAID AS IF SOMETHING AWFUL MIGHT HAPPEN: NOT AT ALL
5. BEING SO RESTLESS THAT IT IS HARD TO SIT STILL: NOT AT ALL
3. WORRYING TOO MUCH ABOUT DIFFERENT THINGS: NOT AT ALL

## 2022-06-15 ASSESSMENT — PATIENT HEALTH QUESTIONNAIRE - PHQ9: SUM OF ALL RESPONSES TO PHQ QUESTIONS 1-9: 0

## 2022-06-15 ASSESSMENT — PAIN SCALES - GENERAL: PAINLEVEL: NO PAIN (0)

## 2022-06-15 ASSESSMENT — ACTIVITIES OF DAILY LIVING (ADL): CURRENT_FUNCTION: NO ASSISTANCE NEEDED

## 2022-06-15 NOTE — NURSING NOTE
"Chief Complaint   Patient presents with     Physical       Initial /58 (BP Location: Left arm, Patient Position: Chair, Cuff Size: Adult Regular)   Pulse 77   Temp 98.1  F (36.7  C) (Tympanic)   Ht 1.772 m (5' 9.75\")   Wt 77 kg (169 lb 11.2 oz)   SpO2 94%   BMI 24.52 kg/m   Estimated body mass index is 24.52 kg/m  as calculated from the following:    Height as of this encounter: 1.772 m (5' 9.75\").    Weight as of this encounter: 77 kg (169 lb 11.2 oz).  Medication Reconciliation: complete  FERMIN MUNOZ, BLAKE  "

## 2022-06-15 NOTE — PROGRESS NOTES
"SUBJECTIVE:   Peter Zhao is a 76 year old male who presents for Preventive Visit.    Peter presents today for follow up.  Routine labs are reviewed with stable CKD stage 2 and slight elevation in PSA which is chronic.  Per Dr. Chan's recommendations we will not longer follow PSAs.  He also has a skin lesion along left jaw line over the last 3 months which is non healing.  He otherwise feels well.  Labs look fine  But A1C is pending.  AM sugars are generally less than 130.      Patient has been advised of split billing requirements and indicates understanding: Yes  Are you in the first 12 months of your Medicare coverage?  No    Healthy Habits:     In general, how would you rate your overall health?  Good    Frequency of exercise:  6-7 days/week    Duration of exercise:  Greater than 60 minutes    Do you usually eat at least 4 servings of fruit and vegetables a day, include whole grains    & fiber and avoid regularly eating high fat or \"junk\" foods?  Yes    Taking medications regularly:  Yes    Medication side effects:  None    Ability to successfully perform activities of daily living:  No assistance needed    Home Safety:  No safety concerns identified    Hearing Impairment:  Need to ask people to speak up or repeat themselves    In the past 6 months, have you been bothered by leaking of urine?  No    In general, how would you rate your overall mental or emotional health?  Good      PHQ-2 Total Score: 0    Additional concerns today:  No    Do you feel safe in your environment? Yes    Have you ever done Advance Care Planning? (For example, a Health Directive, POLST, or a discussion with a medical provider or your loved ones about your wishes): No, advance care planning information given to patient to review.  Patient declined advance care planning discussion at this time.       Fall risk  Fallen 2 or more times in the past year?: No  Any fall with injury in the past year?: No    Cognitive Screening   1) Repeat 3 " items (Leader, Season, Table)    2) Clock draw: NORMAL  3) 3 item recall: Recalls 1 object   Results: NORMAL clock, 1-2 items recalled: COGNITIVE IMPAIRMENT LESS LIKELY    Mini-CogTM Copyright HOLLIE Sue. Licensed by the author for use in [employer]; reprinted with permission (marti@[facility]). All rights reserved.      Do you have sleep apnea, excessive snoring or daytime drowsiness?: no    Reviewed and updated as needed this visit by clinical staff   Tobacco  Allergies  Meds   Med Hx  Surg Hx  Fam Hx  Soc Hx          Reviewed and updated as needed this visit by Provider                   Social History     Tobacco Use     Smoking status: Former Smoker     Smokeless tobacco: Former User     Types: Chew     Quit date: 10/19/2013     Tobacco comment: quit in 2000   Substance Use Topics     Alcohol use: No         Alcohol Use 6/15/2022   Prescreen: >3 drinks/day or >7 drinks/week? No               Current providers sharing in care for this patient include:   Patient Care Team:  Alexander Vazquez DO as PCP - General (Internal Medicine)  lAexander Vazquez DO as Assigned PCP  Floridalma Chan MD as Assigned Surgical Provider    The following health maintenance items are reviewed in Epic and correct as of today:  Health Maintenance Due   Topic Date Due     EYE EXAM  Never done     HEPATITIS C SCREENING  Never done     LUNG CANCER SCREENING  Never done     ZOSTER IMMUNIZATION (2 of 3) 12/10/2013     MICROALBUMIN  12/07/2019     A1C  12/08/2021     DIABETIC FOOT EXAM  06/08/2022     BP Readings from Last 3 Encounters:   06/15/22 108/58   10/04/21 118/68   09/18/21 136/85    Wt Readings from Last 3 Encounters:   06/15/22 77 kg (169 lb 11.2 oz)   10/04/21 74.8 kg (165 lb)   06/08/21 80.3 kg (177 lb)                  Patient Active Problem List   Diagnosis     Type 2 diabetes mellitus without complication, without long-term current use of insulin (H)     CAD (coronary artery disease)     ACP (advance  care planning)     Hyperlipidemia LDL goal <100     Elevated prostate specific antigen (PSA)     HTN (hypertension)     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     H/O migraine     Visual aura     Cataract     S/P colon resection     Hypokalemia     TIA (transient ischemic attack)     Agitation     Attention to colostomy (H)     H/O aortic valve replacement     Hx of aortic valve replacement     ICD (implantable cardioverter-defibrillator) in place     Acute kidney injury (H)     Past Surgical History:   Procedure Laterality Date     AORTIC VALVE REPLACEMENT      25 mm CE bovine replacement Dr. Wu 8/20/2012     CARDIAC SURGERY       CATARACT IOL, RT/LT Right 04/2017     COLECTOMY LOW ANTERIOR N/A 2/17/2017    Procedure: COLECTOMY LOW ANTERIOR;  Surgeon: Tima Moreland MD;  [facility]: [facility]     COLONOSCOPY N/A 2/17/2017    Procedure: COLONOSCOPY;  Surgeon: Tima Moreland MD;  [facility]: [facility]     COLOSTOMY  02/17/2017    Sigmoid Colostomy performed.      LAPAROSCOPIC HERNIORRHAPHY INGUINAL Right 9/18/2015    Procedure: LAPAROSCOPIC HERNIORRHAPHY INGUINAL;  Surgeon: Solitario Nettles DO;  Location: [facility]     PHACOEMULSIFICATION WITH STANDARD INTRAOCULAR LENS IMPLANT Right 4/20/2017    Procedure: PHACOEMULSIFICATION WITH STANDARD INTRAOCULAR LENS IMPLANT;  CATARACT EXTRACTION RIGHT EYE WITH IMPLANT;  Surgeon: Darin Gutierrez MD;  Location: [facility]     PROSTATE BIOPSY, NEEDLE, SATURATION SAMPLING  2009     retinopexy-pvd with retinal tear Right 2008     SIGMOIDOSCOPY FLEXIBLE N/A 02/17/2017    Surgeon: Tima Moreland MD;  Location: [facility]; No specimens taken from procedure.      TAKEDOWN COLOSTOMY N/A 9/21/2017    Procedure: TAKEDOWN COLOSTOMY;  CLOSURE OF NORRIS'S SIGMOID COLOSTOMY, REPAIR STOMAL HERNIA;  Surgeon: Tima Moreland MD;  Location: [facility]     TONSILLECTOMY         Social History     Tobacco Use     Smoking status: Former Smoker     Smokeless tobacco:  Former User     Types: Chew     Quit date: 10/19/2013     Tobacco comment: quit in 2000   Substance Use Topics     Alcohol use: No     Family History   Problem Relation Age of Onset     Diabetes Mother      C.A.D. Mother      Cancer Maternal Grandmother          Current Outpatient Medications   Medication Sig Dispense Refill     acetaminophen (TYLENOL) 500 MG tablet Take 1 to 2 tablets every 6 hours as needed for fever or pain. 60 tablet 0     amLODIPine (NORVASC) 5 MG tablet Take 1 tablet (5 mg) by mouth daily 90 tablet 3     ASPIRIN PO Take 325 mg by mouth daily        atorvastatin (LIPITOR) 40 MG tablet Take 1 tablet (40 mg) by mouth daily 90 tablet 3     blood glucose (ACCU-CHEK SMARTVIEW) test strip Test blood sugar before breakfast one day, before and after supper the next and not at all the third day 25 strip 0     blood glucose monitoring (ACCU-CHEK COMPACT CARE KIT) meter device kit Use to test blood sugar before breakfast one day, before and after supper next day and no testing 3rd day. 1 kit 0     blood glucose monitoring (ACCU-CHEK MULTICLIX) lancets Use to test blood sugar before breakfast one day, before and after supper next day and no testing 3rd day. 102 each 3     clopidogrel (PLAVIX) 75 MG tablet Take 1 tablet (75 mg) by mouth daily 90 tablet 3     finasteride (PROSCAR) 5 MG tablet Take 1 tablet (5 mg) by mouth daily 90 tablet 3     Gymnema Sylvestris Leaf POWD 550 mg 2 times daily       lisinopril (ZESTRIL) 40 MG tablet Take 1 tablet (40 mg) by mouth daily 90 tablet 3     metoprolol tartrate (LOPRESSOR) 100 MG tablet Take 1 tablet (100 mg) by mouth 2 times daily 180 tablet 3     Multiple Vitamin (MULTIVITAMINS PO) Take 1 capsule by mouth daily       nitroGLYcerin (NITROSTAT) 0.4 MG sublingual tablet DISSOLVE ONE TABLET UNDER THE TONGUE EVERY 5 MINUTES AS NEEDED FOR CHEST PAIN.  DO NOT EXCEED A TOTAL OF 3 DOSES IN 15 MINUTES 25 tablet 0     Allergies   Allergen Reactions     Ciprofloxacin Hives  "    Unknown [No Clinical Screening - See Comments]      Antibiotic allergy, pt not sure which     Recent Labs   Lab Test 06/15/22  1005 09/18/21  1839 06/08/21  1054 08/28/19  1146 12/07/18  0757   A1C  --   --  6.6* 6.8* 7.2*   LDL 64  --  65  --  49   HDL 30*  --  26*  --  28*   TRIG 181*  --  256*  --  211*   ALT 22  --  24 27 24   CR 1.42* 1.33* 1.19 1.23 1.35*  1.34*   GFRESTIMATED 51* 52* 59* 57* 52*  52*   GFRESTBLACK  --   --  68 66 63  63   POTASSIUM 4.6 4.2 4.5 4.4 4.4  4.4   TSH 1.31  --  0.86 0.95  --               Review of Systems   Constitutional: Negative for chills and fever.   HENT: Positive for congestion and hearing loss. Negative for ear pain and sore throat.    Eyes: Negative for pain and visual disturbance.   Respiratory: Positive for shortness of breath. Negative for cough.    Cardiovascular: Negative for chest pain, palpitations and peripheral edema.   Gastrointestinal: Negative for abdominal pain, constipation, diarrhea, heartburn, hematochezia and nausea.   Genitourinary: Negative for dysuria, frequency, genital sores, hematuria, impotence, penile discharge and urgency.   Musculoskeletal: Positive for joint swelling and myalgias. Negative for arthralgias.   Skin: Negative for rash.   Neurological: Negative for dizziness, weakness, headaches and paresthesias.   Psychiatric/Behavioral: Negative for mood changes. The patient is not nervous/anxious.      Constitutional, HEENT, cardiovascular, pulmonary, gi and gu systems are negative, except as otherwise noted.    OBJECTIVE:   /58 (BP Location: Left arm, Patient Position: Chair, Cuff Size: Adult Regular)   Pulse 77   Temp 98.1  F (36.7  C) (Tympanic)   Ht 1.772 m (5' 9.75\")   Wt 77 kg (169 lb 11.2 oz)   SpO2 94%   BMI 24.52 kg/m   Estimated body mass index is 24.52 kg/m  as calculated from the following:    Height as of this encounter: 1.772 m (5' 9.75\").    Weight as of this encounter: 77 kg (169 lb 11.2 oz).  Physical " "Exam  GENERAL: alert and no distress  RESP: lungs clear to auscultation - no rales, rhonchi or wheezes  CV: regular rate and rhythm, normal S1 S2, no S3 or S4, no murmur, click or rub, no peripheral edema and peripheral pulses strong  ABDOMEN: Hernias on LLQ  MS: no gross musculoskeletal defects noted, no edema  SKIN: Skin lesion along left jaw line    NEURO: Normal strength and tone, mentation intact and speech normal  PSYCH: mentation appears normal, affect normal/bright    Diagnostic Test Results:  Labs reviewed in Epic    ASSESSMENT / PLAN:   Peter was seen today for physical.    Diagnoses and all orders for this visit:    Encounter for Medicare annual wellness exam    Type 2 diabetes, HbA1c goal < 7% (H)  -     Hemoglobin A1c; Future  -     Hemoglobin A1c    Benign essential hypertension  -     amLODIPine (NORVASC) 5 MG tablet; Take 1 tablet (5 mg) by mouth daily  -     clopidogrel (PLAVIX) 75 MG tablet; Take 1 tablet (75 mg) by mouth daily  -     lisinopril (ZESTRIL) 40 MG tablet; Take 1 tablet (40 mg) by mouth daily  -     metoprolol tartrate (LOPRESSOR) 100 MG tablet; Take 1 tablet (100 mg) by mouth 2 times daily    Hyperlipidemia LDL goal <100  -     atorvastatin (LIPITOR) 40 MG tablet; Take 1 tablet (40 mg) by mouth daily    Benign non-nodular prostatic hyperplasia with lower urinary tract symptoms  -     finasteride (PROSCAR) 5 MG tablet; Take 1 tablet (5 mg) by mouth daily    Skin lesion  -     Adult Dermatology Referral; Future        Patient has been advised of split billing requirements and indicates understanding: Yes    COUNSELING:  Reviewed preventive health counseling, as reflected in patient instructions       Regular exercise       Healthy diet/nutrition       Vision screening       Hearing screening       Dental care       Fall risk prevention    Estimated body mass index is 24.52 kg/m  as calculated from the following:    Height as of this encounter: 1.772 m (5' 9.75\").    Weight as of this " encounter: 77 kg (169 lb 11.2 oz).        He reports that he has quit smoking. He quit smokeless tobacco use about 8 years ago.  His smokeless tobacco use included chew.      Appropriate preventive services were discussed with this patient, including applicable screening as appropriate for cardiovascular disease, diabetes, osteopenia/osteoporosis, and glaucoma.  As appropriate for age/gender, discussed screening for colorectal cancer, prostate cancer, breast cancer, and cervical cancer. Checklist reviewing preventive services available has been given to the patient.    Reviewed patients plan of care and provided an AVS. The Basic Care Plan (routine screening as documented in Health Maintenance) for Peter meets the Care Plan requirement. This Care Plan has been established and reviewed with the Patient.    Counseling Resources:  ATP IV Guidelines  Pooled Cohorts Equation Calculator  Breast Cancer Risk Calculator  Breast Cancer: Medication to Reduce Risk  FRAX Risk Assessment  ICSI Preventive Guidelines  Dietary Guidelines for Americans, 2010  USDA's MyPlate  ASA Prophylaxis  Lung CA Screening    Alexander Vazquez, DO  [facility] [place] [facility]    Identified Health Risks:

## 2022-06-15 NOTE — PATIENT INSTRUCTIONS
Patient Education   Personalized Prevention Plan  You are due for the preventive services outlined below.  Your care team is available to assist you in scheduling these services.  If you have already completed any of these items, please share that information with your care team to update in your medical record.  Health Maintenance Due   Topic Date Due     Eye Exam  Never done     Hepatitis C Screening  Never done     LUNG CANCER SCREENING  Never done     Zoster (Shingles) Vaccine (2 of 3) 12/10/2013     Kidney Microalbumin Urine Test  12/07/2019     A1C Lab  12/08/2021     PHQ-2 (once per calendar year)  01/01/2022     Cholesterol Lab  06/08/2022     Diabetic Foot Exam  06/08/2022     Annual Wellness Visit  06/08/2022

## 2022-07-04 DIAGNOSIS — I10 BENIGN ESSENTIAL HYPERTENSION: ICD-10-CM

## 2022-07-06 RX ORDER — METOPROLOL TARTRATE 100 MG
TABLET ORAL
Qty: 180 TABLET | Refills: 3 | Status: SHIPPED | OUTPATIENT
Start: 2022-07-06 | End: 2023-01-24

## 2022-07-24 ENCOUNTER — HOSPITAL ENCOUNTER (EMERGENCY)
Facility: HOSPITAL | Age: 77
Discharge: HOME | End: 2022-07-24
Attending: PHYSICIAN ASSISTANT | Admitting: PHYSICIAN ASSISTANT
Payer: MEDICARE

## 2022-07-24 VITALS
OXYGEN SATURATION: 97 % | HEART RATE: 58 BPM | SYSTOLIC BLOOD PRESSURE: 103 MMHG | RESPIRATION RATE: 16 BRPM | WEIGHT: 165 LBS | BODY MASS INDEX: 23.85 KG/M2 | TEMPERATURE: 97.5 F | DIASTOLIC BLOOD PRESSURE: 65 MMHG

## 2022-07-24 DIAGNOSIS — H11.31 SUBCONJUNCTIVAL HEMORRHAGE OF RIGHT EYE: ICD-10-CM

## 2022-07-24 PROCEDURE — 99213 OFFICE O/P EST LOW 20 MIN: CPT | Performed by: PHYSICIAN ASSISTANT

## 2022-07-24 PROCEDURE — G0463 HOSPITAL OUTPT CLINIC VISIT: HCPCS

## 2022-07-24 ASSESSMENT — ENCOUNTER SYMPTOMS
PHOTOPHOBIA: 0
EYE ITCHING: 0
CHILLS: 0
FEVER: 0
EYE REDNESS: 1
EYE PAIN: 0
EYE DISCHARGE: 0

## 2022-07-24 NOTE — ED TRIAGE NOTES
"Patient presents with complaints of blood in his left eye.  Notes he woke up with the symptoms yesterday.  Notes that his eye does not hurt, no obvious injury to his eye and denies any vision changes in eye.  States \"I am just here to see what/if I have any limitations\"  Patient does take Plavix.      "

## 2022-07-24 NOTE — ED TRIAGE NOTES
Woke up Saturday morning with right eye red. Wants to know what he can do at home, what he cant do. No obstruction to vision

## 2022-07-24 NOTE — ED PROVIDER NOTES
History     Chief Complaint   Patient presents with     Eye Problem     HPI  Peter Zhao is a 76 year old male who presents to urgent care for evaluation of right eye issue.  Patient states he woke up yesterday and noted some blood in his right eye.  He states that seems to be more evident today.  He denies any mechanism of injury, vision changes, ocular pain, fevers, cold symptoms, or any other associated symptoms.  Patient does take Plavix for a previous TIA.      Allergies:  Allergies   Allergen Reactions     Ciprofloxacin Hives     Unknown [No Clinical Screening - See Comments]      Antibiotic allergy, pt not sure which       Problem List:    Patient Active Problem List    Diagnosis Date Noted     Acute kidney injury (H) 11/30/2018     Priority: Medium     H/O aortic valve replacement 11/16/2018     Priority: Medium     Hx of aortic valve replacement 11/16/2018     Priority: Medium     ICD (implantable cardioverter-defibrillator) in place 11/16/2018     Priority: Medium     Attention to colostomy (H) 09/21/2017     Priority: Medium     Agitation 05/19/2017     Priority: Medium     TIA (transient ischemic attack) 04/29/2017     Priority: Medium     Hypokalemia 02/20/2017     Priority: Medium     S/P colon resection 02/18/2017     Priority: Medium     Hyperlipidemia LDL goal <100 02/15/2017     Priority: Medium     Elevated prostate specific antigen (PSA) 02/15/2017     Priority: Medium     HTN (hypertension) 02/15/2017     Priority: Medium     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  Peter Zhao has no plan or code status on  file. Discussed available resources and provided with information. Confirmed code status reflects current choices pending further ACP discussions.  Confirmed/documented legally designated decision makers.  Added by Daxa Huizaru           CAD (coronary artery disease)      Priority: Medium     CABG 2013, stent x 1 in 2014       Type 2 diabetes mellitus without complication, without long-term current use of insulin (H) 10/31/2013     Priority: Medium        Past Medical History:    Past Medical History:   Diagnosis Date     Allergic state      CAD (coronary artery disease)      Cataract      Cataracts, bilateral      Elevated PSA      Gastro-oesophageal reflux disease      Heart murmur      Hyperlipidemia      Hypertension      Pacemaker      Stented coronary artery      Visual aura        Past Surgical History:    Past Surgical History:   Procedure Laterality Date     AORTIC VALVE REPLACEMENT      25 mm CE bovine replacement Dr. Wu 8/20/2012     CARDIAC SURGERY       CATARACT IOL, RT/LT Right 04/2017     COLECTOMY LOW ANTERIOR N/A 2/17/2017    Procedure: COLECTOMY LOW ANTERIOR;  Surgeon: Tima Moreland MD;  Location: [facility]     COLONOSCOPY N/A 2/17/2017    Procedure: COLONOSCOPY;  Surgeon: Tima Moreland MD;  Location: [facility]     COLOSTOMY  02/17/2017    Sigmoid Colostomy performed.      LAPAROSCOPIC HERNIORRHAPHY INGUINAL Right 9/18/2015    Procedure: LAPAROSCOPIC HERNIORRHAPHY INGUINAL;  Surgeon: Solitario Nettles DO;  Location: [facility]     PHACOEMULSIFICATION WITH STANDARD INTRAOCULAR LENS IMPLANT Right 4/20/2017    Procedure: PHACOEMULSIFICATION WITH STANDARD INTRAOCULAR LENS IMPLANT;  CATARACT EXTRACTION RIGHT EYE WITH IMPLANT;  Surgeon: Darin Gutierrez MD;  Location: [facility]     PROSTATE BIOPSY, NEEDLE, SATURATION SAMPLING  2009     retinopexy-pvd with retinal tear Right 2008     SIGMOIDOSCOPY FLEXIBLE N/A 02/17/2017    Surgeon: Tima Moreland MD;  Location: [facility]; No specimens taken from  procedure.      TAKEDOWN COLOSTOMY N/A 9/21/2017    Procedure: TAKEDOWN COLOSTOMY;  CLOSURE OF NORRIS'S SIGMOID COLOSTOMY, REPAIR STOMAL HERNIA;  Surgeon: Tima Moreland MD;  Location: [facility]     TONSILLECTOMY         Family History:    Family History   Problem Relation Age of Onset     Diabetes Mother      C.A.D. Mother      Cancer Maternal Grandmother        Social History:  Marital Status:   [2]  Social History     Tobacco Use     Smoking status: Former Smoker     Smokeless tobacco: Former User     Types: Chew     Quit date: 10/19/2013     Tobacco comment: quit in 2000   Substance Use Topics     Alcohol use: No     Drug use: No        Medications:    acetaminophen (TYLENOL) 500 MG tablet  amLODIPine (NORVASC) 5 MG tablet  ASPIRIN PO  atorvastatin (LIPITOR) 40 MG tablet  blood glucose (ACCU-CHEK SMARTVIEW) test strip  blood glucose monitoring (ACCU-CHEK COMPACT CARE KIT) meter device kit  blood glucose monitoring (ACCU-CHEK MULTICLIX) lancets  clopidogrel (PLAVIX) 75 MG tablet  finasteride (PROSCAR) 5 MG tablet  Gymnema Sylvestris Leaf POWD  lisinopril (ZESTRIL) 40 MG tablet  metoprolol tartrate (LOPRESSOR) 100 MG tablet  Multiple Vitamin (MULTIVITAMINS PO)  nitroGLYcerin (NITROSTAT) 0.4 MG sublingual tablet          Review of Systems   Constitutional: Negative for chills and fever.   Eyes: Positive for redness. Negative for photophobia, pain, discharge, itching and visual disturbance.   All other systems reviewed and are negative.      Physical Exam   BP: 103/65  Pulse: 58  Temp: 97.5  F (36.4  C)  Resp: 16  Weight: 74.8 kg (165 lb)  SpO2: 97 %      Physical Exam  Vitals and nursing note reviewed.   Constitutional:       General: He is not in acute distress.     Appearance: Normal appearance. He is not ill-appearing or toxic-appearing.   Eyes:      Extraocular Movements: Extraocular movements intact.      Conjunctiva/sclera:      Right eye: Hemorrhage present.      Pupils: Pupils are equal, round, and  reactive to light.        Comments: Patient has subconjunctival hemorrhage present over right eye.  The hemorrhage does not extend past the iris border.   Cardiovascular:      Rate and Rhythm: Regular rhythm.      Heart sounds: Normal heart sounds.   Pulmonary:      Breath sounds: Normal breath sounds.   Neurological:      Mental Status: He is oriented to person, place, and time.         ED Course                 Procedures             Critical Care time:               No results found for this or any previous visit (from the past 24 hour(s)).    Medications - No data to display    Assessments & Plan (with Medical Decision Making)   #1.  Subconjunctival hemorrhage, right    Discussed exam findings with patient and reassurance is given.  Patient has no emergent ocular concerns at this time.  Patient was informed that due to his anticoagulation he is at increased risk for developing these but that they are not considered emergent and are self-limiting.  We did discuss limiting physical activity to prevent any additional subconjunctival hemorrhage.  If patient experiences any abrupt change in vision or develops pain in his right eye he should return to emergency department immediately.  Patient verbalized understanding and agreement of plan.      I have reviewed the nursing notes.    I have reviewed the findings, diagnosis, plan and need for follow up with the patient.    Discharge Medication List as of 7/24/2022 10:15 AM          Final diagnoses:   Subconjunctival hemorrhage of right eye       7/24/2022   [facility]     Gabe Alvarez PA-C  07/24/22 1023

## 2022-09-04 ENCOUNTER — HEALTH MAINTENANCE LETTER (OUTPATIENT)
Age: 77
End: 2022-09-04

## 2022-09-23 ENCOUNTER — HOSPITAL ENCOUNTER (EMERGENCY)
Facility: HOSPITAL | Age: 77
Discharge: HOME | End: 2022-09-23
Attending: PHYSICIAN ASSISTANT | Admitting: PHYSICIAN ASSISTANT
Payer: MEDICARE

## 2022-09-23 VITALS
OXYGEN SATURATION: 99 % | SYSTOLIC BLOOD PRESSURE: 117 MMHG | DIASTOLIC BLOOD PRESSURE: 73 MMHG | HEART RATE: 60 BPM | RESPIRATION RATE: 20 BRPM | TEMPERATURE: 98.1 F

## 2022-09-23 DIAGNOSIS — T14.8XXA SKIN AVULSION: ICD-10-CM

## 2022-09-23 DIAGNOSIS — Z79.01 LONG TERM CURRENT USE OF ANTICOAGULANT THERAPY: ICD-10-CM

## 2022-09-23 PROCEDURE — G0463 HOSPITAL OUTPT CLINIC VISIT: HCPCS

## 2022-09-23 PROCEDURE — 99213 OFFICE O/P EST LOW 20 MIN: CPT | Performed by: PHYSICIAN ASSISTANT

## 2022-09-23 ASSESSMENT — ENCOUNTER SYMPTOMS
CONSTITUTIONAL NEGATIVE: 1
WOUND: 1
RESPIRATORY NEGATIVE: 1
CARDIOVASCULAR NEGATIVE: 1

## 2022-09-23 NOTE — ED TRIAGE NOTES
Pt presents with a skin tear that he needed to have cauterized because it wouldn't stop bleeding.  Pt states he took the bandage off and peeled the scabbing with it and now the wound is bleeding again.  Pt has would bandaged with coban in triage.

## 2022-09-23 NOTE — DISCHARGE INSTRUCTIONS
May leave this in place for 3 days  Wound check (remove the overlying dressing) on day 4 and let it be like a normal scab. It will slough off naturally over the next week or so.  You can replace it the same way if needed with the additional supplies.

## 2022-09-23 NOTE — ED PROVIDER NOTES
History     Chief Complaint   Patient presents with     Wound Check     HPI  Peter Zhao is a 76 year old male who presents with bleeding from area on RT FA that was chemically cauterized last week. He removed a bandage today and tore off the tissue again - it continues to ooze and he is on clopidogrel, so he would like something else to control bleeding.     Allergies:  Allergies   Allergen Reactions     Ciprofloxacin Hives     Unknown [No Clinical Screening - See Comments]      Antibiotic allergy, pt not sure which       Problem List:    Patient Active Problem List    Diagnosis Date Noted     Acute kidney injury (H) 11/30/2018     Priority: Medium     H/O aortic valve replacement 11/16/2018     Priority: Medium     Hx of aortic valve replacement 11/16/2018     Priority: Medium     ICD (implantable cardioverter-defibrillator) in place 11/16/2018     Priority: Medium     Attention to colostomy (H) 09/21/2017     Priority: Medium     Agitation 05/19/2017     Priority: Medium     TIA (transient ischemic attack) 04/29/2017     Priority: Medium     Hypokalemia 02/20/2017     Priority: Medium     S/P colon resection 02/18/2017     Priority: Medium     Hyperlipidemia LDL goal <100 02/15/2017     Priority: Medium     Elevated prostate specific antigen (PSA) 02/15/2017     Priority: Medium     HTN (hypertension) 02/15/2017     Priority: Medium     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  Peter Zhao has no plan or code status on file. Discussed available resources and provided with information. Confirmed code status reflects current  choices pending further ACP discussions.  Confirmed/documented legally designated decision makers.  Added by Daxa Campa           CAD (coronary artery disease)      Priority: Medium     CABG 2013, stent x 1 in 2014       Type 2 diabetes mellitus without complication, without long-term current use of insulin (H) 10/31/2013     Priority: Medium        Past Medical History:    Past Medical History:   Diagnosis Date     Allergic state      CAD (coronary artery disease)      Cataract      Cataracts, bilateral      Elevated PSA      Gastro-oesophageal reflux disease      Heart murmur      Hyperlipidemia      Hypertension      Pacemaker      Stented coronary artery      Visual aura        Past Surgical History:    Past Surgical History:   Procedure Laterality Date     AORTIC VALVE REPLACEMENT      25 mm CE bovine replacement Dr. Wu 8/20/2012     CARDIAC SURGERY       CATARACT IOL, RT/LT Right 04/2017     COLECTOMY LOW ANTERIOR N/A 2/17/2017    Procedure: COLECTOMY LOW ANTERIOR;  Surgeon: Tima Moreland MD;  [facility]     COLONOSCOPY N/A 2/17/2017    Procedure: COLONOSCOPY;  Surgeon: Tima Moreland MD;  [facility]     COLOSTOMY  02/17/2017    Sigmoid Colostomy performed.      LAPAROSCOPIC HERNIORRHAPHY INGUINAL Right 9/18/2015    Procedure: LAPAROSCOPIC HERNIORRHAPHY INGUINAL;  Surgeon: Solitario Nettles DO;  [facility]     PHACOEMULSIFICATION WITH STANDARD INTRAOCULAR LENS IMPLANT Right 4/20/2017    Procedure: PHACOEMULSIFICATION WITH STANDARD INTRAOCULAR LENS IMPLANT;  CATARACT EXTRACTION RIGHT EYE WITH IMPLANT;  Surgeon: Darin Gutierrez MD;  [facility]: [facility]     PROSTATE BIOPSY, NEEDLE, SATURATION SAMPLING  2009     retinopexy-pvd with retinal tear Right 2008     SIGMOIDOSCOPY FLEXIBLE N/A 02/17/2017    Surgeon: Tima Moreland MD;  Location: [facility]; No specimens taken from procedure.      TAKEDOWN COLOSTOMY N/A 9/21/2017    Procedure: TAKEDOWN COLOSTOMY;  CLOSURE OF NORRIS'S SIGMOID  COLOSTOMY, REPAIR STOMAL HERNIA;  Surgeon: Tima Moreland MD;  Location: [facility]     TONSILLECTOMY         Family History:    Family History   Problem Relation Age of Onset     Diabetes Mother      C.A.D. Mother      Cancer Maternal Grandmother        Social History:  Marital Status:   [2]  Social History     Tobacco Use     Smoking status: Former Smoker     Smokeless tobacco: Former User     Types: Chew     Quit date: 10/19/2013     Tobacco comment: quit in 2000   Substance Use Topics     Alcohol use: No     Drug use: No        Medications:    acetaminophen (TYLENOL) 500 MG tablet  amLODIPine (NORVASC) 5 MG tablet  ASPIRIN PO  atorvastatin (LIPITOR) 40 MG tablet  blood glucose (ACCU-CHEK SMARTVIEW) test strip  blood glucose monitoring (ACCU-CHEK COMPACT CARE KIT) meter device kit  blood glucose monitoring (ACCU-CHEK MULTICLIX) lancets  clopidogrel (PLAVIX) 75 MG tablet  finasteride (PROSCAR) 5 MG tablet  Gymnema Sylvestris Leaf POWD  lisinopril (ZESTRIL) 40 MG tablet  metoprolol tartrate (LOPRESSOR) 100 MG tablet  Multiple Vitamin (MULTIVITAMINS PO)  nitroGLYcerin (NITROSTAT) 0.4 MG sublingual tablet          Review of Systems   Constitutional: Negative.    Respiratory: Negative.    Cardiovascular: Negative.    Skin: Positive for wound.       Physical Exam   BP: 117/73  Pulse: 60  Temp: 98.1  F (36.7  C)  Resp: 20  SpO2: 99 %      Physical Exam  Vitals and nursing note reviewed.   Constitutional:       General: He is not in acute distress.     Appearance: He is not toxic-appearing.   Cardiovascular:      Rate and Rhythm: Normal rate.   Pulmonary:      Effort: Pulmonary effort is normal.   Musculoskeletal:      Right forearm: Laceration (avulsed skin, 2mm max depth medial periphery, but oozing s/s/bloody ooze) present.   Skin:     General: Skin is warm and dry.   Neurological:      Mental Status: He is alert.         ED Course     No results found for this or any previous visit (from the past 24  hour(s)).    Medications   gelatin absorbable (GELFOAM) sponge 1 each (has no administration in time range)     Assessments & Plan (with Medical Decision Making)     I have reviewed the nursing notes.  I have reviewed the findings, diagnosis, plan and need for follow up with the patient.  Discharge Medication List as of 9/23/2022  6:49 PM          Final diagnoses:   Long term current use of anticoagulant therapy   Skin avulsion   Area cleansed with soapy water and dried completely. Gelfoam cut to fit and applied followed by sterile gauze 2x2 and ACe. Will wound check in 3-4 days, but seek care here/PCP with concerns for ongoing bleeding or infection. Instructions in AVS and handout given for patient/family reference.     9/23/2022   [facility]     Frederick Ashraf PA  09/23/22 [id number]

## 2022-09-23 NOTE — ED TRIAGE NOTES
Patient presents to urgent care with c/o a skin tear that wont stop bleeding. States he took the bandage off and peeled the scabbing away and now it wont stop bleeding. Last Friday he got a sore burned to keep closed. And today it all peeled it off.

## 2022-10-01 ENCOUNTER — TRANSFERRED RECORDS (OUTPATIENT)
Dept: MULTI SPECIALTY CLINIC | Facility: CLINIC | Age: 77
End: 2022-10-01

## 2022-10-01 LAB — RETINOPATHY: NORMAL

## 2022-10-05 DIAGNOSIS — E11.9 TYPE 2 DIABETES MELLITUS WITHOUT COMPLICATION, UNSPECIFIED WHETHER LONG TERM INSULIN USE (H): ICD-10-CM

## 2022-10-07 RX ORDER — BLOOD SUGAR DIAGNOSTIC
STRIP MISCELLANEOUS
Qty: 50 STRIP | Refills: 11 | Status: SHIPPED | OUTPATIENT
Start: 2022-10-07

## 2022-10-18 ENCOUNTER — TRANSFERRED RECORDS (OUTPATIENT)
Dept: HEALTH INFORMATION MANAGEMENT | Facility: CLINIC | Age: 77
End: 2022-10-18

## 2022-11-02 ENCOUNTER — TRANSFERRED RECORDS (OUTPATIENT)
Dept: HEALTH INFORMATION MANAGEMENT | Facility: CLINIC | Age: 77
End: 2022-11-02

## 2022-11-07 ENCOUNTER — TELEPHONE (OUTPATIENT)
Dept: DERMATOLOGY | Facility: CLINIC | Age: 77
End: 2022-11-07

## 2022-11-22 ENCOUNTER — TRANSFERRED RECORDS (OUTPATIENT)
Dept: HEALTH INFORMATION MANAGEMENT | Facility: CLINIC | Age: 77
End: 2022-11-22

## 2023-01-15 ENCOUNTER — HEALTH MAINTENANCE LETTER (OUTPATIENT)
Age: 78
End: 2023-01-15

## 2023-01-24 DIAGNOSIS — N40.1 BENIGN NON-NODULAR PROSTATIC HYPERPLASIA WITH LOWER URINARY TRACT SYMPTOMS: ICD-10-CM

## 2023-01-24 DIAGNOSIS — I20.0 INTERMEDIATE CORONARY SYNDROME (H): ICD-10-CM

## 2023-01-24 DIAGNOSIS — E78.5 HYPERLIPIDEMIA LDL GOAL <100: ICD-10-CM

## 2023-01-24 DIAGNOSIS — I10 BENIGN ESSENTIAL HYPERTENSION: ICD-10-CM

## 2023-01-24 RX ORDER — LISINOPRIL 40 MG/1
40 TABLET ORAL DAILY
Qty: 90 TABLET | Refills: 3 | Status: SHIPPED | OUTPATIENT
Start: 2023-01-24 | End: 2024-01-19

## 2023-01-24 RX ORDER — FINASTERIDE 5 MG/1
1 TABLET, FILM COATED ORAL DAILY
Qty: 90 TABLET | Refills: 3 | Status: SHIPPED | OUTPATIENT
Start: 2023-01-24 | End: 2024-01-19

## 2023-01-24 RX ORDER — NITROGLYCERIN 0.4 MG/1
TABLET SUBLINGUAL
Qty: 25 TABLET | Refills: 2 | Status: SHIPPED | OUTPATIENT
Start: 2023-01-24 | End: 2023-06-12

## 2023-01-24 RX ORDER — AMLODIPINE BESYLATE 5 MG/1
5 TABLET ORAL DAILY
Qty: 90 TABLET | Refills: 3 | Status: SHIPPED | OUTPATIENT
Start: 2023-01-24 | End: 2024-01-19

## 2023-01-24 RX ORDER — CLOPIDOGREL BISULFATE 75 MG/1
75 TABLET ORAL DAILY
Qty: 90 TABLET | Refills: 3 | Status: SHIPPED | OUTPATIENT
Start: 2023-01-24 | End: 2024-01-19

## 2023-01-24 RX ORDER — METOPROLOL TARTRATE 100 MG
100 TABLET ORAL 2 TIMES DAILY
Qty: 180 TABLET | Refills: 3 | Status: SHIPPED | OUTPATIENT
Start: 2023-01-24 | End: 2024-01-19

## 2023-01-24 RX ORDER — ATORVASTATIN CALCIUM 40 MG/1
40 TABLET, FILM COATED ORAL DAILY
Qty: 90 TABLET | Refills: 3 | Status: SHIPPED | OUTPATIENT
Start: 2023-01-24 | End: 2024-01-19

## 2023-03-07 ENCOUNTER — TRANSFERRED RECORDS (OUTPATIENT)
Dept: HEALTH INFORMATION MANAGEMENT | Facility: CLINIC | Age: 78
End: 2023-03-07

## 2023-05-22 ENCOUNTER — APPOINTMENT (OUTPATIENT)
Dept: GENERAL RADIOLOGY | Facility: HOSPITAL | Age: 78
End: 2023-05-22
Attending: PHYSICIAN ASSISTANT
Payer: COMMERCIAL

## 2023-05-22 ENCOUNTER — APPOINTMENT (OUTPATIENT)
Dept: CT IMAGING | Facility: HOSPITAL | Age: 78
End: 2023-05-22
Attending: PHYSICIAN ASSISTANT
Payer: COMMERCIAL

## 2023-05-22 ENCOUNTER — HOSPITAL ENCOUNTER (EMERGENCY)
Facility: HOSPITAL | Age: 78
Discharge: HOME | End: 2023-05-22
Attending: PHYSICIAN ASSISTANT | Admitting: PHYSICIAN ASSISTANT
Payer: COMMERCIAL

## 2023-05-22 VITALS
RESPIRATION RATE: 17 BRPM | HEART RATE: 73 BPM | WEIGHT: 174 LBS | SYSTOLIC BLOOD PRESSURE: 112 MMHG | DIASTOLIC BLOOD PRESSURE: 86 MMHG | OXYGEN SATURATION: 95 % | BODY MASS INDEX: 25.15 KG/M2

## 2023-05-22 DIAGNOSIS — R07.9 CHEST PAIN OF UNKNOWN ETIOLOGY: ICD-10-CM

## 2023-05-22 LAB
ANION GAP SERPL CALCULATED.3IONS-SCNC: 11 MMOL/L (ref 7–15)
BASOPHILS # BLD AUTO: 0.1 10E3/UL (ref 0–0.2)
BASOPHILS NFR BLD AUTO: 1 %
BUN SERPL-MCNC: 22.8 MG/DL (ref 8–23)
CALCIUM SERPL-MCNC: 9.5 MG/DL (ref 8.8–10.2)
CHLORIDE SERPL-SCNC: 103 MMOL/L (ref 98–107)
CREAT SERPL-MCNC: 1.29 MG/DL (ref 0.67–1.17)
DEPRECATED HCO3 PLAS-SCNC: 25 MMOL/L (ref 22–29)
EOSINOPHIL # BLD AUTO: 0.2 10E3/UL (ref 0–0.7)
EOSINOPHIL NFR BLD AUTO: 3 %
ERYTHROCYTE [DISTWIDTH] IN BLOOD BY AUTOMATED COUNT: 13.8 % (ref 10–15)
GFR SERPL CREATININE-BSD FRML MDRD: 57 ML/MIN/1.73M2
GLUCOSE SERPL-MCNC: 107 MG/DL (ref 70–99)
HCT VFR BLD AUTO: 44.4 % (ref 40–53)
HGB BLD-MCNC: 14.8 G/DL (ref 13.3–17.7)
HOLD SPECIMEN: NORMAL
IMM GRANULOCYTES # BLD: 0.1 10E3/UL
IMM GRANULOCYTES NFR BLD: 1 %
LYMPHOCYTES # BLD AUTO: 1.5 10E3/UL (ref 0.8–5.3)
LYMPHOCYTES NFR BLD AUTO: 17 %
MCH RBC QN AUTO: 29 PG (ref 26.5–33)
MCHC RBC AUTO-ENTMCNC: 33.3 G/DL (ref 31.5–36.5)
MCV RBC AUTO: 87 FL (ref 78–100)
MONOCYTES # BLD AUTO: 0.9 10E3/UL (ref 0–1.3)
MONOCYTES NFR BLD AUTO: 10 %
NEUTROPHILS # BLD AUTO: 6.3 10E3/UL (ref 1.6–8.3)
NEUTROPHILS NFR BLD AUTO: 68 %
NRBC # BLD AUTO: 0 10E3/UL
NRBC BLD AUTO-RTO: 0 /100
PLATELET # BLD AUTO: 201 10E3/UL (ref 150–450)
POTASSIUM SERPL-SCNC: 4.3 MMOL/L (ref 3.4–5.3)
RBC # BLD AUTO: 5.1 10E6/UL (ref 4.4–5.9)
SODIUM SERPL-SCNC: 139 MMOL/L (ref 136–145)
TROPONIN T SERPL HS-MCNC: 20 NG/L
TROPONIN T SERPL HS-MCNC: 22 NG/L
WBC # BLD AUTO: 9.1 10E3/UL (ref 4–11)

## 2023-05-22 PROCEDURE — 250N000011 HC RX IP 250 OP 636: Performed by: PHYSICIAN ASSISTANT

## 2023-05-22 PROCEDURE — 36415 COLL VENOUS BLD VENIPUNCTURE: CPT | Performed by: PHYSICIAN ASSISTANT

## 2023-05-22 PROCEDURE — 99284 EMERGENCY DEPT VISIT MOD MDM: CPT | Performed by: PHYSICIAN ASSISTANT

## 2023-05-22 PROCEDURE — 80048 BASIC METABOLIC PNL TOTAL CA: CPT | Performed by: PHYSICIAN ASSISTANT

## 2023-05-22 PROCEDURE — 99285 EMERGENCY DEPT VISIT HI MDM: CPT | Mod: 25

## 2023-05-22 PROCEDURE — 71275 CT ANGIOGRAPHY CHEST: CPT

## 2023-05-22 PROCEDURE — 93005 ELECTROCARDIOGRAM TRACING: CPT

## 2023-05-22 PROCEDURE — 84484 ASSAY OF TROPONIN QUANT: CPT | Mod: 91 | Performed by: PHYSICIAN ASSISTANT

## 2023-05-22 PROCEDURE — 71045 X-RAY EXAM CHEST 1 VIEW: CPT

## 2023-05-22 PROCEDURE — 85014 HEMATOCRIT: CPT | Performed by: PHYSICIAN ASSISTANT

## 2023-05-22 PROCEDURE — 250N000013 HC RX MED GY IP 250 OP 250 PS 637: Performed by: PHYSICIAN ASSISTANT

## 2023-05-22 RX ORDER — CYCLOBENZAPRINE HCL 10 MG
10 TABLET ORAL 3 TIMES DAILY PRN
Qty: 15 TABLET | Refills: 0 | Status: SHIPPED | OUTPATIENT
Start: 2023-05-22 | End: 2023-06-08

## 2023-05-22 RX ORDER — IOPAMIDOL 755 MG/ML
54 INJECTION, SOLUTION INTRAVASCULAR ONCE
Status: COMPLETED | OUTPATIENT
Start: 2023-05-22 | End: 2023-05-22

## 2023-05-22 RX ORDER — ASPIRIN 81 MG/1
324 TABLET, CHEWABLE ORAL ONCE
Status: COMPLETED | OUTPATIENT
Start: 2023-05-22 | End: 2023-05-22

## 2023-05-22 RX ADMIN — IOPAMIDOL 54 ML: 755 INJECTION, SOLUTION INTRAVENOUS at 20:20

## 2023-05-22 RX ADMIN — ASPIRIN 324 MG: 81 TABLET, CHEWABLE ORAL at 19:34

## 2023-05-22 ASSESSMENT — ENCOUNTER SYMPTOMS
APPETITE CHANGE: 0
BRUISES/BLEEDS EASILY: 1
BACK PAIN: 1
ACTIVITY CHANGE: 0
FATIGUE: 0
FEVER: 0

## 2023-05-22 ASSESSMENT — ACTIVITIES OF DAILY LIVING (ADL): ADLS_ACUITY_SCORE: 35

## 2023-05-23 ENCOUNTER — TELEPHONE (OUTPATIENT)
Dept: INTERNAL MEDICINE | Facility: OTHER | Age: 78
End: 2023-05-23

## 2023-05-23 NOTE — ED NOTES
Pt reports walking outside at approximately 1730 when he felt a sudden sharp pain in the middle of his chest, then it radiated to his back, and has been intermittent ever since. The pain is most sharp in his back 6/10. Pt states that he has never had a heart attack, but has a pacemaker/defibrillator, and has had bypass.

## 2023-05-23 NOTE — DISCHARGE INSTRUCTIONS
What to expect when you have contrast    During your exam, we will inject  contrast  into your vein or artery. (Contrast is a clear liquid with iodine in it. It shows up on X-rays.)    You may feel warm or hot. You may have a metal taste in your mouth and a slight upset stomach. You may also feel pressure near the kidneys and bladder. These effects will last about 1 to 3 minutes.    Please tell us if you have:   Sneezing    Itching   Hives    Swelling in the face   A hoarse voice   Breathing problems   Other new symptoms    Serious problems are rare.  They may include:   Irregular heartbeat    Seizures   Kidney failure             Tissue damage   Shock     Death    If you have any problems during the exam, we  will treat them right away.    When you get home    Call your hospital if you have any new symptoms in the next 2 days, like hives or swelling. (Phone numbers are at the bottom of this page.) Or call your family doctor.     If you have wheezing or trouble breathing, call 911.    Self-care  -Drink at least 4 extra glasses of water today.   This reduces the stress on your kidneys.  -Keep taking your regular medicines.    The contrast will pass out of your body in your  Urine(pee). This will happen in the next 24 hours. You  will not feel this. Your urine will not  change color.    If you have kidney problems or take metformin    Drink 4 to 8 large glasses of water for the next  2 days, if you are not on a fluid restriction.    ?If you take metformin (Glucophage or Glucovance) for diabetes, keep taking it.      ?Your kidney function tests are abnormal.  If you take Metformin, do not take it for 48 hours. Please go to your clinic for a blood test within 3 days after your exam before the restarting this medicine.     (Note to provider:please give patient prescription for lab tests.)    ?Special instructions:     I have read and understand the above information.    Patient Sign  "Here:______________________________________Date:________Time:______    Staff Sign Here:________________________________________Date:_______Time:______      Radiology Departments:     ?[facility]: [phone] ?[facility]: [phone]     ?[facility]: [phone] ?[facility]:[phone]      ?[facility]: [phone]  ?[facility]: [phone]  ?[facility]:[phone]    ?[facility]:[phone]  ?[facility]:[phone]    As we discussed, your imaging and lab work today did not show evidence of a heart attack or concerning or emergent findings in your chest.  This should not be construed as \"nothing is wrong.\"  It simply means that at this time there is no emergent indication for hospitalization or transfer.  Rest and stay hydrated.  Return to this emergency department for any new or worsening symptoms.  Please follow-up in the clinic this week for recheck.  "

## 2023-05-23 NOTE — ED PROVIDER NOTES
History     Chief Complaint   Patient presents with     Chest Pain     The history is provided by the patient.     Peter Zhao is a 77 year old male who presented to the emergency department ambulatory along with wife for evaluation of chest pain.  Began approximate 2 hours prior to arrival.  Pain began walking down the driveway and describes it as sharp in nature and right-sided chest with radiation to the back.  Waxes and wanes.  No dyspnea or diaphoresis.  Pain will go from 0 to an 8.  Worse with position.  Carries a history of coronary artery disease with bypass as well as ICD.    Allergies:  Allergies   Allergen Reactions     Ciprofloxacin Hives     Unknown [No Clinical Screening - See Comments]      Antibiotic allergy, pt not sure which       Problem List:    Patient Active Problem List    Diagnosis Date Noted     Acute kidney injury (H) 11/30/2018     Priority: Medium     H/O aortic valve replacement 11/16/2018     Priority: Medium     Hx of aortic valve replacement 11/16/2018     Priority: Medium     ICD (implantable cardioverter-defibrillator) in place 11/16/2018     Priority: Medium     Attention to colostomy (H) 09/21/2017     Priority: Medium     Agitation 05/19/2017     Priority: Medium     TIA (transient ischemic attack) 04/29/2017     Priority: Medium     Hypokalemia 02/20/2017     Priority: Medium     S/P colon resection 02/18/2017     Priority: Medium     Hyperlipidemia LDL goal <100 02/15/2017     Priority: Medium     Elevated prostate specific antigen (PSA) 02/15/2017     Priority: Medium     HTN (hypertension) 02/15/2017     Priority: Medium     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 02/15/2017     Priority: Medium     H/O migraine 02/15/2017     Priority: Medium     Visual aura 02/15/2017     Priority: Medium     Cataract 02/15/2017     Priority: Medium     ACP (advance care planning) 10/04/2016     Priority: Medium     Advance Care Planning 10/4/2016: ACP  Review of Chart / Resources Provided:  Reviewed chart for advance care plan.  Peter Zhao has no plan or code status on file. Discussed available resources and provided with information. Confirmed code status reflects current choices pending further ACP discussions.  Confirmed/documented legally designated decision makers.  Added by Daxa Campa           CAD (coronary artery disease)      Priority: Medium     CABG 2013, stent x 1 in 2014       Type 2 diabetes mellitus without complication, without long-term current use of insulin (H) 10/31/2013     Priority: Medium        Past Medical History:    Past Medical History:   Diagnosis Date     Allergic state      CAD (coronary artery disease)      Cataract      Cataracts, bilateral      Elevated PSA      Gastro-oesophageal reflux disease      Heart murmur      Hyperlipidemia      Hypertension      Pacemaker      Stented coronary artery      Visual aura        Past Surgical History:    Past Surgical History:   Procedure Laterality Date     AORTIC VALVE REPLACEMENT      25 mm CE bovine replacement Dr. Wu 8/20/2012     CARDIAC SURGERY       CATARACT IOL, RT/LT Right 04/2017     COLECTOMY LOW ANTERIOR N/A 2/17/2017    Procedure: COLECTOMY LOW ANTERIOR;  Surgeon: Tima Moreland MD;  Location: [facility]     COLONOSCOPY N/A 2/17/2017    Procedure: COLONOSCOPY;  Surgeon: Tima Moreland MD;  Location: [facility]     COLOSTOMY  02/17/2017    Sigmoid Colostomy performed.      LAPAROSCOPIC HERNIORRHAPHY INGUINAL Right 9/18/2015    Procedure: LAPAROSCOPIC HERNIORRHAPHY INGUINAL;  Surgeon: Solitario Nettles DO;  Location: [facility]     PHACOEMULSIFICATION WITH STANDARD INTRAOCULAR LENS IMPLANT Right 4/20/2017    Procedure: PHACOEMULSIFICATION WITH STANDARD INTRAOCULAR LENS IMPLANT;  CATARACT EXTRACTION RIGHT EYE WITH IMPLANT;  Surgeon: Darin Gutierrez MD;  Location: [facility]     PROSTATE BIOPSY, NEEDLE, SATURATION SAMPLING  2009     retinopexy-pvd with retinal tear Right 2008      SIGMOIDOSCOPY FLEXIBLE N/A 02/17/2017    Surgeon: Tima Moreland MD;  Location: [facility]; No specimens taken from procedure.      TAKEDOWN COLOSTOMY N/A 9/21/2017    Procedure: TAKEDOWN COLOSTOMY;  CLOSURE OF NORRIS'S SIGMOID COLOSTOMY, REPAIR STOMAL HERNIA;  Surgeon: Tima Moreland MD;  Location: [facility]     TONSILLECTOMY         Family History:    Family History   Problem Relation Age of Onset     Diabetes Mother      C.A.D. Mother      Cancer Maternal Grandmother        Social History:  Marital Status:   [2]  Social History     Tobacco Use     Smoking status: Former     Smokeless tobacco: Former     Types: Chew     Quit date: 10/19/2013     Tobacco comments:     quit in 2000   Substance Use Topics     Alcohol use: No     Drug use: No        Medications:    ACCU-CHEK GUIDE test strip  acetaminophen (TYLENOL) 500 MG tablet  amLODIPine (NORVASC) 5 MG tablet  ASPIRIN PO  atorvastatin (LIPITOR) 40 MG tablet  blood glucose monitoring (ACCU-CHEK COMPACT CARE KIT) meter device kit  blood glucose monitoring (ACCU-CHEK MULTICLIX) lancets  clopidogrel (PLAVIX) 75 MG tablet  finasteride (PROSCAR) 5 MG tablet  Gymnema Sylvestris Leaf POWD  lisinopril (ZESTRIL) 40 MG tablet  metoprolol tartrate (LOPRESSOR) 100 MG tablet  Multiple Vitamin (MULTIVITAMINS PO)  nitroGLYcerin (NITROSTAT) 0.4 MG sublingual tablet          Review of Systems   Constitutional: Negative for activity change, appetite change, fatigue and fever.   Cardiovascular: Positive for chest pain.   Genitourinary: Negative.    Musculoskeletal: Positive for back pain.   Skin: Negative.    Hematological: Bruises/bleeds easily.       Physical Exam   BP: 136/92  Pulse: 64  Resp: 16  Weight: 78.9 kg (174 lb)  SpO2: 97 %      Physical Exam  Vitals and nursing note reviewed.   Constitutional:       General: He is not in acute distress.     Appearance: Normal appearance. He is normal weight. He is not ill-appearing, toxic-appearing or diaphoretic.      Comments:  Pleasant and talkative 77-year-old male found in Fowlers position in no distress.   Cardiovascular:      Rate and Rhythm: Normal rate.   Pulmonary:      Effort: Pulmonary effort is normal.   Skin:     General: Skin is warm and dry.      Capillary Refill: Capillary refill takes less than 2 seconds.   Neurological:      General: No focal deficit present.      Mental Status: He is alert and oriented to person, place, and time.   Psychiatric:         Mood and Affect: Mood normal.         ED Course              ED Course as of 05/22/23 2207   Mon May 22, 2023   1934 Differential diagnosis is broad and includes but is not limited to acute coronary syndrome, pulmonary embolism, pneumothorax, thoracic aortic dissection, acute pericarditis, pericardial effusion, pneumonia, musculoskeletal source, intra-abdominal source.   1939 Unremarkable CBC   2056 My independent review of the EKG shows electronically paced rhythm.   2056 My independent review of the chest x-ray shows mild cardiomegaly without evidence of significant congestive features, focal infiltrate, or pneumothorax.  There is an AICD.   2150 Patient is requesting discharge home     Procedures              Critical Care time:  none               Results for orders placed or performed during the hospital encounter of 05/22/23 (from the past 24 hour(s))   CBC with platelets differential    Narrative    The following orders were created for panel order CBC with platelets differential.  Procedure                               Abnormality         Status                     ---------                               -----------         ------                     CBC with platelets and d...[[id number]]                      Final result                 Please view results for these tests on the individual orders.   Basic metabolic panel   Result Value Ref Range    Sodium 139 136 - 145 mmol/L    Potassium 4.3 3.4 - 5.3 mmol/L    Chloride 103 98 - 107 mmol/L    Carbon Dioxide (CO2)  25 22 - 29 mmol/L    Anion Gap 11 7 - 15 mmol/L    Urea Nitrogen 22.8 8.0 - 23.0 mg/dL    Creatinine 1.29 (H) 0.67 - 1.17 mg/dL    Calcium 9.5 8.8 - 10.2 mg/dL    Glucose 107 (H) 70 - 99 mg/dL    GFR Estimate 57 (L) >60 mL/min/1.73m2   Troponin T, High Sensitivity   Result Value Ref Range    Troponin T, High Sensitivity 22 <=22 ng/L   CBC with platelets and differential   Result Value Ref Range    WBC Count 9.1 4.0 - 11.0 10e3/uL    RBC Count 5.10 4.40 - 5.90 10e6/uL    Hemoglobin 14.8 13.3 - 17.7 g/dL    Hematocrit 44.4 40.0 - 53.0 %    MCV 87 78 - 100 fL    MCH 29.0 26.5 - 33.0 pg    MCHC 33.3 31.5 - 36.5 g/dL    RDW 13.8 10.0 - 15.0 %    Platelet Count 201 150 - 450 10e3/uL    % Neutrophils 68 %    % Lymphocytes 17 %    % Monocytes 10 %    % Eosinophils 3 %    % Basophils 1 %    % Immature Granulocytes 1 %    NRBCs per 100 WBC 0 <1 /100    Absolute Neutrophils 6.3 1.6 - 8.3 10e3/uL    Absolute Lymphocytes 1.5 0.8 - 5.3 10e3/uL    Absolute Monocytes 0.9 0.0 - 1.3 10e3/uL    Absolute Eosinophils 0.2 0.0 - 0.7 10e3/uL    Absolute Basophils 0.1 0.0 - 0.2 10e3/uL    Absolute Immature Granulocytes 0.1 <=0.4 10e3/uL    Absolute NRBCs 0.0 10e3/uL   [place] Draw    Narrative    The following orders were created for panel order [place] [facility].  Procedure                               Abnormality         Status                     ---------                               -----------         ------                     Extra Blue Top Tube[[id number]]                              Final result               Extra Red Top Tube[[id number]]                               Final result                 Please view results for these tests on the individual orders.   Extra Blue Top Tube   Result Value Ref Range    Hold Specimen JIC    Extra Red Top Tube   Result Value Ref Range    Hold Specimen JIC    [place] [facility]    Narrative    The following orders were created for panel order [place] [facility].  Procedure                                Abnormality         Status                     ---------                               -----------         ------                     Extra Green Top (Lithium...[[id number]]                      Final result                 Please view results for these tests on the individual orders.   Extra Green Top (Lithium Heparin) Tube   Result Value Ref Range    Hold Specimen JIC    Troponin T, High Sensitivity   Result Value Ref Range    Troponin T, High Sensitivity 20 <=22 ng/L       Medications   aspirin (ASA) chewable tablet 324 mg (324 mg Oral $Given 5/22/23 1934)   iopamidol (ISOVUE-370) solution 54 mL (54 mLs Intravenous $Given 5/22/23 2020)   sodium chloride (PF) 0.9% PF flush 100 mL (100 mLs Intravenous $Given 5/22/23 2020)       Assessments & Plan (with Medical Decision Making)   Protracted observation the emergency department with complete resolution of pain.  Patient reports the pain would only return very slightly with position change.  Work-up included a nondiagnostic EKG, negative CT angiogram of the chest, and a negative troponin x2.  Chest pain of unknown etiology.  At this point I can find no reasonable or compelling medical indication for further evaluation on an emergent basis.  We had a long detailed discussion.  Discussed that at this time he will be considered chest pain of unknown etiology.  Plan will be for rest and hydration.  Close clinic follow-up this week.  He was advised to return to this emergency department for any return of symptoms, new symptoms, or other questions or concerns whatsoever.    This document was prepared using a combination of typing and voice generated software.  While every attempt was made for accuracy, spelling and grammatical errors may exist.      I have reviewed the nursing notes.    I have reviewed the findings, diagnosis, plan and need for follow up with the patient.           Medical Decision Making  The patient's presentation was of moderate complexity (an undiagnosed  new problem with uncertain diagnosis).    The patient's evaluation involved:  ordering and/or review of 3+ test(s) in this encounter (Multiple labs and images as well as EKG)  review of 2 test result(s) ordered prior to this encounter (Previous imaging, EKG, and labs)    The patient's management necessitated moderate risk (prescription drug management including medications given in the ED).        New Prescriptions    No medications on file       Final diagnoses:   Chest pain of unknown etiology       5/22/2023   [facility]     Ivette Gregg PA-C  05/22/23 [id number]

## 2023-05-23 NOTE — TELEPHONE ENCOUNTER
Patient needs ED f/u  Asking to be worked in with PCP sooner than next available on 6/8/23?     Pended to PCP to review & advise for overbook

## 2023-05-24 DIAGNOSIS — R07.89 ATYPICAL CHEST PAIN: Primary | ICD-10-CM

## 2023-06-06 ENCOUNTER — TRANSFERRED RECORDS (OUTPATIENT)
Dept: HEALTH INFORMATION MANAGEMENT | Facility: CLINIC | Age: 78
End: 2023-06-06

## 2023-06-07 NOTE — PROGRESS NOTES
Assessment & Plan   Problem List Items Addressed This Visit        Endocrine    Type 2 diabetes mellitus without complication, without long-term current use of insulin (H)    Relevant Orders    Miscellaneous Order for DME - ONLY FOR DME   Other Visit Diagnoses     Type 2 diabetes, HbA1c goal < 7% (H)    -  Primary    Relevant Orders    Hemoglobin A1c    Miscellaneous Order for DME - ONLY FOR DME    CKD (chronic kidney disease) stage 2, GFR 60-89 ml/min                 30 minutes spent by me on the date of the encounter doing chart review, review of test results, interpretation of tests, patient visit and documentation      MED REC REQUIRED  Post Medication Reconciliation Status: discharge medications reconciled, continue medications without change      Follow up in 6 months       Alexander Vazquez, [facility] [place] QAMAR Green is a 77 year old, presenting for the following health issues:  ER F/U         View : No data to display.              LEIGHA Green presents today for follow up.  He was seen in ER on 5/22/23 with chest pain.  Work up was unremarkable, negative troponins, EKG and CTA of chest.  Subsequently he did follow up with cardiology also due to a new pacemaker.  His follow up was good per his report but I do not have the notes.  He states he had an EKG there which was said to be fine.  Denies any recurrent chest pain and it was felt by his cardiologist and ER provider the chest pain was musculoskeletal.  He also has a history for Diabetes and has not had a A1C in almost a year.  He states his sugars are generally around 120. He states his glucometer is not working and may need a new one.     ED/UC Followup:    Facility:  [facility]   Date of visit: 5/22/23  Reason for visit: Chest Pain   Current Status: denies chest pain    Followed up with Cardiologist on 6/6/23- pacemaker check, EKG completed.           Review of Systems   Constitutional, HEENT,  cardiovascular, pulmonary, gi and gu systems are negative, except as otherwise noted.      Objective    /70 (BP Location: Right arm, Patient Position: Sitting, Cuff Size: Adult Regular)   Pulse 56   Temp 97.1  F (36.2  C) (Tympanic)   Resp 16   Wt 78.5 kg (173 lb)   SpO2 96%   BMI 25.00 kg/m    Body mass index is 25 kg/m .  Physical Exam   GENERAL: healthy, alert and no distress  RESP: lungs clear to auscultation - no rales, rhonchi or wheezes  CV: regular rate and rhythm, normal S1 S2, no S3 or S4, no murmur, click or rub, no peripheral edema and peripheral pulses strong  MS: no gross musculoskeletal defects noted, no edema  SKIN: Lesion on left cheek  PSYCH: mentation appears normal, affect normal/bright    Admission on 05/22/2023, Discharged on 05/22/2023   Component Date Value Ref Range Status     Sodium 05/22/2023 139  136 - 145 mmol/L Final     Potassium 05/22/2023 4.3  3.4 - 5.3 mmol/L Final     Chloride 05/22/2023 103  98 - 107 mmol/L Final     Carbon Dioxide (CO2) 05/22/2023 25  22 - 29 mmol/L Final     Anion Gap 05/22/2023 11  7 - 15 mmol/L Final     Urea Nitrogen 05/22/2023 22.8  8.0 - 23.0 mg/dL Final     Creatinine 05/22/2023 1.29 (H)  0.67 - 1.17 mg/dL Final     Calcium 05/22/2023 9.5  8.8 - 10.2 mg/dL Final     Glucose 05/22/2023 107 (H)  70 - 99 mg/dL Final     GFR Estimate 05/22/2023 57 (L)  >60 mL/min/1.73m2 Final    eGFR calculated using 2021 CKD-EPI equation.     Troponin T, High Sensitivity 05/22/2023 22  <=22 ng/L Final    Either a High Sensitivity Troponin T baseline (0 hours) value = 100 ng/L, or an increase in High Sensitivity Troponin T = 7 ng/L at 2 hours compared to 0 hours (2-0 hours), suggests myocardial injury, and urgent clinical attention is required.    If the 2-0 hours increase is <7 ng/L, a High Sensitivity Troponin T result above gender-specific reference ranges warrants further evaluation.   Recommendations for further evaluation include correlation with clinical  decision-making tool (e.g., HEART), a 3rd High Sensitivity Troponin T test 2 hours after the 2nd (a 20% change from baseline would represent concern), admission for observation, close PCC/cardiology follow-up, or urgent outpatient provocative testing.     WBC Count 05/22/2023 9.1  4.0 - 11.0 10e3/uL Final     RBC Count 05/22/2023 5.10  4.40 - 5.90 10e6/uL Final     Hemoglobin 05/22/2023 14.8  13.3 - 17.7 g/dL Final     Hematocrit 05/22/2023 44.4  40.0 - 53.0 % Final     MCV 05/22/2023 87  78 - 100 fL Final     MCH 05/22/2023 29.0  26.5 - 33.0 pg Final     MCHC 05/22/2023 33.3  31.5 - 36.5 g/dL Final     RDW 05/22/2023 13.8  10.0 - 15.0 % Final     Platelet Count 05/22/2023 201  150 - 450 10e3/uL Final     % Neutrophils 05/22/2023 68  % Final     % Lymphocytes 05/22/2023 17  % Final     % Monocytes 05/22/2023 10  % Final     % Eosinophils 05/22/2023 3  % Final     % Basophils 05/22/2023 1  % Final     % Immature Granulocytes 05/22/2023 1  % Final     NRBCs per 100 WBC 05/22/2023 0  <1 /100 Final     Absolute Neutrophils 05/22/2023 6.3  1.6 - 8.3 10e3/uL Final     Absolute Lymphocytes 05/22/2023 1.5  0.8 - 5.3 10e3/uL Final     Absolute Monocytes 05/22/2023 0.9  0.0 - 1.3 10e3/uL Final     Absolute Eosinophils 05/22/2023 0.2  0.0 - 0.7 10e3/uL Final     Absolute Basophils 05/22/2023 0.1  0.0 - 0.2 10e3/uL Final     Absolute Immature Granulocytes 05/22/2023 0.1  <=0.4 10e3/uL Final     Absolute NRBCs 05/22/2023 0.0  10e3/uL Final     Hold Specimen 05/22/2023 JIC   Final     Hold Specimen 05/22/2023 JI   Final     Hold Specimen 05/22/2023 [facility]   Final     Troponin T, High Sensitivity 05/22/2023 20  <=22 ng/L Final    Either a High Sensitivity Troponin T baseline (0 hours) value = 100 ng/L, or an increase in High Sensitivity Troponin T = 7 ng/L at 2 hours compared to 0 hours (2-0 hours), suggests myocardial injury, and urgent clinical attention is required.    If the 2-0 hours increase is <7 ng/L, a High Sensitivity  Troponin T result above gender-specific reference ranges warrants further evaluation.   Recommendations for further evaluation include correlation with clinical decision-making tool (e.g., HEART), a 3rd High Sensitivity Troponin T test 2 hours after the 2nd (a 20% change from baseline would represent concern), admission for observation, close PCC/cardiology follow-up, or urgent outpatient provocative testing.     No results found for any visits on 06/08/23.  No results found for this or any previous visit (from the past 24 hour(s)).

## 2023-06-08 ENCOUNTER — OFFICE VISIT (OUTPATIENT)
Dept: INTERNAL MEDICINE | Facility: OTHER | Age: 78
End: 2023-06-08
Attending: INTERNAL MEDICINE
Payer: COMMERCIAL

## 2023-06-08 VITALS
BODY MASS INDEX: 25 KG/M2 | HEART RATE: 56 BPM | SYSTOLIC BLOOD PRESSURE: 112 MMHG | OXYGEN SATURATION: 96 % | TEMPERATURE: 97.1 F | DIASTOLIC BLOOD PRESSURE: 70 MMHG | WEIGHT: 173 LBS | RESPIRATION RATE: 16 BRPM

## 2023-06-08 DIAGNOSIS — N18.2 CKD (CHRONIC KIDNEY DISEASE) STAGE 2, GFR 60-89 ML/MIN: ICD-10-CM

## 2023-06-08 DIAGNOSIS — E11.9 TYPE 2 DIABETES, HBA1C GOAL < 7% (H): Primary | ICD-10-CM

## 2023-06-08 DIAGNOSIS — E11.9 TYPE 2 DIABETES MELLITUS WITHOUT COMPLICATION, WITHOUT LONG-TERM CURRENT USE OF INSULIN (H): ICD-10-CM

## 2023-06-08 LAB
EST. AVERAGE GLUCOSE BLD GHB EST-MCNC: 163 MG/DL
HBA1C MFR BLD: 7.3 %

## 2023-06-08 PROCEDURE — G0463 HOSPITAL OUTPT CLINIC VISIT: HCPCS

## 2023-06-08 PROCEDURE — 83036 HEMOGLOBIN GLYCOSYLATED A1C: CPT | Mod: ZL | Performed by: INTERNAL MEDICINE

## 2023-06-08 PROCEDURE — 99214 OFFICE O/P EST MOD 30 MIN: CPT | Performed by: INTERNAL MEDICINE

## 2023-06-08 PROCEDURE — 36415 COLL VENOUS BLD VENIPUNCTURE: CPT | Mod: ZL | Performed by: INTERNAL MEDICINE

## 2023-06-08 ASSESSMENT — PAIN SCALES - GENERAL: PAINLEVEL: NO PAIN (0)

## 2023-06-09 DIAGNOSIS — E78.5 HYPERLIPIDEMIA LDL GOAL <100: ICD-10-CM

## 2023-06-09 DIAGNOSIS — I20.0 INTERMEDIATE CORONARY SYNDROME (H): ICD-10-CM

## 2023-06-11 ENCOUNTER — HOSPITAL ENCOUNTER (EMERGENCY)
Facility: HOSPITAL | Age: 78
Discharge: HOME | End: 2023-06-11
Attending: NURSE PRACTITIONER | Admitting: NURSE PRACTITIONER
Payer: COMMERCIAL

## 2023-06-11 VITALS
DIASTOLIC BLOOD PRESSURE: 78 MMHG | RESPIRATION RATE: 18 BRPM | TEMPERATURE: 97.7 F | SYSTOLIC BLOOD PRESSURE: 111 MMHG | OXYGEN SATURATION: 95 % | HEART RATE: 95 BPM

## 2023-06-11 DIAGNOSIS — T14.8XXA SKIN AVULSION: ICD-10-CM

## 2023-06-11 PROCEDURE — 99213 OFFICE O/P EST LOW 20 MIN: CPT | Performed by: NURSE PRACTITIONER

## 2023-06-11 PROCEDURE — G0463 HOSPITAL OUTPT CLINIC VISIT: HCPCS

## 2023-06-11 ASSESSMENT — ACTIVITIES OF DAILY LIVING (ADL): ADLS_ACUITY_SCORE: 35

## 2023-06-11 NOTE — ED TRIAGE NOTES
Pt presents with c/o having a laceration   Pt had a sore and scratched the scab off and it was bleeding put bandaid on it and when he took it off the skin came off with it.  Currently bleeding is controlled   Pt is taking a blood thinner   S/x happened today   No otc meds taken

## 2023-06-11 NOTE — ED PROVIDER NOTES
History     Chief Complaint   Patient presents with     Wound Check     HPI  Peter Zhao is a 77 year old male who presents today for concerns for a skin tear on the right forearm that continues to ooze blood.  He reports he pulled a bandage off of his right forearm and tore open his skin.  He has been soaking through bandages.  He is on Plavix and ASA.      No pain.  No concerns for black or bloody stools, other concerning bleeding.      A bandage was placed in triage and no bleeding since.      Tetanus is up to date but due for a booster soon.  He would like to defer at this point and follow up with PCP for booster.     Allergies:  Allergies   Allergen Reactions     Ciprofloxacin Hives     Unknown [No Clinical Screening - See Comments]      Antibiotic allergy, pt not sure which       Problem List:    Patient Active Problem List    Diagnosis Date Noted     Acute kidney injury (H) 11/30/2018     Priority: Medium     H/O aortic valve replacement 11/16/2018     Priority: Medium     Hx of aortic valve replacement 11/16/2018     Priority: Medium     ICD (implantable cardioverter-defibrillator) in place 11/16/2018     Priority: Medium     Attention to colostomy (H) 09/21/2017     Priority: Medium     Agitation 05/19/2017     Priority: Medium     TIA (transient ischemic attack) 04/29/2017     Priority: Medium     Hypokalemia 02/20/2017     Priority: Medium     S/P colon resection 02/18/2017     Priority: Medium     Hyperlipidemia LDL goal <100 02/15/2017     Priority: Medium     Elevated prostate specific antigen (PSA) 02/15/2017     Priority: Medium     HTN (hypertension) 02/15/2017     Priority: Medium     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 02/15/2017     Priority: Medium     H/O migraine 02/15/2017     Priority: Medium     Visual aura 02/15/2017     Priority: Medium     Cataract 02/15/2017     Priority: Medium     ACP (advance care planning) 10/04/2016     Priority: Medium      Advance Care Planning 10/4/2016: ACP Review of Chart / Resources Provided:  Reviewed chart for advance care plan.  Peter Zhao has no plan or code status on file. Discussed available resources and provided with information. Confirmed code status reflects current choices pending further ACP discussions.  Confirmed/documented legally designated decision makers.  Added by Daxa Campa           CAD (coronary artery disease)      Priority: Medium     CABG 2013, stent x 1 in 2014       Type 2 diabetes mellitus without complication, without long-term current use of insulin (H) 10/31/2013     Priority: Medium        Past Medical History:    Past Medical History:   Diagnosis Date     Allergic state      CAD (coronary artery disease)      Cataract      Cataracts, bilateral      Elevated PSA      Gastro-oesophageal reflux disease      Heart murmur      Hyperlipidemia      Hypertension      Pacemaker      Stented coronary artery      Visual aura        Past Surgical History:    Past Surgical History:   Procedure Laterality Date     AORTIC VALVE REPLACEMENT      25 mm CE bovine replacement Dr. Wu 8/20/2012     CARDIAC SURGERY       CATARACT IOL, RT/LT Right 04/2017     COLECTOMY LOW ANTERIOR N/A 2/17/2017    Procedure: COLECTOMY LOW ANTERIOR;  Surgeon: Tima Moreland MD;  Location: [facility]     COLONOSCOPY N/A 2/17/2017    Procedure: COLONOSCOPY;  Surgeon: Tima Moreland MD;  [facility]: [facility]     COLOSTOMY  02/17/2017    Sigmoid Colostomy performed.      LAPAROSCOPIC HERNIORRHAPHY INGUINAL Right 9/18/2015    Procedure: LAPAROSCOPIC HERNIORRHAPHY INGUINAL;  Surgeon: Solitario Nettles DO;  Location: [facility]     PHACOEMULSIFICATION WITH STANDARD INTRAOCULAR LENS IMPLANT Right 4/20/2017    Procedure: PHACOEMULSIFICATION WITH STANDARD INTRAOCULAR LENS IMPLANT;  CATARACT EXTRACTION RIGHT EYE WITH IMPLANT;  Surgeon: Darin Gutierrez MD;  Location: [facility]     PROSTATE BIOPSY, NEEDLE, SATURATION SAMPLING  2009      retinopexy-pvd with retinal tear Right 2008     SIGMOIDOSCOPY FLEXIBLE N/A 02/17/2017    Surgeon: Tima Moreland MD;  Location: [facility]; No specimens taken from procedure.      TAKEDOWN COLOSTOMY N/A 9/21/2017    Procedure: TAKEDOWN COLOSTOMY;  CLOSURE OF NORRIS'S SIGMOID COLOSTOMY, REPAIR STOMAL HERNIA;  Surgeon: Tima Moreland MD;  Location: [facility]     TONSILLECTOMY         Family History:    Family History   Problem Relation Age of Onset     Diabetes Mother      C.A.D. Mother      Cancer Maternal Grandmother        Social History:  Marital Status:   [2]  Social History     Tobacco Use     Smoking status: Former     Smokeless tobacco: Former     Types: Chew     Quit date: 10/19/2013     Tobacco comments:     quit in 2000   Substance Use Topics     Alcohol use: No     Drug use: No        Medications:    ACCU-CHEK GUIDE test strip  acetaminophen (TYLENOL) 500 MG tablet  amLODIPine (NORVASC) 5 MG tablet  ASPIRIN PO  atorvastatin (LIPITOR) 40 MG tablet  blood glucose monitoring (ACCU-CHEK COMPACT CARE KIT) meter device kit  blood glucose monitoring (ACCU-CHEK MULTICLIX) lancets  clopidogrel (PLAVIX) 75 MG tablet  finasteride (PROSCAR) 5 MG tablet  Gymnema Sylvestris Leaf POWD  lisinopril (ZESTRIL) 40 MG tablet  metoprolol tartrate (LOPRESSOR) 100 MG tablet  Multiple Vitamin (MULTIVITAMINS PO)  nitroGLYcerin (NITROSTAT) 0.4 MG sublingual tablet          Review of Systems   Constitutional: Negative.    Skin: Positive for wound.       Physical Exam   BP: 111/78  Pulse: 95  Temp: 97.7  F (36.5  C)  Resp: 18  SpO2: 95 %      Physical Exam  Vitals and nursing note reviewed.   Constitutional:       Appearance: Normal appearance.   Cardiovascular:      Rate and Rhythm: Normal rate.   Pulmonary:      Effort: Pulmonary effort is normal.   Skin:     Comments: Right forearm with bandage, removed for exam.  The dorsal surface of the right forearm has ~ 3 cm x 1.5 cm skin avulsion that is not actively bleeding.  There is  an additional small skin avulsion just distal to it ~ 0.5 cm x 0.5 cm.  No erythema, edema.  The skin is normothermic.     Neurological:      General: No focal deficit present.      Mental Status: He is alert and oriented to person, place, and time.         ED Course      Cleansed the wound on the right forearm with sterile saline irrigation, dried and applied 4 x 4 mepilex dressing.      Assessments & Plan (with Medical Decision Making)     I have reviewed the nursing notes.    I have reviewed the findings, diagnosis, plan and need for follow up with the patient.      ICD-10-CM    1. Skin avulsion  T14.8XXA         Keep mepilex dressing on for up to 7 days, then remove carefully, assuring to avoid tearing any further skin.      Follow up as needed.      Discharge Medication List as of 6/11/2023  6:27 PM          Final diagnoses:   Skin avulsion       6/11/2023   [facility]     Martha Elizabeth NP  06/13/23 1809

## 2023-06-11 NOTE — DISCHARGE INSTRUCTIONS
Keep the dressing on for up to 7 days, then take it off.  Allow the skin to heal under the dressing    Watch for signs of infection

## 2023-06-12 RX ORDER — NITROGLYCERIN 0.4 MG/1
TABLET SUBLINGUAL
Qty: 25 TABLET | Refills: 2 | Status: SHIPPED | OUTPATIENT
Start: 2023-06-12 | End: 2024-04-29

## 2023-06-12 NOTE — TELEPHONE ENCOUNTER
Nitro      Last Written Prescription Date:  1/24/2023  Last Fill Quantity: 25,   # refills: 2  Last Office Visit: 06/8/2023  Future Office visit:

## 2023-06-13 ASSESSMENT — ENCOUNTER SYMPTOMS
WOUND: 1
CONSTITUTIONAL NEGATIVE: 1

## 2023-06-27 ENCOUNTER — ANCILLARY PROCEDURE (OUTPATIENT)
Dept: GENERAL RADIOLOGY | Facility: OTHER | Age: 78
End: 2023-06-27
Attending: INTERNAL MEDICINE
Payer: COMMERCIAL

## 2023-06-27 ENCOUNTER — OFFICE VISIT (OUTPATIENT)
Dept: INTERNAL MEDICINE | Facility: OTHER | Age: 78
End: 2023-06-27
Attending: INTERNAL MEDICINE
Payer: COMMERCIAL

## 2023-06-27 VITALS
SYSTOLIC BLOOD PRESSURE: 108 MMHG | DIASTOLIC BLOOD PRESSURE: 64 MMHG | HEART RATE: 55 BPM | WEIGHT: 170.8 LBS | BODY MASS INDEX: 24.68 KG/M2 | OXYGEN SATURATION: 95 % | TEMPERATURE: 97.5 F | RESPIRATION RATE: 20 BRPM

## 2023-06-27 DIAGNOSIS — N18.30 STAGE 3 CHRONIC KIDNEY DISEASE, UNSPECIFIED WHETHER STAGE 3A OR 3B CKD (H): ICD-10-CM

## 2023-06-27 DIAGNOSIS — L02.92 BOIL: ICD-10-CM

## 2023-06-27 DIAGNOSIS — E11.9 TYPE 2 DIABETES, HBA1C GOAL < 7% (H): ICD-10-CM

## 2023-06-27 DIAGNOSIS — L02.92 BOIL: Primary | ICD-10-CM

## 2023-06-27 LAB
CREAT UR-MCNC: 41 MG/DL
MICROALBUMIN UR-MCNC: <12 MG/L
MICROALBUMIN/CREAT UR: NORMAL MG/G{CREAT}

## 2023-06-27 PROCEDURE — G0463 HOSPITAL OUTPT CLINIC VISIT: HCPCS

## 2023-06-27 PROCEDURE — 99214 OFFICE O/P EST MOD 30 MIN: CPT | Performed by: INTERNAL MEDICINE

## 2023-06-27 PROCEDURE — 82570 ASSAY OF URINE CREATININE: CPT | Mod: ZL | Performed by: INTERNAL MEDICINE

## 2023-06-27 PROCEDURE — 71046 X-RAY EXAM CHEST 2 VIEWS: CPT | Mod: TC,FY

## 2023-06-27 RX ORDER — LANCETS
EACH MISCELLANEOUS
Qty: 100 EACH | Refills: 11 | Status: SHIPPED | OUTPATIENT
Start: 2023-06-27

## 2023-06-27 ASSESSMENT — PAIN SCALES - GENERAL: PAINLEVEL: NO PAIN (0)
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
## 2023-06-27 NOTE — Clinical Note
Would either of you look at this or would there be someone else?  Developing a cyst right over his sternotomy scar, took CXR and did not show obvious wires pushing through.

## 2023-06-28 ENCOUNTER — TELEPHONE (OUTPATIENT)
Dept: INTERNAL MEDICINE | Facility: OTHER | Age: 78
End: 2023-06-28

## 2023-06-28 NOTE — TELEPHONE ENCOUNTER
1:26 PM    Reason for Call: Phone Call    Description: Patient's wife called in, calling Meenakshi back, and has a different number for her to call. [phone] Please call wife back as she has questions.     Was an appointment offered for this call? No  If yes : Appointment type              Date    Preferred method for responding to this message: Telephone Call  What is your phone number ? [phone]    If we cannot reach you directly, may we leave a detailed response at the number you provided? Yes    Can this message wait until your PCP/provider returns, if available today? Not applicable, provider in clinic    Marilyn Virk

## 2023-06-28 NOTE — TELEPHONE ENCOUNTER
Call returned to patient. Patient requesting RN CC to speak with his wife, Floridalma.     Floridalma providing an update [facility] sent a fax to Dr. Vazquez, received a return fax and is unable to reach Dr. Vazquez's writing.     Floridalma requesting RN to reach out to [facility] to discuss further.

## 2023-06-28 NOTE — TELEPHONE ENCOUNTER
10:46 AM    Reason for Call: Phone Call    Description: Patient's wife called in to return Meenakshi's phone call. Please call patient back.     Was an appointment offered for this call? No  If yes : Appointment type              Date    Preferred method for responding to this message: Telephone Call  What is your phone number ?  [phone]    If we cannot reach you directly, may we leave a detailed response at the number you provided? Yes    Can this message wait until your PCP/provider returns, if available today? Not applicable,PROVIDER IN CLINIC    Marilyn Virk

## 2023-06-29 NOTE — TELEPHONE ENCOUNTER
Call placed to [facility] to inquire on information needed by Dr. Vazquez relating to meter device. RN CC spoke with Fredy, received an update the meter device kit has been shipped, patient should be receiving the kit by end of day on 7/3/23.    A call was then placed to patient, RN CC spoke with wife, Floridalma provided update the meter device kit has been shipped, is scheduled to be delivered by end of day on 7/3/23.    Floridalma verbalized understanding, thanking this writer for the assistance.

## 2023-07-04 ENCOUNTER — HOSPITAL ENCOUNTER (EMERGENCY)
Facility: HOSPITAL | Age: 78
End: 2023-07-04
Payer: COMMERCIAL

## 2023-07-12 ENCOUNTER — TELEPHONE (OUTPATIENT)
Dept: INTERNAL MEDICINE | Facility: OTHER | Age: 78
End: 2023-07-12

## 2023-07-12 NOTE — TELEPHONE ENCOUNTER
Call returned to patient with no answer, will continue to attempt to reach patient.     Refill is at pharmacy, ordered on 6/12/23 #25 with 2 refills per Dr. Vazquez.

## 2023-07-12 NOTE — TELEPHONE ENCOUNTER
2nd attempt to reach patient with no answer, will continue to attempt to reach patient to provide update refill is at the pharmacy.

## 2023-09-05 ENCOUNTER — TRANSFERRED RECORDS (OUTPATIENT)
Dept: HEALTH INFORMATION MANAGEMENT | Facility: CLINIC | Age: 78
End: 2023-09-05

## 2023-11-08 ENCOUNTER — TRANSFERRED RECORDS (OUTPATIENT)
Dept: HEALTH INFORMATION MANAGEMENT | Facility: CLINIC | Age: 78
End: 2023-11-08

## 2023-11-08 LAB — RETINOPATHY: NORMAL

## 2023-12-06 ENCOUNTER — TRANSFERRED RECORDS (OUTPATIENT)
Dept: HEALTH INFORMATION MANAGEMENT | Facility: CLINIC | Age: 78
End: 2023-12-06

## 2023-12-09 ENCOUNTER — HEALTH MAINTENANCE LETTER (OUTPATIENT)
Age: 78
End: 2023-12-09

## 2024-01-19 DIAGNOSIS — N40.1 BENIGN NON-NODULAR PROSTATIC HYPERPLASIA WITH LOWER URINARY TRACT SYMPTOMS: ICD-10-CM

## 2024-01-19 DIAGNOSIS — I10 BENIGN ESSENTIAL HYPERTENSION: ICD-10-CM

## 2024-01-19 DIAGNOSIS — E78.5 HYPERLIPIDEMIA LDL GOAL <100: ICD-10-CM

## 2024-01-19 RX ORDER — CLOPIDOGREL BISULFATE 75 MG/1
75 TABLET ORAL DAILY
Qty: 14 TABLET | Refills: 0 | Status: SHIPPED | OUTPATIENT
Start: 2024-01-19 | End: 2024-03-29

## 2024-01-19 RX ORDER — LISINOPRIL 40 MG/1
40 TABLET ORAL DAILY
Qty: 14 TABLET | Refills: 0 | Status: SHIPPED | OUTPATIENT
Start: 2024-01-19 | End: 2024-04-29

## 2024-01-19 RX ORDER — METOPROLOL TARTRATE 100 MG
100 TABLET ORAL 2 TIMES DAILY
Qty: 180 TABLET | Refills: 1 | Status: SHIPPED | OUTPATIENT
Start: 2024-01-19 | End: 2024-04-29

## 2024-01-19 RX ORDER — METOPROLOL TARTRATE 100 MG
100 TABLET ORAL 2 TIMES DAILY
Qty: 28 TABLET | Refills: 0 | Status: SHIPPED | OUTPATIENT
Start: 2024-01-19 | End: 2024-04-29

## 2024-01-19 RX ORDER — LISINOPRIL 40 MG/1
40 TABLET ORAL DAILY
Qty: 90 TABLET | Refills: 1 | Status: SHIPPED | OUTPATIENT
Start: 2024-01-19 | End: 2024-04-29

## 2024-01-19 RX ORDER — AMLODIPINE BESYLATE 5 MG/1
5 TABLET ORAL DAILY
Qty: 90 TABLET | Refills: 1 | Status: SHIPPED | OUTPATIENT
Start: 2024-01-19 | End: 2024-04-29

## 2024-01-19 RX ORDER — FINASTERIDE 5 MG/1
1 TABLET, FILM COATED ORAL DAILY
Qty: 90 TABLET | Refills: 1 | Status: SHIPPED | OUTPATIENT
Start: 2024-01-19 | End: 2024-04-29

## 2024-01-19 RX ORDER — CLOPIDOGREL BISULFATE 75 MG/1
75 TABLET ORAL DAILY
Qty: 90 TABLET | Refills: 1 | Status: SHIPPED | OUTPATIENT
Start: 2024-01-19 | End: 2024-04-29

## 2024-01-19 RX ORDER — AMLODIPINE BESYLATE 5 MG/1
5 TABLET ORAL DAILY
Qty: 14 TABLET | Refills: 0 | Status: SHIPPED | OUTPATIENT
Start: 2024-01-19 | End: 2024-04-29

## 2024-01-19 RX ORDER — FINASTERIDE 5 MG/1
1 TABLET, FILM COATED ORAL DAILY
Qty: 14 TABLET | Refills: 0 | Status: SHIPPED | OUTPATIENT
Start: 2024-01-19 | End: 2024-04-29

## 2024-01-19 RX ORDER — ATORVASTATIN CALCIUM 40 MG/1
40 TABLET, FILM COATED ORAL DAILY
Qty: 90 TABLET | Refills: 1 | Status: SHIPPED | OUTPATIENT
Start: 2024-01-19 | End: 2024-04-29

## 2024-01-19 RX ORDER — ATORVASTATIN CALCIUM 40 MG/1
40 TABLET, FILM COATED ORAL DAILY
Qty: 14 TABLET | Refills: 0 | Status: SHIPPED | OUTPATIENT
Start: 2024-01-19 | End: 2024-04-29

## 2024-01-19 NOTE — TELEPHONE ENCOUNTER
[facility] not attached [phone].  System fax number has not been updated.

## 2024-01-19 NOTE — TELEPHONE ENCOUNTER
LIPITOR    NORVASC       PLAVIX     PROSCAR    ZESTRIL    LOPRESSOR  Last Written Prescription Date:  1-24-23  Last Fill Quantity: 90,   # refills: 3  Last Office Visit: 6-27-23  Future Office visit:       Routing refill request to provider for review/approval because:  6 MEDICATIONS

## 2024-01-19 NOTE — TELEPHONE ENCOUNTER
"Patient requesting 2 week supply of medications pended to be sent to [facility] in [place] until receiving 90 day supply from [facility].    Medications to include:  Lisinopril  Atorvastatin  Norvasc  Plavix  Finasteride  Metoprolol    LOV: 6/27/23    BP Readings from Last 3 Encounters:   06/27/23 108/64   06/11/23 111/78   06/08/23 112/70     Lab Results   Component Value Date    CHOL 130 06/15/2022    CHOL 142 06/08/2021     Lab Results   Component Value Date    HDL 30 06/15/2022    HDL 26 06/08/2021     Lab Results   Component Value Date    LDL 64 06/15/2022    LDL 65 06/08/2021     Lab Results   Component Value Date    TRIG 181 06/15/2022    TRIG 256 06/08/2021     No results found for: \"CHOLHDLRATIO\"    Creatinine   Date Value Ref Range Status   05/22/2023 1.29 (H) 0.67 - 1.17 mg/dL Final   06/08/2021 1.19 0.66 - 1.25 mg/dL Final     Lab Results   Component Value Date    ALT 22 06/15/2022    ALT 24 06/08/2021     GFR Estimate   Date Value Ref Range Status   05/22/2023 57 (L) >60 mL/min/1.73m2 Final     Comment:     eGFR calculated using 2021 CKD-EPI equation.   06/08/2021 59 (L) >60 mL/min/[1.73_m2] Final     Comment:     Non  GFR Calc  Starting 12/18/2018, serum creatinine based estimated GFR (eGFR) will be   calculated using the Chronic Kidney Disease Epidemiology Collaboration   (CKD-EPI) equation.       GFR Estimate If Black   Date Value Ref Range Status   06/08/2021 68 >60 mL/min/[1.73_m2] Final     Comment:      GFR Calc  Starting 12/18/2018, serum creatinine based estimated GFR (eGFR) will be   calculated using the Chronic Kidney Disease Epidemiology Collaboration   (CKD-EPI) equation.         "

## 2024-01-19 NOTE — TELEPHONE ENCOUNTER
Reason for call:  Medication      Have you contacted your pharmacy? No   If patient has contacted Pharmacy and it has been over 72hrs, continue to #2  Medication atorvastatin 40 mg, norvasc 5 mg, plavix 75 mg, finasteride 5 mg, lisinopril, 40 mg, metoprolol tartrate 100 mg  What Pharmacy do you use? [facility] fax [phone] and [facility] in [place] for a 2 week supply until they get [facility] straighten out      (Please note that the turn-around-time for prescriptions is 72 business hours; I am sending your request at this time. SEND TO  [facility]  )

## 2024-01-22 ENCOUNTER — TELEPHONE (OUTPATIENT)
Dept: INTERNAL MEDICINE | Facility: OTHER | Age: 79
End: 2024-01-22

## 2024-01-22 DIAGNOSIS — E78.5 HYPERLIPIDEMIA LDL GOAL <100: ICD-10-CM

## 2024-01-22 DIAGNOSIS — I10 BENIGN ESSENTIAL HYPERTENSION: ICD-10-CM

## 2024-01-22 DIAGNOSIS — L20.9 ATOPIC DERMATITIS, UNSPECIFIED TYPE: ICD-10-CM

## 2024-01-22 RX ORDER — NYSTATIN AND TRIAMCINOLONE ACETONIDE 100000; 1 [USP'U]/G; MG/G
CREAM TOPICAL
Qty: 30 G | Refills: 0 | Status: SHIPPED | OUTPATIENT
Start: 2024-01-22

## 2024-01-22 NOTE — TELEPHONE ENCOUNTER
[facility]      Last Written Prescription Date:  11.2.20  Last Fill Quantity: #30g,   # refills: 0  Last Office Visit: 6.27.23  Future Office visit:       Routing refill request to provider for review/approval because:  Drug not on the G, P or [facility] refill protocol or controlled substance

## 2024-01-22 NOTE — TELEPHONE ENCOUNTER
Order pended in separate encounter on 1/19/24.    Sent to Dr. Vazquez for review upon returning to the clinic on 1/23/24.

## 2024-01-22 NOTE — TELEPHONE ENCOUNTER
Reason for call:  Medication      Have you contacted your pharmacy? No   If patient has contacted Pharmacy and it has been over 72hrs, continue to #2  Medication Lisinopril  Atorvastatin  Norvasc  Plavix  Finasteride  Metoprolol  What Pharmacy do you use? [facility] in [place], from [facility] on [facility] to [facility] and not [facility]      (Please note that the turn-around-time for prescriptions is 72 business hours; I am sending your request at this time. SEND TO  [facility]  )

## 2024-02-08 ENCOUNTER — TRANSFERRED RECORDS (OUTPATIENT)
Dept: HEALTH INFORMATION MANAGEMENT | Facility: HOSPITAL | Age: 79
End: 2024-02-08

## 2024-02-17 ENCOUNTER — HEALTH MAINTENANCE LETTER (OUTPATIENT)
Age: 79
End: 2024-02-17

## 2024-03-05 ENCOUNTER — TRANSFERRED RECORDS (OUTPATIENT)
Dept: HEALTH INFORMATION MANAGEMENT | Facility: CLINIC | Age: 79
End: 2024-03-05

## 2024-03-05 LAB — EJECTION FRACTION: 55.7 %

## 2024-03-29 ENCOUNTER — HOSPITAL ENCOUNTER (EMERGENCY)
Facility: HOSPITAL | Age: 79
Discharge: HOME | End: 2024-03-29
Attending: NURSE PRACTITIONER | Admitting: NURSE PRACTITIONER
Payer: COMMERCIAL

## 2024-03-29 VITALS
TEMPERATURE: 96.8 F | DIASTOLIC BLOOD PRESSURE: 56 MMHG | RESPIRATION RATE: 19 BRPM | OXYGEN SATURATION: 96 % | HEART RATE: 68 BPM | SYSTOLIC BLOOD PRESSURE: 100 MMHG

## 2024-03-29 DIAGNOSIS — J20.9 ACUTE BRONCHITIS WITH SYMPTOMS > 10 DAYS: ICD-10-CM

## 2024-03-29 PROCEDURE — 99213 OFFICE O/P EST LOW 20 MIN: CPT | Performed by: NURSE PRACTITIONER

## 2024-03-29 PROCEDURE — G0463 HOSPITAL OUTPT CLINIC VISIT: HCPCS

## 2024-03-29 RX ORDER — BENZONATATE 100 MG/1
CAPSULE ORAL
COMMUNITY
Start: 2024-03-20 | End: 2024-04-29

## 2024-03-29 RX ORDER — AZITHROMYCIN 250 MG/1
TABLET, FILM COATED ORAL
Qty: 6 TABLET | Refills: 0 | Status: SHIPPED | OUTPATIENT
Start: 2024-03-29 | End: 2024-04-03

## 2024-03-29 RX ORDER — FLUTICASONE PROPIONATE 50 MCG
1 SPRAY, SUSPENSION (ML) NASAL DAILY
Qty: 18.2 ML | Refills: 0 | Status: SHIPPED | OUTPATIENT
Start: 2024-03-29 | End: 2024-04-29

## 2024-03-29 RX ORDER — CETIRIZINE HYDROCHLORIDE 10 MG/1
10 TABLET ORAL 2 TIMES DAILY PRN
Qty: 28 TABLET | Refills: 0 | Status: SHIPPED | OUTPATIENT
Start: 2024-03-29 | End: 2024-04-29

## 2024-03-29 ASSESSMENT — ENCOUNTER SYMPTOMS
VOMITING: 0
FATIGUE: 1
RHINORRHEA: 1
NAUSEA: 0
EYES NEGATIVE: 1
CHILLS: 0
FEVER: 0
COUGH: 1
MYALGIAS: 0
ACTIVITY CHANGE: 1
HEADACHES: 0
SORE THROAT: 0
DIARRHEA: 0
SHORTNESS OF BREATH: 0

## 2024-03-29 ASSESSMENT — COLUMBIA-SUICIDE SEVERITY RATING SCALE - C-SSRS
1. IN THE PAST MONTH, HAVE YOU WISHED YOU WERE DEAD OR WISHED YOU COULD GO TO SLEEP AND NOT WAKE UP?: NO
2. HAVE YOU ACTUALLY HAD ANY THOUGHTS OF KILLING YOURSELF IN THE PAST MONTH?: NO
6. HAVE YOU EVER DONE ANYTHING, STARTED TO DO ANYTHING, OR PREPARED TO DO ANYTHING TO END YOUR LIFE?: NO

## 2024-03-29 ASSESSMENT — ACTIVITIES OF DAILY LIVING (ADL): ADLS_ACUITY_SCORE: 36

## 2024-03-29 NOTE — ED PROVIDER NOTES
History     Chief Complaint   Patient presents with    Cough     HPI  Peter Zhao is a 78 year old male who is accompanied per his wife.  He presents with a 2-week history of cough, runny nose/nasal congestion, and fatigue.  Has been utilizing Afrin nasal spray, Mucinex, NyQuil, Robitussin and Tessalon Perles without resolution of his symptoms.  Has not had anything for the past 2 days.  No known sick contacts.  Quit smoking 40 years ago.  Had fifth COVID vaccination October, 2022.  Influenza vaccine October, 2023.  Denies fevers, chills, nausea, vomiting, diarrhea, headaches, and shortness of breath.     Allergies:  Allergies   Allergen Reactions    Ciprofloxacin Hives    Unknown [No Clinical Screening - See Comments]      Antibiotic allergy, pt not sure which       Problem List:    Patient Active Problem List    Diagnosis Date Noted    Acute kidney injury (H24) 11/30/2018     Priority: Medium    H/O aortic valve replacement 11/16/2018     Priority: Medium    Hx of aortic valve replacement 11/16/2018     Priority: Medium    ICD (implantable cardioverter-defibrillator) in place 11/16/2018     Priority: Medium    Attention to colostomy (H) 09/21/2017     Priority: Medium    Agitation 05/19/2017     Priority: Medium    TIA (transient ischemic attack) 04/29/2017     Priority: Medium    Hypokalemia 02/20/2017     Priority: Medium    S/P colon resection 02/18/2017     Priority: Medium    Hyperlipidemia LDL goal <100 02/15/2017     Priority: Medium    Elevated prostate specific antigen (PSA) 02/15/2017     Priority: Medium    HTN (hypertension) 02/15/2017     Priority: Medium    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 02/15/2017     Priority: Medium    H/O migraine 02/15/2017     Priority: Medium    Visual aura 02/15/2017     Priority: Medium    Cataract 02/15/2017     Priority: Medium    ACP (advance care planning) 10/04/2016     Priority: Medium     Advance Care Planning 10/4/2016:  ACP Review of Chart / Resources Provided:  Reviewed chart for advance care plan.  Peter Zhao has no plan or code status on file. Discussed available resources and provided with information. Confirmed code status reflects current choices pending further ACP discussions.  Confirmed/documented legally designated decision makers.  Added by Daxa Campa          CAD (coronary artery disease)      Priority: Medium     CABG 2013, stent x 1 in 2014      Type 2 diabetes mellitus without complication, without long-term current use of insulin (H) 10/31/2013     Priority: Medium        Past Medical History:    Past Medical History:   Diagnosis Date    Allergic state     CAD (coronary artery disease)     Cataract     Cataracts, bilateral     Elevated PSA     Gastro-oesophageal reflux disease     Heart murmur     Hyperlipidemia     Hypertension     Pacemaker     Stented coronary artery     Visual aura        Past Surgical History:    Past Surgical History:   Procedure Laterality Date    AORTIC VALVE REPLACEMENT      25 mm CE bovine replacement Dr. Wu 8/20/2012    CARDIAC SURGERY      CATARACT IOL, RT/LT Right 04/2017    COLECTOMY LOW ANTERIOR N/A 2/17/2017    Procedure: COLECTOMY LOW ANTERIOR;  Surgeon: Tima Moreland MD;  Location: [facility]    COLONOSCOPY N/A 2/17/2017    Procedure: COLONOSCOPY;  Surgeon: Tima Moreland MD;  Location: [facility]    COLOSTOMY  02/17/2017    Sigmoid Colostomy performed.     LAPAROSCOPIC HERNIORRHAPHY INGUINAL Right 9/18/2015    Procedure: LAPAROSCOPIC HERNIORRHAPHY INGUINAL;  Surgeon: Solitario Nettles DO;  Location: [facility]    PHACOEMULSIFICATION WITH STANDARD INTRAOCULAR LENS IMPLANT Right 4/20/2017    Procedure: PHACOEMULSIFICATION WITH STANDARD INTRAOCULAR LENS IMPLANT;  CATARACT EXTRACTION RIGHT EYE WITH IMPLANT;  Surgeon: Darin Gutierrez MD;  Location: [facility]    PROSTATE BIOPSY, NEEDLE, SATURATION SAMPLING  2009    retinopexy-pvd with retinal tear Right 2008    SIGMOIDOSCOPY  FLEXIBLE N/A 02/17/2017    Surgeon: Tima Moreland MD;  Location: [facility]; No specimens taken from procedure.     TAKEDOWN COLOSTOMY N/A 9/21/2017    Procedure: TAKEDOWN COLOSTOMY;  CLOSURE OF NORRIS'S SIGMOID COLOSTOMY, REPAIR STOMAL HERNIA;  Surgeon: Tima Moreland MD;  Location: [facility]    TONSILLECTOMY         Family History:    Family History   Problem Relation Age of Onset    Diabetes Mother     C.A.D. Mother     Cancer Maternal Grandmother        Social History:  Marital Status:   [2]  Social History     Tobacco Use    Smoking status: Former    Smokeless tobacco: Former     Types: Chew     Quit date: 10/19/2013    Tobacco comments:     quit in 2000   Substance Use Topics    Alcohol use: No    Drug use: No        Medications:    amLODIPine (NORVASC) 5 MG tablet  ASPIRIN PO  atorvastatin (LIPITOR) 40 MG tablet  azithromycin (ZITHROMAX) 250 MG tablet  benzonatate (TESSALON) 100 MG capsule  cetirizine (ZYRTEC) 10 MG tablet  clopidogrel (PLAVIX) 75 MG tablet  finasteride (PROSCAR) 5 MG tablet  fluticasone (FLONASE) 50 MCG/ACT nasal spray  lisinopril (ZESTRIL) 40 MG tablet  metoprolol tartrate (LOPRESSOR) 100 MG tablet  Multiple Vitamin (MULTIVITAMINS PO)  ACCU-CHEK GUIDE test strip  acetaminophen (TYLENOL) 500 MG tablet  amLODIPine (NORVASC) 5 MG tablet  atorvastatin (LIPITOR) 40 MG tablet  blood glucose (NO BRAND SPECIFIED) test strip  blood glucose monitoring (ACCU-CHEK COMPACT CARE KIT) meter device kit  blood glucose monitoring (ACCU-CHEK MULTICLIX) lancets  blood glucose monitoring (NO BRAND SPECIFIED) meter device kit  finasteride (PROSCAR) 5 MG tablet  Gymnema Sylvestris Leaf POWD  lisinopril (ZESTRIL) 40 MG tablet  metoprolol tartrate (LOPRESSOR) 100 MG tablet  nitroGLYcerin (NITROSTAT) 0.4 MG sublingual tablet  nystatin-triamcinolone (MYCOLOG II) 193639-3.1 UNIT/GM-% external cream  thin (NO BRAND SPECIFIED) lancets          Review of Systems   Constitutional:  Positive for activity change and  fatigue. Negative for chills and fever.   HENT:  Positive for congestion (nasal) and rhinorrhea (chronic. worse than normal). Negative for ear pain and sore throat.    Eyes: Negative.    Respiratory:  Positive for cough (white mucus). Negative for shortness of breath.    Gastrointestinal:  Negative for diarrhea, nausea and vomiting.   Musculoskeletal:  Negative for myalgias.   Skin: Negative.    Neurological:  Negative for headaches.       Physical Exam   BP: 100/56  Pulse: 68  Temp: 96.8  F (36  C)  Resp: 19  SpO2: 96 %      Physical Exam  Vitals and nursing note reviewed.   Constitutional:       General: He is in acute distress (mild).      Appearance: He is normal weight.   HENT:      Head: Normocephalic.      Right Ear: Tympanic membrane and ear canal normal.      Left Ear: Tympanic membrane and ear canal normal.      Nose: Mucosal edema and rhinorrhea (Thick white) present.      Right Sinus: No maxillary sinus tenderness or frontal sinus tenderness.      Left Sinus: No maxillary sinus tenderness or frontal sinus tenderness.      Mouth/Throat:      Lips: Pink.      Mouth: Mucous membranes are moist.      Pharynx: Uvula midline. No posterior oropharyngeal erythema.      Comments: Has golf ball size goiter left anterior neck. Previously evaluated.    moderate amount of translucent post nasal drip noted posterior oropharynx      Eyes:      Conjunctiva/sclera: Conjunctivae normal.   Cardiovascular:      Rate and Rhythm: Normal rate and regular rhythm.      Heart sounds: Murmur heard.   Pulmonary:      Effort: Pulmonary effort is normal. No respiratory distress.      Breath sounds: Normal breath sounds. No wheezing.   Lymphadenopathy:      Cervical: No cervical adenopathy.   Skin:     General: Skin is warm and dry.   Neurological:      Mental Status: He is alert and oriented to person, place, and time.   Psychiatric:         Behavior: Behavior normal.         ED Course        Procedures             No results found  for this or any previous visit (from the past 24 hour(s)).    Medications - No data to display    Assessments & Plan (with Medical Decision Making)     I have reviewed the nursing notes.    I have reviewed the findings, diagnosis, plan and need for follow up with the patient.  (J20.9) Acute bronchitis with symptoms > 10 days  Comment: 78 year old male who is accompanied per his wife.  He presents with a 2-week history of cough, runny nose/nasal congestion, and fatigue.  Has been utilizing Afrin nasal spray, Mucinex, NyQuil, Robitussin and Tessalon Perles without resolution of his symptoms.  Has not had anything for the past 2 days.  No known sick contacts.  Quit smoking 40 years ago.  Had fifth COVID vaccination October, 2022.  Influenza vaccine October, 2023.  Denies fevers, chills, nausea, vomiting, diarrhea, headaches, and shortness of breath.     MDM:Murmur.  Lungs clear to auscultation.    Plan: Azithromycin Z-Tian.  Flonase.  Cetirizine.  Education provided and/or discussed for this/these medications and bronchitis.  Treat symptoms conservatively with acetaminophen and  ibuprofen (if applicable) for fevers, body aches, and headaches, Dextromethorphan/guaifenesin (Robitussin DM), and/or honey for cough. May use chest rubs for sore throat and congestion, hot and cold liquids may help decrease sore throat and help you feel better. Increase fluids.   cetirizine (Zyrtec) 20 mg  daily for ten to fourteen days to see if symptoms lessen or resolve. If the medication seems to help you may take 10 mg daily on an ongoing basis.  May buy over the counter.  avoid over the counter cold medications that contain phenylephrine.  Return to be reevaluated by ER/UC or your primary care provider if symptoms worsen, you develop breathing difficulties, or you do not improve in a reasonable time frame. It can take several days for a cough to resolve. It can take ten to fourteen days for upper respiratory symptoms to resolve.    -Increase fluids.   -Complete all antibiotics even if feeling better.    -Taking antibiotics with food may decrease the symptoms, of an upset stomach, that can occur when taking antibiotics. Antibiotics frequently cause diarrhea. Probiotics or yogurt may help prevent or decrease these symptoms.   -Return to be reevaluated if symptoms do not improve, or worsen.  These discharge instructions and medications were reviewed with him and his wife and understanding verbalized.    This document was prepared using a combination of typing and voice generated software.  While every attempt was made for accuracy, spelling and grammatical errors may exist.    Discharge Medication List as of 3/29/2024 10:53 AM        START taking these medications    Details   azithromycin (ZITHROMAX) 250 MG tablet Take 2 tablets (500 mg) by mouth daily for 1 day, THEN 1 tablet (250 mg) daily for 4 days., Disp-6 tablet, R-0, E-Prescribe      cetirizine (ZYRTEC) 10 MG tablet Take 1 tablet (10 mg) by mouth 2 times daily as needed for allergies, Disp-28 tablet, R-0, E-Prescribe      fluticasone (FLONASE) 50 MCG/ACT nasal spray Spray 1 spray into both nostrils daily, Disp-18.2 mL, R-0, E-Prescribe             Final diagnoses:   Acute bronchitis with symptoms > 10 days       3/29/2024   [facility]         Mercedez German, CNP  03/29/24 2342

## 2024-03-29 NOTE — ED TRIAGE NOTES
Pt presents with c/o cough    Ongoing for 2 weeks   cough, congestion/drainage        Afrin nose spray, sudafed

## 2024-03-29 NOTE — DISCHARGE INSTRUCTIONS
Treat symptoms conservatively with acetaminophen and  ibuprofen (if applicable) for fevers, body aches, and headaches, Dextromethorphan/guaifenesin (Robitussin DM), and/or honey for cough. May use chest rubs for sore throat and congestion, hot and cold liquids may help decrease sore throat and help you feel better. Increase fluids.   cetirizine (Zyrtec) 20 mg  daily for ten to fourteen days to see if symptoms lessen or resolve. If the medication seems to help you may take 10 mg daily on an ongoing basis.  May buy over the counter.  avoid over the counter cold medications that contain phenylephrine.  Return to be reevaluated by ER/UC or your primary care provider if symptoms worsen, you develop breathing difficulties, or you do not improve in a reasonable time frame. It can take several days for a cough to resolve. It can take ten to fourteen days for upper respiratory symptoms to resolve.   -Increase fluids.   -Complete all antibiotics even if feeling better.    -Taking antibiotics with food may decrease the symptoms, of an upset stomach, that can occur when taking antibiotics. Antibiotics frequently cause diarrhea. Probiotics or yogurt may help prevent or decrease these symptoms.   -Return to be reevaluated if symptoms do not improve, or worsen.

## 2024-04-03 ENCOUNTER — APPOINTMENT (OUTPATIENT)
Dept: GENERAL RADIOLOGY | Facility: HOSPITAL | Age: 79
End: 2024-04-03
Attending: STUDENT IN AN ORGANIZED HEALTH CARE EDUCATION/TRAINING PROGRAM
Payer: COMMERCIAL

## 2024-04-03 ENCOUNTER — HOSPITAL ENCOUNTER (EMERGENCY)
Facility: HOSPITAL | Age: 79
Discharge: HOME | End: 2024-04-03
Attending: STUDENT IN AN ORGANIZED HEALTH CARE EDUCATION/TRAINING PROGRAM | Admitting: STUDENT IN AN ORGANIZED HEALTH CARE EDUCATION/TRAINING PROGRAM
Payer: COMMERCIAL

## 2024-04-03 VITALS
TEMPERATURE: 97.8 F | HEART RATE: 61 BPM | RESPIRATION RATE: 18 BRPM | SYSTOLIC BLOOD PRESSURE: 120 MMHG | OXYGEN SATURATION: 95 % | DIASTOLIC BLOOD PRESSURE: 78 MMHG

## 2024-04-03 DIAGNOSIS — E86.0 DEHYDRATION: ICD-10-CM

## 2024-04-03 LAB
ANION GAP SERPL CALCULATED.3IONS-SCNC: 10 MMOL/L (ref 7–15)
BASOPHILS # BLD AUTO: 0.1 10E3/UL (ref 0–0.2)
BASOPHILS NFR BLD AUTO: 1 %
BUN SERPL-MCNC: 30.8 MG/DL (ref 8–23)
CALCIUM SERPL-MCNC: 9.5 MG/DL (ref 8.8–10.2)
CHLORIDE SERPL-SCNC: 103 MMOL/L (ref 98–107)
CREAT SERPL-MCNC: 1.5 MG/DL (ref 0.67–1.17)
DEPRECATED HCO3 PLAS-SCNC: 25 MMOL/L (ref 22–29)
EGFRCR SERPLBLD CKD-EPI 2021: 47 ML/MIN/1.73M2
EOSINOPHIL # BLD AUTO: 0.3 10E3/UL (ref 0–0.7)
EOSINOPHIL NFR BLD AUTO: 3 %
ERYTHROCYTE [DISTWIDTH] IN BLOOD BY AUTOMATED COUNT: 14.4 % (ref 10–15)
GLUCOSE BLDC GLUCOMTR-MCNC: 108 MG/DL (ref 70–99)
GLUCOSE SERPL-MCNC: 101 MG/DL (ref 70–99)
HCT VFR BLD AUTO: 47.3 % (ref 40–53)
HGB BLD-MCNC: 15.4 G/DL (ref 13.3–17.7)
HOLD SPECIMEN: NORMAL
IMM GRANULOCYTES # BLD: 0.1 10E3/UL
IMM GRANULOCYTES NFR BLD: 1 %
LYMPHOCYTES # BLD AUTO: 1.6 10E3/UL (ref 0.8–5.3)
LYMPHOCYTES NFR BLD AUTO: 15 %
MAGNESIUM SERPL-MCNC: 2.3 MG/DL (ref 1.7–2.3)
MCH RBC QN AUTO: 28.7 PG (ref 26.5–33)
MCHC RBC AUTO-ENTMCNC: 32.6 G/DL (ref 31.5–36.5)
MCV RBC AUTO: 88 FL (ref 78–100)
MONOCYTES # BLD AUTO: 1 10E3/UL (ref 0–1.3)
MONOCYTES NFR BLD AUTO: 9 %
NEUTROPHILS # BLD AUTO: 7.2 10E3/UL (ref 1.6–8.3)
NEUTROPHILS NFR BLD AUTO: 70 %
NRBC # BLD AUTO: 0 10E3/UL
NRBC BLD AUTO-RTO: 0 /100
PLATELET # BLD AUTO: 313 10E3/UL (ref 150–450)
POTASSIUM SERPL-SCNC: 5.4 MMOL/L (ref 3.4–5.3)
RBC # BLD AUTO: 5.36 10E6/UL (ref 4.4–5.9)
SODIUM SERPL-SCNC: 138 MMOL/L (ref 135–145)
WBC # BLD AUTO: 10.3 10E3/UL (ref 4–11)

## 2024-04-03 PROCEDURE — 93005 ELECTROCARDIOGRAM TRACING: CPT

## 2024-04-03 PROCEDURE — 85025 COMPLETE CBC W/AUTO DIFF WBC: CPT | Performed by: STUDENT IN AN ORGANIZED HEALTH CARE EDUCATION/TRAINING PROGRAM

## 2024-04-03 PROCEDURE — 99284 EMERGENCY DEPT VISIT MOD MDM: CPT | Performed by: STUDENT IN AN ORGANIZED HEALTH CARE EDUCATION/TRAINING PROGRAM

## 2024-04-03 PROCEDURE — 80048 BASIC METABOLIC PNL TOTAL CA: CPT | Performed by: STUDENT IN AN ORGANIZED HEALTH CARE EDUCATION/TRAINING PROGRAM

## 2024-04-03 PROCEDURE — 71046 X-RAY EXAM CHEST 2 VIEWS: CPT

## 2024-04-03 PROCEDURE — 258N000003 HC RX IP 258 OP 636: Performed by: STUDENT IN AN ORGANIZED HEALTH CARE EDUCATION/TRAINING PROGRAM

## 2024-04-03 PROCEDURE — 96361 HYDRATE IV INFUSION ADD-ON: CPT

## 2024-04-03 PROCEDURE — 83735 ASSAY OF MAGNESIUM: CPT | Performed by: STUDENT IN AN ORGANIZED HEALTH CARE EDUCATION/TRAINING PROGRAM

## 2024-04-03 PROCEDURE — 99285 EMERGENCY DEPT VISIT HI MDM: CPT | Mod: 25

## 2024-04-03 PROCEDURE — 93010 ELECTROCARDIOGRAM REPORT: CPT | Performed by: INTERNAL MEDICINE

## 2024-04-03 PROCEDURE — 96360 HYDRATION IV INFUSION INIT: CPT

## 2024-04-03 PROCEDURE — 82962 GLUCOSE BLOOD TEST: CPT

## 2024-04-03 PROCEDURE — 36415 COLL VENOUS BLD VENIPUNCTURE: CPT | Performed by: STUDENT IN AN ORGANIZED HEALTH CARE EDUCATION/TRAINING PROGRAM

## 2024-04-03 RX ADMIN — SODIUM CHLORIDE, POTASSIUM CHLORIDE, SODIUM LACTATE AND CALCIUM CHLORIDE 1000 ML: 600; 310; 30; 20 INJECTION, SOLUTION INTRAVENOUS at 13:32

## 2024-04-03 ASSESSMENT — ACTIVITIES OF DAILY LIVING (ADL)
ADLS_ACUITY_SCORE: 38

## 2024-04-03 ASSESSMENT — COLUMBIA-SUICIDE SEVERITY RATING SCALE - C-SSRS
6. HAVE YOU EVER DONE ANYTHING, STARTED TO DO ANYTHING, OR PREPARED TO DO ANYTHING TO END YOUR LIFE?: NO
1. IN THE PAST MONTH, HAVE YOU WISHED YOU WERE DEAD OR WISHED YOU COULD GO TO SLEEP AND NOT WAKE UP?: NO
2. HAVE YOU ACTUALLY HAD ANY THOUGHTS OF KILLING YOURSELF IN THE PAST MONTH?: NO

## 2024-04-03 NOTE — ED NOTES
"Patient presents to the ED w/ c/o dizziness.     He reports he was driving and, \"Started seeing floaties. They were going back and forth.\"  Wife reports they have both been \"sick with colds\" for the last two weeks.   BEFAST negative.     Denies chest pain, SOB, fever, chills, ABD pain.     Wife reports the patient does not drink enough water, he prefers to drink Diet Coke.  Educated the patient on the importance of drinking an appropriate amount of water daily, he verbalized understanding of this.     "

## 2024-04-03 NOTE — ED TRIAGE NOTES
Patient presents with c/o getting dizzy while driving the car, went home and rested and the dizziness subsided, patient denies any dizziness at this time.     BEFAST-negative.

## 2024-04-03 NOTE — ED PROVIDER NOTES
[facility]  ED Provider Note    Chief Complaint   Patient presents with    Dizziness     History:  Peter Zhao is a 78 year old male with history of diabetes, coronary artery disease, hypertension, who presents to the emergency department today feeling lightheaded.  He states his normal blood pressure is around 106, was driving and became lightheaded.  He denies vertigo.  States he has been sick with a cold for the past 2 weeks while he was traveling in [place] in [place] and he just returned Monday.  He did come to the urgent care last Friday was given Tessalon Perles and prednisone which she finished yesterday.  He felt dizzy for about 15 minutes and then felt better after resting at home.  No other complaints no chest pain abdominal pain nausea vomiting.  No blood in stool no blood in vomit.  No urinary symptoms. he has had some diarrhea over the past few days.    Review of Systems   Performed; see HPI for pertinent positives and negatives.     Medical history, surgical history, and social history was reviewed.  Nursing documentation, triage note, and vitals were reviewed.    Vitals:  BP: 104/69  Pulse: 64  Temp: 97.8  F (36.6  C)  Resp: 18  SpO2: 95 %    Physical Exam:  Constitutional: Alert and conversant. NAD   HENT: NCAT   Eyes: Normal pupils   Neck: supple   CV: No pallor  Pulmonary/Chest: Non-labored respirations  Abdominal: non-distended   MSK: CONCEPCION.   Neuro: Alert and appropriate negative Merlin-Hallpike no nystagmus  Skin: Warm and dry. No diaphoresis. No rashes on exposed skin    Psych: Appropriate mood and affect       MDM:  78-year-old male with dizziness    ED Course as of 04/03/24 1533   Wed Apr 03, 2024   1426 Blood pressure 104 initially, then in the 90s, upon standing systolic of 84.  Suspicious for dehydration especially in the setting of his recent travel, decreased oral intake according to his wife plus diarrhea BUN/creatinine certainly consistent with a 20:1 ratio   1444  XR Chest 2 Views  No evidence of pneumonia   1445 GLUCOSE BY METER POCT(!): 108  Not hypoglycemic   1445 Potassium(!): 5.4  Not dangerously high, fluids given   1531 Blood pressure improved into the 120s over 70s after a liter bolus.  This is consistent with my initial evaluation the patient was fluid down.  Appropriate for further outpatient management, recommending close primary care follow-up discharged in stable condition all questions answered and return precautions given.       Procedures:  Procedures    Impression:  Final diagnoses:   Dehydration           Ronak Wilkinson MD  04/03/24 [id number]

## 2024-04-03 NOTE — DISCHARGE INSTRUCTIONS
Continue to take great efforts to hydrate.  Your blood pressures are much better now than when they came in but it is going to be your responsibility to hydrate well at home.  What ever you are already drinking at home at least double and if you can do more it would be reasonable to do so while you still have diarrhea once the diarrhea improves then you can go back to your normal eating and drinking and behaviors although in general I would recommend not drinking soda pop

## 2024-04-03 NOTE — ED NOTES
Attempted to interrogate patient's pacemaker/defib, unable to transmit information x3.  MD notified.

## 2024-04-03 NOTE — ED NOTES
Pt presents with he was going to town and felt dizzy at around 1020.  He turned around and drove home due to the dizziness.  Pt started to come here and went away  reports it looked like objects were moving. Reports he feels tired and that's not normal. Spouse reports he's been sick for the past couple week and ears have been plugged since their trip to [facility], just got back Monday.  Was given Tesslon perrls and then came to the ER last Friday and was given prednisone and finished yesterday.  Was also told to take Zyrtec

## 2024-04-04 LAB
ATRIAL RATE - MUSE: 60 BPM
DIASTOLIC BLOOD PRESSURE - MUSE: NORMAL MMHG
INTERPRETATION ECG - MUSE: NORMAL
P AXIS - MUSE: 61 DEGREES
PR INTERVAL - MUSE: NORMAL MS
QRS DURATION - MUSE: 156 MS
QT - MUSE: 462 MS
QTC - MUSE: 515 MS
R AXIS - MUSE: 240 DEGREES
SYSTOLIC BLOOD PRESSURE - MUSE: NORMAL MMHG
T AXIS - MUSE: 73 DEGREES
VENTRICULAR RATE- MUSE: 75 BPM

## 2024-04-29 ENCOUNTER — TELEPHONE (OUTPATIENT)
Dept: INTERNAL MEDICINE | Facility: OTHER | Age: 79
End: 2024-04-29

## 2024-04-29 ENCOUNTER — OFFICE VISIT (OUTPATIENT)
Dept: INTERNAL MEDICINE | Facility: OTHER | Age: 79
End: 2024-04-29
Attending: INTERNAL MEDICINE
Payer: COMMERCIAL

## 2024-04-29 VITALS
RESPIRATION RATE: 16 BRPM | SYSTOLIC BLOOD PRESSURE: 97 MMHG | BODY MASS INDEX: 23.89 KG/M2 | OXYGEN SATURATION: 96 % | DIASTOLIC BLOOD PRESSURE: 66 MMHG | HEART RATE: 56 BPM | TEMPERATURE: 97.5 F | WEIGHT: 165.3 LBS

## 2024-04-29 DIAGNOSIS — I20.0 INTERMEDIATE CORONARY SYNDROME (H): ICD-10-CM

## 2024-04-29 DIAGNOSIS — E78.5 HYPERLIPIDEMIA LDL GOAL <100: ICD-10-CM

## 2024-04-29 DIAGNOSIS — E11.9 TYPE 2 DIABETES, HBA1C GOAL < 7% (H): Primary | ICD-10-CM

## 2024-04-29 DIAGNOSIS — N40.1 BENIGN NON-NODULAR PROSTATIC HYPERPLASIA WITH LOWER URINARY TRACT SYMPTOMS: ICD-10-CM

## 2024-04-29 DIAGNOSIS — I10 BENIGN ESSENTIAL HYPERTENSION: ICD-10-CM

## 2024-04-29 LAB
ALBUMIN SERPL BCG-MCNC: 4.1 G/DL (ref 3.5–5.2)
ALP SERPL-CCNC: 91 U/L (ref 40–150)
ALT SERPL W P-5'-P-CCNC: 23 U/L (ref 0–70)
ANION GAP SERPL CALCULATED.3IONS-SCNC: 12 MMOL/L (ref 7–15)
AST SERPL W P-5'-P-CCNC: 30 U/L (ref 0–45)
BASOPHILS # BLD AUTO: 0.1 10E3/UL (ref 0–0.2)
BASOPHILS NFR BLD AUTO: 1 %
BILIRUB SERPL-MCNC: 1 MG/DL
BUN SERPL-MCNC: 23.4 MG/DL (ref 8–23)
CALCIUM SERPL-MCNC: 9.5 MG/DL (ref 8.8–10.2)
CHLORIDE SERPL-SCNC: 103 MMOL/L (ref 98–107)
CHOLEST SERPL-MCNC: 128 MG/DL
CREAT SERPL-MCNC: 1.34 MG/DL (ref 0.67–1.17)
DEPRECATED HCO3 PLAS-SCNC: 24 MMOL/L (ref 22–29)
EGFRCR SERPLBLD CKD-EPI 2021: 54 ML/MIN/1.73M2
EOSINOPHIL # BLD AUTO: 0.2 10E3/UL (ref 0–0.7)
EOSINOPHIL NFR BLD AUTO: 2 %
ERYTHROCYTE [DISTWIDTH] IN BLOOD BY AUTOMATED COUNT: 13.9 % (ref 10–15)
EST. AVERAGE GLUCOSE BLD GHB EST-MCNC: 146 MG/DL
FASTING STATUS PATIENT QL REPORTED: YES
GLUCOSE SERPL-MCNC: 130 MG/DL (ref 70–99)
HBA1C MFR BLD: 6.7 %
HCT VFR BLD AUTO: 47.1 % (ref 40–53)
HDLC SERPL-MCNC: 31 MG/DL
HGB BLD-MCNC: 15.3 G/DL (ref 13.3–17.7)
IMM GRANULOCYTES # BLD: 0 10E3/UL
IMM GRANULOCYTES NFR BLD: 0 %
LDLC SERPL CALC-MCNC: 65 MG/DL
LYMPHOCYTES # BLD AUTO: 1.6 10E3/UL (ref 0.8–5.3)
LYMPHOCYTES NFR BLD AUTO: 15 %
MCH RBC QN AUTO: 28.6 PG (ref 26.5–33)
MCHC RBC AUTO-ENTMCNC: 32.5 G/DL (ref 31.5–36.5)
MCV RBC AUTO: 88 FL (ref 78–100)
MONOCYTES # BLD AUTO: 0.7 10E3/UL (ref 0–1.3)
MONOCYTES NFR BLD AUTO: 7 %
NEUTROPHILS # BLD AUTO: 7.9 10E3/UL (ref 1.6–8.3)
NEUTROPHILS NFR BLD AUTO: 75 %
NONHDLC SERPL-MCNC: 97 MG/DL
PLATELET # BLD AUTO: 218 10E3/UL (ref 150–450)
POTASSIUM SERPL-SCNC: 4.9 MMOL/L (ref 3.4–5.3)
PROT SERPL-MCNC: 6.9 G/DL (ref 6.4–8.3)
RBC # BLD AUTO: 5.35 10E6/UL (ref 4.4–5.9)
SODIUM SERPL-SCNC: 139 MMOL/L (ref 135–145)
TRIGL SERPL-MCNC: 159 MG/DL
WBC # BLD AUTO: 10.5 10E3/UL (ref 4–11)

## 2024-04-29 PROCEDURE — 36415 COLL VENOUS BLD VENIPUNCTURE: CPT | Mod: ZL | Performed by: INTERNAL MEDICINE

## 2024-04-29 PROCEDURE — 83036 HEMOGLOBIN GLYCOSYLATED A1C: CPT | Mod: ZL | Performed by: INTERNAL MEDICINE

## 2024-04-29 PROCEDURE — 80053 COMPREHEN METABOLIC PANEL: CPT | Mod: ZL | Performed by: INTERNAL MEDICINE

## 2024-04-29 PROCEDURE — 80061 LIPID PANEL: CPT | Mod: ZL | Performed by: INTERNAL MEDICINE

## 2024-04-29 PROCEDURE — 99214 OFFICE O/P EST MOD 30 MIN: CPT | Performed by: INTERNAL MEDICINE

## 2024-04-29 PROCEDURE — G0463 HOSPITAL OUTPT CLINIC VISIT: HCPCS

## 2024-04-29 PROCEDURE — 85025 COMPLETE CBC W/AUTO DIFF WBC: CPT | Mod: ZL | Performed by: INTERNAL MEDICINE

## 2024-04-29 RX ORDER — METOPROLOL TARTRATE 100 MG
100 TABLET ORAL 2 TIMES DAILY
Qty: 28 TABLET | Refills: 0 | Status: SHIPPED | OUTPATIENT
Start: 2024-04-29

## 2024-04-29 RX ORDER — LISINOPRIL 40 MG/1
40 TABLET ORAL DAILY
Qty: 14 TABLET | Refills: 0 | Status: SHIPPED | OUTPATIENT
Start: 2024-04-29 | End: 2024-07-23

## 2024-04-29 RX ORDER — ATORVASTATIN CALCIUM 40 MG/1
40 TABLET, FILM COATED ORAL DAILY
Qty: 14 TABLET | Refills: 0 | Status: SHIPPED | OUTPATIENT
Start: 2024-04-29 | End: 2024-07-10

## 2024-04-29 RX ORDER — AMLODIPINE BESYLATE 5 MG/1
5 TABLET ORAL DAILY
Qty: 14 TABLET | Refills: 0 | Status: SHIPPED | OUTPATIENT
Start: 2024-04-29 | End: 2024-08-12

## 2024-04-29 RX ORDER — NITROGLYCERIN 0.4 MG/1
TABLET SUBLINGUAL
Qty: 25 TABLET | Refills: 2 | Status: SHIPPED | OUTPATIENT
Start: 2024-04-29

## 2024-04-29 RX ORDER — CLOPIDOGREL BISULFATE 75 MG/1
75 TABLET ORAL DAILY
Qty: 90 TABLET | Refills: 1 | Status: SHIPPED | OUTPATIENT
Start: 2024-04-29

## 2024-04-29 RX ORDER — FINASTERIDE 5 MG/1
1 TABLET, FILM COATED ORAL DAILY
Qty: 14 TABLET | Refills: 0 | Status: SHIPPED | OUTPATIENT
Start: 2024-04-29

## 2024-04-29 SDOH — HEALTH STABILITY: PHYSICAL HEALTH: ON AVERAGE, HOW MANY DAYS PER WEEK DO YOU ENGAGE IN MODERATE TO STRENUOUS EXERCISE (LIKE A BRISK WALK)?: 7 DAYS

## 2024-04-29 ASSESSMENT — SOCIAL DETERMINANTS OF HEALTH (SDOH): HOW OFTEN DO YOU GET TOGETHER WITH FRIENDS OR RELATIVES?: ONCE A WEEK

## 2024-04-29 ASSESSMENT — PAIN SCALES - GENERAL: PAINLEVEL: NO PAIN (0)

## 2024-04-29 NOTE — TELEPHONE ENCOUNTER
Call returned to patient to provide update patient is to continue checking blood glucose one time weekly (on Wednesdays as usual), patient is to also check BP and P one time daily x 2 weeks per Dr. Vazquez. Patient may provide update to Dr. Vazquez in 2 weeks.

## 2024-04-29 NOTE — PROGRESS NOTES
Preventive Care Visit  [facility]  Alexander ALEMANStefan Vazquez DO, Internal Medicine  Apr 29, 2024      Assessment & Plan   Problem List Items Addressed This Visit          Endocrine    Hyperlipidemia LDL goal <100    Relevant Medications    atorvastatin (LIPITOR) 40 MG tablet    nitroGLYcerin (NITROSTAT) 0.4 MG sublingual tablet    Other Relevant Orders    Lipid Profile (Chol, Trig, HDL, LDL calc)    CBC with platelets and differential (Completed)    Comprehensive metabolic panel (BMP + Alb, Alk Phos, ALT, AST, Total. Bili, TP)     Other Visit Diagnoses       Type 2 diabetes, HbA1c goal < 7% (H)    -  Primary    Relevant Orders    Hemoglobin A1c    Benign essential hypertension        Relevant Medications    amLODIPine (NORVASC) 5 MG tablet    clopidogrel (PLAVIX) 75 MG tablet    lisinopril (ZESTRIL) 40 MG tablet    metoprolol tartrate (LOPRESSOR) 100 MG tablet    Benign non-nodular prostatic hyperplasia with lower urinary tract symptoms        Relevant Medications    finasteride (PROSCAR) 5 MG tablet    Intermediate coronary syndrome (H)        Relevant Medications    nitroGLYcerin (NITROSTAT) 0.4 MG sublingual tablet             Patient has been advised of split billing requirements and indicates understanding: Yes  Review of prior external note(s) from - Outside records from [facility].   25 minutes spent by me on the date of the encounter doing chart review, review of outside records, review of test results, interpretation of tests, patient visit, documentation, and discussion with family      Counseling  Appropriate preventive services were discussed with this patient, including applicable screening as appropriate for fall prevention, nutrition, physical activity, Tobacco-use cessation, weight loss and cognition.  Checklist reviewing preventive services available has been given to the patient.  Reviewed patient's diet, addressing concerns and/or questions.   The patient was instructed to see the dentist every 6  months.   The patient was provided with written information regarding signs of hearing loss.         No follow-ups on file.      Sebastian Green is a 78 year old, presenting for the following:  Physical        HPI    Peter is a 78 male with a history of coronary artery disease, ICD/pacer, type 2 diabetes mellitus and aortic valve replacement with bioprosthetic aortic valve presents to clinic today for routine follow-up.    Patient states that he is doing well however he was seen in the emergency department recently at which time he was thought to be dehydrated.  Patient was given some IV fluids and subsequently discharged.  He presents to clinic today with a blood pressure 97/66.  I did ask the patient as to what his blood pressures tend to be at home and states that the blood pressures tend to be higher.  He questions whether his blood pressure cuff is accurate.  He denies any chest pain and has not used nitroglycerin.  He otherwise feels well and states that the only time he felt dizzy was upon his ER evaluation.  He was noted to be slightly hyperkalemic in the ER also.  He is fasting today.      4/29/2024   General Health   How would you rate your overall physical health? Good   Feel stress (tense, anxious, or unable to sleep) Not at all         4/29/2024   Nutrition   Diet: Regular (no restrictions)         4/29/2024   Exercise   Days per week of moderate/strenous exercise 7 days         4/29/2024   Social Factors   Frequency of gathering with friends or relatives Once a week   Worry food won't last until get money to buy more No   Food not last or not have enough money for food? No   Do you have housing?  Yes   Are you worried about losing your housing? No   Lack of transportation? No   Unable to get utilities (heat,electricity)? No         4/29/2024   Fall Risk   Fallen 2 or more times in the past year? No   Trouble with walking or balance? No          4/29/2024   Activities of Daily Living- Home Safety    Needs help with the following daily activites None of the above   Safety concerns in the home None of the above         2024   Dental   Dentist two times every year? (!) NO         2024   Hearing Screening   Hearing concerns? (!) I NEED TO ASK PEOPLE TO SPEAK UP OR REPEAT THEMSELVES.         2024   Driving Risk Screening   Patient/family members have concerns about driving No         2024   General Alertness/Fatigue Screening   Have you been more tired than usual lately? No         2024   Urinary Incontinence Screening   Bothered by leaking urine in past 6 months No         2024   TB Screening   Were you born outside of the [place]? No         Today's PHQ-2 Score:       2024     9:41 AM   PHQ-2 (  [employer])   Q1: Little interest or pleasure in doing things 0   Q2: Feeling down, depressed or hopeless 0   PHQ-2 Score 0           2024   Substance Use   Alcohol more than 3/day or more than 7/wk No   Do you have a current opioid prescription? No   How severe/bad is pain from 1 to 10? 0/10 (No Pain)   Do you use any other substances recreationally? No     Social History     Tobacco Use    Smoking status: Former    Smokeless tobacco: Former     Types: Chew     Quit date: 10/19/2013    Tobacco comments:     quit in    Vaping Use    Vaping status: Never Used   Substance Use Topics    Alcohol use: No    Drug use: No       ASCVD Risk   The ASCVD Risk score (Stephie OSBORN, et al., 2019) failed to calculate for the following reasons:    The patient has a prior MI or stroke diagnosis    Fracture Risk Assessment Tool  Link to Frax Calculator  Use the information below to complete the Frax calculator  : 1945  Sex: male  Weight (kg): 75 kg (actual weight)  Height (cm): 0 cm  Previous Fragility Fracture:  No  History of parent with fractured hip:  No  Current Smoking:  No  Patient has been on glucocorticoids for more than 3 months (5mg/day or more): No  Rheumatoid Arthritis on  Problem List:  No  Secondary Osteoporosis on Problem List:  No  Consumes 3 or more units of alcohol per day: No  Femoral Neck BMD (g/cm2)            Reviewed and updated as needed this visit by Provider                    Past Medical History:   Diagnosis Date    Allergic state     CAD (coronary artery disease)     CABG 2013, stent x 1 in 2014    Cataract     Cataracts, bilateral     Elevated PSA     Gastro-oesophageal reflux disease     Heart murmur     Hyperlipidemia     Hypertension     Pacemaker     Stented coronary artery     Visual aura      Past Surgical History:   Procedure Laterality Date    AORTIC VALVE REPLACEMENT      25 mm CE bovine replacement Dr. Wu 8/20/2012    CARDIAC SURGERY      CATARACT IOL, RT/LT Right 04/2017    COLECTOMY LOW ANTERIOR N/A 2/17/2017    Procedure: COLECTOMY LOW ANTERIOR;  Surgeon: Tima Moreland MD;  [facility]    COLONOSCOPY N/A 2/17/2017    Procedure: COLONOSCOPY;  Surgeon: Tima Moreland MD;  [facility]    COLOSTOMY  02/17/2017    Sigmoid Colostomy performed.     LAPAROSCOPIC HERNIORRHAPHY INGUINAL Right 9/18/2015    Procedure: LAPAROSCOPIC HERNIORRHAPHY INGUINAL;  Surgeon: Solitario Nettles DO;  [facility]    PHACOEMULSIFICATION WITH STANDARD INTRAOCULAR LENS IMPLANT Right 4/20/2017    Procedure: PHACOEMULSIFICATION WITH STANDARD INTRAOCULAR LENS IMPLANT;  CATARACT EXTRACTION RIGHT EYE WITH IMPLANT;  Surgeon: Darin Gutierrez MD;  Location: [facility]    PROSTATE BIOPSY, NEEDLE, SATURATION SAMPLING  2009    retinopexy-pvd with retinal tear Right 2008    SIGMOIDOSCOPY FLEXIBLE N/A 02/17/2017    Surgeon: Tima Morleand MD;  Location: [facility]; No specimens taken from procedure.     TAKEDOWN COLOSTOMY N/A 9/21/2017    Procedure: TAKEDOWN COLOSTOMY;  CLOSURE OF NORRIS'S SIGMOID COLOSTOMY, REPAIR STOMAL HERNIA;  Surgeon: Tima Moreland MD;  Location: [facility]    TONSILLECTOMY       Lab work is in process  Labs reviewed in EPIC  BP Readings from Last 3  Encounters:   04/29/24 97/66   04/03/24 120/78   03/29/24 100/56    Wt Readings from Last 3 Encounters:   04/29/24 75 kg (165 lb 4.8 oz)   06/27/23 77.5 kg (170 lb 12.8 oz)   06/08/23 78.5 kg (173 lb)                  Patient Active Problem List   Diagnosis    Type 2 diabetes mellitus without complication, without long-term current use of insulin (H)    CAD (coronary artery disease)    ACP (advance care planning)    Hyperlipidemia LDL goal <100    Elevated prostate specific antigen (PSA)    HTN (hypertension)    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    H/O migraine    Visual aura    Cataract    S/P colon resection    Hypokalemia    TIA (transient ischemic attack)    Agitation    Attention to colostomy (H)    H/O aortic valve replacement    Hx of aortic valve replacement    ICD (implantable cardioverter-defibrillator) in place    Acute kidney injury (H24)     Past Surgical History:   Procedure Laterality Date    AORTIC VALVE REPLACEMENT      25 mm CE bovine replacement Dr. Wu 8/20/2012    CARDIAC SURGERY      CATARACT IOL, RT/LT Right 04/2017    COLECTOMY LOW ANTERIOR N/A 2/17/2017    Procedure: COLECTOMY LOW ANTERIOR;  Surgeon: Tima Moreland MD;  [facility]    COLONOSCOPY N/A 2/17/2017    Procedure: COLONOSCOPY;  Surgeon: Tima Moreland MD;  [facility]    COLOSTOMY  02/17/2017    Sigmoid Colostomy performed.     LAPAROSCOPIC HERNIORRHAPHY INGUINAL Right 9/18/2015    Procedure: LAPAROSCOPIC HERNIORRHAPHY INGUINAL;  Surgeon: Solitario Nettles, DO;  [facility]    PHACOEMULSIFICATION WITH STANDARD INTRAOCULAR LENS IMPLANT Right 4/20/2017    Procedure: PHACOEMULSIFICATION WITH STANDARD INTRAOCULAR LENS IMPLANT;  CATARACT EXTRACTION RIGHT EYE WITH IMPLANT;  Surgeon: Darin Gutierrez MD;  [facility]    PROSTATE BIOPSY, NEEDLE, SATURATION SAMPLING  2009    retinopexy-pvd with retinal tear Right 2008    SIGMOIDOSCOPY FLEXIBLE N/A 02/17/2017    Surgeon:  Tima Moreland MD;  [facility]; No specimens taken from procedure.     TAKEDOWN COLOSTOMY N/A 9/21/2017    Procedure: TAKEDOWN COLOSTOMY;  CLOSURE OF NORRIS'S SIGMOID COLOSTOMY, REPAIR STOMAL HERNIA;  Surgeon: Tima Moreland MD;  [facility]: [facility]    TONSILLECTOMY         Social History     Tobacco Use    Smoking status: Former    Smokeless tobacco: Former     Types: Chew     Quit date: 10/19/2013    Tobacco comments:     quit in 2000   Substance Use Topics    Alcohol use: No     Family History   Problem Relation Age of Onset    Diabetes Mother     C.A.D. Mother     Cancer Maternal Grandmother          Current Outpatient Medications   Medication Sig Dispense Refill    ACCU-CHEK GUIDE test strip TEST BLOOD SUGAR BEFORE BREAKFAST ONE DAY, BEFORE AND AFTER SUPPER THE NEXT AND NOT AT ALL THE THIRD DAY 50 strip 11    acetaminophen (TYLENOL) 500 MG tablet Take 1 to 2 tablets every 6 hours as needed for fever or pain. 60 tablet 0    amLODIPine (NORVASC) 5 MG tablet Take 1 tablet (5 mg) by mouth daily 14 tablet 0    ASPIRIN PO Take 325 mg by mouth daily       atorvastatin (LIPITOR) 40 MG tablet Take 1 tablet (40 mg) by mouth daily 14 tablet 0    blood glucose (NO BRAND SPECIFIED) test strip Use to test blood sugar daily or as directed. To accompany: Blood Glucose Monitor Brands: ACCU-CHEK COMPACT 100 strip 11    blood glucose monitoring (ACCU-CHEK MULTICLIX) lancets Use to test blood sugar before breakfast one day, before and after supper next day and no testing 3rd day. 102 each 3    blood glucose monitoring (NO BRAND SPECIFIED) meter device kit Use to test blood sugar daily. 1 kit 3    clopidogrel (PLAVIX) 75 MG tablet Take 1 tablet (75 mg) by mouth daily 90 tablet 1    finasteride (PROSCAR) 5 MG tablet Take 1 tablet (5 mg) by mouth daily 14 tablet 0    Gymnema Sylvestris Leaf POWD 550 mg 2 times daily      lisinopril (ZESTRIL) 40 MG tablet Take 1 tablet (40 mg) by mouth daily 14 tablet 0    metoprolol tartrate  (LOPRESSOR) 100 MG tablet Take 1 tablet (100 mg) by mouth 2 times daily 28 tablet 0    Multiple Vitamin (MULTIVITAMINS PO) Take 1 capsule by mouth daily      nitroGLYcerin (NITROSTAT) 0.4 MG sublingual tablet DISSOLVE ONE TABLET UNDER THE TONGUE EVERY 5 MINUTES AS NEEDED FOR CHEST PAIN.  DO NOT EXCEED A TOTAL OF 3 DOSES IN 15 MINUTES 25 tablet 2    nystatin-triamcinolone (MYCOLOG II) 005623-2.1 UNIT/GM-% external cream APPLY  CREAM TOPICALLY TO AFFECTED AREA TWICE DAILY 30 g 0    thin (NO BRAND SPECIFIED) lancets Use to test blood sugar daily or as directed. To accompany: Blood Glucose Monitor Brands: ACCU-CHEK COMPACT 100 each 11    blood glucose monitoring (ACCU-CHEK COMPACT CARE KIT) meter device kit Use to test blood sugar before breakfast one day, before and after supper next day and no testing 3rd day. (Patient not taking: Reported on 4/29/2024) 1 kit 0     Allergies   Allergen Reactions    Ciprofloxacin Hives    Unknown [No Clinical Screening - See Comments]      Antibiotic allergy, pt not sure which     Recent Labs   Lab Test 04/03/24  1330 06/08/23  1128 05/22/23  1929 06/15/22  1423 06/15/22  1005 09/18/21  1839 06/08/21  1054 08/28/19  1146 12/07/18  0757   A1C  --  7.3*  --  6.6*  --   --  6.6* 6.8* 7.2*   LDL  --   --   --   --  64  --  65  --  49   HDL  --   --   --   --  30*  --  26*  --  28*   TRIG  --   --   --   --  181*  --  256*  --  211*   ALT  --   --   --   --  22  --  24 27 24   CR 1.50*  --  1.29*  --  1.42*   < > 1.19 1.23 1.35*  1.34*   GFRESTIMATED 47*  --  57*  --  51*   < > 59* 57* 52*  52*   GFRESTBLACK  --   --   --   --   --   --  68 66 63  63   POTASSIUM 5.4*  --  4.3  --  4.6   < > 4.5 4.4 4.4  4.4   TSH  --   --   --   --  1.31  --  0.86 0.95  --     < > = values in this interval not displayed.      Current providers sharing in care for this patient include:  Patient Care Team:  Alexander Vazquez DO as PCP - General (Internal Medicine)  Alexander Vazquez DO as  "Assigned PCP    The following health maintenance items are reviewed in Epic and correct as of today:  Health Maintenance   Topic Date Due    HEPATITIS C SCREENING  Never done    RSV VACCINE (Pregnancy & 60+) (1 - 1-dose 60+ series) Never done    MEDICARE ANNUAL WELLNESS VISIT  Never done    ZOSTER IMMUNIZATION (2 of 3) 12/10/2013    LIPID  06/15/2023    COVID-19 Vaccine (6 - 2023-24 season) 09/01/2023    A1C  12/08/2023    LUNG CANCER SCREENING  05/22/2024    MICROALBUMIN  06/27/2024    DIABETIC FOOT EXAM  06/27/2024    DTAP/TDAP/TD IMMUNIZATION (3 - Td or Tdap) 07/04/2024    EYE EXAM  11/08/2024    BMP  04/03/2025    FALL RISK ASSESSMENT  04/29/2025    ADVANCE CARE PLANNING  06/15/2027    PHQ-2 (once per calendar year)  Completed    INFLUENZA VACCINE  Completed    Pneumococcal Vaccine: 65+ Years  Completed    IPV IMMUNIZATION  Aged Out    HPV IMMUNIZATION  Aged Out    MENINGITIS IMMUNIZATION  Aged Out    RSV MONOCLONAL ANTIBODY  Aged Out    COLORECTAL CANCER SCREENING  Discontinued         Review of Systems  Constitutional, HEENT, cardiovascular, pulmonary, gi and gu systems are negative, except as otherwise noted.     Objective    Exam  BP 97/66 (BP Location: Left arm, Patient Position: Sitting, Cuff Size: Adult Large)   Pulse 56   Temp 97.5  F (36.4  C) (Tympanic)   Resp 16   Wt 75 kg (165 lb 4.8 oz)   SpO2 96%   BMI 23.89 kg/m     Estimated body mass index is 23.89 kg/m  as calculated from the following:    Height as of 6/15/22: 1.772 m (5' 9.75\").    Weight as of this encounter: 75 kg (165 lb 4.8 oz).    Physical Exam  GENERAL: alert and no distress  EYES: Eyes grossly normal to inspection, PERRL and conjunctivae and sclerae normal  RESP: lungs clear to auscultation - no rales, rhonchi or wheezes  CV: Heart is regular in rate and rhythm with a systolic murmur and opening closing snap of aortic valve appreciated.  ABDOMEN: soft, nontender, no hepatosplenomegaly, no masses and bowel sounds normal  MS: no " gross musculoskeletal defects noted, no edema  SKIN: no suspicious lesions or rashes  SKIN: no suspicious lesions or rashes  NEURO: Normal strength and tone, mentation intact and speech normal  PSYCH: mentation appears normal, affect normal/bright        4/29/2024   Mini Cog   Clock Draw Score 2 Normal   3 Item Recall 0 objects recalled   Mini Cog Total Score 2              Signed Electronically by: Alexander Vazquez, DO

## 2024-05-14 ENCOUNTER — TRANSFERRED RECORDS (OUTPATIENT)
Dept: HEALTH INFORMATION MANAGEMENT | Facility: CLINIC | Age: 79
End: 2024-05-14

## 2024-07-10 DIAGNOSIS — E78.5 HYPERLIPIDEMIA LDL GOAL <100: ICD-10-CM

## 2024-07-10 RX ORDER — ATORVASTATIN CALCIUM 40 MG/1
40 TABLET, FILM COATED ORAL DAILY
Qty: 90 TABLET | Refills: 2 | Status: SHIPPED | OUTPATIENT
Start: 2024-07-10

## 2024-07-23 DIAGNOSIS — I10 BENIGN ESSENTIAL HYPERTENSION: ICD-10-CM

## 2024-07-23 RX ORDER — LISINOPRIL 40 MG/1
40 TABLET ORAL DAILY
Qty: 90 TABLET | Refills: 2 | Status: SHIPPED | OUTPATIENT
Start: 2024-07-23

## 2024-07-23 NOTE — TELEPHONE ENCOUNTER
Lisinopril 40 MG Oral Tablet         Last Written Prescription Date:  4/29/24  Last Fill Quantity: 14,   # refills: 0  Last Office Visit: 4/29/24  Future Office visit:       Routing refill request to provider for review/approval because:    ACE Inhibitors (Including Combos) Protocol [id number]/23/2024 06:14 AM   Protocol Details Has GFR on file in past 12 months and most recent value is normal       GFR Estimate   Date Value Ref Range Status   04/29/2024 54 (L) >60 mL/min/1.73m2 Final   06/08/2021 59 (L) >60 mL/min/[1.73_m2] Final     Comment:     Non  GFR Calc  Starting 12/18/2018, serum creatinine based estimated GFR (eGFR) will be   calculated using the Chronic Kidney Disease Epidemiology Collaboration   (CKD-EPI) equation.

## 2024-08-02 ENCOUNTER — HOSPITAL ENCOUNTER (EMERGENCY)
Facility: HOSPITAL | Age: 79
Discharge: HOME | End: 2024-08-02
Attending: EMERGENCY MEDICINE | Admitting: EMERGENCY MEDICINE
Payer: COMMERCIAL

## 2024-08-02 VITALS
HEART RATE: 62 BPM | OXYGEN SATURATION: 97 % | DIASTOLIC BLOOD PRESSURE: 69 MMHG | RESPIRATION RATE: 16 BRPM | SYSTOLIC BLOOD PRESSURE: 115 MMHG | TEMPERATURE: 97.5 F

## 2024-08-02 DIAGNOSIS — S00.411A ABRASION OF RIGHT EAR CANAL, INITIAL ENCOUNTER: ICD-10-CM

## 2024-08-02 LAB
ALBUMIN SERPL BCG-MCNC: 3.8 G/DL (ref 3.5–5.2)
ALP SERPL-CCNC: 95 U/L (ref 40–150)
ALT SERPL W P-5'-P-CCNC: 16 U/L (ref 0–70)
ANION GAP SERPL CALCULATED.3IONS-SCNC: 9 MMOL/L (ref 7–15)
APTT PPP: 29 SECONDS (ref 22–38)
AST SERPL W P-5'-P-CCNC: 21 U/L (ref 0–45)
BASOPHILS # BLD AUTO: 0.1 10E3/UL (ref 0–0.2)
BASOPHILS NFR BLD AUTO: 1 %
BILIRUB SERPL-MCNC: 0.5 MG/DL
BUN SERPL-MCNC: 26.6 MG/DL (ref 8–23)
CALCIUM SERPL-MCNC: 9.2 MG/DL (ref 8.8–10.4)
CHLORIDE SERPL-SCNC: 108 MMOL/L (ref 98–107)
CREAT SERPL-MCNC: 1.28 MG/DL (ref 0.67–1.17)
EGFRCR SERPLBLD CKD-EPI 2021: 57 ML/MIN/1.73M2
EOSINOPHIL # BLD AUTO: 0.2 10E3/UL (ref 0–0.7)
EOSINOPHIL NFR BLD AUTO: 3 %
ERYTHROCYTE [DISTWIDTH] IN BLOOD BY AUTOMATED COUNT: 14 % (ref 10–15)
GLUCOSE SERPL-MCNC: 149 MG/DL (ref 70–99)
HCO3 SERPL-SCNC: 24 MMOL/L (ref 22–29)
HCT VFR BLD AUTO: 43.3 % (ref 40–53)
HGB BLD-MCNC: 14.3 G/DL (ref 13.3–17.7)
HOLD SPECIMEN: NORMAL
HOLD SPECIMEN: NORMAL
IMM GRANULOCYTES # BLD: 0.1 10E3/UL
IMM GRANULOCYTES NFR BLD: 1 %
INR PPP: 1.07 (ref 0.85–1.15)
LYMPHOCYTES # BLD AUTO: 1 10E3/UL (ref 0.8–5.3)
LYMPHOCYTES NFR BLD AUTO: 14 %
MCH RBC QN AUTO: 29.1 PG (ref 26.5–33)
MCHC RBC AUTO-ENTMCNC: 33 G/DL (ref 31.5–36.5)
MCV RBC AUTO: 88 FL (ref 78–100)
MONOCYTES # BLD AUTO: 0.6 10E3/UL (ref 0–1.3)
MONOCYTES NFR BLD AUTO: 9 %
NEUTROPHILS # BLD AUTO: 5.1 10E3/UL (ref 1.6–8.3)
NEUTROPHILS NFR BLD AUTO: 73 %
NRBC # BLD AUTO: 0 10E3/UL
NRBC BLD AUTO-RTO: 0 /100
PLATELET # BLD AUTO: 202 10E3/UL (ref 150–450)
POTASSIUM SERPL-SCNC: 4.4 MMOL/L (ref 3.4–5.3)
PROT SERPL-MCNC: 6.5 G/DL (ref 6.4–8.3)
RBC # BLD AUTO: 4.91 10E6/UL (ref 4.4–5.9)
SODIUM SERPL-SCNC: 141 MMOL/L (ref 135–145)
WBC # BLD AUTO: 7 10E3/UL (ref 4–11)

## 2024-08-02 PROCEDURE — 85025 COMPLETE CBC W/AUTO DIFF WBC: CPT | Performed by: EMERGENCY MEDICINE

## 2024-08-02 PROCEDURE — 99283 EMERGENCY DEPT VISIT LOW MDM: CPT

## 2024-08-02 PROCEDURE — 36415 COLL VENOUS BLD VENIPUNCTURE: CPT | Performed by: EMERGENCY MEDICINE

## 2024-08-02 PROCEDURE — 85730 THROMBOPLASTIN TIME PARTIAL: CPT | Performed by: EMERGENCY MEDICINE

## 2024-08-02 PROCEDURE — 80053 COMPREHEN METABOLIC PANEL: CPT | Performed by: EMERGENCY MEDICINE

## 2024-08-02 PROCEDURE — 85610 PROTHROMBIN TIME: CPT | Performed by: EMERGENCY MEDICINE

## 2024-08-02 PROCEDURE — 99284 EMERGENCY DEPT VISIT MOD MDM: CPT | Performed by: EMERGENCY MEDICINE

## 2024-08-02 RX ORDER — NEOMYCIN SULFATE, POLYMYXIN B SULFATE AND HYDROCORTISONE 10; 3.5; 1 MG/ML; MG/ML; [USP'U]/ML
3 SUSPENSION/ DROPS AURICULAR (OTIC) 4 TIMES DAILY
Qty: 5 ML | Refills: 0 | Status: SHIPPED | OUTPATIENT
Start: 2024-08-02

## 2024-08-02 ASSESSMENT — ACTIVITIES OF DAILY LIVING (ADL)
ADLS_ACUITY_SCORE: 38
ADLS_ACUITY_SCORE: 38

## 2024-08-02 NOTE — ED TRIAGE NOTES
"Patient presents to ED reports yesterday morning he was cleaning his ears prior to putting in his hearing aids like he always does and noticed blood on Q-tip in R ear so he did not put them in and this morning cleaned out ears and noted blood on both Q-tips. Denies ear pain, denies ear injury, denies change in hearing from baseline but reports \"I'm concerned about the bleeding\".         "

## 2024-08-02 NOTE — ED PROVIDER NOTES
History     Chief Complaint   Patient presents with    Ear Drainage     Patient cleaned his ears with a Q-tip yesterday prior to using hearing aids and had some blood on the Q-tip when he removed it from the right ear.  Today he had the same and then a scant amount in the left ear.  He has not noticed any change in hearing, he has no ear pain.  He is chronically on Plavix and aspirin, has had a history of aortic valve replacement in the past and TIA.  Wife also provided history.        Peter Zhao is a 78 year old male who presents because of bleeding in the ear canal    Allergies:  Allergies   Allergen Reactions    Ciprofloxacin Hives    Unknown [No Clinical Screening - See Comments]      Antibiotic allergy, pt not sure which       Problem List:    Patient Active Problem List    Diagnosis Date Noted    Acute kidney injury (H24) 11/30/2018     Priority: Medium    H/O aortic valve replacement 11/16/2018     Priority: Medium    Hx of aortic valve replacement 11/16/2018     Priority: Medium    ICD (implantable cardioverter-defibrillator) in place 11/16/2018     Priority: Medium    Attention to colostomy (H) 09/21/2017     Priority: Medium    Agitation 05/19/2017     Priority: Medium    TIA (transient ischemic attack) 04/29/2017     Priority: Medium    Hypokalemia 02/20/2017     Priority: Medium    S/P colon resection 02/18/2017     Priority: Medium    Hyperlipidemia LDL goal <100 02/15/2017     Priority: Medium    Elevated prostate specific antigen (PSA) 02/15/2017     Priority: Medium    HTN (hypertension) 02/15/2017     Priority: Medium    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 02/15/2017     Priority: Medium    H/O migraine 02/15/2017     Priority: Medium    Visual aura 02/15/2017     Priority: Medium    Cataract 02/15/2017     Priority: Medium    ACP (advance care planning) 10/04/2016     Priority: Medium     Advance Care Planning 10/4/2016: ACP Review of Chart / Resources  Provided:  Reviewed chart for advance care plan.  Peter Zhao has no plan or code status on file. Discussed available resources and provided with information. Confirmed code status reflects current choices pending further ACP discussions.  Confirmed/documented legally designated decision makers.  Added by Daxa Campa          CAD (coronary artery disease)      Priority: Medium     CABG 2013, stent x 1 in 2014      Type 2 diabetes mellitus without complication, without long-term current use of insulin (H) 10/31/2013     Priority: Medium        Past Medical History:    Past Medical History:   Diagnosis Date    Allergic state     CAD (coronary artery disease)     Cataract     Cataracts, bilateral     Elevated PSA     Gastro-oesophageal reflux disease     Heart murmur     Hyperlipidemia     Hypertension     Pacemaker     Stented coronary artery     Visual aura        Past Surgical History:    Past Surgical History:   Procedure Laterality Date    AORTIC VALVE REPLACEMENT      25 mm CE bovine replacement Dr. Wu 8/20/2012    CARDIAC SURGERY      CATARACT IOL, RT/LT Right 04/2017    COLECTOMY LOW ANTERIOR N/A 2/17/2017    Procedure: COLECTOMY LOW ANTERIOR;  Surgeon: Tima Moreland MD;  Location: [facility]    COLONOSCOPY N/A 2/17/2017    Procedure: COLONOSCOPY;  Surgeon: Tima Moreland MD;  Location: [facility]    COLOSTOMY  02/17/2017    Sigmoid Colostomy performed.     LAPAROSCOPIC HERNIORRHAPHY INGUINAL Right 9/18/2015    Procedure: LAPAROSCOPIC HERNIORRHAPHY INGUINAL;  Surgeon: Solitario Nettles DO;  Location: [facility]    PHACOEMULSIFICATION WITH STANDARD INTRAOCULAR LENS IMPLANT Right 4/20/2017    Procedure: PHACOEMULSIFICATION WITH STANDARD INTRAOCULAR LENS IMPLANT;  CATARACT EXTRACTION RIGHT EYE WITH IMPLANT;  Surgeon: Darin Gutierrez MD;  Location: [facility]    PROSTATE BIOPSY, NEEDLE, SATURATION SAMPLING  2009    retinopexy-pvd with retinal tear Right 2008    SIGMOIDOSCOPY FLEXIBLE N/A 02/17/2017    Surgeon:  Tima Moreland MD;  Location: [facility]; No specimens taken from procedure.     TAKEDOWN COLOSTOMY N/A 9/21/2017    Procedure: TAKEDOWN COLOSTOMY;  CLOSURE OF NORRIS'S SIGMOID COLOSTOMY, REPAIR STOMAL HERNIA;  Surgeon: Tima Moreland MD;  Location: [facility]    TONSILLECTOMY         Family History:    Family History   Problem Relation Age of Onset    Diabetes Mother     C.A.D. Mother     Cancer Maternal Grandmother        Social History:  Marital Status:   [2]  Social History     Tobacco Use    Smoking status: Former    Smokeless tobacco: Former     Types: Chew     Quit date: 10/19/2013    Tobacco comments:     quit in 2000   Vaping Use    Vaping status: Never Used   Substance Use Topics    Alcohol use: No    Drug use: No        Medications:    neomycin-polymyxin-hydrocortisone (CORTISPORIN) 3.5-73050-0 otic suspension  ACCU-CHEK GUIDE test strip  acetaminophen (TYLENOL) 500 MG tablet  amLODIPine (NORVASC) 5 MG tablet  ASPIRIN PO  atorvastatin (LIPITOR) 40 MG tablet  blood glucose (NO BRAND SPECIFIED) test strip  blood glucose monitoring (ACCU-CHEK COMPACT CARE KIT) meter device kit  blood glucose monitoring (ACCU-CHEK MULTICLIX) lancets  blood glucose monitoring (NO BRAND SPECIFIED) meter device kit  clopidogrel (PLAVIX) 75 MG tablet  finasteride (PROSCAR) 5 MG tablet  Gymnema Sylvestris Leaf POWD  lisinopril (ZESTRIL) 40 MG tablet  metoprolol tartrate (LOPRESSOR) 100 MG tablet  Multiple Vitamin (MULTIVITAMINS PO)  nitroGLYcerin (NITROSTAT) 0.4 MG sublingual tablet  nystatin-triamcinolone (MYCOLOG II) 619269-4.1 UNIT/GM-% external cream  thin (NO BRAND SPECIFIED) lancets          Review of Systems   Skin:         Chronically easy bruising   All other systems reviewed and are negative.      Physical Exam   BP: 115/69  Pulse: 62  Temp: 97.5  F (36.4  C)  Resp: 16  SpO2: 97 %      Physical Exam  Vitals and nursing note reviewed.   Constitutional:       General: He is not in acute distress.     Appearance: Normal  appearance. He is not ill-appearing, toxic-appearing or diaphoretic.   HENT:      Head: Normocephalic and atraumatic.      Right Ear: Tympanic membrane and external ear normal.      Left Ear: Tympanic membrane and external ear normal.      Ears:      Comments: Right ear canal small amount of bright red blood in the right ear canal mainly pooled inferiorly questionable small papule no purulent drainage no pain with traction on the pinna or pressure on the tragus, no edema of the ear canal  Left ear canal had small amount of cerumen on the periphery and very scant amount of dried red blood on the superior aspect of the canal no canal edema no pain with traction on the pinna or pressure on the tragus     Nose: Nose normal.      Mouth/Throat:      Mouth: Mucous membranes are moist.      Pharynx: Oropharynx is clear.   Eyes:      Extraocular Movements: Extraocular movements intact.   Cardiovascular:      Rate and Rhythm: Normal rate and regular rhythm.   Pulmonary:      Effort: Pulmonary effort is normal.   Musculoskeletal:      Cervical back: Normal range of motion and neck supple.   Skin:     General: Skin is warm and dry.      Capillary Refill: Capillary refill takes less than 2 seconds.      Comments: Patient had a few areas of ecchymosis on his forearms which she notes is often chronic   Neurological:      General: No focal deficit present.      Mental Status: He is alert and oriented to person, place, and time. Mental status is at baseline.   Psychiatric:         Mood and Affect: Mood normal.         Behavior: Behavior normal.         Thought Content: Thought content normal.         Judgment: Judgment normal.         ED Course        Procedures              Critical Care time:  none               Results for orders placed or performed during the hospital encounter of 08/02/24 (from the past 24 hour(s))   CBC with platelets differential    Narrative    The following orders were created for panel order CBC with  platelets differential.  Procedure                               Abnormality         Status                     ---------                               -----------         ------                     CBC with platelets and d...[[id number]]                      Final result                 Please view results for these tests on the individual orders.   Comprehensive metabolic panel   Result Value Ref Range    Sodium 141 135 - 145 mmol/L    Potassium 4.4 3.4 - 5.3 mmol/L    Carbon Dioxide (CO2) 24 22 - 29 mmol/L    Anion Gap 9 7 - 15 mmol/L    Urea Nitrogen 26.6 (H) 8.0 - 23.0 mg/dL    Creatinine 1.28 (H) 0.67 - 1.17 mg/dL    GFR Estimate 57 (L) >60 mL/min/1.73m2    Calcium 9.2 8.8 - 10.4 mg/dL    Chloride 108 (H) 98 - 107 mmol/L    Glucose 149 (H) 70 - 99 mg/dL    Alkaline Phosphatase 95 40 - 150 U/L    AST 21 0 - 45 U/L    ALT 16 0 - 70 U/L    Protein Total 6.5 6.4 - 8.3 g/dL    Albumin 3.8 3.5 - 5.2 g/dL    Bilirubin Total 0.5 <=1.2 mg/dL   INR   Result Value Ref Range    INR 1.07 0.85 - 1.15   Partial thromboplastin time   Result Value Ref Range    aPTT 29 22 - 38 Seconds   CBC with platelets and differential   Result Value Ref Range    WBC Count 7.0 4.0 - 11.0 10e3/uL    RBC Count 4.91 4.40 - 5.90 10e6/uL    Hemoglobin 14.3 13.3 - 17.7 g/dL    Hematocrit 43.3 40.0 - 53.0 %    MCV 88 78 - 100 fL    MCH 29.1 26.5 - 33.0 pg    MCHC 33.0 31.5 - 36.5 g/dL    RDW 14.0 10.0 - 15.0 %    Platelet Count 202 150 - 450 10e3/uL    % Neutrophils 73 %    % Lymphocytes 14 %    % Monocytes 9 %    % Eosinophils 3 %    % Basophils 1 %    % Immature Granulocytes 1 %    NRBCs per 100 WBC 0 <1 /100    Absolute Neutrophils 5.1 1.6 - 8.3 10e3/uL    Absolute Lymphocytes 1.0 0.8 - 5.3 10e3/uL    Absolute Monocytes 0.6 0.0 - 1.3 10e3/uL    Absolute Eosinophils 0.2 0.0 - 0.7 10e3/uL    Absolute Basophils 0.1 0.0 - 0.2 10e3/uL    Absolute Immature Granulocytes 0.1 <=0.4 10e3/uL    Absolute NRBCs 0.0 10e3/uL   Extra Tube    Narrative    The  following orders were created for panel order Extra Tube.  Procedure                               Abnormality         Status                     ---------                               -----------         ------                     Extra Red Top Tube[[id number]]                               Final result               Extra Heparinized Syringe[[id number]]                        Final result                 Please view results for these tests on the individual orders.   Extra Red Top Tube   Result Value Ref Range    Hold Specimen JIC    Extra Heparinized Syringe   Result Value Ref Range    Hold Specimen JIC        Medications - No data to display    Assessments & Plan (with Medical Decision Making)     I have reviewed the nursing notes.    I have reviewed the findings, diagnosis, plan and need for follow up with the patient.           Medical Decision Making  The patient's presentation was of straightforward complexity (a clearly self-limited or minor problem).    The patient's evaluation involved:  ordering and/or review of 2 test(s) in this encounter (multiple blood tests)    The patient's management necessitated moderate risk (prescription drug management including medications given in the ED).    Patient presented to the ED with some blood in his external canal of the right ear scant in the left it is difficult to say whether it was cross transfer from the Q-tip or not in the left ear.  He.  He may have a small papule or skin lesion in the left ear that got abraded.  ED labs showed he is somewhat dehydrated I suspect given some of his antiplatelet agents he is more prone to bleeding.  After ED evaluation appears he has abrasion possibly from a skin lesion or papule in the ear canal.  Going to have him follow-up with ENT for further evaluation Cortisporin drops avoid putting his hearing aids in this week until healed return to ED if any concerns.  Strong return precautions given.    New Prescriptions     NEOMYCIN-POLYMYXIN-HYDROCORTISONE (CORTISPORIN) 3.5-50140-9 OTIC SUSPENSION    Place 3 drops into both ears 4 times daily       Final diagnoses:   Abrasion of right ear canal, initial encounter       8/2/2024   [facility]       Ever Kumar MD  08/02/24 0941

## 2024-08-08 ENCOUNTER — TRANSFERRED RECORDS (OUTPATIENT)
Dept: HEALTH INFORMATION MANAGEMENT | Facility: CLINIC | Age: 79
End: 2024-08-08

## 2024-08-12 ENCOUNTER — TELEPHONE (OUTPATIENT)
Dept: INTERNAL MEDICINE | Facility: OTHER | Age: 79
End: 2024-08-12

## 2024-08-12 DIAGNOSIS — I10 BENIGN ESSENTIAL HYPERTENSION: ICD-10-CM

## 2024-08-12 RX ORDER — AMLODIPINE BESYLATE 5 MG/1
5 TABLET ORAL DAILY
Qty: 90 TABLET | Refills: 2 | Status: SHIPPED | OUTPATIENT
Start: 2024-08-12

## 2024-08-12 NOTE — TELEPHONE ENCOUNTER
Call returned to patients wife, update provided the refill request for a 90 day supply has been sent to Dr. Vazquez.

## 2024-08-12 NOTE — TELEPHONE ENCOUNTER
Norvasc      Last Written Prescription Date:  04/29/24  Last Fill Quantity: 14,   # refills: 0  Last Office Visit: 04/29/24  Future Office visit:         Pt would like a 90 day supply.

## 2024-08-12 NOTE — TELEPHONE ENCOUNTER
Calcium Channel Blockers Protocol  [id number]/12/2024 08:13 AM   Protocol Details GFR is on file in the past 12 months and most recent GFR is normal     GFR Estimate   Date Value Ref Range Status   08/02/2024 57 (L) >60 mL/min/1.73m2 Final     Comment:     eGFR calculated using 2021 CKD-EPI equation.   06/08/2021 59 (L) >60 mL/min/[1.73_m2] Final     Comment:     Non  GFR Calc  Starting 12/18/2018, serum creatinine based estimated GFR (eGFR) will be   calculated using the Chronic Kidney Disease Epidemiology Collaboration   (CKD-EPI) equation.

## 2024-08-12 NOTE — TELEPHONE ENCOUNTER
8:20 AM    Reason for Call: Phone Call    Description: Patient's wife called in stating that the patient has been only getting 15 days worth of medication rather than 90. Patient needs a 90 day supply of amlodipine 5MG  sent to [facility] in [place]. Please call wife back.     Was an appointment offered for this call? No  If yes : Appointment type              Date    Preferred method for responding to this message: Telephone Call  What is your phone number ? [phone]     If we cannot reach you directly, may we leave a detailed response at the number you provided? Yes    Can this message wait until your PCP/provider returns, if available today? Deya Virk

## 2024-09-13 ENCOUNTER — HOSPITAL ENCOUNTER (EMERGENCY)
Facility: HOSPITAL | Age: 79
Discharge: HOME | End: 2024-09-13
Attending: PHYSICIAN ASSISTANT | Admitting: PHYSICIAN ASSISTANT
Payer: COMMERCIAL

## 2024-09-13 ENCOUNTER — APPOINTMENT (OUTPATIENT)
Dept: GENERAL RADIOLOGY | Facility: HOSPITAL | Age: 79
End: 2024-09-13
Attending: PHYSICIAN ASSISTANT
Payer: COMMERCIAL

## 2024-09-13 VITALS
RESPIRATION RATE: 17 BRPM | DIASTOLIC BLOOD PRESSURE: 65 MMHG | SYSTOLIC BLOOD PRESSURE: 111 MMHG | OXYGEN SATURATION: 94 % | TEMPERATURE: 99.7 F | HEART RATE: 78 BPM

## 2024-09-13 DIAGNOSIS — U07.1 COVID-19: ICD-10-CM

## 2024-09-13 LAB
ALBUMIN SERPL BCG-MCNC: 4.1 G/DL (ref 3.5–5.2)
ALP SERPL-CCNC: 84 U/L (ref 40–150)
ALT SERPL W P-5'-P-CCNC: 23 U/L (ref 0–70)
ANION GAP SERPL CALCULATED.3IONS-SCNC: 13 MMOL/L (ref 7–15)
AST SERPL W P-5'-P-CCNC: 35 U/L (ref 0–45)
BASOPHILS # BLD AUTO: 0 10E3/UL (ref 0–0.2)
BASOPHILS NFR BLD AUTO: 1 %
BILIRUB SERPL-MCNC: 1 MG/DL
BUN SERPL-MCNC: 26.2 MG/DL (ref 8–23)
CALCIUM SERPL-MCNC: 9 MG/DL (ref 8.8–10.4)
CHLORIDE SERPL-SCNC: 105 MMOL/L (ref 98–107)
CREAT SERPL-MCNC: 1.27 MG/DL (ref 0.67–1.17)
EGFRCR SERPLBLD CKD-EPI 2021: 57 ML/MIN/1.73M2
EOSINOPHIL # BLD AUTO: 0 10E3/UL (ref 0–0.7)
EOSINOPHIL NFR BLD AUTO: 0 %
ERYTHROCYTE [DISTWIDTH] IN BLOOD BY AUTOMATED COUNT: 14.4 % (ref 10–15)
FLUAV RNA SPEC QL NAA+PROBE: NEGATIVE
FLUBV RNA RESP QL NAA+PROBE: NEGATIVE
GLUCOSE BLDC GLUCOMTR-MCNC: 173 MG/DL (ref 70–99)
GLUCOSE SERPL-MCNC: 173 MG/DL (ref 70–99)
HCO3 SERPL-SCNC: 19 MMOL/L (ref 22–29)
HCT VFR BLD AUTO: 40.7 % (ref 40–53)
HGB BLD-MCNC: 13.7 G/DL (ref 13.3–17.7)
HOLD SPECIMEN: NORMAL
IMM GRANULOCYTES # BLD: 0 10E3/UL
IMM GRANULOCYTES NFR BLD: 1 %
LACTATE SERPL-SCNC: 1.6 MMOL/L (ref 0.7–2)
LYMPHOCYTES # BLD AUTO: 0.4 10E3/UL (ref 0.8–5.3)
LYMPHOCYTES NFR BLD AUTO: 4 %
MCH RBC QN AUTO: 29.2 PG (ref 26.5–33)
MCHC RBC AUTO-ENTMCNC: 33.7 G/DL (ref 31.5–36.5)
MCV RBC AUTO: 87 FL (ref 78–100)
MONOCYTES # BLD AUTO: 0.9 10E3/UL (ref 0–1.3)
MONOCYTES NFR BLD AUTO: 11 %
NEUTROPHILS # BLD AUTO: 7 10E3/UL (ref 1.6–8.3)
NEUTROPHILS NFR BLD AUTO: 84 %
NRBC # BLD AUTO: 0 10E3/UL
NRBC BLD AUTO-RTO: 0 /100
PLATELET # BLD AUTO: 187 10E3/UL (ref 150–450)
POTASSIUM SERPL-SCNC: 3.9 MMOL/L (ref 3.4–5.3)
PROCALCITONIN SERPL IA-MCNC: 0.11 NG/ML
PROT SERPL-MCNC: 7 G/DL (ref 6.4–8.3)
RBC # BLD AUTO: 4.69 10E6/UL (ref 4.4–5.9)
RSV RNA SPEC NAA+PROBE: NEGATIVE
SARS-COV-2 RNA RESP QL NAA+PROBE: POSITIVE
SODIUM SERPL-SCNC: 137 MMOL/L (ref 135–145)
WBC # BLD AUTO: 8.4 10E3/UL (ref 4–11)

## 2024-09-13 PROCEDURE — 85004 AUTOMATED DIFF WBC COUNT: CPT | Performed by: PHYSICIAN ASSISTANT

## 2024-09-13 PROCEDURE — 93010 ELECTROCARDIOGRAM REPORT: CPT | Performed by: INTERNAL MEDICINE

## 2024-09-13 PROCEDURE — 84075 ASSAY ALKALINE PHOSPHATASE: CPT | Performed by: PHYSICIAN ASSISTANT

## 2024-09-13 PROCEDURE — 250N000011 HC RX IP 250 OP 636: Performed by: PHYSICIAN ASSISTANT

## 2024-09-13 PROCEDURE — 87637 SARSCOV2&INF A&B&RSV AMP PRB: CPT | Performed by: PHYSICIAN ASSISTANT

## 2024-09-13 PROCEDURE — 85014 HEMATOCRIT: CPT | Performed by: PHYSICIAN ASSISTANT

## 2024-09-13 PROCEDURE — 71045 X-RAY EXAM CHEST 1 VIEW: CPT

## 2024-09-13 PROCEDURE — 36415 COLL VENOUS BLD VENIPUNCTURE: CPT | Performed by: PHYSICIAN ASSISTANT

## 2024-09-13 PROCEDURE — 99284 EMERGENCY DEPT VISIT MOD MDM: CPT | Performed by: PHYSICIAN ASSISTANT

## 2024-09-13 PROCEDURE — 96374 THER/PROPH/DIAG INJ IV PUSH: CPT

## 2024-09-13 PROCEDURE — 83605 ASSAY OF LACTIC ACID: CPT | Performed by: PHYSICIAN ASSISTANT

## 2024-09-13 PROCEDURE — 99285 EMERGENCY DEPT VISIT HI MDM: CPT | Mod: 25

## 2024-09-13 PROCEDURE — 96361 HYDRATE IV INFUSION ADD-ON: CPT

## 2024-09-13 PROCEDURE — 84145 PROCALCITONIN (PCT): CPT | Performed by: PHYSICIAN ASSISTANT

## 2024-09-13 PROCEDURE — 82962 GLUCOSE BLOOD TEST: CPT

## 2024-09-13 PROCEDURE — 87040 BLOOD CULTURE FOR BACTERIA: CPT | Performed by: PHYSICIAN ASSISTANT

## 2024-09-13 PROCEDURE — 258N000003 HC RX IP 258 OP 636: Performed by: PHYSICIAN ASSISTANT

## 2024-09-13 PROCEDURE — 36592 COLLECT BLOOD FROM PICC: CPT | Performed by: PHYSICIAN ASSISTANT

## 2024-09-13 PROCEDURE — 93005 ELECTROCARDIOGRAM TRACING: CPT

## 2024-09-13 RX ORDER — KETOROLAC TROMETHAMINE 15 MG/ML
15 INJECTION, SOLUTION INTRAMUSCULAR; INTRAVENOUS ONCE
Status: COMPLETED | OUTPATIENT
Start: 2024-09-13 | End: 2024-09-13

## 2024-09-13 RX ORDER — SODIUM CHLORIDE 9 MG/ML
INJECTION, SOLUTION INTRAVENOUS CONTINUOUS
Status: DISCONTINUED | OUTPATIENT
Start: 2024-09-13 | End: 2024-09-13 | Stop reason: HOSPADM

## 2024-09-13 RX ORDER — LIDOCAINE HYDROCHLORIDE 20 MG/ML
JELLY TOPICAL ONCE
Status: COMPLETED | OUTPATIENT
Start: 2024-09-13 | End: 2024-09-13

## 2024-09-13 RX ADMIN — KETOROLAC TROMETHAMINE 15 MG: 15 INJECTION, SOLUTION INTRAMUSCULAR; INTRAVENOUS at 14:28

## 2024-09-13 RX ADMIN — SODIUM CHLORIDE: 9 INJECTION, SOLUTION INTRAVENOUS at 14:28

## 2024-09-13 ASSESSMENT — ACTIVITIES OF DAILY LIVING (ADL)
ADLS_ACUITY_SCORE: 38

## 2024-09-13 ASSESSMENT — ENCOUNTER SYMPTOMS
ACTIVITY CHANGE: 1
BRUISES/BLEEDS EASILY: 1
FEVER: 1
SHORTNESS OF BREATH: 0
ROS GI COMMENTS: COLOSTOMY
FATIGUE: 1
COUGH: 1

## 2024-09-13 NOTE — ED PROVIDER NOTES
History     Chief Complaint   Patient presents with    Fever     The history is provided by the patient and the spouse.     Peter Zhao is a 78 year old male who presented to the emergency department via wheelchair along with wife for evaluation of confusion and a fever.  Patient also has a cough.  Woke up feeling this way.  Developed a cough last night.    Allergies:  Allergies   Allergen Reactions    Ciprofloxacin Hives    Unknown [No Clinical Screening - See Comments]      Antibiotic allergy, pt not sure which       Problem List:    Patient Active Problem List    Diagnosis Date Noted    Acute kidney injury (H24) 11/30/2018     Priority: Medium    H/O aortic valve replacement 11/16/2018     Priority: Medium    Hx of aortic valve replacement 11/16/2018     Priority: Medium    ICD (implantable cardioverter-defibrillator) in place 11/16/2018     Priority: Medium    Attention to colostomy (H) 09/21/2017     Priority: Medium    Agitation 05/19/2017     Priority: Medium    TIA (transient ischemic attack) 04/29/2017     Priority: Medium    Hypokalemia 02/20/2017     Priority: Medium    S/P colon resection 02/18/2017     Priority: Medium    Hyperlipidemia LDL goal <100 02/15/2017     Priority: Medium    Elevated prostate specific antigen (PSA) 02/15/2017     Priority: Medium    HTN (hypertension) 02/15/2017     Priority: Medium    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 02/15/2017     Priority: Medium    H/O migraine 02/15/2017     Priority: Medium    Visual aura 02/15/2017     Priority: Medium    Cataract 02/15/2017     Priority: Medium    ACP (advance care planning) 10/04/2016     Priority: Medium     Advance Care Planning 10/4/2016: ACP Review of Chart / Resources Provided:  Reviewed chart for advance care plan.  Peter Zhao has no plan or code status on file. Discussed available resources and provided with information. Confirmed code status reflects current choices pending  further ACP discussions.  Confirmed/documented legally designated decision makers.  Added by Daxa Campa          CAD (coronary artery disease)      Priority: Medium     CABG 2013, stent x 1 in 2014      Type 2 diabetes mellitus without complication, without long-term current use of insulin (H) 10/31/2013     Priority: Medium        Past Medical History:    Past Medical History:   Diagnosis Date    Allergic state     CAD (coronary artery disease)     Cataract     Cataracts, bilateral     Elevated PSA     Gastro-oesophageal reflux disease     Heart murmur     Hyperlipidemia     Hypertension     Pacemaker     Stented coronary artery     Visual aura        Past Surgical History:    Past Surgical History:   Procedure Laterality Date    AORTIC VALVE REPLACEMENT      25 mm CE bovine replacement Dr. Wu 8/20/2012    CARDIAC SURGERY      CATARACT IOL, RT/LT Right 04/2017    COLECTOMY LOW ANTERIOR N/A 2/17/2017    Procedure: COLECTOMY LOW ANTERIOR;  Surgeon: Tima Moreland MD;  Location: [facility]    COLONOSCOPY N/A 2/17/2017    Procedure: COLONOSCOPY;  Surgeon: Tima Moreland MD;  Location: [facility]    COLOSTOMY  02/17/2017    Sigmoid Colostomy performed.     LAPAROSCOPIC HERNIORRHAPHY INGUINAL Right 9/18/2015    Procedure: LAPAROSCOPIC HERNIORRHAPHY INGUINAL;  Surgeon: Solitario Nettles DO;  Location: [facility]    PHACOEMULSIFICATION WITH STANDARD INTRAOCULAR LENS IMPLANT Right 4/20/2017    Procedure: PHACOEMULSIFICATION WITH STANDARD INTRAOCULAR LENS IMPLANT;  CATARACT EXTRACTION RIGHT EYE WITH IMPLANT;  Surgeon: Darin Gutierrez MD;  Location: [facility]    PROSTATE BIOPSY, NEEDLE, SATURATION SAMPLING  2009    retinopexy-pvd with retinal tear Right 2008    SIGMOIDOSCOPY FLEXIBLE N/A 02/17/2017    Surgeon: Tima Moreland MD;  Location: [facility]; No specimens taken from procedure.     TAKEDOWN COLOSTOMY N/A 9/21/2017    Procedure: TAKEDOWN COLOSTOMY;  CLOSURE OF NORRIS'S SIGMOID COLOSTOMY, REPAIR STOMAL HERNIA;   Surgeon: Tima Moreland MD;  Location: [facility]    TONSILLECTOMY         Family History:    Family History   Problem Relation Age of Onset    Diabetes Mother     C.A.D. Mother     Cancer Maternal Grandmother        Social History:  Marital Status:   [2]  Social History     Tobacco Use    Smoking status: Former    Smokeless tobacco: Former     Types: Chew     Quit date: 10/19/2013    Tobacco comments:     quit in 2000   Vaping Use    Vaping status: Never Used   Substance Use Topics    Alcohol use: No    Drug use: No        Medications:    nirmatrelvir and ritonavir (PAXLOVID) 150 mg/100 mg therapy pack  ACCU-CHEK GUIDE test strip  acetaminophen (TYLENOL) 500 MG tablet  amLODIPine (NORVASC) 5 MG tablet  ASPIRIN PO  atorvastatin (LIPITOR) 40 MG tablet  blood glucose (NO BRAND SPECIFIED) test strip  blood glucose monitoring (ACCU-CHEK COMPACT CARE KIT) meter device kit  blood glucose monitoring (ACCU-CHEK MULTICLIX) lancets  blood glucose monitoring (NO BRAND SPECIFIED) meter device kit  clopidogrel (PLAVIX) 75 MG tablet  finasteride (PROSCAR) 5 MG tablet  Gymnema Sylvestris Leaf POWD  lisinopril (ZESTRIL) 40 MG tablet  metoprolol tartrate (LOPRESSOR) 100 MG tablet  Multiple Vitamin (MULTIVITAMINS PO)  neomycin-polymyxin-hydrocortisone (CORTISPORIN) 3.5-86344-5 otic suspension  nitroGLYcerin (NITROSTAT) 0.4 MG sublingual tablet  nystatin-triamcinolone (MYCOLOG II) 209879-4.1 UNIT/GM-% external cream  thin (NO BRAND SPECIFIED) lancets          Review of Systems   Constitutional:  Positive for activity change, fatigue and fever.   Respiratory:  Positive for cough. Negative for shortness of breath.    Cardiovascular:  Negative for chest pain.   Gastrointestinal:         Colostomy   Hematological:  Bruises/bleeds easily.        Plavix       Physical Exam   BP: 115/69  Pulse: 91  Temp: (!) 102.2  F (39  C)  Resp: 16  SpO2: 94 %      Physical Exam  Vitals and nursing note reviewed.   Constitutional:       General: He  is not in acute distress.     Appearance: Normal appearance. He is ill-appearing. He is not toxic-appearing or diaphoretic.   Cardiovascular:      Rate and Rhythm: Normal rate and regular rhythm.   Pulmonary:      Effort: Pulmonary effort is normal.      Breath sounds: Normal breath sounds.      Comments: Nonproductive cough  Abdominal:      Palpations: Abdomen is soft.   Musculoskeletal:      Right lower leg: No edema.      Left lower leg: No edema.   Skin:     General: Skin is warm and dry.      Capillary Refill: Capillary refill takes less than 2 seconds.   Neurological:      General: No focal deficit present.      Mental Status: He is alert and oriented to person, place, and time.   Psychiatric:         Mood and Affect: Mood normal.         ED Course     ED Course as of 09/13/24 1616   Fri Sep 13, 2024   1412 My independent review of the EKG shows a paced rhythm at a rate of 90.   1514 SARS CoV2 PCR(!): Positive   1616 My independent review of the chest x-ray shows no evidence of focal consolidation, pneumothorax, or widened mediastinum.     Procedures              Critical Care time:  none               Results for orders placed or performed during the hospital encounter of 09/13/24 (from the past 24 hour(s))   Glucose by meter   Result Value Ref Range    GLUCOSE BY METER POCT 173 (H) 70 - 99 mg/dL   Symptomatic Influenza A/B, RSV, & SARS-CoV2 PCR (COVID-19) Nasopharyngeal    Specimen: Nasopharyngeal; Swab   Result Value Ref Range    Influenza A PCR Negative Negative    Influenza B PCR Negative Negative    RSV PCR Negative Negative    SARS CoV2 PCR Positive (A) Negative    Narrative    Testing was performed using the Xpert Xpress CoV2/Flu/RSV Assay on the [employer] GeneXpert Instrument. This test should be ordered for the detection of SARS-CoV2, influenza, and RSV viruses in individuals with signs and symptoms of respiratory tract infection. This test is for in vitro diagnostic use under the [place] FDA for  laboratories certified under CLIA to perform high or moderate complexity testing. This test has been [place] FDA cleared. A negative result does not rule out the presence of PCR inhibitors in the specimen or target RNA in concentration below the limit of detection for the assay. If only one viral target is positive but coinfection with multiple targets is suspected, the sample should be re-tested with another FDA cleared, approved, or authorized test, if coninfection would change clinical management. This test was validated by the [facility]. These laboratories are certified under the Clinical Laboratory Improvement Amendments of 1988 (CLIA-88) as qualified to perfom high complexity laboratory testing.   EKG 12-lead, tracing only   Result Value Ref Range    Systolic Blood Pressure  mmHg    Diastolic Blood Pressure  mmHg    Ventricular Rate 90 BPM    Atrial Rate 90 BPM    IA Interval 162 ms    QRS Duration 162 ms     ms    QTc 506 ms    P Axis 79 degrees    R AXIS 185 degrees    T Axis 79 degrees    Interpretation ECG       Atrial-sensed ventricular-paced rhythm  Abnormal ECG  When compared with ECG of 03-Apr-2024 12:45,  Vent. rate has increased by  15 bpm     CBC with platelets differential    Narrative    The following orders were created for panel order CBC with platelets differential.  Procedure                               Abnormality         Status                     ---------                               -----------         ------                     CBC with platelets and d...[[id number]]  Abnormal            Final result                 Please view results for these tests on the individual orders.   Comprehensive metabolic panel   Result Value Ref Range    Sodium 137 135 - 145 mmol/L    Potassium 3.9 3.4 - 5.3 mmol/L    Carbon Dioxide (CO2) 19 (L) 22 - 29 mmol/L    Anion Gap 13 7 - 15 mmol/L    Urea Nitrogen 26.2 (H) 8.0 - 23.0 mg/dL    Creatinine 1.27 (H) 0.67 - 1.17 mg/dL    GFR  Estimate 57 (L) >60 mL/min/1.73m2    Calcium 9.0 8.8 - 10.4 mg/dL    Chloride 105 98 - 107 mmol/L    Glucose 173 (H) 70 - 99 mg/dL    Alkaline Phosphatase 84 40 - 150 U/L    AST 35 0 - 45 U/L    ALT 23 0 - 70 U/L    Protein Total 7.0 6.4 - 8.3 g/dL    Albumin 4.1 3.5 - 5.2 g/dL    Bilirubin Total 1.0 <=1.2 mg/dL   Lactic acid whole blood with 1x repeat in 2 hr when >2   Result Value Ref Range    Lactic Acid, Initial 1.6 0.7 - 2.0 mmol/L   Procalcitonin   Result Value Ref Range    Procalcitonin 0.11 <0.50 ng/mL   [place] Draw    Narrative    The following orders were created for panel order [place] Draw.  Procedure                               Abnormality         Status                     ---------                               -----------         ------                     Extra Blue Top Tube[[id number]]                              Final result               Extra Red Top Tube[[id number]]                               Final result               Extra Green Top (Lithium...[[id number]]                      Final result                 Please view results for these tests on the individual orders.   CBC with platelets and differential   Result Value Ref Range    WBC Count 8.4 4.0 - 11.0 10e3/uL    RBC Count 4.69 4.40 - 5.90 10e6/uL    Hemoglobin 13.7 13.3 - 17.7 g/dL    Hematocrit 40.7 40.0 - 53.0 %    MCV 87 78 - 100 fL    MCH 29.2 26.5 - 33.0 pg    MCHC 33.7 31.5 - 36.5 g/dL    RDW 14.4 10.0 - 15.0 %    Platelet Count 187 150 - 450 10e3/uL    % Neutrophils 84 %    % Lymphocytes 4 %    % Monocytes 11 %    % Eosinophils 0 %    % Basophils 1 %    % Immature Granulocytes 1 %    NRBCs per 100 WBC 0 <1 /100    Absolute Neutrophils 7.0 1.6 - 8.3 10e3/uL    Absolute Lymphocytes 0.4 (L) 0.8 - 5.3 10e3/uL    Absolute Monocytes 0.9 0.0 - 1.3 10e3/uL    Absolute Eosinophils 0.0 0.0 - 0.7 10e3/uL    Absolute Basophils 0.0 0.0 - 0.2 10e3/uL    Absolute Immature Granulocytes 0.0 <=0.4 10e3/uL    Absolute NRBCs 0.0 10e3/uL   Extra  Blue Top Tube   Result Value Ref Range    Hold Specimen JIC    Extra Red Top Tube   Result Value Ref Range    Hold Specimen JIC    Extra Green Top (Lithium Heparin) Tube   Result Value Ref Range    Hold Specimen JIC    XR Chest Port 1 View    Narrative    PROCEDURE:  XR CHEST PORT 1 VIEW    HISTORY: Fever. .    COMPARISON:  4/3/2024    FINDINGS:    The cardiomediastinal contours are stable. There is calcific aortic  atherosclerosis.   Low lung volumes. Slightly limited left base assessment. No effusion  or pneumothorax.      Impression    IMPRESSION:  Probable atelectasis at the left lung base. Consider  follow-up PA and lateral views.      ERIN JAY MD         SYSTEM ID:  [id number]       Medications   sodium chloride 0.9 % infusion ( Intravenous $New Bag 9/13/24 1428)   lidocaine (XYLOCAINE) 2 % external gel (has no administration in time range)   ketorolac (TORADOL) injection 15 mg (15 mg Intravenous $Given 9/13/24 1428)       Assessments & Plan (with Medical Decision Making)   78-year-old male who presents to the emergency department for evaluation of episodic confusion secondary to COVID infection.  Symptoms began last night.  Presented here with a fever of 102.  COVID-positive.  No focal pneumonia identified.  Remainder of laboratory evaluation as above.  Lactic acid and procalcitonin are low.  No reasonable indication for urinalysis.  Long discussion with wife.  He will be started on renally dosed Paxlovid.  Oxygen saturations are acceptable on room air.  Strict return precautions were provided.  Close clinic follow-up was discussed.  See discharge instructions.    Peter and his wife have also agreed to schedule a follow up appointment with his primary provider for re-evaluation and further management. They verbalized an understanding of the results of our workup and agree with the complete discharge plan including instructions for return and follow up.  There is no indication for further  investigation or treatment on an emergent basis.  There is no reasonably foreseeable injury that would be associated with discharge and close outpatient follow-up.     This document was prepared using a combination of typing and voice generated software.  While every attempt was made for accuracy, spelling and grammatical errors may exist.      I have reviewed the nursing notes.    I have reviewed the findings, diagnosis, plan and need for follow up with the patient.           Medical Decision Making  The patient's presentation was of moderate complexity (an acute illness with systemic symptoms).    The patient's evaluation involved:  strong consideration of a test (CT head and chest) that was ultimately deferred  ordering and/or review of 3+ test(s) in this encounter (multiple labs and images)    The patient's management necessitated moderate risk (prescription drug management including medications given in the ED).        New Prescriptions    NIRMATRELVIR AND RITONAVIR (PAXLOVID) 150 MG/100 MG THERAPY PACK    Take 2 tablets by mouth 2 times daily for 5 days.       Final diagnoses:   COVID-19       9/13/2024   [facility]       Ivette Gregg PA-C  09/13/24 [id number]

## 2024-09-13 NOTE — ED NOTES
Pt agreed to be straight cath, when straight cath pt reported to much pain, and wanted it removed before reaching bladder. Still need UA

## 2024-09-13 NOTE — DISCHARGE INSTRUCTIONS
Rest and stay hydrated.    Tylenol as needed for fevers.    Paxlovid as prescribed.    Please return to this emergency department for any new or worsening symptoms or other questions or concerns.

## 2024-09-13 NOTE — ED NOTES
AVS reviewed. All questions and concerns answered. Education reviewed, pt and wife states no further questions at this time.

## 2024-09-13 NOTE — ED TRIAGE NOTES
Patient here with his wife who states that patient has not been acting himself since this morning. BE FAST negative but patient is oriented to 2 of 4 when normally he is 4/4.  Patient did start coughing last night and has a fever today.  Provider to room for stoke eval and states stroke code is not needed.

## 2024-09-16 LAB
ATRIAL RATE - MUSE: 90 BPM
DIASTOLIC BLOOD PRESSURE - MUSE: NORMAL MMHG
INTERPRETATION ECG - MUSE: NORMAL
P AXIS - MUSE: 79 DEGREES
PR INTERVAL - MUSE: 162 MS
QRS DURATION - MUSE: 162 MS
QT - MUSE: 414 MS
QTC - MUSE: 506 MS
R AXIS - MUSE: 185 DEGREES
SYSTOLIC BLOOD PRESSURE - MUSE: NORMAL MMHG
T AXIS - MUSE: 79 DEGREES
VENTRICULAR RATE- MUSE: 90 BPM

## 2024-09-19 LAB
BACTERIA BLD CULT: NO GROWTH
BACTERIA BLD CULT: NO GROWTH

## 2024-11-12 NOTE — ANESTHESIA PROCEDURE NOTES
CENTRAL LINE INSERTION PROCEDURE NOTE:   Pre-Procedure  Performed by  in the presence of a teaching physician    Location: pre-op    Procedure Times:2/17/2017 10:45 AM and 2/17/2017 11:00 AM  Pre-Anesthestic Checklist: patient identified, IV checked, site marked, risks and benefits discussed, informed consent, monitors and equipment checked, pre-op evaluation, at physician/surgeon's request and post-op pain management    Timeout  Correct Patient: Yes   Correct Procedure: Yes   Correct Site: Yes   Correct Laterality: Yes   Correct Position: Yes   Site Marked: Yes   .   Procedure Documentation   Procedure: central line, new line and emergent  Position: supine  Patient Prep;all elements of maximal sterile barrier technique followed, mask, hat, sterile gown, sterile gloves, draped, chlorhexidine gluconate and isopropyl alcohol  Diagnosis:Large Bowel Obstruction     Insertion Site:right, internal jugular  Using U/S with sterile probe cover and sterile gel, vein evaluated for patency/adequacy of cortis insertion is adequate, and using realtime U/S imaging the dustin was punctured, and needle was observed entering vein on U/S. A permanent image is entered into the patient's record.   Skin infiltrated with 2 mL of 2% lidocaine.  Catheter: 7 Fr, 20 cm, T.L.  Assessment/Narrative    Catheter: 7 Fr, 20 cm, T.L.     Secured by suture  Tegaderm and Biopatch dressing used.  blood aspirated from all lumens  All lumens flushed: Yes  Verification method: Placement to be verified post-opPerson verifying: Gregorio/Elie  Comments:  Threaded to 18cm.           Once he gets his physical scheduled, I can put in the orders so they can be done a few days prior

## 2024-11-13 ENCOUNTER — TRANSFERRED RECORDS (OUTPATIENT)
Dept: MULTI SPECIALTY CLINIC | Facility: CLINIC | Age: 79
End: 2024-11-13

## 2024-11-13 LAB — RETINOPATHY: NORMAL

## 2024-11-17 DIAGNOSIS — N40.1 BENIGN NON-NODULAR PROSTATIC HYPERPLASIA WITH LOWER URINARY TRACT SYMPTOMS: ICD-10-CM

## 2024-11-18 RX ORDER — FINASTERIDE 5 MG/1
1 TABLET, FILM COATED ORAL DAILY
Qty: 90 TABLET | Refills: 0 | Status: SHIPPED | OUTPATIENT
Start: 2024-11-18

## 2024-11-23 ENCOUNTER — HEALTH MAINTENANCE LETTER (OUTPATIENT)
Age: 79
End: 2024-11-23

## 2024-12-09 NOTE — PROGRESS NOTES
{PROVIDER CHARTING PREFERENCE:773589}    Sebastian Green is a 79 year old, presenting for the following health issues:  Establish Care        12/10/2024    10:14 AM   Additional Questions   Accompanied by wife       Via the Health Maintenance questionnaire, the patient has reported the following services have been completed -Eye Exam: [facility] 2024-11-13, this information has been sent to the abstraction team.  History of Present Illness       Reason for visit:  Establish a new relationship   He is taking medications regularly.       79 year old male here to establish care.  Previous Dr. Vazquez patient.      HLP  HTN - controlled, No lightheadedness, CP, palp, edema.  Does get mildly winded occasionally with exertion.  DM2 - checks sugars once weekly, highest 113 recently.  Last A1c was 6.7 back in April 2024.  BPH with LUTS  CAD - denies history of MI or stenting  ICD/pacer  Aortic Valve replacement with bioprosthetic valve - approx 2012.  CKD3a looking at past labs    Cardio - Dr. Duenas at [facility], states seen q6 months, states last seen maybe October?      Large hernia LLQ - states nobody will to do surgery electively  Lump left neck - long term and unchanged - states told nothing to worry about  History of ostomy and reversal    Diabetes Follow-up    How often are you checking your blood sugar? A few times a week  What time of day are you checking your blood sugars (select all that apply)?   fasting  Have you had any blood sugars above 200?  No  Have you had any blood sugars below 70?  No  What symptoms do you notice when your blood sugar is low?  None  What concerns do you have today about your diabetes? None   Do you have any of these symptoms? (Select all that apply)  Burning in feet and Weight loss      {Reference  Diabetes Management Resources  Blood Glucose Log - 3 weeks  Blood Glucose Log with Food and Insulin Record :032281}    Diabetic Foot Screen:  Any complaints of  "increased pain or numbness ? { :009609::\"No\"}  Is there a foot ulcer now or a history of foot ulcer? { :414903::\"No\"}  Does the foot have an abnormal shape? { :209113::\"No\"}  Are the nails thick, too long or ingrown? { :866709::\"No\"}  Are there any redness or open areas? { :774359::\"No\"}         Sensation Testing done at all points on the diagram with monofilament     Right Foot: Sensation {Normal/Absent at points:282782::\"Normal at all points\"}  Left Foot: Sensation {Normal/Absent at points:579419::\"Normal at all points\"}     Risk Category: {SMIfootexam:376558::\"0- No loss of protective sensation\"}  Performed by ***    Hyperlipidemia Follow-Up    Are you regularly taking any medication or supplement to lower your cholesterol?   Yes- atorvastatin  Are you having muscle aches or other side effects that you think could be caused by your cholesterol lowering medication?  No    Hypertension Follow-up    Do you check your blood pressure regularly outside of the clinic? Yes   Are you following a low salt diet? Yes  Are your blood pressures ever more than 140 on the top number (systolic) OR more   than 90 on the bottom number (diastolic), for example 140/90? No    BP Readings from Last 2 Encounters:   12/10/24 100/58   09/13/24 111/65     Hemoglobin A1C (%)   Date Value   04/29/2024 6.7 (H)   06/08/2023 7.3 (H)   06/08/2021 6.6 (H)   08/28/2019 6.8 (H)     LDL Cholesterol Calculated (mg/dL)   Date Value   04/29/2024 65   06/15/2022 64   06/08/2021 65   12/07/2018 49       {additonal problems for provider to add (Optional):170386}    {ROS Picklists (Optional):978169}      Objective    /58 (BP Location: Left arm, Patient Position: Sitting, Cuff Size: Adult Regular)   Pulse 65   Temp 97.1  F (36.2  C) (Tympanic)   Resp 16   Wt 74.3 kg (163 lb 14.4 oz)   SpO2 98%   BMI 23.69 kg/m    Body mass index is 23.69 kg/m .  Physical Exam   {Exam List (Optional):179659}    {Diagnostic Test Results (Optional):112285}    "     Signed Electronically by: ETELVINA PASTOR DO  {Email feedback regarding this note to [email or web address]   :132203}

## 2024-12-10 ENCOUNTER — OFFICE VISIT (OUTPATIENT)
Dept: FAMILY MEDICINE | Facility: OTHER | Age: 79
End: 2024-12-10
Attending: FAMILY MEDICINE
Payer: COMMERCIAL

## 2024-12-10 VITALS
BODY MASS INDEX: 23.69 KG/M2 | HEART RATE: 65 BPM | SYSTOLIC BLOOD PRESSURE: 100 MMHG | DIASTOLIC BLOOD PRESSURE: 58 MMHG | WEIGHT: 163.9 LBS | TEMPERATURE: 97.1 F | OXYGEN SATURATION: 98 % | RESPIRATION RATE: 16 BRPM

## 2024-12-10 DIAGNOSIS — N40.1 BENIGN NON-NODULAR PROSTATIC HYPERPLASIA WITH LOWER URINARY TRACT SYMPTOMS: ICD-10-CM

## 2024-12-10 DIAGNOSIS — I25.10 CORONARY ARTERY DISEASE INVOLVING NATIVE CORONARY ARTERY OF NATIVE HEART WITHOUT ANGINA PECTORIS: ICD-10-CM

## 2024-12-10 DIAGNOSIS — L20.9 ATOPIC DERMATITIS, UNSPECIFIED TYPE: ICD-10-CM

## 2024-12-10 DIAGNOSIS — I10 BENIGN ESSENTIAL HYPERTENSION: ICD-10-CM

## 2024-12-10 DIAGNOSIS — E11.9 TYPE 2 DIABETES MELLITUS WITHOUT COMPLICATION, WITHOUT LONG-TERM CURRENT USE OF INSULIN (H): Primary | ICD-10-CM

## 2024-12-10 DIAGNOSIS — E78.5 HYPERLIPIDEMIA LDL GOAL <100: ICD-10-CM

## 2024-12-10 LAB
ANION GAP SERPL CALCULATED.3IONS-SCNC: 10 MMOL/L (ref 7–15)
BUN SERPL-MCNC: 27.4 MG/DL (ref 8–23)
CALCIUM SERPL-MCNC: 8.8 MG/DL (ref 8.8–10.4)
CHLORIDE SERPL-SCNC: 108 MMOL/L (ref 98–107)
CREAT SERPL-MCNC: 1.22 MG/DL (ref 0.67–1.17)
EGFRCR SERPLBLD CKD-EPI 2021: 60 ML/MIN/1.73M2
EST. AVERAGE GLUCOSE BLD GHB EST-MCNC: 143 MG/DL
GLUCOSE SERPL-MCNC: 143 MG/DL (ref 70–99)
HBA1C MFR BLD: 6.6 %
HCO3 SERPL-SCNC: 23 MMOL/L (ref 22–29)
POTASSIUM SERPL-SCNC: 4.6 MMOL/L (ref 3.4–5.3)
SODIUM SERPL-SCNC: 141 MMOL/L (ref 135–145)

## 2024-12-10 PROCEDURE — 82607 VITAMIN B-12: CPT | Mod: ZL | Performed by: FAMILY MEDICINE

## 2024-12-10 PROCEDURE — 83036 HEMOGLOBIN GLYCOSYLATED A1C: CPT | Mod: ZL | Performed by: FAMILY MEDICINE

## 2024-12-10 PROCEDURE — 99214 OFFICE O/P EST MOD 30 MIN: CPT | Performed by: FAMILY MEDICINE

## 2024-12-10 PROCEDURE — G0463 HOSPITAL OUTPT CLINIC VISIT: HCPCS

## 2024-12-10 PROCEDURE — 80048 BASIC METABOLIC PNL TOTAL CA: CPT | Mod: ZL | Performed by: FAMILY MEDICINE

## 2024-12-10 PROCEDURE — 82374 ASSAY BLOOD CARBON DIOXIDE: CPT | Mod: ZL | Performed by: FAMILY MEDICINE

## 2024-12-10 PROCEDURE — 36415 COLL VENOUS BLD VENIPUNCTURE: CPT | Mod: ZL | Performed by: FAMILY MEDICINE

## 2024-12-10 RX ORDER — LISINOPRIL 40 MG/1
40 TABLET ORAL DAILY
Qty: 90 TABLET | Refills: 2 | Status: SHIPPED | OUTPATIENT
Start: 2024-12-10

## 2024-12-10 RX ORDER — FINASTERIDE 5 MG/1
1 TABLET, FILM COATED ORAL DAILY
Qty: 90 TABLET | Refills: 2 | Status: SHIPPED | OUTPATIENT
Start: 2024-12-10

## 2024-12-10 RX ORDER — METOPROLOL TARTRATE 100 MG/1
100 TABLET ORAL 2 TIMES DAILY
Qty: 180 TABLET | Refills: 2 | Status: SHIPPED | OUTPATIENT
Start: 2024-12-10

## 2024-12-10 RX ORDER — ATORVASTATIN CALCIUM 40 MG/1
40 TABLET, FILM COATED ORAL DAILY
Qty: 90 TABLET | Refills: 2 | Status: SHIPPED | OUTPATIENT
Start: 2024-12-10

## 2024-12-10 RX ORDER — NYSTATIN AND TRIAMCINOLONE ACETONIDE 100000; 1 [USP'U]/G; MG/G
CREAM TOPICAL
Qty: 30 G | Refills: 0 | Status: SHIPPED | OUTPATIENT
Start: 2024-12-10

## 2024-12-10 RX ORDER — AMLODIPINE BESYLATE 5 MG/1
5 TABLET ORAL DAILY
Qty: 90 TABLET | Refills: 2 | Status: SHIPPED | OUTPATIENT
Start: 2024-12-10

## 2024-12-10 RX ORDER — CLOPIDOGREL BISULFATE 75 MG/1
75 TABLET ORAL DAILY
Qty: 90 TABLET | Refills: 2 | Status: SHIPPED | OUTPATIENT
Start: 2024-12-10

## 2024-12-10 ASSESSMENT — PAIN SCALES - GENERAL: PAINLEVEL_OUTOF10: NO PAIN (0)

## 2024-12-11 LAB — VIT B12 SERPL-MCNC: 727 PG/ML (ref 232–1245)

## 2025-03-02 ENCOUNTER — HOSPITAL ENCOUNTER (EMERGENCY)
Facility: HOSPITAL | Age: 80
Discharge: HOME | End: 2025-03-02
Attending: NURSE PRACTITIONER
Payer: COMMERCIAL

## 2025-03-02 VITALS
WEIGHT: 163.69 LBS | BODY MASS INDEX: 23.66 KG/M2 | DIASTOLIC BLOOD PRESSURE: 77 MMHG | OXYGEN SATURATION: 96 % | SYSTOLIC BLOOD PRESSURE: 130 MMHG | TEMPERATURE: 97 F | HEART RATE: 71 BPM | RESPIRATION RATE: 16 BRPM

## 2025-03-02 DIAGNOSIS — Z77.098 CHEMICAL EXPOSURE: ICD-10-CM

## 2025-03-02 PROCEDURE — 99282 EMERGENCY DEPT VISIT SF MDM: CPT

## 2025-03-02 PROCEDURE — 99282 EMERGENCY DEPT VISIT SF MDM: CPT | Performed by: NURSE PRACTITIONER

## 2025-03-02 ASSESSMENT — ENCOUNTER SYMPTOMS
HEMATOLOGIC/LYMPHATIC NEGATIVE: 1
EYES NEGATIVE: 1
ENDOCRINE NEGATIVE: 1
CARDIOVASCULAR NEGATIVE: 1
CONSTITUTIONAL NEGATIVE: 1
MUSCULOSKELETAL NEGATIVE: 1
RESPIRATORY NEGATIVE: 1
PSYCHIATRIC NEGATIVE: 1
ALLERGIC/IMMUNOLOGIC NEGATIVE: 1
GASTROINTESTINAL NEGATIVE: 1
NEUROLOGICAL NEGATIVE: 1

## 2025-03-02 ASSESSMENT — COLUMBIA-SUICIDE SEVERITY RATING SCALE - C-SSRS
2. HAVE YOU ACTUALLY HAD ANY THOUGHTS OF KILLING YOURSELF IN THE PAST MONTH?: NO
6. HAVE YOU EVER DONE ANYTHING, STARTED TO DO ANYTHING, OR PREPARED TO DO ANYTHING TO END YOUR LIFE?: NO
1. IN THE PAST MONTH, HAVE YOU WISHED YOU WERE DEAD OR WISHED YOU COULD GO TO SLEEP AND NOT WAKE UP?: NO

## 2025-03-02 NOTE — ED NOTES
Nurse provided petroleum based clear skin barrier cream, per providers recommendation, to protect patients skin after dish soap.

## 2025-03-02 NOTE — ED TRIAGE NOTES
Pt presents with c/o getting flex seal sprayed on his face and would like ER staff to remove it. Pt denies any breathing issues and no eye issues.

## 2025-03-02 NOTE — ED NOTES
Provider assisted patient with cleaning face with warm water and sarah dish soap - per MDS instruction. Patient expressed content.
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
## 2025-03-02 NOTE — DISCHARGE INSTRUCTIONS
Take warm shower and wash your face with soap and the water.  Keep the face hydrated with lotions as it will be irritated for the next few days.

## 2025-03-09 ENCOUNTER — HEALTH MAINTENANCE LETTER (OUTPATIENT)
Age: 80
End: 2025-03-09

## 2025-03-11 ENCOUNTER — TRANSFERRED RECORDS (OUTPATIENT)
Dept: HEALTH INFORMATION MANAGEMENT | Facility: CLINIC | Age: 80
End: 2025-03-11

## 2025-05-19 NOTE — PLAN OF CARE
Problem: Goal Outcome Summary  Goal: Goal Outcome Summary  Outcome: No Change  Patient alert with some forgetfulness, o2 sats low 90's on 4-5 LPM nasal cannula unchanged this shift. Patient up to dangle at bedside for approx 30 min, refusing to sit in chair. Using dilaudid PCA with effect. Abdomin rounded, having small amount of brown liquid stool. Patient self removed NG this shift, denies nausea/vomiting. Having elevated BP's at times this shift, prn hydralazine along with scheduled IV metoprolol given with effect. SCD's in place. Temple cath patient with adequate urine output. CVP line 8-12 unchanged    Problem: Bowel Obstruction (Adult)  Goal: Signs and Symptoms of Listed Potential Problems Will be Absent or Manageable (Bowel Obstruction)  Signs and symptoms of listed potential problems will be absent or manageable by discharge/transition of care (reference Bowel Obstruction (Adult) CPG).   Outcome: No Change    02/19/17 0459   Bowel Obstruction   Problems Assessed (Bowel Obstruction) pain            4 = No assist / stand by assistance

## 2025-06-22 ENCOUNTER — HEALTH MAINTENANCE LETTER (OUTPATIENT)
Age: 80
End: 2025-06-22

## 2025-08-26 ENCOUNTER — ANCILLARY PROCEDURE (OUTPATIENT)
Dept: GENERAL RADIOLOGY | Facility: OTHER | Age: 80
End: 2025-08-26
Attending: FAMILY MEDICINE
Payer: COMMERCIAL

## 2025-08-26 ENCOUNTER — OFFICE VISIT (OUTPATIENT)
Dept: FAMILY MEDICINE | Facility: OTHER | Age: 80
End: 2025-08-26
Attending: FAMILY MEDICINE
Payer: COMMERCIAL

## 2025-08-26 DIAGNOSIS — R05.8 PRODUCTIVE COUGH: ICD-10-CM

## 2025-08-26 PROCEDURE — 71046 X-RAY EXAM CHEST 2 VIEWS: CPT | Mod: TC

## 2025-08-26 PROCEDURE — 71046 X-RAY EXAM CHEST 2 VIEWS: CPT | Mod: 26 | Performed by: RADIOLOGY

## 2025-08-26 ASSESSMENT — PAIN SCALES - GENERAL: PAINLEVEL_OUTOF10: NO PAIN (0)

## 2025-09-04 ENCOUNTER — HOSPITAL ENCOUNTER (OUTPATIENT)
Dept: CT IMAGING | Facility: HOSPITAL | Age: 80
End: 2025-09-04
Attending: FAMILY MEDICINE
Payer: COMMERCIAL

## 2025-09-04 DIAGNOSIS — R93.89 ABNORMAL CXR (CHEST X-RAY): ICD-10-CM

## 2025-09-04 PROCEDURE — 71250 CT THORAX DX C-: CPT | Mod: 26 | Performed by: RADIOLOGY

## 2025-09-04 PROCEDURE — 71250 CT THORAX DX C-: CPT

## (undated) DEVICE — GLV-7.5 PROTEXIS PI CLASSIC LF/PF

## (undated) DEVICE — BIN-LENS IMPLANT CART

## (undated) DEVICE — CAUTERY TIP CLEANER

## (undated) DEVICE — TUBING-SUCTION 20FT

## (undated) DEVICE — TOWEL-OR DISP 4PKS

## (undated) DEVICE — BIN-TECNIS DCB00 LENSES

## (undated) DEVICE — BIN-CATARACT BIN

## (undated) DEVICE — CATH TRAY-16FR METER W/STATLOCK LATEX]

## (undated) DEVICE — DRSG-SPONGE STERILE 8 X 4

## (undated) DEVICE — LIGACLIP-LARGE 33.7CM CLIP APPLIER

## (undated) DEVICE — SUTURE-PROLENE 4-0 SH 8831H

## (undated) DEVICE — SUTURE-PDS II 0 CTX Z370T

## (undated) DEVICE — CAUTERY-EXTENDED 6.5" BLADE

## (undated) DEVICE — SUTURE-CHROMIC GUT 3-0 FS-2 636H

## (undated) DEVICE — BLANKET-BAIR UPPER BODY

## (undated) DEVICE — SUTURE-PROLENE 3-0 KS

## (undated) DEVICE — CYSTOTOME-IRRIGATING  25G

## (undated) DEVICE — SCD SLEEVE-KNEE REG.

## (undated) DEVICE — LABEL-STERILE PREPRINTED FOR OR

## (undated) DEVICE — KNIFE-MVR 20GA/45 DEGREE ANGLED

## (undated) DEVICE — SUTURE-VICRYL 3-0 SH J416H

## (undated) DEVICE — SUTURE-VICRYL 2-0 TIE J105T

## (undated) DEVICE — PACK-EYE-CUSTOM

## (undated) DEVICE — BLADE-SCALPEL #10

## (undated) DEVICE — GOWN-SURG XL LVL 3 REINFORCED

## (undated) DEVICE — CAUTERY PAD-POLYHESIVE II ADULT

## (undated) DEVICE — IRRIGATION-H2O 1000ML

## (undated) DEVICE — FORCEP-COLON C NEEDLE  SWING JAW  FB-220UA

## (undated) DEVICE — PACK-CLOSING-CUSTOM

## (undated) DEVICE — GLV-7.0 PROTEXIS PI CLASSIC LF/PF

## (undated) DEVICE — PACK-LAP LAVH-CUSTOM

## (undated) DEVICE — SPONGE-LAPAROTOMY PADS 18 X 18

## (undated) DEVICE — SUTURE-CHROMIC GUT 0 REEL L114G

## (undated) DEVICE — SUTURE-VICRYL 2-0 SH J417H

## (undated) DEVICE — STAPLER-PROXIMATE 3.5MM X 60MM

## (undated) DEVICE — SPONGE-PEANUT 3/8"

## (undated) DEVICE — BETADINE 5% STERILE OPHTHALMIC SOLUTION 1 OZ.

## (undated) DEVICE — TRAY-SKIN PREP POVIDONE/IODINE

## (undated) DEVICE — KNIFE-LASEREDGE CLEAR CORNEAL 2.75MM ANGLED DOUBLE BEVEL

## (undated) DEVICE — DRSG-TEGADERM LARGE 6"X8"

## (undated) DEVICE — PACK-LAPAROTOMY-CUSTOM

## (undated) DEVICE — DRSG-KERLIX ROLL 4.5 X 4.1YD

## (undated) DEVICE — SUTURE-VICRYL 2-0 CT-1 J945H

## (undated) DEVICE — CANISTER-SUCTION 2000CC

## (undated) DEVICE — PACK-PHACO STELLARIS

## (undated) DEVICE — IRRIGATION-NACL 1000ML

## (undated) DEVICE — Device

## (undated) DEVICE — SUTURE-VICRYL 2-0 CT-1 J345H

## (undated) DEVICE — CLAMP COVER-RADIOPAQUE FABRIC

## (undated) DEVICE — SUTURE-VICRYL 0 CT-2 J270H

## (undated) DEVICE — SUTURE-PDS II 1 CTX Z371T

## (undated) DEVICE — HANDPIECE-CAPSULEGUARD I/A STELLARIS

## (undated) DEVICE — DRSG-TEGADERM MEDIUM #1626

## (undated) DEVICE — LINEAR CUTTER-BLUE 75MM PROXIMATE

## (undated) DEVICE — TUBE-SALEM SUMP 18FR STOMACH SUCTION

## (undated) DEVICE — SYRINGE-ASEPTO IRRIGATION

## (undated) DEVICE — LIGHT HANDLE COVER

## (undated) DEVICE — INSTRUMENT WIPE-VISIWIPE

## (undated) DEVICE — APPLICATOR-CHLORAPREP 26ML TINTED CHG 2%+ 70% IPA-SURGICAL

## (undated) DEVICE — STAPLER-29MM INTRALUMINAL CURVED

## (undated) DEVICE — STAPLER-SKIN 35 WIDE STAPLES

## (undated) RX ORDER — FLUMAZENIL 0.1 MG/ML
INJECTION, SOLUTION INTRAVENOUS
Status: DISPENSED
Start: 2017-02-17

## (undated) RX ORDER — ONDANSETRON 2 MG/ML
INJECTION INTRAMUSCULAR; INTRAVENOUS
Status: DISPENSED
Start: 2017-09-21

## (undated) RX ORDER — PROPOFOL 10 MG/ML
INJECTION, EMULSION INTRAVENOUS
Status: DISPENSED
Start: 2017-02-17

## (undated) RX ORDER — EPHEDRINE SULFATE 50 MG/ML
INJECTION, SOLUTION INTRAMUSCULAR; INTRAVENOUS; SUBCUTANEOUS
Status: DISPENSED
Start: 2017-09-21

## (undated) RX ORDER — NEOSTIGMINE METHYLSULFATE 1 MG/ML
VIAL (ML) INJECTION
Status: DISPENSED
Start: 2017-09-21

## (undated) RX ORDER — LIDOCAINE HYDROCHLORIDE 20 MG/ML
INJECTION, SOLUTION EPIDURAL; INFILTRATION; INTRACAUDAL; PERINEURAL
Status: DISPENSED
Start: 2017-09-21

## (undated) RX ORDER — GLYCOPYRROLATE 0.2 MG/ML
INJECTION, SOLUTION INTRAMUSCULAR; INTRAVENOUS
Status: DISPENSED
Start: 2017-02-17

## (undated) RX ORDER — PROPOFOL 10 MG/ML
INJECTION, EMULSION INTRAVENOUS
Status: DISPENSED
Start: 2017-09-21

## (undated) RX ORDER — NEOSTIGMINE METHYLSULFATE 1 MG/ML
VIAL (ML) INJECTION
Status: DISPENSED
Start: 2017-02-17

## (undated) RX ORDER — GLYCOPYRROLATE 0.2 MG/ML
INJECTION, SOLUTION INTRAMUSCULAR; INTRAVENOUS
Status: DISPENSED
Start: 2017-09-21

## (undated) RX ORDER — ONDANSETRON 2 MG/ML
INJECTION INTRAMUSCULAR; INTRAVENOUS
Status: DISPENSED
Start: 2017-02-17

## (undated) RX ORDER — EPHEDRINE SULFATE 50 MG/ML
INJECTION, SOLUTION INTRAMUSCULAR; INTRAVENOUS; SUBCUTANEOUS
Status: DISPENSED
Start: 2017-02-17

## (undated) RX ORDER — FENTANYL CITRATE 50 UG/ML
INJECTION, SOLUTION INTRAMUSCULAR; INTRAVENOUS
Status: DISPENSED
Start: 2017-02-17

## (undated) RX ORDER — PHENYLEPHRINE HCL IN 0.9% NACL 1 MG/10 ML
SYRINGE (ML) INTRAVENOUS
Status: DISPENSED
Start: 2017-09-21

## (undated) RX ORDER — PHENYLEPHRINE HCL IN 0.9% NACL 1 MG/10 ML
SYRINGE (ML) INTRAVENOUS
Status: DISPENSED
Start: 2017-02-17

## (undated) RX ORDER — DEXAMETHASONE SODIUM PHOSPHATE 10 MG/ML
INJECTION, SOLUTION INTRAMUSCULAR; INTRAVENOUS
Status: DISPENSED
Start: 2017-09-21

## (undated) RX ORDER — LIDOCAINE HYDROCHLORIDE 20 MG/ML
INJECTION, SOLUTION EPIDURAL; INFILTRATION; INTRACAUDAL; PERINEURAL
Status: DISPENSED
Start: 2017-02-17

## (undated) RX ORDER — FENTANYL CITRATE 50 UG/ML
INJECTION, SOLUTION INTRAMUSCULAR; INTRAVENOUS
Status: DISPENSED
Start: 2017-09-21

## (undated) RX ORDER — DEXAMETHASONE SODIUM PHOSPHATE 10 MG/ML
INJECTION, SOLUTION INTRAMUSCULAR; INTRAVENOUS
Status: DISPENSED
Start: 2017-02-17